# Patient Record
Sex: MALE | Race: WHITE | NOT HISPANIC OR LATINO | Employment: OTHER | URBAN - METROPOLITAN AREA
[De-identification: names, ages, dates, MRNs, and addresses within clinical notes are randomized per-mention and may not be internally consistent; named-entity substitution may affect disease eponyms.]

---

## 2017-01-30 RX ORDER — ZOLPIDEM TARTRATE 10 MG/1
TABLET ORAL
Qty: 30 TABLET | Refills: 0 | Status: SHIPPED | OUTPATIENT
Start: 2017-01-30 | End: 2017-03-15 | Stop reason: SDUPTHER

## 2017-03-15 RX ORDER — ZOLPIDEM TARTRATE 10 MG/1
TABLET ORAL
Qty: 30 TABLET | Refills: 0 | Status: SHIPPED | OUTPATIENT
Start: 2017-03-15 | End: 2017-05-16 | Stop reason: SDUPTHER

## 2017-05-17 RX ORDER — ZOLPIDEM TARTRATE 10 MG/1
TABLET ORAL
Qty: 30 TABLET | Refills: 0 | Status: SHIPPED | OUTPATIENT
Start: 2017-05-17 | End: 2017-06-14 | Stop reason: SDUPTHER

## 2017-06-10 ENCOUNTER — PATIENT MESSAGE (OUTPATIENT)
Dept: INTERNAL MEDICINE | Facility: CLINIC | Age: 62
End: 2017-06-10

## 2017-06-15 RX ORDER — ZOLPIDEM TARTRATE 10 MG/1
TABLET ORAL
Qty: 30 TABLET | Refills: 0 | Status: SHIPPED | OUTPATIENT
Start: 2017-06-15 | End: 2018-01-03 | Stop reason: SDUPTHER

## 2017-06-30 ENCOUNTER — LAB VISIT (OUTPATIENT)
Dept: LAB | Facility: HOSPITAL | Age: 62
End: 2017-06-30
Attending: FAMILY MEDICINE
Payer: COMMERCIAL

## 2017-06-30 ENCOUNTER — OFFICE VISIT (OUTPATIENT)
Dept: INTERNAL MEDICINE | Facility: CLINIC | Age: 62
End: 2017-06-30
Payer: COMMERCIAL

## 2017-06-30 VITALS
HEART RATE: 81 BPM | BODY MASS INDEX: 23.92 KG/M2 | SYSTOLIC BLOOD PRESSURE: 110 MMHG | OXYGEN SATURATION: 98 % | WEIGHT: 176.56 LBS | DIASTOLIC BLOOD PRESSURE: 60 MMHG | HEIGHT: 72 IN

## 2017-06-30 DIAGNOSIS — E78.5 HYPERLIPIDEMIA, UNSPECIFIED HYPERLIPIDEMIA TYPE: ICD-10-CM

## 2017-06-30 DIAGNOSIS — C02.9 TONGUE CANCER: ICD-10-CM

## 2017-06-30 DIAGNOSIS — Z00.00 ANNUAL PHYSICAL EXAM: Primary | ICD-10-CM

## 2017-06-30 DIAGNOSIS — Z00.00 ANNUAL PHYSICAL EXAM: ICD-10-CM

## 2017-06-30 DIAGNOSIS — Z12.5 PROSTATE CANCER SCREENING: ICD-10-CM

## 2017-06-30 DIAGNOSIS — Z12.11 COLON CANCER SCREENING: ICD-10-CM

## 2017-06-30 DIAGNOSIS — G47.00 INSOMNIA, UNSPECIFIED TYPE: ICD-10-CM

## 2017-06-30 LAB
ANION GAP SERPL CALC-SCNC: 13 MMOL/L
BUN SERPL-MCNC: 17 MG/DL
CALCIUM SERPL-MCNC: 10.1 MG/DL
CHLORIDE SERPL-SCNC: 108 MMOL/L
CHOLEST/HDLC SERPL: 3.8 {RATIO}
CO2 SERPL-SCNC: 23 MMOL/L
COMPLEXED PSA SERPL-MCNC: 0.86 NG/ML
CREAT SERPL-MCNC: 1.3 MG/DL
EST. GFR  (AFRICAN AMERICAN): >60 ML/MIN/1.73 M^2
EST. GFR  (NON AFRICAN AMERICAN): 58.5 ML/MIN/1.73 M^2
GLUCOSE SERPL-MCNC: 92 MG/DL
HDL/CHOLESTEROL RATIO: 26 %
HDLC SERPL-MCNC: 250 MG/DL
HDLC SERPL-MCNC: 65 MG/DL
LDLC SERPL CALC-MCNC: 164 MG/DL
NONHDLC SERPL-MCNC: 185 MG/DL
POTASSIUM SERPL-SCNC: 4.6 MMOL/L
SODIUM SERPL-SCNC: 144 MMOL/L
TRIGL SERPL-MCNC: 105 MG/DL

## 2017-06-30 PROCEDURE — 80048 BASIC METABOLIC PNL TOTAL CA: CPT

## 2017-06-30 PROCEDURE — 99396 PREV VISIT EST AGE 40-64: CPT | Mod: S$GLB,,, | Performed by: FAMILY MEDICINE

## 2017-06-30 PROCEDURE — 84153 ASSAY OF PSA TOTAL: CPT

## 2017-06-30 PROCEDURE — 36415 COLL VENOUS BLD VENIPUNCTURE: CPT

## 2017-06-30 PROCEDURE — 99999 PR PBB SHADOW E&M-EST. PATIENT-LVL IV: CPT | Mod: PBBFAC,,, | Performed by: FAMILY MEDICINE

## 2017-06-30 PROCEDURE — 80061 LIPID PANEL: CPT

## 2017-06-30 NOTE — PROGRESS NOTES
Subjective:       Patient ID: Timothy Galdamez is a 62 y.o. male.    Chief Complaint: Annual Exam    Established patient for an annual wellness check/physical exam. No specific complaints, please see ROS.        Review of Systems   Constitutional: Negative for chills, fatigue and fever.   HENT: Negative for congestion and trouble swallowing.    Eyes: Negative for redness.   Respiratory: Negative for cough, chest tightness and shortness of breath.    Cardiovascular: Negative for chest pain, palpitations and leg swelling.   Gastrointestinal: Negative for abdominal pain and blood in stool.   Genitourinary: Negative for hematuria.   Musculoskeletal: Negative for arthralgias, back pain, gait problem, joint swelling, myalgias and neck pain.   Skin: Negative for color change and rash.   Neurological: Negative for tremors, speech difficulty, weakness, numbness and headaches.   Hematological: Negative for adenopathy. Does not bruise/bleed easily.   Psychiatric/Behavioral: Positive for sleep disturbance. Negative for behavioral problems and confusion. The patient is not nervous/anxious.        Objective:      Physical Exam   Constitutional: He appears well-developed and well-nourished.   HENT:   Head: Normocephalic.   Right Ear: Tympanic membrane, external ear and ear canal normal.   Left Ear: Tympanic membrane, external ear and ear canal normal.   Mouth/Throat: Oropharynx is clear and moist.   Eyes: Pupils are equal, round, and reactive to light.   Neck: Normal range of motion. Neck supple. Carotid bruit is not present. No thyromegaly present.   Cardiovascular: Normal rate, regular rhythm, normal heart sounds and intact distal pulses.  Exam reveals no gallop and no friction rub.    No murmur heard.  Pulmonary/Chest: Effort normal and breath sounds normal.   Abdominal: Soft. Bowel sounds are normal. He exhibits no distension and no mass. There is no tenderness. There is no rebound and no guarding.   Musculoskeletal: Normal  range of motion. He exhibits no edema or tenderness.   Lymphadenopathy:     He has no cervical adenopathy.   Neurological: He is alert. He has normal strength. He displays normal reflexes. No cranial nerve deficit or sensory deficit. Coordination and gait normal.   Skin: Skin is warm and dry.   Psychiatric: He has a normal mood and affect. His behavior is normal. Judgment and thought content normal.   Nursing note and vitals reviewed.      Assessment:       1. Annual physical exam    2. Hyperlipidemia, unspecified hyperlipidemia type    3. Insomnia, unspecified type    4. Tongue cancer    5. Colon cancer screening    6. Prostate cancer screening        Plan:   Timothy was seen today for annual exam.    Diagnoses and all orders for this visit:    Annual physical exam  -     Basic metabolic panel; Future  -     Lipid panel; Future  -     PSA, Screening; Future    Hyperlipidemia, unspecified hyperlipidemia type  -     Lipid panel; Future    Insomnia, unspecified type    Tongue cancer    Colon cancer screening  -     Case request GI: COLONOSCOPY    Prostate cancer screening  -     PSA, Screening; Future      See meds, orders, follow up, routing and instructions sections of encounter.  Dictation #1  MRN:[id number]  [facility]:[id number]

## 2017-06-30 NOTE — PATIENT INSTRUCTIONS
[phone] - [facility].  Clinical  or Psychologist            Insomnia  Insomnia is repeated difficulty going to sleep or staying asleep, or both. Whether you have insomnia is not defined by a specific amount of sleep. Different people need different amounts of sleep, and you may need more or less sleep at different times of your life.  There are 3 major types of insomnia:  short-term, chronic, and other.  Short-term, or acute insomnia lasts less than 3 months.  The symptoms are temporary and can be linked directly to a stressor, such as the death of a loved one, financial problems, or a new physical problem.  Short-term insomnia stops when the stressor resolves or the person adapts to its presence.  Chronic insomnia occurs at least 3 times a week and lasts longer than 3 months.  Chronic insomnia can occur when either the cause of the sleeping problem is not clear, or the insomnia does not get better when the stressor is resolved. A number of other criteria are also used to make the diagnosis of chronic insomnia.   Other insomnia is the third type of insomnia-related sleep disorders.  This description applies to people who have problems getting to sleep or staying asleep, but do not meet all of the factors that describe either short-term or chronic insomnia.    Many things cause insomnia. Different people may have different causes. It can be from an underlying medical or psychological condition, or lifestyle. It can also be primary insomnia, which means no cause can be found.  Causes of insomnia include:  · Chronic medical problems- heart disease, gastrointestinal problems, hormonal changes, breathing problems  · Anxiety  · Stress  · Depression  · Pain  · Work schedule  · Sleep apnea  · Illegal drugs  · Certain medicines  Many different medidcines can affect your sleep, such as stimulants, caffeine, alcohol, some decongestants, and diet pills. Other medicines may include some types of  blood pressure pills, steroids, asthma medicines, antihistamines, antidepressants, seizure medicines and statins. Not all of these will affect your sleep, and they shouldnt be stopped without talking to your doctor.  Symptoms of insomnia can include:  · Lying awake for long periods at night before falling asleep  · Waking up several times during the night  · Waking up early in the morning and not being able to get back to sleep  · Feeling tired and not refreshed by sleep  · Not being able to function properly during the day and finding it hard to concentrate  · Irritability  · Tiredness and fatigue during the day  Home care  1. Review your medicines with your doctor or pharmacist to find out if they can cause insomnia. Not all medicines will affect your sleep, but they shouldn't be stopped without reviewing them with your doctor. There may be serious side effects and consequences from suddenly stopping your medicines. Not taking them may cause strokes, heart attacks, and many other problems.  2. Caffeine, smoking and alcohol also affect sleep. Limit your daily use and do not use these before bedtime. Alcohol may make you sleepy at first, but as its effects wear off, you may awaken a few hours later and have trouble returning to sleep.  3. Do not exercise, eat or drink large amounts of liquid within 2 hours of your bedtime.  4. Improve your sleep habits. Have a fixed bed and wake-up time. Try to keep noise, light and heat in your bedroom at a comfortable level. Try using earplugs or eyeshades if needed.   5. Avoid watching TV in bed.  6. If you do not fall asleep within 30 minutes, try to relax by reading or listening to soft music.  7. Limit daytime napping to one 30 minute period, early in the day.  8. Get regular exercise. Find other ways to lessen your stress level.  9. If a medicine was prescribed to help reset your sleep patterns, take it as directed. Sleeping pills are intended for short-term use, only. If  taken for too long, the effect wears off while the risk of physical addiction and psychological dependence increases.  Sleep diary  If the cause isnt obvious and it is not improving, try keeping a sleep diary for a couple of weeks. Include in it:  · The time you go to bed  · How long it takes to fall asleep  · How many times you wake up  · What time you wake up  · Your meal times and what you eat  · What time you drink alcohol  · Your exercise habits and times  Follow-up care  Follow up with your healthcare provider, or as advised. If X-rays or CT scans were done, you will be notified if there is a change in the reading, especially if it affects treatment.  Call 911  Call 911 if any of these occur:  · Trouble breathing  · Confusion or trouble waking  · Fainting or loss of consciousness  · Rapid heart rate  · New chest, arm, shoulder, neck or upper back pain  · Trouble with speech or vision, weakness of an arm or leg  · Trouble walking or talking, loss of balance, numbness or weakness in one side of your body, facial droop  When to seek medical advice  Call your healthcare provider right away if any of these occur:  · Extreme restlessness or irritability  · Confusion or hallucinations (seeing or hearing things that are not there)  · Anxiety, depression  · Several days without sleeping  Date Last Reviewed: 11/19/2015  © 5724-0285 [employer]. [address] [place] [address], [place] [address]. All rights reserved. This information is not intended as a substitute for professional medical care. Always follow your healthcare professional's instructions.

## 2017-07-05 ENCOUNTER — PATIENT MESSAGE (OUTPATIENT)
Dept: INTERNAL MEDICINE | Facility: CLINIC | Age: 62
End: 2017-07-05

## 2017-07-05 DIAGNOSIS — E78.5 HYPERLIPIDEMIA, UNSPECIFIED HYPERLIPIDEMIA TYPE: Primary | ICD-10-CM

## 2017-07-05 DIAGNOSIS — Z12.11 SPECIAL SCREENING FOR MALIGNANT NEOPLASMS, COLON: Primary | ICD-10-CM

## 2017-07-05 RX ORDER — PRAVASTATIN SODIUM 40 MG/1
40 TABLET ORAL DAILY
Qty: 30 TABLET | Refills: 11 | Status: SHIPPED | OUTPATIENT
Start: 2017-07-05 | End: 2018-08-02 | Stop reason: SDUPTHER

## 2017-07-05 RX ORDER — SODIUM, POTASSIUM,MAG SULFATES 17.5-3.13G
SOLUTION, RECONSTITUTED, ORAL ORAL
Qty: 1 BOTTLE | Refills: 0 | Status: SHIPPED | OUTPATIENT
Start: 2017-07-05 | End: 2018-06-04

## 2017-08-21 ENCOUNTER — SURGERY (OUTPATIENT)
Age: 62
End: 2017-08-21

## 2017-08-21 ENCOUNTER — ANESTHESIA (OUTPATIENT)
Dept: ENDOSCOPY | Facility: HOSPITAL | Age: 62
End: 2017-08-21
Payer: COMMERCIAL

## 2017-08-21 ENCOUNTER — ANESTHESIA EVENT (OUTPATIENT)
Dept: ENDOSCOPY | Facility: HOSPITAL | Age: 62
End: 2017-08-21
Payer: COMMERCIAL

## 2017-08-21 ENCOUNTER — HOSPITAL ENCOUNTER (OUTPATIENT)
Facility: HOSPITAL | Age: 62
Discharge: HOME | End: 2017-08-21
Attending: COLON & RECTAL SURGERY | Admitting: COLON & RECTAL SURGERY
Payer: COMMERCIAL

## 2017-08-21 VITALS
RESPIRATION RATE: 18 BRPM | HEART RATE: 56 BPM | OXYGEN SATURATION: 100 % | DIASTOLIC BLOOD PRESSURE: 81 MMHG | TEMPERATURE: 98 F | HEIGHT: 73 IN | WEIGHT: 175 LBS | SYSTOLIC BLOOD PRESSURE: 131 MMHG | BODY MASS INDEX: 23.19 KG/M2

## 2017-08-21 DIAGNOSIS — Z12.11 SPECIAL SCREENING FOR MALIGNANT NEOPLASMS, COLON: ICD-10-CM

## 2017-08-21 PROCEDURE — 63600175 PHARM REV CODE 636 W HCPCS: Performed by: NURSE ANESTHETIST, CERTIFIED REGISTERED

## 2017-08-21 PROCEDURE — D9220A PRA ANESTHESIA: Mod: 33,CRNA,, | Performed by: NURSE ANESTHETIST, CERTIFIED REGISTERED

## 2017-08-21 PROCEDURE — S5010 5% DEXTROSE AND 0.45% SALINE: HCPCS | Performed by: COLON & RECTAL SURGERY

## 2017-08-21 PROCEDURE — 25000003 PHARM REV CODE 250: Performed by: COLON & RECTAL SURGERY

## 2017-08-21 PROCEDURE — 37000009 HC ANESTHESIA EA ADD 15 MINS: Performed by: COLON & RECTAL SURGERY

## 2017-08-21 PROCEDURE — G0121 COLON CA SCRN NOT HI RSK IND: HCPCS | Performed by: COLON & RECTAL SURGERY

## 2017-08-21 PROCEDURE — 37000008 HC ANESTHESIA 1ST 15 MINUTES: Performed by: COLON & RECTAL SURGERY

## 2017-08-21 PROCEDURE — G0121 COLON CA SCRN NOT HI RSK IND: HCPCS | Mod: ,,, | Performed by: COLON & RECTAL SURGERY

## 2017-08-21 PROCEDURE — D9220A PRA ANESTHESIA: Mod: 33,ANES,, | Performed by: ANESTHESIOLOGY

## 2017-08-21 RX ORDER — LIDOCAINE HCL/PF 100 MG/5ML
SYRINGE (ML) INTRAVENOUS
Status: DISCONTINUED | OUTPATIENT
Start: 2017-08-21 | End: 2017-08-21

## 2017-08-21 RX ORDER — PROPOFOL 10 MG/ML
VIAL (ML) INTRAVENOUS CONTINUOUS PRN
Status: DISCONTINUED | OUTPATIENT
Start: 2017-08-21 | End: 2017-08-21

## 2017-08-21 RX ORDER — PROPOFOL 10 MG/ML
VIAL (ML) INTRAVENOUS
Status: DISCONTINUED | OUTPATIENT
Start: 2017-08-21 | End: 2017-08-21

## 2017-08-21 RX ORDER — DEXTROSE MONOHYDRATE AND SODIUM CHLORIDE 5; .45 G/100ML; G/100ML
INJECTION, SOLUTION INTRAVENOUS CONTINUOUS
Status: DISCONTINUED | OUTPATIENT
Start: 2017-08-21 | End: 2017-08-21 | Stop reason: HOSPADM

## 2017-08-21 RX ADMIN — DEXTROSE AND SODIUM CHLORIDE: 5; .45 INJECTION, SOLUTION INTRAVENOUS at 08:08

## 2017-08-21 RX ADMIN — PROPOFOL 150 MCG/KG/MIN: 10 INJECTION, EMULSION INTRAVENOUS at 09:08

## 2017-08-21 RX ADMIN — PROPOFOL 80 MG: 10 INJECTION, EMULSION INTRAVENOUS at 09:08

## 2017-08-21 RX ADMIN — LIDOCAINE HYDROCHLORIDE 100 MG: 20 INJECTION, SOLUTION INTRAVENOUS at 09:08

## 2017-08-21 NOTE — ANESTHESIA PREPROCEDURE EVALUATION
2017  Timothy Galdamez is a 62 y.o., male.  Pre-operative evaluation for Procedure(s) (LRB):  COLONOSCOPY (N/A)    Timothy Galdamez is a 62 y.o. male     Patient Active Problem List   Diagnosis    Insomnia    Hyperlipidemia    Tongue cancer       Review of patient's allergies indicates:  No Known Allergies    No current facility-administered medications on file prior to encounter.      Current Outpatient Prescriptions on File Prior to Encounter   Medication Sig Dispense Refill    meloxicam (MOBIC) 15 MG tablet       sildenafil (VIAGRA) 100 MG tablet Take 1 tablet (100 mg total) by mouth daily as needed for Erectile Dysfunction. 6 tablet 11    zolpidem (AMBIEN) 10 mg Tab TAKE ONE TABLET BY MOUTH EVERY DAY AT BEDTIME AS NEEDED 30 tablet 0       Past Surgical History:   Procedure Laterality Date    ANKLE SURGERY      L ankle    TONSILLECTOMY      VASECTOMY         Social History     Social History    Marital status: Single     Spouse name: N/A    Number of children: N/A    Years of education: N/A     Occupational History    Not on file.     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                     Vital Signs Range (Last 24H):         CBC: No results for input(s): WBC, RBC, HGB, HCT, PLT, MCV, MCH, MCHC in the last 72 hours.    CMP: No results for input(s): NA, K, CL, CO2, BUN, CREATININE, GLU, MG, PHOS, CALCIUM, ALBUMIN, PROT, ALKPHOS, ALT, AST, BILITOT in the last 72 hours.    INR  No results for input(s): INR, PROTIME, APTT in the last 72 hours.    Invalid input(s): PT        Diagnostic Studies:      EKD  Echo:        Pre-op Assessment    I have reviewed the Patient Summary Reports.     I have reviewed the Nursing Notes.   I have reviewed the Medications.     Review of Systems  Anesthesia Hx:  No problems with previous Anesthesia  History of prior surgery of interest to airway management or planning: Denies Family Hx of Anesthesia complications.    Social:  Non-Smoker, Alcohol Use Occasional alcohol use   Cardiovascular:   Exercise tolerance: good Denies Hypertension.      hyperlipidemia    Pulmonary:   Denies COPD.  Denies Recent URI.    Renal/:   Denies Chronic Renal Disease.     Hepatic/GI:   Denies GERD.  Denies Hepatitis.    Musculoskeletal:   Denies Arthritis.     Neurological:   Denies CVA. Denies Seizures.    Endocrine:   Denies Diabetes. Denies Hypothyroidism.        Physical Exam  General:  Well nourished    Airway/Jaw/Neck:  Airway Findings: Mouth Opening: Normal Tongue: Normal  General Airway Assessment: Adult  Mallampati: II  TM Distance: Normal, at least 6 cm  Jaw/Neck Findings:  Neck ROM: Normal ROM      Dental:  Dental Findings: In tact   Chest/Lungs:  Chest/Lungs Findings: Clear to auscultation, Normal Respiratory Rate     Heart/Vascular:  Heart Findings: Rate: Normal  Rhythm: Regular Rhythm        Mental Status:  Mental Status Findings:  Cooperative, Alert and Oriented         Anesthesia Plan  Type of Anesthesia, risks & benefits discussed:  Anesthesia Type:  general  Patient's Preference: general  Intra-op Monitoring Plan: standard ASA monitors  Intra-op Monitoring Plan Comments:   Post Op Pain Control Plan: per primary service following discharge from PACU  Post Op Pain Control Plan Comments:   Induction:   IV  Beta Blocker:  Patient is not currently on a Beta-Blocker (No further documentation required).       Informed Consent: Patient understands risks and agrees with Anesthesia plan.  Questions answered. Anesthesia consent signed with patient.  ASA Score: 1     Day of Surgery Review of History  & Physical: I have interviewed and examined the patient. I have reviewed the patient's H&P dated: 7/15/14.   H&P update referred to the provider.         Ready For Surgery From Anesthesia Perspective.

## 2017-08-21 NOTE — PATIENT INSTRUCTIONS
Discharge Summary/Instructions after an Endoscopic Procedure  Patient Name: Timothy Galdamez  Patient MRN: [id number]  Patient YOB: 1955  Monday, August 21, 2017  Danny Jane MD  RESTRICTIONS:  During your procedure today, you received medications for sedation.  These   medications may affect your judgment, balance and coordination.  Therefore,   for 24 hours, you have the following restrictions:   - DO NOT drive a car, operate machinery, make legal/financial decisions,   sign important papers or drink alcohol.    ACTIVITY:  The following day: return to full activity including work, except no heavy   lifting, straining or running for 3 days if polyps were removed.  DIET:  Eat and drink normally unless instructed otherwise.  TREATMENT FOR COMMON SIDE EFFECTS:  - Mild abdominal pain, belching, bloating or excessive gas: rest, eat   lightly and use a heating pad.  - Sore Throat: treat with throat lozenges and/or gargle with warm salt   water.  SYMPTOMS TO WATCH FOR AND REPORT TO YOUR PHYSICIAN:  1. Abdominal pain or bloating, other than gas cramps.  2. Chest pain.  3. Back pain.  4. Chills or fever occurring within 24 hours after the procedure.  5. Rectal bleeding, which would show as bright red, maroon, or black stools.   (A tablespoon of blood from the rectum is not serious, especially if   hemorrhoids are present.)  6. Vomiting.  7. Weakness or dizziness.  8. Because air was used during the procedure, expelling large amounts of air   from your rectum or belching is normal.  9. If a bowel prep was taken, you may not have a bowel movement for 1-3   days.  This is normal.  GO DIRECTLY TO THE EMERGENCY ROOM IF YOU HAVE ANY OF THE FOLLOWING:   Difficulty breathing  Chills and/or fever over 101 F   Persistent vomiting and/or vomiting blood   Severe abdominal pain   Severe chest pain   Black, tarry stools   Bleeding- more than one tablespoon  Your doctor recommends these additional instructions:  If any biopsies  were taken, your doctorâs clinic will call you in 1 to 2   weeks with any results.  You are being discharged to home.   Your physician has recommended a repeat colonoscopy in 10 years for   screening purposes.  For questions, problems or results please call your physician - Danny Jane MD at Work:  [phone].  [facility], EMERGENCY ROOM PHONE NUMBER: [phone]  IF A COMPLICATION OR EMERGENCY SITUATION ARISES AND YOU ARE UNABLE TO REACH   YOUR PHYSICIAN - GO DIRECTLY TO THE EMERGENCY ROOM.  Danny Jane MD  8/21/2017 9:20:42 AM  This report has been verified and signed electronically.

## 2017-08-21 NOTE — TRANSFER OF CARE
"Anesthesia Transfer of Care Note    Patient: Timothy GREENE Rudolph    Procedure(s) Performed: Procedure(s) (LRB):  COLONOSCOPY (N/A)    Patient location: GI    Anesthesia Type: general    Transport from OR: Transported from OR on room air with adequate spontaneous ventilation    Post pain: adequate analgesia    Post assessment: no apparent anesthetic complications    Post vital signs: stable    Level of consciousness: awake    Nausea/Vomiting: no nausea/vomiting    Transfer of care protocol was followed      Last vitals:   Visit Vitals  BP (!) 97/56 (BP Location: Left arm, Patient Position: Lying)   Pulse 61   Temp 36.7 °C (98.1 °F) (Oral)   Resp 18   Ht 6' 1" (1.854 m)   Wt 79.4 kg (175 lb)   SpO2 98%   BMI 23.09 kg/m²     "

## 2017-08-21 NOTE — ANESTHESIA POSTPROCEDURE EVALUATION
"Anesthesia Post Evaluation    Patient: Timothy Galdamez    Procedure(s) Performed: Procedure(s) (LRB):  COLONOSCOPY (N/A)    Final Anesthesia Type: general  Patient location during evaluation: GI PACU  Patient participation: Yes- Able to Participate  Level of consciousness: awake and alert  Post-procedure vital signs: reviewed and stable  Pain management: adequate  Airway patency: patent  PONV status at discharge: No PONV  Anesthetic complications: no      Cardiovascular status: blood pressure returned to baseline  Respiratory status: unassisted, spontaneous ventilation and room air  Hydration status: euvolemic  Follow-up not needed.        Visit Vitals  /81 (BP Location: Left arm, Patient Position: Lying)   Pulse (!) 56   Temp 36.7 °C (98 °F) (Oral)   Resp 18   Ht 6' 1" (1.854 m)   Wt 79.4 kg (175 lb)   SpO2 100%   BMI 23.09 kg/m²       Pain/Stephy Score: Pain Assessment Performed: Yes (8/21/2017  9:45 AM)  Presence of Pain: denies (8/21/2017  9:45 AM)  Stephy Score: 10 (8/21/2017  9:45 AM)      "

## 2017-08-21 NOTE — DISCHARGE INSTRUCTIONS
Colonoscopy     A camera attached to a flexible tube with a viewing lens is used to take video pictures.     Colonoscopy is a test to view the inside of your lower digestive tract (colon and rectum). Sometimes it can show the last part of the small intestine (ileum). During the test, small pieces of tissue may be removed for testing. This is called a biopsy. Small growths, such as polyps, may also be removed.   Why is colonoscopy done?  The test is done to help look for colon cancer. And it can help find the source of abdominal pain, bleeding, and changes in bowel habits. It may be needed once a year, depending on factors such as your:  · Age  · Health history  · Family health history  · Symptoms  · Results from any prior colonoscopy  Risks and possible complications  These include:  · Bleeding               · A puncture or tear in the colon   · Risks of anesthesia  · A cancer lesion not being seen  Getting ready   To prepare for the test:  · Talk with your healthcare provider about the risks of the test (see below). Also ask your healthcare provider about alternatives to the test.  · Tell your healthcare provider about any medicines you take. Also tell him or her about any health conditions you may have.  · Make sure your rectum and colon are empty for the test. Follow the diet and bowel prep instructions exactly. If you dont, the test may need to be rescheduled.  · Plan for a friend or family member to drive you home after the test.     Colonoscopy provides an inside view of the entire colon.     You may discuss the results with your doctor right away or at a future visit.  During the test   The test is usually done in the hospital on an outpatient basis. This means you go home the same day. The procedure takes about 30 minutes. During that time:  · You are given relaxing (sedating) medicine through an IV line. You may be drowsy, or fully asleep.  · The healthcare provider will first give you a physical exam to  check for anal and rectal problems.  · Then the anus is lubricated and the scope inserted.  · If you are awake, you may have a feeling similar to needing to have a bowel movement. You may also feel pressure as air is pumped into the colon. Its OK to pass gas during the procedure.  · Biopsy, polyp removal, or other treatments may be done during the test.  After the test   You may have gas right after the test. It can help to try to pass it to help prevent later bloating. Your healthcare provider may discuss the results with you right away. Or you may need to schedule a follow-up visit to talk about the results. After the test, you can go back to your normal eating and other activities. You may be tired from the sedation and need to rest for a few hours.  Date Last Reviewed: 11/1/2016  © 9493-5174 [employer]. [address] [place] [address], [place] [address]. All rights reserved. This information is not intended as a substitute for professional medical care. Always follow your healthcare professional's instructions.

## 2017-08-21 NOTE — PLAN OF CARE
Discharge instructions reviewed with pt and pts wife. Both verbalize understanding and have no questions at this time.

## 2017-08-21 NOTE — H&P
Endoscopy H&P    Procedure : Colonoscopy      asymptomatic screening exam      Past Medical History:   Diagnosis Date    Hyperlipidemia      Sedation Problems: NO  Family History   Problem Relation Age of Onset    Psoriasis Neg Hx     Eczema Neg Hx      Fam Hx of Sedation Problems: NO  Social History     Social History    Marital status: Single     Spouse name: N/A    Number of children: N/A    Years of education: N/A     Occupational History    Not on file.     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                   Review of Systems - Negative     Respiratory ROS: negative  Cardiovascular ROS: negative  Gastrointestinal ROS: negative  Musculoskeletal ROS: negative  Neurological ROS: negative        Physical Exam:  General: no distress  Head: normocephalic  Airway:  normal oropharynx, airway normal  Neck: supple, symmetrical, trachea midline  Lungs:  clear to auscultation bilaterally and normal respiratory effort  Heart: regular rate and rhythm, S1, S2 normal, no murmur, rub or gallop  Abdomen: soft, non-tender non-distented; bowel sounds normal; no masses,  no organomegaly  Extremities: no cyanosis or edema, or clubbing       Deep Sedation: Mallampati Score per anesthesia     SedationPlan :Gen     ASA : II  Have seen and examined the patient with the fellow and agree with their plan.  MALGORZATA DENNIS

## 2017-08-28 ENCOUNTER — TELEPHONE (OUTPATIENT)
Dept: ENDOSCOPY | Facility: HOSPITAL | Age: 62
End: 2017-08-28

## 2017-12-08 ENCOUNTER — PATIENT MESSAGE (OUTPATIENT)
Dept: INTERNAL MEDICINE | Facility: CLINIC | Age: 62
End: 2017-12-08

## 2018-01-03 RX ORDER — ZOLPIDEM TARTRATE 10 MG/1
10 TABLET ORAL NIGHTLY
Qty: 30 TABLET | Refills: 0 | Status: SHIPPED | OUTPATIENT
Start: 2018-01-03 | End: 2018-03-02 | Stop reason: SDUPTHER

## 2018-02-09 RX ORDER — SILDENAFIL CITRATE 100 MG/1
TABLET, FILM COATED ORAL
Qty: 6 TABLET | Refills: 11 | Status: SHIPPED | OUTPATIENT
Start: 2018-02-09 | End: 2019-05-22 | Stop reason: ALTCHOICE

## 2018-03-02 RX ORDER — ZOLPIDEM TARTRATE 10 MG/1
10 TABLET ORAL NIGHTLY
Qty: 30 TABLET | Refills: 0 | Status: SHIPPED | OUTPATIENT
Start: 2018-03-02 | End: 2018-04-19 | Stop reason: SDUPTHER

## 2018-04-19 RX ORDER — ZOLPIDEM TARTRATE 10 MG/1
10 TABLET ORAL NIGHTLY
Qty: 30 TABLET | Refills: 0 | Status: SHIPPED | OUTPATIENT
Start: 2018-04-19 | End: 2018-06-17 | Stop reason: SDUPTHER

## 2018-06-04 ENCOUNTER — OFFICE VISIT (OUTPATIENT)
Dept: INTERNAL MEDICINE | Facility: CLINIC | Age: 63
End: 2018-06-04
Attending: FAMILY MEDICINE
Payer: COMMERCIAL

## 2018-06-04 VITALS
HEART RATE: 54 BPM | WEIGHT: 180 LBS | SYSTOLIC BLOOD PRESSURE: 121 MMHG | DIASTOLIC BLOOD PRESSURE: 78 MMHG | BODY MASS INDEX: 23.1 KG/M2 | HEIGHT: 74 IN

## 2018-06-04 DIAGNOSIS — E78.5 HYPERLIPIDEMIA, UNSPECIFIED HYPERLIPIDEMIA TYPE: ICD-10-CM

## 2018-06-04 DIAGNOSIS — R41.3 MEMORY CHANGE: ICD-10-CM

## 2018-06-04 DIAGNOSIS — C02.9 TONGUE CANCER: ICD-10-CM

## 2018-06-04 DIAGNOSIS — Z00.00 ANNUAL PHYSICAL EXAM: Primary | ICD-10-CM

## 2018-06-04 DIAGNOSIS — Z12.5 PROSTATE CANCER SCREENING: ICD-10-CM

## 2018-06-04 DIAGNOSIS — M75.101 ROTATOR CUFF SYNDROME OF RIGHT SHOULDER: ICD-10-CM

## 2018-06-04 DIAGNOSIS — G25.81 RLS (RESTLESS LEGS SYNDROME): ICD-10-CM

## 2018-06-04 DIAGNOSIS — G47.00 INSOMNIA, UNSPECIFIED TYPE: ICD-10-CM

## 2018-06-04 PROCEDURE — 99396 PREV VISIT EST AGE 40-64: CPT | Mod: S$GLB,,, | Performed by: FAMILY MEDICINE

## 2018-06-04 PROCEDURE — 99999 PR PBB SHADOW E&M-EST. PATIENT-LVL IV: CPT | Mod: PBBFAC,,, | Performed by: FAMILY MEDICINE

## 2018-06-04 NOTE — PATIENT INSTRUCTIONS
[phone] - [facility]. Clinical  or Psychologist      Physical Therapy/Occupational Therapy number to schedule appointments - [phone]

## 2018-06-04 NOTE — PROGRESS NOTES
Subjective:       Patient ID: Timothy Galdamez is a 63 y.o. male.    Chief Complaint: Annual Exam    Established patient for an annual wellness check/physical exam and also chronic disease management. Specific complaints - see dictation and please see ROS.        Review of Systems   Constitutional: Negative for chills, fatigue and fever.   HENT: Negative for congestion and trouble swallowing.    Eyes: Negative for redness.   Respiratory: Negative for cough, chest tightness and shortness of breath.    Cardiovascular: Negative for chest pain, palpitations and leg swelling.   Gastrointestinal: Negative for abdominal pain and blood in stool.   Genitourinary: Negative for hematuria.   Musculoskeletal: Positive for arthralgias. Negative for back pain, gait problem, joint swelling, myalgias and neck pain.   Skin: Negative for color change and rash.   Neurological: Negative for tremors, speech difficulty, weakness, numbness and headaches.   Hematological: Negative for adenopathy. Does not bruise/bleed easily.   Psychiatric/Behavioral: Positive for decreased concentration. Negative for behavioral problems, confusion and sleep disturbance. The patient is not nervous/anxious.        Objective:      Physical Exam   Constitutional: He is oriented to person, place, and time. He appears well-developed and well-nourished. No distress.   HENT:   Head: Normocephalic.   Right Ear: Tympanic membrane, external ear and ear canal normal.   Left Ear: Tympanic membrane, external ear and ear canal normal.   Nose: Nose normal.   Mouth/Throat: Uvula is midline, oropharynx is clear and moist and mucous membranes are normal. No oropharyngeal exudate.   Eyes: Conjunctivae are normal. Right eye exhibits no discharge. Left eye exhibits no discharge. No scleral icterus.   Neck: Normal range of motion. Neck supple. No thyromegaly present.   Cardiovascular: Normal rate, regular rhythm, normal heart sounds and intact distal pulses.  Exam reveals no  gallop and no friction rub.    No murmur heard.  Pulmonary/Chest: Effort normal. No respiratory distress. He has no wheezes. He has no rales. He exhibits no tenderness.   Abdominal: Soft. There is no tenderness.   Musculoskeletal: He exhibits no edema.        Right shoulder: He exhibits decreased range of motion. He exhibits normal strength.   Lymphadenopathy:     He has no cervical adenopathy.   Neurological: He is alert and oriented to person, place, and time.   Skin: Skin is warm and dry. No rash noted. He is not diaphoretic.   Nursing note and vitals reviewed.      Assessment:       1. Annual physical exam    2. Hyperlipidemia, unspecified hyperlipidemia type    3. Prostate cancer screening    4. Rotator cuff syndrome of right shoulder    5. Memory change    6. Insomnia, unspecified type    7. Tongue cancer    8. RLS (restless legs syndrome)        Plan:   Timothy was seen today for annual exam.    Diagnoses and all orders for this visit:    Annual physical exam  -     Comprehensive metabolic panel; Future  -     Lipid panel; Future  -     PSA, Screening; Future  -     TSH; Future    Hyperlipidemia, unspecified hyperlipidemia type  -     Lipid panel; Future    Prostate cancer screening  -     PSA, Screening; Future    Rotator cuff syndrome of right shoulder  -     Ambulatory Referral to Physical/Occupational Therapy    Memory change  -     Ambulatory referral to Neurology    Insomnia, unspecified type    Tongue cancer  Comments:  treated at outside institution - states had scanning last year - no records available  Orders:  -     TSH; Future    RLS (restless legs syndrome)      See meds, orders, follow up, routing and instructions sections of encounter.  A 63-year-old established male patient, presenting for an annual physical   examination with multiple complaints including, but not limited to right   shoulder pain for months, worse with volleyball serves.  He also has insomnia,   which is chronic.  We discussed  medications in the past.  I let him know that as   he approaches the age of 65, we will have future difficulties with Ambien.  He   also describes daytime fatigue and I did discuss residual Ambien effects.  He   also states he is taking his Ambien in the middle of the night.  I advised him   strongly not to.  Advised he needs eight hours of time period to sleep.  He   states he is arising after two hours of sleep.  I suggested looking into some   psychological counseling perhaps for stress management, increasing his aerobic   activities and a trial of alternate medications.  He has other complaints of   daytime fatigue, but no loss of libido.  He was asking about a testosterone   supplementation.  I informed him that we do not give out those type supplements   without a comprehensive evaluation and a typical referral to either   Endocrinology or Urology.  At that point., he states he did not want to pursue.    Does not appear he has any global symptoms of hypogonadism or any previous   risk.  Suggest increasing his aerobic activity.  See additional orders.      ISI  dd: 06/04/2018 13:30:22 (CDT)  td: 06/05/2018 03:22:02 (CDT)  Doc ID   #[id number]  Job ID #[id number]    CC:

## 2018-06-18 RX ORDER — ZOLPIDEM TARTRATE 10 MG/1
TABLET ORAL
Qty: 30 TABLET | Refills: 2 | Status: SHIPPED | OUTPATIENT
Start: 2018-06-18 | End: 2018-12-11 | Stop reason: SDUPTHER

## 2018-08-02 RX ORDER — PRAVASTATIN SODIUM 40 MG/1
TABLET ORAL
Qty: 90 TABLET | Refills: 3 | Status: SHIPPED | OUTPATIENT
Start: 2018-08-02 | End: 2019-05-22 | Stop reason: SDUPTHER

## 2018-12-14 RX ORDER — ZOLPIDEM TARTRATE 10 MG/1
TABLET ORAL
Qty: 30 TABLET | Refills: 2 | Status: SHIPPED | OUTPATIENT
Start: 2018-12-14 | End: 2019-05-22 | Stop reason: ALTCHOICE

## 2018-12-17 RX ORDER — ZOLPIDEM TARTRATE 10 MG/1
10 TABLET ORAL NIGHTLY
Qty: 30 TABLET | Refills: 2 | OUTPATIENT
Start: 2018-12-17

## 2019-03-19 RX ORDER — ZOLPIDEM TARTRATE 10 MG/1
TABLET ORAL
Qty: 30 TABLET | Refills: 0 | Status: SHIPPED | OUTPATIENT
Start: 2019-03-19 | End: 2019-05-22 | Stop reason: ALTCHOICE

## 2019-05-22 ENCOUNTER — OFFICE VISIT (OUTPATIENT)
Dept: INTERNAL MEDICINE | Facility: CLINIC | Age: 64
End: 2019-05-22
Attending: FAMILY MEDICINE
Payer: COMMERCIAL

## 2019-05-22 VITALS
HEIGHT: 72 IN | BODY MASS INDEX: 24.44 KG/M2 | OXYGEN SATURATION: 95 % | SYSTOLIC BLOOD PRESSURE: 110 MMHG | WEIGHT: 180.44 LBS | TEMPERATURE: 98 F | HEART RATE: 68 BPM | DIASTOLIC BLOOD PRESSURE: 72 MMHG

## 2019-05-22 DIAGNOSIS — Z12.5 PROSTATE CANCER SCREENING: ICD-10-CM

## 2019-05-22 DIAGNOSIS — C02.9 TONGUE CANCER: ICD-10-CM

## 2019-05-22 DIAGNOSIS — Z00.00 ANNUAL PHYSICAL EXAM: Primary | ICD-10-CM

## 2019-05-22 DIAGNOSIS — E78.5 HYPERLIPIDEMIA, UNSPECIFIED HYPERLIPIDEMIA TYPE: ICD-10-CM

## 2019-05-22 PROBLEM — Z12.11 SPECIAL SCREENING FOR MALIGNANT NEOPLASMS, COLON: Status: RESOLVED | Noted: 2017-08-21 | Resolved: 2019-05-22

## 2019-05-22 PROCEDURE — 99999 PR PBB SHADOW E&M-EST. PATIENT-LVL III: ICD-10-PCS | Mod: PBBFAC,,, | Performed by: FAMILY MEDICINE

## 2019-05-22 PROCEDURE — 99396 PREV VISIT EST AGE 40-64: CPT | Mod: 25,S$GLB,, | Performed by: FAMILY MEDICINE

## 2019-05-22 PROCEDURE — 99999 PR PBB SHADOW E&M-EST. PATIENT-LVL III: CPT | Mod: PBBFAC,,, | Performed by: FAMILY MEDICINE

## 2019-05-22 PROCEDURE — 99396 PR PREVENTIVE VISIT,EST,40-64: ICD-10-PCS | Mod: 25,S$GLB,, | Performed by: FAMILY MEDICINE

## 2019-05-22 RX ORDER — PRAVASTATIN SODIUM 40 MG/1
40 TABLET ORAL DAILY
Qty: 90 TABLET | Refills: 3 | Status: SHIPPED | OUTPATIENT
Start: 2019-05-22 | End: 2020-03-13 | Stop reason: SDUPTHER

## 2019-05-22 NOTE — PROGRESS NOTES
Subjective:       Patient ID: Timothy Galdamez is a 64 y.o. male.    Chief Complaint: Annual Exam    Established patient for an annual wellness check/physical exam and also chronic disease management. Specific complaints - see dictation and please see ROS.  P, S, Fm, Soc Hx's; Meds, allergies reviewed and reconciled.  Health maintenance file reviewed and addressed items due.    Review of Systems   Constitutional: Negative for chills, fatigue, fever and unexpected weight change.   HENT: Negative for congestion and trouble swallowing.    Eyes: Negative for redness and visual disturbance.   Respiratory: Negative for cough, chest tightness and shortness of breath.    Cardiovascular: Negative for chest pain, palpitations and leg swelling.   Gastrointestinal: Negative for abdominal pain and blood in stool.   Genitourinary: Negative for difficulty urinating and hematuria.   Musculoskeletal: Negative for arthralgias, back pain, gait problem, joint swelling, myalgias and neck pain.   Skin: Negative for color change and rash.   Neurological: Negative for tremors, speech difficulty, weakness, numbness and headaches.   Hematological: Negative for adenopathy. Does not bruise/bleed easily.   Psychiatric/Behavioral: Negative for behavioral problems, confusion and sleep disturbance. The patient is not nervous/anxious.        Objective:      Physical Exam   Constitutional: He appears well-developed and well-nourished.   HENT:   Head: Normocephalic and atraumatic.   Right Ear: Tympanic membrane, external ear and ear canal normal.   Left Ear: Tympanic membrane, external ear and ear canal normal.   Mouth/Throat: Oropharynx is clear and moist.   Eyes: Pupils are equal, round, and reactive to light. No scleral icterus.   Neck: Normal range of motion. Neck supple. Carotid bruit is not present. No thyromegaly present.   Cardiovascular: Normal rate, regular rhythm, normal heart sounds and intact distal pulses. Exam reveals no gallop and no  friction rub.   No murmur heard.  Pulmonary/Chest: Effort normal and breath sounds normal.   Abdominal: Soft. He exhibits no distension and no mass. There is no tenderness. There is no rebound and no guarding. Hernia confirmed negative in the right inguinal area and confirmed negative in the left inguinal area.   Genitourinary: Rectum normal, prostate normal, testes normal and penis normal. Right testis shows no mass and no tenderness. Left testis shows no mass and no tenderness.   Musculoskeletal: Normal range of motion. He exhibits no edema or tenderness.   Lymphadenopathy:     He has no cervical adenopathy.        Right: No inguinal adenopathy present.        Left: No inguinal adenopathy present.   Neurological: He is alert. He has normal strength. He displays normal reflexes. No cranial nerve deficit or sensory deficit. Coordination and gait normal.   Skin: Skin is warm and dry.   Psychiatric: He has a normal mood and affect. His behavior is normal. Judgment and thought content normal.   Nursing note and vitals reviewed.      Assessment:       1. Annual physical exam    2. Hyperlipidemia, unspecified hyperlipidemia type    3. Prostate cancer screening    4. Tongue cancer        Plan:     Medication List with Changes/Refills   Changed and/or Refilled Medications    Modified Medication Previous Medication    PRAVASTATIN (PRAVACHOL) 40 MG TABLET pravastatin (PRAVACHOL) 40 MG tablet       Take 1 tablet (40 mg total) by mouth once daily.    TAKE ONE TABLET BY MOUTH EVERY DAY   Discontinued Medications    MELOXICAM (MOBIC) 15 MG TABLET        VIAGRA 100 MG TABLET    TAKE ONE TABLET BY MOUTH EVERY DAY AS NEEDED FOR ERECTILE DYSFUNCTION    ZOLPIDEM (AMBIEN) 10 MG TAB    TAKE ONE TABLET BY MOUTH AT BEDTIME    ZOLPIDEM (AMBIEN) 10 MG TAB    TAKE ONE TABLET BY MOUTH AT BEDTIME     Timothy was seen today for annual exam.    Diagnoses and all orders for this visit:    Annual physical exam  -     Comprehensive metabolic panel;  Standing  -     Lipid panel; Standing  -     PSA, Screening; Standing    Hyperlipidemia, unspecified hyperlipidemia type  -     Lipid panel; Standing    Prostate cancer screening  -     PSA, Screening; Standing    Tongue cancer  Comments:  followed by Dr. Sae Sandhu    Other orders  -     pravastatin (PRAVACHOL) 40 MG tablet; Take 1 tablet (40 mg total) by mouth once daily.      See meds, orders, follow up, routing and instructions sections of encounter.  A 64-year-old patient for annual physical examination.  He successfully   discontinued his sleeping medication.  He is doing pretty well at this time.  No   other complaints noted.    RECOMMENDATIONS:  Annual followup.  Continue pravastatin.      CRICKET/HN  dd: 05/22/2019 10:15:27 (CDT)  td: 05/23/2019 02:28:22 (CDT)  Doc ID   #[id number]  Job ID #[id number]    CC:

## 2019-11-17 ENCOUNTER — PATIENT MESSAGE (OUTPATIENT)
Dept: INTERNAL MEDICINE | Facility: CLINIC | Age: 64
End: 2019-11-17

## 2020-03-13 DIAGNOSIS — E78.5 HYPERLIPIDEMIA, UNSPECIFIED HYPERLIPIDEMIA TYPE: Primary | ICD-10-CM

## 2020-03-13 NOTE — TELEPHONE ENCOUNTER
----- Message from Samira Mobley sent at 3/13/2020  3:20 PM CDT -----  Contact: Michelle mirza/ [facility]  Type:  RX Refill Request    Who Called:  Michelle    Refill or New Rx: refill    RX Name and Strength:  pravastatin (PRAVACHOL) 40 MG tablet     Preferred Pharmacy with phone number: [facility]    Would the patient rather a call back or a response via [facility]? Call    Best Call Back Number: [phone]  FAX: [phone]

## 2020-03-18 RX ORDER — PRAVASTATIN SODIUM 40 MG/1
40 TABLET ORAL DAILY
Qty: 90 TABLET | Refills: 0 | Status: SHIPPED | OUTPATIENT
Start: 2020-03-18 | End: 2020-04-17 | Stop reason: SDUPTHER

## 2020-03-19 NOTE — PROGRESS NOTES
Refill Authorization Note     is requesting a refill authorization.    Brief assessment and rationale for refill: APPROVE: prr          Medication Therapy Plan: approve 3 more    Medication reconciliation completed: No                         Comments:   Refill Center Care Gap Closure protocols temporarily suspended.   Requested Prescriptions   Pending Prescriptions Disp Refills    pravastatin (PRAVACHOL) 40 MG tablet 90 tablet 0     Sig: Take 1 tablet (40 mg total) by mouth once daily.       Cardiovascular:  Antilipid - Statins Passed - 3/13/2020  4:55 PM        Passed - Patient is at least 18 years old        Passed - Office visit in past 12 months or future 90 days.     Recent Outpatient Visits            10 months ago Annual physical exam    [facility] Tee Thomas MD    1 year ago Annual physical exam    [facility] Tee Thomas MD    2 years ago Annual physical exam    [facility] Rosa Thomas MD    3 years ago Routine general medical examination at a health care facility    [facility] Rosa Thomas MD    4 years ago Routine general medical examination at a health care facility    [facility] Rosa Thomas MD          Future Appointments              In 1 month Tee Thomas MD [facility], [facility]                Passed - Lipid Panel completed in last 360 days     Lab Results   Component Value Date    CHOL 206 (H) 05/22/2019    HDL 72 05/22/2019    LDLCALC 120.8 05/22/2019    TRIG 66 05/22/2019             Passed - ALT is 94 or below and within 360 days     ALT   Date Value Ref Range Status   05/22/2019 15 10 - 44 U/L Final   06/04/2018 17 10 - 44 U/L Final   06/04/2018 17 10 - 44 U/L Final              Passed - AST is 54 or below and within 360 days     AST   Date Value Ref Range Status   05/22/2019 18 10 - 40 U/L Final   06/04/2018 22 10 - 40 U/L Final    06/04/2018 22 10 - 40 U/L Final               Appointments  past 12m or future 3m with PCP    Date Provider   Last Visit   5/22/2019 Tee Thomas MD   Next Visit   5/11/2020 Tee Thomas MD   .  ED visits in past 90 days: 0       Note composed:7:12 PM 03/18/2020

## 2020-04-17 ENCOUNTER — PATIENT MESSAGE (OUTPATIENT)
Dept: INTERNAL MEDICINE | Facility: CLINIC | Age: 65
End: 2020-04-17

## 2020-04-17 DIAGNOSIS — E78.5 HYPERLIPIDEMIA, UNSPECIFIED HYPERLIPIDEMIA TYPE: ICD-10-CM

## 2020-04-17 RX ORDER — PRAVASTATIN SODIUM 40 MG/1
40 TABLET ORAL DAILY
Qty: 90 TABLET | Refills: 0 | Status: SHIPPED | OUTPATIENT
Start: 2020-04-17 | End: 2020-04-19 | Stop reason: SDUPTHER

## 2020-04-17 NOTE — TELEPHONE ENCOUNTER
I am now on the [employer] Gold Plan and the prescription for Pravastatin is covered at no cost to me.  I will be ordering through them and not [facility] in the future.  The ask for a 90-day supply for the mail order to be free.  Hope that is not a problem.     Their fax number is [phone]. Their phone number is [phone]     My [employer] Number, if needed is [id number].     Thanks,     Timothy

## 2020-04-19 DIAGNOSIS — E78.5 HYPERLIPIDEMIA, UNSPECIFIED HYPERLIPIDEMIA TYPE: ICD-10-CM

## 2020-04-20 RX ORDER — PRAVASTATIN SODIUM 40 MG/1
40 TABLET ORAL DAILY
Qty: 90 TABLET | Refills: 0 | Status: SHIPPED | OUTPATIENT
Start: 2020-04-20 | End: 2020-06-30

## 2020-04-20 RX ORDER — PRAVASTATIN SODIUM 40 MG/1
40 TABLET ORAL DAILY
Qty: 90 TABLET | Refills: 0 | Status: SHIPPED | OUTPATIENT
Start: 2020-04-20 | End: 2020-04-20 | Stop reason: SDUPTHER

## 2020-06-30 ENCOUNTER — OFFICE VISIT (OUTPATIENT)
Dept: INTERNAL MEDICINE | Facility: CLINIC | Age: 65
End: 2020-06-30
Attending: FAMILY MEDICINE
Payer: MEDICARE

## 2020-06-30 ENCOUNTER — HOSPITAL ENCOUNTER (OUTPATIENT)
Dept: RADIOLOGY | Facility: HOSPITAL | Age: 65
Discharge: HOME | End: 2020-06-30
Attending: FAMILY MEDICINE
Payer: MEDICARE

## 2020-06-30 VITALS
DIASTOLIC BLOOD PRESSURE: 80 MMHG | HEIGHT: 72 IN | WEIGHT: 188.06 LBS | BODY MASS INDEX: 25.47 KG/M2 | HEART RATE: 58 BPM | OXYGEN SATURATION: 97 % | SYSTOLIC BLOOD PRESSURE: 122 MMHG

## 2020-06-30 DIAGNOSIS — G89.29 CHRONIC RIGHT-SIDED LOW BACK PAIN, UNSPECIFIED WHETHER SCIATICA PRESENT: ICD-10-CM

## 2020-06-30 DIAGNOSIS — Z13.6 SCREENING FOR AAA (AORTIC ABDOMINAL ANEURYSM): ICD-10-CM

## 2020-06-30 DIAGNOSIS — M54.50 CHRONIC RIGHT-SIDED LOW BACK PAIN, UNSPECIFIED WHETHER SCIATICA PRESENT: ICD-10-CM

## 2020-06-30 DIAGNOSIS — C02.9 TONGUE CANCER: ICD-10-CM

## 2020-06-30 DIAGNOSIS — E78.5 HYPERLIPIDEMIA, UNSPECIFIED HYPERLIPIDEMIA TYPE: ICD-10-CM

## 2020-06-30 DIAGNOSIS — R10.9 FLANK PAIN: ICD-10-CM

## 2020-06-30 DIAGNOSIS — Z00.00 ANNUAL PHYSICAL EXAM: Primary | ICD-10-CM

## 2020-06-30 DIAGNOSIS — Z12.5 PROSTATE CANCER SCREENING: ICD-10-CM

## 2020-06-30 DIAGNOSIS — Z12.89 ENCOUNTER FOR SCREENING FOR MALIGNANT NEOPLASM OF OTHER SITES: ICD-10-CM

## 2020-06-30 PROCEDURE — 99499 RISK ADDL DX/OHS AUDIT: ICD-10-PCS | Mod: S$GLB,,, | Performed by: FAMILY MEDICINE

## 2020-06-30 PROCEDURE — 99397 PR PREVENTIVE VISIT,EST,65 & OVER: ICD-10-PCS | Mod: HCNC,S$GLB,, | Performed by: FAMILY MEDICINE

## 2020-06-30 PROCEDURE — 72170 X-RAY EXAM OF PELVIS: CPT | Mod: TC,HCNC

## 2020-06-30 PROCEDURE — 76700 US EXAM ABDOM COMPLETE: CPT | Mod: TC,HCNC

## 2020-06-30 PROCEDURE — 72170 XR PELVIS ROUTINE AP: ICD-10-PCS | Mod: 26,HCNC,, | Performed by: RADIOLOGY

## 2020-06-30 PROCEDURE — 76700 US EXAM ABDOM COMPLETE: CPT | Mod: 26,HCNC,, | Performed by: RADIOLOGY

## 2020-06-30 PROCEDURE — 99397 PER PM REEVAL EST PAT 65+ YR: CPT | Mod: HCNC,S$GLB,, | Performed by: FAMILY MEDICINE

## 2020-06-30 PROCEDURE — 99999 PR PBB SHADOW E&M-EST. PATIENT-LVL V: CPT | Mod: PBBFAC,HCNC,, | Performed by: FAMILY MEDICINE

## 2020-06-30 PROCEDURE — 99499 UNLISTED E&M SERVICE: CPT | Mod: S$GLB,,, | Performed by: FAMILY MEDICINE

## 2020-06-30 PROCEDURE — 76700 US ABDOMEN COMPLETE: ICD-10-PCS | Mod: 26,HCNC,, | Performed by: RADIOLOGY

## 2020-06-30 PROCEDURE — 99999 PR PBB SHADOW E&M-EST. PATIENT-LVL V: ICD-10-PCS | Mod: PBBFAC,HCNC,, | Performed by: FAMILY MEDICINE

## 2020-06-30 PROCEDURE — 72170 X-RAY EXAM OF PELVIS: CPT | Mod: 26,HCNC,, | Performed by: RADIOLOGY

## 2020-06-30 RX ORDER — PRAVASTATIN SODIUM 40 MG/1
40 TABLET ORAL DAILY
Qty: 90 TABLET | Refills: 3 | Status: SHIPPED | OUTPATIENT
Start: 2020-06-30 | End: 2021-06-15 | Stop reason: SDUPTHER

## 2020-06-30 NOTE — PROGRESS NOTES
Subjective:       Patient ID: Timothy GREENE Rudolph is a 65 y.o. male.    Chief Complaint: Annual Exam, Back Pain, and Hip Pain    Established patient for an annual wellness check/physical exam and also chronic disease management. Specific complaints - see dictation and please see ROS.  P, S, Fm, Soc Hx's; Meds, allergies reviewed and reconciled.  Health maintenance file reviewed and addressed items due.    6 mo hx of right pelvic brim and R flank musclular area abd wall. Constant. Mild progression. No neuro sx.    Back pain as listed above.  Right flank area.  Muscular area.  No injury.  Prior history of head and neck cancer.    Review of Systems   Constitutional: Negative for chills, fatigue, fever and unexpected weight change.   HENT: Negative for congestion and trouble swallowing.    Eyes: Negative for redness and visual disturbance.   Respiratory: Negative for cough, chest tightness and shortness of breath.    Cardiovascular: Negative for chest pain, palpitations and leg swelling.   Gastrointestinal: Negative for abdominal pain and blood in stool.   Genitourinary: Negative for difficulty urinating and hematuria.   Musculoskeletal: Positive for arthralgias. Negative for back pain, gait problem, joint swelling, myalgias and neck pain.        Side pain   Skin: Negative for color change and rash.   Neurological: Negative for tremors, speech difficulty, weakness, numbness and headaches.   Hematological: Negative for adenopathy. Does not bruise/bleed easily.   Psychiatric/Behavioral: Negative for behavioral problems, confusion and sleep disturbance. The patient is not nervous/anxious.        Objective:      Physical Exam  Vitals signs and nursing note reviewed.   Constitutional:       Appearance: He is well-developed. He is not diaphoretic.   Eyes:      General: No scleral icterus.  Neck:      Musculoskeletal: Normal range of motion and neck supple.      Thyroid: No thyromegaly.      Vascular: No carotid bruit or JVD.       Trachea: No tracheal deviation.   Cardiovascular:      Rate and Rhythm: Normal rate and regular rhythm.      Heart sounds: Normal heart sounds. No murmur. No friction rub. No gallop.    Pulmonary:      Effort: Pulmonary effort is normal. No respiratory distress.      Breath sounds: Normal breath sounds. No wheezing or rales.   Abdominal:      General: There is no distension.      Palpations: Abdomen is soft. There is no mass.      Tenderness: There is no abdominal tenderness. There is no guarding or rebound.   Lymphadenopathy:      Cervical: No cervical adenopathy.   Skin:     General: Skin is warm and dry.      Findings: No erythema or rash.   Neurological:      Mental Status: He is alert and oriented to person, place, and time.      Cranial Nerves: No cranial nerve deficit.      Motor: No tremor.      Coordination: Coordination normal.      Gait: Gait normal.   Psychiatric:         Behavior: Behavior normal.         Thought Content: Thought content normal.         Judgment: Judgment normal.         Assessment:       1. Annual physical exam    2. Hyperlipidemia, unspecified hyperlipidemia type    3. Chronic right-sided low back pain, unspecified whether sciatica present    4. Tongue cancer    5. Prostate cancer screening    6. Screening for AAA (aortic abdominal aneurysm)    7. Encounter for screening for malignant neoplasm of other sites    8. Flank pain        Plan:     Medication List with Changes/Refills   Changed and/or Refilled Medications    Modified Medication Previous Medication    PRAVASTATIN (PRAVACHOL) 40 MG TABLET pravastatin (PRAVACHOL) 40 MG tablet       Take 1 tablet (40 mg total) by mouth once daily.    Take 1 tablet (40 mg total) by mouth once daily.     Timothy was seen today for annual exam, back pain and hip pain.    Diagnoses and all orders for this visit:    Annual physical exam  -     Comprehensive metabolic panel; Future  -     Lipid Panel; Future  -     PSA, Screening; Future  -     Cancel:  CV Abdominal Aorta; Future    Hyperlipidemia, unspecified hyperlipidemia type  -     Lipid Panel; Future  -     pravastatin (PRAVACHOL) 40 MG tablet; Take 1 tablet (40 mg total) by mouth once daily.    Chronic right-sided low back pain, unspecified whether sciatica present  -     Ambulatory referral/consult to [facility]; Future  -     X-Ray Pelvis Routine AP; Future  -     NM Bone Scan Whole Body; Future  -     Urinalysis; Future  -     Urinalysis Microscopic; Future  -     US Abdomen Complete; Future    Tongue cancer  Comments:  followed by Dr. Sae Sandhu  Orders:  -     X-Ray Pelvis Routine AP; Future  -     NM Bone Scan Whole Body; Future    Prostate cancer screening  -     PSA, Screening; Future    Screening for AAA (aortic abdominal aneurysm)  -     Cancel: CV Abdominal Aorta; Future  -     US Abdomen Complete; Future    Encounter for screening for malignant neoplasm of other sites  -     NM Bone Scan Whole Body; Future    Flank pain  -     US Abdomen Complete; Future      See meds, orders, follow up, routing and instructions sections of encounter and AVS. Discussed with patient and provided on AVS.      FU w/ ENT treating throat cancer and current sx.    Investigate with bone scan.  Referral to back and spine center.  Symptoms do not suggest diffuse metastasis however will obtain some additional information.

## 2020-07-03 ENCOUNTER — TELEPHONE (OUTPATIENT)
Dept: INTERNAL MEDICINE | Facility: CLINIC | Age: 65
End: 2020-07-03

## 2020-07-03 DIAGNOSIS — I77.819 ECTATIC AORTA: Primary | ICD-10-CM

## 2020-07-03 NOTE — TELEPHONE ENCOUNTER
Please contact patient and explain that the ultrasound was generally OK - did not show any problems with the organs (liver, kidney).     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    Treatment is to eat healthy and keep taking the cholesterol medication.     I recommend a follow up ultrasound in 1 year.    See Cardiology department test orders and please call patient to schedule for ordered test in 1 year. Thank you.

## 2020-07-06 ENCOUNTER — HOSPITAL ENCOUNTER (OUTPATIENT)
Dept: RADIOLOGY | Facility: HOSPITAL | Age: 65
Discharge: HOME | End: 2020-07-06
Attending: FAMILY MEDICINE
Payer: MEDICARE

## 2020-07-06 ENCOUNTER — TELEPHONE (OUTPATIENT)
Dept: INTERNAL MEDICINE | Facility: CLINIC | Age: 65
End: 2020-07-06

## 2020-07-06 DIAGNOSIS — Z12.89 ENCOUNTER FOR SCREENING FOR MALIGNANT NEOPLASM OF OTHER SITES: ICD-10-CM

## 2020-07-06 DIAGNOSIS — M54.50 CHRONIC RIGHT-SIDED LOW BACK PAIN, UNSPECIFIED WHETHER SCIATICA PRESENT: ICD-10-CM

## 2020-07-06 DIAGNOSIS — G89.29 CHRONIC RIGHT-SIDED LOW BACK PAIN, UNSPECIFIED WHETHER SCIATICA PRESENT: ICD-10-CM

## 2020-07-06 DIAGNOSIS — C02.9 TONGUE CANCER: ICD-10-CM

## 2020-07-06 PROCEDURE — 78306 NM BONE SCAN WHOLE BODY: ICD-10-PCS | Mod: 26,HCNC,, | Performed by: RADIOLOGY

## 2020-07-06 PROCEDURE — A9503 TC99M MEDRONATE: HCPCS | Mod: HCNC

## 2020-07-06 PROCEDURE — 78306 BONE IMAGING WHOLE BODY: CPT | Mod: 26,HCNC,, | Performed by: RADIOLOGY

## 2020-07-06 NOTE — TELEPHONE ENCOUNTER
Notified pt of message from PCP and he stated he understood. He had his Prevnar 13 today and also know she has to get the Pneumovax 23 next year. Pt was placed om Recall list for Sono next yr.

## 2020-07-08 ENCOUNTER — IMMUNIZATION (OUTPATIENT)
Dept: PHARMACY | Facility: CLINIC | Age: 65
End: 2020-07-08
Payer: MEDICARE

## 2020-09-02 ENCOUNTER — PATIENT MESSAGE (OUTPATIENT)
Dept: INTERNAL MEDICINE | Facility: CLINIC | Age: 65
End: 2020-09-02

## 2020-09-02 DIAGNOSIS — R41.3 MEMORY PROBLEM: Primary | ICD-10-CM

## 2020-09-02 NOTE — TELEPHONE ENCOUNTER
Dr. Thomas,     My mother and my older brother both were diagnosed with severe dementia at rather young ages.  I was told that there is a test for the genes that contain dementia.  And that if the genes are present, then early treatment can prevent of defer the onset of dementia.   Is this correct?  And if so, can you order the test for me?

## 2020-09-03 ENCOUNTER — TELEPHONE (OUTPATIENT)
Dept: NEUROLOGY | Facility: CLINIC | Age: 65
End: 2020-09-03

## 2020-09-03 NOTE — TELEPHONE ENCOUNTER
----- Message from Annetta Rasheed MA sent at 9/3/2020 11:38 AM CDT -----  Regarding: Patient requesting an appt  Good Morning,       Patient requesting an appt to see a provider to discuss dementia prevention options. I wasn't sure which provider would be appropriate to schedule this patient with, can you assist with scheduling this patient.    Referral was placed by Dr. Thomas.      Thank you  Annetta Rasheed MA  [facility]

## 2020-11-01 ENCOUNTER — PATIENT OUTREACH (OUTPATIENT)
Dept: ADMINISTRATIVE | Facility: OTHER | Age: 65
End: 2020-11-01

## 2020-11-03 ENCOUNTER — OFFICE VISIT (OUTPATIENT)
Dept: NEUROLOGY | Facility: CLINIC | Age: 65
End: 2020-11-03
Attending: FAMILY MEDICINE
Payer: MEDICARE

## 2020-11-03 VITALS
DIASTOLIC BLOOD PRESSURE: 86 MMHG | HEART RATE: 67 BPM | BODY MASS INDEX: 24.87 KG/M2 | SYSTOLIC BLOOD PRESSURE: 142 MMHG | HEIGHT: 73 IN | WEIGHT: 187.63 LBS

## 2020-11-03 DIAGNOSIS — E55.9 VITAMIN D DEFICIENCY: Primary | ICD-10-CM

## 2020-11-03 DIAGNOSIS — R41.3 MEMORY PROBLEM: ICD-10-CM

## 2020-11-03 PROCEDURE — 1101F PT FALLS ASSESS-DOCD LE1/YR: CPT | Mod: HCNC,CPTII,S$GLB, | Performed by: PSYCHIATRY & NEUROLOGY

## 2020-11-03 PROCEDURE — 99999 PR PBB SHADOW E&M-EST. PATIENT-LVL IV: CPT | Mod: PBBFAC,HCNC,, | Performed by: PSYCHIATRY & NEUROLOGY

## 2020-11-03 PROCEDURE — 99999 PR PBB SHADOW E&M-EST. PATIENT-LVL IV: ICD-10-PCS | Mod: PBBFAC,HCNC,, | Performed by: PSYCHIATRY & NEUROLOGY

## 2020-11-03 PROCEDURE — 3008F BODY MASS INDEX DOCD: CPT | Mod: HCNC,CPTII,S$GLB, | Performed by: PSYCHIATRY & NEUROLOGY

## 2020-11-03 PROCEDURE — 3008F PR BODY MASS INDEX (BMI) DOCUMENTED: ICD-10-PCS | Mod: HCNC,CPTII,S$GLB, | Performed by: PSYCHIATRY & NEUROLOGY

## 2020-11-03 PROCEDURE — 1101F PR PT FALLS ASSESS DOC 0-1 FALLS W/OUT INJ PAST YR: ICD-10-PCS | Mod: HCNC,CPTII,S$GLB, | Performed by: PSYCHIATRY & NEUROLOGY

## 2020-11-03 PROCEDURE — 99205 OFFICE O/P NEW HI 60 MIN: CPT | Mod: HCNC,S$GLB,, | Performed by: PSYCHIATRY & NEUROLOGY

## 2020-11-03 PROCEDURE — 99205 PR OFFICE/OUTPT VISIT, NEW, LEVL V, 60-74 MIN: ICD-10-PCS | Mod: HCNC,S$GLB,, | Performed by: PSYCHIATRY & NEUROLOGY

## 2020-11-03 NOTE — PATIENT INSTRUCTIONS
CALL [phone] TO SCHEDULE APPOINTMENT WITH NEUROPSYCHOLOGY FOR MEMORY TESTING    CHECK LABS TODAY

## 2020-11-03 NOTE — LETTER
November 3, 2020      Tee Thomas MD  [address]  [facility] [address]           [facility] - Neurology 7th Fl  [address]  [facility] [address]  Phone: [phone]  Fax: [phone]          Patient: Timothy Galdamez   MR Number: [id number]   YOB: 1955   Date of Visit: 11/3/2020       Dear Dr. Tee Thomas:    Thank you for referring Timothy Galdamez to me for evaluation. Attached you will find relevant portions of my assessment and plan of care.    If you have questions, please do not hesitate to call me. I look forward to following Timothy Galdamez along with you.    Sincerely,    Jay Wiseman MD    Enclosure  CC:  No Recipients    If you would like to receive this communication electronically, please contact [email or web address] or [phone] to request more information on [facility] access.    For providers and/or their staff who would like to refer a patient to [facility], please contact us through our one-stop-shop provider referral line, [facility], at [phone].    If you feel you have received this communication in error or would no longer like to receive these types of communications, please e-mail [email or web address]

## 2020-11-03 NOTE — PROGRESS NOTES
[facility] - NEUROLOGY 7TH [address] [facility], [place]    Date: November 3, 2020   Patient Name: Timothy Galdamez   MRN: [id number]   PCP: Tee Brown  Referring Provider: Tee Brown MD    Assessment:      This is Timothy Galdamez, 65 y.o. male with memory difficulty and MOCA below normal, advised minimizing EtOH     Plan:      -  Referral to neuropsychology  -  Labs       I discussed side effects of the medications. I asked the patient to stop the medication if he notices serious adverse effects as we discussed and to seek immediate medical attention at an ER.     Jay Wiseman MD  [facility]   Department of Neurology    Subjective:        HPI:   Mr. Timothy Galdamez is a 65 y.o. male who presents with a chief complaint of memory difficulty    Patient has noted memory difficulty describes as difficulty recalling the names of celebrities and having to concentrate more when putting sentences together in conversation.  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  He greatly enjoys his work and spends a significant amount of after hours time maintaining additional certifications.  He is happily  and enjoys spending free time traveling and caring for pets.  He notes that he will typically sleep 2-3 hours then awaken and only sleep an additional 1-2 hours but does not feel tired during the day, remote trial of melatonin unhelpful.  He remains physically active doing treadmill, rowing machine, or yoga several days per week.  He estimates 1-2 drinks EtOH every evening during the week and more on weekends.    He notes family history of dementia:  Brother became symptomatic in early 70's although with significant smoking history, Mother became symptomatic in her mid 70's with mild vascular risk factors, Father passed away early with substance abuse issues.    PAST MEDICAL HISTORY:  Past Medical History:   Diagnosis Date     "Hyperlipidemia        PAST SURGICAL HISTORY:  Past Surgical History:   Procedure Laterality Date    ANKLE SURGERY      L ankle    COLONOSCOPY N/A 8/21/2017    Procedure: COLONOSCOPY;  Surgeon: Danny Jane MD;  Location: [facility] ([address] [place] [address]);  Service: Endoscopy;  Laterality: N/A;  Do not cancel this order    TONSILLECTOMY      VASECTOMY         CURRENT MEDS:  Current Outpatient Medications   Medication Sig Dispense Refill    pneumoc 13-vj conj-dip cr,PF, (PREVNAR 13, PF,) 0.5 mL Syrg Inject into the muscle. 0.5 mL 0    pravastatin (PRAVACHOL) 40 MG tablet Take 1 tablet (40 mg total) by mouth once daily. 90 tablet 3     No current facility-administered medications for this visit.        ALLERGIES:  Review of patient's allergies indicates:  No Known Allergies    FAMILY HISTORY:  Family History   Problem Relation Age of Onset    Psoriasis Neg Hx     Eczema Neg Hx        SOCIAL HISTORY:  Social History     Tobacco Use    Smoking status: Former Smoker   Substance Use Topics    Alcohol use: Yes     Alcohol/week: 3.0 standard drinks     Types: 3 Cans of beer per week     Comment: 4-5 x week    Drug use: No       Review of Systems:  12 review of systems is negative except for the symptoms mentioned in HPI.        Objective:     Vitals:    11/03/20 0751   BP: (!) 142/86   Pulse: 67   Weight: 85.1 kg (187 lb 9.8 oz)   Height: 6' 1" (1.854 m)       General: NAD, well nourished   Eyes: no tearing, discharge, no erythema   ENT: moist mucous membranes of the oral cavity, nares patent    Neck: Supple, full range of motion  Cardiovascular: Warm and well perfused, pulses equal and symmetrical  Lungs: Normal work of breathing, normal chest wall excursions  Skin: No rash, lesions, or breakdown on exposed skin  Psychiatry: Mood and affect are appropriate   Abdomen: soft, non tender, non distended  Extremeties: No cyanosis, clubbing or edema.    Neurological   MENTAL STATUS: MOCA 25/30  CRANIAL NERVES: Visual fields " intact. PERRL. EOMI. Facial sensation intact. Face symmetrical. Hearing grossly intact. Full shoulder shrug bilaterally. Tongue protrudes midline   SENSORY: Sensation is intact to light touch throughout.  Negative Romberg.   MOTOR: Normal bulk and tone. No pronator drift.     REFLEXES: ankles mute, remainder symmetric and 2+ throughout.   CEREBELLAR/COORDINATION/GAIT: Gait steady with normal arm swing and stride length, mild difficulty on tandem.  Heel to shin intact. Finger to nose intact. Normal rapid alternating movements.

## 2020-11-23 ENCOUNTER — PATIENT MESSAGE (OUTPATIENT)
Dept: NEUROLOGY | Facility: CLINIC | Age: 65
End: 2020-11-23

## 2020-12-01 ENCOUNTER — OFFICE VISIT (OUTPATIENT)
Dept: NEUROLOGY | Facility: CLINIC | Age: 65
End: 2020-12-01
Payer: MEDICARE

## 2020-12-01 DIAGNOSIS — R41.3 MEMORY CHANGE: Primary | ICD-10-CM

## 2020-12-01 PROCEDURE — 96116 NUBHVL XM PHYS/QHP 1ST HR: CPT | Mod: HCNC,S$GLB,, | Performed by: CLINICAL NEUROPSYCHOLOGIST

## 2020-12-01 PROCEDURE — 96116 PR NEUROBEHAVIORAL STATUS EXAM BY PSYCH/PHYS: ICD-10-PCS | Mod: HCNC,S$GLB,, | Performed by: CLINICAL NEUROPSYCHOLOGIST

## 2020-12-01 PROCEDURE — 99499 NO LOS: ICD-10-PCS | Mod: HCNC,S$GLB,, | Performed by: CLINICAL NEUROPSYCHOLOGIST

## 2020-12-01 PROCEDURE — 99499 UNLISTED E&M SERVICE: CPT | Mod: HCNC,S$GLB,, | Performed by: CLINICAL NEUROPSYCHOLOGIST

## 2020-12-02 ENCOUNTER — TELEPHONE (OUTPATIENT)
Dept: NEUROLOGY | Facility: CLINIC | Age: 65
End: 2020-12-02

## 2020-12-07 ENCOUNTER — INITIAL CONSULT (OUTPATIENT)
Dept: NEUROLOGY | Facility: CLINIC | Age: 65
End: 2020-12-07
Payer: MEDICARE

## 2020-12-07 DIAGNOSIS — F51.01 PRIMARY INSOMNIA: ICD-10-CM

## 2020-12-07 DIAGNOSIS — R41.3 MEMORY CHANGE: Primary | ICD-10-CM

## 2020-12-07 DIAGNOSIS — R41.3 MEMORY PROBLEM: ICD-10-CM

## 2020-12-07 DIAGNOSIS — E55.9 VITAMIN D DEFICIENCY: ICD-10-CM

## 2020-12-07 PROCEDURE — 99999 PR PBB SHADOW E&M-EST. PATIENT-LVL I: ICD-10-PCS | Mod: PBBFAC,HCNC,, | Performed by: CLINICAL NEUROPSYCHOLOGIST

## 2020-12-07 PROCEDURE — 96132 NRPSYC TST EVAL PHYS/QHP 1ST: CPT | Mod: HCNC,S$GLB,, | Performed by: CLINICAL NEUROPSYCHOLOGIST

## 2020-12-07 PROCEDURE — 99499 UNLISTED E&M SERVICE: CPT | Mod: HCNC,S$GLB,, | Performed by: CLINICAL NEUROPSYCHOLOGIST

## 2020-12-07 PROCEDURE — 96138 PSYCL/NRPSYC TECH 1ST: CPT | Mod: HCNC,S$GLB,, | Performed by: CLINICAL NEUROPSYCHOLOGIST

## 2020-12-07 PROCEDURE — 99999 PR PBB SHADOW E&M-EST. PATIENT-LVL I: CPT | Mod: PBBFAC,HCNC,, | Performed by: CLINICAL NEUROPSYCHOLOGIST

## 2020-12-07 PROCEDURE — 96133 NRPSYC TST EVAL PHYS/QHP EA: CPT | Mod: HCNC,S$GLB,, | Performed by: CLINICAL NEUROPSYCHOLOGIST

## 2020-12-07 PROCEDURE — 96139 PR PSYCH/NEUROPSYCH TEST ADMIN/SCORING, BY TECH, 2+ TESTS, EA ADDTL 30 MIN: ICD-10-PCS | Mod: HCNC,S$GLB,, | Performed by: CLINICAL NEUROPSYCHOLOGIST

## 2020-12-07 PROCEDURE — 96132 PR NEUROPSYCHOLOGIC TEST EVAL SVCS, 1ST HR: ICD-10-PCS | Mod: HCNC,S$GLB,, | Performed by: CLINICAL NEUROPSYCHOLOGIST

## 2020-12-07 PROCEDURE — 99499 NO LOS: ICD-10-PCS | Mod: HCNC,S$GLB,, | Performed by: CLINICAL NEUROPSYCHOLOGIST

## 2020-12-07 PROCEDURE — 96139 PSYCL/NRPSYC TST TECH EA: CPT | Mod: HCNC,S$GLB,, | Performed by: CLINICAL NEUROPSYCHOLOGIST

## 2020-12-07 PROCEDURE — 96138 PR PSYCH/NEUROPSYCH TEST ADMIN/SCORING, BY TECH, 2+ TESTS, 1ST 30 MIN: ICD-10-PCS | Mod: HCNC,S$GLB,, | Performed by: CLINICAL NEUROPSYCHOLOGIST

## 2020-12-07 PROCEDURE — 96133 PR NEUROPSYCHOLOGIC TEST EVAL SVCS, EA ADDTL HR: ICD-10-PCS | Mod: HCNC,S$GLB,, | Performed by: CLINICAL NEUROPSYCHOLOGIST

## 2020-12-07 NOTE — PROGRESS NOTES
Outpatient Neuropsychological Consultation (Interview Only)    Referral Information  Name: Timothy Galdamez  MRN: [id number]  : 1955  Age: 65 y.o.  Handedness: Right  Race: White  Gender: male  Referring Provider: Jay Wiseman Md  [address] [place] [address]  Referral Reason/Medical Necessity: Cognitive difficulties with neuropsychological evaluation ordered by Neurology to characterize cognitive status, differential diagnosis, and updated treatment recommendations.   Billing: See at the end of the report as billing and coding has changed in 2019.  Consent: The patient expressed an understanding of the purpose of the evaluation and consented to all procedures.    DIAGNOSTIC IMPRESSIONS/TREATMENT PLAN:   Review below for onset/course of cognitive symptoms along with functional status and pertinent medical history. Results from interview indicate the following:    Problem List Items Addressed This Visit        Neuro    Memory change - Primary    Current Assessment & Plan     Cognitive testing with treatment plan scheduled for 20                CHERYL Stout II, Ph.D., ABPP-CN  Board Certified Clinical Neuropsychologist  Co-Director, Cognitive Disorders and Brain Health Program  Department of Neurology and Neurosciences  [facility]    HPI/CURRENT SYMPTOMS   Active problems noted below.  [Onset/Course of Cognitive Symptoms]:  Mr. Galdamez has a significant family history of dementia prompting some worry about cognition. Onset of cognitive changes has been noted by him in the past several years. Specifically, he noted more trouble with with short-term memory and attention/focus. Course was fairly stable until the past year when he's noticed more trouble with attention and short-term memory.   Patient has noted memory difficulty describes as difficulty recalling the names of celebrities and having to concentrate more when putting sentences together in conversation.  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  He greatly enjoys his work and spends a significant amount of after hours time maintaining additional certifications.  He is happily  and enjoys spending free time traveling and caring for pets.  He remains physically active doing treadmill, rowing machine, or yoga several days per week.  He estimates 1-2 drinks EtOH every evening during the week and more on weekends.    Gradual worsening for the past several years; thought it was normal but has become worse in the past year    Neuropsychiatric Sxs:  Mood:   Depression Anxiety Other   · None · None · NA     Neurovegetative:   Sleep Appetite Energy   Current: He notes that he will typically sleep 2-3 hours then awaken and only sleep an additional 1-2 hours but does not feel tired during the day, remote trial of melatonin unhelpful. Onset of sleep disruption was for >10 years.    -Does move around a lot at night but no dx. Brother had sleep study diagnosed with RLS.  Good  Good   Awakens at 4am, exercises at 5am and goes to work at 6:30 and goes home      Behavioral Concerns: NA  Delusions/Hallucinations: NA  SI/HI: None    Physical Sxs:  Motor Sensory Pain    None pertinent   None pertinent   NA       Current Functioning (I/ADLs):  ADLs  Self-Care Eating Safety Other   Independent Independent Independent NA     Instrumental IADLs:   Driving Medications/Health Household Finances   Independent Independent Independent Independent       PERTINENT BACKGROUND INFORMATION     Family History   Problem Relation Age of Onset    Psoriasis Neg Hx     Eczema Neg Hx      Family Neurologic History: He notes family history of dementia:  Brother became symptomatic in early 70's although with significant smoking history and  this year from COVID, Mother became symptomatic in her mid 70's with mild vascular risk factors,  Family Psychiatric History:  Father passed away early with substance abuse  "issues.    Developmental/Academic Hx:  Developmental: No gestational or later developmental concerns.  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 (Works  hours weekly, "I'm obsessed with it I guess")      MEDICAL HISTORY     Patient Active Problem List   Diagnosis    Insomnia    Hyperlipidemia    Tongue cancer    Rotator cuff syndrome of right shoulder    Memory change    RLS (restless legs syndrome)    Chronic right-sided low back pain    Ectatic aorta       Past Medical History:   Diagnosis Date    Hyperlipidemia        Past Surgical History:   Procedure Laterality Date    ANKLE SURGERY      L ankle    COLONOSCOPY N/A 8/21/2017    Procedure: COLONOSCOPY;  Surgeon: Danny Jane MD;  Location: [address];  Service: Endoscopy;  Laterality: N/A;  Do not cancel this order    TONSILLECTOMY      VASECTOMY         Pertinent Neurologic History  TBIs  None   Seizures  None   CVAs  ?TIA but unknown   Movement Dis.  None   Other  NA         Pertinent Labs Impacting Cognition  Lab Results   Component Value Date    [id number] 834 11/03/2020     No results found for: RPR  No results found for: FOLATE  Lab Results   Component Value Date    TSH 2.175 11/03/2020     No results found for: LABA1C, HGBA1C  No results found for: HIV1X2, LTH76PPQB    Neurodiagnostics  Year Diagnostic Results[Copied from Report]   None   None None         Current Outpatient Medications:     pneumoc 13-vj " "conj-dip cr,PF, (PREVNAR 13, PF,) 0.5 mL Syrg, Inject into the muscle., Disp: 0.5 mL, Rfl: 0    pravastatin (PRAVACHOL) 40 MG tablet, Take 1 tablet (40 mg total) by mouth once daily., Disp: 90 tablet, Rfl: 3    Relevant Psychiatric History:  · Childhood: No tx hx  · Adult: No tx hx  · Substance Use: 2 beers nightly      MENTAL STATUS AND OBSERVATIONS:     APPEARANCE: Casually dressed and adequate grooming/hygiene.   ALERTNESS/ORIENTATION: Attentive and alert. Fully oriented (x5) to time and place  GAIT: Unremarkable  MOTOR MOVEMENTS/MANNERISMS: Unremarkable  SPEECH/LANGUAGE: Normal in rate, rhythm, tone, and volume. No significant word finding difficulty noted. Expressive and receptive language was normal.  STATED MOOD/AFFECT: The patients stated mood was "good." Affect was congruent with stated mood.   INTERPERSONAL BEHAVIOR: Rapport was quickly and easily established   SUICIDALITY/HOMICIDALITY: Denied  HALLUCINATIONS/DELUSIONS: None evidenced or endorsed  THOUGHT PROCESSES: Thoughts seemed logical and goal-directed.   INSIGHT/AWARENESS: Good insight/awareness and concerned about cognitive symptoms.     BILLING     Service Description CPT Code Minutes Units   Psychiatric diagnostic evaluation by physician [id number]     Neurobehavioral status exam by physician [id number]   Each additional hour by physician [id number]  0           "

## 2020-12-11 NOTE — PROGRESS NOTES
Outpatient Neuropsychological Evaluation    Referral Information  Name: Timothy Galdamez  MRN: [id number]  : 1955  Age: 65 y.o.  Handedness: Right  Race: White  Gender: male  Referring Provider: Jay Wiseman Md  [id number] [place] [address]  Referral Reason/Medical Necessity: Cognitive difficulties with neuropsychological evaluation ordered by Neurology to characterize cognitive status, differential diagnosis, and updated treatment recommendations.   Billing: See at the end of the report as billing and coding has changed in 2019.  Consent: The patient expressed an understanding of the purpose of the evaluation and consented to all procedures.    DIAGNOSTIC IMPRESSIONS/TREATMENT PLAN:   Review below for onset/course of cognitive symptoms along with functional status and pertinent medical history. Results indicate the following diagnoses and treatment plan with feedback scheduled to discuss findings and plan.    Problem List Items Addressed This Visit        Neuro    Memory change - Primary    Current Assessment & Plan     Assessment:  -Cognitive Testing:  Results were largely normal with no evidence of any significant cognitive impairment.  As is the case for all individuals, he had a pattern of strengths and weaknesses but it is normal to have a few isolated atypical scores on any lengthy neuropsychological battery of tests.   -Etiology:    >>Fortunately, he does not have any cognitive disorder and NO dementia.  In fact, many of his scores were normal or better than normal for his age.  >>Certainly the combination of significant work related stress and insomnia may be resulting in the kinds of cognitive sxs that he is noticing is everyday life.  Plan:  Follow Up Recommendations:  Neuropsychology Follow-up · We will discuss results and plan with him during feedback session.  · Neuropsychology: No follow-up.  If Mr. Galdamez or others notice reductions in functioning, repeat evaluation is  recommended.   Neurology Follow-up  Continued Neurology follow-up    Mental Health Follow-up · Psychiatry/Medical Psychology:  Will discuss whether psychiatry consult for his sleep versus sleep study versus both during feedback.  · Psychology/Therapy: Consultation with a therapist or  may be helpful so he can reduce his job-related over-work/stress.  longstanding  hour work weeks  · Recommend [email or web address] for referrals/scheduling therapy.    Sleep Medicine Follow-up  Will discuss potential referral with him during feedback.        Recommendations for Mr. Galdamez and Caregivers/Family:   Brain Health: · Engage in regular exercise, which increases alertness and arousal and can improve attention and focus.    · Get a good nights sleep, as this can enhance alertness and cognition.  · Eat healthy foods and balanced meals. It is notable that research indicates certain nutrients may aid in brain function, such as B vitamins (especially B6, B12, and folic acid), antioxidants (such as vitamins C and E, and beta carotene), and Omega-3 fatty acids. Talk with your physician or nutritionist about whats right for you.   · Keep your brain active. Find activities to stay mentally active, such as reading, games (cards, checkers), puzzles (crosswords, Sudoku, jig saw), crafts (models, woodworking), gardening, or participating in activities in the community.  · Stay socially engaged. Continue staying active with your family and friends.   Sleep Tips · Poor sleep has a negative effect on cognition. Several strategies have been shown to improve sleep:   · Caffeine intake in the afternoon and evening, as well as stuffing oneself at supper, can decrease the quality of restful sleep throughout the night.   · Bedtime and wake-up times should be consistent every night and morning so the body becomes used to a single routine, even on the weekends.  · Engage in daily physical activity, but not 2-3 hours before  bedtime.   · No technology use (television, computer, iPad) 1-2 hours before bed.   · Have a wind down routine (e.g., soft lights in the house, bath before bed, reduced fluid intake, songs, reading, less noise) to promote sleep readiness.   · Visit the www.sleepfoundation.org for more strategies.   Attention Tips · Remember that inattention and lack of focus are major culprits to forgetting information so be sure and practice paying attention for adequate learning of information. If you rely on passive attention to remembering something (e.g., yeah, uh-huh approach), youll find you cannot recall it later. I recommend the following to improve attention, which may aid in later recall:   · Reduce distractions as much as possible.  · Look at the person as they are speaking to you.   · Paraphrase as they are speaking  · Write down important pieces of information   · Ask people to repeat if you zone out.    · Have visual cues  (posted to-do-list, daily schedule) to remind you if you need to do something later.   Processing Speed Tips · Using multiple modalities (e.g., listening, writing notes, asking questions, recording) to learn new information is likely to allow additional time for processing, thus improving memory for the material.   · Allowing sufficient time to complete tasks will reduce frustration and help to ensure completion.  · Spend a lot of up-front time planning in advance how long a task may take and then chunk steps in the task so you don't wear yourself out.   Executive Functioning Tips: · Dont attempt to multi-task.  Separate tasks so that each can be completed one at a time.  · Consider using a calendar/day planner, as that may be effective to help you plan and stay on track.  Color-coding specific tasks by importance may add additional benefit to your planner.  · Break down large projects into smaller tasks and write down the steps to completing the task.    Memory Tips- Learning something  (initially reading something, talking to someone) · Rehearse - Immediately after seeing/hearing something, try to recall it.  Wait a few minutes, then check again.  Gradually lengthen the intervals between rehearsals.  · Repetition of learned material is critical to ensure storage of information to be learned. Self-test at home to ensure learning.  · Write down important information to improve your attention and focus and to have something to look back on when you need to recall it.  · Make sure the person doesnt rattle off, but presents in a clear, logical, and unhurried manner.    Memory Tips - Remembering after a period of time · Jog your memory - Lose something?  Think back to when you last had it.  What did you do next?  And after that?  Mentally walk yourself through each activity that followed.  Prodding your memory this way may enable you to recall the location of the missing item.  · Use a cue - Symbolic reminders (the proverbial string around the finger) are helpful.  So too are memos, timers, calendar notes, etc.--keep them in visible, appropriate places.  · Get organized - Have fixed locations for all important papers, key phone numbers, medications, keys, wallet, glasses, tools, etc.  · Develop routines - Routines can anchor memories so they do not drift away.                      Other    Primary insomnia    Current Assessment & Plan     Assessment:  -longstanding trouble with middle insomnia and sleep efficiency often results in negative downstream effects for cognition  -he also reports family history of restless leg syndrome along with potential symptoms  Plan:  -will discuss sleep hygiene and potential referral for assessment of sleep disorder           Other Visit Diagnoses     Memory problem        Vitamin D deficiency               CHERYL Stout II, Ph.D., [facility]  Board Certified Clinical Neuropsychologist  Co-Director, Cognitive Disorders and Brain Health Program  Department of Neurology  and Neurosciences  [facility]    HPI/CURRENT SYMPTOMS   Active problems noted below.  [Onset/Course of Cognitive Symptoms]:  Mr. Galdamez has a significant family history of dementia prompting some worry about cognition. Onset of cognitive changes has been noted by him in the past several years. Specifically, he noted more trouble with with short-term memory and attention/focus. Course was fairly stable until the past year when he's noticed more trouble with attention and short-term memory prompting this evaluation.     Neuropsychiatric Sxs:  Mood:   Depression Anxiety Other   · None · None · NA     Neurovegetative:   Sleep Appetite Energy   Current: He notes that he will typically sleep 2-3 hours then awaken and only sleep an additional 1-2 hours but does not feel tired during the day, remote trial of melatonin unhelpful. Onset of sleep disruption was for >10 years.    -Does move around a lot at night but no dx. Brother had sleep study diagnosed with RLS.  Good  Good   Awakens at 4am, exercises at 5am and goes to work at 6:30 and goes home      Behavioral Concerns: NA  Delusions/Hallucinations: NA  SI/HI: None    Physical Sxs:  Motor Sensory Pain    None pertinent   None pertinent   NA       Current Functioning (I/ADLs):  ADLs  Self-Care Eating Safety Other   Independent Independent Independent NA     Instrumental IADLs:   Driving Medications/Health Household Finances   Independent Independent Independent Independent       PERTINENT BACKGROUND INFORMATION     Family History   Problem Relation Age of Onset    Psoriasis Neg Hx     Eczema Neg Hx      Family Neurologic History: He notes family history of dementia:  Brother became symptomatic in early 70's although with significant smoking history and  this year from COVID, Mother became symptomatic in her mid 70's with mild vascular risk factors,  Family Psychiatric History:  Father passed away early with substance abuse  "issues.    Developmental/Academic Hx:  Developmental: No gestational or later developmental concerns.  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 (Works  hours weekly, "I'm obsessed with it I guess")      MEDICAL HISTORY     Patient Active Problem List   Diagnosis    Primary insomnia    Hyperlipidemia    Tongue cancer    Rotator cuff syndrome of right shoulder    Memory change    RLS (restless legs syndrome)    Chronic right-sided low back pain    Ectatic aorta       Past Medical History:   Diagnosis Date    Hyperlipidemia        Past Surgical History:   Procedure Laterality Date    ANKLE SURGERY      L ankle    COLONOSCOPY N/A 8/21/2017    Procedure: COLONOSCOPY;  Surgeon: Danny Jane MD;  Location: [address];  Service: Endoscopy;  Laterality: N/A;  Do not cancel this order    TONSILLECTOMY      VASECTOMY         Pertinent Neurologic History  TBIs  None   Seizures  None   CVAs  ?TIA per patient but never any dx. He notes having a period where he seemed off and wonders if that was a TIA.   Movement Dis.  None   Other  NA         Pertinent Labs Impacting Cognition  Lab Results   Component Value Date    [id number] 834 11/03/2020     No results found for: RPR  No results found for: FOLATE  Lab Results   Component Value Date    TSH 2.175 11/03/2020     No results found for: LABA1C, HGBA1C  No results found for: HIV1X2, IHP22IVTF    Neurodiagnostics  Year Diagnostic " "Results[Copied from Report]   None   None None         Current Outpatient Medications:     pneumoc 13-vj conj-dip cr,PF, (PREVNAR 13, PF,) 0.5 mL Syrg, Inject into the muscle., Disp: 0.5 mL, Rfl: 0    pravastatin (PRAVACHOL) 40 MG tablet, Take 1 tablet (40 mg total) by mouth once daily., Disp: 90 tablet, Rfl: 3    Relevant Psychiatric History:  · Childhood: No tx hx  · Adult: No tx hx  · Substance Use: 2 beers nightly    MENTAL STATUS AND OBSERVATIONS:     APPEARANCE: Casually dressed and adequate grooming/hygiene.   ALERTNESS/ORIENTATION: Attentive and alert. Fully oriented (x5) to time and place  GAIT: Unremarkable  MOTOR MOVEMENTS/MANNERISMS: Unremarkable  SPEECH/LANGUAGE: Normal in rate, rhythm, tone, and volume. No significant word finding difficulty noted. Expressive and receptive language was normal.  STATED MOOD/AFFECT: The patients stated mood was "good." Affect was congruent with stated mood as testing progressed. Initially, when testing started he was quite anxious. With reassurance, mood/affect improved.   INTERPERSONAL BEHAVIOR: Rapport was quickly and easily established   SUICIDALITY/HOMICIDALITY: Denied  HALLUCINATIONS/DELUSIONS: None evidenced or endorsed  THOUGHT PROCESSES: Thoughts seemed logical and goal-directed.   TEST TAKING BEHAVIOR and VALIDITY: Freestanding and embedded performance validity measures and observation of effort were largely suggestive of adequate engagement. At the beginning of testing, he was quite anxious about his performance that likely impacted one validity measure. With reassurance, his anxiety improved. Additionally, scores below are largely WNL. Thus, The current results, therefore, are likely a valid reflection of the patient's current functioning.     PROCEDURES/TESTS ADMINISTERED   In addition to performing a review of pertinent medical records, reviewing limits to confidentiality, conducting a clinical interview, and explaining procedures, the following measures " were administered: [place] Cognitive Assessment (MoCA), [employer] (ACS) Test of Pre-Morbid Functioning (TOPF), Green's MSVT, Wechsler Adult Intelligence Scale-Fourth Edition (WAIS-IV Digit Span, Arithmetic, Symbol Search, Coding Matrix Reasoning and Similarities), Trail Making Test (TMT-A&B; Basilio et al., 2004), Verbal Fluency Test(FAS/Animals; Basilio et al., 2004), NAB Naming Test, Warren Complex Figure Copy Trial(Warren CFT), [place] Verbal Learning Test-Second Edition (CVLT-2), Wechsler Memory Scale-Fourth Edition (WMS-IV) Logical Memory and Visual Reproduction subtests, [place] Card Sorting Test (WCST-128), Grooved Pegboard (GPT; Basilio et al., 2004), JOSEFINA-7/PHQ-9,     Manual norms were used unless otherwise indicated.  Review data Appendix Below for scores and percentiles.     TEST RESULTS     Pre-Morbid Functioning  Average to above average based on educational and occupational history along with score on a word reading measure   Mental Status:  MoCA=26/30   Attention/Working Memory:  Within normal expectations   Processing or Mental Speed  Within normal expectations   Motor Functions  Very slightly slower than expected motor speed and dexterity bilaterally   Language    Basic expressive and receptive language was normal on observation   Naming was normal   Verbal reasoning was normal   Phonemic and semantic fluency were both below expectations   Visuospatial/  Construction:  Visual reasoning was excellent   Copy a simple figures was normal   Copy of a complex figure was below expectations mainly due to poor initial approach and planning.  No significant trouble with visual perceptual skills noted   Learning and Memory:  On a structured learning task, his learning and memory were slightly below expectations. Recognition cues improved recall.    Unstructured task, his learning and memory was well above expectations.  It seems that he benefited quite a bit from the  repetition.   Visual learning was just below expectations but he had no trouble with visual recall.   Executive or Frontal-lobe Functions  Set shifting was above expectations   Verbal and visual reasoning were normal   Conceptual reasoning was normal   Word generativity was below expectations   Normal copy of a clock   Psychiatric or Behavioral Symptoms  Mild anxiety       BILLING     Service Description CPT Code Minutes Units   Test Evaluation Services --  --   Neuropsychological testing evaluation services by physician [phone]   Each additional hour by physician [phone]   Test Administration and Scoring --  --   Psychological or neuropsychological test administration and scoring by technician [phone]   Each additional 30 minutes by technician [phone]         DATA APPENDIX:   Note: It is important to note that scores/percentiles should only be interpreted by a neuropsychologist. It is common for healthy individuals to have 1-3 isolated low/unusual scores that are not indicative of any significant cognitive dysfunction.      Raw Score Type of Standardized Score Standardized Score   MSVT IR 95 - -   MSVT DR 90 - -   MSVT Cons 85 - -   ACS LM II Rec 22 - -   ACS VR II Rec 6 - -   ACS RDS 9 - -   CVLT-II FC 16 - -   PREMORBID FUNCTIONING Raw Score Type of Standardized Score Standardized Score   TOPF simple dem. eFSIQ -    TOPF pred. eFSIQ -    TOPF simple + pred. eFSIQ -    INTELLECTUAL FUNCTIONING Raw Score Type of Standardized Score Standardized Score   WAIS-IV      WMI -    PSI -    Similarities 27 ss 11   Matrix Reasoning 23 ss 16   Digit Span 29 ss 12         DS Forward 10 ss 10         DS Backward 11 ss 14         DS Sequence 8 ss 10         Longest Digit Forward 7 - -         Longest Digit Backward 7 - -         Longest Digit Sequence 6 - -   Arithmetic 18 ss 14   Symbol Search 31 ss 12   Coding 59 ss 11   COGNITIVE SCREENING Raw Score Type of Standardized Score  Standardized Score   MoCA 26/30 - -   Orientation - Place 2/2 - -   Orientation - Date 4/4 - -   LANGUAGE FUNCTIONING Raw Score Type of Standardized Score Standardized Score   WAIS-IV Similarities 27 ss 11   TOPF Word Reading 49    NAB Naming 31 Tscore 55   FAS 25 Tscore 33   Animal Naming 13 Tscore 33   VISUOSPATIAL FUNCTIONING Raw Score Type of Standardized Score Standardized Score   WAIS-IV Matrix Reasoning 23 ss 16   RCFT Copy 28 - -   RCFT Time to Copy 233 - -   LEARNING & MEMORY Raw Score Type of Standardized Score Standardized Score   CVLT-II      Trials 1-5 (T-Score) 55 Tscore 67   List A Trial 1 8 zscore 1.5   List A Trial 5 14 zscore 1.5   List B 5 zscore 0   SDFR 11 zscore 1   SDCR 12 zscore 1   LDFR 12 zscore 1   LDCR 12 zscore 0.5   Semantic Clustering 2.6 zscore 2   Learning White 1.4 zscore 0.5   Repetitions 5 zscore 0.5   Intrusions 0 zscore -1   Recognition Hits 14 zscore 0   False Positives 2 zscore -0.5   Discriminability 2.7 zscore 0   WMS-IV      Auditory Immediate (additional score) -    Auditory Delayed (additional score) -    Auditory Memory -    WMS-IV Subtests      LM I 18 ss 7   LM II 15 ss 8   LM Recognition 22 - -   (CVLT-II Trials 1-5) 67  14   (CVLT-II Long Delay) 1  13   VR I 26 ss 7   VR II 19 ss 10   VR II Recognition 6 - -   VR Copy 42 - -   ATTENTION/WORKING MEMORY Raw Score Type of Standardized Score Standardized Score   WAIS-IV WMI -    WAIS-IV Digit Span 29 ss 12         DS Forward 10 ss 10         DS Backward 11 ss 14         DS Sequence 8 ss 10         Longest Digit Forward 7 - -         Longest Digit Backward 7 - -         Longest Digit Sequence 6 - -   WAIS-IV Arithmetic 18 ss 14   MENTAL PROCESSING SPEED Raw Score Type of Standardized Score Standardized Score   WAIS-IV PSI -    WAIS-IV Symbol Search 31 ss 12   WAIS-IV Coding 59 ss 11   TMT A  30 Tscore 50   TMT A errors 0 - -   EXECUTIVE FUNCTIONING Raw Score Type of Standardized Score  Standardized Score   TMT B 51 Tscore 62   TMT B errors 0 - -   WCST-128      Total Correct 68     Total Errors 17    Perseverative Resp. 7    Perseverative Err. 7    Nonperseverative Err. 10    Concept. Level Response 66    Categories Completed 6 - -   FMS 0 - -   Learning to Learn 3.90 - -   FRONTOMOTOR  Raw Score Type of Standardized Score Standardized Score   GPT DH 88 Tscore 43   GPD NDH 90 Tscore 42   MOOD & PERSONALITY Raw Score Type of Standardized Score Standardized Score   PHQ-9 3 - -   JOSEFINA-7 5 - -

## 2020-12-11 NOTE — ASSESSMENT & PLAN NOTE
Assessment:  -Cognitive Testing:  Results were largely normal with no evidence of any significant cognitive impairment.  As is the case for all individuals, he had a pattern of strengths and weaknesses but it is normal to have a few isolated atypical scores on any lengthy neuropsychological battery of tests.   -Etiology:    >>Fortunately, he does not have any cognitive disorder and NO dementia.  In fact, many of his scores were normal or better than normal for his age.  >>Certainly the combination of significant work related stress and insomnia may be resulting in the kinds of cognitive sxs that he is noticing is everyday life.  Plan:  Follow Up Recommendations:  Neuropsychology Follow-up · We will discuss results and plan with him during feedback session.  · Neuropsychology: No follow-up.  If Mr. Galdamez or others notice reductions in functioning, repeat evaluation is recommended.   Neurology Follow-up  Continued Neurology follow-up    Mental Health Follow-up · Psychiatry/Medical Psychology:  Will discuss whether psychiatry consult for his sleep versus sleep study versus both during feedback.  · Psychology/Therapy: Consultation with a therapist or  may be helpful so he can reduce his job-related over-work/stress.  longstanding  hour work weeks  · Recommend [email or web address] for referrals/scheduling therapy.    Sleep Medicine Follow-up  Will discuss potential referral with him during feedback.        Recommendations for Mr. Galdamez and Caregivers/Family:   Brain Health: · Engage in regular exercise, which increases alertness and arousal and can improve attention and focus.    · Get a good nights sleep, as this can enhance alertness and cognition.  · Eat healthy foods and balanced meals. It is notable that research indicates certain nutrients may aid in brain function, such as B vitamins (especially B6, B12, and folic acid), antioxidants (such as vitamins C and E, and beta carotene), and Omega-3  fatty acids. Talk with your physician or nutritionist about whats right for you.   · Keep your brain active. Find activities to stay mentally active, such as reading, games (cards, checkers), puzzles (crosswords, Sudoku, jig saw), crafts (models, woodworking), gardening, or participating in activities in the community.  · Stay socially engaged. Continue staying active with your family and friends.   Sleep Tips · Poor sleep has a negative effect on cognition. Several strategies have been shown to improve sleep:   · Caffeine intake in the afternoon and evening, as well as stuffing oneself at supper, can decrease the quality of restful sleep throughout the night.   · Bedtime and wake-up times should be consistent every night and morning so the body becomes used to a single routine, even on the weekends.  · Engage in daily physical activity, but not 2-3 hours before bedtime.   · No technology use (television, computer, iPad) 1-2 hours before bed.   · Have a wind down routine (e.g., soft lights in the house, bath before bed, reduced fluid intake, songs, reading, less noise) to promote sleep readiness.   · Visit the www.sleepfoundation.org for more strategies.   Attention Tips · Remember that inattention and lack of focus are major culprits to forgetting information so be sure and practice paying attention for adequate learning of information. If you rely on passive attention to remembering something (e.g., yestephy, uh-huh approach), youll find you cannot recall it later. I recommend the following to improve attention, which may aid in later recall:   · Reduce distractions as much as possible.  · Look at the person as they are speaking to you.   · Paraphrase as they are speaking  · Write down important pieces of information   · Ask people to repeat if you zone out.    · Have visual cues  (posted to-do-list, daily schedule) to remind you if you need to do something later.   Processing Speed Tips · Using multiple  modalities (e.g., listening, writing notes, asking questions, recording) to learn new information is likely to allow additional time for processing, thus improving memory for the material.   · Allowing sufficient time to complete tasks will reduce frustration and help to ensure completion.  · Spend a lot of up-front time planning in advance how long a task may take and then chunk steps in the task so you don't wear yourself out.   Executive Functioning Tips: · Dont attempt to multi-task.  Separate tasks so that each can be completed one at a time.  · Consider using a calendar/day planner, as that may be effective to help you plan and stay on track.  Color-coding specific tasks by importance may add additional benefit to your planner.  · Break down large projects into smaller tasks and write down the steps to completing the task.    Memory Tips- Learning something (initially reading something, talking to someone) · Rehearse - Immediately after seeing/hearing something, try to recall it.  Wait a few minutes, then check again.  Gradually lengthen the intervals between rehearsals.  · Repetition of learned material is critical to ensure storage of information to be learned. Self-test at home to ensure learning.  · Write down important information to improve your attention and focus and to have something to look back on when you need to recall it.  · Make sure the person doesnt rattle off, but presents in a clear, logical, and unhurried manner.    Memory Tips - Remembering after a period of time · Jog your memory - Lose something?  Think back to when you last had it.  What did you do next?  And after that?  Mentally walk yourself through each activity that followed.  Prodding your memory this way may enable you to recall the location of the missing item.  · Use a cue - Symbolic reminders (the proverbial string around the finger) are helpful.  So too are memos, timers, calendar notes, etc.--keep them in visible,  appropriate places.  · Get organized - Have fixed locations for all important papers, key phone numbers, medications, keys, wallet, glasses, tools, etc.  · Develop routines - Routines can anchor memories so they do not drift away.

## 2020-12-11 NOTE — ASSESSMENT & PLAN NOTE
Assessment:  -longstanding trouble with middle insomnia and sleep efficiency often results in negative downstream effects for cognition  -he also reports family history of restless leg syndrome along with potential symptoms  Plan:  -will discuss sleep hygiene and potential referral for assessment of sleep disorder

## 2020-12-28 ENCOUNTER — PATIENT MESSAGE (OUTPATIENT)
Dept: INTERNAL MEDICINE | Facility: CLINIC | Age: 65
End: 2020-12-28

## 2021-01-11 ENCOUNTER — OFFICE VISIT (OUTPATIENT)
Dept: NEUROLOGY | Facility: CLINIC | Age: 66
End: 2021-01-11
Payer: MEDICARE

## 2021-01-11 DIAGNOSIS — R41.3 MEMORY CHANGE: ICD-10-CM

## 2021-01-11 PROCEDURE — 99499 UNLISTED E&M SERVICE: CPT | Mod: GT,95,, | Performed by: CLINICAL NEUROPSYCHOLOGIST

## 2021-01-11 PROCEDURE — 99499 NO LOS: ICD-10-PCS | Mod: GT,95,, | Performed by: CLINICAL NEUROPSYCHOLOGIST

## 2021-02-01 ENCOUNTER — PATIENT MESSAGE (OUTPATIENT)
Dept: INTERNAL MEDICINE | Facility: CLINIC | Age: 66
End: 2021-02-01

## 2021-06-15 ENCOUNTER — HOSPITAL ENCOUNTER (OUTPATIENT)
Dept: RADIOLOGY | Facility: HOSPITAL | Age: 66
Discharge: HOME | End: 2021-06-15
Attending: FAMILY MEDICINE
Payer: MEDICARE

## 2021-06-15 ENCOUNTER — OFFICE VISIT (OUTPATIENT)
Dept: INTERNAL MEDICINE | Facility: CLINIC | Age: 66
End: 2021-06-15
Attending: FAMILY MEDICINE
Payer: MEDICARE

## 2021-06-15 VITALS
BODY MASS INDEX: 24.38 KG/M2 | HEIGHT: 72 IN | OXYGEN SATURATION: 99 % | SYSTOLIC BLOOD PRESSURE: 132 MMHG | WEIGHT: 180 LBS | HEART RATE: 66 BPM | DIASTOLIC BLOOD PRESSURE: 80 MMHG

## 2021-06-15 DIAGNOSIS — Z12.5 PROSTATE CANCER SCREENING: ICD-10-CM

## 2021-06-15 DIAGNOSIS — E78.5 HYPERLIPIDEMIA, UNSPECIFIED HYPERLIPIDEMIA TYPE: ICD-10-CM

## 2021-06-15 DIAGNOSIS — Z00.00 ANNUAL PHYSICAL EXAM: Primary | ICD-10-CM

## 2021-06-15 DIAGNOSIS — G89.29 CHRONIC RIGHT-SIDED LOW BACK PAIN, UNSPECIFIED WHETHER SCIATICA PRESENT: ICD-10-CM

## 2021-06-15 DIAGNOSIS — R10.9 FLANK PAIN: ICD-10-CM

## 2021-06-15 DIAGNOSIS — M54.50 CHRONIC RIGHT-SIDED LOW BACK PAIN, UNSPECIFIED WHETHER SCIATICA PRESENT: ICD-10-CM

## 2021-06-15 DIAGNOSIS — C02.9 TONGUE CANCER: ICD-10-CM

## 2021-06-15 DIAGNOSIS — R41.3 MEMORY CHANGE: ICD-10-CM

## 2021-06-15 PROCEDURE — 1101F PT FALLS ASSESS-DOCD LE1/YR: CPT | Mod: CPTII,S$GLB,, | Performed by: FAMILY MEDICINE

## 2021-06-15 PROCEDURE — 1101F PR PT FALLS ASSESS DOC 0-1 FALLS W/OUT INJ PAST YR: ICD-10-PCS | Mod: CPTII,S$GLB,, | Performed by: FAMILY MEDICINE

## 2021-06-15 PROCEDURE — 99999 PR PBB SHADOW E&M-EST. PATIENT-LVL IV: CPT | Mod: PBBFAC,,, | Performed by: FAMILY MEDICINE

## 2021-06-15 PROCEDURE — 72100 X-RAY EXAM L-S SPINE 2/3 VWS: CPT | Mod: 26,,, | Performed by: RADIOLOGY

## 2021-06-15 PROCEDURE — 99999 PR PBB SHADOW E&M-EST. PATIENT-LVL IV: ICD-10-PCS | Mod: PBBFAC,,, | Performed by: FAMILY MEDICINE

## 2021-06-15 PROCEDURE — 99397 PER PM REEVAL EST PAT 65+ YR: CPT | Mod: S$GLB,,, | Performed by: FAMILY MEDICINE

## 2021-06-15 PROCEDURE — 1125F AMNT PAIN NOTED PAIN PRSNT: CPT | Mod: S$GLB,,, | Performed by: FAMILY MEDICINE

## 2021-06-15 PROCEDURE — 3008F PR BODY MASS INDEX (BMI) DOCUMENTED: ICD-10-PCS | Mod: CPTII,S$GLB,, | Performed by: FAMILY MEDICINE

## 2021-06-15 PROCEDURE — 72100 XR LUMBAR SPINE AP AND LATERAL: ICD-10-PCS | Mod: 26,,, | Performed by: RADIOLOGY

## 2021-06-15 PROCEDURE — 99499 UNLISTED E&M SERVICE: CPT | Mod: S$GLB,,, | Performed by: FAMILY MEDICINE

## 2021-06-15 PROCEDURE — 3288F PR FALLS RISK ASSESSMENT DOCUMENTED: ICD-10-PCS | Mod: CPTII,S$GLB,, | Performed by: FAMILY MEDICINE

## 2021-06-15 PROCEDURE — 3288F FALL RISK ASSESSMENT DOCD: CPT | Mod: CPTII,S$GLB,, | Performed by: FAMILY MEDICINE

## 2021-06-15 PROCEDURE — 99397 PR PREVENTIVE VISIT,EST,65 & OVER: ICD-10-PCS | Mod: S$GLB,,, | Performed by: FAMILY MEDICINE

## 2021-06-15 PROCEDURE — 72100 X-RAY EXAM L-S SPINE 2/3 VWS: CPT | Mod: TC

## 2021-06-15 PROCEDURE — 1125F PR PAIN SEVERITY QUANTIFIED, PAIN PRESENT: ICD-10-PCS | Mod: S$GLB,,, | Performed by: FAMILY MEDICINE

## 2021-06-15 PROCEDURE — 99499 RISK ADDL DX/OHS AUDIT: ICD-10-PCS | Mod: S$GLB,,, | Performed by: FAMILY MEDICINE

## 2021-06-15 PROCEDURE — 3008F BODY MASS INDEX DOCD: CPT | Mod: CPTII,S$GLB,, | Performed by: FAMILY MEDICINE

## 2021-06-15 RX ORDER — PRAVASTATIN SODIUM 40 MG/1
40 TABLET ORAL DAILY
Qty: 90 TABLET | Refills: 3 | Status: SHIPPED | OUTPATIENT
Start: 2021-06-15 | End: 2021-10-22 | Stop reason: SDUPTHER

## 2021-06-16 ENCOUNTER — PATIENT MESSAGE (OUTPATIENT)
Dept: INTERNAL MEDICINE | Facility: CLINIC | Age: 66
End: 2021-06-16

## 2021-06-17 ENCOUNTER — TELEPHONE (OUTPATIENT)
Dept: ORTHOPEDICS | Facility: CLINIC | Age: 66
End: 2021-06-17

## 2021-06-17 ENCOUNTER — PATIENT MESSAGE (OUTPATIENT)
Dept: ORTHOPEDICS | Facility: CLINIC | Age: 66
End: 2021-06-17

## 2021-06-17 DIAGNOSIS — M51.36 DDD (DEGENERATIVE DISC DISEASE), LUMBAR: Primary | ICD-10-CM

## 2021-06-26 ENCOUNTER — PATIENT OUTREACH (OUTPATIENT)
Dept: ADMINISTRATIVE | Facility: OTHER | Age: 66
End: 2021-06-26

## 2021-06-28 ENCOUNTER — OFFICE VISIT (OUTPATIENT)
Dept: ORTHOPEDICS | Facility: CLINIC | Age: 66
End: 2021-06-28
Attending: FAMILY MEDICINE
Payer: MEDICARE

## 2021-06-28 ENCOUNTER — HOSPITAL ENCOUNTER (OUTPATIENT)
Dept: RADIOLOGY | Facility: HOSPITAL | Age: 66
Discharge: HOME | End: 2021-06-28
Attending: PHYSICIAN ASSISTANT
Payer: MEDICARE

## 2021-06-28 VITALS — WEIGHT: 184.31 LBS | BODY MASS INDEX: 24.96 KG/M2 | HEIGHT: 72 IN

## 2021-06-28 DIAGNOSIS — M51.36 DDD (DEGENERATIVE DISC DISEASE), LUMBAR: ICD-10-CM

## 2021-06-28 DIAGNOSIS — M54.50 CHRONIC RIGHT-SIDED LOW BACK PAIN, UNSPECIFIED WHETHER SCIATICA PRESENT: ICD-10-CM

## 2021-06-28 DIAGNOSIS — G89.29 CHRONIC RIGHT-SIDED LOW BACK PAIN, UNSPECIFIED WHETHER SCIATICA PRESENT: ICD-10-CM

## 2021-06-28 PROCEDURE — 3008F PR BODY MASS INDEX (BMI) DOCUMENTED: ICD-10-PCS | Mod: CPTII,S$GLB,, | Performed by: PHYSICIAN ASSISTANT

## 2021-06-28 PROCEDURE — 3288F FALL RISK ASSESSMENT DOCD: CPT | Mod: CPTII,S$GLB,, | Performed by: PHYSICIAN ASSISTANT

## 2021-06-28 PROCEDURE — 1101F PT FALLS ASSESS-DOCD LE1/YR: CPT | Mod: CPTII,S$GLB,, | Performed by: PHYSICIAN ASSISTANT

## 2021-06-28 PROCEDURE — 1125F PR PAIN SEVERITY QUANTIFIED, PAIN PRESENT: ICD-10-PCS | Mod: S$GLB,,, | Performed by: PHYSICIAN ASSISTANT

## 2021-06-28 PROCEDURE — 72120 XR LUMBAR SPINE FLEXION AND EXTENSION ONLY: ICD-10-PCS | Mod: 26,,, | Performed by: RADIOLOGY

## 2021-06-28 PROCEDURE — 3008F BODY MASS INDEX DOCD: CPT | Mod: CPTII,S$GLB,, | Performed by: PHYSICIAN ASSISTANT

## 2021-06-28 PROCEDURE — 99999 PR PBB SHADOW E&M-EST. PATIENT-LVL III: CPT | Mod: PBBFAC,,, | Performed by: PHYSICIAN ASSISTANT

## 2021-06-28 PROCEDURE — 1125F AMNT PAIN NOTED PAIN PRSNT: CPT | Mod: S$GLB,,, | Performed by: PHYSICIAN ASSISTANT

## 2021-06-28 PROCEDURE — 72120 X-RAY BEND ONLY L-S SPINE: CPT | Mod: TC

## 2021-06-28 PROCEDURE — 99204 OFFICE O/P NEW MOD 45 MIN: CPT | Mod: S$GLB,,, | Performed by: PHYSICIAN ASSISTANT

## 2021-06-28 PROCEDURE — 99999 PR PBB SHADOW E&M-EST. PATIENT-LVL III: ICD-10-PCS | Mod: PBBFAC,,, | Performed by: PHYSICIAN ASSISTANT

## 2021-06-28 PROCEDURE — 72120 X-RAY BEND ONLY L-S SPINE: CPT | Mod: 26,,, | Performed by: RADIOLOGY

## 2021-06-28 PROCEDURE — 99204 PR OFFICE/OUTPT VISIT, NEW, LEVL IV, 45-59 MIN: ICD-10-PCS | Mod: S$GLB,,, | Performed by: PHYSICIAN ASSISTANT

## 2021-06-28 PROCEDURE — 1159F PR MEDICATION LIST DOCUMENTED IN MEDICAL RECORD: ICD-10-PCS | Mod: S$GLB,,, | Performed by: PHYSICIAN ASSISTANT

## 2021-06-28 PROCEDURE — 1101F PR PT FALLS ASSESS DOC 0-1 FALLS W/OUT INJ PAST YR: ICD-10-PCS | Mod: CPTII,S$GLB,, | Performed by: PHYSICIAN ASSISTANT

## 2021-06-28 PROCEDURE — 3288F PR FALLS RISK ASSESSMENT DOCUMENTED: ICD-10-PCS | Mod: CPTII,S$GLB,, | Performed by: PHYSICIAN ASSISTANT

## 2021-06-28 PROCEDURE — 1159F MED LIST DOCD IN RCRD: CPT | Mod: S$GLB,,, | Performed by: PHYSICIAN ASSISTANT

## 2021-07-27 ENCOUNTER — OFFICE VISIT (OUTPATIENT)
Dept: INTERNAL MEDICINE | Facility: CLINIC | Age: 66
End: 2021-07-27
Attending: FAMILY MEDICINE
Payer: MEDICARE

## 2021-07-27 DIAGNOSIS — G89.29 CHRONIC RIGHT-SIDED LOW BACK PAIN WITHOUT SCIATICA: Primary | ICD-10-CM

## 2021-07-27 DIAGNOSIS — C02.9 TONGUE CANCER: ICD-10-CM

## 2021-07-27 DIAGNOSIS — M54.50 CHRONIC RIGHT-SIDED LOW BACK PAIN WITHOUT SCIATICA: Primary | ICD-10-CM

## 2021-07-27 PROCEDURE — 1160F PR REVIEW ALL MEDS BY PRESCRIBER/CLIN PHARMACIST DOCUMENTED: ICD-10-PCS | Mod: CPTII,95,, | Performed by: FAMILY MEDICINE

## 2021-07-27 PROCEDURE — 1159F MED LIST DOCD IN RCRD: CPT | Mod: CPTII,95,, | Performed by: FAMILY MEDICINE

## 2021-07-27 PROCEDURE — 1159F PR MEDICATION LIST DOCUMENTED IN MEDICAL RECORD: ICD-10-PCS | Mod: CPTII,95,, | Performed by: FAMILY MEDICINE

## 2021-07-27 PROCEDURE — 1160F RVW MEDS BY RX/DR IN RCRD: CPT | Mod: CPTII,95,, | Performed by: FAMILY MEDICINE

## 2021-07-27 PROCEDURE — 99213 PR OFFICE/OUTPT VISIT, EST, LEVL III, 20-29 MIN: ICD-10-PCS | Mod: 95,,, | Performed by: FAMILY MEDICINE

## 2021-07-27 PROCEDURE — 99213 OFFICE O/P EST LOW 20 MIN: CPT | Mod: 95,,, | Performed by: FAMILY MEDICINE

## 2021-10-18 DIAGNOSIS — E78.5 HYPERLIPIDEMIA, UNSPECIFIED HYPERLIPIDEMIA TYPE: ICD-10-CM

## 2021-10-19 RX ORDER — PRAVASTATIN SODIUM 40 MG/1
TABLET ORAL
Refills: 0 | OUTPATIENT
Start: 2021-10-19

## 2021-10-20 ENCOUNTER — PATIENT MESSAGE (OUTPATIENT)
Dept: ADMINISTRATIVE | Facility: OTHER | Age: 66
End: 2021-10-20
Payer: MEDICARE

## 2021-10-22 DIAGNOSIS — E78.5 HYPERLIPIDEMIA, UNSPECIFIED HYPERLIPIDEMIA TYPE: ICD-10-CM

## 2021-10-22 RX ORDER — PRAVASTATIN SODIUM 40 MG/1
40 TABLET ORAL DAILY
Qty: 90 TABLET | Refills: 0 | Status: SHIPPED | OUTPATIENT
Start: 2021-10-22 | End: 2021-11-15

## 2021-11-13 DIAGNOSIS — E78.5 HYPERLIPIDEMIA, UNSPECIFIED HYPERLIPIDEMIA TYPE: ICD-10-CM

## 2021-11-15 RX ORDER — PRAVASTATIN SODIUM 40 MG/1
TABLET ORAL
Qty: 90 TABLET | Refills: 2 | Status: SHIPPED | OUTPATIENT
Start: 2021-11-15 | End: 2022-09-28 | Stop reason: SDUPTHER

## 2021-12-13 ENCOUNTER — LAB VISIT (OUTPATIENT)
Dept: LAB | Facility: HOSPITAL | Age: 66
End: 2021-12-13
Attending: FAMILY MEDICINE
Payer: MEDICARE

## 2021-12-13 DIAGNOSIS — E78.5 HYPERLIPIDEMIA, UNSPECIFIED HYPERLIPIDEMIA TYPE: ICD-10-CM

## 2021-12-13 LAB
CHOLEST SERPL-MCNC: 203 MG/DL (ref 120–199)
CHOLEST/HDLC SERPL: 2.7 {RATIO} (ref 2–5)
HDLC SERPL-MCNC: 75 MG/DL (ref 40–75)
HDLC SERPL: 36.9 % (ref 20–50)
LDLC SERPL CALC-MCNC: 115 MG/DL (ref 63–159)
NONHDLC SERPL-MCNC: 128 MG/DL
TRIGL SERPL-MCNC: 65 MG/DL (ref 30–150)
TSH SERPL DL<=0.005 MIU/L-ACNC: 3.15 UIU/ML (ref 0.4–4)

## 2021-12-13 PROCEDURE — 36415 COLL VENOUS BLD VENIPUNCTURE: CPT | Mod: HCNC,PO | Performed by: FAMILY MEDICINE

## 2021-12-13 PROCEDURE — 80061 LIPID PANEL: CPT | Mod: HCNC | Performed by: FAMILY MEDICINE

## 2021-12-13 PROCEDURE — 84443 ASSAY THYROID STIM HORMONE: CPT | Mod: HCNC | Performed by: FAMILY MEDICINE

## 2022-01-11 ENCOUNTER — LAB VISIT (OUTPATIENT)
Dept: PRIMARY CARE CLINIC | Facility: CLINIC | Age: 67
End: 2022-01-11
Payer: MEDICARE

## 2022-01-11 DIAGNOSIS — Z20.822 CONTACT WITH AND (SUSPECTED) EXPOSURE TO COVID-19: ICD-10-CM

## 2022-01-11 LAB
CTP QC/QA: YES
SARS-COV-2 AG RESP QL IA.RAPID: POSITIVE

## 2022-01-11 PROCEDURE — 87811 SARS-COV-2 COVID19 W/OPTIC: CPT | Mod: HCNC

## 2022-01-15 DIAGNOSIS — U07.1 COVID-19 VIRUS DETECTED: ICD-10-CM

## 2022-01-16 ENCOUNTER — PATIENT MESSAGE (OUTPATIENT)
Dept: INTERNAL MEDICINE | Facility: CLINIC | Age: 67
End: 2022-01-16
Payer: MEDICARE

## 2022-01-16 ENCOUNTER — NURSE TRIAGE (OUTPATIENT)
Dept: ADMINISTRATIVE | Facility: CLINIC | Age: 67
End: 2022-01-16
Payer: MEDICARE

## 2022-01-16 NOTE — TELEPHONE ENCOUNTER
Pt states that he is not having new or worsening symptoms.  First tested + on 6th.  Pt retested on the 11th and 16th and all tests are still +.  Calling with questions regarding home isolation.  Denies any symptoms at this time.  Symptoms first started on 1/5.  Per protocol, care advised is home care.  Instructed pt that retesting is not generally recommended within 90 days unless exposure to someone that is Covid + and start having symptoms within 14 days of exposure.  Instructed to call for worsening/questions/concerns.  VU.    Reason for Disposition   [1] COVID-19 diagnosed by positive lab test AND [2] NO symptoms (e.g., cough, fever, others)    Additional Information   Negative: SEVERE difficulty breathing (e.g., struggling for each breath, speaks in single words)   Negative: Difficult to awaken or acting confused (e.g., disoriented, slurred speech)   Negative: Bluish (or gray) lips or face now   Negative: Shock suspected (e.g., cold/pale/clammy skin, too weak to stand, low BP, rapid pulse)   Negative: Sounds like a life-threatening emergency to the triager   Negative: SEVERE or constant chest pain or pressure (Exception: mild central chest pain, present only when coughing)   Negative: MODERATE difficulty breathing (e.g., speaks in phrases, SOB even at rest, pulse 100-120)   Negative: [1] Headache AND [2] stiff neck (can't touch chin to chest)   Negative: MILD difficulty breathing (e.g., minimal/no SOB at rest, SOB with walking, pulse <100)   Negative: Chest pain or pressure   Negative: Patient sounds very sick or weak to the triager   Negative: Fever > 103 F (39.4 C)   Negative: [1] Fever > 101 F (38.3 C) AND [2] age > 60 years   Negative: [1] Fever > 100.0 F (37.8 C) AND [2] bedridden (e.g., nursing home patient, CVA, chronic illness, recovering from surgery)   Negative: HIGH RISK for severe COVID complications (e.g., age > 64 years, obesity with BMI > 25, pregnant, chronic lung disease or  other chronic medical condition)  (Exception: Already seen by PCP and no new or worsening symptoms.)   Negative: [1] HIGH RISK patient AND [2] influenza is widespread in the community AND [3] ONE OR MORE respiratory symptoms: cough, sore throat, runny or stuffy nose   Negative: [1] HIGH RISK patient AND [2] influenza exposure within the last 7 days AND [3] ONE OR MORE respiratory symptoms: cough, sore throat, runny or stuffy nose   Negative: [1] COVID-19 infection suspected by caller or triager AND [2] mild symptoms (cough, fever, or others) AND [3] negative COVID-19 rapid test   Negative: Fever present > 3 days (72 hours)   Negative: [1] Fever returns after gone for over 24 hours AND [2] symptoms worse or not improved   Negative: [1] Continuous (nonstop) coughing interferes with work or school AND [2] no improvement using cough treatment per protocol   Negative: Cough present > 3 weeks    Protocols used: CORONAVIRUS (COVID-19) DIAGNOSED OR [id number]

## 2022-01-19 NOTE — TELEPHONE ENCOUNTER
Called pt to inform him no need to retest someone put me on hold for 4 minutes and never came back I never spoke to pt to infomed him of message called back and no answer

## 2022-05-31 ENCOUNTER — PATIENT MESSAGE (OUTPATIENT)
Dept: INTERNAL MEDICINE | Facility: CLINIC | Age: 67
End: 2022-05-31
Payer: MEDICARE

## 2022-05-31 NOTE — PROGRESS NOTES
Subjective:      Chief Complaint: get established     HPI   Mr. Timothy Galdamez is a 67-year-old man with restless leg syndrome, memory deficits, hyperlipidemia, ectatic aorta, head and neck cancer (Hx of; Dx with squamous cell Ca of neck mass/LN in 2015), chronic right back/shoulder pain, and insomnia presenting as a new patient to establish/transition primary care.    The 10-year ASCVD risk score (Cesar MCLAUGHLIN Jr., et al., 2013) is: 13%    Values used to calculate the score:      Age: 67 years      Sex: Male      Is Non- : No      Diabetic: No      Tobacco smoker: No      Systolic Blood Pressure: 134 mmHg      Is BP treated: No      HDL Cholesterol: 75 mg/dL      Total Cholesterol: 203 mg/dL   - maintained on pravastatin 40; previously intolerant of higher intensity statins    Family, social, surgical Hx reviewed     Health Maintenance:  Due for annual screening labs and vaccinations (Shingrix, completion of pneumococcal series, and COVID booster).    Past Medical History:   Diagnosis Date    Hyperlipidemia      Past Surgical History:   Procedure Laterality Date    ANKLE SURGERY      L ankle    COLONOSCOPY N/A 8/21/2017    Procedure: COLONOSCOPY;  Surgeon: Danny Jane MD;  Location: [address]);  Service: Endoscopy;  Laterality: N/A;  Do not cancel this order    TONSILLECTOMY      VASECTOMY       Family History   Problem Relation Age of Onset    Psoriasis Neg Hx     Eczema Neg Hx      Social History     Socioeconomic History    Marital status:    Tobacco Use    Smoking status: Former Smoker   Substance and Sexual Activity    Alcohol use: Yes     Alcohol/week: 3.0 standard drinks     Types: 3 Cans of beer per week     Comment: 4-5 x week    Drug use: No   Social History Narrative    Single, CPA, no tobacco, 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                 Social Determinants of Health     Financial Resource Strain: Low Risk     Difficulty of  Paying Living Expenses: Not hard at all   Food Insecurity: No Food Insecurity    Worried About Running Out of Food in the Last Year: Never true    Ran Out of Food in the Last Year: Never true   Transportation Needs: Unknown    Lack of Transportation (Non-Medical): No   Physical Activity: Insufficiently Active    Days of Exercise per Week: 4 days    Minutes of Exercise per Session: 30 min   Stress: Stress Concern Present    Feeling of Stress : To some extent   Social Connections: Unknown    Frequency of Social Gatherings with Friends and Family: Patient refused    Active Member of Clubs or Organizations: No    Attends Club or Organization Meetings: Patient refused    Marital Status:    Housing Stability: Low Risk     Unable to Pay for Housing in the Last Year: No    Number of Places Lived in the Last Year: 1    Unstable Housing in the Last Year: No     Review of patient's allergies indicates:  No Known Allergies  Timothy BAIRD Rudolph had no medications administered during this visit.    Review of Systems   Constitutional: Negative for appetite change, chills and fever.   HENT: Negative.    Respiratory: Negative for cough, chest tightness and shortness of breath.    Cardiovascular: Negative for chest pain, palpitations and leg swelling.   Gastrointestinal: Negative for abdominal distention, abdominal pain, blood in stool, constipation, diarrhea, nausea and vomiting.   Endocrine: Negative.    Genitourinary: Negative for difficulty urinating, dysuria, frequency and hematuria.   Musculoskeletal: Negative.    Integumentary:  Negative.   Neurological: Negative.    Psychiatric/Behavioral: Negative.          Objective:      Vitals:    06/01/22 0835   BP: 134/80   Pulse: 64   Resp: 16   Temp: 97.7 °F (36.5 °C)      Physical Exam  Vitals reviewed.   Constitutional:       General: He is not in acute distress.     Appearance: Normal appearance.   HENT:      Head: Normocephalic and atraumatic.      Comments: Facial  features are symmetric      Nose: Nose normal. No congestion or rhinorrhea.      Mouth/Throat:      Mouth: Mucous membranes are moist.      Pharynx: Oropharynx is clear. No oropharyngeal exudate or posterior oropharyngeal erythema.   Eyes:      General: No scleral icterus.     Extraocular Movements: Extraocular movements intact.      Conjunctiva/sclera: Conjunctivae normal.   Cardiovascular:      Rate and Rhythm: Normal rate and regular rhythm.      Pulses: Normal pulses.      Heart sounds: Normal heart sounds.   Pulmonary:      Effort: Pulmonary effort is normal. No respiratory distress.      Breath sounds: Normal breath sounds.   Musculoskeletal:         General: No deformity or signs of injury. Normal range of motion.      Cervical back: Normal range of motion.      Comments: Gait normal    Skin:     General: Skin is warm and dry.      Findings: No rash.   Neurological:      General: No focal deficit present.      Mental Status: He is alert and oriented to person, place, and time. Mental status is at baseline.   Psychiatric:         Mood and Affect: Mood normal.         Behavior: Behavior normal.         Thought Content: Thought content normal.       Current Outpatient Medications on File Prior to Visit   Medication Sig Dispense Refill    diphenhydrAMINE (SOMINEX) 25 mg tablet Take 25 mg by mouth nightly as needed for Insomnia.      pravastatin (PRAVACHOL) 40 MG tablet TAKE 1 TABLET EVERY DAY 90 tablet 2     No current facility-administered medications on file prior to visit.         Assessment:       1. Physical exam, annual    2. Prostate cancer screening    3. Fatigue, unspecified type    4. Vitamin D deficiency    5. Ectatic aorta    6. Abnormal PSA        Plan:       Physical exam, annual  -     CBC Auto Differential; Future; Expected date: 06/01/2022  -     Comprehensive Metabolic Panel; Future; Expected date: 06/01/2022  -     Lipid Panel; Future; Expected date: 06/01/2022  -     T4; Future; Expected  date: 06/01/2022  -     TSH; Future; Expected date: 06/01/2022  -     Vitamin D; Future; Expected date: 06/01/2022    Prostate cancer screening   - PSA pending    Fatigue, unspecified type  -     CBC Auto Differential; Future; Expected date: 06/01/2022  -     T4; Future; Expected date: 06/01/2022  -     TSH; Future; Expected date: 06/01/2022    Vitamin D deficiency  -     Vitamin D; Future; Expected date: 06/01/2022    Ectatic aorta   -  longstanding diagnosis;  Will continue to monitor for progression    Other orders  -     Pneumococcal Polysaccharide Vaccine (23 Valent) (SQ/IM)

## 2022-06-01 ENCOUNTER — PATIENT MESSAGE (OUTPATIENT)
Dept: INTERNAL MEDICINE | Facility: CLINIC | Age: 67
End: 2022-06-01

## 2022-06-01 ENCOUNTER — OFFICE VISIT (OUTPATIENT)
Dept: INTERNAL MEDICINE | Facility: CLINIC | Age: 67
End: 2022-06-01
Payer: MEDICARE

## 2022-06-01 VITALS
WEIGHT: 178 LBS | HEART RATE: 64 BPM | TEMPERATURE: 98 F | BODY MASS INDEX: 24.11 KG/M2 | DIASTOLIC BLOOD PRESSURE: 80 MMHG | HEIGHT: 72 IN | SYSTOLIC BLOOD PRESSURE: 134 MMHG | RESPIRATION RATE: 16 BRPM

## 2022-06-01 DIAGNOSIS — R13.10 DYSPHAGIA, UNSPECIFIED TYPE: Primary | ICD-10-CM

## 2022-06-01 DIAGNOSIS — R53.83 FATIGUE, UNSPECIFIED TYPE: ICD-10-CM

## 2022-06-01 DIAGNOSIS — I77.819 ECTATIC AORTA: ICD-10-CM

## 2022-06-01 DIAGNOSIS — Z00.00 PHYSICAL EXAM, ANNUAL: Primary | ICD-10-CM

## 2022-06-01 DIAGNOSIS — Z12.5 PROSTATE CANCER SCREENING: ICD-10-CM

## 2022-06-01 DIAGNOSIS — R97.20 ABNORMAL PSA: ICD-10-CM

## 2022-06-01 DIAGNOSIS — E55.9 VITAMIN D DEFICIENCY: ICD-10-CM

## 2022-06-01 PROCEDURE — 90732 PPSV23 VACC 2 YRS+ SUBQ/IM: CPT | Mod: S$GLB,,, | Performed by: STUDENT IN AN ORGANIZED HEALTH CARE EDUCATION/TRAINING PROGRAM

## 2022-06-01 PROCEDURE — 99999 PR PBB SHADOW E&M-EST. PATIENT-LVL III: ICD-10-PCS | Mod: PBBFAC,,, | Performed by: STUDENT IN AN ORGANIZED HEALTH CARE EDUCATION/TRAINING PROGRAM

## 2022-06-01 PROCEDURE — 1159F MED LIST DOCD IN RCRD: CPT | Mod: CPTII,S$GLB,, | Performed by: STUDENT IN AN ORGANIZED HEALTH CARE EDUCATION/TRAINING PROGRAM

## 2022-06-01 PROCEDURE — 90732 PNEUMOCOCCAL POLYSACCHARIDE VACCINE 23-VALENT =>2YO SQ IM: ICD-10-PCS | Mod: S$GLB,,, | Performed by: STUDENT IN AN ORGANIZED HEALTH CARE EDUCATION/TRAINING PROGRAM

## 2022-06-01 PROCEDURE — 3288F PR FALLS RISK ASSESSMENT DOCUMENTED: ICD-10-PCS | Mod: CPTII,S$GLB,, | Performed by: STUDENT IN AN ORGANIZED HEALTH CARE EDUCATION/TRAINING PROGRAM

## 2022-06-01 PROCEDURE — 99999 PR PBB SHADOW E&M-EST. PATIENT-LVL III: CPT | Mod: PBBFAC,,, | Performed by: STUDENT IN AN ORGANIZED HEALTH CARE EDUCATION/TRAINING PROGRAM

## 2022-06-01 PROCEDURE — 3075F PR MOST RECENT SYSTOLIC BLOOD PRESS GE 130-139MM HG: ICD-10-PCS | Mod: CPTII,S$GLB,, | Performed by: STUDENT IN AN ORGANIZED HEALTH CARE EDUCATION/TRAINING PROGRAM

## 2022-06-01 PROCEDURE — 1126F AMNT PAIN NOTED NONE PRSNT: CPT | Mod: CPTII,S$GLB,, | Performed by: STUDENT IN AN ORGANIZED HEALTH CARE EDUCATION/TRAINING PROGRAM

## 2022-06-01 PROCEDURE — 1159F PR MEDICATION LIST DOCUMENTED IN MEDICAL RECORD: ICD-10-PCS | Mod: CPTII,S$GLB,, | Performed by: STUDENT IN AN ORGANIZED HEALTH CARE EDUCATION/TRAINING PROGRAM

## 2022-06-01 PROCEDURE — 1101F PT FALLS ASSESS-DOCD LE1/YR: CPT | Mod: CPTII,S$GLB,, | Performed by: STUDENT IN AN ORGANIZED HEALTH CARE EDUCATION/TRAINING PROGRAM

## 2022-06-01 PROCEDURE — 1101F PR PT FALLS ASSESS DOC 0-1 FALLS W/OUT INJ PAST YR: ICD-10-PCS | Mod: CPTII,S$GLB,, | Performed by: STUDENT IN AN ORGANIZED HEALTH CARE EDUCATION/TRAINING PROGRAM

## 2022-06-01 PROCEDURE — 3079F DIAST BP 80-89 MM HG: CPT | Mod: CPTII,S$GLB,, | Performed by: STUDENT IN AN ORGANIZED HEALTH CARE EDUCATION/TRAINING PROGRAM

## 2022-06-01 PROCEDURE — 3288F FALL RISK ASSESSMENT DOCD: CPT | Mod: CPTII,S$GLB,, | Performed by: STUDENT IN AN ORGANIZED HEALTH CARE EDUCATION/TRAINING PROGRAM

## 2022-06-01 PROCEDURE — 99397 PER PM REEVAL EST PAT 65+ YR: CPT | Mod: 25,S$GLB,, | Performed by: STUDENT IN AN ORGANIZED HEALTH CARE EDUCATION/TRAINING PROGRAM

## 2022-06-01 PROCEDURE — G0009 ADMIN PNEUMOCOCCAL VACCINE: HCPCS | Mod: S$GLB,,, | Performed by: STUDENT IN AN ORGANIZED HEALTH CARE EDUCATION/TRAINING PROGRAM

## 2022-06-01 PROCEDURE — 3008F PR BODY MASS INDEX (BMI) DOCUMENTED: ICD-10-PCS | Mod: CPTII,S$GLB,, | Performed by: STUDENT IN AN ORGANIZED HEALTH CARE EDUCATION/TRAINING PROGRAM

## 2022-06-01 PROCEDURE — 99499 UNLISTED E&M SERVICE: CPT | Mod: S$GLB,,, | Performed by: STUDENT IN AN ORGANIZED HEALTH CARE EDUCATION/TRAINING PROGRAM

## 2022-06-01 PROCEDURE — 3079F PR MOST RECENT DIASTOLIC BLOOD PRESSURE 80-89 MM HG: ICD-10-PCS | Mod: CPTII,S$GLB,, | Performed by: STUDENT IN AN ORGANIZED HEALTH CARE EDUCATION/TRAINING PROGRAM

## 2022-06-01 PROCEDURE — 3075F SYST BP GE 130 - 139MM HG: CPT | Mod: CPTII,S$GLB,, | Performed by: STUDENT IN AN ORGANIZED HEALTH CARE EDUCATION/TRAINING PROGRAM

## 2022-06-01 PROCEDURE — 99397 PR PREVENTIVE VISIT,EST,65 & OVER: ICD-10-PCS | Mod: 25,S$GLB,, | Performed by: STUDENT IN AN ORGANIZED HEALTH CARE EDUCATION/TRAINING PROGRAM

## 2022-06-01 PROCEDURE — 99499 RISK ADDL DX/OHS AUDIT: ICD-10-PCS | Mod: S$GLB,,, | Performed by: STUDENT IN AN ORGANIZED HEALTH CARE EDUCATION/TRAINING PROGRAM

## 2022-06-01 PROCEDURE — 3008F BODY MASS INDEX DOCD: CPT | Mod: CPTII,S$GLB,, | Performed by: STUDENT IN AN ORGANIZED HEALTH CARE EDUCATION/TRAINING PROGRAM

## 2022-06-01 PROCEDURE — G0009 PNEUMOCOCCAL POLYSACCHARIDE VACCINE 23-VALENT =>2YO SQ IM: ICD-10-PCS | Mod: S$GLB,,, | Performed by: STUDENT IN AN ORGANIZED HEALTH CARE EDUCATION/TRAINING PROGRAM

## 2022-06-01 PROCEDURE — 1126F PR PAIN SEVERITY QUANTIFIED, NO PAIN PRESENT: ICD-10-PCS | Mod: CPTII,S$GLB,, | Performed by: STUDENT IN AN ORGANIZED HEALTH CARE EDUCATION/TRAINING PROGRAM

## 2022-06-01 RX ORDER — DIPHENHYDRAMINE HCL 25 MG
25 TABLET ORAL NIGHTLY PRN
COMMUNITY
End: 2023-09-12

## 2022-06-01 NOTE — TELEPHONE ENCOUNTER
Pt was seen today,    He is reporting some difficulty swallowing and want a referral to see a specialist to enlarge his throat.    Please advise, thanks

## 2022-06-02 ENCOUNTER — LAB VISIT (OUTPATIENT)
Dept: LAB | Facility: HOSPITAL | Age: 67
End: 2022-06-02
Attending: STUDENT IN AN ORGANIZED HEALTH CARE EDUCATION/TRAINING PROGRAM
Payer: MEDICARE

## 2022-06-02 ENCOUNTER — PATIENT MESSAGE (OUTPATIENT)
Dept: INTERNAL MEDICINE | Facility: CLINIC | Age: 67
End: 2022-06-02
Payer: MEDICARE

## 2022-06-02 DIAGNOSIS — E55.9 VITAMIN D DEFICIENCY: ICD-10-CM

## 2022-06-02 DIAGNOSIS — Z12.5 PROSTATE CANCER SCREENING: ICD-10-CM

## 2022-06-02 DIAGNOSIS — R53.83 FATIGUE, UNSPECIFIED TYPE: ICD-10-CM

## 2022-06-02 DIAGNOSIS — Z00.00 PHYSICAL EXAM, ANNUAL: ICD-10-CM

## 2022-06-02 DIAGNOSIS — R97.20 ABNORMAL PSA: ICD-10-CM

## 2022-06-02 LAB
25(OH)D3+25(OH)D2 SERPL-MCNC: 60 NG/ML (ref 30–96)
ALBUMIN SERPL BCP-MCNC: 3.9 G/DL (ref 3.5–5.2)
ALP SERPL-CCNC: 55 U/L (ref 55–135)
ALT SERPL W/O P-5'-P-CCNC: 20 U/L (ref 10–44)
ANION GAP SERPL CALC-SCNC: 7 MMOL/L (ref 8–16)
AST SERPL-CCNC: 17 U/L (ref 10–40)
BASOPHILS # BLD AUTO: 0.05 K/UL (ref 0–0.2)
BASOPHILS NFR BLD: 0.9 % (ref 0–1.9)
BILIRUB SERPL-MCNC: 0.9 MG/DL (ref 0.1–1)
BUN SERPL-MCNC: 12 MG/DL (ref 8–23)
CALCIUM SERPL-MCNC: 9.4 MG/DL (ref 8.7–10.5)
CHLORIDE SERPL-SCNC: 102 MMOL/L (ref 95–110)
CHOLEST SERPL-MCNC: 237 MG/DL (ref 120–199)
CHOLEST/HDLC SERPL: 3.2 {RATIO} (ref 2–5)
CO2 SERPL-SCNC: 28 MMOL/L (ref 23–29)
COMPLEXED PSA SERPL-MCNC: 0.73 NG/ML (ref 0–4)
CREAT SERPL-MCNC: 1 MG/DL (ref 0.5–1.4)
DIFFERENTIAL METHOD: ABNORMAL
EOSINOPHIL # BLD AUTO: 0.2 K/UL (ref 0–0.5)
EOSINOPHIL NFR BLD: 3.2 % (ref 0–8)
ERYTHROCYTE [DISTWIDTH] IN BLOOD BY AUTOMATED COUNT: 13.5 % (ref 11.5–14.5)
EST. GFR  (AFRICAN AMERICAN): >60 ML/MIN/1.73 M^2
EST. GFR  (NON AFRICAN AMERICAN): >60 ML/MIN/1.73 M^2
GLUCOSE SERPL-MCNC: 92 MG/DL (ref 70–110)
HCT VFR BLD AUTO: 47.6 % (ref 40–54)
HDLC SERPL-MCNC: 74 MG/DL (ref 40–75)
HDLC SERPL: 31.2 % (ref 20–50)
HGB BLD-MCNC: 15.8 G/DL (ref 14–18)
IMM GRANULOCYTES # BLD AUTO: 0.02 K/UL (ref 0–0.04)
IMM GRANULOCYTES NFR BLD AUTO: 0.4 % (ref 0–0.5)
LDLC SERPL CALC-MCNC: 152.6 MG/DL (ref 63–159)
LYMPHOCYTES # BLD AUTO: 0.9 K/UL (ref 1–4.8)
LYMPHOCYTES NFR BLD: 17.3 % (ref 18–48)
MCH RBC QN AUTO: 31.9 PG (ref 27–31)
MCHC RBC AUTO-ENTMCNC: 33.2 G/DL (ref 32–36)
MCV RBC AUTO: 96 FL (ref 82–98)
MONOCYTES # BLD AUTO: 0.6 K/UL (ref 0.3–1)
MONOCYTES NFR BLD: 11.1 % (ref 4–15)
NEUTROPHILS # BLD AUTO: 3.6 K/UL (ref 1.8–7.7)
NEUTROPHILS NFR BLD: 67.1 % (ref 38–73)
NONHDLC SERPL-MCNC: 163 MG/DL
NRBC BLD-RTO: 0 /100 WBC
PLATELET # BLD AUTO: 236 K/UL (ref 150–450)
PMV BLD AUTO: 12.1 FL (ref 9.2–12.9)
POTASSIUM SERPL-SCNC: 5.2 MMOL/L (ref 3.5–5.1)
PROT SERPL-MCNC: 6.4 G/DL (ref 6–8.4)
RBC # BLD AUTO: 4.95 M/UL (ref 4.6–6.2)
SODIUM SERPL-SCNC: 137 MMOL/L (ref 136–145)
T4 SERPL-MCNC: 6.8 UG/DL (ref 4.5–11.5)
TRIGL SERPL-MCNC: 52 MG/DL (ref 30–150)
TSH SERPL DL<=0.005 MIU/L-ACNC: 3.63 UIU/ML (ref 0.4–4)
WBC # BLD AUTO: 5.31 K/UL (ref 3.9–12.7)

## 2022-06-02 PROCEDURE — 82306 VITAMIN D 25 HYDROXY: CPT | Performed by: STUDENT IN AN ORGANIZED HEALTH CARE EDUCATION/TRAINING PROGRAM

## 2022-06-02 PROCEDURE — 36415 COLL VENOUS BLD VENIPUNCTURE: CPT | Mod: PO | Performed by: STUDENT IN AN ORGANIZED HEALTH CARE EDUCATION/TRAINING PROGRAM

## 2022-06-02 PROCEDURE — 84443 ASSAY THYROID STIM HORMONE: CPT | Performed by: STUDENT IN AN ORGANIZED HEALTH CARE EDUCATION/TRAINING PROGRAM

## 2022-06-02 PROCEDURE — 84436 ASSAY OF TOTAL THYROXINE: CPT | Performed by: STUDENT IN AN ORGANIZED HEALTH CARE EDUCATION/TRAINING PROGRAM

## 2022-06-02 PROCEDURE — 80061 LIPID PANEL: CPT | Performed by: STUDENT IN AN ORGANIZED HEALTH CARE EDUCATION/TRAINING PROGRAM

## 2022-06-02 PROCEDURE — 85025 COMPLETE CBC W/AUTO DIFF WBC: CPT | Performed by: STUDENT IN AN ORGANIZED HEALTH CARE EDUCATION/TRAINING PROGRAM

## 2022-06-02 PROCEDURE — 80053 COMPREHEN METABOLIC PANEL: CPT | Performed by: STUDENT IN AN ORGANIZED HEALTH CARE EDUCATION/TRAINING PROGRAM

## 2022-06-02 PROCEDURE — 84153 ASSAY OF PSA TOTAL: CPT | Performed by: STUDENT IN AN ORGANIZED HEALTH CARE EDUCATION/TRAINING PROGRAM

## 2022-06-02 NOTE — TELEPHONE ENCOUNTER
Last OV 6-1-22    Pt is asking if he should be on a higher dose of statin.  He is currently taking pravastatin  40 MG tablet.  Please advise, thanks

## 2022-09-28 DIAGNOSIS — E78.5 HYPERLIPIDEMIA, UNSPECIFIED HYPERLIPIDEMIA TYPE: ICD-10-CM

## 2022-09-28 RX ORDER — PRAVASTATIN SODIUM 40 MG/1
40 TABLET ORAL DAILY
Qty: 90 TABLET | Refills: 0 | Status: SHIPPED | OUTPATIENT
Start: 2022-09-28 | End: 2022-12-09

## 2022-09-28 NOTE — TELEPHONE ENCOUNTER
No new care gaps identified.  [facility] Embedded Care Gaps. Reference number: [id number]. 9/28/2022   3:46:50 PM CDT

## 2022-12-07 DIAGNOSIS — E78.5 HYPERLIPIDEMIA, UNSPECIFIED HYPERLIPIDEMIA TYPE: ICD-10-CM

## 2022-12-08 NOTE — TELEPHONE ENCOUNTER
No new care gaps identified.  [facility] Embedded Care Gaps. Reference number: [id number]. 12/07/2022   7:58:15 PM CST

## 2022-12-09 RX ORDER — PRAVASTATIN SODIUM 40 MG/1
TABLET ORAL
Qty: 90 TABLET | Refills: 0 | Status: SHIPPED | OUTPATIENT
Start: 2022-12-09 | End: 2023-09-22 | Stop reason: ALTCHOICE

## 2022-12-09 NOTE — TELEPHONE ENCOUNTER
Pravastatin refilled per patient request.  However, 10 year ASCVD risk is high enough to warrant more aggressive therapy.  Can we ask if he has ever tried/or been intolerant of either atorvastatin (Lipitor) at 40 mg or Crestor (rosuvastatin) at 20 mg or more please.    Thank you for your time.

## 2022-12-09 NOTE — TELEPHONE ENCOUNTER
I spoke to pt,, we discussed the following per   Dr. Lanier:Pravastatin refilled per patient request.  However, 10 year ASCVD risk is high enough to warrant more aggressive therapy.  Can we ask if he has ever tried/or been intolerant of either atorvastatin (Lipitor) at 40 mg or Crestor (rosuvastatin) at 20 mg or more please.    Pt don't remember taking either medication.  He is willing to change to medication you suggest.    Please send new Rx to [facility]: [facility]    Thanks

## 2022-12-09 NOTE — TELEPHONE ENCOUNTER
Refill Routing Note   Medication(s) are not appropriate for processing by [facility] for the following reason(s):      - Medication not previously prescribed by PCP    [facility] action(s):  Defer          Medication reconciliation completed: No     Appointments  past 12m or future 3m with PCP    Date Provider   Last Visit   Visit date not found Kiko Saleh DO   Next Visit   Visit date not found Kiko Saleh DO   ED visits in past 90 days: 0        Note composed:8:27 PM 12/08/2022

## 2022-12-12 ENCOUNTER — PATIENT MESSAGE (OUTPATIENT)
Dept: INTERNAL MEDICINE | Facility: CLINIC | Age: 67
End: 2022-12-12
Payer: MEDICARE

## 2022-12-12 RX ORDER — ROSUVASTATIN CALCIUM 20 MG/1
20 TABLET, COATED ORAL DAILY
Qty: 90 TABLET | Refills: 3 | Status: SHIPPED | OUTPATIENT
Start: 2022-12-12 | End: 2023-09-22

## 2023-01-19 ENCOUNTER — OFFICE VISIT (OUTPATIENT)
Dept: INTERNAL MEDICINE | Facility: CLINIC | Age: 68
End: 2023-01-19
Payer: MEDICARE

## 2023-01-19 VITALS
DIASTOLIC BLOOD PRESSURE: 80 MMHG | SYSTOLIC BLOOD PRESSURE: 126 MMHG | OXYGEN SATURATION: 97 % | HEIGHT: 72 IN | TEMPERATURE: 99 F | BODY MASS INDEX: 24.61 KG/M2 | WEIGHT: 181.69 LBS | HEART RATE: 55 BPM

## 2023-01-19 DIAGNOSIS — R03.0 ELEVATED BP WITHOUT DIAGNOSIS OF HYPERTENSION: Primary | ICD-10-CM

## 2023-01-19 PROCEDURE — 99213 PR OFFICE/OUTPT VISIT, EST, LEVL III, 20-29 MIN: ICD-10-PCS | Mod: HCNC,S$GLB,, | Performed by: INTERNAL MEDICINE

## 2023-01-19 PROCEDURE — 3008F BODY MASS INDEX DOCD: CPT | Mod: HCNC,CPTII,S$GLB, | Performed by: INTERNAL MEDICINE

## 2023-01-19 PROCEDURE — 99999 PR PBB SHADOW E&M-EST. PATIENT-LVL III: CPT | Mod: PBBFAC,HCNC,, | Performed by: INTERNAL MEDICINE

## 2023-01-19 PROCEDURE — 99213 OFFICE O/P EST LOW 20 MIN: CPT | Mod: HCNC,S$GLB,, | Performed by: INTERNAL MEDICINE

## 2023-01-19 PROCEDURE — 99999 PR PBB SHADOW E&M-EST. PATIENT-LVL III: ICD-10-PCS | Mod: PBBFAC,HCNC,, | Performed by: INTERNAL MEDICINE

## 2023-01-19 PROCEDURE — 1160F PR REVIEW ALL MEDS BY PRESCRIBER/CLIN PHARMACIST DOCUMENTED: ICD-10-PCS | Mod: HCNC,CPTII,S$GLB, | Performed by: INTERNAL MEDICINE

## 2023-01-19 PROCEDURE — 3008F PR BODY MASS INDEX (BMI) DOCUMENTED: ICD-10-PCS | Mod: HCNC,CPTII,S$GLB, | Performed by: INTERNAL MEDICINE

## 2023-01-19 PROCEDURE — 3074F PR MOST RECENT SYSTOLIC BLOOD PRESSURE < 130 MM HG: ICD-10-PCS | Mod: HCNC,CPTII,S$GLB, | Performed by: INTERNAL MEDICINE

## 2023-01-19 PROCEDURE — 3079F PR MOST RECENT DIASTOLIC BLOOD PRESSURE 80-89 MM HG: ICD-10-PCS | Mod: HCNC,CPTII,S$GLB, | Performed by: INTERNAL MEDICINE

## 2023-01-19 PROCEDURE — 3074F SYST BP LT 130 MM HG: CPT | Mod: HCNC,CPTII,S$GLB, | Performed by: INTERNAL MEDICINE

## 2023-01-19 PROCEDURE — 3079F DIAST BP 80-89 MM HG: CPT | Mod: HCNC,CPTII,S$GLB, | Performed by: INTERNAL MEDICINE

## 2023-01-19 PROCEDURE — 1159F PR MEDICATION LIST DOCUMENTED IN MEDICAL RECORD: ICD-10-PCS | Mod: HCNC,CPTII,S$GLB, | Performed by: INTERNAL MEDICINE

## 2023-01-19 PROCEDURE — 1159F MED LIST DOCD IN RCRD: CPT | Mod: HCNC,CPTII,S$GLB, | Performed by: INTERNAL MEDICINE

## 2023-01-19 PROCEDURE — 1160F RVW MEDS BY RX/DR IN RCRD: CPT | Mod: HCNC,CPTII,S$GLB, | Performed by: INTERNAL MEDICINE

## 2023-01-19 NOTE — PROGRESS NOTES
[facility] Note    Chief Complaint      Chief Complaint   Patient presents with    Hypertension     History of Present Illness      Timothy Galdamez is a 67 y.o. male who presents today for chief complaint elevated blood pressure .  Patient is new to me      PCP: Young Lanier MD  Patient comes to appointment alone.     HPI  Says his bp was 160s at home yesterdy and today, he is not on anti hypertensive   He is feeling fine   On my manual repeat his BP is normal   Active Problem List with Overview Notes    Diagnosis Date Noted    Ectatic aorta 07/03/2020    Chronic right-sided low back pain 06/30/2020    Rotator cuff syndrome of right shoulder 06/04/2018    Memory change 06/04/2018     nml NP testing [id number]      RLS (restless legs syndrome) 06/04/2018    Tongue cancer 06/29/2015     followed by Dr. Sae Sandhu      Primary insomnia 10/17/2012    Hyperlipidemia 10/17/2012     Health Maintenance   Topic Date Due    TETANUS VACCINE  06/29/2026    Lipid Panel  06/02/2027    Hepatitis C Screening  Completed    Abdominal Aortic Aneurysm Screening  Completed       Past Medical History:   Diagnosis Date    Hyperlipidemia        Past Surgical History:   Procedure Laterality Date    ANKLE SURGERY      L ankle    BIOPSY OF TISSUE OF NECK Left 2013    COLONOSCOPY N/A 08/21/2017    Procedure: COLONOSCOPY;  Surgeon: Danny Jane MD;  Location: [facility] ([address] [place] [address]);  Service: Endoscopy;  Laterality: N/A;  Do not cancel this order    TONSILLECTOMY      TUMOR REMOVAL Right 2015    at     VASECTOMY         family history includes Arthritis in his mother; COPD in his brother.     Social History     Tobacco Use    Smoking status: Former   Substance Use Topics    Alcohol use: Yes     Alcohol/week: 3.0 standard drinks     Types: 3 Cans of beer per week     Comment: 4-5 x week    Drug use: No       Review of Systems   Constitutional:  Negative for chills, fever, malaise/fatigue and weight loss.    Respiratory:  Negative for cough, sputum production, shortness of breath and wheezing.    Cardiovascular:  Negative for chest pain, palpitations, orthopnea and leg swelling.   Gastrointestinal:  Negative for constipation, diarrhea, nausea and vomiting.   Genitourinary:  Negative for dysuria, frequency, hematuria and urgency.      Outpatient Encounter Medications as of 1/19/2023   Medication Sig Note Dispense Refill    diphenhydrAMINE (SOMINEX) 25 mg tablet Take 25 mg by mouth nightly as needed for Insomnia. 6/1/2022: Benadryl      pravastatin (PRAVACHOL) 40 MG tablet TAKE 1 TABLET ONE TIME DAILY  90 tablet 0    rosuvastatin (CRESTOR) 20 MG tablet Take 1 tablet (20 mg total) by mouth once daily. (Patient not taking: Reported on 1/19/2023)  90 tablet 3     No facility-administered encounter medications on file as of 1/19/2023.       Review of patient's allergies indicates:  No Known Allergies      Physical Exam      Vital Signs  Temp: 98.5 °F (36.9 °C)  Pulse: (!) 55  SpO2: 97 %  BP: 126/80  BP Location: Right arm  Patient Position: Sitting  Height and Weight  Height: 6' (182.9 cm)  Weight: 82.4 kg (181 lb 10.5 oz)  BSA (Calculated - sq m): 2.05 sq meters  BMI (Calculated): 24.6  Weight in (lb) to have BMI = 25: 183.9]    Physical Exam  Vitals reviewed.   Constitutional:       Appearance: He is well-developed. He is not diaphoretic.   HENT:      Head: Normocephalic and atraumatic.      Right Ear: External ear normal.      Left Ear: External ear normal.   Eyes:      General: No scleral icterus.        Right eye: No discharge.         Left eye: No discharge.      Conjunctiva/sclera: Conjunctivae normal.   Pulmonary:      Effort: Pulmonary effort is normal. No respiratory distress.   Musculoskeletal:         General: Normal range of motion.      Cervical back: Normal range of motion.   Neurological:      Mental Status: He is alert and oriented to person, place, and time.   Psychiatric:         Behavior: Behavior  normal.         Thought Content: Thought content normal.         Judgment: Judgment normal.        Laboratory:  CBC:  No results for input(s): WBC, RBC, HGB, HCT, PLT, MCV, MCH, MCHC in the last 2160 hours.  CMP:  No results for input(s): GLU, CALCIUM, ALBUMIN, PROT, NA, K, CO2, CL, BUN, ALKPHOS, ALT, AST, BILITOT in the last 2160 hours.    Invalid input(s): CREATININ  URINALYSIS:  No results for input(s): COLORU, CLARITYU, SPECGRAV, PHUR, PROTEINUA, GLUCOSEU, BILIRUBINCON, BLOODU, WBCU, RBCU, BACTERIA, MUCUS, NITRITE, LEUKOCYTESUR, UROBILINOGEN, HYALINECASTS in the last 2160 hours.   LIPIDS:  No results for input(s): TSH, HDL, CHOL, TRIG, LDLCALC, CHOLHDL, NONHDLCHOL, TOTALCHOLEST in the last 2160 hours.  TSH:  No results for input(s): TSH in the last 2160 hours.  A1C:  No results for input(s): HGBA1C in the last 2160 hours.    Radiology:      Assessment/Plan     Timothy GREENE Rudolph is a 67 y.o.male with:    1. Elevated BP without diagnosis of hypertension    Discussed need for cuff calibration, can do nurse visit bp ck with his PCP     Use of the [facility] Patient Portal discussed and encouraged during today's visit  -Continue current medications and maintain follow up with specialists.  Return to clinic in .  No future appointments.    Selina Price MD  1/19/2023 1:25 PM    Primary Care Internal Medicine

## 2023-08-16 ENCOUNTER — PES CALL (OUTPATIENT)
Dept: ADMINISTRATIVE | Facility: CLINIC | Age: 68
End: 2023-08-16
Payer: MEDICARE

## 2023-08-28 ENCOUNTER — OFFICE VISIT (OUTPATIENT)
Dept: OTOLARYNGOLOGY | Facility: CLINIC | Age: 68
End: 2023-08-28
Payer: MEDICARE

## 2023-08-28 VITALS
DIASTOLIC BLOOD PRESSURE: 92 MMHG | SYSTOLIC BLOOD PRESSURE: 150 MMHG | BODY MASS INDEX: 24.7 KG/M2 | HEART RATE: 49 BPM | WEIGHT: 182.13 LBS

## 2023-08-28 DIAGNOSIS — R13.19 ESOPHAGEAL DYSPHAGIA: ICD-10-CM

## 2023-08-28 DIAGNOSIS — C01 SQUAMOUS CELL CARCINOMA OF BASE OF TONGUE: Primary | ICD-10-CM

## 2023-08-28 PROCEDURE — 99999 PR PBB SHADOW E&M-EST. PATIENT-LVL III: ICD-10-PCS | Mod: PBBFAC,HCNC,, | Performed by: OTOLARYNGOLOGY

## 2023-08-28 PROCEDURE — 31575 DIAGNOSTIC LARYNGOSCOPY: CPT | Mod: HCNC,S$GLB,, | Performed by: OTOLARYNGOLOGY

## 2023-08-28 PROCEDURE — 1159F MED LIST DOCD IN RCRD: CPT | Mod: HCNC,CPTII,S$GLB, | Performed by: OTOLARYNGOLOGY

## 2023-08-28 PROCEDURE — 99204 PR OFFICE/OUTPT VISIT, NEW, LEVL IV, 45-59 MIN: ICD-10-PCS | Mod: 25,HCNC,S$GLB, | Performed by: OTOLARYNGOLOGY

## 2023-08-28 PROCEDURE — 3080F PR MOST RECENT DIASTOLIC BLOOD PRESSURE >= 90 MM HG: ICD-10-PCS | Mod: HCNC,CPTII,S$GLB, | Performed by: OTOLARYNGOLOGY

## 2023-08-28 PROCEDURE — 3008F PR BODY MASS INDEX (BMI) DOCUMENTED: ICD-10-PCS | Mod: HCNC,CPTII,S$GLB, | Performed by: OTOLARYNGOLOGY

## 2023-08-28 PROCEDURE — 3077F SYST BP >= 140 MM HG: CPT | Mod: HCNC,CPTII,S$GLB, | Performed by: OTOLARYNGOLOGY

## 2023-08-28 PROCEDURE — 1126F AMNT PAIN NOTED NONE PRSNT: CPT | Mod: HCNC,CPTII,S$GLB, | Performed by: OTOLARYNGOLOGY

## 2023-08-28 PROCEDURE — 1126F PR PAIN SEVERITY QUANTIFIED, NO PAIN PRESENT: ICD-10-PCS | Mod: HCNC,CPTII,S$GLB, | Performed by: OTOLARYNGOLOGY

## 2023-08-28 PROCEDURE — 3080F DIAST BP >= 90 MM HG: CPT | Mod: HCNC,CPTII,S$GLB, | Performed by: OTOLARYNGOLOGY

## 2023-08-28 PROCEDURE — 1159F PR MEDICATION LIST DOCUMENTED IN MEDICAL RECORD: ICD-10-PCS | Mod: HCNC,CPTII,S$GLB, | Performed by: OTOLARYNGOLOGY

## 2023-08-28 PROCEDURE — 3077F PR MOST RECENT SYSTOLIC BLOOD PRESSURE >= 140 MM HG: ICD-10-PCS | Mod: HCNC,CPTII,S$GLB, | Performed by: OTOLARYNGOLOGY

## 2023-08-28 PROCEDURE — 3008F BODY MASS INDEX DOCD: CPT | Mod: HCNC,CPTII,S$GLB, | Performed by: OTOLARYNGOLOGY

## 2023-08-28 PROCEDURE — 99999 PR PBB SHADOW E&M-EST. PATIENT-LVL III: CPT | Mod: PBBFAC,HCNC,, | Performed by: OTOLARYNGOLOGY

## 2023-08-28 PROCEDURE — 99204 OFFICE O/P NEW MOD 45 MIN: CPT | Mod: 25,HCNC,S$GLB, | Performed by: OTOLARYNGOLOGY

## 2023-08-28 PROCEDURE — 31575 PR LARYNGOSCOPY, FLEXIBLE; DIAGNOSTIC: ICD-10-PCS | Mod: HCNC,S$GLB,, | Performed by: OTOLARYNGOLOGY

## 2023-08-28 NOTE — PROGRESS NOTES
Chief Complaint   Patient presents with    Base of Tounge and neck muscle spasms         68 y.o. male presents with history of right BOT squamous cell carcinoma. Treated at [facility] in 2015 with radiation. Did surveillance for 5 years without recurrence. Now here for recent ulcers under his tongue which are now resolved. Also with neck spasms and longstanding history of dysphagia with feeling of food getting stuck in his esophagus. He has not had a previous dilation. No current throat or ear pain.       Review of Systems     Constitutional: Negative for fatigue and unexpected weight change.   HENT: per HPI.  Eyes: Negative for visual disturbance.   Respiratory: Negative for shortness of breath, hemoptysis  Cardiovascular: Negative for chest pain and palpitations.   Genitourinary: Negative for dysuria and difficulty urinating.   Musculoskeletal: Negative for decreased ROM, back pain.   Skin: Negative for rash, sunburn, itching.   Neurological: Negative for dizziness and seizures.   Hematological: Negative for adenopathy. Does not bruise/bleed easily.   Psychiatric/Behavioral: Negative for agitation. The patient is not nervous/anxious.   Endocrine: Negative for rapid weight loss/weight gain, heat/cold intolerance.     Past Medical History:   Diagnosis Date    Hyperlipidemia        Past Surgical History:   Procedure Laterality Date    ANKLE SURGERY      L ankle    BIOPSY OF TISSUE OF NECK Left 2013    COLONOSCOPY N/A 08/21/2017    Procedure: COLONOSCOPY;  Surgeon: Danny Jane MD;  Location: [facility] ([address] [place] [address]);  Service: Endoscopy;  Laterality: N/A;  Do not cancel this order    TONSILLECTOMY      TUMOR REMOVAL Right 2015    at     VASECTOMY         family history includes Arthritis in his mother; COPD in his brother.    Pt  reports that he has quit smoking. He does not have any smokeless tobacco history on file. He reports current alcohol use of about 3.0 standard drinks of alcohol per week. He reports that he does  not use drugs.    Review of patient's allergies indicates:  No Known Allergies     Physical Exam    Vitals:    08/28/23 1122   BP: (!) 150/92   Pulse: (!) 49     Body mass index is 24.7 kg/m².    Physical Exam  Vitals and nursing note reviewed.   Constitutional:       General: He is not in acute distress.     Appearance: He is well-developed. He is not diaphoretic.   HENT:      Head: Normocephalic and atraumatic.      Right Ear: Hearing and external ear normal. No decreased hearing noted.      Left Ear: Hearing and external ear normal. No decreased hearing noted.      Nose: Nose normal.      Mouth/Throat:      Mouth: Mucous membranes are moist. No oral lesions.      Tongue: No lesions.      Pharynx: Oropharynx is clear. Uvula midline.   Eyes:      General: Lids are normal.         Right eye: No discharge.         Left eye: No discharge.      Conjunctiva/sclera: Conjunctivae normal.      Pupils: Pupils are equal, round, and reactive to light.   Neck:      Thyroid: No thyroid mass or thyromegaly.      Trachea: Trachea and phonation normal. No tracheal tenderness or tracheal deviation.   Cardiovascular:      Heart sounds: Normal heart sounds.   Pulmonary:      Breath sounds: Normal breath sounds. No stridor.   Abdominal:      Palpations: Abdomen is soft.   Musculoskeletal:      Cervical back: Normal range of motion and neck supple. No edema or erythema.   Lymphadenopathy:      Cervical: No cervical adenopathy.   Skin:     General: Skin is warm and dry.      Coloration: Skin is not pale.      Findings: No erythema or rash.   Neurological:      Mental Status: He is alert and oriented to person, place, and time.      Procedure: Flexible laryngoscopy  In order to fully examine the upper aerodigestive tract, including the larynx, in a patient with a hyperactive gag reflex, flexible endoscopy is required.  After explaining the procedure and obtaining verbal consent, a timeout was performed with the patient's participation  according to the universal protocol. Both nasal cavities were anesthetized with 4% Xylocaine spray mixed with John-Synephrine. The flexible laryngoscope was inserted into the nasal cavity and advanced to visualize the nasal cavity, nasopharynx, the posterior oropharynx, hypopharynx, and the endolarynx with the above findings noted. The scope was removed and the procedure terminated. The patient tolerated this procedure well without apparent complication.      Nasopharynx - the torus is clear. There are no lesions of the posterior wall.   Oropharynx - no lesions of the tongue base. There is no obvious fullness or asymmetry.  Hypopharynx - there are no lesions of the pyriform sinuses or postcricoid region   Larynx - there are no lesions of the supraglottic or glottic larynx. Vocal fold mobility is normal with complete closure.      Assessment     1. Squamous cell carcinoma of base of tongue    2. Esophageal dysphagia          Plan  In summary,  Rudolph is a 68 year old male with history of BOT squamous cell carcinoma p16+ s/p XRT. BRIGIDA. Having some esophageal dysphagia. GI referral placed. All questions were answered. Return to clinic in 1 year for surveillance or sooner as needed.

## 2023-08-29 ENCOUNTER — OFFICE VISIT (OUTPATIENT)
Dept: INTERNAL MEDICINE | Facility: CLINIC | Age: 68
End: 2023-08-29
Payer: MEDICARE

## 2023-08-29 ENCOUNTER — TELEPHONE (OUTPATIENT)
Dept: GASTROENTEROLOGY | Facility: CLINIC | Age: 68
End: 2023-08-29
Payer: MEDICARE

## 2023-08-29 VITALS
SYSTOLIC BLOOD PRESSURE: 138 MMHG | WEIGHT: 184.06 LBS | DIASTOLIC BLOOD PRESSURE: 86 MMHG | RESPIRATION RATE: 16 BRPM | OXYGEN SATURATION: 97 % | BODY MASS INDEX: 24.93 KG/M2 | TEMPERATURE: 98 F | HEIGHT: 72 IN | HEART RATE: 49 BPM

## 2023-08-29 DIAGNOSIS — I77.819 ECTATIC AORTA: ICD-10-CM

## 2023-08-29 DIAGNOSIS — Z00.00 PHYSICAL EXAM, ANNUAL: Primary | ICD-10-CM

## 2023-08-29 DIAGNOSIS — R73.01 ABNORMAL FASTING GLUCOSE: ICD-10-CM

## 2023-08-29 DIAGNOSIS — E78.5 HYPERLIPIDEMIA, UNSPECIFIED HYPERLIPIDEMIA TYPE: ICD-10-CM

## 2023-08-29 DIAGNOSIS — I10 HYPERTENSION, UNSPECIFIED TYPE: ICD-10-CM

## 2023-08-29 DIAGNOSIS — E55.9 VITAMIN D DEFICIENCY: ICD-10-CM

## 2023-08-29 DIAGNOSIS — Z12.5 ENCOUNTER FOR SCREENING FOR MALIGNANT NEOPLASM OF PROSTATE: ICD-10-CM

## 2023-08-29 PROCEDURE — 1126F AMNT PAIN NOTED NONE PRSNT: CPT | Mod: HCNC,CPTII,S$GLB, | Performed by: STUDENT IN AN ORGANIZED HEALTH CARE EDUCATION/TRAINING PROGRAM

## 2023-08-29 PROCEDURE — 1159F PR MEDICATION LIST DOCUMENTED IN MEDICAL RECORD: ICD-10-PCS | Mod: HCNC,CPTII,S$GLB, | Performed by: STUDENT IN AN ORGANIZED HEALTH CARE EDUCATION/TRAINING PROGRAM

## 2023-08-29 PROCEDURE — 1101F PR PT FALLS ASSESS DOC 0-1 FALLS W/OUT INJ PAST YR: ICD-10-PCS | Mod: HCNC,CPTII,S$GLB, | Performed by: STUDENT IN AN ORGANIZED HEALTH CARE EDUCATION/TRAINING PROGRAM

## 2023-08-29 PROCEDURE — 99999 PR PBB SHADOW E&M-EST. PATIENT-LVL III: CPT | Mod: PBBFAC,HCNC,, | Performed by: STUDENT IN AN ORGANIZED HEALTH CARE EDUCATION/TRAINING PROGRAM

## 2023-08-29 PROCEDURE — 3075F SYST BP GE 130 - 139MM HG: CPT | Mod: HCNC,CPTII,S$GLB, | Performed by: STUDENT IN AN ORGANIZED HEALTH CARE EDUCATION/TRAINING PROGRAM

## 2023-08-29 PROCEDURE — 3008F BODY MASS INDEX DOCD: CPT | Mod: HCNC,CPTII,S$GLB, | Performed by: STUDENT IN AN ORGANIZED HEALTH CARE EDUCATION/TRAINING PROGRAM

## 2023-08-29 PROCEDURE — 3079F PR MOST RECENT DIASTOLIC BLOOD PRESSURE 80-89 MM HG: ICD-10-PCS | Mod: HCNC,CPTII,S$GLB, | Performed by: STUDENT IN AN ORGANIZED HEALTH CARE EDUCATION/TRAINING PROGRAM

## 2023-08-29 PROCEDURE — 3079F DIAST BP 80-89 MM HG: CPT | Mod: HCNC,CPTII,S$GLB, | Performed by: STUDENT IN AN ORGANIZED HEALTH CARE EDUCATION/TRAINING PROGRAM

## 2023-08-29 PROCEDURE — 3008F PR BODY MASS INDEX (BMI) DOCUMENTED: ICD-10-PCS | Mod: HCNC,CPTII,S$GLB, | Performed by: STUDENT IN AN ORGANIZED HEALTH CARE EDUCATION/TRAINING PROGRAM

## 2023-08-29 PROCEDURE — 1126F PR PAIN SEVERITY QUANTIFIED, NO PAIN PRESENT: ICD-10-PCS | Mod: HCNC,CPTII,S$GLB, | Performed by: STUDENT IN AN ORGANIZED HEALTH CARE EDUCATION/TRAINING PROGRAM

## 2023-08-29 PROCEDURE — 99397 PER PM REEVAL EST PAT 65+ YR: CPT | Mod: HCNC,S$GLB,, | Performed by: STUDENT IN AN ORGANIZED HEALTH CARE EDUCATION/TRAINING PROGRAM

## 2023-08-29 PROCEDURE — 1101F PT FALLS ASSESS-DOCD LE1/YR: CPT | Mod: HCNC,CPTII,S$GLB, | Performed by: STUDENT IN AN ORGANIZED HEALTH CARE EDUCATION/TRAINING PROGRAM

## 2023-08-29 PROCEDURE — 99397 PR PREVENTIVE VISIT,EST,65 & OVER: ICD-10-PCS | Mod: HCNC,S$GLB,, | Performed by: STUDENT IN AN ORGANIZED HEALTH CARE EDUCATION/TRAINING PROGRAM

## 2023-08-29 PROCEDURE — 99999 PR PBB SHADOW E&M-EST. PATIENT-LVL III: ICD-10-PCS | Mod: PBBFAC,HCNC,, | Performed by: STUDENT IN AN ORGANIZED HEALTH CARE EDUCATION/TRAINING PROGRAM

## 2023-08-29 PROCEDURE — 3288F FALL RISK ASSESSMENT DOCD: CPT | Mod: HCNC,CPTII,S$GLB, | Performed by: STUDENT IN AN ORGANIZED HEALTH CARE EDUCATION/TRAINING PROGRAM

## 2023-08-29 PROCEDURE — 1159F MED LIST DOCD IN RCRD: CPT | Mod: HCNC,CPTII,S$GLB, | Performed by: STUDENT IN AN ORGANIZED HEALTH CARE EDUCATION/TRAINING PROGRAM

## 2023-08-29 PROCEDURE — 3075F PR MOST RECENT SYSTOLIC BLOOD PRESS GE 130-139MM HG: ICD-10-PCS | Mod: HCNC,CPTII,S$GLB, | Performed by: STUDENT IN AN ORGANIZED HEALTH CARE EDUCATION/TRAINING PROGRAM

## 2023-08-29 PROCEDURE — 3288F PR FALLS RISK ASSESSMENT DOCUMENTED: ICD-10-PCS | Mod: HCNC,CPTII,S$GLB, | Performed by: STUDENT IN AN ORGANIZED HEALTH CARE EDUCATION/TRAINING PROGRAM

## 2023-08-29 NOTE — TELEPHONE ENCOUNTER
----- Message from Patricia Valdes MA sent at 8/28/2023  5:03 PM CDT -----  Regarding: FW: Pt calling for endoscopy / dialation of throat for [address] location  Contact: @[phone]  He has a referral for GI clinic appointment. Can you please call?  ----- Message -----  From: Lizet Soliman  Sent: 8/28/2023   4:04 PM CDT  To: Patricia Valdes MA  Subject: Pt calling for endoscopy / dialation of thro#    Pt calling for endoscopy / dialation of throat for [address] location please call and advise @[phone]

## 2023-08-31 ENCOUNTER — LAB VISIT (OUTPATIENT)
Dept: LAB | Facility: HOSPITAL | Age: 68
End: 2023-08-31
Attending: STUDENT IN AN ORGANIZED HEALTH CARE EDUCATION/TRAINING PROGRAM
Payer: MEDICARE

## 2023-08-31 DIAGNOSIS — E55.9 VITAMIN D DEFICIENCY: ICD-10-CM

## 2023-08-31 DIAGNOSIS — R73.01 ABNORMAL FASTING GLUCOSE: ICD-10-CM

## 2023-08-31 DIAGNOSIS — E78.5 HYPERLIPIDEMIA, UNSPECIFIED HYPERLIPIDEMIA TYPE: ICD-10-CM

## 2023-08-31 DIAGNOSIS — Z12.5 ENCOUNTER FOR SCREENING FOR MALIGNANT NEOPLASM OF PROSTATE: ICD-10-CM

## 2023-08-31 DIAGNOSIS — Z00.00 PHYSICAL EXAM, ANNUAL: ICD-10-CM

## 2023-08-31 DIAGNOSIS — I10 HYPERTENSION, UNSPECIFIED TYPE: ICD-10-CM

## 2023-08-31 LAB
25(OH)D3+25(OH)D2 SERPL-MCNC: 51 NG/ML (ref 30–96)
ALBUMIN SERPL BCP-MCNC: 3.7 G/DL (ref 3.5–5.2)
ALP SERPL-CCNC: 45 U/L (ref 55–135)
ALT SERPL W/O P-5'-P-CCNC: 19 U/L (ref 10–44)
ANION GAP SERPL CALC-SCNC: 9 MMOL/L (ref 8–16)
AST SERPL-CCNC: 17 U/L (ref 10–40)
BASOPHILS # BLD AUTO: 0.04 K/UL (ref 0–0.2)
BASOPHILS NFR BLD: 1 % (ref 0–1.9)
BILIRUB SERPL-MCNC: 0.8 MG/DL (ref 0.1–1)
BUN SERPL-MCNC: 14 MG/DL (ref 8–23)
CALCIUM SERPL-MCNC: 9.3 MG/DL (ref 8.7–10.5)
CHLORIDE SERPL-SCNC: 109 MMOL/L (ref 95–110)
CHOLEST SERPL-MCNC: 150 MG/DL (ref 120–199)
CHOLEST/HDLC SERPL: 2.6 {RATIO} (ref 2–5)
CO2 SERPL-SCNC: 23 MMOL/L (ref 23–29)
COMPLEXED PSA SERPL-MCNC: 0.7 NG/ML (ref 0–4)
CREAT SERPL-MCNC: 0.9 MG/DL (ref 0.5–1.4)
DIFFERENTIAL METHOD: ABNORMAL
EOSINOPHIL # BLD AUTO: 0.2 K/UL (ref 0–0.5)
EOSINOPHIL NFR BLD: 3.8 % (ref 0–8)
ERYTHROCYTE [DISTWIDTH] IN BLOOD BY AUTOMATED COUNT: 13.5 % (ref 11.5–14.5)
EST. GFR  (NO RACE VARIABLE): >60 ML/MIN/1.73 M^2
ESTIMATED AVG GLUCOSE: 100 MG/DL (ref 68–131)
GLUCOSE SERPL-MCNC: 91 MG/DL (ref 70–110)
HBA1C MFR BLD: 5.1 % (ref 4–5.6)
HCT VFR BLD AUTO: 46.1 % (ref 40–54)
HDLC SERPL-MCNC: 57 MG/DL (ref 40–75)
HDLC SERPL: 38 % (ref 20–50)
HGB BLD-MCNC: 15.4 G/DL (ref 14–18)
IMM GRANULOCYTES # BLD AUTO: 0.01 K/UL (ref 0–0.04)
IMM GRANULOCYTES NFR BLD AUTO: 0.2 % (ref 0–0.5)
LDLC SERPL CALC-MCNC: 83.4 MG/DL (ref 63–159)
LYMPHOCYTES # BLD AUTO: 1 K/UL (ref 1–4.8)
LYMPHOCYTES NFR BLD: 22.7 % (ref 18–48)
MCH RBC QN AUTO: 32 PG (ref 27–31)
MCHC RBC AUTO-ENTMCNC: 33.4 G/DL (ref 32–36)
MCV RBC AUTO: 96 FL (ref 82–98)
MONOCYTES # BLD AUTO: 0.6 K/UL (ref 0.3–1)
MONOCYTES NFR BLD: 13.4 % (ref 4–15)
NEUTROPHILS # BLD AUTO: 2.5 K/UL (ref 1.8–7.7)
NEUTROPHILS NFR BLD: 58.9 % (ref 38–73)
NONHDLC SERPL-MCNC: 93 MG/DL
NRBC BLD-RTO: 0 /100 WBC
PLATELET # BLD AUTO: 193 K/UL (ref 150–450)
PMV BLD AUTO: 12.7 FL (ref 9.2–12.9)
POTASSIUM SERPL-SCNC: 5.3 MMOL/L (ref 3.5–5.1)
PROT SERPL-MCNC: 6.3 G/DL (ref 6–8.4)
RBC # BLD AUTO: 4.82 M/UL (ref 4.6–6.2)
SODIUM SERPL-SCNC: 141 MMOL/L (ref 136–145)
T4 SERPL-MCNC: 7.2 UG/DL (ref 4.5–11.5)
TRIGL SERPL-MCNC: 48 MG/DL (ref 30–150)
TSH SERPL DL<=0.005 MIU/L-ACNC: 2.52 UIU/ML (ref 0.4–4)
WBC # BLD AUTO: 4.18 K/UL (ref 3.9–12.7)

## 2023-08-31 PROCEDURE — 83036 HEMOGLOBIN GLYCOSYLATED A1C: CPT | Mod: HCNC | Performed by: STUDENT IN AN ORGANIZED HEALTH CARE EDUCATION/TRAINING PROGRAM

## 2023-08-31 PROCEDURE — 36415 COLL VENOUS BLD VENIPUNCTURE: CPT | Mod: HCNC,PO | Performed by: STUDENT IN AN ORGANIZED HEALTH CARE EDUCATION/TRAINING PROGRAM

## 2023-08-31 PROCEDURE — 84443 ASSAY THYROID STIM HORMONE: CPT | Mod: HCNC | Performed by: STUDENT IN AN ORGANIZED HEALTH CARE EDUCATION/TRAINING PROGRAM

## 2023-08-31 PROCEDURE — 85025 COMPLETE CBC W/AUTO DIFF WBC: CPT | Mod: HCNC | Performed by: STUDENT IN AN ORGANIZED HEALTH CARE EDUCATION/TRAINING PROGRAM

## 2023-08-31 PROCEDURE — 80053 COMPREHEN METABOLIC PANEL: CPT | Mod: HCNC | Performed by: STUDENT IN AN ORGANIZED HEALTH CARE EDUCATION/TRAINING PROGRAM

## 2023-08-31 PROCEDURE — 80061 LIPID PANEL: CPT | Mod: HCNC | Performed by: STUDENT IN AN ORGANIZED HEALTH CARE EDUCATION/TRAINING PROGRAM

## 2023-08-31 PROCEDURE — 82306 VITAMIN D 25 HYDROXY: CPT | Mod: HCNC | Performed by: STUDENT IN AN ORGANIZED HEALTH CARE EDUCATION/TRAINING PROGRAM

## 2023-08-31 PROCEDURE — 84153 ASSAY OF PSA TOTAL: CPT | Mod: HCNC | Performed by: STUDENT IN AN ORGANIZED HEALTH CARE EDUCATION/TRAINING PROGRAM

## 2023-08-31 PROCEDURE — 84436 ASSAY OF TOTAL THYROXINE: CPT | Mod: HCNC | Performed by: STUDENT IN AN ORGANIZED HEALTH CARE EDUCATION/TRAINING PROGRAM

## 2023-09-09 ENCOUNTER — PATIENT MESSAGE (OUTPATIENT)
Dept: INTERNAL MEDICINE | Facility: CLINIC | Age: 68
End: 2023-09-09
Payer: MEDICARE

## 2023-09-12 ENCOUNTER — PATIENT MESSAGE (OUTPATIENT)
Dept: NEUROLOGY | Facility: CLINIC | Age: 68
End: 2023-09-12
Payer: MEDICARE

## 2023-09-12 ENCOUNTER — OFFICE VISIT (OUTPATIENT)
Dept: GASTROENTEROLOGY | Facility: CLINIC | Age: 68
End: 2023-09-12
Payer: MEDICARE

## 2023-09-12 VITALS
DIASTOLIC BLOOD PRESSURE: 98 MMHG | HEART RATE: 56 BPM | HEIGHT: 72 IN | WEIGHT: 184.31 LBS | BODY MASS INDEX: 24.96 KG/M2 | SYSTOLIC BLOOD PRESSURE: 151 MMHG

## 2023-09-12 DIAGNOSIS — C01 SQUAMOUS CELL CARCINOMA OF BASE OF TONGUE: ICD-10-CM

## 2023-09-12 DIAGNOSIS — R13.19 ESOPHAGEAL DYSPHAGIA: ICD-10-CM

## 2023-09-12 PROCEDURE — 1101F PT FALLS ASSESS-DOCD LE1/YR: CPT | Mod: CPTII,S$GLB,, | Performed by: STUDENT IN AN ORGANIZED HEALTH CARE EDUCATION/TRAINING PROGRAM

## 2023-09-12 PROCEDURE — 3288F FALL RISK ASSESSMENT DOCD: CPT | Mod: CPTII,S$GLB,, | Performed by: STUDENT IN AN ORGANIZED HEALTH CARE EDUCATION/TRAINING PROGRAM

## 2023-09-12 PROCEDURE — 3044F PR MOST RECENT HEMOGLOBIN A1C LEVEL <7.0%: ICD-10-PCS | Mod: CPTII,S$GLB,, | Performed by: STUDENT IN AN ORGANIZED HEALTH CARE EDUCATION/TRAINING PROGRAM

## 2023-09-12 PROCEDURE — 3077F SYST BP >= 140 MM HG: CPT | Mod: CPTII,S$GLB,, | Performed by: STUDENT IN AN ORGANIZED HEALTH CARE EDUCATION/TRAINING PROGRAM

## 2023-09-12 PROCEDURE — 99204 PR OFFICE/OUTPT VISIT, NEW, LEVL IV, 45-59 MIN: ICD-10-PCS | Mod: S$GLB,,, | Performed by: STUDENT IN AN ORGANIZED HEALTH CARE EDUCATION/TRAINING PROGRAM

## 2023-09-12 PROCEDURE — 3008F PR BODY MASS INDEX (BMI) DOCUMENTED: ICD-10-PCS | Mod: CPTII,S$GLB,, | Performed by: STUDENT IN AN ORGANIZED HEALTH CARE EDUCATION/TRAINING PROGRAM

## 2023-09-12 PROCEDURE — 99999 PR PBB SHADOW E&M-EST. PATIENT-LVL III: CPT | Mod: PBBFAC,,, | Performed by: STUDENT IN AN ORGANIZED HEALTH CARE EDUCATION/TRAINING PROGRAM

## 2023-09-12 PROCEDURE — 1101F PR PT FALLS ASSESS DOC 0-1 FALLS W/OUT INJ PAST YR: ICD-10-PCS | Mod: CPTII,S$GLB,, | Performed by: STUDENT IN AN ORGANIZED HEALTH CARE EDUCATION/TRAINING PROGRAM

## 2023-09-12 PROCEDURE — 1159F MED LIST DOCD IN RCRD: CPT | Mod: CPTII,S$GLB,, | Performed by: STUDENT IN AN ORGANIZED HEALTH CARE EDUCATION/TRAINING PROGRAM

## 2023-09-12 PROCEDURE — 99999 PR PBB SHADOW E&M-EST. PATIENT-LVL III: ICD-10-PCS | Mod: PBBFAC,,, | Performed by: STUDENT IN AN ORGANIZED HEALTH CARE EDUCATION/TRAINING PROGRAM

## 2023-09-12 PROCEDURE — 3044F HG A1C LEVEL LT 7.0%: CPT | Mod: CPTII,S$GLB,, | Performed by: STUDENT IN AN ORGANIZED HEALTH CARE EDUCATION/TRAINING PROGRAM

## 2023-09-12 PROCEDURE — 3080F DIAST BP >= 90 MM HG: CPT | Mod: CPTII,S$GLB,, | Performed by: STUDENT IN AN ORGANIZED HEALTH CARE EDUCATION/TRAINING PROGRAM

## 2023-09-12 PROCEDURE — 3008F BODY MASS INDEX DOCD: CPT | Mod: CPTII,S$GLB,, | Performed by: STUDENT IN AN ORGANIZED HEALTH CARE EDUCATION/TRAINING PROGRAM

## 2023-09-12 PROCEDURE — 3077F PR MOST RECENT SYSTOLIC BLOOD PRESSURE >= 140 MM HG: ICD-10-PCS | Mod: CPTII,S$GLB,, | Performed by: STUDENT IN AN ORGANIZED HEALTH CARE EDUCATION/TRAINING PROGRAM

## 2023-09-12 PROCEDURE — 3080F PR MOST RECENT DIASTOLIC BLOOD PRESSURE >= 90 MM HG: ICD-10-PCS | Mod: CPTII,S$GLB,, | Performed by: STUDENT IN AN ORGANIZED HEALTH CARE EDUCATION/TRAINING PROGRAM

## 2023-09-12 PROCEDURE — 3288F PR FALLS RISK ASSESSMENT DOCUMENTED: ICD-10-PCS | Mod: CPTII,S$GLB,, | Performed by: STUDENT IN AN ORGANIZED HEALTH CARE EDUCATION/TRAINING PROGRAM

## 2023-09-12 PROCEDURE — 99204 OFFICE O/P NEW MOD 45 MIN: CPT | Mod: S$GLB,,, | Performed by: STUDENT IN AN ORGANIZED HEALTH CARE EDUCATION/TRAINING PROGRAM

## 2023-09-12 PROCEDURE — 1159F PR MEDICATION LIST DOCUMENTED IN MEDICAL RECORD: ICD-10-PCS | Mod: CPTII,S$GLB,, | Performed by: STUDENT IN AN ORGANIZED HEALTH CARE EDUCATION/TRAINING PROGRAM

## 2023-09-12 NOTE — PROGRESS NOTES
[facility] Consultation Note    Reason for Consult:  dysphagia     PCP:   Young Lanier   [address] [facility] [address]    Referring MD:  Micaela Poole Md  [address] [place] [address]    HPI:  This is a 68 y.o. male here for evaluation of dysphagia. PMHX of SCC of Tongue, HLD. For the past year, he has had increasing frequency of dysphagia to solids that occurs a few times a week now. Common with course or dry foods and certain vitamins. He feels they are hanging up in his throat. Has to drink liquids to get them to go down. If he has no liquids on hand, will vomit it back up. No choking episodes or times where he was not tolerating his secretions. No issues with liquids. Denied regular reflux symptoms. Sometimes after ETOH use will have dyspepsia and takes rolaids. He does have a history of SCC of the tongue which was removed in 2015 and followed with XRT. He also does not produce much saliva since XRT treatments. No prior EGD. Cscope in 2017 which was normal. Repeat in 10 years. Not anemic on recent blood work. Denied NSAID use. No Fh of CRC, esophageal or gastric cancer, celiac or IBD. No issues with BM, blood in stool, weight loss.        Objective Findings:    Vital Signs:  BP (!) 151/98   Pulse (!) 56   Ht 6' (1.829 m)   Wt 83.6 kg (184 lb 4.9 oz)   BMI 25.00 kg/m²   Body mass index is 25 kg/m².    Physical Exam:  General Appearance: Well appearing in no acute distress  Head:   Normocephalic, without obvious abnormality  Eyes:    No scleral icterus    Lungs: CTA bilaterally in anterior and posterior fields   Heart:  Regular rate and rhythm, no murmurs heard  Abdomen: Soft, non tender, non distended with positive bowel sounds in all four quadrants.      Imaging:    US 2020      Impression:     Ectatic abdominal aorta up to 2.3 cm with atherosclerotic plaque.           Endoscopy:      Cscope 2017   Findings:        The perianal and digital  rectal examinations were normal.        The entire examined colon appeared normal.     Assessment:    Dysphagia   SCC of tongue s/p resection and XRT     Patient with worsening solid food dysphagia over past year in setting of tongue SCC resection and XRT. May be esophageal stricture from prior radiation. Plan for EGD with possible dilation. No significant reflux symptoms.      Recommendations:    - EGD for further evaluation of dysphagia   - Cscope in 2027 for CRC screening     RTC after endoscopy     Order summary:         Thank you so much for allowing me to participate in the care of Timothy Villavicencio MD  Gastroenterology   [facility]

## 2023-09-13 ENCOUNTER — TELEPHONE (OUTPATIENT)
Dept: ENDOSCOPY | Facility: HOSPITAL | Age: 68
End: 2023-09-13
Payer: MEDICARE

## 2023-09-13 ENCOUNTER — PATIENT MESSAGE (OUTPATIENT)
Dept: ENDOSCOPY | Facility: HOSPITAL | Age: 68
End: 2023-09-13
Payer: MEDICARE

## 2023-09-13 DIAGNOSIS — R13.10 DYSPHAGIA, UNSPECIFIED TYPE: Primary | ICD-10-CM

## 2023-09-13 NOTE — TELEPHONE ENCOUNTER
"   Message  Received: Yesterday  Basilia Villavicencio MD Piglia, Ashley, RN  Caller: Unspecified (Yesterday,  2:34 PM)  Procedure: EGD     Diagnosis: Dysphagia     Procedure Timin-4 weeks     *If within 4 weeks selected, please nicolasa as high priority*     *If greater than 12 weeks, please select "5-12 weeks" and delay sending until 2 months prior to requested date*     Provider: Myself     Location: [facility]     Additional Scheduling Information: No scheduling concerns     Prep Specifications:N/A     Is the patient taking a GLP-1 Agonist:no     Have you attached a patient to this message: yes    "

## 2023-09-14 ENCOUNTER — ANESTHESIA EVENT (OUTPATIENT)
Dept: ENDOSCOPY | Facility: HOSPITAL | Age: 68
End: 2023-09-14
Payer: MEDICARE

## 2023-09-14 NOTE — ANESTHESIA PREPROCEDURE EVALUATION
09/14/2023  Timothy Galdamez is a 68 y.o., male.  [facility]  Anesthesia Pre-Operative Evaluation       Patient Name: Timothy Galdamez  YOB: 1955  MRN: [id number]  CSN: [id number]      Code Status: No Order   Date of Procedure: 9/18/2023  Anesthesia: Choice Procedure: Procedure(s) (LRB):  EGD (ESOPHAGOGASTRODUODENOSCOPY) (N/A)  Pre-Operative Diagnosis: Dysphagia, unspecified type [R13.10]  Proceduralist: Surgeon(s) and Role:     * Basilia Villavicencio MD - Primary        SUBJECTIVE:   Timothy Galdamez is a 68 y.o. male who  has a past medical history of Hyperlipidemia..     he has a current medication list which includes the following long-term medication(s): pravastatin and rosuvastatin.     ALLERGIES:   Review of patient's allergies indicates:  No Known Allergies  LDA:          Lines/Drains/Airways     Peripheral Intravenous Line  Duration                Peripheral IV - Single Lumen 07/16/14 1015 Left Forearm 3346 days               Anesthesia Evaluation      Airway   Dental      Pulmonary    Cardiovascular   Past MI: Ectatic Aortic.    Neuro/Psych    TIA: Memory Change.    GI/Hepatic/Renal      Endo/Other    Arthritis: Restless leg syndrome.  Abdominal                   MEDICATIONS:     Antibiotics (From admission, onward)    None        VTE Risk Mitigation (From admission, onward)    None            No current facility-administered medications for this encounter.     Current Outpatient Medications   Medication Sig Dispense Refill    pravastatin (PRAVACHOL) 40 MG tablet TAKE 1 TABLET ONE TIME DAILY 90 tablet 0    rosuvastatin (CRESTOR) 20 MG tablet Take 1 tablet (20 mg total) by mouth once daily. (Patient not taking: Reported on 9/12/2023) 90 tablet 3          History:   There are no hospital problems to display for this patient.    Surgical History:    has a past surgical history  "that includes Vasectomy; Tonsillectomy; Ankle surgery; Colonoscopy (N/A, 08/21/2017); Tumor removal (Right, 2015); and Biopsy of tissue of neck (Left, 2013).   Social History:    has no history on file for sexual activity.  reports that he has quit smoking. He does not have any smokeless tobacco history on file. He reports current alcohol use of about 3.0 standard drinks of alcohol per week. He reports that he does not use drugs.     OBJECTIVE:     Vital Signs (Most Recent):    Vital Signs Range (Last 24H):  BP: ()/()   Arterial Line BP: ()/()        There is no height or weight on file to calculate BMI.   Wt Readings from Last 4 Encounters:   09/12/23 83.6 kg (184 lb 4.9 oz)   08/29/23 83.5 kg (184 lb 1.4 oz)   08/28/23 82.6 kg (182 lb 1.6 oz)   01/19/23 82.4 kg (181 lb 10.5 oz)       Significant Labs:  Lab Results   Component Value Date    WBC 4.18 08/31/2023    HGB 15.4 08/31/2023    HCT 46.1 08/31/2023     08/31/2023     08/31/2023    K 5.3 (H) 08/31/2023     08/31/2023    CREATININE 0.9 08/31/2023    BUN 14 08/31/2023    CO2 23 08/31/2023    GLU 91 08/31/2023    CALCIUM 9.3 08/31/2023    ALKPHOS 45 (L) 08/31/2023    ALT 19 08/31/2023    AST 17 08/31/2023    ALBUMIN 3.7 08/31/2023    HGBA1C 5.1 08/31/2023     No LMP for male patient.  No results found for this or any previous visit (from the past 72 hour(s)).    EKG:       TTE:  No results found for this or any previous visit.  No results found for: "EF"   No results found for this or any previous visit.  LORETO:  No results found for this or any previous visit.  Stress Test:  No results found for this or any previous visit.     LHC:  No results found for this or any previous visit.     PFT:  No results found for: "FEV1", "FVC", "EXG4IYT", "TLC", "DLCO"     ASSESSMENT/PLAN:       Pre-op Assessment    I have reviewed the Patient Summary Reports.     I have reviewed the Nursing Notes. I have reviewed the NPO Status.   I have reviewed the " Medications.     Review of Systems  Anesthesia Hx:  No problems with previous Anesthesia  Denies Family Hx of Anesthesia complications.   Denies Personal Hx of Anesthesia complications.   Social:  Primary Insomnia   Hematology/Oncology:  Hematology Normal   Oncology Normal   Oncology Comments: Tongue Cancer     EENT/Dental:EENT/Dental Normal   Cardiovascular:   Past MI: Ectatic Aortic.  hyperlipidemia    Pulmonary:  Pulmonary Normal    Renal/:  Renal/ Normal     Hepatic/GI:  Hepatic/GI Normal    Musculoskeletal:  Musculoskeletal Normal Arthritis: Restless leg syndrome.   Rotator Cuff syndrome right syndrome  Chronic lower back pain   Neurological:  Neurology Normal TIA: Memory Change.    Endocrine:  Endocrine Normal    Dermatological:  Skin Normal    Psych:  Psychiatric Normal              Anesthesia Plan  Type of Anesthesia, risks & benefits discussed:    Anesthesia Type: Gen Natural Airway  Intra-op Monitoring Plan: Standard ASA Monitors  Post Op Pain Control Plan: multimodal analgesia  Induction:  IV  Informed Consent: Informed consent signed with the Patient and all parties understand the risks and agree with anesthesia plan.  All questions answered. Patient consented to blood products? No  ASA Score: 2  Day of Surgery Review of History & Physical: H&P Update referred to the surgeon/provider.    Ready For Surgery From Anesthesia Perspective.     .

## 2023-09-15 ENCOUNTER — OFFICE VISIT (OUTPATIENT)
Dept: INTERNAL MEDICINE | Facility: CLINIC | Age: 68
End: 2023-09-15
Payer: MEDICARE

## 2023-09-15 VITALS
SYSTOLIC BLOOD PRESSURE: 132 MMHG | BODY MASS INDEX: 25.14 KG/M2 | OXYGEN SATURATION: 99 % | HEART RATE: 56 BPM | DIASTOLIC BLOOD PRESSURE: 86 MMHG | TEMPERATURE: 96 F | HEIGHT: 72 IN | WEIGHT: 185.63 LBS | RESPIRATION RATE: 16 BRPM

## 2023-09-15 DIAGNOSIS — I10 HYPERTENSION, UNSPECIFIED TYPE: Primary | ICD-10-CM

## 2023-09-15 PROCEDURE — 3075F SYST BP GE 130 - 139MM HG: CPT | Mod: HCNC,CPTII,S$GLB, | Performed by: STUDENT IN AN ORGANIZED HEALTH CARE EDUCATION/TRAINING PROGRAM

## 2023-09-15 PROCEDURE — 3008F PR BODY MASS INDEX (BMI) DOCUMENTED: ICD-10-PCS | Mod: HCNC,CPTII,S$GLB, | Performed by: STUDENT IN AN ORGANIZED HEALTH CARE EDUCATION/TRAINING PROGRAM

## 2023-09-15 PROCEDURE — 3079F DIAST BP 80-89 MM HG: CPT | Mod: HCNC,CPTII,S$GLB, | Performed by: STUDENT IN AN ORGANIZED HEALTH CARE EDUCATION/TRAINING PROGRAM

## 2023-09-15 PROCEDURE — 1101F PR PT FALLS ASSESS DOC 0-1 FALLS W/OUT INJ PAST YR: ICD-10-PCS | Mod: HCNC,CPTII,S$GLB, | Performed by: STUDENT IN AN ORGANIZED HEALTH CARE EDUCATION/TRAINING PROGRAM

## 2023-09-15 PROCEDURE — 3044F PR MOST RECENT HEMOGLOBIN A1C LEVEL <7.0%: ICD-10-PCS | Mod: HCNC,CPTII,S$GLB, | Performed by: STUDENT IN AN ORGANIZED HEALTH CARE EDUCATION/TRAINING PROGRAM

## 2023-09-15 PROCEDURE — 1159F MED LIST DOCD IN RCRD: CPT | Mod: HCNC,CPTII,S$GLB, | Performed by: STUDENT IN AN ORGANIZED HEALTH CARE EDUCATION/TRAINING PROGRAM

## 2023-09-15 PROCEDURE — 3075F PR MOST RECENT SYSTOLIC BLOOD PRESS GE 130-139MM HG: ICD-10-PCS | Mod: HCNC,CPTII,S$GLB, | Performed by: STUDENT IN AN ORGANIZED HEALTH CARE EDUCATION/TRAINING PROGRAM

## 2023-09-15 PROCEDURE — 1159F PR MEDICATION LIST DOCUMENTED IN MEDICAL RECORD: ICD-10-PCS | Mod: HCNC,CPTII,S$GLB, | Performed by: STUDENT IN AN ORGANIZED HEALTH CARE EDUCATION/TRAINING PROGRAM

## 2023-09-15 PROCEDURE — 3044F HG A1C LEVEL LT 7.0%: CPT | Mod: HCNC,CPTII,S$GLB, | Performed by: STUDENT IN AN ORGANIZED HEALTH CARE EDUCATION/TRAINING PROGRAM

## 2023-09-15 PROCEDURE — 3079F PR MOST RECENT DIASTOLIC BLOOD PRESSURE 80-89 MM HG: ICD-10-PCS | Mod: HCNC,CPTII,S$GLB, | Performed by: STUDENT IN AN ORGANIZED HEALTH CARE EDUCATION/TRAINING PROGRAM

## 2023-09-15 PROCEDURE — 99214 PR OFFICE/OUTPT VISIT, EST, LEVL IV, 30-39 MIN: ICD-10-PCS | Mod: HCNC,S$GLB,, | Performed by: STUDENT IN AN ORGANIZED HEALTH CARE EDUCATION/TRAINING PROGRAM

## 2023-09-15 PROCEDURE — 99214 OFFICE O/P EST MOD 30 MIN: CPT | Mod: HCNC,S$GLB,, | Performed by: STUDENT IN AN ORGANIZED HEALTH CARE EDUCATION/TRAINING PROGRAM

## 2023-09-15 PROCEDURE — 99999 PR PBB SHADOW E&M-EST. PATIENT-LVL V: ICD-10-PCS | Mod: PBBFAC,HCNC,, | Performed by: STUDENT IN AN ORGANIZED HEALTH CARE EDUCATION/TRAINING PROGRAM

## 2023-09-15 PROCEDURE — 3288F FALL RISK ASSESSMENT DOCD: CPT | Mod: HCNC,CPTII,S$GLB, | Performed by: STUDENT IN AN ORGANIZED HEALTH CARE EDUCATION/TRAINING PROGRAM

## 2023-09-15 PROCEDURE — 1126F AMNT PAIN NOTED NONE PRSNT: CPT | Mod: HCNC,CPTII,S$GLB, | Performed by: STUDENT IN AN ORGANIZED HEALTH CARE EDUCATION/TRAINING PROGRAM

## 2023-09-15 PROCEDURE — 3008F BODY MASS INDEX DOCD: CPT | Mod: HCNC,CPTII,S$GLB, | Performed by: STUDENT IN AN ORGANIZED HEALTH CARE EDUCATION/TRAINING PROGRAM

## 2023-09-15 PROCEDURE — 1126F PR PAIN SEVERITY QUANTIFIED, NO PAIN PRESENT: ICD-10-PCS | Mod: HCNC,CPTII,S$GLB, | Performed by: STUDENT IN AN ORGANIZED HEALTH CARE EDUCATION/TRAINING PROGRAM

## 2023-09-15 PROCEDURE — 99999 PR PBB SHADOW E&M-EST. PATIENT-LVL V: CPT | Mod: PBBFAC,HCNC,, | Performed by: STUDENT IN AN ORGANIZED HEALTH CARE EDUCATION/TRAINING PROGRAM

## 2023-09-15 PROCEDURE — 1101F PT FALLS ASSESS-DOCD LE1/YR: CPT | Mod: HCNC,CPTII,S$GLB, | Performed by: STUDENT IN AN ORGANIZED HEALTH CARE EDUCATION/TRAINING PROGRAM

## 2023-09-15 PROCEDURE — 3288F PR FALLS RISK ASSESSMENT DOCUMENTED: ICD-10-PCS | Mod: HCNC,CPTII,S$GLB, | Performed by: STUDENT IN AN ORGANIZED HEALTH CARE EDUCATION/TRAINING PROGRAM

## 2023-09-15 RX ORDER — ASPIRIN 81 MG/1
81 TABLET ORAL DAILY
COMMUNITY

## 2023-09-15 RX ORDER — MULTIVITAMIN
1 TABLET ORAL DAILY
Status: ON HOLD | COMMUNITY
End: 2024-03-08 | Stop reason: HOSPADM

## 2023-09-15 RX ORDER — CALCIUM CARBONATE 600 MG
600 TABLET ORAL DAILY
COMMUNITY

## 2023-09-15 NOTE — H&P
Short Stay Endoscopy History and Physical    PCP - Young Lanier MD  Referring Physician - Saba Arnold RN  No address on file    Procedure - Colonoscopy  ASA - per anesthesia  Mallampati - per anesthesia  History of Anesthesia problems - no  Family history Anesthesia problems -  no   Plan of anesthesia - General    HPI  68 y.o. male  Reason for procedure:   Dysphagia, unspecified type [R13.10]        ROS:  Constitutional: No fevers, chills, No weight loss  CV: No chest pain  Pulm: No cough, No shortness of breath  GI: see HPI    Medical History:  has a past medical history of Hyperlipidemia.    Surgical History:  has a past surgical history that includes Vasectomy; Tonsillectomy; Ankle surgery; Colonoscopy (N/A, 08/21/2017); Tumor removal (Right, 2015); and Biopsy of tissue of neck (Left, 2013).    Family History: family history includes Arthritis in his mother; COPD in his brother..    Social History:  reports that he has quit smoking. He does not have any smokeless tobacco history on file. He reports current alcohol use of about 3.0 standard drinks of alcohol per week. He reports that he does not use drugs.    Review of patient's allergies indicates:  No Known Allergies    Medications:   Medications Prior to Admission   Medication Sig Dispense Refill Last Dose    ascorbic acid, vitamin C, (VITAMIN C) 100 MG tablet Take 100 mg by mouth once daily.       aspirin (ECOTRIN) 81 MG EC tablet Take 81 mg by mouth once daily.       calcium carbonate (OS-HERMES) 600 mg calcium (1,500 mg) Tab Take 600 mg by mouth once daily.       ergocalciferol, vitamin D2, (VITAMIN D ORAL) Take by mouth.       multivitamin (ONE DAILY MULTIVITAMIN) per tablet Take 1 tablet by mouth once daily.       NON FORMULARY MEDICATION Prevegan once daily       omega-3/dha/epa/dpa/fish oil (OMEGA-3 2100 ORAL) Take by mouth.       pravastatin (PRAVACHOL) 40 MG tablet TAKE 1 TABLET ONE TIME DAILY 90 tablet 0     rosuvastatin (CRESTOR) 20 MG  tablet Take 1 tablet (20 mg total) by mouth once daily. 90 tablet 3     vitamin E 100 UNIT capsule Take 100 Units by mouth once daily.          Physical Exam:    Vital Signs: There were no vitals filed for this visit.    General Appearance: Well appearing in no acute distress  Abdomen: Soft, non tender, non distended with normal bowel sounds, no masses    Labs:  Lab Results   Component Value Date    WBC 4.18 08/31/2023    HGB 15.4 08/31/2023    HCT 46.1 08/31/2023     08/31/2023    CHOL 150 08/31/2023    TRIG 48 08/31/2023    HDL 57 08/31/2023    ALT 19 08/31/2023    AST 17 08/31/2023     08/31/2023    K 5.3 (H) 08/31/2023     08/31/2023    CREATININE 0.9 08/31/2023    BUN 14 08/31/2023    CO2 23 08/31/2023    TSH 2.519 08/31/2023    PSA 0.70 08/31/2023    HGBA1C 5.1 08/31/2023       I have explained the risks and benefits of this endoscopic procedure to the patient including but not limited to bleeding, inflammation, infection, perforation, and death.    Assessment/Plan:     Dysphagia     - Proceed with EGD     Basilia Villavicencio MD  Gastroenterology   [facility]

## 2023-09-18 ENCOUNTER — ANESTHESIA (OUTPATIENT)
Dept: ENDOSCOPY | Facility: HOSPITAL | Age: 68
End: 2023-09-18
Payer: MEDICARE

## 2023-09-18 ENCOUNTER — HOSPITAL ENCOUNTER (OUTPATIENT)
Facility: HOSPITAL | Age: 68
Discharge: HOME | End: 2023-09-18
Attending: STUDENT IN AN ORGANIZED HEALTH CARE EDUCATION/TRAINING PROGRAM | Admitting: STUDENT IN AN ORGANIZED HEALTH CARE EDUCATION/TRAINING PROGRAM
Payer: MEDICARE

## 2023-09-18 VITALS
WEIGHT: 185 LBS | SYSTOLIC BLOOD PRESSURE: 111 MMHG | HEART RATE: 62 BPM | TEMPERATURE: 98 F | OXYGEN SATURATION: 96 % | BODY MASS INDEX: 25.06 KG/M2 | HEIGHT: 72 IN | RESPIRATION RATE: 14 BRPM | DIASTOLIC BLOOD PRESSURE: 67 MMHG

## 2023-09-18 DIAGNOSIS — R13.10 DYSPHAGIA: ICD-10-CM

## 2023-09-18 DIAGNOSIS — R13.10 DYSPHAGIA, UNSPECIFIED TYPE: Primary | ICD-10-CM

## 2023-09-18 PROCEDURE — 88305 TISSUE EXAM BY PATHOLOGIST: CPT | Mod: 26,HCNC,, | Performed by: PATHOLOGY

## 2023-09-18 PROCEDURE — 43239 EGD BIOPSY SINGLE/MULTIPLE: CPT | Performed by: STUDENT IN AN ORGANIZED HEALTH CARE EDUCATION/TRAINING PROGRAM

## 2023-09-18 PROCEDURE — 88342 IMHCHEM/IMCYTCHM 1ST ANTB: CPT | Performed by: PATHOLOGY

## 2023-09-18 PROCEDURE — D9220A PRA ANESTHESIA: Mod: ,,, | Performed by: NURSE ANESTHETIST, CERTIFIED REGISTERED

## 2023-09-18 PROCEDURE — 94761 N-INVAS EAR/PLS OXIMETRY MLT: CPT

## 2023-09-18 PROCEDURE — 88305 TISSUE EXAM BY PATHOLOGIST: CPT | Mod: 59 | Performed by: PATHOLOGY

## 2023-09-18 PROCEDURE — 43239 EGD BIOPSY SINGLE/MULTIPLE: CPT | Mod: HCNC,,, | Performed by: STUDENT IN AN ORGANIZED HEALTH CARE EDUCATION/TRAINING PROGRAM

## 2023-09-18 PROCEDURE — 99900035 HC TECH TIME PER 15 MIN (STAT)

## 2023-09-18 PROCEDURE — D9220A PRA ANESTHESIA: ICD-10-PCS | Mod: ,,, | Performed by: NURSE ANESTHETIST, CERTIFIED REGISTERED

## 2023-09-18 PROCEDURE — 63600175 PHARM REV CODE 636 W HCPCS: Performed by: NURSE ANESTHETIST, CERTIFIED REGISTERED

## 2023-09-18 PROCEDURE — 88342 CHG IMMUNOCYTOCHEMISTRY: ICD-10-PCS | Mod: 26,HCNC,, | Performed by: PATHOLOGY

## 2023-09-18 PROCEDURE — 27201012 HC FORCEPS, HOT/COLD, DISP: Performed by: STUDENT IN AN ORGANIZED HEALTH CARE EDUCATION/TRAINING PROGRAM

## 2023-09-18 PROCEDURE — 43239 PR EGD, FLEX, W/BIOPSY, SGL/MULTI: ICD-10-PCS | Mod: HCNC,,, | Performed by: STUDENT IN AN ORGANIZED HEALTH CARE EDUCATION/TRAINING PROGRAM

## 2023-09-18 PROCEDURE — 88305 TISSUE EXAM BY PATHOLOGIST: ICD-10-PCS | Mod: 26,HCNC,, | Performed by: PATHOLOGY

## 2023-09-18 PROCEDURE — 88342 IMHCHEM/IMCYTCHM 1ST ANTB: CPT | Mod: 26,HCNC,, | Performed by: PATHOLOGY

## 2023-09-18 PROCEDURE — 37000008 HC ANESTHESIA 1ST 15 MINUTES: Performed by: STUDENT IN AN ORGANIZED HEALTH CARE EDUCATION/TRAINING PROGRAM

## 2023-09-18 PROCEDURE — 25000003 PHARM REV CODE 250: Performed by: NURSE ANESTHETIST, CERTIFIED REGISTERED

## 2023-09-18 PROCEDURE — 25000003 PHARM REV CODE 250: Performed by: STUDENT IN AN ORGANIZED HEALTH CARE EDUCATION/TRAINING PROGRAM

## 2023-09-18 RX ORDER — PROPOFOL 10 MG/ML
VIAL (ML) INTRAVENOUS
Status: DISCONTINUED | OUTPATIENT
Start: 2023-09-18 | End: 2023-09-18

## 2023-09-18 RX ORDER — SODIUM CHLORIDE 9 MG/ML
INJECTION, SOLUTION INTRAVENOUS CONTINUOUS
Status: DISCONTINUED | OUTPATIENT
Start: 2023-09-18 | End: 2023-09-18 | Stop reason: HOSPADM

## 2023-09-18 RX ORDER — LIDOCAINE HYDROCHLORIDE 20 MG/ML
INJECTION, SOLUTION EPIDURAL; INFILTRATION; INTRACAUDAL; PERINEURAL
Status: DISCONTINUED | OUTPATIENT
Start: 2023-09-18 | End: 2023-09-18

## 2023-09-18 RX ORDER — OMEPRAZOLE 40 MG/1
40 CAPSULE, DELAYED RELEASE ORAL DAILY
Qty: 30 CAPSULE | Refills: 11 | Status: ON HOLD | OUTPATIENT
Start: 2023-09-18 | End: 2024-03-08 | Stop reason: HOSPADM

## 2023-09-18 RX ORDER — PROPOFOL 10 MG/ML
VIAL (ML) INTRAVENOUS CONTINUOUS PRN
Status: DISCONTINUED | OUTPATIENT
Start: 2023-09-18 | End: 2023-09-18

## 2023-09-18 RX ADMIN — SODIUM CHLORIDE: 0.9 INJECTION, SOLUTION INTRAVENOUS at 11:09

## 2023-09-18 RX ADMIN — PROPOFOL 50 MG: 10 INJECTION, EMULSION INTRAVENOUS at 11:09

## 2023-09-18 RX ADMIN — Medication 200 MCG/KG/MIN: at 11:09

## 2023-09-18 RX ADMIN — GLYCOPYRROLATE 0.2 MG: 0.2 INJECTION, SOLUTION INTRAMUSCULAR; INTRAVENOUS at 11:09

## 2023-09-18 RX ADMIN — LIDOCAINE HYDROCHLORIDE 80 MG: 20 INJECTION, SOLUTION EPIDURAL; INFILTRATION; INTRACAUDAL; PERINEURAL at 11:09

## 2023-09-18 RX ADMIN — PROPOFOL 130 MG: 10 INJECTION, EMULSION INTRAVENOUS at 11:09

## 2023-09-18 NOTE — TRANSFER OF CARE
Anesthesia Transfer of Care Note    Patient: Timothy GREENE Allday    Procedure(s) Performed: Procedure(s) (LRB):  EGD (ESOPHAGOGASTRODUODENOSCOPY) (N/A)    Patient location: PACU    Anesthesia Type: MAC    Transport from OR: Transported from OR on room air with adequate spontaneous ventilation    Post pain: adequate analgesia    Post assessment: no apparent anesthetic complications    Post vital signs: stable    Level of consciousness: responds to stimulation    Nausea/Vomiting: no nausea/vomiting    Complications: none    Transfer of care protocol was followed      Last vitals:   Visit Vitals  /77 (BP Location: Left arm, Patient Position: Lying)   Pulse 67   Temp 36.7 °C (98.1 °F) (Temporal)   Resp 14   Ht 6' (1.829 m)   Wt 83.9 kg (185 lb)   SpO2 95%   BMI 25.09 kg/m²

## 2023-09-18 NOTE — [employer]ATION PATIENT INSTRUCTIONS
Discharge Summary/Instructions after an Endoscopic Procedure  Patient Name: Timothy Galdamez  Patient MRN: [id number]  Patient YOB: 1955  Monday, September 18, 2023  Basilia Villavicencio MD  Dear patient,  As a result of recent federal legislation (The Federal Cures Act), you may   receive lab or pathology results from your procedure in your [facility]   account before your physician is able to contact you. Your physician or   their representative will relay the results to you with their   recommendations at their soonest availability.  Thank you,  RESTRICTIONS:  During your procedure today, you received medications for sedation.  These   medications may affect your judgment, balance and coordination.  Therefore,   for 24 hours, you have the following restrictions:   - DO NOT drive a car, operate machinery, make legal/financial decisions,   sign important papers or drink alcohol.    ACTIVITY:  Today: no heavy lifting, straining or running due to procedural   sedation/anesthesia.  The following day: return to full activity including work.  DIET:  Eat and drink normally unless instructed otherwise.     TREATMENT FOR COMMON SIDE EFFECTS:  - Mild abdominal pain, nausea, belching, bloating or excessive gas:  rest,   eat lightly and use a heating pad.  - Sore Throat: treat with throat lozenges and/or gargle with warm salt   water.  - Because air was used during the procedure, expelling large amounts of air   from your rectum or belching is normal.  - If a bowel prep was taken, you may not have a bowel movement for 1-3 days.    This is normal.  SYMPTOMS TO WATCH FOR AND REPORT TO YOUR PHYSICIAN:  1. Abdominal pain or bloating, other than gas cramps.  2. Chest pain.  3. Back pain.  4. Signs of infection such as: chills or fever occurring within 24 hours   after the procedure.  5. Rectal bleeding, which would show as bright red, maroon, or black stools.   (A tablespoon of blood from the rectum is not serious, especially if    hemorrhoids are present.)  6. Vomiting.  7. Weakness or dizziness.  GO DIRECTLY TO THE NEAREST EMERGENCY ROOM IF YOU HAVE ANY OF THE FOLLOWING:      Difficulty breathing              Chills and/or fever over 101 F   Persistent vomiting and/or vomiting blood   Severe abdominal pain   Severe chest pain   Black, tarry stools   Bleeding- more than one tablespoon   Any other symptom or condition that you feel may need urgent attention  Your doctor recommends these additional instructions:  If any biopsies were taken, your doctors clinic will contact you in 1 to 2   weeks with any results.  - Discharge patient to home (ambulatory).   - Resume regular diet.   - Continue present medications.   - Await pathology results.   - Use Prilosec (omeprazole) 40 mg PO daily.  For questions, problems or results please call your physician - Basilia Villavicencio MD at Work:  [phone].  [facility] PHONE NUMBER: [phone]  IF A COMPLICATION OR EMERGENCY SITUATION ARISES AND YOU ARE UNABLE TO REACH   YOUR PHYSICIAN - GO DIRECTLY TO THE EMERGENCY ROOM.  Basilia Villavicencio MD  9/18/2023 11:48:39 AM  This report has been verified and signed electronically.  Dear patient,  As a result of recent federal legislation (The Federal Cures Act), you may   receive lab or pathology results from your procedure in your [facility]   account before your physician is able to contact you. Your physician or   their representative will relay the results to you with their   recommendations at their soonest availability.  Thank you,  [employer]ATION

## 2023-09-18 NOTE — ANESTHESIA POSTPROCEDURE EVALUATION
Anesthesia Post Evaluation    Patient: Timothy Galdamez    Procedure(s) Performed: Procedure(s) (LRB):  EGD (ESOPHAGOGASTRODUODENOSCOPY) (N/A)    Final Anesthesia Type: general      Patient location during evaluation: GI PACU  Patient participation: Yes- Able to Participate  Level of consciousness: awake and alert  Post-procedure vital signs: reviewed and stable  Pain management: adequate  Airway patency: patent    PONV status at discharge: No PONV  Anesthetic complications: no      Cardiovascular status: blood pressure returned to baseline  Respiratory status: unassisted, spontaneous ventilation and room air  Hydration status: euvolemic  Follow-up not needed.          Vitals Value Taken Time   /64 09/18/23 1202   Temp 36.7 °C (98 °F) 09/18/23 1145   Pulse 68 09/18/23 1204   Resp 14 09/18/23 1200   SpO2 96 % 09/18/23 1204   Vitals shown include unvalidated device data.      No case tracking events are documented in the log.      Pain/Stephy Score: Stephy Score: 9 (9/18/2023 11:55 AM)

## 2023-09-20 ENCOUNTER — HOSPITAL ENCOUNTER (OUTPATIENT)
Dept: CARDIOLOGY | Facility: HOSPITAL | Age: 68
Discharge: HOME | End: 2023-09-20
Attending: STUDENT IN AN ORGANIZED HEALTH CARE EDUCATION/TRAINING PROGRAM
Payer: MEDICARE

## 2023-09-20 DIAGNOSIS — I10 HYPERTENSION, UNSPECIFIED TYPE: ICD-10-CM

## 2023-09-20 LAB
IMMEDIATE ARM BP: 157 MMHG
IMMEDIATE LEFT ABI: 1.27
IMMEDIATE LEFT TIBIAL: 199 MMHG
IMMEDIATE RIGHT ABI: 1.14
IMMEDIATE RIGHT TIBIAL: 179 MMHG
LEFT ABI: 1.17
LEFT ARM BP: 140 MMHG
LEFT DORSALIS PEDIS: 164 MMHG
LEFT POSTERIOR TIBIAL: 168 MMHG
LEFT TBI: 0.83
LEFT TOE PRESSURE: 119 MMHG
RIGHT ABI: 1.19
RIGHT ARM BP: 144 MMHG
RIGHT DORSALIS PEDIS: 172 MMHG
RIGHT POSTERIOR TIBIAL: 170 MMHG
RIGHT TBI: 1.2
RIGHT TOE PRESSURE: 173 MMHG
TREADMILL GRADE: 12 %
TREADMILL SPEED: 2 MPH

## 2023-09-20 PROCEDURE — 93924 LWR XTR VASC STDY BILAT: CPT | Mod: HCNC

## 2023-09-20 PROCEDURE — 93924 LWR XTR VASC STDY BILAT: CPT | Mod: 26,HCNC,, | Performed by: INTERNAL MEDICINE

## 2023-09-20 PROCEDURE — 93924 ANKLE BRACHIAL INDICES (ABI): ICD-10-PCS | Mod: 26,HCNC,, | Performed by: INTERNAL MEDICINE

## 2023-09-22 ENCOUNTER — OFFICE VISIT (OUTPATIENT)
Dept: CARDIOLOGY | Facility: CLINIC | Age: 68
End: 2023-09-22
Payer: MEDICARE

## 2023-09-22 VITALS — HEIGHT: 72 IN | BODY MASS INDEX: 24.87 KG/M2 | WEIGHT: 183.63 LBS

## 2023-09-22 DIAGNOSIS — E78.00 PURE HYPERCHOLESTEROLEMIA: ICD-10-CM

## 2023-09-22 DIAGNOSIS — Z13.6 ENCOUNTER FOR SCREENING FOR CARDIOVASCULAR DISORDERS: Primary | ICD-10-CM

## 2023-09-22 DIAGNOSIS — I77.89 OTHER SPECIFIED DISORDERS OF ARTERIES AND ARTERIOLES: ICD-10-CM

## 2023-09-22 DIAGNOSIS — I44.7 LBBB (LEFT BUNDLE BRANCH BLOCK): ICD-10-CM

## 2023-09-22 DIAGNOSIS — I10 PRIMARY HYPERTENSION: ICD-10-CM

## 2023-09-22 DIAGNOSIS — I77.819 ECTATIC AORTA: ICD-10-CM

## 2023-09-22 PROCEDURE — 99204 OFFICE O/P NEW MOD 45 MIN: CPT | Mod: S$GLB,,, | Performed by: INTERNAL MEDICINE

## 2023-09-22 PROCEDURE — 1101F PR PT FALLS ASSESS DOC 0-1 FALLS W/OUT INJ PAST YR: ICD-10-PCS | Mod: CPTII,S$GLB,, | Performed by: INTERNAL MEDICINE

## 2023-09-22 PROCEDURE — 1101F PT FALLS ASSESS-DOCD LE1/YR: CPT | Mod: CPTII,S$GLB,, | Performed by: INTERNAL MEDICINE

## 2023-09-22 PROCEDURE — 99204 PR OFFICE/OUTPT VISIT, NEW, LEVL IV, 45-59 MIN: ICD-10-PCS | Mod: S$GLB,,, | Performed by: INTERNAL MEDICINE

## 2023-09-22 PROCEDURE — 3008F PR BODY MASS INDEX (BMI) DOCUMENTED: ICD-10-PCS | Mod: CPTII,S$GLB,, | Performed by: INTERNAL MEDICINE

## 2023-09-22 PROCEDURE — 93010 EKG 12-LEAD: ICD-10-PCS | Mod: S$GLB,,, | Performed by: INTERNAL MEDICINE

## 2023-09-22 PROCEDURE — 1126F AMNT PAIN NOTED NONE PRSNT: CPT | Mod: CPTII,S$GLB,, | Performed by: INTERNAL MEDICINE

## 2023-09-22 PROCEDURE — 93005 EKG 12-LEAD: ICD-10-PCS | Mod: S$GLB,,, | Performed by: INTERNAL MEDICINE

## 2023-09-22 PROCEDURE — 1159F PR MEDICATION LIST DOCUMENTED IN MEDICAL RECORD: ICD-10-PCS | Mod: CPTII,S$GLB,, | Performed by: INTERNAL MEDICINE

## 2023-09-22 PROCEDURE — 3008F BODY MASS INDEX DOCD: CPT | Mod: CPTII,S$GLB,, | Performed by: INTERNAL MEDICINE

## 2023-09-22 PROCEDURE — 3288F FALL RISK ASSESSMENT DOCD: CPT | Mod: CPTII,S$GLB,, | Performed by: INTERNAL MEDICINE

## 2023-09-22 PROCEDURE — 3044F HG A1C LEVEL LT 7.0%: CPT | Mod: CPTII,S$GLB,, | Performed by: INTERNAL MEDICINE

## 2023-09-22 PROCEDURE — 93005 ELECTROCARDIOGRAM TRACING: CPT | Mod: S$GLB,,, | Performed by: INTERNAL MEDICINE

## 2023-09-22 PROCEDURE — 1126F PR PAIN SEVERITY QUANTIFIED, NO PAIN PRESENT: ICD-10-PCS | Mod: CPTII,S$GLB,, | Performed by: INTERNAL MEDICINE

## 2023-09-22 PROCEDURE — 3288F PR FALLS RISK ASSESSMENT DOCUMENTED: ICD-10-PCS | Mod: CPTII,S$GLB,, | Performed by: INTERNAL MEDICINE

## 2023-09-22 PROCEDURE — 93010 ELECTROCARDIOGRAM REPORT: CPT | Mod: S$GLB,,, | Performed by: INTERNAL MEDICINE

## 2023-09-22 PROCEDURE — 1160F RVW MEDS BY RX/DR IN RCRD: CPT | Mod: CPTII,S$GLB,, | Performed by: INTERNAL MEDICINE

## 2023-09-22 PROCEDURE — 1159F MED LIST DOCD IN RCRD: CPT | Mod: CPTII,S$GLB,, | Performed by: INTERNAL MEDICINE

## 2023-09-22 PROCEDURE — 3044F PR MOST RECENT HEMOGLOBIN A1C LEVEL <7.0%: ICD-10-PCS | Mod: CPTII,S$GLB,, | Performed by: INTERNAL MEDICINE

## 2023-09-22 PROCEDURE — 99999 PR PBB SHADOW E&M-EST. PATIENT-LVL III: ICD-10-PCS | Mod: PBBFAC,HCNC,, | Performed by: INTERNAL MEDICINE

## 2023-09-22 PROCEDURE — 1160F PR REVIEW ALL MEDS BY PRESCRIBER/CLIN PHARMACIST DOCUMENTED: ICD-10-PCS | Mod: CPTII,S$GLB,, | Performed by: INTERNAL MEDICINE

## 2023-09-22 PROCEDURE — 99999 PR PBB SHADOW E&M-EST. PATIENT-LVL III: CPT | Mod: PBBFAC,HCNC,, | Performed by: INTERNAL MEDICINE

## 2023-09-22 RX ORDER — ROSUVASTATIN CALCIUM 20 MG/1
20 TABLET, COATED ORAL
Qty: 90 TABLET | Refills: 3 | Status: ON HOLD | OUTPATIENT
Start: 2023-09-22 | End: 2024-03-08 | Stop reason: HOSPADM

## 2023-09-22 NOTE — TELEPHONE ENCOUNTER
No care due was identified.  [facility] Embedded Care Due Messages. Reference number: [id number].   9/21/2023 8:37:55 PM CDT

## 2023-09-22 NOTE — PROGRESS NOTES
Subjective:     Chief Complaint:  Timothy Galdamez is a 68 y.o. male referred by Young Lanier MD for evaluation of Hypertension.    Problem List and HPI:   Hypertension  Squamous cell carcinoma - XRT 2015  LBBB    Noted difference in BP between his two arms. Reviewed the BP log that he provided to Dr. Lanier. BP fluctuates with as much as a 30 mm Hg difference between the 2 arms - higher on the right side.  Previously on pravastatin recently switched to rosuvastatin 20 mg but is still finishing off pravastatin.  Untreated LDL was ~ 160 with a total cholesterol ~ 250 mg/dL.        Review of patient's allergies indicates:  No Known Allergies     Current Outpatient Medications   Medication Sig    ascorbic acid, vitamin C, (VITAMIN C) 100 MG tablet Take 100 mg by mouth once daily.    aspirin (ECOTRIN) 81 MG EC tablet Take 81 mg by mouth once daily.    calcium carbonate (OS-HERMES) 600 mg calcium (1,500 mg) Tab Take 600 mg by mouth once daily.    ergocalciferol, vitamin D2, (VITAMIN D ORAL) Take 1 tablet by mouth once daily.    multivitamin (ONE DAILY MULTIVITAMIN) per tablet Take 1 tablet by mouth once daily.    NON FORMULARY MEDICATION Prevegan once daily    omega-3/dha/epa/dpa/fish oil (OMEGA-3 2100 ORAL) Take 1 capsule by mouth once daily.    rosuvastatin (CRESTOR) 20 MG tablet TAKE 1 TABLET ONE TIME DAILY    vitamin E 100 UNIT capsule Take 100 Units by mouth once daily.    omeprazole (PRILOSEC) 40 MG capsule Take 1 capsule (40 mg total) by mouth once daily. (Patient not taking: Reported on 9/22/2023)     No current facility-administered medications for this visit.         Past Medical and Surgical history:  Timothy Galdamez  has a past medical history of Hyperlipidemia.   Past Surgical History:   Procedure Laterality Date    ANKLE SURGERY      L ankle    BIOPSY OF TISSUE OF NECK Left 2013    COLONOSCOPY N/A 08/21/2017    Procedure: COLONOSCOPY;  Surgeon: Danny Jane MD;  [facility] ([address] [place] [address]);   Service: Endoscopy;  Laterality: N/A;  Do not cancel this order    ESOPHAGOGASTRODUODENOSCOPY N/A 9/18/2023    Procedure: EGD (ESOPHAGOGASTRODUODENOSCOPY);  Surgeon: Basilia Villavicencio MD;  Location: [facility] ENDOSCOPY;  Service: Gastroenterology;  Laterality: N/A;  9.13 instr sent via portal [facility]  pre call complete, stated he would have a ride -HH    TONSILLECTOMY      TUMOR REMOVAL Right 2015    at [facility]    VASECTOMY         Social history:  Timothy D Rudolph  reports that he has quit smoking. He does not have any smokeless tobacco history on file. He reports current alcohol use of about 3.0 standard drinks of alcohol per week. He reports that he does not use drugs.    Family history:  Timothy's family history includes Arthritis in his mother; COPD in his brother.      Objective:   Ht 6' (1.829 m)   Wt 83.3 kg (183 lb 10.3 oz)   BMI 24.91 kg/m²    Physical Exam  Constitutional:       Appearance: He is well-developed.      Comments:      HENT:      Head: Normocephalic and atraumatic.   Neck:      Vascular: No carotid bruit or JVD.   Cardiovascular:      Rate and Rhythm: Normal rate and regular rhythm.      Pulses:           Radial pulses are 2+ on the right side and 2+ on the left side.        Posterior tibial pulses are 2+ on the right side and 2+ on the left side.      Heart sounds: S1 normal and S2 normal. No murmur heard.     No gallop.   Pulmonary:      Effort: Pulmonary effort is normal.      Breath sounds: No wheezing or rales.   Chest:      Chest wall: There is no dullness to percussion.   Abdominal:      Palpations: Abdomen is soft. There is no hepatomegaly or splenomegaly.      Tenderness: There is no abdominal tenderness.   Musculoskeletal:      Right lower leg: No edema.      Left lower leg: No edema.   Skin:     General: Skin is warm and dry.      Findings: No bruising.      Nails: There is no clubbing.   Neurological:      Mental Status: He is alert and oriented to person, place, and time.      Gait: Gait normal.    Psychiatric:         Speech: Speech normal.         Behavior: Behavior normal.         Thought Content: Thought content normal.         Judgment: Judgment normal.           Labs  Lipids:  Recent Labs   Lab 08/31/23  0700   LDL Cholesterol 83.4   HDL 57   Cholesterol 150      Renal:  Recent Labs   Lab 08/31/23  0700   Creatinine 0.9   Potassium 5.3 H   CO2 23   BUN 14     Liver:  Recent Labs   Lab 08/31/23  0700   AST 17   ALT 19     Cbc:    Lab Results   Component Value Date    WBC 4.18 08/31/2023    HGB 15.4 08/31/2023    HCT 46.1 08/31/2023    MCV 96 08/31/2023     08/31/2023     The 10-year ASCVD risk score (Zari MILLAN, et al., 2019) is: 9.9%    Values used to calculate the score:      Age: 68 years      Sex: Male      Is Non- : No      Diabetic: No      Tobacco smoker: No      Systolic Blood Pressure: 111 mmHg      Is BP treated: No      HDL Cholesterol: 57 mg/dL      Total Cholesterol: 150 mg/dL       ECG today sinus rhythm with left bundle branch block noted previously.      Assessment and Plan:       ICD-10-CM ICD-9-CM   1. Encounter for screening for cardiovascular disorders  Z13.6 V81.2   2. Primary hypertension  I10 401.9   3. Ectatic aorta  I77.819 447.70   4. Other specified disorders of arteries and arterioles  I77.89 447.8   5. Pure hypercholesterolemia  E78.00 272.0   6. LBBB (left bundle branch block)  I44.7 426.3        He had radiation therapy to the right side of his neck but his blood pressure is lower on the contralateral side.  Through reasonable to perform an ultrasound to rule out subclavian artery stenosis.  He does not have any symptoms suggestive of subclavian steal.  Carotid ultrasound in view of radiation to the right neck.  Coronary artery calcium determination by CT scan to refine 10 year and lifetime risk assessment of myocardial infarction beyond models using traditional risk factors.   Left bundle branch block is longstanding.  It is reasonable to  obtain an echocardiogram    Orders entered during this encounter:    Encounter for screening for cardiovascular disorders  -     IN OFFICE EKG 12-LEAD (to [place])  -     CV Ultrasound doppler arterial arms bilat; Future; Expected date: 09/22/2023  -     CV Ultrasound Bilateral Doppler Carotid; Future  -     CT Calcium Scoring Cardiac; Future; Expected date: 09/22/2023    Primary hypertension  -     Ambulatory referral/consult to Cardiology  -     IN OFFICE EKG 12-LEAD (to [place])  -     CV Ultrasound doppler arterial arms bilat; Future; Expected date: 09/22/2023  -     CV Ultrasound Bilateral Doppler Carotid; Future  -     CT Calcium Scoring Cardiac; Future; Expected date: 09/22/2023    Ectatic aorta  -     IN OFFICE EKG 12-LEAD (to [place])  -     CV Ultrasound doppler arterial arms bilat; Future; Expected date: 09/22/2023  -     CV Ultrasound Bilateral Doppler Carotid; Future  -     CT Calcium Scoring Cardiac; Future; Expected date: 09/22/2023    Other specified disorders of arteries and arterioles  -     CV Ultrasound Bilateral Doppler Carotid; Future    Pure hypercholesterolemia    LBBB (left bundle branch block)         Follow up if symptoms worsen or fail to improve.

## 2023-09-22 NOTE — TELEPHONE ENCOUNTER
Refill Routing Note   Medication(s) are not appropriate for processing by [facility] for the following reason(s):      Clarification of medication (Rx) details  Drug-drug interaction    [facility] action(s):  Defer Care Due:  None identified     Medication Therapy Plan: Both Crestor and Pravachol active on med list. Pravastatin has not been filled since Sept 2022, patient is currently filling Crestor. Which is patient to be taking?        Appointments  past 12m or future 3m with PCP    Date Provider   Last Visit   9/15/2023 Young Lanier MD   Next Visit   Visit date not found Young Lanier MD   ED visits in past 90 days: 0        Note composed:8:56 PM 09/21/2023

## 2023-09-23 ENCOUNTER — PATIENT MESSAGE (OUTPATIENT)
Dept: GASTROENTEROLOGY | Facility: CLINIC | Age: 68
End: 2023-09-23
Payer: MEDICARE

## 2023-09-25 LAB
FINAL PATHOLOGIC DIAGNOSIS: NORMAL
Lab: NORMAL

## 2023-10-18 ENCOUNTER — HOSPITAL ENCOUNTER (OUTPATIENT)
Dept: RADIOLOGY | Facility: HOSPITAL | Age: 68
Discharge: HOME | End: 2023-10-18
Attending: INTERNAL MEDICINE
Payer: MEDICARE

## 2023-10-18 DIAGNOSIS — I10 PRIMARY HYPERTENSION: ICD-10-CM

## 2023-10-18 DIAGNOSIS — I77.819 ECTATIC AORTA: ICD-10-CM

## 2023-10-18 DIAGNOSIS — Z13.6 ENCOUNTER FOR SCREENING FOR CARDIOVASCULAR DISORDERS: ICD-10-CM

## 2023-10-18 PROCEDURE — 75571 CT HRT W/O DYE W/CA TEST: CPT | Mod: 26,HCNC,, | Performed by: STUDENT IN AN ORGANIZED HEALTH CARE EDUCATION/TRAINING PROGRAM

## 2023-10-18 PROCEDURE — 75571 CT HRT W/O DYE W/CA TEST: CPT | Mod: TC,HCNC

## 2023-10-18 PROCEDURE — 75571 CT CALCIUM SCORING CARDIAC: ICD-10-PCS | Mod: 26,HCNC,, | Performed by: STUDENT IN AN ORGANIZED HEALTH CARE EDUCATION/TRAINING PROGRAM

## 2023-10-19 ENCOUNTER — HOSPITAL ENCOUNTER (OUTPATIENT)
Dept: CARDIOLOGY | Facility: HOSPITAL | Age: 68
Discharge: HOME | End: 2023-10-19
Attending: INTERNAL MEDICINE
Payer: MEDICARE

## 2023-10-19 DIAGNOSIS — I10 PRIMARY HYPERTENSION: ICD-10-CM

## 2023-10-19 DIAGNOSIS — Z13.6 ENCOUNTER FOR SCREENING FOR CARDIOVASCULAR DISORDERS: ICD-10-CM

## 2023-10-19 DIAGNOSIS — I77.819 ECTATIC AORTA: ICD-10-CM

## 2023-10-19 DIAGNOSIS — I77.89 OTHER SPECIFIED DISORDERS OF ARTERIES AND ARTERIOLES: ICD-10-CM

## 2023-10-19 LAB
LEFT ARM DIASTOLIC BLOOD PRESSURE: 80 MMHG
LEFT ARM SYSTOLIC BLOOD PRESSURE: 120 MMHG
LEFT CBA DIAS: 21 CM/S
LEFT CBA SYS: 130 CM/S
LEFT CCA DIST DIAS: 31 CM/S
LEFT CCA DIST SYS: 138 CM/S
LEFT CCA MID DIAS: 38 CM/S
LEFT CCA MID SYS: 156 CM/S
LEFT CCA PROX DIAS: 117 CM/S
LEFT CCA PROX SYS: 116 CM/S
LEFT ECA DIAS: 16 CM/S
LEFT ECA SYS: 123 CM/S
LEFT ICA DIST DIAS: 28 CM/S
LEFT ICA DIST SYS: 106 CM/S
LEFT ICA MID DIAS: 44 CM/S
LEFT ICA MID SYS: 130 CM/S
LEFT ICA PROX DIAS: 36 CM/S
LEFT ICA PROX SYS: 139 CM/S
LEFT VERTEBRAL DIAS: 11 CM/S
LEFT VERTEBRAL SYS: 59 CM/S
LEFT WRIST BRACHIAL INDEX: 1.16 WBI
OHS CV CAROTID RIGHT ICA EDV HIGHEST: 40
OHS CV CAROTID ULTRASOUND LEFT ICA/CCA RATIO: 1.01
OHS CV CAROTID ULTRASOUND RIGHT ICA/CCA RATIO: 2.09
OHS CV PV CAROTID LEFT HIGHEST CCA: 156
OHS CV PV CAROTID LEFT HIGHEST ICA: 139
OHS CV PV CAROTID RIGHT HIGHEST CCA: 83
OHS CV PV CAROTID RIGHT HIGHEST ICA: 171
OHS CV US CAROTID LEFT HIGHEST EDV: 44
RIGHT ARM DIASTOLIC BLOOD PRESSURE: 80 MMHG
RIGHT ARM SYSTOLIC BLOOD PRESSURE: 140 MMHG
RIGHT CBA DIAS: 21 CM/S
RIGHT CBA SYS: 112 CM/S
RIGHT CCA DIST DIAS: 21 CM/S
RIGHT CCA DIST SYS: 82 CM/S
RIGHT CCA MID DIAS: 11 CM/S
RIGHT CCA MID SYS: 83 CM/S
RIGHT CCA PROX DIAS: 11 CM/S
RIGHT CCA PROX SYS: 76 CM/S
RIGHT ECA DIAS: 15 CM/S
RIGHT ECA SYS: 120 CM/S
RIGHT ICA DIST DIAS: 30 CM/S
RIGHT ICA DIST SYS: 95 CM/S
RIGHT ICA MID DIAS: 29 CM/S
RIGHT ICA MID SYS: 97 CM/S
RIGHT ICA PROX DIAS: 40 CM/S
RIGHT ICA PROX SYS: 171 CM/S
RIGHT VERTEBRAL DIAS: 15 CM/S
RIGHT VERTEBRAL SYS: 85 CM/S
RIGHT WRIST BRACHIAL INDEX: 1 WBI
UPPER ARTERIAL LEFT ARM AXILLARY RATIO: 7
UPPER ARTERIAL LEFT ARM AXILLARY SYS MAX: 117 CM/S
UPPER ARTERIAL LEFT ARM BRACHIAL RATIO: 9
UPPER ARTERIAL LEFT ARM BRACHIAL SYS MAX: 96 CM/S
UPPER ARTERIAL LEFT ARM RADIAL RATIO: 15
UPPER ARTERIAL LEFT ARM RADIAL SYS MAX: 115 CM/S
UPPER ARTERIAL LEFT ARM SUBCLAVIAN RATIO: 10
UPPER ARTERIAL LEFT ARM SUBCLAVIAN SYS MAX: 233 CM/S
UPPER ARTERIAL LEFT ARM ULNAR RATIO: 12
UPPER ARTERIAL LEFT ARM ULNAR SYS MAX: 75 CM/S
UPPER ARTERIAL RIGHT ARM AXILLARY RATIO: 24
UPPER ARTERIAL RIGHT ARM AXILLARY SYS MAX: 111 CM/S
UPPER ARTERIAL RIGHT ARM BRACHIAL RATIO: 15
UPPER ARTERIAL RIGHT ARM BRACHIAL SYS MAX: 96 CM/S
UPPER ARTERIAL RIGHT ARM RADIAL RATIO: 13
UPPER ARTERIAL RIGHT ARM RADIAL SYS MAX: 135 CM/S
UPPER ARTERIAL RIGHT ARM SUBCLAVIAN SYS MAX: 337 CM/S
UPPER ARTERIAL RIGHT ARM ULNAR RATIO: 18
UPPER ARTERIAL RIGHT ARM ULNAR SYS MAX: 85 CM/S

## 2023-10-19 PROCEDURE — 93930 UPPER EXTREMITY STUDY: CPT | Mod: 26,HCNC,, | Performed by: INTERNAL MEDICINE

## 2023-10-19 PROCEDURE — 93880 CV US DOPPLER CAROTID (CUPID ONLY): ICD-10-PCS | Mod: 26,HCNC,, | Performed by: INTERNAL MEDICINE

## 2023-10-19 PROCEDURE — 93930 UPPER EXTREMITY STUDY: CPT | Mod: 50,HCNC

## 2023-10-19 PROCEDURE — 93880 EXTRACRANIAL BILAT STUDY: CPT | Mod: HCNC

## 2023-10-19 PROCEDURE — 93930 CV US DOPPLER ARTERIAL ARMS BILATERAL (CUPID ONLY): ICD-10-PCS | Mod: 26,HCNC,, | Performed by: INTERNAL MEDICINE

## 2023-10-19 PROCEDURE — 93880 EXTRACRANIAL BILAT STUDY: CPT | Mod: 26,HCNC,, | Performed by: INTERNAL MEDICINE

## 2023-10-20 ENCOUNTER — TELEPHONE (OUTPATIENT)
Dept: CARDIOLOGY | Facility: CLINIC | Age: 68
End: 2023-10-20
Payer: MEDICARE

## 2023-10-20 ENCOUNTER — PATIENT MESSAGE (OUTPATIENT)
Dept: CARDIOLOGY | Facility: CLINIC | Age: 68
End: 2023-10-20
Payer: MEDICARE

## 2023-10-20 DIAGNOSIS — I77.1 SUBCLAVIAN ARTERY STENOSIS, LEFT: Primary | ICD-10-CM

## 2023-10-20 DIAGNOSIS — I65.29 OCCLUSION AND STENOSIS OF UNSPECIFIED CAROTID ARTERY: Primary | ICD-10-CM

## 2023-10-20 RX ORDER — EZETIMIBE 10 MG/1
10 TABLET ORAL DAILY
COMMUNITY
End: 2023-10-20 | Stop reason: SDUPTHER

## 2023-10-20 RX ORDER — EZETIMIBE 10 MG/1
10 TABLET ORAL DAILY
Qty: 90 TABLET | Refills: 3 | Status: ON HOLD | OUTPATIENT
Start: 2023-10-20 | End: 2024-03-08 | Stop reason: HOSPADM

## 2023-10-20 NOTE — TELEPHONE ENCOUNTER
----- Message from Irasema Graf MD sent at 10/20/2023  9:51 AM CDT -----  Please tell patient that the CT scan showed a small amount of coronary calcification.  His score was 42 which is not unexpected.  He should continue taking aspirin and Crestor. I would like to add Zetia - pl send me a prescription.  He should see Dr. Guzmán for the blockage in the subclavian artery.  Should check blood pressure only in the right arm because it will be underestimated in the left arm

## 2023-10-24 ENCOUNTER — PATIENT MESSAGE (OUTPATIENT)
Dept: INTERNAL MEDICINE | Facility: CLINIC | Age: 68
End: 2023-10-24
Payer: MEDICARE

## 2023-10-24 NOTE — TELEPHONE ENCOUNTER
Last OV 9-15-23  FYI    Pt sent in home BP reading as follows:  Month Year  Sep-23 137 85 146 88 9 4  Oct-23 135 80 137 81 2 2    Virtual appt with Dr. Graf 11-9-23   OV with Dr. Guzmán 12-4-23    Thanks     Complex Repair And M Plasty Text: The defect edges were debeveled with a #15 scalpel blade.  The primary defect was closed partially with a complex linear closure.  Given the location of the remaining defect, shape of the defect and the proximity to free margins an M plasty was deemed most appropriate for complete closure of the defect.  Using a sterile surgical marker, an appropriate advancement flap was drawn incorporating the defect and placing the expected incisions within the relaxed skin tension lines where possible.    The area thus outlined was incised deep to adipose tissue with a #15 scalpel blade.  The skin margins were undermined to an appropriate distance in all directions utilizing iris scissors.

## 2023-10-26 ENCOUNTER — PATIENT MESSAGE (OUTPATIENT)
Dept: CARDIOLOGY | Facility: CLINIC | Age: 68
End: 2023-10-26
Payer: MEDICARE

## 2023-10-28 ENCOUNTER — PATIENT MESSAGE (OUTPATIENT)
Dept: INTERNAL MEDICINE | Facility: CLINIC | Age: 68
End: 2023-10-28
Payer: MEDICARE

## 2023-10-28 DIAGNOSIS — L98.9 SKIN LESION: Primary | ICD-10-CM

## 2023-10-30 ENCOUNTER — OFFICE VISIT (OUTPATIENT)
Dept: CARDIOLOGY | Facility: CLINIC | Age: 68
End: 2023-10-30
Payer: MEDICARE

## 2023-10-30 VITALS
BODY MASS INDEX: 25.29 KG/M2 | HEIGHT: 72 IN | WEIGHT: 186.75 LBS | HEART RATE: 56 BPM | SYSTOLIC BLOOD PRESSURE: 156 MMHG | DIASTOLIC BLOOD PRESSURE: 95 MMHG

## 2023-10-30 DIAGNOSIS — I70.0 AORTIC ATHEROSCLEROSIS: ICD-10-CM

## 2023-10-30 DIAGNOSIS — I25.10 CORONARY ARTERY CALCIFICATION: ICD-10-CM

## 2023-10-30 DIAGNOSIS — I65.29 OCCLUSION AND STENOSIS OF UNSPECIFIED CAROTID ARTERY: ICD-10-CM

## 2023-10-30 DIAGNOSIS — I25.84 CORONARY ARTERY CALCIFICATION: ICD-10-CM

## 2023-10-30 DIAGNOSIS — I77.819 ECTATIC AORTA: ICD-10-CM

## 2023-10-30 DIAGNOSIS — R79.9 ABNORMAL FINDING OF BLOOD CHEMISTRY, UNSPECIFIED: ICD-10-CM

## 2023-10-30 DIAGNOSIS — I77.1 SUBCLAVIAN ARTERY STENOSIS, LEFT: Primary | ICD-10-CM

## 2023-10-30 PROCEDURE — 99205 OFFICE O/P NEW HI 60 MIN: CPT | Mod: HCNC,S$GLB,, | Performed by: INTERNAL MEDICINE

## 2023-10-30 PROCEDURE — 1159F PR MEDICATION LIST DOCUMENTED IN MEDICAL RECORD: ICD-10-PCS | Mod: HCNC,CPTII,S$GLB, | Performed by: INTERNAL MEDICINE

## 2023-10-30 PROCEDURE — 1126F AMNT PAIN NOTED NONE PRSNT: CPT | Mod: HCNC,CPTII,S$GLB, | Performed by: INTERNAL MEDICINE

## 2023-10-30 PROCEDURE — 1101F PR PT FALLS ASSESS DOC 0-1 FALLS W/OUT INJ PAST YR: ICD-10-PCS | Mod: HCNC,CPTII,S$GLB, | Performed by: INTERNAL MEDICINE

## 2023-10-30 PROCEDURE — 3080F PR MOST RECENT DIASTOLIC BLOOD PRESSURE >= 90 MM HG: ICD-10-PCS | Mod: HCNC,CPTII,S$GLB, | Performed by: INTERNAL MEDICINE

## 2023-10-30 PROCEDURE — 99205 PR OFFICE/OUTPT VISIT, NEW, LEVL V, 60-74 MIN: ICD-10-PCS | Mod: HCNC,S$GLB,, | Performed by: INTERNAL MEDICINE

## 2023-10-30 PROCEDURE — 3080F DIAST BP >= 90 MM HG: CPT | Mod: HCNC,CPTII,S$GLB, | Performed by: INTERNAL MEDICINE

## 2023-10-30 PROCEDURE — 3077F SYST BP >= 140 MM HG: CPT | Mod: HCNC,CPTII,S$GLB, | Performed by: INTERNAL MEDICINE

## 2023-10-30 PROCEDURE — 3077F PR MOST RECENT SYSTOLIC BLOOD PRESSURE >= 140 MM HG: ICD-10-PCS | Mod: HCNC,CPTII,S$GLB, | Performed by: INTERNAL MEDICINE

## 2023-10-30 PROCEDURE — 3288F FALL RISK ASSESSMENT DOCD: CPT | Mod: HCNC,CPTII,S$GLB, | Performed by: INTERNAL MEDICINE

## 2023-10-30 PROCEDURE — 1101F PT FALLS ASSESS-DOCD LE1/YR: CPT | Mod: HCNC,CPTII,S$GLB, | Performed by: INTERNAL MEDICINE

## 2023-10-30 PROCEDURE — 3288F PR FALLS RISK ASSESSMENT DOCUMENTED: ICD-10-PCS | Mod: HCNC,CPTII,S$GLB, | Performed by: INTERNAL MEDICINE

## 2023-10-30 PROCEDURE — 3044F PR MOST RECENT HEMOGLOBIN A1C LEVEL <7.0%: ICD-10-PCS | Mod: HCNC,CPTII,S$GLB, | Performed by: INTERNAL MEDICINE

## 2023-10-30 PROCEDURE — 1159F MED LIST DOCD IN RCRD: CPT | Mod: HCNC,CPTII,S$GLB, | Performed by: INTERNAL MEDICINE

## 2023-10-30 PROCEDURE — 99999 PR PBB SHADOW E&M-EST. PATIENT-LVL IV: ICD-10-PCS | Mod: PBBFAC,HCNC,, | Performed by: INTERNAL MEDICINE

## 2023-10-30 PROCEDURE — 1126F PR PAIN SEVERITY QUANTIFIED, NO PAIN PRESENT: ICD-10-PCS | Mod: HCNC,CPTII,S$GLB, | Performed by: INTERNAL MEDICINE

## 2023-10-30 PROCEDURE — 99999 PR PBB SHADOW E&M-EST. PATIENT-LVL IV: CPT | Mod: PBBFAC,HCNC,, | Performed by: INTERNAL MEDICINE

## 2023-10-30 PROCEDURE — 3008F PR BODY MASS INDEX (BMI) DOCUMENTED: ICD-10-PCS | Mod: HCNC,CPTII,S$GLB, | Performed by: INTERNAL MEDICINE

## 2023-10-30 PROCEDURE — 3008F BODY MASS INDEX DOCD: CPT | Mod: HCNC,CPTII,S$GLB, | Performed by: INTERNAL MEDICINE

## 2023-10-30 PROCEDURE — 3044F HG A1C LEVEL LT 7.0%: CPT | Mod: HCNC,CPTII,S$GLB, | Performed by: INTERNAL MEDICINE

## 2023-10-30 NOTE — PROGRESS NOTES
"[facility]      Chief Complaint   Patient presents with    Subclavian artery stenosis, left       Patient ID: Timothy Galdamez is a 68 y.o. male with HTN, history of squamous cell CA s/p XRT (2015), overweight, former smoker, who presents for an initial appointment.  Pertinent history/events are as follows:     -Pt kindly referred by Dr. Wilian Dudley for evaluation of subclavian artery stenosis (per his note on 9/22/2023):  "Noted difference in BP between his two arms. Reviewed the BP log that he provided to Dr. Lanier. BP fluctuates with as much as a 30 mm Hg difference between the 2 arms - higher on the right side.  He had radiation therapy to the right side of his neck but his blood pressure is lower on the contralateral side.  Through reasonable to perform an ultrasound to rule out subclavian artery stenosis.  He does not have any symptoms suggestive of subclavian steal."    HPI:  Mr. Galdamez reports no upper extremity weakness, no dizziness, or other symptoms.  E Arterial Ultrasound on 10/19/2023 revealed right subclavian artery with elevated velocity suggestive of greater than 50% stenosis.  Normal wrist brachial index bilaterally.     Past Medical History:   Diagnosis Date    Hyperlipidemia      Past Surgical History:   Procedure Laterality Date    ANKLE SURGERY      L ankle    BIOPSY OF TISSUE OF NECK Left 2013    COLONOSCOPY N/A 08/21/2017    Procedure: COLONOSCOPY;  Surgeon: Danny Jane MD;  Location: [address]);  Service: Endoscopy;  Laterality: N/A;  Do not cancel this order    ESOPHAGOGASTRODUODENOSCOPY N/A 9/18/2023    Procedure: EGD (ESOPHAGOGASTRODUODENOSCOPY);  Surgeon: Basilia Villavicencio MD;  Location: [facility] ENDOSCOPY;  Service: Gastroenterology;  Laterality: N/A;  9.13 instr sent via portal [facility]  pre call complete, stated he would have a ride -HH    TONSILLECTOMY      TUMOR REMOVAL Right 2015    at     VASECTOMY       Social History     Socioeconomic History    Marital " status:    Tobacco Use    Smoking status: Former   Substance and Sexual Activity    Alcohol use: Yes     Alcohol/week: 3.0 standard drinks of alcohol     Types: 3 Cans of beer per week     Comment: 4-5 x week    Drug use: No   Social History Narrative    Single, CPA, no tobacco, 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                 Social Determinants of Health     Financial Resource Strain: Low Risk  (10/26/2023)    Overall Financial Resource Strain (CARDIA)     Difficulty of Paying Living Expenses: Not hard at all   Food Insecurity: No Food Insecurity (10/26/2023)    Hunger Vital Sign     Worried About Running Out of Food in the Last Year: Never true     Ran Out of Food in the Last Year: Never true   Transportation Needs: No Transportation Needs (10/26/2023)    PRAPARE - Transportation     Lack of Transportation (Medical): No     Lack of Transportation (Non-Medical): No   Physical Activity: Insufficiently Active (10/26/2023)    Exercise Vital Sign     Days of Exercise per Week: 3 days     Minutes of Exercise per Session: 40 min   Stress: Stress Concern Present (10/26/2023)    [employer] - Occupational Stress Questionnaire     Feeling of Stress : Rather much   Social Connections: Unknown (10/26/2023)    Social Connection and Isolation Panel [NHANES]     Frequency of Communication with Friends and Family: Never     Frequency of Social Gatherings with Friends and Family: Patient refused     Active Member of Clubs or Organizations: Yes     Attends Club or Organization Meetings: More than 4 times per year     Marital Status:    Housing Stability: Low Risk  (10/26/2023)    Housing Stability Vital Sign     Unable to Pay for Housing in the Last Year: No     Number of Places Lived in the Last Year: 1     Unstable Housing in the Last Year: No     Family History   Problem Relation Age of Onset    Arthritis Mother     COPD Brother     Psoriasis Neg Hx     Eczema Neg  Hx        Review of patient's allergies indicates:  No Known Allergies    Medication List with Changes/Refills   Current Medications    ASCORBIC ACID, VITAMIN C, (VITAMIN C) 100 MG TABLET    Take 100 mg by mouth once daily.    ASPIRIN (ECOTRIN) 81 MG EC TABLET    Take 81 mg by mouth once daily.    CALCIUM CARBONATE (OS-HERMES) 600 MG CALCIUM (1,500 MG) TAB    Take 600 mg by mouth once daily.    ERGOCALCIFEROL, VITAMIN D2, (VITAMIN D ORAL)    Take 1 tablet by mouth once daily.    EZETIMIBE (ZETIA) 10 MG TABLET    Take 1 tablet (10 mg total) by mouth once daily.    MULTIVITAMIN (ONE DAILY MULTIVITAMIN) PER TABLET    Take 1 tablet by mouth once daily.    NON FORMULARY MEDICATION    Prevegan once daily    OMEGA-3/DHA/EPA/DPA/FISH OIL (OMEGA-3 2100 ORAL)    Take 1 capsule by mouth once daily.    OMEPRAZOLE (PRILOSEC) 40 MG CAPSULE    Take 1 capsule (40 mg total) by mouth once daily.    ROSUVASTATIN (CRESTOR) 20 MG TABLET    TAKE 1 TABLET ONE TIME DAILY    VITAMIN E 100 UNIT CAPSULE    Take 100 Units by mouth once daily.       Review of Systems  Constitution: Denies chills, fever, and sweats.  HENT: Denies headaches or blurry vision.  Cardiovascular: Denies chest pain or irregular heart beat.  Respiratory: Denies cough or shortness of breath.  Gastrointestinal: Denies abdominal pain, nausea, or vomiting.  Musculoskeletal: Denies muscle cramps.  Neurological: Denies dizziness or focal weakness.  Psychiatric/Behavioral: Normal mental status.  Hematologic/Lymphatic: Denies bleeding problem or easy bruising/bleeding.  Skin: Denies rash or suspicious lesions    Physical Examination  BP (!) 156/95   Pulse (!) 56   Ht 6' (1.829 m)   Wt 84.7 kg (186 lb 11.7 oz)   BMI 25.33 kg/m²     Constitutional: No acute distress, conversant  HEENT: Sclera anicteric, Pupils equal, round and reactive to light, extraocular motions intact, Oropharynx clear  Neck: No JVD, no carotid bruits  Cardiovascular: regular rate and rhythm, no murmur, rubs  "or gallops, normal S1/S2  Pulmonary: Clear to auscultation bilaterally  Abdominal: Abdomen soft, nontender, nondistended, positive bowel sounds  Extremities: No lower extremity edema,   Pulses:  Carotid pulses are 2+ on the right side, and 2+ on the left side.  Radial pulses are 2+ on the right side, and 2+ on the left side.   Femoral pulses are 2+ on the right side, and 2+ on the left side.  Popliteal pulses are 2+ on the right side, and 2+ on the left side.   Dorsalis pedis pulses are 2+ on the right side, and 2+ on the left side.   Posterior tibial pulses are 2+ on the right side, and 2+ on the left side.    Skin: No ecchymosis, erythema, or ulcers  Psych: Alert and oriented x 3, appropriate affect  Neuro: CNII-XII intact, no focal deficits    Labs:  Most Recent Data  CBC:   Lab Results   Component Value Date    WBC 4.18 08/31/2023    HGB 15.4 08/31/2023    HCT 46.1 08/31/2023     08/31/2023    MCV 96 08/31/2023    RDW 13.5 08/31/2023     BMP:   Lab Results   Component Value Date     08/31/2023    K 5.3 (H) 08/31/2023     08/31/2023    CO2 23 08/31/2023    BUN 14 08/31/2023    CREATININE 0.9 08/31/2023    GLU 91 08/31/2023    CALCIUM 9.3 08/31/2023     LFTS;   Lab Results   Component Value Date    PROT 6.3 08/31/2023    ALBUMIN 3.7 08/31/2023    BILITOT 0.8 08/31/2023    AST 17 08/31/2023    ALKPHOS 45 (L) 08/31/2023    ALT 19 08/31/2023     COAGS: No results found for: "INR", "PROTIME", "PTT"  FLP:   Lab Results   Component Value Date    CHOL 150 08/31/2023    HDL 57 08/31/2023    LDLCALC 83.4 08/31/2023    TRIG 48 08/31/2023    CHOLHDL 38.0 08/31/2023     CARDIAC: No results found for: "TROPONINI", "CKTOTAL", "CKMB", "BNP"    Imaging:    BUE Arterial Ultrasound 10/19/2023:    Right subclavian artery with elevated velocity suggestive of greater than 50% stenosis.  Recommend further evaluation with CTA or MRA if clinically indicated.    Right wrist brachial index 1.0, is normal.    Left wrist " brachial index 1.16, is normal.    Assessment/Plan:   Timothy Galdamez is a 68 y.o. male with HTN, history of squamous cell CA s/p XRT (2015), overweight, former smoker, who presents for an initial appointment.     Left Subclavian artery stenosis- Pt is asymptomatic with no LUE weakness, dizziness, or other symptoms related to subclavian steal.  Etiologies include atherosclerosis and prior radiation exposure.  Check CTA neck to evaluate further. Continue rosuvastatin 20 mg daily, zetia 10 mg daily, and ASA 81 mg daily.      2. HTN- Continue current medications.     3. Overweight- Encourage diet, exercise, and weight loss.    Follow up in 5 months with lipids prior    Total duration of face to face visit time 30 minutes.  Total time spent counseling greater than fifty percent of total visit time.  Counseling included discussion regarding imaging findings, diagnosis, possibilities, treatment options, risks and benefits.  The patient had many questions regarding the options and long-term effects.    Carlos Guzmán MD, PhD  Interventional Cardiology

## 2023-10-30 NOTE — PATIENT INSTRUCTIONS
Assessment/Plan:   Timothy Galdamez is a 68 y.o. male with HTN, history of squamous cell CA s/p XRT (2015), overweight, former smoker, who presents for an initial appointment.     Left Subclavian artery stenosis- Pt is asymptomatic with no LUE weakness, dizziness, or other symptoms related to subclavian steal.  Etiologies include atherosclerosis and prior radiation exposure.  Check CTA neck to evaluate further. Continue rosuvastatin 20 mg daily, zetia 10 mg daily, and ASA 81 mg daily.      2. HTN- Continue current medications.     3. Overweight- Encourage diet, exercise, and weight loss.    Follow up in 5 months with lipids prior

## 2023-11-01 ENCOUNTER — PATIENT MESSAGE (OUTPATIENT)
Dept: ADMINISTRATIVE | Facility: HOSPITAL | Age: 68
End: 2023-11-01
Payer: MEDICARE

## 2023-11-01 ENCOUNTER — PATIENT OUTREACH (OUTPATIENT)
Dept: ADMINISTRATIVE | Facility: HOSPITAL | Age: 68
End: 2023-11-01
Payer: MEDICARE

## 2023-11-02 ENCOUNTER — PATIENT MESSAGE (OUTPATIENT)
Dept: CARDIOLOGY | Facility: CLINIC | Age: 68
End: 2023-11-02
Payer: MEDICARE

## 2023-11-05 ENCOUNTER — PATIENT MESSAGE (OUTPATIENT)
Dept: CARDIOLOGY | Facility: CLINIC | Age: 68
End: 2023-11-05
Payer: MEDICARE

## 2023-11-06 ENCOUNTER — HOSPITAL ENCOUNTER (OUTPATIENT)
Dept: CARDIOLOGY | Facility: HOSPITAL | Age: 68
Discharge: HOME | End: 2023-11-06
Attending: INTERNAL MEDICINE
Payer: MEDICARE

## 2023-11-06 VITALS
BODY MASS INDEX: 24.79 KG/M2 | HEART RATE: 53 BPM | WEIGHT: 183 LBS | HEIGHT: 72 IN | DIASTOLIC BLOOD PRESSURE: 80 MMHG | SYSTOLIC BLOOD PRESSURE: 110 MMHG

## 2023-11-06 DIAGNOSIS — I10 PRIMARY HYPERTENSION: ICD-10-CM

## 2023-11-06 LAB
ASCENDING AORTA: 3.71 CM
AV INDEX (PROSTH): 1.19
AV MEAN GRADIENT: 4 MMHG
AV PEAK GRADIENT: 7 MMHG
AV VALVE AREA BY VELOCITY RATIO: 3.21 CM²
AV VALVE AREA: 3.73 CM²
AV VELOCITY RATIO: 1.02
BSA FOR ECHO PROCEDURE: 2.05 M2
CV ECHO LV RWT: 0.33 CM
DOP CALC AO PEAK VEL: 1.28 M/S
DOP CALC AO VTI: 33.69 CM
DOP CALC LVOT AREA: 3.1 CM2
DOP CALC LVOT DIAMETER: 2 CM
DOP CALC LVOT PEAK VEL: 1.31 M/S
DOP CALC LVOT STROKE VOLUME: 125.63 CM3
DOP CALC RVOT PEAK VEL: 0.65 M/S
DOP CALC RVOT VTI: 16.14 CM
DOP CALCLVOT PEAK VEL VTI: 40.01 CM
E WAVE DECELERATION TIME: 233.8 MSEC
E/A RATIO: 1
E/E' RATIO: 15.4 M/S
ECHO LV POSTERIOR WALL: 0.78 CM (ref 0.6–1.1)
FRACTIONAL SHORTENING: 24 % (ref 28–44)
INTERVENTRICULAR SEPTUM: 0.72 CM (ref 0.6–1.1)
IVRT: 98.95 MSEC
LA MAJOR: 5.17 CM
LA MINOR: 5.32 CM
LA WIDTH: 3.46 CM
LEFT ATRIUM SIZE: 2.94 CM
LEFT ATRIUM VOLUME INDEX MOD: 23.4 ML/M2
LEFT ATRIUM VOLUME INDEX: 22.1 ML/M2
LEFT ATRIUM VOLUME MOD: 47.91 CM3
LEFT ATRIUM VOLUME: 45.34 CM3
LEFT INTERNAL DIMENSION IN SYSTOLE: 3.63 CM (ref 2.1–4)
LEFT VENTRICLE DIASTOLIC VOLUME INDEX: 52.39 ML/M2
LEFT VENTRICLE DIASTOLIC VOLUME: 107.4 ML
LEFT VENTRICLE MASS INDEX: 57 G/M2
LEFT VENTRICLE SYSTOLIC VOLUME INDEX: 27.2 ML/M2
LEFT VENTRICLE SYSTOLIC VOLUME: 55.69 ML
LEFT VENTRICULAR INTERNAL DIMENSION IN DIASTOLE: 4.8 CM (ref 3.5–6)
LEFT VENTRICULAR MASS: 116.63 G
LV LATERAL E/E' RATIO: 12.83 M/S
LV SEPTAL E/E' RATIO: 19.25 M/S
MV A" WAVE DURATION": 12.94 MSEC
MV PEAK A VEL: 0.77 M/S
MV PEAK E VEL: 0.77 M/S
MV STENOSIS PRESSURE HALF TIME: 67.8 MS
MV VALVE AREA P 1/2 METHOD: 3.24 CM2
PISA TR MAX VEL: 2.29 M/S
PULM VEIN S/D RATIO: 1.15
PV MEAN GRADIENT: 1 MMHG
PV PEAK D VEL: 0.33 M/S
PV PEAK GRADIENT: 4 MMHG
PV PEAK S VEL: 0.38 M/S
PV PEAK VELOCITY: 1.01 M/S
RA MAJOR: 4.71 CM
RA PRESSURE ESTIMATED: 3 MMHG
RA WIDTH: 3.34 CM
RIGHT VENTRICULAR END-DIASTOLIC DIMENSION: 2.78 CM
RV TB RVSP: 5 MMHG
SINUS: 3.71 CM
STJ: 3.28 CM
TDI LATERAL: 0.06 M/S
TDI SEPTAL: 0.04 M/S
TDI: 0.05 M/S
TR MAX PG: 21 MMHG
TRICUSPID ANNULAR PLANE SYSTOLIC EXCURSION: 2.72 CM
TV REST PULMONARY ARTERY PRESSURE: 24 MMHG
Z-SCORE OF LEFT VENTRICULAR DIMENSION IN END DIASTOLE: -2.48
Z-SCORE OF LEFT VENTRICULAR DIMENSION IN END SYSTOLE: -0.29

## 2023-11-06 PROCEDURE — 93306 TTE W/DOPPLER COMPLETE: CPT | Mod: HCNC,PO

## 2023-11-06 PROCEDURE — 93306 TTE W/DOPPLER COMPLETE: CPT | Mod: 26,HCNC,, | Performed by: INTERNAL MEDICINE

## 2023-11-06 PROCEDURE — 93306 ECHO (CUPID ONLY): ICD-10-PCS | Mod: 26,HCNC,, | Performed by: INTERNAL MEDICINE

## 2023-11-09 ENCOUNTER — PATIENT MESSAGE (OUTPATIENT)
Dept: CARDIOLOGY | Facility: CLINIC | Age: 68
End: 2023-11-09
Payer: MEDICARE

## 2023-11-09 ENCOUNTER — OFFICE VISIT (OUTPATIENT)
Dept: CARDIOLOGY | Facility: CLINIC | Age: 68
End: 2023-11-09
Payer: MEDICARE

## 2023-11-09 VITALS
DIASTOLIC BLOOD PRESSURE: 87 MMHG | HEIGHT: 72 IN | SYSTOLIC BLOOD PRESSURE: 148 MMHG | WEIGHT: 175 LBS | BODY MASS INDEX: 23.7 KG/M2 | HEART RATE: 60 BPM

## 2023-11-09 DIAGNOSIS — I77.1 SUBCLAVIAN ARTERY STENOSIS, LEFT: ICD-10-CM

## 2023-11-09 DIAGNOSIS — E87.5 HYPERKALEMIA: ICD-10-CM

## 2023-11-09 DIAGNOSIS — E78.00 PURE HYPERCHOLESTEROLEMIA: Primary | ICD-10-CM

## 2023-11-09 DIAGNOSIS — I25.84 CORONARY ARTERY CALCIFICATION: ICD-10-CM

## 2023-11-09 DIAGNOSIS — I25.10 CORONARY ARTERY CALCIFICATION: ICD-10-CM

## 2023-11-09 PROCEDURE — 99213 OFFICE O/P EST LOW 20 MIN: CPT | Mod: 95,,, | Performed by: INTERNAL MEDICINE

## 2023-11-09 PROCEDURE — 3008F BODY MASS INDEX DOCD: CPT | Mod: CPTII,95,, | Performed by: INTERNAL MEDICINE

## 2023-11-09 PROCEDURE — 3077F SYST BP >= 140 MM HG: CPT | Mod: CPTII,95,, | Performed by: INTERNAL MEDICINE

## 2023-11-09 PROCEDURE — 1101F PT FALLS ASSESS-DOCD LE1/YR: CPT | Mod: CPTII,95,, | Performed by: INTERNAL MEDICINE

## 2023-11-09 PROCEDURE — 3079F DIAST BP 80-89 MM HG: CPT | Mod: CPTII,95,, | Performed by: INTERNAL MEDICINE

## 2023-11-09 PROCEDURE — 3288F FALL RISK ASSESSMENT DOCD: CPT | Mod: CPTII,95,, | Performed by: INTERNAL MEDICINE

## 2023-11-09 PROCEDURE — 1126F AMNT PAIN NOTED NONE PRSNT: CPT | Mod: CPTII,95,, | Performed by: INTERNAL MEDICINE

## 2023-11-09 RX ORDER — AMLODIPINE BESYLATE 2.5 MG/1
2.5 TABLET ORAL DAILY
Qty: 30 TABLET | Refills: 11 | Status: SHIPPED | OUTPATIENT
Start: 2023-11-09 | End: 2023-12-14 | Stop reason: SDUPTHER

## 2023-11-09 NOTE — PROGRESS NOTES
The patient location is: Home, in [place].  The chief complaint leading to consultation is: Hypertension and Hyperlipidemia    Visit type:TELE AUDIOVISUAL  Total time spent with patient: 21m 10s minutes.  Each patient to whom he or she provides medical services by telemedicine is:  (1) informed of the relationship between the physician and patient and the respective role of any other health care provider with respect to management of the patient; and (2) notified that he or she may decline to receive medical services by telemedicine and may withdraw from such care at any time.   Subjective:   Chief Complaint:  Timothy Galdamez is a 68 y.o. male who presents for follow-up of Hypertension and Hyperlipidemia      Problem List and HPI:   Hypertension  Squamous cell carcinoma - XRT 2015  LBBB  Left subclavian artery stenosis  CACS 42    Started ezetimibe recently.   Evaluated by Dr. Guzmán for a left subclavian artery stenosis.  In view of asymptomatic nature he did not recommend intervention. CTA pending.    Home BPs - SBPs as high as 150s in am and lower in the evening. Wakes up v early. Difference in BP b/w the 2 arms is now less noticeable.            Review of patient's allergies indicates:  No Known Allergies     Current Outpatient Medications   Medication Sig    ascorbic acid, vitamin C, (VITAMIN C) 100 MG tablet Take 100 mg by mouth once daily.    aspirin (ECOTRIN) 81 MG EC tablet Take 81 mg by mouth once daily.    calcium carbonate (OS-HERMES) 600 mg calcium (1,500 mg) Tab Take 600 mg by mouth once daily.    ergocalciferol, vitamin D2, (VITAMIN D ORAL) Take 1 tablet by mouth once daily.    ezetimibe (ZETIA) 10 mg tablet Take 1 tablet (10 mg total) by mouth once daily.    multivitamin (ONE DAILY MULTIVITAMIN) per tablet Take 1 tablet by mouth once daily.    NON FORMULARY MEDICATION Prevegan once daily    omega-3/dha/epa/dpa/fish oil (OMEGA-3 2100 ORAL) Take 1 capsule by mouth once daily.    omeprazole (PRILOSEC) 40  MG capsule Take 1 capsule (40 mg total) by mouth once daily.    rosuvastatin (CRESTOR) 20 MG tablet TAKE 1 TABLET ONE TIME DAILY    vitamin E 100 UNIT capsule Take 100 Units by mouth once daily.     No current facility-administered medications for this visit.       Social history:  Timothy GREENE Rudolph  reports that he has quit smoking. He does not have any smokeless tobacco history on file. He reports current alcohol use of about 3.0 standard drinks of alcohol per week. He reports that he does not use drugs.      Objective:   BP (!) 148/87   Pulse 60   Ht 6' (1.829 m)   Wt 79.4 kg (175 lb)   BMI 23.73 kg/m²    Physical Exam    Lipids:  Recent Labs   Lab 08/31/23  0700   LDL Cholesterol 83.4   HDL 57   Cholesterol 150      Renal:  Recent Labs   Lab 08/31/23  0700 11/06/23  1643   Creatinine 0.9 1.0   Potassium 5.3 H  --    CO2 23  --    BUN 14  --      Liver:  Recent Labs   Lab 08/31/23  0700   AST 17   ALT 19     CBC:  Lab Results   Component Value Date    WBC 4.18 08/31/2023    HGB 15.4 08/31/2023    HCT 46.1 08/31/2023    MCV 96 08/31/2023     08/31/2023         Results for orders placed during the hospital encounter of 11/06/23    Echo    Interpretation Summary    Left Ventricle: The left ventricle is normal in size. Ventricular mass is normal. Normal wall thickness. Normal wall motion. There is normal systolic function with a visually estimated ejection fraction of 55 - 60%. There is normal diastolic function.    Right Ventricle: Normal right ventricular cavity size. Wall thickness is normal. Right ventricle wall motion  is normal. Systolic function is normal.    Pulmonary Artery: The estimated pulmonary artery systolic pressure is 24 mmHg.    IVC/SVC: Normal venous pressure at 3 mmHg.       No results found for this or any previous visit.      Results for orders placed or performed during the hospital encounter of 11/13/23 (from the past 2160 hour(s))   CTA Neck    Narrative    EXAMINATION:  CTA  NECK    CLINICAL HISTORY:  Carotid artery stenosis;left subclavian artery stenosis;Occlusion and stenosis of unspecified carotid artery    TECHNIQUE:  CT angiogram was performed from the level of the rosie to the EAC following the IV administration of 75mL of Omnipaque 350.   Sagittal and coronal reconstructions and maximum intensity projection reconstructions were performed. Arterial stenosis percentages are based on NASCET measurement criteria.    COMPARISON:  10/20/2014    FINDINGS:  The aortic arch is not completely included on the exam.  The major aortic branch vessels are patent.  There is no evidence of significant subclavian stenosis on either side.  The bilateral common carotid arteries are patent.  There is mixed calcified noncalcified plaque at the carotid bifurcations right greater than left.  In the left common carotid artery, there is noncalcified plaque measuring up to 4 mm.  No significant stenosis of the common carotid artery or left carotid artery bifurcation.  On the right, there is heterogeneous mixed plaque resulting in less than 50% stenosis by NASCET criteria.  The cervical, petrous, cavernous and supraclinoid segments are patent.  The right A1 segment is hypoplastic.  Left A1 segment is dominant.  The major anterior and middle cerebral artery branches patent.    The vertebral arteries are patent and codominant.  The basilar and major posterior cerebral artery branches patent.    Limited intracranial evaluation shows no acute abnormalities.  Hypodensity at the junction of the anterior thalamus and caudate either represents a cyst or remote infarct.  The visualized paranasal sinuses and mastoid air cells are essentially clear.  There is multilevel cervical spondylosis.    Postoperative changes right neck.  The cystic/solid lesion right neck prior study of 2014 is no longer visualized.  Mild asymmetry with mild medial deviation of the right vocal cord.  There is an area of focal nodular  enhancement at the base of the tongue on the right measuring approximately 1.0 x 1.1 x 0.9 cm (axial image 232 series 2 and sagittal 104 series 601).    Visualized portions of the lung apices show no acute abnormalities.      Impression    CTA neck shows no evidence of significant subclavian stenosis as clinically questioned.    Atherosclerotic disease with mixed calcified noncalcified plaque at the carotid bifurcations right greater than left and left common carotid artery.  No high-grade stenosis by NASCET criteria.  The estimated stenosis is less than 50%.    Nodular enhancing lesion at the base of the tongue right.  Recommend follow-up with ENT further evaluation/characterization.    This report was flagged in Epic as abnormal.      Electronically signed by: Parish Blancas MD  Date:    11/14/2023  Time:    09:14           Assessment and Plan:       ICD-10-CM ICD-9-CM   1. Pure hypercholesterolemia  E78.00 272.0   2. Hyperkalemia  E87.5 276.7   3. Coronary artery calcification  I25.10 414.00    I25.84 414.4   4. Subclavian artery stenosis, left  I77.1 447.1        CACS <100 but in view of subclavian artery stenosis recommend a LDL of <70. Continue rosuvastatin 20 mg daily. Continue ezetimibe 10 mg added recently.  Lipids in 2/2024.   Reviewed BP log. BPs in am are higher. K has been elevated .Begin amlodipine 2.5 mg qhs.    Orders placed during this encounter:     Pure hypercholesterolemia  -     Lipid Panel; Future; Expected date: 02/01/2024  -     Comprehensive Metabolic Panel; Future; Expected date: 02/01/2024    Hyperkalemia    Coronary artery calcification    Subclavian artery stenosis, left    Other orders  -     Discontinue: amLODIPine (NORVASC) 2.5 MG tablet; Take 1 tablet (2.5 mg total) by mouth once daily.  Dispense: 30 tablet; Refill: 11         Follow up in about 8 months (around 7/9/2024).

## 2023-11-10 ENCOUNTER — PATIENT OUTREACH (OUTPATIENT)
Dept: ADMINISTRATIVE | Facility: HOSPITAL | Age: 68
End: 2023-11-10
Payer: MEDICARE

## 2023-11-10 ENCOUNTER — TELEPHONE (OUTPATIENT)
Dept: INTERNAL MEDICINE | Facility: CLINIC | Age: 68
End: 2023-11-10
Payer: MEDICARE

## 2023-11-10 VITALS — SYSTOLIC BLOOD PRESSURE: 133 MMHG | DIASTOLIC BLOOD PRESSURE: 79 MMHG

## 2023-11-10 NOTE — PROGRESS NOTES

## 2023-11-13 ENCOUNTER — HOSPITAL ENCOUNTER (OUTPATIENT)
Dept: RADIOLOGY | Facility: HOSPITAL | Age: 68
Discharge: HOME | End: 2023-11-13
Attending: INTERNAL MEDICINE
Payer: MEDICARE

## 2023-11-13 DIAGNOSIS — I65.29 OCCLUSION AND STENOSIS OF UNSPECIFIED CAROTID ARTERY: ICD-10-CM

## 2023-11-13 PROCEDURE — 25500020 PHARM REV CODE 255: Mod: HCNC | Performed by: INTERNAL MEDICINE

## 2023-11-13 PROCEDURE — 70498 CTA NECK: ICD-10-PCS | Mod: 26,HCNC,, | Performed by: RADIOLOGY

## 2023-11-13 PROCEDURE — 70498 CT ANGIOGRAPHY NECK: CPT | Mod: 26,HCNC,, | Performed by: RADIOLOGY

## 2023-11-13 PROCEDURE — 70498 CT ANGIOGRAPHY NECK: CPT | Mod: TC,HCNC

## 2023-11-13 RX ADMIN — IOHEXOL 75 ML: 350 INJECTION, SOLUTION INTRAVENOUS at 05:11

## 2023-11-15 ENCOUNTER — PATIENT MESSAGE (OUTPATIENT)
Dept: OTOLARYNGOLOGY | Facility: CLINIC | Age: 68
End: 2023-11-15
Payer: MEDICARE

## 2023-11-21 ENCOUNTER — PATIENT MESSAGE (OUTPATIENT)
Dept: INTERNAL MEDICINE | Facility: CLINIC | Age: 68
End: 2023-11-21
Payer: MEDICARE

## 2023-11-22 ENCOUNTER — PATIENT MESSAGE (OUTPATIENT)
Dept: CARDIOLOGY | Facility: CLINIC | Age: 68
End: 2023-11-22
Payer: MEDICARE

## 2023-11-22 NOTE — TELEPHONE ENCOUNTER
"I spoke to pt and notified him of the following per Dr. Lanier:.CTA-neck gathered to screen for subclavian stenosis incidentally identified "Nodular enhancing lesion at the base of the tongue right" in this Patient  with History of right BOT squamous cell carcinoma.   Pt is scheduled for Otolaryngology appt on 11-29-23.  Pt verbalized understanding     "

## 2023-11-29 ENCOUNTER — OFFICE VISIT (OUTPATIENT)
Dept: OTOLARYNGOLOGY | Facility: CLINIC | Age: 68
End: 2023-11-29
Payer: MEDICARE

## 2023-11-29 VITALS
WEIGHT: 179.69 LBS | BODY MASS INDEX: 24.34 KG/M2 | HEIGHT: 72 IN | DIASTOLIC BLOOD PRESSURE: 87 MMHG | SYSTOLIC BLOOD PRESSURE: 132 MMHG | HEART RATE: 59 BPM

## 2023-11-29 DIAGNOSIS — C01 SQUAMOUS CELL CARCINOMA OF BASE OF TONGUE: Primary | ICD-10-CM

## 2023-11-29 PROCEDURE — 1159F MED LIST DOCD IN RCRD: CPT | Mod: HCNC,CPTII,S$GLB, | Performed by: OTOLARYNGOLOGY

## 2023-11-29 PROCEDURE — 3075F PR MOST RECENT SYSTOLIC BLOOD PRESS GE 130-139MM HG: ICD-10-PCS | Mod: HCNC,CPTII,S$GLB, | Performed by: OTOLARYNGOLOGY

## 2023-11-29 PROCEDURE — 99999 PR PBB SHADOW E&M-EST. PATIENT-LVL IV: ICD-10-PCS | Mod: PBBFAC,HCNC,, | Performed by: OTOLARYNGOLOGY

## 2023-11-29 PROCEDURE — 99215 PR OFFICE/OUTPT VISIT, EST, LEVL V, 40-54 MIN: ICD-10-PCS | Mod: 25,HCNC,S$GLB, | Performed by: OTOLARYNGOLOGY

## 2023-11-29 PROCEDURE — 99215 OFFICE O/P EST HI 40 MIN: CPT | Mod: 25,HCNC,S$GLB, | Performed by: OTOLARYNGOLOGY

## 2023-11-29 PROCEDURE — 3044F HG A1C LEVEL LT 7.0%: CPT | Mod: HCNC,CPTII,S$GLB, | Performed by: OTOLARYNGOLOGY

## 2023-11-29 PROCEDURE — 3079F DIAST BP 80-89 MM HG: CPT | Mod: HCNC,CPTII,S$GLB, | Performed by: OTOLARYNGOLOGY

## 2023-11-29 PROCEDURE — 31575 DIAGNOSTIC LARYNGOSCOPY: CPT | Mod: HCNC,S$GLB,, | Performed by: OTOLARYNGOLOGY

## 2023-11-29 PROCEDURE — 3008F PR BODY MASS INDEX (BMI) DOCUMENTED: ICD-10-PCS | Mod: HCNC,CPTII,S$GLB, | Performed by: OTOLARYNGOLOGY

## 2023-11-29 PROCEDURE — 1126F PR PAIN SEVERITY QUANTIFIED, NO PAIN PRESENT: ICD-10-PCS | Mod: HCNC,CPTII,S$GLB, | Performed by: OTOLARYNGOLOGY

## 2023-11-29 PROCEDURE — 3044F PR MOST RECENT HEMOGLOBIN A1C LEVEL <7.0%: ICD-10-PCS | Mod: HCNC,CPTII,S$GLB, | Performed by: OTOLARYNGOLOGY

## 2023-11-29 PROCEDURE — 31575 PR LARYNGOSCOPY, FLEXIBLE; DIAGNOSTIC: ICD-10-PCS | Mod: HCNC,S$GLB,, | Performed by: OTOLARYNGOLOGY

## 2023-11-29 PROCEDURE — 1126F AMNT PAIN NOTED NONE PRSNT: CPT | Mod: HCNC,CPTII,S$GLB, | Performed by: OTOLARYNGOLOGY

## 2023-11-29 PROCEDURE — 3008F BODY MASS INDEX DOCD: CPT | Mod: HCNC,CPTII,S$GLB, | Performed by: OTOLARYNGOLOGY

## 2023-11-29 PROCEDURE — 3075F SYST BP GE 130 - 139MM HG: CPT | Mod: HCNC,CPTII,S$GLB, | Performed by: OTOLARYNGOLOGY

## 2023-11-29 PROCEDURE — 3079F PR MOST RECENT DIASTOLIC BLOOD PRESSURE 80-89 MM HG: ICD-10-PCS | Mod: HCNC,CPTII,S$GLB, | Performed by: OTOLARYNGOLOGY

## 2023-11-29 PROCEDURE — 1159F PR MEDICATION LIST DOCUMENTED IN MEDICAL RECORD: ICD-10-PCS | Mod: HCNC,CPTII,S$GLB, | Performed by: OTOLARYNGOLOGY

## 2023-11-29 PROCEDURE — 99999 PR PBB SHADOW E&M-EST. PATIENT-LVL IV: CPT | Mod: PBBFAC,HCNC,, | Performed by: OTOLARYNGOLOGY

## 2023-11-29 NOTE — PROGRESS NOTES
No chief complaint on file.        68 y.o. male presents with history of right BOT squamous cell carcinoma. Treated at [facility] in 2015 with radiation. Did surveillance for 5 years without recurrence. Now here for recent ulcers under his tongue which are now resolved. Also with neck spasms and longstanding history of dysphagia with feeling of food getting stuck in his esophagus. He has not had a previous dilation. No current throat or ear pain.     Here for evaluation after incidental finding of right BOT mass on CTA neck for subclavian artery stenosis. No new throat pain, ear pain, hemoptysis or voice changes.      Review of Systems     Constitutional: Negative for fatigue and unexpected weight change.   HENT: per HPI.  Eyes: Negative for visual disturbance.   Respiratory: Negative for shortness of breath, hemoptysis  Cardiovascular: Negative for chest pain and palpitations.   Genitourinary: Negative for dysuria and difficulty urinating.   Musculoskeletal: Negative for decreased ROM, back pain.   Skin: Negative for rash, sunburn, itching.   Neurological: Negative for dizziness and seizures.   Hematological: Negative for adenopathy. Does not bruise/bleed easily.   Psychiatric/Behavioral: Negative for agitation. The patient is not nervous/anxious.   Endocrine: Negative for rapid weight loss/weight gain, heat/cold intolerance.     Past Medical History:   Diagnosis Date    Hyperlipidemia        Past Surgical History:   Procedure Laterality Date    ANKLE SURGERY      L ankle    BIOPSY OF TISSUE OF NECK Left 2013    COLONOSCOPY N/A 08/21/2017    Procedure: COLONOSCOPY;  Surgeon: Danny Jane MD;  Location: [address]);  Service: Endoscopy;  Laterality: N/A;  Do not cancel this order    ESOPHAGOGASTRODUODENOSCOPY N/A 9/18/2023    Procedure: EGD (ESOPHAGOGASTRODUODENOSCOPY);  Surgeon: Basilia Villavicencio MD;  Location: [facility] ENDOSCOPY;  Service: Gastroenterology;  Laterality: N/A;  9.13 instr sent via portal [facility]  pre  call complete, stated he would have a ride -    TONSILLECTOMY      TUMOR REMOVAL Right 2015    at [facility]    VASECTOMY         family history includes Arthritis in his mother; COPD in his brother.    Pt  reports that he has quit smoking. He does not have any smokeless tobacco history on file. He reports current alcohol use of about 3.0 standard drinks of alcohol per week. He reports that he does not use drugs.    Review of patient's allergies indicates:  No Known Allergies     Physical Exam    Vitals:    11/29/23 0959   BP: 132/87   Pulse: (!) 59     Body mass index is 24.37 kg/m².    Physical Exam  Vitals and nursing note reviewed.   Constitutional:       General: He is not in acute distress.     Appearance: He is well-developed. He is not diaphoretic.   HENT:      Head: Normocephalic and atraumatic.      Right Ear: Hearing and external ear normal. No decreased hearing noted.      Left Ear: Hearing and external ear normal. No decreased hearing noted.      Nose: Nose normal.      Mouth/Throat:      Mouth: Mucous membranes are moist. No oral lesions.      Tongue: No lesions.      Pharynx: Oropharynx is clear. Uvula midline.   Eyes:      General: Lids are normal.         Right eye: No discharge.         Left eye: No discharge.      Conjunctiva/sclera: Conjunctivae normal.      Pupils: Pupils are equal, round, and reactive to light.   Neck:      Thyroid: No thyroid mass or thyromegaly.      Trachea: Trachea and phonation normal. No tracheal tenderness or tracheal deviation.   Cardiovascular:      Heart sounds: Normal heart sounds.   Pulmonary:      Breath sounds: Normal breath sounds. No stridor.   Abdominal:      Palpations: Abdomen is soft.   Musculoskeletal:      Cervical back: Normal range of motion and neck supple. No edema or erythema.   Lymphadenopathy:      Cervical: No cervical adenopathy.   Skin:     General: Skin is warm and dry.      Coloration: Skin is not pale.      Findings: No erythema or rash.    Neurological:      Mental Status: He is alert and oriented to person, place, and time.        Procedure: Flexible laryngoscopy  In order to fully examine the upper aerodigestive tract, including the larynx, in a patient with a hyperactive gag reflex, flexible endoscopy is required.  After explaining the procedure and obtaining verbal consent, a timeout was performed with the patient's participation according to the universal protocol. Both nasal cavities were anesthetized with 4% Xylocaine spray mixed with John-Synephrine. The flexible laryngoscope was inserted into the nasal cavity and advanced to visualize the nasal cavity, nasopharynx, the posterior oropharynx, hypopharynx, and the endolarynx with the above findings noted. The scope was removed and the procedure terminated. The patient tolerated this procedure well without apparent complication.      Nasopharynx - the torus is clear. There are no lesions of the posterior wall.   Oropharynx - Right BOT with exophytic erythema with overlying ulcerative change.  Hypopharynx - there are no lesions of the pyriform sinuses or postcricoid region   Larynx - there are no lesions of the supraglottic or glottic larynx. Vocal fold mobility is normal with complete closure.      CTA 11/13/23:  Postoperative changes right neck.  The cystic/solid lesion right neck prior study of 2014 is no longer visualized.  Mild asymmetry with mild medial deviation of the right vocal cord.  There is an area of focal nodular enhancement at the base of the tongue on the right measuring approximately 1.0 x 1.1 x 0.9 cm (axial image 232 series 2 and sagittal 104 series 601).     Assessment     1. Squamous cell carcinoma of base of tongue          Plan  In summary,  Rudolph is a 68 year old male with history of BOT squamous cell carcinoma p16+ s/p XRT in 2015. Now with concerning R BOT lesion. No noted keya disease. PET/CT ordered. Plan for DL/Biopsy in OR after PET complete. Risks, benefits,  and alternatives discussed with the patient and he is in agreement with the plan. Plan for 12/11/23.

## 2023-12-06 NOTE — PRE-PROCEDURE INSTRUCTIONS
PreOp Instructions given:   - Verbal medication information (what to hold and what to take)   - NPO guidelines 2400  - Arrival place directions given; time to be given the day before procedure by the   Surgeon's Office [facility]  - Bathing with antibacterial soap   - Don't wear any jewelry or bring any valuables AM of surgery   - No makeup or moisturizer to face   - No perfume/cologne, powder, lotions or aftershave   Pt. verbalized understanding.   Pt denies any h/o Anesthesia/Sedation complications or side effects.  HR-50-54 bpm  Patient does not know arrival time.  Explained that this information comes from the surgeon's office and if they haven't heard from them by 2 or 3 pm to call the office.  Patient stated an understanding.

## 2023-12-07 ENCOUNTER — HOSPITAL ENCOUNTER (OUTPATIENT)
Dept: RADIOLOGY | Facility: HOSPITAL | Age: 68
Discharge: HOME | End: 2023-12-07
Attending: OTOLARYNGOLOGY
Payer: MEDICARE

## 2023-12-07 DIAGNOSIS — C01 SQUAMOUS CELL CARCINOMA OF BASE OF TONGUE: ICD-10-CM

## 2023-12-07 PROCEDURE — 78815 PET IMAGE W/CT SKULL-THIGH: CPT | Mod: 26,PI,HCNC, | Performed by: STUDENT IN AN ORGANIZED HEALTH CARE EDUCATION/TRAINING PROGRAM

## 2023-12-07 PROCEDURE — A9552 F18 FDG: HCPCS | Mod: HCNC

## 2023-12-07 PROCEDURE — 78815 NM PET CT FDG SKULL BASE TO MID THIGH: ICD-10-PCS | Mod: 26,PI,HCNC, | Performed by: STUDENT IN AN ORGANIZED HEALTH CARE EDUCATION/TRAINING PROGRAM

## 2023-12-08 ENCOUNTER — TELEPHONE (OUTPATIENT)
Dept: OTOLARYNGOLOGY | Facility: CLINIC | Age: 68
End: 2023-12-08
Payer: MEDICARE

## 2023-12-10 ENCOUNTER — ANESTHESIA EVENT (OUTPATIENT)
Dept: SURGERY | Facility: HOSPITAL | Age: 68
End: 2023-12-10
Payer: MEDICARE

## 2023-12-10 NOTE — ANESTHESIA PREPROCEDURE EVALUATION
[facility]  Anesthesia Pre-Operative Evaluation         Patient Name: Timothy Galdamez  YOB: 1955  MRN: [id number]    SUBJECTIVE:     Pre-operative evaluation for Procedure(s) (LRB):  LARYNGOSCOPY, DIRECT, WITH BRONCHOSCOPY (N/A)     12/10/2023    Timothy Galdamez is a 68 y.o. 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 Patient was treated at [facility] with radiation and surveillance for 5 years without recurrence. Now with concerning R BOT lesion associated with ulcers which have resolved. Patient also has neck spasms and longstanding history of dysphagia with feeling of food getting suck in esophagus. No history of esophageal dilation. No keya disease. Plan for DL/Biopsy in OR after PET/CT imaging complete.     Patient now presents for the above procedure(s).    FDG Skull Base to Mid Thigh:  Impression:   1.2 cm hypermetabolic lesion at the right base of tongue, concerning for malignancy.  No PET evidence for metastases.    CTA Neck   Impression:  CTA neck shows no evidence of significant subclavian stenosis as clinically questioned.  Atherosclerotic disease with mixed calcified noncalcified plaque at the carotid bifurcations right greater than left and left common carotid artery.  No high-grade stenosis by NASCET criteria.  The estimated stenosis is less than 50%.  Nodular enhancing lesion at the base of the tongue right.  Recommend follow-up with ENT further evaluation/characterization.      LDA: None documented.     Prev airway: None documented.    Drips: None documented.      Patient Active Problem List   Diagnosis    Primary insomnia    Hyperlipidemia    Tongue cancer    Rotator cuff syndrome of right shoulder    Memory change    RLS (restless legs syndrome)    Chronic right-sided low back pain    Ectatic aorta     Subclavian artery stenosis, left    Aortic atherosclerosis    Coronary artery calcification       Review of patient's allergies indicates:  No Known Allergies    Current Inpatient Medications:      No current facility-administered medications on file prior to encounter.     Current Outpatient Medications on File Prior to Encounter   Medication Sig Dispense Refill    aspirin (ECOTRIN) 81 MG EC tablet Take 81 mg by mouth once daily.      amLODIPine (NORVASC) 2.5 MG tablet Take 1 tablet (2.5 mg total) by mouth once daily. 30 tablet 11    ascorbic acid, vitamin C, (VITAMIN C) 100 MG tablet Take 100 mg by mouth once daily.      calcium carbonate (OS-HERMES) 600 mg calcium (1,500 mg) Tab Take 600 mg by mouth once daily.      ergocalciferol, vitamin D2, (VITAMIN D ORAL) Take 1 tablet by mouth once daily.      ezetimibe (ZETIA) 10 mg tablet Take 1 tablet (10 mg total) by mouth once daily. 90 tablet 3    multivitamin (ONE DAILY MULTIVITAMIN) per tablet Take 1 tablet by mouth once daily.      NON FORMULARY MEDICATION Prevegan once daily      omega-3/dha/epa/dpa/fish oil (OMEGA-3 2100 ORAL) Take 1 capsule by mouth once daily.      omeprazole (PRILOSEC) 40 MG capsule Take 1 capsule (40 mg total) by mouth once daily. 30 capsule 11    rosuvastatin (CRESTOR) 20 MG tablet TAKE 1 TABLET ONE TIME DAILY 90 tablet 3    vitamin E 100 UNIT capsule Take 100 Units by mouth once daily.         Past Surgical History:   Procedure Laterality Date    ANKLE SURGERY      L ankle    BIOPSY OF TISSUE OF NECK Left 2013    COLONOSCOPY N/A 08/21/2017    Procedure: COLONOSCOPY;  Surgeon: Danny Jane MD;  Location: [address]);  Service: Endoscopy;  Laterality: N/A;  Do not cancel this order    ESOPHAGOGASTRODUODENOSCOPY N/A 9/18/2023    Procedure: EGD (ESOPHAGOGASTRODUODENOSCOPY);  Surgeon: Basilia Villavicencio MD;  Location: [facility] ENDOSCOPY;  Service: Gastroenterology;  Laterality: N/A;  9.13 instr sent via portal [facility]  pre call complete,  stated he would have a ride -    TONSILLECTOMY      TUMOR REMOVAL Right 2015    at [facility]    VASECTOMY         Social History:  Tobacco Use: Medium Risk (11/9/2023)    Patient History     Smoking Tobacco Use: Former     Smokeless Tobacco Use: Unknown     Passive Exposure: Not on file      Alcohol Use: Heavy Drinker (11/7/2023)    AUDIT-C     Frequency of Alcohol Consumption: 4 or more times a week     Average Number of Drinks: 1 or 2     Frequency of Binge Drinking: Monthly        OBJECTIVE:     Vital Signs Range (Last 24H):         Significant Labs:  Lab Results   Component Value Date    WBC 4.18 08/31/2023    HGB 15.4 08/31/2023    HCT 46.1 08/31/2023     08/31/2023    CHOL 150 08/31/2023    TRIG 48 08/31/2023    HDL 57 08/31/2023    ALT 19 08/31/2023    AST 17 08/31/2023     08/31/2023    K 5.3 (H) 08/31/2023     08/31/2023    CREATININE 1.0 11/06/2023    BUN 14 08/31/2023    CO2 23 08/31/2023    TSH 2.519 08/31/2023    PSA 0.70 08/31/2023    HGBA1C 5.1 08/31/2023       Diagnostic Studies: No relevant studies.    EKG:   Results for orders placed or performed in visit on 09/22/23   IN OFFICE EKG 12-LEAD (to [place])    Collection Time: 09/22/23  9:58 AM    Narrative    Test Reason : I10,Z13.6,I77.819,    Vent. Rate : 054 BPM     Atrial Rate : 054 BPM     P-R Int : 184 ms          QRS Dur : 130 ms      QT Int : 486 ms       P-R-T Axes : 052 058 055 degrees     QTc Int : 460 ms    Sinus bradycardia  Left bundle branch block  Abnormal ECG  When compared with ECG of 16-JUL-2014 15:00,  Vent. rate has decreased BY  26 BPM  Nonspecific T wave abnormality now evident in Inferior leads  QT has shortened  Confirmed by Dickson RITCHIE MD ([id number]) on 9/22/2023 5:18:47 PM    Referred By: SANDRA HDZ           Confirmed By:Dickson RITCHIE MD       2D ECHO:  TTE:  Results for orders placed or performed during the hospital encounter of 11/06/23   Echo   Result Value Ref Range    BSA 2.05 m2    LVOT stroke volume 125.63 cm3  "   LVIDd 4.80 3.5 - 6.0 cm    LV Systolic Volume 55.69 mL    LV Systolic Volume Index 27.2 mL/m2    LVIDs 3.63 2.1 - 4.0 cm    LV Diastolic Volume 107.40 mL    LV Diastolic Volume Index 52.39 mL/m2    IVS 0.72 0.6 - 1.1 cm    LVOT diameter 2.00 cm    LVOT area 3.1 cm2    FS 24 (A) 28 - 44 %    Left Ventricle Relative Wall Thickness 0.33 cm    Posterior Wall 0.78 0.6 - 1.1 cm    LV mass 116.63 g    LV Mass Index 57 g/m2    MV Peak E Palmer 0.77 m/s    TDI LATERAL 0.06 m/s    TDI SEPTAL 0.04 m/s    E/E' ratio 15.40 m/s    MV Peak A Palmer 0.77 m/s    TR Max Palmer 2.29 m/s    E/A ratio 1.00     IVRT 98.95 msec    E wave deceleration time 233.80 msec    MV "A" wave duration 12.94 msec    LV SEPTAL E/E' RATIO 19.25 m/s    LA Volume Index 22.1 mL/m2    LV LATERAL E/E' RATIO 12.83 m/s    LA volume 45.34 cm3    PV Peak S Palmer 0.38 m/s    PV Peak D Palmer 0.33 m/s    Pulm vein S/D ratio 1.15     LVOT peak palmer 1.31 m/s    RVDD 2.78 cm    RVOT peak VTI 16.14 cm    TAPSE 2.72 cm    LA size 2.94 cm    Left Atrium Minor Axis 5.32 cm    Left Atrium Major Axis 5.17 cm    LA volume (mod) 47.91 cm3    LA WIDTH 3.46 cm    LA Volume Index (Mod) 23.4 mL/m2    RA Major Axis 4.71 cm    RA Width 3.34 cm    AV mean gradient 4 mmHg    AV peak gradient 7 mmHg    Ao peak palmer 1.28 m/s    Ao VTI 33.69 cm    LVOT peak VTI 40.01 cm    AV valve area 3.73 cm²    AV Velocity Ratio 1.02     AV index (prosthetic) 1.19     MELA by Velocity Ratio 3.21 cm²    MV stenosis pressure 1/2 time 67.80 ms    MV valve area p 1/2 method 3.24 cm2    Triscuspid Valve Regurgitation Peak Gradient 21 mmHg    PV mean gradient 1 mmHg    PV PEAK VELOCITY 1.01 m/s    PV peak gradient 4 mmHg    RVOT peak palmer 0.65 m/s    Sinus 3.71 cm    STJ 3.28 cm    Ascending aorta 3.71 cm    Mean e' 0.05 m/s    ZLVIDS -0.29     ZLVIDD -2.48     TV resting pulmonary artery pressure 24 mmHg    RV TB RVSP 5 mmHg    Est. RA pres 3 mmHg    Narrative      Left Ventricle: The left ventricle is normal in " size. Ventricular mass   is normal. Normal wall thickness. Normal wall motion. There is normal   systolic function with a visually estimated ejection fraction of 55 - 60%.   There is normal diastolic function.    Right Ventricle: Normal right ventricular cavity size. Wall thickness   is normal. Right ventricle wall motion  is normal. Systolic function is   normal.    Pulmonary Artery: The estimated pulmonary artery systolic pressure is   24 mmHg.    IVC/SVC: Normal venous pressure at 3 mmHg.         LORETO:  No results found for this or any previous visit.    ASSESSMENT/PLAN:        Pre-op Assessment    I have reviewed the Patient Summary Reports.     I have reviewed the Nursing Notes. I have reviewed the NPO Status.   I have reviewed the Medications.     Review of Systems  Anesthesia Hx:  No problems with previous Anesthesia               Denies Personal Hx of Anesthesia complications.                    Social:  Former Smoker       Cardiovascular:     Hypertension   CAD          PVD                              Pulmonary:  Pulmonary Normal                       Renal/:  Renal/ Normal                 Hepatic/GI:  Hepatic/GI Normal                 Neurological:  Neurology Normal                                          Physical Exam  General: Well nourished, Cooperative and Alert    Airway:  Mallampati: III / II  Mouth Opening: Normal  Tongue: Normal  Neck ROM: Normal ROM    Dental:  Intact        Anesthesia Plan  Type of Anesthesia, risks & benefits discussed:    Anesthesia Type: Gen ETT  Intra-op Monitoring Plan: Standard ASA Monitors  Post Op Pain Control Plan: multimodal analgesia and IV/PO Opioids PRN  Induction:  IV  Airway Plan: Direct, Post-Induction  Informed Consent: Informed consent signed with the Patient and all parties understand the risks and agree with anesthesia plan.  All questions answered.   ASA Score: 3  Day of Surgery Review of History & Physical: H&P Update referred to the  surgeon/provider.    Ready For Surgery From Anesthesia Perspective.     .

## 2023-12-11 ENCOUNTER — HOSPITAL ENCOUNTER (OUTPATIENT)
Facility: HOSPITAL | Age: 68
Discharge: HOME | End: 2023-12-11
Attending: OTOLARYNGOLOGY | Admitting: OTOLARYNGOLOGY
Payer: MEDICARE

## 2023-12-11 ENCOUNTER — ANESTHESIA (OUTPATIENT)
Dept: SURGERY | Facility: HOSPITAL | Age: 68
End: 2023-12-11
Payer: MEDICARE

## 2023-12-11 VITALS
RESPIRATION RATE: 18 BRPM | HEIGHT: 72 IN | TEMPERATURE: 97 F | HEART RATE: 64 BPM | WEIGHT: 179 LBS | DIASTOLIC BLOOD PRESSURE: 81 MMHG | SYSTOLIC BLOOD PRESSURE: 164 MMHG | OXYGEN SATURATION: 98 % | BODY MASS INDEX: 24.24 KG/M2

## 2023-12-11 DIAGNOSIS — J39.2 OROPHARYNGEAL MASS: Primary | ICD-10-CM

## 2023-12-11 PROCEDURE — 36000709 HC OR TIME LEV III EA ADD 15 MIN: Mod: HCNC | Performed by: OTOLARYNGOLOGY

## 2023-12-11 PROCEDURE — 25000003 PHARM REV CODE 250: Mod: HCNC

## 2023-12-11 PROCEDURE — D9220A PRA ANESTHESIA: ICD-10-PCS | Mod: HCNC,,, | Performed by: ANESTHESIOLOGY

## 2023-12-11 PROCEDURE — D9220A PRA ANESTHESIA: Mod: HCNC,,, | Performed by: ANESTHESIOLOGY

## 2023-12-11 PROCEDURE — 31536 PR LARYNGOSCOPY,DIRCT,OP SCOPE,BIOPSY: ICD-10-PCS | Mod: HCNC,,, | Performed by: OTOLARYNGOLOGY

## 2023-12-11 PROCEDURE — 36000708 HC OR TIME LEV III 1ST 15 MIN: Mod: HCNC | Performed by: OTOLARYNGOLOGY

## 2023-12-11 PROCEDURE — 63600175 PHARM REV CODE 636 W HCPCS: Mod: HCNC

## 2023-12-11 PROCEDURE — 25000003 PHARM REV CODE 250: Mod: HCNC | Performed by: OTOLARYNGOLOGY

## 2023-12-11 PROCEDURE — 71000044 HC DOSC ROUTINE RECOVERY FIRST HOUR: Mod: HCNC | Performed by: OTOLARYNGOLOGY

## 2023-12-11 PROCEDURE — 31536 LARYNGOSCOPY W/BX & OP SCOPE: CPT | Mod: HCNC,,, | Performed by: OTOLARYNGOLOGY

## 2023-12-11 PROCEDURE — 37000009 HC ANESTHESIA EA ADD 15 MINS: Mod: HCNC | Performed by: OTOLARYNGOLOGY

## 2023-12-11 PROCEDURE — 71000015 HC POSTOP RECOV 1ST HR: Mod: HCNC | Performed by: OTOLARYNGOLOGY

## 2023-12-11 PROCEDURE — 37000008 HC ANESTHESIA 1ST 15 MINUTES: Mod: HCNC | Performed by: OTOLARYNGOLOGY

## 2023-12-11 RX ORDER — OXYMETAZOLINE HCL 0.05 %
SPRAY, NON-AEROSOL (ML) NASAL
Status: DISCONTINUED | OUTPATIENT
Start: 2023-12-11 | End: 2023-12-11 | Stop reason: HOSPADM

## 2023-12-11 RX ORDER — SODIUM CHLORIDE 0.9 % (FLUSH) 0.9 %
10 SYRINGE (ML) INJECTION
Status: DISCONTINUED | OUTPATIENT
Start: 2023-12-11 | End: 2023-12-11 | Stop reason: HOSPADM

## 2023-12-11 RX ORDER — PROPOFOL 10 MG/ML
VIAL (ML) INTRAVENOUS
Status: DISCONTINUED | OUTPATIENT
Start: 2023-12-11 | End: 2023-12-11

## 2023-12-11 RX ORDER — ROCURONIUM BROMIDE 10 MG/ML
INJECTION, SOLUTION INTRAVENOUS
Status: DISCONTINUED | OUTPATIENT
Start: 2023-12-11 | End: 2023-12-11

## 2023-12-11 RX ORDER — ONDANSETRON 4 MG/1
4 TABLET, ORALLY DISINTEGRATING ORAL EVERY 8 HOURS PRN
Qty: 10 TABLET | Refills: 0 | Status: ON HOLD | OUTPATIENT
Start: 2023-12-11 | End: 2024-03-08 | Stop reason: HOSPADM

## 2023-12-11 RX ORDER — FENTANYL CITRATE 50 UG/ML
INJECTION, SOLUTION INTRAMUSCULAR; INTRAVENOUS
Status: DISCONTINUED | OUTPATIENT
Start: 2023-12-11 | End: 2023-12-11

## 2023-12-11 RX ORDER — HALOPERIDOL 5 MG/ML
0.5 INJECTION INTRAMUSCULAR EVERY 10 MIN PRN
Status: DISCONTINUED | OUTPATIENT
Start: 2023-12-11 | End: 2023-12-11 | Stop reason: HOSPADM

## 2023-12-11 RX ORDER — LIDOCAINE HYDROCHLORIDE 10 MG/ML
1 INJECTION, SOLUTION EPIDURAL; INFILTRATION; INTRACAUDAL; PERINEURAL ONCE AS NEEDED
Status: DISCONTINUED | OUTPATIENT
Start: 2023-12-11 | End: 2023-12-11 | Stop reason: HOSPADM

## 2023-12-11 RX ORDER — LIDOCAINE HYDROCHLORIDE 20 MG/ML
INJECTION, SOLUTION EPIDURAL; INFILTRATION; INTRACAUDAL; PERINEURAL
Status: DISCONTINUED | OUTPATIENT
Start: 2023-12-11 | End: 2023-12-11

## 2023-12-11 RX ORDER — DEXAMETHASONE SODIUM PHOSPHATE 4 MG/ML
INJECTION, SOLUTION INTRA-ARTICULAR; INTRALESIONAL; INTRAMUSCULAR; INTRAVENOUS; SOFT TISSUE
Status: DISCONTINUED | OUTPATIENT
Start: 2023-12-11 | End: 2023-12-11

## 2023-12-11 RX ORDER — HYDROCODONE BITARTRATE AND ACETAMINOPHEN 5; 325 MG/1; MG/1
1 TABLET ORAL EVERY 6 HOURS PRN
Qty: 5 TABLET | Refills: 0 | Status: ON HOLD | OUTPATIENT
Start: 2023-12-11 | End: 2024-03-08 | Stop reason: HOSPADM

## 2023-12-11 RX ORDER — DEXMEDETOMIDINE HYDROCHLORIDE 100 UG/ML
INJECTION, SOLUTION INTRAVENOUS
Status: DISCONTINUED | OUTPATIENT
Start: 2023-12-11 | End: 2023-12-11

## 2023-12-11 RX ORDER — MIDAZOLAM HYDROCHLORIDE 5 MG/ML
INJECTION INTRAMUSCULAR; INTRAVENOUS
Status: DISCONTINUED | OUTPATIENT
Start: 2023-12-11 | End: 2023-12-11

## 2023-12-11 RX ORDER — SODIUM CHLORIDE 9 MG/ML
INJECTION, SOLUTION INTRAVENOUS CONTINUOUS
Status: DISCONTINUED | OUTPATIENT
Start: 2023-12-11 | End: 2023-12-11 | Stop reason: HOSPADM

## 2023-12-11 RX ORDER — FENTANYL CITRATE 50 UG/ML
25 INJECTION, SOLUTION INTRAMUSCULAR; INTRAVENOUS EVERY 5 MIN PRN
Status: DISCONTINUED | OUTPATIENT
Start: 2023-12-11 | End: 2023-12-11 | Stop reason: HOSPADM

## 2023-12-11 RX ADMIN — FENTANYL CITRATE 100 MCG: 50 INJECTION, SOLUTION INTRAMUSCULAR; INTRAVENOUS at 10:12

## 2023-12-11 RX ADMIN — LIDOCAINE HYDROCHLORIDE 75 MG: 20 INJECTION, SOLUTION EPIDURAL; INFILTRATION; INTRACAUDAL; PERINEURAL at 10:12

## 2023-12-11 RX ADMIN — PROPOFOL 30 MG: 10 INJECTION, EMULSION INTRAVENOUS at 11:12

## 2023-12-11 RX ADMIN — DEXMEDETOMIDINE 8 MCG: 100 INJECTION, SOLUTION, CONCENTRATE INTRAVENOUS at 11:12

## 2023-12-11 RX ADMIN — DEXAMETHASONE SODIUM PHOSPHATE 4 MG: 4 INJECTION, SOLUTION INTRAMUSCULAR; INTRAVENOUS at 11:12

## 2023-12-11 RX ADMIN — ROCURONIUM BROMIDE 40 MG: 10 INJECTION INTRAVENOUS at 10:12

## 2023-12-11 RX ADMIN — PROPOFOL 120 MG: 10 INJECTION, EMULSION INTRAVENOUS at 10:12

## 2023-12-11 RX ADMIN — MIDAZOLAM 2 MG: 5 INJECTION INTRAMUSCULAR; INTRAVENOUS at 10:12

## 2023-12-11 RX ADMIN — SODIUM CHLORIDE: 9 INJECTION, SOLUTION INTRAVENOUS at 10:12

## 2023-12-11 RX ADMIN — PROPOFOL 50 MG: 10 INJECTION, EMULSION INTRAVENOUS at 11:12

## 2023-12-11 NOTE — OP NOTE
Surgery Date: 12/11/2023      Surgeon(s) and Role:     * Micaela Poole MD - Primary     * Andrew Houston MD - Resident - Assisting           Pre-op Diagnosis:  Squamous cell carcinoma of base of tongue [C01]     Post-op Diagnosis:  Post-Op Diagnosis Codes:     * Squamous cell carcinoma of base of tongue [C01]     Procedure(s) (LRB):  LARYNGOSCOPY, DIRECT, WITH BRONCHOSCOPY (N/A)     Anesthesia: General     Operative Findings: direct laryngoscopy and biopsy of right tongue base    Indication for Surgery: Mr. 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 Now with concerning R BOT lesion. No noted keya disease. PET/CT with increased uptake in right BOT. Plan for DL/Biopsy in OR. Risks, benefits, and alternatives discussed with the patient and he is in agreement with the plan.     Description of Surgery: Patient taken to the operating room and placed under general anesthesia. The patient was rotated 90 degrees to face the operating surgeon. The mouthguard was inserted and the Dedo laryngoscope was used to perform the laryngoscopy, together with the 0-degree telescope. The BOT, vallecula, posterior pharynx, lateral pharyngeal walls, epiglottis, FVC, TVC, and subglottis were examined. An exophytic mass of the right tongue base with central ulceration was seen and biopsied multiple times and sent for permanent pathology. Hemostasis is achieved with afrin-soaked pledgets. The scope, pledgets and mouthguard were removed. He was then extubated and taken to the recovery room.    Complications: None     Estimated Blood Loss: 5 ml         Specimens: Right base of tongue

## 2023-12-11 NOTE — TRANSFER OF CARE
Anesthesia Transfer of Care Note    Patient: Timothy GREENE Rudolph    Procedure(s) Performed: Procedure(s) (LRB):  LARYNGOSCOPY, DIRECT, WITH BRONCHOSCOPY (N/A)    Patient location: PACU    Anesthesia Type: epidural    Transport from OR: Transported from OR on 6-10 L/min O2 by face mask with adequate spontaneous ventilation    Post pain: adequate analgesia    Post assessment: no apparent anesthetic complications    Post vital signs: stable    Level of consciousness: awake    Nausea/Vomiting: no nausea/vomiting    Complications: none    Transfer of care protocol was followed      Last vitals: Visit Vitals  BP (!) 156/79 (BP Location: Right arm, Patient Position: Lying)   Pulse 70   Temp 36.3 °C (97.3 °F) (Temporal)   Resp 18   Ht 6' (1.829 m)   Wt 81.2 kg (179 lb)   SpO2 100%   BMI 24.28 kg/m²

## 2023-12-11 NOTE — PLAN OF CARE
Patient discharged to home via wheelchair, escorted by [facility] transport. Pt alert and talkative, vitals stable on room air, tolerating PO intake. Discharge instructions (written and verbal) and follow-up information given to patient who verbalized understanding, as well as a readiness for discharge. C contact info provided for additional questions following discharge. MARINE

## 2023-12-11 NOTE — DISCHARGE INSTRUCTIONS
DO NOT CALL [facility] ON CALL FOR POSTOPERATIVE PROBLEMS. CALL THE  [phone] AND ASK FOR ENT RESIDENT ON CALL.    Full liquid and soft diet and advance as tolerated  Call for pain or discomfort not relieved by pain medication  Call for redness, swelling, drainage, pus like drainage or greenish drainage, signs of infection, fever, increase in bleeding, or pain

## 2023-12-11 NOTE — ANESTHESIA PROCEDURE NOTES
Intubation    Date/Time: 12/11/2023 10:58 AM    Performed by: Ariella Martinez MD  Authorized by: Ben Gillis MD    Intubation:     Induction:  Intravenous    Intubated:  Postinduction    Mask Ventilation:  Easy mask    Attempts:  1    Attempted By:  Student    Method of Intubation:  Video laryngoscopy    Blade:  Angel 3    Laryngeal View Grade: Grade I - full view of cords      Difficult Airway Encountered?: No      Complications:  None    Airway Device:  Oral endotracheal tube    Airway Device Size:  7.0    Style/Cuff Inflation:  Cuffed (inflated to minimal occlusive pressure)    Secured at:  The lips    Placement Verified By:  Capnometry and Fiber optic visualization    Complicating Factors:  None    Findings Post-Intubation:  BS equal bilateral and atraumatic/condition of teeth unchanged

## 2023-12-11 NOTE — ANESTHESIA POSTPROCEDURE EVALUATION
Anesthesia Post Evaluation    Patient: Timothy Galdamez    Procedure(s) Performed: Procedure(s) (LRB):  LARYNGOSCOPY, DIRECT, WITH BRONCHOSCOPY (N/A)    Final Anesthesia Type: general      Patient location during evaluation: PACU  Patient participation: Yes- Able to Participate  Level of consciousness: awake and alert and oriented  Post-procedure vital signs: reviewed and stable  Pain management: adequate  Airway patency: patent    PONV status at discharge: No PONV  Anesthetic complications: no      Cardiovascular status: blood pressure returned to baseline  Respiratory status: unassisted, spontaneous ventilation and face mask  Hydration status: euvolemic  Follow-up not needed.              Vitals Value Taken Time   /76 12/11/23 1152   Temp 37 12/11/23 1156   Pulse 68 12/11/23 1156   Resp 12 12/11/23 1156   SpO2 99 % 12/11/23 1156   Vitals shown include unvalidated device data.      No case tracking events are documented in the log.      Pain/Tsephy Score: Stephy Score: 9 (12/11/2023 11:47 AM)

## 2023-12-11 NOTE — BRIEF OP NOTE
[facility] - Surgery ([facility])  Brief Operative Note    Surgery Date: 12/11/2023     Surgeon(s) and Role:     * Yunior Poole MD - Primary     * Andrew Houston MD - Resident - Assisting        Pre-op Diagnosis:  Squamous cell carcinoma of base of tongue [C01]    Post-op Diagnosis:  Post-Op Diagnosis Codes:     * Squamous cell carcinoma of base of tongue [C01]    Procedure(s) (LRB):  LARYNGOSCOPY, DIRECT, WITH BRONCHOSCOPY (N/A)    Anesthesia: General    Operative Findings: direct laryngoscopy and biopsy of right tongue base    Estimated Blood Loss: 5 ml         Specimens:   Specimen (24h ago, onward)       Start     Ordered    12/11/23 1117  Specimen to Pathology, Surgery ENT  Once        Comments: Pre-op Diagnosis: Squamous cell carcinoma of base of tongue [C01]Procedure(s):LARYNGOSCOPY, DIRECT, WITH BRONCHOSCOPY Number of specimens: 1Name of specimens: 1. Right base of tongue -Perm     References:    Click here for ordering Quick Tip   Question Answer Comment   Procedure Type: ENT    Which provider would you like to cc? YUNIOR POOLE    Release to patient Immediate        12/11/23 1119                      Discharge Note    OUTCOME: Patient tolerated treatment/procedure well without complication and is now ready for discharge.    DISPOSITION: Home or Self Care    FINAL DIAGNOSIS:  Oropharyngeal mass    FOLLOWUP: In clinic    DISCHARGE INSTRUCTIONS:    Discharge Procedure Orders   Diet Adult Regular     No dressing needed     Activity as tolerated

## 2023-12-11 NOTE — H&P
To OR for direct laryngoscopy and biopsy. Same day surgery.    68 y.o. male presents with history of right BOT squamous cell carcinoma. Treated at [facility] in 2015 with radiation. Did surveillance for 5 years without recurrence. Now here for recent ulcers under his tongue which are now resolved. Also with neck spasms and longstanding history of dysphagia with feeling of food getting stuck in his esophagus. He has not had a previous dilation. No current throat or ear pain.     Here for evaluation after incidental finding of right BOT mass on CTA neck for subclavian artery stenosis. No new throat pain, ear pain, hemoptysis or voice changes.      Review of Systems     Constitutional: Negative for fatigue and unexpected weight change.   HENT: per HPI.  Eyes: Negative for visual disturbance.   Respiratory: Negative for shortness of breath, hemoptysis  Cardiovascular: Negative for chest pain and palpitations.   Genitourinary: Negative for dysuria and difficulty urinating.   Musculoskeletal: Negative for decreased ROM, back pain.   Skin: Negative for rash, sunburn, itching.   Neurological: Negative for dizziness and seizures.   Hematological: Negative for adenopathy. Does not bruise/bleed easily.   Psychiatric/Behavioral: Negative for agitation. The patient is not nervous/anxious.   Endocrine: Negative for rapid weight loss/weight gain, heat/cold intolerance.     Past Medical History:   Diagnosis Date    Hyperlipidemia        Past Surgical History:   Procedure Laterality Date    ANKLE SURGERY      L ankle    BIOPSY OF TISSUE OF NECK Left 2013    COLONOSCOPY N/A 08/21/2017    Procedure: COLONOSCOPY;  Surgeon: Danny Jane MD;  Location: [address]);  Service: Endoscopy;  Laterality: N/A;  Do not cancel this order    ESOPHAGOGASTRODUODENOSCOPY N/A 9/18/2023    Procedure: EGD (ESOPHAGOGASTRODUODENOSCOPY);  Surgeon: Basilia Villavicencio MD;  Location: [facility] ENDOSCOPY;  Service: Gastroenterology;  Laterality: N/A;  9.13  instr sent via portal [facility]  pre call complete, stated he would have a ride -    TONSILLECTOMY      TUMOR REMOVAL Right 2015    at [facility]    VASECTOMY         family history includes Arthritis in his mother; COPD in his brother.    Pt  reports that he has quit smoking. He does not have any smokeless tobacco history on file. He reports current alcohol use of about 3.0 standard drinks of alcohol per week. He reports that he does not use drugs.    Review of patient's allergies indicates:  No Known Allergies     Physical Exam    Vitals:    11/29/23 0959   BP: 132/87   Pulse: (!) 59     Body mass index is 24.37 kg/m².    Physical Exam  Vitals and nursing note reviewed.   Constitutional:       General: He is not in acute distress.     Appearance: He is well-developed. He is not diaphoretic.   HENT:      Head: Normocephalic and atraumatic.      Right Ear: Hearing and external ear normal. No decreased hearing noted.      Left Ear: Hearing and external ear normal. No decreased hearing noted.      Nose: Nose normal.      Mouth/Throat:      Mouth: Mucous membranes are moist. No oral lesions.      Tongue: No lesions.      Pharynx: Oropharynx is clear. Uvula midline.   Eyes:      General: Lids are normal.         Right eye: No discharge.         Left eye: No discharge.      Conjunctiva/sclera: Conjunctivae normal.      Pupils: Pupils are equal, round, and reactive to light.   Neck:      Thyroid: No thyroid mass or thyromegaly.      Trachea: Trachea and phonation normal. No tracheal tenderness or tracheal deviation.   Cardiovascular:      Heart sounds: Normal heart sounds.   Pulmonary:      Breath sounds: Normal breath sounds. No stridor.   Abdominal:      Palpations: Abdomen is soft.   Musculoskeletal:      Cervical back: Normal range of motion and neck supple. No edema or erythema.   Lymphadenopathy:      Cervical: No cervical adenopathy.   Skin:     General: Skin is warm and dry.      Coloration: Skin is not pale.      Findings:  No erythema or rash.   Neurological:      Mental Status: He is alert and oriented to person, place, and time.        Procedure: Flexible laryngoscopy  In order to fully examine the upper aerodigestive tract, including the larynx, in a patient with a hyperactive gag reflex, flexible endoscopy is required.  After explaining the procedure and obtaining verbal consent, a timeout was performed with the patient's participation according to the universal protocol. Both nasal cavities were anesthetized with 4% Xylocaine spray mixed with John-Synephrine. The flexible laryngoscope was inserted into the nasal cavity and advanced to visualize the nasal cavity, nasopharynx, the posterior oropharynx, hypopharynx, and the endolarynx with the above findings noted. The scope was removed and the procedure terminated. The patient tolerated this procedure well without apparent complication.      Nasopharynx - the torus is clear. There are no lesions of the posterior wall.   Oropharynx - Right BOT with exophytic erythema with overlying ulcerative change.  Hypopharynx - there are no lesions of the pyriform sinuses or postcricoid region   Larynx - there are no lesions of the supraglottic or glottic larynx. Vocal fold mobility is normal with complete closure.      CTA 11/13/23:  Postoperative changes right neck.  The cystic/solid lesion right neck prior study of 2014 is no longer visualized.  Mild asymmetry with mild medial deviation of the right vocal cord.  There is an area of focal nodular enhancement at the base of the tongue on the right measuring approximately 1.0 x 1.1 x 0.9 cm (axial image 232 series 2 and sagittal 104 series 601).     Assessment     1. Squamous cell carcinoma of base of tongue      Plan  In summary, Mr. 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 Now with concerning R BOT lesion. No noted keya disease. PET/CT ordered. Plan for DL/Biopsy in OR after PET complete.  Risks, benefits, and alternatives discussed with the patient and he is in agreement with the plan. Plan for 12/11/23.

## 2023-12-18 LAB
COMMENT: NORMAL
FINAL PATHOLOGIC DIAGNOSIS: NORMAL
GROSS: NORMAL
Lab: NORMAL
MICROSCOPIC EXAM: NORMAL

## 2023-12-18 RX ORDER — AMLODIPINE BESYLATE 2.5 MG/1
2.5 TABLET ORAL DAILY
Qty: 90 TABLET | Refills: 3 | Status: ON HOLD | OUTPATIENT
Start: 2023-12-18 | End: 2024-03-08 | Stop reason: HOSPADM

## 2023-12-19 DIAGNOSIS — C02.9 SQUAMOUS CELL CARCINOMA OF TONGUE: Primary | ICD-10-CM

## 2023-12-20 ENCOUNTER — ON-DEMAND VIRTUAL (OUTPATIENT)
Dept: URGENT CARE | Facility: CLINIC | Age: 68
End: 2023-12-20
Payer: MEDICARE

## 2023-12-20 DIAGNOSIS — M54.50 ACUTE MIDLINE LOW BACK PAIN WITHOUT SCIATICA: Primary | ICD-10-CM

## 2023-12-20 PROCEDURE — 99212 OFFICE O/P EST SF 10 MIN: CPT | Mod: 95,,, | Performed by: FAMILY MEDICINE

## 2023-12-20 PROCEDURE — 99212 PR OFFICE/OUTPT VISIT, EST, LEVL II, 10-19 MIN: ICD-10-PCS | Mod: 95,,, | Performed by: FAMILY MEDICINE

## 2023-12-20 RX ORDER — CYCLOBENZAPRINE HCL 5 MG
5 TABLET ORAL 3 TIMES DAILY PRN
Qty: 6 TABLET | Refills: 0 | Status: SHIPPED | OUTPATIENT
Start: 2023-12-20 | End: 2023-12-30

## 2023-12-20 NOTE — PROGRESS NOTES
Subjective:      Patient ID: Timothy Galdamez is a 68 y.o. male.    Vitals:  vitals were not taken for this visit.     Chief Complaint: Back Pain (Back pain)      Visit Type: TELE AUDIOVISUAL    Present with the patient at the time of consultation: TELEMED PRESENT WITH PATIENT: None    Past Medical History:   Diagnosis Date    Hyperlipidemia      Past Surgical History:   Procedure Laterality Date    ANKLE SURGERY      L ankle    BIOPSY OF TISSUE OF NECK Left 2013    COLONOSCOPY N/A 08/21/2017    Procedure: COLONOSCOPY;  Surgeon: Danny Jane MD;  Location: [facility] (4TH FLR);  Service: Endoscopy;  Laterality: N/A;  Do not cancel this order    DIRECT LARYNGOBRONCHOSCOPY N/A 12/11/2023    Procedure: LARYNGOSCOPY, DIRECT, WITH BRONCHOSCOPY;  Surgeon: Micaela Poole MD;  Location: [facility] OR 2ND FLR;  Service: ENT;  Laterality: N/A;    ESOPHAGOGASTRODUODENOSCOPY N/A 9/18/2023    Procedure: EGD (ESOPHAGOGASTRODUODENOSCOPY);  Surgeon: Basilia Villavicencio MD;  Location: [facility] ENDOSCOPY;  Service: Gastroenterology;  Laterality: N/A;  9.13 instr sent via portal [facility]  pre call complete, stated he would have a ride -HH    TONSILLECTOMY      TUMOR REMOVAL Right 2015    at     VASECTOMY       Review of patient's allergies indicates:  No Known Allergies  Current Outpatient Medications on File Prior to Visit   Medication Sig Dispense Refill    amLODIPine (NORVASC) 2.5 MG tablet Take 1 tablet (2.5 mg total) by mouth once daily. 90 tablet 3    ascorbic acid, vitamin C, (VITAMIN C) 100 MG tablet Take 100 mg by mouth once daily.      aspirin (ECOTRIN) 81 MG EC tablet Take 81 mg by mouth once daily.      calcium carbonate (OS-HERMES) 600 mg calcium (1,500 mg) Tab Take 600 mg by mouth once daily.      ergocalciferol, vitamin D2, (VITAMIN D ORAL) Take 1 tablet by mouth once daily.      ezetimibe (ZETIA) 10 mg tablet Take 1 tablet (10 mg total) by mouth once daily. 90 tablet 3    HYDROcodone-acetaminophen (NORCO) 5-325 mg per tablet  Take 1 tablet by mouth every 6 (six) hours as needed for Pain. 5 tablet 0    multivitamin (ONE DAILY MULTIVITAMIN) per tablet Take 1 tablet by mouth once daily.      NON FORMULARY MEDICATION Prevegan once daily      omega-3/dha/epa/dpa/fish oil (OMEGA-3 2100 ORAL) Take 1 capsule by mouth once daily.      omeprazole (PRILOSEC) 40 MG capsule Take 1 capsule (40 mg total) by mouth once daily. 30 capsule 11    ondansetron (ZOFRAN-ODT) 4 MG TbDL Dissolve 1 tablet (4 mg total) by mouth every 8 (eight) hours as needed (nausea). 10 tablet 0    rosuvastatin (CRESTOR) 20 MG tablet TAKE 1 TABLET ONE TIME DAILY 90 tablet 3    vitamin E 100 UNIT capsule Take 100 Units by mouth once daily.       No current facility-administered medications on file prior to visit.     Family History   Problem Relation Age of Onset    Arthritis Mother     COPD Brother     Psoriasis Neg Hx     Eczema Neg Hx        Medications Ordered                [facility] - [place], [place] - [address]   [address] [place] [address]    Telephone: [phone]   Fax: [phone]   Hours: Not open 24 hours                         E-Prescribed (1 of 1)              cyclobenzaprine (FLEXERIL) 5 MG tablet    Sig: Take 1 tablet (5 mg total) by mouth 3 (three) times daily as needed for Muscle spasms.       Start: 12/20/23     Quantity: 6 tablet Refills: 0                           Ohs Peq Odvv Intake    12/20/2023  6:25 AM CST - Filed by Patient   Describe your reason for todays visit Back pain   What is your current physical address in the event of a medical emergency? [address] [place] [address]   Are you able to take your vital signs? Yes   Systolic Blood Pressure: 129   Diastolic Blood Pressure: 85   Weight: 175   Height: 72   Pulse: 60   Temperature: 98.7   Respiration rate:    Pulse Oxygen:    Please attach any relevant images or files          Onset of symptoms was a few days ago.    Back Pain  Pertinent  negatives include no bladder incontinence.       Genitourinary:  Negative for bladder incontinence and bed wetting.   Musculoskeletal:  Positive for back pain.        Objective:   The physical exam was conducted virtually.  Physical Exam   Constitutional: He is oriented to person, place, and time. He does not appear ill. No distress.   HENT:   Head: Normocephalic and atraumatic.   Pulmonary/Chest: Effort normal. No respiratory distress.   Neurological: He is alert and oriented to person, place, and time.   Psychiatric: Mood normal.       Assessment:     1. Acute midline low back pain without sciatica        Plan:       Acute midline low back pain without sciatica  -     cyclobenzaprine (FLEXERIL) 5 MG tablet; Take 1 tablet (5 mg total) by mouth 3 (three) times daily as needed for Muscle spasms.  Dispense: 6 tablet; Refill: 0

## 2023-12-27 LAB
DNA RANGE(S) EXAMINED NAR: NORMAL
GENE DIS ANL INTERP-IMP: POSITIVE
GENE DIS ASSESSED: NORMAL
GENE MUT TESTED BLD/T: 2.6 M/MB
MSI CA SPEC-IMP: NORMAL
PD-L1 BY 22C3 TISS IMSTN DOC: POSITIVE
REASON FOR STUDY: NORMAL
TEMPUS FUSIONADDENDUM: NORMAL
TEMPUS LCA: NORMAL
TEMPUS PD-L1 (22C3) COMBINED POSITIVE SCORE: 15
TEMPUS PD-L1 (22C3) TUMOR PROPORTION SCORE: 10 %
TEMPUS PORTAL: NORMAL
TEMPUS TRIAL1: NORMAL
TEMPUS TRIAL2: NORMAL
TEMPUS TRIALCOUNT: 2

## 2023-12-29 ENCOUNTER — PATIENT MESSAGE (OUTPATIENT)
Dept: OTOLARYNGOLOGY | Facility: CLINIC | Age: 68
End: 2023-12-29
Payer: MEDICARE

## 2024-01-03 ENCOUNTER — OFFICE VISIT (OUTPATIENT)
Dept: OTOLARYNGOLOGY | Facility: CLINIC | Age: 69
End: 2024-01-03
Payer: MEDICARE

## 2024-01-03 DIAGNOSIS — C02.9 TONGUE CANCER: Primary | ICD-10-CM

## 2024-01-03 PROCEDURE — 99499 UNLISTED E&M SERVICE: CPT | Mod: 95,,, | Performed by: OTOLARYNGOLOGY

## 2024-01-04 ENCOUNTER — OFFICE VISIT (OUTPATIENT)
Dept: OTOLARYNGOLOGY | Facility: CLINIC | Age: 69
End: 2024-01-04
Payer: MEDICARE

## 2024-01-04 VITALS
DIASTOLIC BLOOD PRESSURE: 85 MMHG | SYSTOLIC BLOOD PRESSURE: 146 MMHG | HEART RATE: 60 BPM | BODY MASS INDEX: 24.01 KG/M2 | WEIGHT: 177 LBS

## 2024-01-04 DIAGNOSIS — C02.9 TONGUE CANCER: Primary | ICD-10-CM

## 2024-01-04 PROCEDURE — 31575 DIAGNOSTIC LARYNGOSCOPY: CPT | Mod: S$GLB,,, | Performed by: OTOLARYNGOLOGY

## 2024-01-04 PROCEDURE — 3288F FALL RISK ASSESSMENT DOCD: CPT | Mod: CPTII,S$GLB,, | Performed by: OTOLARYNGOLOGY

## 2024-01-04 PROCEDURE — 3079F DIAST BP 80-89 MM HG: CPT | Mod: CPTII,S$GLB,, | Performed by: OTOLARYNGOLOGY

## 2024-01-04 PROCEDURE — 99214 OFFICE O/P EST MOD 30 MIN: CPT | Mod: 25,S$GLB,, | Performed by: OTOLARYNGOLOGY

## 2024-01-04 PROCEDURE — 3077F SYST BP >= 140 MM HG: CPT | Mod: CPTII,S$GLB,, | Performed by: OTOLARYNGOLOGY

## 2024-01-04 PROCEDURE — 3008F BODY MASS INDEX DOCD: CPT | Mod: CPTII,S$GLB,, | Performed by: OTOLARYNGOLOGY

## 2024-01-04 PROCEDURE — 99999 PR PBB SHADOW E&M-EST. PATIENT-LVL III: CPT | Mod: PBBFAC,,, | Performed by: OTOLARYNGOLOGY

## 2024-01-04 PROCEDURE — 1126F AMNT PAIN NOTED NONE PRSNT: CPT | Mod: CPTII,S$GLB,, | Performed by: OTOLARYNGOLOGY

## 2024-01-04 PROCEDURE — 1101F PT FALLS ASSESS-DOCD LE1/YR: CPT | Mod: CPTII,S$GLB,, | Performed by: OTOLARYNGOLOGY

## 2024-01-05 ENCOUNTER — TELEPHONE (OUTPATIENT)
Dept: SPEECH THERAPY | Facility: HOSPITAL | Age: 69
End: 2024-01-05
Payer: MEDICARE

## 2024-01-05 RX ORDER — LIDOCAINE HYDROCHLORIDE 10 MG/ML
1 INJECTION, SOLUTION EPIDURAL; INFILTRATION; INTRACAUDAL; PERINEURAL ONCE
Status: CANCELLED | OUTPATIENT
Start: 2024-01-05 | End: 2024-01-05

## 2024-01-05 RX ORDER — SODIUM CHLORIDE 0.9 % (FLUSH) 0.9 %
10 SYRINGE (ML) INJECTION
Status: CANCELLED | OUTPATIENT
Start: 2024-01-05

## 2024-01-05 NOTE — ASSESSMENT & PLAN NOTE
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  This most recent tumor appears to be P 16 negative and is presumably a 2nd primary.  After reviewing his scans and examining him, I do not feel that transoral robotic surgery is feasible given the medial extent of the tumor and also the need for reconstruction.  I recommended that we proceed to the operating room for right versus bilateral neck dissection, partial glossectomy, tracheostomy and reconstruction with an anterolateral thigh free flap.  I will also arrange for general surgery to place a gastrostomy tube at the time of surgery since I will not allow him deep by mouth for least 1 month after surgery.  He is amenable to this plan.  I will arrange for him to be seen by speech language pathology prior to surgery.  Surgery is scheduled on January 30, 2024 in conjunction with Dr. Poole.

## 2024-01-05 NOTE — TELEPHONE ENCOUNTER
Edwina pt to schedule pre-op appt 1/24@8am----- Message from Jeferson Ventura MD sent at 1/5/2024  2:10 PM CST -----  Please have him see Mary Ellen for pre-surgery evaluation. Thanks.

## 2024-01-05 NOTE — PROGRESS NOTES
Chief Complaint   Patient presents with    Other     Discuss surgery       HPI   68 y.o. male presents for evaluation of a newly diagnosed squamous cell carcinoma of the right base of tongue.  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  He underwent right sided cervical lymph node biopsy which revealed metastatic disease in the right neck and subsequent laryngoscopy with biopsy of the right base of tongue.  He was treated with radiation at [facility] in 2015.  Interestingly, his most recent biopsy revealed P 16 negative squamous cell carcinoma.    He was recently seen by Dr. Poole.  He underwent PET-CT scan which revealed no evidence of metastatic disease.  He presents today to discuss his surgical options particularly as they pertain to reconstruction.    Review of Systems   Constitutional: Negative for fatigue and unexpected weight change.   HENT: Per HPI.  Eyes: Negative for visual disturbance.   Respiratory: Negative for shortness of breath, hemoptysis   Cardiovascular: Negative for chest pain and palpitations.   Musculoskeletal: Negative for decreased ROM, back pain.   Skin: Negative for rash, sunburn, itching.   Neurological: Negative for dizziness and seizures.   Hematological: Negative for adenopathy. Does not bruise/bleed easily.   Endocrine: Negative for rapid weight loss/weight gain, heat/cold intolerance.     Past Medical History   Patient Active Problem List   Diagnosis    Primary insomnia    Hyperlipidemia    Tongue cancer    Rotator cuff syndrome of right shoulder    Memory change    RLS (restless legs syndrome)    Chronic right-sided low back pain    Ectatic aorta    Subclavian artery stenosis, left    Aortic atherosclerosis    Coronary artery calcification    Oropharyngeal mass           Past Surgical History   Past Surgical History:   Procedure Laterality Date    ANKLE SURGERY       L ankle    BIOPSY OF TISSUE OF NECK Left 2013    COLONOSCOPY N/A 08/21/2017    Procedure: COLONOSCOPY;  Surgeon: Danny Jane MD;  Location: [facility] ENDO (4TH FLR);  Service: Endoscopy;  Laterality: N/A;  Do not cancel this order    DIRECT LARYNGOBRONCHOSCOPY N/A 12/11/2023    Procedure: LARYNGOSCOPY, DIRECT, WITH BRONCHOSCOPY;  Surgeon: Micaela Poole MD;  Location: [facility] OR 2ND FLR;  Service: ENT;  Laterality: N/A;    ESOPHAGOGASTRODUODENOSCOPY N/A 9/18/2023    Procedure: EGD (ESOPHAGOGASTRODUODENOSCOPY);  Surgeon: Basilia Villavicencio MD;  Location: [facility] ENDOSCOPY;  Service: Gastroenterology;  Laterality: N/A;  9.13 instr sent via portal [facility]  pre call complete, stated he would have a ride -    TONSILLECTOMY      TUMOR REMOVAL Right 2015    at     VASECTOMY           Family History   Family History   Problem Relation Age of Onset    Arthritis Mother     COPD Brother     Psoriasis Neg Hx     Eczema Neg Hx            Social History   .  Social History     Socioeconomic History    Marital status:    Tobacco Use    Smoking status: Former   Substance and Sexual Activity    Alcohol use: Yes     Alcohol/week: 3.0 standard drinks of alcohol     Types: 3 Cans of beer per week     Comment: 4-5 x week    Drug use: No   Social History Narrative    Single, CPA, no tobacco, 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                 Social Determinants of Health     Financial Resource Strain: Low Risk  (11/7/2023)    Overall Financial Resource Strain (CARDIA)     Difficulty of Paying Living Expenses: Not hard at all   Food Insecurity: No Food Insecurity (11/7/2023)    Hunger Vital Sign     Worried About Running Out of Food in the Last Year: Never true     Ran Out of Food in the Last Year: Never true   Transportation Needs: No Transportation Needs (11/7/2023)    PRAPARE - Transportation     Lack of Transportation (Medical): No     Lack of Transportation (Non-Medical): No   Physical Activity:  Insufficiently Active (11/7/2023)    Exercise Vital Sign     Days of Exercise per Week: 4 days     Minutes of Exercise per Session: 30 min   Stress: Stress Concern Present (11/7/2023)    [employer] [employer] - Occupational Stress Questionnaire     Feeling of Stress : Rather much   Social Connections: Unknown (11/7/2023)    Social Connection and Isolation Panel [NHANES]     Frequency of Communication with Friends and Family: Once a week     Frequency of Social Gatherings with Friends and Family: Patient declined     Active Member of Clubs or Organizations: Yes     Attends Club or Organization Meetings: More than 4 times per year     Marital Status:    Housing Stability: Low Risk  (11/7/2023)    Housing Stability Vital Sign     Unable to Pay for Housing in the Last Year: No     Number of Places Lived in the Last Year: 1     Unstable Housing in the Last Year: No         Allergies   Review of patient's allergies indicates:  No Known Allergies        Physical Exam     Vitals:    01/04/24 0835   BP: (!) 146/85   Pulse: 60         Body mass index is 24.01 kg/m².      General: AOx3, NAD   Respiratory:  Symmetric chest rise, normal effort  Nose: No gross nasal septal deviation. Inferior Turbinates WNL bilaterally. No septal perforation. No masses/lesions.   Oral Cavity:  Oral Tongue mobile, no lesions noted. Hard Palate WNL. No buccal or FOM lesions.  Oropharynx:  No masses/lesions of the posterior pharyngeal wall. Tonsillar fossa without lesions. Soft palate without masses. Midline uvula.  Palpable mass right base of tongue extending to the midline.  Neck:  Well-healed right neck scar.  No cervical lymphadenopathy, thyromegaly or thyroid nodules.  Normal range of motion.    Face: House Brackmann I bilaterally.     Flex Naso Emilee Hypo Procedures #2    Procedure:  Diagnostic flexible nasopharyngoscopy, laryngoscopy and hypopharyngoscopy:    Routine preparation with local atomizer with 1%  neosynephrine/pontocaine with customary flexible endoscope.    Nasopharynx:  No lesions.   Mucosa:  No lesions.   Adenoids:  Present.  Posterior Choanae:  Patent.  Eustachian Tubes:  Patent.  Posterior pharynx:  No lesions.  Larynx/hypopharynx:   Epiglottis:  No lesions, without edema.   AE Folds:  No lesions.   Vocal cords:  No polyps, nodules, ulcers or lesions.   Mobility:  Equal and normal bilateral.   Hypopharynx:  No lesions.   Piriform sinus:  No pooling, no lesions.   Post Cricoid:  No erythema, no edema.    See photo.      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  This most recent tumor appears to be P 16 negative and is presumably a 2nd primary.  After reviewing his scans and examining him, I do not feel that transoral robotic surgery is feasible given the medial extent of the tumor and also the need for reconstruction.  I recommended that we proceed to the operating room for right versus bilateral neck dissection, partial glossectomy, tracheostomy and reconstruction with an anterolateral thigh free flap.  I will also arrange for general surgery to place a gastrostomy tube at the time of surgery since I will not allow him deep by mouth for least 1 month after surgery.  He is amenable to this plan.  I will arrange for him to be seen by speech language pathology prior to surgery.  Surgery is scheduled on January 30, 2024 in conjunction with Dr. Poole.         Relevant Orders    Case Request Operating Room: GLOSSECTOMY, PARTIAL, DISSECTION, NECK, TRACHEOTOMY, CREATION, FREE FLAP (Completed)    Ambulatory referral/consult to Speech Therapy

## 2024-01-08 ENCOUNTER — TELEPHONE (OUTPATIENT)
Dept: CARDIOLOGY | Facility: CLINIC | Age: 69
End: 2024-01-08
Payer: MEDICARE

## 2024-01-08 DIAGNOSIS — Z01.818 PRE-OP TESTING: Primary | ICD-10-CM

## 2024-01-08 NOTE — ANESTHESIA PAT ROS NOTE
01/08/2024  Timothy Galdamez is a 68 y.o., male.      Pre-op Assessment          Review of Systems           Anesthesia Assessment: Preoperative EQUATION    Planned Procedure: Procedure(s) (LRB):  GLOSSECTOMY, PARTIAL (Right)  DISSECTION, NECK (Right)  TRACHEOTOMY (N/A)  CREATION, FREE FLAP (N/A)  Requested Anesthesia Type:General  Surgeon: Micaela Poole MD  Service: ENT  Known or anticipated Date of Surgery:1/30/2024    Surgeon notes: reviewed    Electronic QUestionnaire Assessment completed via nurse interview with patient.        Triage considerations:     The patient has no apparent active cardiac condition (No unstable coronary Syndrome such as severe unstable angina or recent [<1 month] myocardial infarction, decompensated CHF, severe valvular   disease or significant arrhythmia)    Previous anesthesia records:GETA and No problems    Airway:  Mallampati: III / II  Mouth Opening: Normal  Tongue: Normal  Neck ROM: Normal ROM      Intubation     Date/Time: 12/11/2023 10:58 AM     Performed by: Ariella Martinez MD  Authorized by: Ben Gillis MD    Intubation:     Induction:  Intravenous    Intubated:  Postinduction    Mask Ventilation:  Easy mask    Attempts:  1    Attempted By:  Student    Method of Intubation:  Video laryngoscopy    Blade:  Angel 3    Laryngeal View Grade: Grade I - full view of cords      Difficult Airway Encountered?: No      Complications:  None    Airway Device:  Oral endotracheal tube    Airway Device Size:  7.0    Style/Cuff Inflation:  Cuffed (inflated to minimal occlusive pressure)    Secured at:  The lips    Placement Verified By:  Capnometry and Fiber optic visualization    Complicating Factors:  None    Findings Post-Intubation:  BS equal bilateral and atraumatic/condition of teeth unchanged          Last PCP note: within [facility]   Subspecialty notes:  Cardiology: General, ENT, Gastroenterology    Other important co-morbidities: GERD, HLD, HTN, and TONGUE CANCER, HX OF NECK RADIATION       Tests already available:  Available tests,  within 3 months , within [facility] .     12/7/23  PET CT    11/13/23  CTA NECK    11/6/23  CREATININE, ECHO-EF 55-60%  Summary         Left Ventricle: The left ventricle is normal in size. Ventricular mass is normal. Normal wall thickness. Normal wall motion. There is normal systolic function with a visually estimated ejection fraction of 55 - 60%. There is normal diastolic function.    Right Ventricle: Normal right ventricular cavity size. Wall thickness is normal. Right ventricle wall motion  is normal. Systolic function is normal.    Pulmonary Artery: The estimated pulmonary artery systolic pressure is 24 mmHg.    IVC/SVC: Normal venous pressure at 3 mmHg.       10/19/23  CV ULTRASOUND ARTERIAL ARMS BILATERAL, CV ULTRASOUND BILATERAL DOPPLER CAROTID    Conclusion    Right subclavian artery with elevated velocity suggestive of greater than 50% stenosis.  Recommend further evaluation with CTA or MRA if clinically indicated.    Right wrist brachial index 1.0, is normal.    Left wrist brachial index 1.16, is normal.    Conclusion     There is less than 50% right Internal Carotid Stenosis.    There is less than 50% left Internal Carotid Stenosis.    There is antegrade flow in the vertebral arteries bilaterally..      10/18/23  CT CALCIUM SCORING    9/22/23  EKG    9/20/23  TRENTON  Conclusion    Resting TRENTON's and waveforms are normal.    Left exercise TRENTON is mildly reduced consistent with mild PAD.    Right exercise TRENTON is normal.               Instructions given. (See in Nurse's note)    Optimization:  Anesthesia Preop Clinic Assessment  Indicated    Medical Opinion Indicated [facility]       Sub-specialist consult indicated:   [facility]      Plan:    Testing:  BMP, EKG, Hematocrit, Hemoglobin, and T&S   Pre-anesthesia  visit        Visit focus: concerns in complex and/or prolonged anesthesia, airway concerns     Consultation:[facility] Perioperative Hospitalist     Patient  has previously scheduled Medical Appointment:1/24    Navigation: Tests Scheduled.              Consults scheduled.             Results will be tracked by Preop Clinic.    Irasema Graf MD Buckner, Megan, DAMON; Michelle Landers LPN  Caller: Unspecified (2 days ago, 11:48 AM)  Patient is cleared for surgery and anesthesia. He has a right subclavian artery stenosis therefore BPs on that side will be lower. He can hold aspirin for 5 days prior to surgery.  Irasema Graf  [facility] [place]    Medical optimization 1/24/24:  Patient is optimized for surgery.  Optimized per cardiology (Lesia) 1/18/24; holding ASA 81 mg .  Thania Monroy NP  Perioperative Medicine  [facility]

## 2024-01-08 NOTE — TELEPHONE ENCOUNTER
----- Message from Georgie Falk RN sent at 1/8/2024  1:23 PM CST -----  Dr. Graf,         This patient is scheduled for surgery (partial glossectomy, neck dissection, tracheotomy, free flap creation) on 1/30/2024 with Dr. Poole. This will last approximately 510 minutes under general anesthesia. Requesting cardiac optimization prior to this procedure along with medication (aspirin) instructions. Please advise. Will await your response.                                        Thank you,                                                   DAMON Jacques BSN                                       [facility]

## 2024-01-09 ENCOUNTER — PATIENT MESSAGE (OUTPATIENT)
Dept: OTOLARYNGOLOGY | Facility: CLINIC | Age: 69
End: 2024-01-09
Payer: MEDICARE

## 2024-01-09 ENCOUNTER — TELEPHONE (OUTPATIENT)
Dept: PREADMISSION TESTING | Facility: HOSPITAL | Age: 69
End: 2024-01-09
Payer: MEDICARE

## 2024-01-09 NOTE — TELEPHONE ENCOUNTER
----- Message from Georgie Falk RN sent at 1/8/2024  1:22 PM CST -----  1/30 Corinne/POC/LAB/EKG

## 2024-01-18 ENCOUNTER — DOCUMENTATION ONLY (OUTPATIENT)
Dept: CARDIOLOGY | Facility: CLINIC | Age: 69
End: 2024-01-18

## 2024-01-18 ENCOUNTER — TELEPHONE (OUTPATIENT)
Dept: CARDIOLOGY | Facility: CLINIC | Age: 69
End: 2024-01-18
Payer: MEDICARE

## 2024-01-18 NOTE — TELEPHONE ENCOUNTER
----- Message from Irasema Graf MD sent at 1/18/2024  3:24 PM CST -----  Patient is cleared for surgery and anesthesia. He has a right subclavian artery stenosis therefore BPs on that side will be lower. He can hold aspirin for 5 days prior to surgery.  Irasema Graf  [facility]: [phone]     ----- Message -----  From: Georgie Falk RN  Sent: 1/16/2024  11:50 AM CST  To: Irasema Graf MD; #    Dr. Graf,     Is this patient optimized from a cardiac standpoint to proceed with 1/30/24 partial glossectomy, neck dissection, tracheotomy, and free flap creation under 510 minutes of general anesthesia? Also requesting aspirin 81mg instructions. Please advise.     Thanks,     DAMON Jacques  [facility] - Anesthesia

## 2024-01-22 NOTE — DISCHARGE INSTRUCTIONS
Your surgery has been scheduled for:_____1/30/24_____________________________________    You should report to:  ____[facility], located on the [place] side of the first floor of the           [facility] ([phone])  __X__[facility], located on the [facility] side of the            Second floor of the [facility] ([phone])  ____3rd Floor [facility] located on the [facility] side of the [facility] [phone]  ____[facility]: located at [address] [place] [address]. [address]     Please Note   Tell your doctor if you take Aspirin, products containing Aspirin, herbal medications  or blood thinners, such as Coumadin, Ticlid, or Plavix.  (Consult your provider regarding holding or stopping before surgery).  Arrange for someone to drive you home following surgery.  You will not be allowed to leave the surgical facility alone or drive yourself home following sedation and anesthesia.    Before Surgery  Stop taking all herbal medications, vitamins, and supplements 7 days prior to surgery  No Motrin/Advil (Ibuprofen) 7 days before surgery  No Aleve (Naproxen) 7 days before surgery  Stop Taking Asprin, products containing Aspirin __7___days before surgery   No Goody's/BC Powder 7 days before surgery  Refrain from drinking alcoholic beverages for 24 hours before and after surgery  Stop or limit smoking at least 24 hours prior to surgery  You may take Tylenol for pain    Night before Surgery  Do not eat or drink after midnight  Take a shower or bath (shower is recommended).  Bathe with Hibiclens soap or an antibacterial soap from the neck down.  If not supplied by your surgeon, hibiclens soap will need to be purchased over the counter in pharmacy.  Rinse soap off thoroughly.  Shampoo your hair with your regular shampoo    The Day of Surgery  Take another bath or shower with hibiclens  or any antibacterial soap, to reduce the chance of infection.  Take heart and blood pressure medications with a small sip of water, as advised by the perioperative team.  Do not take fluid pills  You may brush your teeth and rinse your mouth, but do not swallow any additional water.   Do not apply perfumes, powder, body lotions or deodorant on the day of surgery.  Nail polish should be removed.  Do not wear makeup or moisturizer  Wear comfortable clothes, such as a button front shirt and loose fitting pants.  Leave all jewelry, including body piercings, and valuables at home.    Bring any devices you will neeed after surgery such as crutches or canes.  If you have sleep apnea, please bring your CPAP machine  In the event that your physical condition changes including the onset of a cold or respiratory illness, or if you have to delay or cancel your surgery, please notify your surgeon.

## 2024-01-23 ENCOUNTER — PATIENT MESSAGE (OUTPATIENT)
Dept: PREADMISSION TESTING | Facility: HOSPITAL | Age: 69
End: 2024-01-23
Payer: MEDICARE

## 2024-01-23 ENCOUNTER — TELEPHONE (OUTPATIENT)
Dept: SPEECH THERAPY | Facility: HOSPITAL | Age: 69
End: 2024-01-23
Payer: MEDICARE

## 2024-01-23 ENCOUNTER — TELEPHONE (OUTPATIENT)
Dept: INTERNAL MEDICINE | Facility: CLINIC | Age: 69
End: 2024-01-23
Payer: MEDICARE

## 2024-01-24 ENCOUNTER — OFFICE VISIT (OUTPATIENT)
Dept: INTERNAL MEDICINE | Facility: CLINIC | Age: 69
End: 2024-01-24
Payer: MEDICARE

## 2024-01-24 ENCOUNTER — CLINICAL SUPPORT (OUTPATIENT)
Dept: SPEECH THERAPY | Facility: HOSPITAL | Age: 69
End: 2024-01-24
Attending: OTOLARYNGOLOGY
Payer: MEDICARE

## 2024-01-24 VITALS
DIASTOLIC BLOOD PRESSURE: 79 MMHG | OXYGEN SATURATION: 98 % | HEART RATE: 53 BPM | TEMPERATURE: 98 F | BODY MASS INDEX: 23.59 KG/M2 | HEIGHT: 72 IN | SYSTOLIC BLOOD PRESSURE: 135 MMHG | WEIGHT: 174.19 LBS

## 2024-01-24 DIAGNOSIS — I70.0 AORTIC ATHEROSCLEROSIS: ICD-10-CM

## 2024-01-24 DIAGNOSIS — G25.81 RLS (RESTLESS LEGS SYNDROME): ICD-10-CM

## 2024-01-24 DIAGNOSIS — Z92.3 HISTORY OF HEAD AND NECK RADIATION: ICD-10-CM

## 2024-01-24 DIAGNOSIS — C02.9 TONGUE CANCER: ICD-10-CM

## 2024-01-24 DIAGNOSIS — R74.01 ELEVATED ALANINE AMINOTRANSFERASE (ALT) LEVEL: ICD-10-CM

## 2024-01-24 DIAGNOSIS — R13.12 DYSPHAGIA, OROPHARYNGEAL: Primary | ICD-10-CM

## 2024-01-24 DIAGNOSIS — G89.29 CHRONIC RIGHT-SIDED LOW BACK PAIN, UNSPECIFIED WHETHER SCIATICA PRESENT: ICD-10-CM

## 2024-01-24 DIAGNOSIS — I10 PRIMARY HYPERTENSION: ICD-10-CM

## 2024-01-24 DIAGNOSIS — E78.00 PURE HYPERCHOLESTEROLEMIA: ICD-10-CM

## 2024-01-24 DIAGNOSIS — I25.10 CORONARY ARTERY CALCIFICATION: ICD-10-CM

## 2024-01-24 DIAGNOSIS — M54.50 CHRONIC RIGHT-SIDED LOW BACK PAIN, UNSPECIFIED WHETHER SCIATICA PRESENT: ICD-10-CM

## 2024-01-24 DIAGNOSIS — I77.1 SUBCLAVIAN ARTERY STENOSIS, LEFT: ICD-10-CM

## 2024-01-24 DIAGNOSIS — I25.84 CORONARY ARTERY CALCIFICATION: ICD-10-CM

## 2024-01-24 DIAGNOSIS — R41.3 MEMORY CHANGE: ICD-10-CM

## 2024-01-24 DIAGNOSIS — Z01.818 PREOPERATIVE EXAMINATION: Primary | ICD-10-CM

## 2024-01-24 PROCEDURE — 3075F SYST BP GE 130 - 139MM HG: CPT | Mod: CPTII,S$GLB,, | Performed by: NURSE PRACTITIONER

## 2024-01-24 PROCEDURE — 3078F DIAST BP <80 MM HG: CPT | Mod: CPTII,S$GLB,, | Performed by: NURSE PRACTITIONER

## 2024-01-24 PROCEDURE — 1126F AMNT PAIN NOTED NONE PRSNT: CPT | Mod: CPTII,S$GLB,, | Performed by: NURSE PRACTITIONER

## 2024-01-24 PROCEDURE — 3008F BODY MASS INDEX DOCD: CPT | Mod: CPTII,S$GLB,, | Performed by: NURSE PRACTITIONER

## 2024-01-24 PROCEDURE — 99999 PR PBB SHADOW E&M-EST. PATIENT-LVL V: CPT | Mod: PBBFAC,,, | Performed by: NURSE PRACTITIONER

## 2024-01-24 PROCEDURE — 92610 EVALUATE SWALLOWING FUNCTION: CPT | Mod: GN | Performed by: SPEECH-LANGUAGE PATHOLOGIST

## 2024-01-24 PROCEDURE — 99215 OFFICE O/P EST HI 40 MIN: CPT | Mod: S$GLB,,, | Performed by: NURSE PRACTITIONER

## 2024-01-24 PROCEDURE — 1159F MED LIST DOCD IN RCRD: CPT | Mod: CPTII,S$GLB,, | Performed by: NURSE PRACTITIONER

## 2024-01-24 PROCEDURE — 1160F RVW MEDS BY RX/DR IN RCRD: CPT | Mod: CPTII,S$GLB,, | Performed by: NURSE PRACTITIONER

## 2024-01-24 NOTE — ASSESSMENT & PLAN NOTE
Recommend  healthy diet (DASH/Mediterranean) and exercise.   Patient should exercise 30 minutes at least five times weekly once recovered from surgery. Limit alcohol.  Treated with: Zetia/Crestor    Component      Latest Ref Rng 8/31/2023   Cholesterol Total      120 - 199 mg/dL 150    Triglycerides      30 - 150 mg/dL 48    HDL      40 - 75 mg/dL 57    LDL Cholesterol      63.0 - 159.0 mg/dL 83.4    HDL/Cholesterol Ratio      20.0 - 50.0 % 38.0    Total Cholesterol/HDL Ratio      2.0 - 5.0  2.6    Non-HDL Cholesterol      mg/dL 93

## 2024-01-24 NOTE — ASSESSMENT & PLAN NOTE
Current BP  controlled today.    Taking: amlodipine    Lifestyle changes to reduce systolic BP:   exercise 30 minutes per day,  5 days per week or 150 minutes weekly; sodium reduction and avoidance of high salt foods such as processed meats, frozen meals and  fast foods.   Keeping a healthy weight/BMI can help with better BP control    BP acceptable for surgery. I recommend monitoring BP during perioperative period as uncontrolled pain can elevate blood pressure.

## 2024-01-24 NOTE — OUTPATIENT SUBJECTIVE & OBJECTIVE
"Outpatient Subjective & Objective      Chief Complaint: Preoperative evaulation, perioperative medical management, and complication reduction plan.     Functional Capacity: works out 2-3x weekly: treadmill, rowing, and stretching exercises- can do all without CP/SOB.       Anesthesia issues: None    Difficulty mouth opening: No    Steroid use in the last 12 months:  No    Dental Issues: None    Family anesthesia difficulty: None       Family Hx of Thrombosis: None    Past Medical History:   Diagnosis Date    Hyperlipidemia          No past medical history pertinent negatives.      Past Surgical History:   Procedure Laterality Date    ANKLE SURGERY      L ankle    BIOPSY OF TISSUE OF NECK Left 2013    COLONOSCOPY N/A 08/21/2017    Procedure: COLONOSCOPY;  Surgeon: Danny Jane MD;  Location: [facility] (4TH FLR);  Service: Endoscopy;  Laterality: N/A;  Do not cancel this order    DIRECT LARYNGOBRONCHOSCOPY N/A 12/11/2023    Procedure: LARYNGOSCOPY, DIRECT, WITH BRONCHOSCOPY;  Surgeon: Micaela Poole MD;  [facility];  Service: ENT;  Laterality: N/A;    ESOPHAGOGASTRODUODENOSCOPY N/A 9/18/2023    Procedure: EGD (ESOPHAGOGASTRODUODENOSCOPY);  Surgeon: Basilia Villavicencio MD;  Location: [facility] ENDOSCOPY;  Service: Gastroenterology;  Laterality: N/A;  9.13 instr sent via portal [facility]  pre call complete, stated he would have a ride -HH    TONSILLECTOMY      TUMOR REMOVAL Right 2015    at     VASECTOMY         Review of Systems   Constitutional:  Negative for chills, fatigue, fever and unexpected weight change.   HENT:  Positive for tinnitus and trouble swallowing (attributes to tongue mass). Negative for congestion, hearing loss, rhinorrhea and sore throat.    Eyes:  Positive for visual disturbance (right eye- occasional "bloodshot").   Respiratory:  Negative for cough, chest tightness, shortness of breath and wheezing.    Cardiovascular:  Negative for chest pain, palpitations and leg swelling. "   Gastrointestinal:  Negative for constipation and diarrhea.        Denies Fatty liver, Hepatitis   Genitourinary:  Negative for decreased urine volume, difficulty urinating, dysuria, frequency, hematuria and urgency.   Musculoskeletal:  Positive for back pain (occasional). Negative for arthralgias, neck pain and neck stiffness.   Skin:  Negative for rash and wound.   Neurological:  Negative for dizziness, syncope, weakness, numbness and headaches.   Hematological:  Does not bruise/bleed easily.   Psychiatric/Behavioral:  Negative for sleep disturbance and suicidal ideas.               VITALS  Visit Vitals  /79 (BP Location: Right arm, Patient Position: Sitting)   Pulse (!) 53   Temp 98.1 °F (36.7 °C) (Oral)   Ht 6' (1.829 m)   Wt 79 kg (174 lb 2.6 oz)   SpO2 98%   BMI 23.62 kg/m²          Physical Exam  Vitals reviewed.   Constitutional:       General: He is not in acute distress.     Appearance: He is well-developed.   HENT:      Head: Normocephalic.      Nose: Nose normal.      Mouth/Throat:      Pharynx: No oropharyngeal exudate.   Eyes:      General:         Right eye: No discharge.         Left eye: No discharge.      Conjunctiva/sclera: Conjunctivae normal.      Pupils: Pupils are equal, round, and reactive to light.   Neck:      Thyroid: No thyromegaly.      Vascular: No carotid bruit or JVD.      Trachea: No tracheal deviation.   Cardiovascular:      Rate and Rhythm: Regular rhythm. Bradycardia present.      Pulses:           Carotid pulses are 2+ on the right side and 2+ on the left side.       Dorsalis pedis pulses are 2+ on the right side and 2+ on the left side.        Posterior tibial pulses are 2+ on the right side and 2+ on the left side.      Heart sounds: Normal heart sounds. No murmur heard.  Pulmonary:      Effort: Pulmonary effort is normal. No respiratory distress.      Breath sounds: Normal breath sounds. No stridor. No wheezing, rhonchi or rales.   Abdominal:      General: Bowel sounds  are normal. There is no distension.      Palpations: Abdomen is soft.      Tenderness: There is no abdominal tenderness. There is no guarding.   Musculoskeletal:      Cervical back: Normal range of motion.      Right lower leg: No edema.      Left lower leg: No edema.   Lymphadenopathy:      Cervical: No cervical adenopathy.   Skin:     General: Skin is warm and dry.      Capillary Refill: Capillary refill takes less than 2 seconds.      Findings: No erythema or rash.   Neurological:      Mental Status: He is alert and oriented to person, place, and time.   Psychiatric:         Behavior: Behavior normal.          Significant Labs:  Lab Results   Component Value Date    WBC 6.05 01/24/2024    HGB 15.8 01/24/2024    HGB 15.8 01/24/2024    HCT 46.4 01/24/2024    HCT 46.4 01/24/2024     01/24/2024    CHOL 150 08/31/2023    TRIG 48 08/31/2023    HDL 57 08/31/2023    ALT 53 (H) 01/24/2024    AST 34 01/24/2024     01/24/2024     01/24/2024    K 4.7 01/24/2024    K 4.7 01/24/2024     01/24/2024     01/24/2024    CREATININE 0.9 01/24/2024    CREATININE 0.9 01/24/2024    BUN 10 01/24/2024    BUN 10 01/24/2024    CO2 27 01/24/2024    CO2 27 01/24/2024    TSH 2.519 08/31/2023    PSA 0.70 08/31/2023    HGBA1C 5.1 08/31/2023       Diagnostic Studies: No relevant studies.    EKG:   Results for orders placed or performed in visit on 09/22/23   IN OFFICE EKG 12-LEAD (to [place])    Collection Time: 09/22/23  9:58 AM    Narrative    Test Reason : I10,Z13.6,I77.819,    Vent. Rate : 054 BPM     Atrial Rate : 054 BPM     P-R Int : 184 ms          QRS Dur : 130 ms      QT Int : 486 ms       P-R-T Axes : 052 058 055 degrees     QTc Int : 460 ms    Sinus bradycardia  Left bundle branch block  Abnormal ECG  When compared with ECG of 16-JUL-2014 15:00,  Vent. rate has decreased BY  26 BPM  Nonspecific T wave abnormality now evident in Inferior leads  QT has shortened  Confirmed by Dickson RITCHIE MD ([id number]) on 9/22/2023  "5:18:47 PM    Referred By: SANDRA HDZ           Confirmed By:Dickson RITCHIE MD       2D ECHO:  TTE:  Results for orders placed or performed during the hospital encounter of 11/06/23   Echo   Result Value Ref Range    BSA 2.05 m2    LVOT stroke volume 125.63 cm3    LVIDd 4.80 3.5 - 6.0 cm    LV Systolic Volume 55.69 mL    LV Systolic Volume Index 27.2 mL/m2    LVIDs 3.63 2.1 - 4.0 cm    LV Diastolic Volume 107.40 mL    LV Diastolic Volume Index 52.39 mL/m2    IVS 0.72 0.6 - 1.1 cm    LVOT diameter 2.00 cm    LVOT area 3.1 cm2    FS 24 (A) 28 - 44 %    Left Ventricle Relative Wall Thickness 0.33 cm    Posterior Wall 0.78 0.6 - 1.1 cm    LV mass 116.63 g    LV Mass Index 57 g/m2    MV Peak E Palmer 0.77 m/s    TDI LATERAL 0.06 m/s    TDI SEPTAL 0.04 m/s    E/E' ratio 15.40 m/s    MV Peak A Palmer 0.77 m/s    TR Max Palmer 2.29 m/s    E/A ratio 1.00     IVRT 98.95 msec    E wave deceleration time 233.80 msec    MV "A" wave duration 12.94 msec    LV SEPTAL E/E' RATIO 19.25 m/s    LA Volume Index 22.1 mL/m2    LV LATERAL E/E' RATIO 12.83 m/s    LA volume 45.34 cm3    PV Peak S Palmer 0.38 m/s    PV Peak D Palmer 0.33 m/s    Pulm vein S/D ratio 1.15     LVOT peak palmer 1.31 m/s    RVDD 2.78 cm    RVOT peak VTI 16.14 cm    TAPSE 2.72 cm    LA size 2.94 cm    Left Atrium Minor Axis 5.32 cm    Left Atrium Major Axis 5.17 cm    LA volume (mod) 47.91 cm3    LA WIDTH 3.46 cm    LA Volume Index (Mod) 23.4 mL/m2    RA Major Axis 4.71 cm    RA Width 3.34 cm    AV mean gradient 4 mmHg    AV peak gradient 7 mmHg    Ao peak palmer 1.28 m/s    Ao VTI 33.69 cm    LVOT peak VTI 40.01 cm    AV valve area 3.73 cm²    AV Velocity Ratio 1.02     AV index (prosthetic) 1.19     MELA by Velocity Ratio 3.21 cm²    MV stenosis pressure 1/2 time 67.80 ms    MV valve area p 1/2 method 3.24 cm2    Triscuspid Valve Regurgitation Peak Gradient 21 mmHg    PV mean gradient 1 mmHg    PV PEAK VELOCITY 1.01 m/s    PV peak gradient 4 mmHg    RVOT peak palmer 0.65 m/s    Sinus 3.71 " cm    STJ 3.28 cm    Ascending aorta 3.71 cm    Mean e' 0.05 m/s    ZLVIDS -0.29     ZLVIDD -2.48     TV resting pulmonary artery pressure 24 mmHg    RV TB RVSP 5 mmHg    Est. RA pres 3 mmHg    Narrative      Left Ventricle: The left ventricle is normal in size. Ventricular mass   is normal. Normal wall thickness. Normal wall motion. There is normal   systolic function with a visually estimated ejection fraction of 55 - 60%.   There is normal diastolic function.    Right Ventricle: Normal right ventricular cavity size. Wall thickness   is normal. Right ventricle wall motion  is normal. Systolic function is   normal.    Pulmonary Artery: The estimated pulmonary artery systolic pressure is   24 mmHg.    IVC/SVC: Normal venous pressure at 3 mmHg.           Imaging   Carotid US 10/19/23    There is less than 50% right Internal Carotid Stenosis.    There is less than 50% left Internal Carotid Stenosis.    There is antegrade flow in the vertebral arteries bilaterally..    US Bilateral arm 10/19/23   Right subclavian artery with elevated velocity suggestive of greater than 50% stenosis.  Recommend further evaluation with CTA or MRA if clinically indicated.    Right wrist brachial index 1.0, is normal.    Left wrist brachial index 1.16, is normal.      Active Cardiac Conditions: None      Revised Cardiac Risk Index   High -Risk Surgery  Intraperitoneal; Intrathoracic; suprainguinal vascular Yes- + 1 No- 0   History of Ischemic Heart Disease   (Hx of MI/positive exercise test/current chest pain due to ischemia/use of nitrate therapy/EKG with pathological Q waves) Yes- + 1 No- 0   History of CHF  (Pulmonary edema/bilateral rales or S3 gallop/PND/CXR showing pulmonary vascular redistribution) Yes- + 1 No- 0   History of CVA   (Prior stroke or TIA) Yes- + 1 No- 0   Pre-operative treatment with insulin Yes- + 1 No- 0   Pre-operative creatinine > 2mg/dl Yes- + 1 No- 0   Total: 0      Risk Status:  Estimated risk of cardiac  complications after non-cardiac surgery using the Revised Cardiac Risk Index for Preoperative risk is 3.9 %      ARISCAT (Canet) risk index: Intermediate: 13.3% risk of post-op pulmonary complications.    American Society of Anesthesiologists Physical Status classification (ASA): 3    Shayy respiratory failure index: 1.8 %    MITRA postop respiratory failure risk (For General Anesthesia):       No further cardiac workup needed prior to surgery.    Outpatient Subjective & Objective

## 2024-01-24 NOTE — ASSESSMENT & PLAN NOTE
Followed per Cardiology; last seen 11/9/23.  Taking ASA/Crestor/Zetia.    CT Calcium Score 10/18/23  Impression:     Your total calcium score is 42.  The total calcium score of 42 is between the 25th and 50th percentile for males between the ages of 65 and 69.

## 2024-01-24 NOTE — PROGRESS NOTES
REFERRING PHYSICIAN:  Jeferson Ventura M.D., Head and Neck Surgical Oncologist  LENGTH OF VISIT:  45 minutes    REASON FOR REFERRAL:  Timothy Galdamez, age 68, was referred by Dr. 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  He was unaccompanied.    Mr. Galdamez has a history of XRT at [facility] in 2015 to treat p16+ SCC of BOT.  He was able to recall that he had twice daily treatment for 4-6 weeks.      MEDICAL HISTORY:  Past Medical History:   Diagnosis Date    Hyperlipidemia        SURGICAL HISTORY:  Past Surgical History:   Procedure Laterality Date    ANKLE SURGERY      L ankle    BIOPSY OF TISSUE OF NECK Left 2013    COLONOSCOPY N/A 08/21/2017    Procedure: COLONOSCOPY;  Surgeon: Danny Jane MD;  Location: [facility] (4TH FLR);  Service: Endoscopy;  Laterality: N/A;  Do not cancel this order    DIRECT LARYNGOBRONCHOSCOPY N/A 12/11/2023    Procedure: LARYNGOSCOPY, DIRECT, WITH BRONCHOSCOPY;  Surgeon: Micaela Poole MD;  [facility];  Service: ENT;  Laterality: N/A;    ESOPHAGOGASTRODUODENOSCOPY N/A 9/18/2023    Procedure: EGD (ESOPHAGOGASTRODUODENOSCOPY);  Surgeon: Basilia Villavicencio MD;  Location: [facility] ENDOSCOPY;  Service: Gastroenterology;  Laterality: N/A;  9.13 instr sent via portal [facility]  pre call complete, stated he would have a ride -    TONSILLECTOMY      [facility]         ONCOLOGIC and TREATMENT HISTORY of problem for which patient is referred:  2015:  XRT at [facility] to treat p16+ BOT SCC  [id number] SCC BOT SCC; thought to be second primary      Possible side effects related to treatment:  Xerostomia:   Positive; manages with drinking during day.  Using Biotene spray at night  "  Irritation:  Negative  Changes in taste: Present briefly with XRT; resolved.  Radiation fibrosis: Possible; R neck tighter than L.   Lymphedema:  Negative    SWALLOWING HISTORY:  In the past 12 months or so, began having sensation of food sticking (indicated base of neck) and needs liquids to clear.  Has difficulty with this occurring with vitamins (whole or halved), bread, nuts, peanut butter, and food that may not have been chewed sufficiently (fast eater, reduced saliva).  Benefits from liquid washes to clear and/or preparing foods to tender, moist state.    FAMILY HISTORY:  Family History   Problem Relation Age of Onset    Arthritis Mother     COPD Brother     Psoriasis Neg Hx     Eczema Neg Hx        SOCIAL HISTORY:  Mr. Galdamez is  and lives in [place].  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    Tobacco use:  Former smoker as a teen ([id number]).  ETOH use:  Yes.  Mr. Galdamez stated that he thinks he drinks more than he should and noted it was a family pattern.  He verbalized his suspicion that alcohol consumption may be related to his cancer.    BEHAVIOR:  Mr. Galdamez was a very pleasant man whose affect and social interaction were appropriate for situation and setting.  He  was fully cooperative for the assessment. Results are considered indicative of the current levels of speech and swallowing functioning.    HEARING:  Subjectively, within normal limits.  Mr. Galdamez reported it was fine.  He also reported tinnitus that predated his 2015 treatment.  He has had that assessed by an ENT and Audiologist.  He manages it with use of white noise when going to sleep.    ASSESSMENT:  Oral Peripheral:     Mandible: 53 mm via TheraBite measuring tool.  Avg = 40 m.   Lips:  within normal limits  for rounding, " spreading, and alternating as well as impounding air, smacking, and repetition of /p/.   Tongue:  within normal limits  for protrusion, lateralization, elevation, retroflexion. AMRs for /t/, /k/, and diadochokinetics were within normal limits.   Velum and Hard Palate:  Hard palate within normal limits.  Velum dynamic, symmetric.   Reflexes:  Gag: + Swallow: +   Dentition:  All native.  Bite within normal limits for speech and swallowing purposes.  Used a fluoride tray for a time after XRT, but has stopped.  Denied any dental issues related to XRT.   Oropharynx: within normal limits     Speech:  100% intelligible with no distortions.       Swallowing:  Within normal limits for timing of initiation and laryngeal elevation on palpation with single and consecutive swallows of thin liquids.  No s/s observed or reported concerning for airway threat.    Voice/Resonance:  Within normal limits.   He noted he may get some slight voice change related to apparent compensation for discomfort in lesion area at the ends of long conversations.    COUNSELING:  Timothy Rudolph engaged in discussion of normal swallowing function, possible effects of surgery, and therapeutic intervention.    Reviewed the basics of the normal pharyngeal swallow using Follow the Swallow.    Per his notes, Dr. Ventura plans not to allow PO intake for about 1 month after surgery. Will be swallowing saliva until cleared.  Reviewed general protocol for having PEG removed and our goal of facilitating that as rapidly as possible once cleared for PO intake.  Reviewed possible use of MBSS to compliment office assessment as needed.  Will plan to see him at his initial post-op visit time with the surgeons unless he is in home health.    Should he experience difficulty with speech post-op, provided strategy of slowing rate of speech as initial one to use.      IMPRESSIONS:   This 69 yo man appears to present with   Mild dysphagia per patient report characterized by  inefficiency in clearing material through pharynx.  XRT-associated toxicities, including irritation and possible radiation fibrosis  Apparent second primary SCC of BOT, [id number] per Dr. Ventura   History SCC BOT, p16+, s/p XRT, 2015 ([facility]).      RECOMMENDATIONS/PLAN OF CARE:  It is felt that  Rudolph would benefit from  1.  Continuation of his current regular consistency diet with thin liquids through the time of surgery using common aspiration precautions and strategies to facilitate clearance of solids, including    A.  Preparing foods to tender, moist state.   B.  Adding sauces, gravies, rouxs, condiments.   C.  Adding a tiny sip of liquid to oral cavity during mastication.  2.  Proceed with surgery as directed by Laura Poole and Lorenzo.  3.  Return to clinic for re-assessment at time of post-op appt if not in home health services; otherwise, would like to see him as soon as he is out of all home health services.  4.  Contact with questions via portal or phone at any time.

## 2024-01-24 NOTE — PROGRESS NOTES
[facility]  Progress Note    Patient Name: Timothy Galdamez  MRN: [id number]  Date of Evaluation- 01/24/2024  PCP- Young Lanier MD    Future cases for Timothy Galdamez [[id number]]       Case ID Status Date Time Alejandro Procedure Provider Location    [id number] [facility] 1/30/2024  8:30  GLOSSECTOMY, PARTIAL Micaela Poole MD [[id number]] [facility]            HPI:  This is a 68 y.o. male  who presents today for a preoperative evaluation in preparation for partial glossectomy, neck dissection, tracheotomy; and creation of free flap.  Surgery is indicated for tongue cancer.   Patient is new to me.  The history has been obtained by speaking with the patient and reviewing the electronic medical record and/or outside health information. Significant health conditions for the perioperative period are discussed below in assessment and plan.   Patient reports current health status to be Good.  Denies any new symptoms before surgery.       Subjective/ Objective:     Chief Complaint: Preoperative evaulation, perioperative medical management, and complication reduction plan.     Functional Capacity: works out 2-3x weekly: treadmill, rowing, and stretching exercises- can do all without CP/SOB.       Anesthesia issues: None    Difficulty mouth opening: No    Steroid use in the last 12 months:  No    Dental Issues: None    Family anesthesia difficulty: None       Family Hx of Thrombosis: None    Past Medical History:   Diagnosis Date    Hyperlipidemia          No past medical history pertinent negatives.      Past Surgical History:   Procedure Laterality Date    ANKLE SURGERY      L ankle    BIOPSY OF TISSUE OF NECK Left 2013    COLONOSCOPY N/A 08/21/2017    Procedure: COLONOSCOPY;  Surgeon: Danny Jane MD;  Location: [facility] (4TH FLR);  Service: Endoscopy;  Laterality: N/A;  Do not cancel this order    DIRECT LARYNGOBRONCHOSCOPY N/A 12/11/2023    Procedure: LARYNGOSCOPY, DIRECT, WITH BRONCHOSCOPY;   "Surgeon: Micaela Poole MD;  Location: [facility] OR [address] [place] [address];  Service: ENT;  Laterality: N/A;    ESOPHAGOGASTRODUODENOSCOPY N/A 9/18/2023    Procedure: EGD (ESOPHAGOGASTRODUODENOSCOPY);  Surgeon: Basilia Villavicencio MD;  Location: [facility] ENDOSCOPY;  Service: Gastroenterology;  Laterality: N/A;  9.13 instr sent via portal HS  pre call complete, stated he would have a ride -HH    TONSILLECTOMY      TUMOR REMOVAL Right 2015    at     VASECTOMY         Review of Systems   Constitutional:  Negative for chills, fatigue, fever and unexpected weight change.   HENT:  Positive for tinnitus and trouble swallowing (attributes to tongue mass). Negative for congestion, hearing loss, rhinorrhea and sore throat.    Eyes:  Positive for visual disturbance (right eye- occasional "bloodshot").   Respiratory:  Negative for cough, chest tightness, shortness of breath and wheezing.    Cardiovascular:  Negative for chest pain, palpitations and leg swelling.   Gastrointestinal:  Negative for constipation and diarrhea.        Denies Fatty liver, Hepatitis   Genitourinary:  Negative for decreased urine volume, difficulty urinating, dysuria, frequency, hematuria and urgency.   Musculoskeletal:  Positive for back pain (occasional). Negative for arthralgias, neck pain and neck stiffness.   Skin:  Negative for rash and wound.   Neurological:  Negative for dizziness, syncope, weakness, numbness and headaches.   Hematological:  Does not bruise/bleed easily.   Psychiatric/Behavioral:  Negative for sleep disturbance and suicidal ideas.               VITALS  Visit Vitals  /79 (BP Location: Right arm, Patient Position: Sitting)   Pulse (!) 53   Temp 98.1 °F (36.7 °C) (Oral)   Ht 6' (1.829 m)   Wt 79 kg (174 lb 2.6 oz)   SpO2 98%   BMI 23.62 kg/m²          Physical Exam  Vitals reviewed.   Constitutional:       General: He is not in acute distress.     Appearance: He is well-developed.   HENT:      Head: Normocephalic.      Nose: Nose normal.     "  Mouth/Throat:      Pharynx: No oropharyngeal exudate.   Eyes:      General:         Right eye: No discharge.         Left eye: No discharge.      Conjunctiva/sclera: Conjunctivae normal.      Pupils: Pupils are equal, round, and reactive to light.   Neck:      Thyroid: No thyromegaly.      Vascular: No carotid bruit or JVD.      Trachea: No tracheal deviation.   Cardiovascular:      Rate and Rhythm: Regular rhythm. Bradycardia present.      Pulses:           Carotid pulses are 2+ on the right side and 2+ on the left side.       Dorsalis pedis pulses are 2+ on the right side and 2+ on the left side.        Posterior tibial pulses are 2+ on the right side and 2+ on the left side.      Heart sounds: Normal heart sounds. No murmur heard.  Pulmonary:      Effort: Pulmonary effort is normal. No respiratory distress.      Breath sounds: Normal breath sounds. No stridor. No wheezing, rhonchi or rales.   Abdominal:      General: Bowel sounds are normal. There is no distension.      Palpations: Abdomen is soft.      Tenderness: There is no abdominal tenderness. There is no guarding.   Musculoskeletal:      Cervical back: Normal range of motion.      Right lower leg: No edema.      Left lower leg: No edema.   Lymphadenopathy:      Cervical: No cervical adenopathy.   Skin:     General: Skin is warm and dry.      Capillary Refill: Capillary refill takes less than 2 seconds.      Findings: No erythema or rash.   Neurological:      Mental Status: He is alert and oriented to person, place, and time.   Psychiatric:         Behavior: Behavior normal.          Significant Labs:  Lab Results   Component Value Date    WBC 6.05 01/24/2024    HGB 15.8 01/24/2024    HGB 15.8 01/24/2024    HCT 46.4 01/24/2024    HCT 46.4 01/24/2024     01/24/2024    CHOL 150 08/31/2023    TRIG 48 08/31/2023    HDL 57 08/31/2023    ALT 53 (H) 01/24/2024    AST 34 01/24/2024     01/24/2024     01/24/2024    K 4.7 01/24/2024    K 4.7  "01/24/2024     01/24/2024     01/24/2024    CREATININE 0.9 01/24/2024    CREATININE 0.9 01/24/2024    BUN 10 01/24/2024    BUN 10 01/24/2024    CO2 27 01/24/2024    CO2 27 01/24/2024    TSH 2.519 08/31/2023    PSA 0.70 08/31/2023    HGBA1C 5.1 08/31/2023       Diagnostic Studies: No relevant studies.    EKG:   Results for orders placed or performed in visit on 09/22/23   IN OFFICE EKG 12-LEAD (to [place])    Collection Time: 09/22/23  9:58 AM    Narrative    Test Reason : I10,Z13.6,I77.819,    Vent. Rate : 054 BPM     Atrial Rate : 054 BPM     P-R Int : 184 ms          QRS Dur : 130 ms      QT Int : 486 ms       P-R-T Axes : 052 058 055 degrees     QTc Int : 460 ms    Sinus bradycardia  Left bundle branch block  Abnormal ECG  When compared with ECG of 16-JUL-2014 15:00,  Vent. rate has decreased BY  26 BPM  Nonspecific T wave abnormality now evident in Inferior leads  QT has shortened  Confirmed by Dickson RITCHIE MD ([id number]) on 9/22/2023 5:18:47 PM    Referred By: SANDRA HDZ           Confirmed By:Dickson RITCHIE MD       2D ECHO:  TTE:  Results for orders placed or performed during the hospital encounter of 11/06/23   Echo   Result Value Ref Range    BSA 2.05 m2    LVOT stroke volume 125.63 cm3    LVIDd 4.80 3.5 - 6.0 cm    LV Systolic Volume 55.69 mL    LV Systolic Volume Index 27.2 mL/m2    LVIDs 3.63 2.1 - 4.0 cm    LV Diastolic Volume 107.40 mL    LV Diastolic Volume Index 52.39 mL/m2    IVS 0.72 0.6 - 1.1 cm    LVOT diameter 2.00 cm    LVOT area 3.1 cm2    FS 24 (A) 28 - 44 %    Left Ventricle Relative Wall Thickness 0.33 cm    Posterior Wall 0.78 0.6 - 1.1 cm    LV mass 116.63 g    LV Mass Index 57 g/m2    MV Peak E Palmer 0.77 m/s    TDI LATERAL 0.06 m/s    TDI SEPTAL 0.04 m/s    E/E' ratio 15.40 m/s    MV Peak A Palmer 0.77 m/s    TR Max Palmer 2.29 m/s    E/A ratio 1.00     IVRT 98.95 msec    E wave deceleration time 233.80 msec    MV "A" wave duration 12.94 msec    LV SEPTAL E/E' RATIO 19.25 m/s    LA Volume " Index 22.1 mL/m2    LV LATERAL E/E' RATIO 12.83 m/s    LA volume 45.34 cm3    PV Peak S Palmer 0.38 m/s    PV Peak D Palmer 0.33 m/s    Pulm vein S/D ratio 1.15     LVOT peak palmer 1.31 m/s    RVDD 2.78 cm    RVOT peak VTI 16.14 cm    TAPSE 2.72 cm    LA size 2.94 cm    Left Atrium Minor Axis 5.32 cm    Left Atrium Major Axis 5.17 cm    LA volume (mod) 47.91 cm3    LA WIDTH 3.46 cm    LA Volume Index (Mod) 23.4 mL/m2    RA Major Axis 4.71 cm    RA Width 3.34 cm    AV mean gradient 4 mmHg    AV peak gradient 7 mmHg    Ao peak palmer 1.28 m/s    Ao VTI 33.69 cm    LVOT peak VTI 40.01 cm    AV valve area 3.73 cm²    AV Velocity Ratio 1.02     AV index (prosthetic) 1.19     MELA by Velocity Ratio 3.21 cm²    MV stenosis pressure 1/2 time 67.80 ms    MV valve area p 1/2 method 3.24 cm2    Triscuspid Valve Regurgitation Peak Gradient 21 mmHg    PV mean gradient 1 mmHg    PV PEAK VELOCITY 1.01 m/s    PV peak gradient 4 mmHg    RVOT peak palmer 0.65 m/s    Sinus 3.71 cm    STJ 3.28 cm    Ascending aorta 3.71 cm    Mean e' 0.05 m/s    ZLVIDS -0.29     ZLVIDD -2.48     TV resting pulmonary artery pressure 24 mmHg    RV TB RVSP 5 mmHg    Est. RA pres 3 mmHg    Narrative      Left Ventricle: The left ventricle is normal in size. Ventricular mass   is normal. Normal wall thickness. Normal wall motion. There is normal   systolic function with a visually estimated ejection fraction of 55 - 60%.   There is normal diastolic function.    Right Ventricle: Normal right ventricular cavity size. Wall thickness   is normal. Right ventricle wall motion  is normal. Systolic function is   normal.    Pulmonary Artery: The estimated pulmonary artery systolic pressure is   24 mmHg.    IVC/SVC: Normal venous pressure at 3 mmHg.           Imaging   Carotid US 10/19/23    There is less than 50% right Internal Carotid Stenosis.    There is less than 50% left Internal Carotid Stenosis.    There is antegrade flow in the vertebral arteries bilaterally..    US  Bilateral arm 10/19/23   Right subclavian artery with elevated velocity suggestive of greater than 50% stenosis.  Recommend further evaluation with CTA or MRA if clinically indicated.    Right wrist brachial index 1.0, is normal.    Left wrist brachial index 1.16, is normal.      Active Cardiac Conditions: None      Revised Cardiac Risk Index   High -Risk Surgery  Intraperitoneal; Intrathoracic; suprainguinal vascular Yes- + 1 No- 0   History of Ischemic Heart Disease   (Hx of MI/positive exercise test/current chest pain due to ischemia/use of nitrate therapy/EKG with pathological Q waves) Yes- + 1 No- 0   History of CHF  (Pulmonary edema/bilateral rales or S3 gallop/PND/CXR showing pulmonary vascular redistribution) Yes- + 1 No- 0   History of CVA   (Prior stroke or TIA) Yes- + 1 No- 0   Pre-operative treatment with insulin Yes- + 1 No- 0   Pre-operative creatinine > 2mg/dl Yes- + 1 No- 0   Total: 0      Risk Status:  Estimated risk of cardiac complications after non-cardiac surgery using the Revised Cardiac Risk Index for Preoperative risk is 3.9 %      ARISCAT (Canet) risk index: Intermediate: 13.3% risk of post-op pulmonary complications.    American Society of Anesthesiologists Physical Status classification (ASA): 3    [facility] failure index: 1.8 %    MANRIQUEZ postop respiratory failure risk (For General Anesthesia):       No further cardiac workup needed prior to surgery.          Orders Placed This Encounter    CBC Auto Differential    Comprehensive Metabolic Panel           Assessment/Plan:     Tongue cancer  Scheduled with Dr. Poole on 1/30/24 for partial glossectomy/neck dissection/tracheotomy.   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       Memory change  Doing better- not currently having symptoms.   Treated with Prevagen  Last seen by neurology 11/2020    RLS (restless legs syndrome)  Not treated; tolerating symptoms.   Melatonin helps a little.   Will  "discuss with PCP at next visit.     Hyperlipidemia  Recommend  healthy diet (DASH/Mediterranean) and exercise.   Patient should exercise 30 minutes at least five times weekly once recovered from surgery. Limit alcohol.  Treated with: Zetia/Crestor    Component      Latest Ref Rng 8/31/2023   Cholesterol Total      120 - 199 mg/dL 150    Triglycerides      30 - 150 mg/dL 48    HDL      40 - 75 mg/dL 57    LDL Cholesterol      63.0 - 159.0 mg/dL 83.4    HDL/Cholesterol Ratio      20.0 - 50.0 % 38.0    Total Cholesterol/HDL Ratio      2.0 - 5.0  2.6    Non-HDL Cholesterol      mg/dL 93          Subclavian artery stenosis, left  Followed per Cardiology (Ramirez) - no intervention needed since patient is asymptomatic.  BP readings may be lower on left side      CTA neck 11/13/23  Impression:     CTA neck shows no evidence of significant subclavian stenosis as clinically questioned.     Atherosclerotic disease with mixed calcified noncalcified plaque at the carotid bifurcations right greater than left and left common carotid artery.  No high-grade stenosis by NASCET criteria.  The estimated stenosis is less than 50%.     Nodular enhancing lesion at the base of the tongue right.  Recommend follow-up with ENT further evaluation/characterization.     This report was flagged in Epic as abnormal.            Aortic atherosclerosis  Taking ASA/statin; stable.     Coronary artery calcification  Followed per Cardiology; last seen 11/9/23.  Taking ASA/Crestor/Zetia.    CT Calcium Score 10/18/23  Impression:     Your total calcium score is 42.  The total calcium score of 42 is between the 25th and 50th percentile for males between the ages of 65 and 69.            Chronic right-sided low back pain  Intermittent pain- "muscle spasms"  No loss of bladder or bowel control    Denies N/T to buttocks, perineum and inner surfaces of the thighs (saddle anesthesia)      Symptoms: pain radiating down leg:    Any N/T or  weakness:   Followed " per Pain Management ?    Recent imaging: Xray L-Spine 6/2021  FINDINGS:  Lumbar vertebral body heights and alignment are preserved.  There is severe disc space narrowing, spondylitic spurring and degenerative endplate sclerosis at T12-L1, L1-2, and T11-12 with mild disc space narrowing of the lower lumbar spine and exuberant facet arthrosis at the lower lumbar levels.  Flexion and extension reveals no instability.     Impression:     Advanced degenerative change without instability          Primary hypertension  Current BP  controlled today.    Taking: amlodipine    Lifestyle changes to reduce systolic BP:   exercise 30 minutes per day,  5 days per week or 150 minutes weekly; sodium reduction and avoidance of high salt foods such as processed meats, frozen meals and  fast foods.   Keeping a healthy weight/BMI can help with better BP control    BP acceptable for surgery. I recommend monitoring BP during perioperative period as uncontrolled pain can elevate blood pressure.         Elevated alanine aminotransferase (ALT) level  ALT is 53 U/L  AST is normal  Denies liver disease  Alcohol consumption: 3 Glasses of wine weekly or  4-5 Standard drinks or equivalent per week   I recommend care with the usage of medications that are hepatotoxic or metabolized in the liver.        Discussion/Management of Perioperative Care    Thromboembolic prophylaxis (VTE) Care: Risk factors for thrombosis include: age and surgical procedure.  I recommend prophylaxis of thromboembolism with the use of compression stockings/pneumatic devices, and/or pharmacologic agents. The benefits should outweigh the risks for pharmacologic prophylaxis in the perioperative period. I also encourage early ambulation if not contraindicated during the post-operative period.    Risk factors for post-operative pulmonary complications include:age > 65 years, HTN, and surgery > 3 hours. To reduce the risk of pulmonary complications, prophylactic recommendations  include: end tidal carbon dioxide monitoring and pain control.    Risk factors for renal complications include: age and HTN. To reduce the risk of postoperative renal complications, I recommend the patient maintain adequate fluid volume status by drinking 2 liters of water daily.  Avoid/reduce NSAIDS and GARCIA-2 inhibitors use as well as IV contrast for renal protection.    I recommend the use of appropriate prophylactic antibiotics to reduce the risk of surgical site infections.    Delirium risk factors include advanced age. I recommend to avoid/reduce use of benzodiazepine use (not for patients who take on a regular basis), anticholinergics, Benadryl,  and agents that may cause postoperative serotonin syndrome.  Controlled pain can decrease the risk for postop delirium and since opioids are used for postoperative pain control, I suggest using the lowest dose for the shortest amount of time necessary for pain management.     The patient is at an increased risk for urinary retention due to : advanced age. I recommend to avoid/decrease the use of benzodiazepines, anticholinergics, and Benadryl in the perioperative period. I also recommend using opioids for the shortest period of time if possible.          This visit was focused on Preoperative evaluation, Perioperative Medical management, complication reduction plans. I suggest that the patient follows up with primary care or relevant sub specialists for ongoing health care.    I appreciate the opportunity to be involved in this patients care. Please feel free to contact me if there were any questions about this consultation.        I spent a total of 63 minutes on the day of the visit.This includes face to face time and non-face to face time preparing to see the patient (e.g., review of tests), obtaining and/or reviewing separately obtained history, documenting clinical information in the electronic or other health record, independently interpreting results and  communicating results to the patient/family/caregiver, or care coordinator.       Patient is optimized for surgery.    Optimized per cardiology (Dinshaw) 1/18/24; holding ASA 81 mg .      Thania Monroy NP  Perioperative Medicine  [facility]

## 2024-01-24 NOTE — ASSESSMENT & PLAN NOTE
Followed per Cardiology (Ramirez) - no intervention needed since patient is asymptomatic.  BP readings may be lower on left side      CTA neck 11/13/23  Impression:     CTA neck shows no evidence of significant subclavian stenosis as clinically questioned.     Atherosclerotic disease with mixed calcified noncalcified plaque at the carotid bifurcations right greater than left and left common carotid artery.  No high-grade stenosis by NASCET criteria.  The estimated stenosis is less than 50%.     Nodular enhancing lesion at the base of the tongue right.  Recommend follow-up with ENT further evaluation/characterization.     This report was flagged in Epic as abnormal.

## 2024-01-24 NOTE — HPI
This is a 68 y.o. male  who presents today for a preoperative evaluation in preparation for partial glossectomy, neck dissection, tracheotomy; and creation of free flap.  Surgery is indicated for tongue cancer.   Patient is new to me.  The history has been obtained by speaking with the patient and reviewing the electronic medical record and/or outside health information. Significant health conditions for the perioperative period are discussed below in assessment and plan.   Patient reports current health status to be Good.  Denies any new symptoms before surgery.

## 2024-01-24 NOTE — ASSESSMENT & PLAN NOTE
Not treated; tolerating symptoms.   Melatonin helps a little.   Will discuss with PCP at next visit.

## 2024-01-24 NOTE — ASSESSMENT & PLAN NOTE
Scheduled with Dr. Poole on 1/30/24 for partial glossectomy/neck dissection/tracheotomy.   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

## 2024-01-24 NOTE — ASSESSMENT & PLAN NOTE
"Intermittent pain- "muscle spasms"  No loss of bladder or bowel control    Denies N/T to buttocks, perineum and inner surfaces of the thighs (saddle anesthesia)      Symptoms: pain radiating down leg:    Any N/T or  weakness:   Followed per Pain Management ?    Recent imaging: Xray L-Spine 6/2021  FINDINGS:  Lumbar vertebral body heights and alignment are preserved.  There is severe disc space narrowing, spondylitic spurring and degenerative endplate sclerosis at T12-L1, L1-2, and T11-12 with mild disc space narrowing of the lower lumbar spine and exuberant facet arthrosis at the lower lumbar levels.  Flexion and extension reveals no instability.     Impression:     Advanced degenerative change without instability        "

## 2024-01-24 NOTE — ASSESSMENT & PLAN NOTE
ALT is 53 U/L  AST is normal  Denies liver disease  Alcohol consumption: 3 Glasses of wine weekly or  4-5 Standard drinks or equivalent per week   I recommend care with the usage of medications that are hepatotoxic or metabolized in the liver.

## 2024-01-24 NOTE — ASSESSMENT & PLAN NOTE
Doing better- not currently having symptoms.   Treated with Prevagen  Last seen by neurology 11/2020

## 2024-01-24 NOTE — PLAN OF CARE
IMPRESSIONS:   This 67 yo man appears to present with   Mild dysphagia per patient report characterized by inefficiency in clearing material through pharynx.  XRT-associated toxicities, including irritation and possible radiation fibrosis  Apparent second primary SCC of BOT, [id number] per Dr. Ventura   History SCC BOT, p16+, s/p XRT, 2015 ([facility]).      RECOMMENDATIONS/PLAN OF CARE:  It is felt that  Rudolph would benefit from  1.  Continuation of his current regular consistency diet with thin liquids through the time of surgery using common aspiration precautions and strategies to facilitate clearance of solids, including    A.  Preparing foods to tender, moist state.   B.  Adding sauces, gravies, rouxs, condiments.   C.  Adding a tiny sip of liquid to oral cavity during mastication.  2.  Proceed with surgery as directed by Laura Poole and Lorenzo.  3.  Return to clinic for re-assessment at time of post-op appt if not in home health services; otherwise, would like to see him as soon as he is out of all home health services.  4.  Contact with questions via portal or phone at any time.

## 2024-01-29 ENCOUNTER — PATIENT MESSAGE (OUTPATIENT)
Dept: OTOLARYNGOLOGY | Facility: CLINIC | Age: 69
End: 2024-01-29
Payer: MEDICARE

## 2024-01-29 RX ORDER — HYDROMORPHONE HYDROCHLORIDE 1 MG/ML
0.2 INJECTION, SOLUTION INTRAMUSCULAR; INTRAVENOUS; SUBCUTANEOUS EVERY 5 MIN PRN
Status: CANCELLED | OUTPATIENT
Start: 2024-01-29

## 2024-01-29 RX ORDER — SODIUM CHLORIDE 0.9 % (FLUSH) 0.9 %
10 SYRINGE (ML) INJECTION
Status: CANCELLED | OUTPATIENT
Start: 2024-01-29

## 2024-01-29 RX ORDER — HALOPERIDOL 5 MG/ML
0.5 INJECTION INTRAMUSCULAR EVERY 10 MIN PRN
Status: CANCELLED | OUTPATIENT
Start: 2024-01-29

## 2024-01-30 ENCOUNTER — HOSPITAL ENCOUNTER (INPATIENT)
Facility: HOSPITAL | Age: 69
LOS: 1 days | Discharge: HOME | DRG: 146 | End: 2024-01-30
Attending: OTOLARYNGOLOGY | Admitting: OTOLARYNGOLOGY
Payer: MEDICARE

## 2024-01-30 ENCOUNTER — PATIENT MESSAGE (OUTPATIENT)
Dept: INTERNAL MEDICINE | Facility: CLINIC | Age: 69
End: 2024-01-30
Payer: MEDICARE

## 2024-01-30 VITALS
WEIGHT: 170 LBS | RESPIRATION RATE: 16 BRPM | OXYGEN SATURATION: 98 % | DIASTOLIC BLOOD PRESSURE: 82 MMHG | HEART RATE: 90 BPM | SYSTOLIC BLOOD PRESSURE: 154 MMHG | TEMPERATURE: 100 F | BODY MASS INDEX: 23.03 KG/M2 | HEIGHT: 72 IN

## 2024-01-30 DIAGNOSIS — C02.9 TONGUE CANCER: ICD-10-CM

## 2024-01-30 DIAGNOSIS — U07.1 COVID-19 VIRUS DETECTED: ICD-10-CM

## 2024-01-30 LAB — SARS-COV-2 RDRP RESP QL NAA+PROBE: POSITIVE

## 2024-01-30 PROCEDURE — 12000002 HC ACUTE/MED SURGE SEMI-PRIVATE ROOM

## 2024-01-30 PROCEDURE — 36415 COLL VENOUS BLD VENIPUNCTURE: CPT | Performed by: OTOLARYNGOLOGY

## 2024-01-30 PROCEDURE — 99499 UNLISTED E&M SERVICE: CPT | Mod: ,,, | Performed by: OTOLARYNGOLOGY

## 2024-01-30 PROCEDURE — 99222 1ST HOSP IP/OBS MODERATE 55: CPT | Mod: AI,GC,, | Performed by: OTOLARYNGOLOGY

## 2024-01-30 PROCEDURE — U0002 COVID-19 LAB TEST NON-CDC: HCPCS | Performed by: STUDENT IN AN ORGANIZED HEALTH CARE EDUCATION/TRAINING PROGRAM

## 2024-01-30 RX ORDER — LIDOCAINE HYDROCHLORIDE 10 MG/ML
1 INJECTION, SOLUTION EPIDURAL; INFILTRATION; INTRACAUDAL; PERINEURAL ONCE
Status: DISCONTINUED | OUTPATIENT
Start: 2024-01-30 | End: 2024-01-30 | Stop reason: HOSPADM

## 2024-01-30 RX ORDER — SODIUM CHLORIDE 0.9 % (FLUSH) 0.9 %
10 SYRINGE (ML) INJECTION
Status: DISCONTINUED | OUTPATIENT
Start: 2024-01-30 | End: 2024-01-30 | Stop reason: HOSPADM

## 2024-01-30 RX ORDER — NIRMATRELVIR AND RITONAVIR 300-100 MG
KIT ORAL
Qty: 10 TABLET | Refills: 0 | Status: SHIPPED | OUTPATIENT
Start: 2024-01-30 | End: 2024-02-04

## 2024-01-30 RX ORDER — NIRMATRELVIR AND RITONAVIR 300-100 MG
KIT ORAL
Qty: 10 TABLET | Refills: 0 | Status: SHIPPED | OUTPATIENT
Start: 2024-01-30 | End: 2024-01-30

## 2024-01-30 NOTE — PROGRESS NOTES
Pt informed me that he felt like he had a fever and chills last night and woke up w/ a little cough, Dr. Perry informed, he will come and see pt. Pt is currently asymptomatic

## 2024-01-30 NOTE — H&P
Otolaryngology - Head and Neck Surgery  History & Physical    68 y.o. male presents with history of right BOT squamous cell carcinoma. Treated at [facility] in 2015 with radiation. Did surveillance for 5 years without recurrence. Now here for recent ulcers under his tongue which are now resolved. Also with neck spasms and longstanding history of dysphagia with feeling of food getting stuck in his esophagus. He has not had a previous dilation. No current throat or ear pain.     Here for evaluation after incidental finding of right BOT mass on CTA neck for subclavian artery stenosis. No new throat pain, ear pain, hemoptysis or voice changes. Biopsy showing squamous cell carcinoma.      Review of Systems     Constitutional: Negative for fatigue and unexpected weight change.   HENT: per HPI.  Eyes: Negative for visual disturbance.   Respiratory: Negative for shortness of breath, hemoptysis  Cardiovascular: Negative for chest pain and palpitations.   Genitourinary: Negative for dysuria and difficulty urinating.   Musculoskeletal: Negative for decreased ROM, back pain.   Skin: Negative for rash, sunburn, itching.   Neurological: Negative for dizziness and seizures.   Hematological: Negative for adenopathy. Does not bruise/bleed easily.   Psychiatric/Behavioral: Negative for agitation. The patient is not nervous/anxious.   Endocrine: Negative for rapid weight loss/weight gain, heat/cold intolerance.     Past Medical History:   Diagnosis Date    Cancer     Hyperlipidemia     Hypertension        Past Surgical History:   Procedure Laterality Date    ANKLE SURGERY      L ankle    BIOPSY OF TISSUE OF NECK Left 2013    COLONOSCOPY N/A 08/21/2017    Procedure: COLONOSCOPY;  Surgeon: Danny Jane MD;  Location: [address]);  Service: Endoscopy;  Laterality: N/A;  Do not cancel this order    DIRECT LARYNGOBRONCHOSCOPY N/A 12/11/2023    Procedure: LARYNGOSCOPY, DIRECT, WITH BRONCHOSCOPY;  Surgeon: Micaela Poole MD;   Location: [facility] OR [address] [place] [address];  Service: ENT;  Laterality: N/A;    ESOPHAGOGASTRODUODENOSCOPY N/A 09/18/2023    Procedure: EGD (ESOPHAGOGASTRODUODENOSCOPY);  Surgeon: Basilia Villavicencio MD;  Location: [facility] ENDOSCOPY;  Service: Gastroenterology;  Laterality: N/A;  9.13 instr sent via portal [facility]  pre call complete, stated he would have a ride -HH    TONSILLECTOMY      TUMOR REMOVAL Right 2015    at     VASECTOMY      WRIST FRACTURE SURGERY Right        family history includes Arthritis in his mother; COPD in his brother.    Pt  reports that he has quit smoking. His smoking use included cigarettes. He has never used smokeless tobacco. He reports current alcohol use of about 7.0 - 8.0 standard drinks of alcohol per week. He reports that he does not use drugs.    Review of patient's allergies indicates:  No Known Allergies     Physical Exam    Vitals:    01/30/24 0921   BP: (!) 154/82   Pulse: 90   Resp: 16   Temp: 99.8 °F (37.7 °C)     Body mass index is 23.06 kg/m².    Physical Exam  Vitals and nursing note reviewed.   Constitutional:       General: He is not in acute distress.     Appearance: He is well-developed. He is not diaphoretic.   HENT:      Head: Normocephalic and atraumatic.      Right Ear: Hearing and external ear normal. No decreased hearing noted.      Left Ear: Hearing and external ear normal. No decreased hearing noted.      Nose: Nose normal.      Mouth/Throat:      Mouth: Mucous membranes are moist. No oral lesions.      Tongue: No lesions.      Pharynx: Oropharynx is clear. Uvula midline.   Eyes:      General: Lids are normal.         Right eye: No discharge.         Left eye: No discharge.      Conjunctiva/sclera: Conjunctivae normal.      Pupils: Pupils are equal, round, and reactive to light.   Neck:      Thyroid: No thyroid mass or thyromegaly.      Trachea: Trachea and phonation normal. No tracheal tenderness or tracheal deviation.   Cardiovascular:      Heart sounds: Normal heart sounds.    Pulmonary:      Breath sounds: Normal breath sounds. No stridor.   Abdominal:      Palpations: Abdomen is soft.   Musculoskeletal:      Cervical back: Normal range of motion and neck supple. No edema or erythema.   Lymphadenopathy:      Cervical: No cervical adenopathy.   Skin:     General: Skin is warm and dry.      Coloration: Skin is not pale.      Findings: No erythema or rash.   Neurological:      Mental Status: He is alert and oriented to person, place, and time.       CTA 11/13/23:  Postoperative changes right neck.  The cystic/solid lesion right neck prior study of 2014 is no longer visualized.  Mild asymmetry with mild medial deviation of the right vocal cord.  There is an area of focal nodular enhancement at the base of the tongue on the right measuring approximately 1.0 x 1.1 x 0.9 cm (axial image 232 series 2 and sagittal 104 series 601).     Assessment     1. Tongue cancer          Plan  In summary,  Rudolph is a 68 year old male with history of BOT squamous cell carcinoma p16+ s/p XRT in 2015. Now with concerning R BOT lesion with biopsy positive for SCC.    Plan for OR today for partial glossectomy, neck dissection, tracheostomy, and reconstruction with likely ALT free flap.      Patient reports feeling subjective fevers and chills at home, no known sick contacts. Rapid COVID test ordered and returned positive. Will send Paxlovid to pharmacy and reschedule surgery at a later date.

## 2024-01-31 VITALS — SYSTOLIC BLOOD PRESSURE: 130 MMHG | DIASTOLIC BLOOD PRESSURE: 80 MMHG

## 2024-02-06 ENCOUNTER — PATIENT MESSAGE (OUTPATIENT)
Dept: RESEARCH | Facility: HOSPITAL | Age: 69
End: 2024-02-06
Payer: MEDICARE

## 2024-02-07 ENCOUNTER — PATIENT MESSAGE (OUTPATIENT)
Dept: OTOLARYNGOLOGY | Facility: CLINIC | Age: 69
End: 2024-02-07
Payer: MEDICARE

## 2024-02-12 ENCOUNTER — PATIENT MESSAGE (OUTPATIENT)
Dept: OTOLARYNGOLOGY | Facility: CLINIC | Age: 69
End: 2024-02-12
Payer: MEDICARE

## 2024-02-15 ENCOUNTER — TELEPHONE (OUTPATIENT)
Dept: HEMATOLOGY/ONCOLOGY | Facility: CLINIC | Age: 69
End: 2024-02-15
Payer: MEDICARE

## 2024-02-15 ENCOUNTER — OFFICE VISIT (OUTPATIENT)
Dept: OTOLARYNGOLOGY | Facility: CLINIC | Age: 69
End: 2024-02-15
Payer: MEDICARE

## 2024-02-15 ENCOUNTER — HOSPITAL ENCOUNTER (OUTPATIENT)
Dept: RADIOLOGY | Facility: HOSPITAL | Age: 69
Discharge: HOME | End: 2024-02-15
Attending: OTOLARYNGOLOGY
Payer: MEDICARE

## 2024-02-15 VITALS
SYSTOLIC BLOOD PRESSURE: 105 MMHG | WEIGHT: 173.06 LBS | BODY MASS INDEX: 23.44 KG/M2 | HEART RATE: 71 BPM | DIASTOLIC BLOOD PRESSURE: 72 MMHG | HEIGHT: 72 IN

## 2024-02-15 DIAGNOSIS — C02.9 TONGUE CANCER: ICD-10-CM

## 2024-02-15 DIAGNOSIS — C02.9 TONGUE CANCER: Primary | ICD-10-CM

## 2024-02-15 PROCEDURE — 70491 CT SOFT TISSUE NECK W/DYE: CPT | Mod: TC

## 2024-02-15 PROCEDURE — 31575 DIAGNOSTIC LARYNGOSCOPY: CPT | Mod: S$GLB,,, | Performed by: OTOLARYNGOLOGY

## 2024-02-15 PROCEDURE — 3078F DIAST BP <80 MM HG: CPT | Mod: CPTII,S$GLB,, | Performed by: OTOLARYNGOLOGY

## 2024-02-15 PROCEDURE — 1159F MED LIST DOCD IN RCRD: CPT | Mod: CPTII,S$GLB,, | Performed by: OTOLARYNGOLOGY

## 2024-02-15 PROCEDURE — 99214 OFFICE O/P EST MOD 30 MIN: CPT | Mod: 25,S$GLB,, | Performed by: OTOLARYNGOLOGY

## 2024-02-15 PROCEDURE — 25500020 PHARM REV CODE 255: Performed by: OTOLARYNGOLOGY

## 2024-02-15 PROCEDURE — 1160F RVW MEDS BY RX/DR IN RCRD: CPT | Mod: CPTII,S$GLB,, | Performed by: OTOLARYNGOLOGY

## 2024-02-15 PROCEDURE — 3074F SYST BP LT 130 MM HG: CPT | Mod: CPTII,S$GLB,, | Performed by: OTOLARYNGOLOGY

## 2024-02-15 PROCEDURE — 1111F DSCHRG MED/CURRENT MED MERGE: CPT | Mod: CPTII,S$GLB,, | Performed by: OTOLARYNGOLOGY

## 2024-02-15 PROCEDURE — 1125F AMNT PAIN NOTED PAIN PRSNT: CPT | Mod: CPTII,S$GLB,, | Performed by: OTOLARYNGOLOGY

## 2024-02-15 PROCEDURE — 70491 CT SOFT TISSUE NECK W/DYE: CPT | Mod: 26,,, | Performed by: RADIOLOGY

## 2024-02-15 PROCEDURE — 3008F BODY MASS INDEX DOCD: CPT | Mod: CPTII,S$GLB,, | Performed by: OTOLARYNGOLOGY

## 2024-02-15 PROCEDURE — 99999 PR PBB SHADOW E&M-EST. PATIENT-LVL IV: CPT | Mod: PBBFAC,,, | Performed by: OTOLARYNGOLOGY

## 2024-02-15 RX ORDER — SODIUM CHLORIDE 0.9 % (FLUSH) 0.9 %
10 SYRINGE (ML) INJECTION
Status: CANCELLED | OUTPATIENT
Start: 2024-02-15

## 2024-02-15 RX ORDER — LIDOCAINE HYDROCHLORIDE 10 MG/ML
1 INJECTION, SOLUTION EPIDURAL; INFILTRATION; INTRACAUDAL; PERINEURAL ONCE
Status: CANCELLED | OUTPATIENT
Start: 2024-02-15 | End: 2024-02-15

## 2024-02-15 RX ADMIN — IOHEXOL 75 ML: 350 INJECTION, SOLUTION INTRAVENOUS at 11:02

## 2024-02-15 NOTE — ASSESSMENT & PLAN NOTE
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  This new tumor is P 16 negative.  It appears that this lesion has progressed since his last evaluation.  He has not had imaging since December of this year so I ordered a new CT scan.  I explained to him that the only curative option for this problem the surgery but that surgery may entail resection of the tongue base which may devascularize the remainder of the oral tongue and results in a functional total glossectomy.  He states that he has not willing to consent such an operation given his occupational demands particularly as pertains to speech.  This is a reasonable choice.  I will discuss his case at our tumor board today and will arrange evaluations with Radiation and Medical Oncology.  I will contact him with tumor board recommendations.

## 2024-02-15 NOTE — PROGRESS NOTES
CC: Base of tongue cancer      HPI   68 y.o. male presents for evaluation of a newly diagnosed squamous cell carcinoma of the right base of tongue.  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  He underwent right sided cervical lymph node biopsy which revealed metastatic disease in the right neck and subsequent laryngoscopy with biopsy of the right base of tongue.  He was treated with radiation at [facility] in 2015.  Interestingly, his most recent biopsy revealed P 16 negative squamous cell carcinoma.    He presented for surgery roughly 2 weeks ago and was found to be COVID positive.  Surgery was delayed for several weeks in order to give him time to recover from this illness.  Reports that he has noted increasing difficulty swallowing and is concerned that his tumor is growing.    Review of Systems   Constitutional: Negative for fatigue and unexpected weight change.   HENT: Per HPI.  Eyes: Negative for visual disturbance.   Respiratory: Negative for shortness of breath, hemoptysis   Cardiovascular: Negative for chest pain and palpitations.   Musculoskeletal: Negative for decreased ROM, back pain.   Skin: Negative for rash, sunburn, itching.   Neurological: Negative for dizziness and seizures.   Hematological: Negative for adenopathy. Does not bruise/bleed easily.   Endocrine: Negative for rapid weight loss/weight gain, heat/cold intolerance.     Past Medical History   Patient Active Problem List   Diagnosis    Primary insomnia    Hyperlipidemia    Tongue cancer    Rotator cuff syndrome of right shoulder    Memory change    RLS (restless legs syndrome)    Chronic right-sided low back pain    Ectatic aorta    Subclavian artery stenosis, left    Aortic atherosclerosis    Coronary artery calcification    Oropharyngeal mass    Primary hypertension    Elevated alanine aminotransferase (ALT) level            Past Surgical History   Past Surgical History:   Procedure Laterality Date    ANKLE SURGERY      L ankle    BIOPSY OF TISSUE OF NECK Left 2013    COLONOSCOPY N/A 08/21/2017    Procedure: COLONOSCOPY;  Surgeon: Danny Jane MD;  Location: [facility] (4TH FLR);  Service: Endoscopy;  Laterality: N/A;  Do not cancel this order    DIRECT LARYNGOBRONCHOSCOPY N/A 12/11/2023    Procedure: LARYNGOSCOPY, DIRECT, WITH BRONCHOSCOPY;  Surgeon: Micaela Poole MD;  [facility];  Service: ENT;  Laterality: N/A;    ESOPHAGOGASTRODUODENOSCOPY N/A 09/18/2023    Procedure: EGD (ESOPHAGOGASTRODUODENOSCOPY);  Surgeon: Basilia Villavicencio MD;  Location: [facility] ENDOSCOPY;  Service: Gastroenterology;  Laterality: N/A;  9.13 instr sent via portal [facility]  pre call complete, stated he would have a ride -    TONSILLECTOMY      TUMOR REMOVAL Right 2015    at     VASECTOMY      WRIST FRACTURE SURGERY Right          Family History   Family History   Problem Relation Age of Onset    Arthritis Mother     COPD Brother     Psoriasis Neg Hx     Eczema Neg Hx            Social History   .  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                 Social Determinants of Health     Financial Resource Strain: Low Risk  (1/22/2024)    Overall Financial Resource Strain (CARDIA)     Difficulty of Paying Living Expenses: Not hard at all   Food Insecurity: No Food Insecurity (1/22/2024)    Hunger Vital Sign     Worried About Running Out of Food in the Last Year: Never true      Ran Out of Food in the Last Year: Never true   Transportation Needs: No Transportation Needs (1/22/2024)    PRAPARE - Transportation     Lack of Transportation (Medical): No     Lack of Transportation (Non-Medical): No   Physical Activity: Insufficiently Active (1/22/2024)    Exercise Vital Sign     Days of Exercise per Week: 3 days     Minutes of Exercise per Session: 30 min   Stress: Stress Concern Present (1/22/2024)    [employer] [place] [employer] - Occupational Stress Questionnaire     Feeling of Stress : Rather much   Social Connections: Unknown (1/22/2024)    Social Connection and Isolation Panel [NHANES]     Frequency of Communication with Friends and Family: Never     Frequency of Social Gatherings with Friends and Family: Patient declined     Active Member of Clubs or Organizations: No     Attends Club or Organization Meetings: Never     Marital Status:    Housing Stability: Low Risk  (1/22/2024)    Housing Stability Vital Sign     Unable to Pay for Housing in the Last Year: No     Number of Places Lived in the Last Year: 1     Unstable Housing in the Last Year: No         Allergies   Review of patient's allergies indicates:  No Known Allergies        Physical Exam     Vitals:    02/15/24 1012   BP: 105/72   Pulse: 71         Body mass index is 23.47 kg/m².      General: AOx3, NAD   Respiratory:  Symmetric chest rise, normal effort  Nose: No gross nasal septal deviation. Inferior Turbinates WNL bilaterally. No septal perforation. No masses/lesions.   Oral Cavity:  Oral Tongue mobile, no lesions noted. Hard Palate WNL. No buccal or FOM lesions.  Oropharynx:  No masses/lesions of the posterior pharyngeal wall. Tonsillar fossa without lesions. Soft palate without masses. Midline uvula.  Palpable mass right base of tongue extending across the midline with roughly 1 cm of apparently normal tongue base to the left of the lesion.  Neck:  Well-healed right neck scar.  No cervical  lymphadenopathy, thyromegaly or thyroid nodules.  Normal range of motion.    Face: House Brackmann I bilaterally.     Flex Naso Emilee Hypo Procedures #2    Procedure:  Diagnostic flexible nasopharyngoscopy, laryngoscopy and hypopharyngoscopy:    Routine preparation with local atomizer with 1% neosynephrine/pontocaine with customary flexible endoscope.    Nasopharynx:  No lesions.   Mucosa:  No lesions.   Adenoids:  Present.  Posterior Choanae:  Patent.  Eustachian Tubes:  Patent.  Posterior pharynx:  Ulcerated lesion of right base of tongue extending across the midline to within roughly 1 cm of the left most aspect of the base of tongue.  Larynx/hypopharynx:   Epiglottis:  No lesions, without edema.   AE Folds:  No lesions.   Vocal cords:  No polyps, nodules, ulcers or lesions.   Mobility:  Equal and normal bilateral.   Hypopharynx:  No lesions.   Piriform sinus:  No pooling, no lesions.   Post Cricoid:  No erythema, no edema.        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  This new tumor is P 16 negative.  It appears that this lesion has progressed since his last evaluation.  He has not had imaging since December of this year so I ordered a new CT scan.  I explained to him that the only curative option for this problem the surgery but that surgery may entail resection of the tongue base which may devascularize the remainder of the oral tongue and results in a functional total glossectomy.  He states that he has not willing to consent such an operation given his occupational demands particularly as pertains to speech.  This is a reasonable choice.  I will discuss his case at our tumor board today and will arrange evaluations with Radiation and Medical Oncology.  I will contact him  with tumor board recommendations.         Relevant Orders    CT Soft Tissue Neck With Contrast (Completed)    Ambulatory referral/consult to Radiation Oncology    Ambulatory referral/consult to Oncology

## 2024-02-15 NOTE — TELEPHONE ENCOUNTER
Called & spoke with pt, scheduled with both medical & radiation oncology on Monday 2/19/24. Reviewed details & confirmed.

## 2024-02-15 NOTE — H&P (VIEW-ONLY)
CC: Base of tongue cancer      HPI   68 y.o. male presents for evaluation of a newly diagnosed squamous cell carcinoma of the right base of tongue.  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  He underwent right sided cervical lymph node biopsy which revealed metastatic disease in the right neck and subsequent laryngoscopy with biopsy of the right base of tongue.  He was treated with radiation at [facility] in 2015.  Interestingly, his most recent biopsy revealed P 16 negative squamous cell carcinoma.    He presented for surgery roughly 2 weeks ago and was found to be COVID positive.  Surgery was delayed for several weeks in order to give him time to recover from this illness.  Reports that he has noted increasing difficulty swallowing and is concerned that his tumor is growing.    Review of Systems   Constitutional: Negative for fatigue and unexpected weight change.   HENT: Per HPI.  Eyes: Negative for visual disturbance.   Respiratory: Negative for shortness of breath, hemoptysis   Cardiovascular: Negative for chest pain and palpitations.   Musculoskeletal: Negative for decreased ROM, back pain.   Skin: Negative for rash, sunburn, itching.   Neurological: Negative for dizziness and seizures.   Hematological: Negative for adenopathy. Does not bruise/bleed easily.   Endocrine: Negative for rapid weight loss/weight gain, heat/cold intolerance.     Past Medical History   Patient Active Problem List   Diagnosis    Primary insomnia    Hyperlipidemia    Tongue cancer    Rotator cuff syndrome of right shoulder    Memory change    RLS (restless legs syndrome)    Chronic right-sided low back pain    Ectatic aorta    Subclavian artery stenosis, left    Aortic atherosclerosis    Coronary artery calcification    Oropharyngeal mass    Primary hypertension    Elevated alanine aminotransferase (ALT) level            Past Surgical History   Past Surgical History:   Procedure Laterality Date    ANKLE SURGERY      L ankle    BIOPSY OF TISSUE OF NECK Left 2013    COLONOSCOPY N/A 08/21/2017    Procedure: COLONOSCOPY;  Surgeon: Danny Jane MD;  Location: [facility] (4TH FLR);  Service: Endoscopy;  Laterality: N/A;  Do not cancel this order    DIRECT LARYNGOBRONCHOSCOPY N/A 12/11/2023    Procedure: LARYNGOSCOPY, DIRECT, WITH BRONCHOSCOPY;  Surgeon: Micaela Poole MD;  [facility];  Service: ENT;  Laterality: N/A;    ESOPHAGOGASTRODUODENOSCOPY N/A 09/18/2023    Procedure: EGD (ESOPHAGOGASTRODUODENOSCOPY);  Surgeon: Basilia Villavicencio MD;  Location: [facility] ENDOSCOPY;  Service: Gastroenterology;  Laterality: N/A;  9.13 instr sent via portal [facility]  pre call complete, stated he would have a ride -    TONSILLECTOMY      TUMOR REMOVAL Right 2015    at     VASECTOMY      WRIST FRACTURE SURGERY Right          Family History   Family History   Problem Relation Age of Onset    Arthritis Mother     COPD Brother     Psoriasis Neg Hx     Eczema Neg Hx            Social History   .  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                 Social Determinants of Health     Financial Resource Strain: Low Risk  (1/22/2024)    Overall Financial Resource Strain (CARDIA)     Difficulty of Paying Living Expenses: Not hard at all   Food Insecurity: No Food Insecurity (1/22/2024)    Hunger Vital Sign     Worried About Running Out of Food in the Last Year: Never true      Ran Out of Food in the Last Year: Never true   Transportation Needs: No Transportation Needs (1/22/2024)    PRAPARE - Transportation     Lack of Transportation (Medical): No     Lack of Transportation (Non-Medical): No   Physical Activity: Insufficiently Active (1/22/2024)    Exercise Vital Sign     Days of Exercise per Week: 3 days     Minutes of Exercise per Session: 30 min   Stress: Stress Concern Present (1/22/2024)    [employer] [employer] - Occupational Stress Questionnaire     Feeling of Stress : Rather much   Social Connections: Unknown (1/22/2024)    Social Connection and Isolation Panel [NHANES]     Frequency of Communication with Friends and Family: Never     Frequency of Social Gatherings with Friends and Family: Patient declined     Active Member of Clubs or Organizations: No     Attends Club or Organization Meetings: Never     Marital Status:    Housing Stability: Low Risk  (1/22/2024)    Housing Stability Vital Sign     Unable to Pay for Housing in the Last Year: No     Number of Places Lived in the Last Year: 1     Unstable Housing in the Last Year: No         Allergies   Review of patient's allergies indicates:  No Known Allergies        Physical Exam     Vitals:    02/15/24 1012   BP: 105/72   Pulse: 71         Body mass index is 23.47 kg/m².      General: AOx3, NAD   Respiratory:  Symmetric chest rise, normal effort  Nose: No gross nasal septal deviation. Inferior Turbinates WNL bilaterally. No septal perforation. No masses/lesions.   Oral Cavity:  Oral Tongue mobile, no lesions noted. Hard Palate WNL. No buccal or FOM lesions.  Oropharynx:  No masses/lesions of the posterior pharyngeal wall. Tonsillar fossa without lesions. Soft palate without masses. Midline uvula.  Palpable mass right base of tongue extending across the midline with roughly 1 cm of apparently normal tongue base to the left of the lesion.  Neck:  Well-healed right neck scar.  No cervical  lymphadenopathy, thyromegaly or thyroid nodules.  Normal range of motion.    Face: House Brackmann I bilaterally.     Flex Naso Emilee Hypo Procedures #2    Procedure:  Diagnostic flexible nasopharyngoscopy, laryngoscopy and hypopharyngoscopy:    Routine preparation with local atomizer with 1% neosynephrine/pontocaine with customary flexible endoscope.    Nasopharynx:  No lesions.   Mucosa:  No lesions.   Adenoids:  Present.  Posterior Choanae:  Patent.  Eustachian Tubes:  Patent.  Posterior pharynx:  Ulcerated lesion of right base of tongue extending across the midline to within roughly 1 cm of the left most aspect of the base of tongue.  Larynx/hypopharynx:   Epiglottis:  No lesions, without edema.   AE Folds:  No lesions.   Vocal cords:  No polyps, nodules, ulcers or lesions.   Mobility:  Equal and normal bilateral.   Hypopharynx:  No lesions.   Piriform sinus:  No pooling, no lesions.   Post Cricoid:  No erythema, no edema.        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  This new tumor is P 16 negative.  It appears that this lesion has progressed since his last evaluation.  He has not had imaging since December of this year so I ordered a new CT scan.  I explained to him that the only curative option for this problem the surgery but that surgery may entail resection of the tongue base which may devascularize the remainder of the oral tongue and results in a functional total glossectomy.  He states that he has not willing to consent such an operation given his occupational demands particularly as pertains to speech.  This is a reasonable choice.  I will discuss his case at our tumor board today and will arrange evaluations with Radiation and Medical Oncology.  I will contact him  with tumor board recommendations.         Relevant Orders    CT Soft Tissue Neck With Contrast (Completed)    Ambulatory referral/consult to Radiation Oncology    Ambulatory referral/consult to Oncology

## 2024-02-15 NOTE — TELEPHONE ENCOUNTER
----- Message from Samira Mobley sent at 2/15/2024  2:14 PM CST -----  Regarding: head/neck  New Cancer Patient Intake Documentation     Diagnosis: Tongue cancer [C02.9]     Date patient referral received: 2/15/24     Records collected: records from 2015 diagnosis are scanned in ; records from recent diagnosis are in house     Questions asked:  What doctor have you seen for this diagnosis?  Dr. Ventura     What imaging have you had?   11/13 CTA neck  12/7/23 PET scan  2/15/24 CT neck     Have you been diagnosed with cancer by a biopsy and/or surgery?   pathology 12/11/23  pathology 4/16/15

## 2024-02-15 NOTE — NURSING
Oncology Navigation   Intake  Date of Diagnosis: 12/11/23  Cancer Type: Head and Neck  Type of Referral: Internal  Date of Referral: 02/15/24  Initial Nurse Navigator Contact: 02/15/24  Referral to Initial Contact Timeline (days): 0  Date Worked: 02/15/24  First Appointment Available: 02/19/24  Appointment Date: 02/19/24  First Available Date vs. Scheduled Date (days): 0  Multiple appointments: Yes     Treatment  Current Status: Staging work-up    Surgical Oncologist: Dr. Jeferson Ventura    Medical Oncologist: Dr. Vincent Reid  Consult Date: 02/19/24    Radiation Oncologist: Dr. Elvin Zuniga    Procedures: Biopsy; CT; PET scan  Biopsy Schedule Date: 12/11/23  CT Schedule Date: 02/15/24  PET Scan Schedule Date: 12/07/23       HPV: Negative    Radiation Oncologist: Dr. Elvin Zuniga        Acuity      Follow Up  No follow-ups on file.

## 2024-02-19 ENCOUNTER — OFFICE VISIT (OUTPATIENT)
Dept: HEMATOLOGY/ONCOLOGY | Facility: CLINIC | Age: 69
End: 2024-02-19
Payer: MEDICARE

## 2024-02-19 ENCOUNTER — PATIENT MESSAGE (OUTPATIENT)
Dept: OTOLARYNGOLOGY | Facility: CLINIC | Age: 69
End: 2024-02-19
Payer: MEDICARE

## 2024-02-19 VITALS
RESPIRATION RATE: 16 BRPM | OXYGEN SATURATION: 99 % | WEIGHT: 172.81 LBS | SYSTOLIC BLOOD PRESSURE: 136 MMHG | DIASTOLIC BLOOD PRESSURE: 79 MMHG | HEART RATE: 64 BPM | BODY MASS INDEX: 23.44 KG/M2

## 2024-02-19 DIAGNOSIS — C02.9 SQUAMOUS CELL CARCINOMA OF TONGUE: Primary | ICD-10-CM

## 2024-02-19 DIAGNOSIS — C02.9 TONGUE CANCER: ICD-10-CM

## 2024-02-19 DIAGNOSIS — Z79.899 DRUG THERAPY: ICD-10-CM

## 2024-02-19 PROCEDURE — 99205 OFFICE O/P NEW HI 60 MIN: CPT | Mod: S$GLB,,, | Performed by: INTERNAL MEDICINE

## 2024-02-19 PROCEDURE — 3288F FALL RISK ASSESSMENT DOCD: CPT | Mod: CPTII,S$GLB,, | Performed by: INTERNAL MEDICINE

## 2024-02-19 PROCEDURE — 3008F BODY MASS INDEX DOCD: CPT | Mod: CPTII,S$GLB,, | Performed by: INTERNAL MEDICINE

## 2024-02-19 PROCEDURE — 1126F AMNT PAIN NOTED NONE PRSNT: CPT | Mod: CPTII,S$GLB,, | Performed by: INTERNAL MEDICINE

## 2024-02-19 PROCEDURE — 1111F DSCHRG MED/CURRENT MED MERGE: CPT | Mod: CPTII,S$GLB,, | Performed by: INTERNAL MEDICINE

## 2024-02-19 PROCEDURE — 99999 PR PBB SHADOW E&M-EST. PATIENT-LVL IV: CPT | Mod: PBBFAC,,, | Performed by: INTERNAL MEDICINE

## 2024-02-19 PROCEDURE — 1101F PT FALLS ASSESS-DOCD LE1/YR: CPT | Mod: CPTII,S$GLB,, | Performed by: INTERNAL MEDICINE

## 2024-02-19 PROCEDURE — 3078F DIAST BP <80 MM HG: CPT | Mod: CPTII,S$GLB,, | Performed by: INTERNAL MEDICINE

## 2024-02-19 PROCEDURE — 3075F SYST BP GE 130 - 139MM HG: CPT | Mod: CPTII,S$GLB,, | Performed by: INTERNAL MEDICINE

## 2024-02-19 PROCEDURE — 1159F MED LIST DOCD IN RCRD: CPT | Mod: CPTII,S$GLB,, | Performed by: INTERNAL MEDICINE

## 2024-02-19 PROCEDURE — 1160F RVW MEDS BY RX/DR IN RCRD: CPT | Mod: CPTII,S$GLB,, | Performed by: INTERNAL MEDICINE

## 2024-02-20 ENCOUNTER — OFFICE VISIT (OUTPATIENT)
Dept: RADIATION ONCOLOGY | Facility: CLINIC | Age: 69
End: 2024-02-20
Payer: MEDICARE

## 2024-02-20 VITALS
WEIGHT: 175.69 LBS | DIASTOLIC BLOOD PRESSURE: 72 MMHG | HEIGHT: 72 IN | SYSTOLIC BLOOD PRESSURE: 130 MMHG | HEART RATE: 61 BPM | BODY MASS INDEX: 23.8 KG/M2 | OXYGEN SATURATION: 98 % | TEMPERATURE: 98 F

## 2024-02-20 DIAGNOSIS — C02.9 TONGUE CANCER: ICD-10-CM

## 2024-02-20 PROCEDURE — 99999 PR PBB SHADOW E&M-EST. PATIENT-LVL IV: CPT | Mod: PBBFAC,,, | Performed by: STUDENT IN AN ORGANIZED HEALTH CARE EDUCATION/TRAINING PROGRAM

## 2024-02-20 PROCEDURE — 3288F FALL RISK ASSESSMENT DOCD: CPT | Mod: CPTII,S$GLB,, | Performed by: STUDENT IN AN ORGANIZED HEALTH CARE EDUCATION/TRAINING PROGRAM

## 2024-02-20 PROCEDURE — 3008F BODY MASS INDEX DOCD: CPT | Mod: CPTII,S$GLB,, | Performed by: STUDENT IN AN ORGANIZED HEALTH CARE EDUCATION/TRAINING PROGRAM

## 2024-02-20 PROCEDURE — 3075F SYST BP GE 130 - 139MM HG: CPT | Mod: CPTII,S$GLB,, | Performed by: STUDENT IN AN ORGANIZED HEALTH CARE EDUCATION/TRAINING PROGRAM

## 2024-02-20 PROCEDURE — 1126F AMNT PAIN NOTED NONE PRSNT: CPT | Mod: CPTII,S$GLB,, | Performed by: STUDENT IN AN ORGANIZED HEALTH CARE EDUCATION/TRAINING PROGRAM

## 2024-02-20 PROCEDURE — 1159F MED LIST DOCD IN RCRD: CPT | Mod: CPTII,S$GLB,, | Performed by: STUDENT IN AN ORGANIZED HEALTH CARE EDUCATION/TRAINING PROGRAM

## 2024-02-20 PROCEDURE — 31575 DIAGNOSTIC LARYNGOSCOPY: CPT | Mod: S$GLB,,, | Performed by: STUDENT IN AN ORGANIZED HEALTH CARE EDUCATION/TRAINING PROGRAM

## 2024-02-20 PROCEDURE — 99205 OFFICE O/P NEW HI 60 MIN: CPT | Mod: 25,S$GLB,, | Performed by: STUDENT IN AN ORGANIZED HEALTH CARE EDUCATION/TRAINING PROGRAM

## 2024-02-20 PROCEDURE — 3078F DIAST BP <80 MM HG: CPT | Mod: CPTII,S$GLB,, | Performed by: STUDENT IN AN ORGANIZED HEALTH CARE EDUCATION/TRAINING PROGRAM

## 2024-02-20 PROCEDURE — 1101F PT FALLS ASSESS-DOCD LE1/YR: CPT | Mod: CPTII,S$GLB,, | Performed by: STUDENT IN AN ORGANIZED HEALTH CARE EDUCATION/TRAINING PROGRAM

## 2024-02-20 PROCEDURE — 1111F DSCHRG MED/CURRENT MED MERGE: CPT | Mod: CPTII,S$GLB,, | Performed by: STUDENT IN AN ORGANIZED HEALTH CARE EDUCATION/TRAINING PROGRAM

## 2024-02-20 NOTE — PROGRESS NOTES
[facility] Consult Note    Referring provider: Jeferson Ventura MD    Assessment:  Timothy Galdamez is a 68 y.o. 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  Never smoker (intermittent use for a few yrs, never a pack a day)  ECOG: (0) Fully active, able to carry on all predisease performance without restriction        Plan:  Treatment options were discussed with the patient including surgery, radiotherapy, systemic therapy and some combination thereof.   We discussed [place] IJROBP 2018 with RPA.   We discussed the goals of treatment to be curative.  The risks, benefits, scheduling, alternatives to and rationale of radiation therapy were explained in detail.    At this time, he is set up for induction IO. I have spoken with Dr. Moore and will obtain his OSH prior radiation records. My preference is resection if feasible. Understanding this may require a morbid procedure, if risks of surgery > benefits, re-irradiation with concurrent chemo could be considered. If induction therapy could potentially make surgery feasible/ less risk of total glossectomy, then I think that would be best plan. If induction does not change local therapy, it may be best to proceed with definitive intent treatment.   Risks of re-irradiation were discussed, notably tissue necrosis, damage to adjacent blood vessel, notably lingual artery, laryngeal edema and necrosis.         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   11/13/23 CTA for subclavian artery stenosis, demonstrating nodular enhancing lesion at the right tongue base.  12/7/23: PET/CT demonstrated a focus of avidity in the right BOT (1.2cm, with SUV of 7.6). no avid or enlarged cervical  lymph nodes. No distant mets.   12/11/23: DL and biopsy  Op note: exophytic mass of the right tongue base with central ulceration was seen and biopsied   Path: p16 negative squamous cell carcinoma. No LVSI, no PNI. PDL1 10%  CPS 15  2/15/24: CT neck with right BOT mass, mildly increased since prior imaging, on personal review, extending down into the right vallecula. No adenopathy.   Scheduled for surgery 1/30/24 but delayed due to pt having covid. There was clinical progression in interim. Surgery may entail devascularization of the tongue and he did not consent to this.        History of Present Illness:  Timothy GREENE Rudolph presents today to discuss the role of re-irradiation.    He is asymptomatic from this tumor, other than odynophagia with acidic/spicy foods. No throat pain otherwise, otalgia, neck masses, hemoptysis. Voice will fatigue at the end of the day, no other voice changes. Tolerating a full diet. No weight loss. Smoked casually (< 1ppd) for a few years. Social alcohol use that picks up before tax season and then he does not drink during tax season, as he is a busy . No illicit drug use. Hx of tonsillectomy as a kid. No RT other than H&N RT with Dr. Moore.     Review of Systems:  ROS as above    Social History:  Social History     Tobacco Use    Smoking status: Former     Types: Cigarettes    Smokeless tobacco: Never    Tobacco comments:     Quit in 1981   Substance Use Topics    Alcohol use: Yes     Alcohol/week: 7.0 - 8.0 standard drinks of alcohol     Types: 3 Glasses of wine, 4 - 5 Standard drinks or equivalent per week    Drug use: No       Past Medical History:  Past Medical History:   Diagnosis Date    Cancer     Hyperlipidemia     Hypertension        Past Surgical History:   Procedure Laterality Date    ANKLE SURGERY      L ankle    BIOPSY OF TISSUE OF NECK Left 2013    COLONOSCOPY N/A 08/21/2017    Procedure: COLONOSCOPY;  Surgeon: Danny Jane MD;  Location: [address]  FLR);  Service: Endoscopy;  Laterality: N/A;  Do not cancel this order    DIRECT LARYNGOBRONCHOSCOPY N/A 12/11/2023    Procedure: LARYNGOSCOPY, DIRECT, WITH BRONCHOSCOPY;  Surgeon: Micaela Poole MD;  [facility];  Service: ENT;  Laterality: N/A;    ESOPHAGOGASTRODUODENOSCOPY N/A 09/18/2023    Procedure: EGD (ESOPHAGOGASTRODUODENOSCOPY);  Surgeon: Basilia Villavicencio MD;  Location: [facility] ENDOSCOPY;  Service: Gastroenterology;  Laterality: N/A;  9.13 instr sent via portal [facility]  pre call complete, stated he would have a ride -HH    TONSILLECTOMY      TUMOR REMOVAL Right 2015    at     VASECTOMY      WRIST FRACTURE SURGERY Right          Medications:  Current Outpatient Medications on File Prior to Visit   Medication Sig Dispense Refill    amLODIPine (NORVASC) 2.5 MG tablet Take 1 tablet (2.5 mg total) by mouth once daily. 90 tablet 3    ascorbic acid, vitamin C, (VITAMIN C) 100 MG tablet Take 100 mg by mouth once daily.      aspirin (ECOTRIN) 81 MG EC tablet Take 81 mg by mouth once daily.      calcium carbonate (OS-HERMES) 600 mg calcium (1,500 mg) Tab Take 600 mg by mouth once daily.      ergocalciferol, vitamin D2, (VITAMIN D ORAL) Take 1 tablet by mouth once daily.      ezetimibe (ZETIA) 10 mg tablet Take 1 tablet (10 mg total) by mouth once daily. 90 tablet 3    HYDROcodone-acetaminophen (NORCO) 5-325 mg per tablet Take 1 tablet by mouth every 6 (six) hours as needed for Pain. 5 tablet 0    multivitamin (ONE DAILY MULTIVITAMIN) per tablet Take 1 tablet by mouth once daily.      NON FORMULARY MEDICATION Prevegan once daily      omega-3/dha/epa/dpa/fish oil (OMEGA-3 2100 ORAL) Take 1 capsule by mouth once daily.      omeprazole (PRILOSEC) 40 MG capsule Take 1 capsule (40 mg total) by mouth once daily. 30 capsule 11    ondansetron (ZOFRAN-ODT) 4 MG TbDL Dissolve 1 tablet (4 mg total) by mouth every 8 (eight) hours as needed (nausea). 10 tablet 0    rosuvastatin (CRESTOR) 20 MG tablet TAKE 1 TABLET  ONE TIME DAILY 90 tablet 3    vitamin E 100 UNIT capsule Take 100 Units by mouth once daily.       No current facility-administered medications on file prior to visit.       Allergies:  Review of patient's allergies indicates:  No Known Allergies    Exam:  Vitals:    02/20/24 1427   BP: 130/72   BP Location: Right arm   Patient Position: Sitting   BP Method: Large (Automatic)   Pulse: 61   Temp: 98.1 °F (36.7 °C)   SpO2: 98%   Weight: 79.7 kg (175 lb 11.3 oz)   Height: 6' (1.829 m)     Constitutional: Pleasant 68 y.o. male in no acute distress.  Well nourished. Well groomed.   HEENT: no gingival lesions other than a ~2mm shallow ulcer in the right medial mandibular gingiva. Likely trauma. No FOM, oral tongue, buccal, soft palate, tonsillar fossa lesions. He has telangiectasias on the posterior pharyngeal wall and post RT scar tissue in the R>L opx. No tonsillar fossa masses. On palpation there is ~3cm right BOT lesion that does not involve the right tonsil or pharyngeal wall. I palpate 1.5cm of normal tongue on the left but the lesion crosses midline.   Lymph: no appreciated cervical adenopathy.  Cardiovascular: Upper extremities warm to touch  Lungs: No audible wheezing.  Normal effort. **  Musculoskeletal: No gross MSK deformities. Ambulates well  Skin: No rashes appreciated.  Psych: Alert and oriented with appropriate mood and affect.  Neuro:  Grossly normal.    PROCEDURE:  Flexible nasolaryngoscopy  INDICATION:  BOT cancer, gag reflex  PROCEDURE DETAILS:  After verbal consent was obtained and a time-out was performed, the nasal cavity was topically anesthetized.  A flexible nasolaryngoscope was introduced through the right nasal cavity.  There were no complications.  Findings:  no nasopharyngeal masses. No posterior pharyngeal wall masses. In the right BOT there is a raised mass with central ulceration that crosses midline. This does not involve the lingual surface of the epiglottis. No involvement of the  lateral pharynx or PE folds. No appreciated laryngeal or hypopharyngeal lesions.  No significant supraglottic edema.  Vocal cords were visualized bilaterally with normal function.       Data Review:    Independent Interpretation of Test(s): CT neck from 2/15/24 was personally reviewed and shows R bot mass        Elvin Zuniga MD  Radiation Oncology

## 2024-02-21 ENCOUNTER — PATIENT MESSAGE (OUTPATIENT)
Dept: OTOLARYNGOLOGY | Facility: CLINIC | Age: 69
End: 2024-02-21
Payer: MEDICARE

## 2024-02-21 NOTE — PROGRESS NOTES
PATIENT: Timothy Galdamez  MRN: [id number]  DATE: 2/21/2024    Diagnosis:   1. Squamous cell carcinoma of tongue    2. Tongue cancer    3. Drug therapy        Chief Complaint: Cancer and Establish Care      Oncologic History:   Very pleasant 67yo man with prior history of p16+ tongue cancer s/p radiation with Dr. Moore at  in 2015; he was recently noted to have new BOT mass on imaging and upon biopsy this was found to be p16- squamous cell carcinoma. He is referred by Dr. Ventura for medical oncology evaluation.             Subjective:      History of Present Illness: Mr. Galdamez is a 68 y.o. male who presents for evaluation and management of SqCCa BOT, p16-.  He feels well. He is scheduled for partial glossectomy later this week with Dr. Ventura however considering that there is a high morbidity to that surgery he is referred to discuss other possible options. We discussed the options of chemotherapy vs chemo/IO vs single-agent keytruda (his CPS is 15%/10% TPS). He describes an interest to minimize toxicity and as such we did discuss the consideration for up-front immunotherapy. He is scheduled to see Dr. Zuniga tomorrow. Also discussed availability of a clinical trial--will refer to clinical trial TB.       Past medical, surgical, family, and social histories have been reviewed and updated below.    Past Medical History:   Past Medical History:   Diagnosis Date    Cancer     Hyperlipidemia     Hypertension        Past Surgical History:   Past Surgical History:   Procedure Laterality Date    ANKLE SURGERY      L ankle    BIOPSY OF TISSUE OF NECK Left 2013    COLONOSCOPY N/A 08/21/2017    Procedure: COLONOSCOPY;  Surgeon: Danny Jane MD;  Location: [facility] (4TH FLR);  Service: Endoscopy;  Laterality: N/A;  Do not cancel this order    DIRECT LARYNGOBRONCHOSCOPY N/A 12/11/2023    Procedure: LARYNGOSCOPY, DIRECT, WITH BRONCHOSCOPY;  Surgeon: Micaela Poole MD;  Location: [facility] OR 2ND FLR;  Service: ENT;   Laterality: N/A;    ESOPHAGOGASTRODUODENOSCOPY N/A 09/18/2023    Procedure: EGD (ESOPHAGOGASTRODUODENOSCOPY);  Surgeon: Basilia Villavicencio MD;  Location: [facility] ENDOSCOPY;  Service: Gastroenterology;  Laterality: N/A;  9.13 instr sent via portal [facility]  pre call complete, stated he would have a ride -HH    TONSILLECTOMY      TUMOR REMOVAL Right 2015    at     VASECTOMY      WRIST FRACTURE SURGERY Right        Family History:   Family History   Problem Relation Age of Onset    Arthritis Mother     COPD Brother     Psoriasis Neg Hx     Eczema Neg Hx        Social History:  reports that he has quit smoking. His smoking use included cigarettes. He has never used smokeless tobacco. He reports current alcohol use of about 7.0 - 8.0 standard drinks of alcohol per week. He reports that he does not use drugs.    Allergies:  Review of patient's allergies indicates:  No Known Allergies    Medications:  Current Outpatient Medications   Medication Sig Dispense Refill    amLODIPine (NORVASC) 2.5 MG tablet Take 1 tablet (2.5 mg total) by mouth once daily. 90 tablet 3    ascorbic acid, vitamin C, (VITAMIN C) 100 MG tablet Take 100 mg by mouth once daily.      aspirin (ECOTRIN) 81 MG EC tablet Take 81 mg by mouth once daily.      calcium carbonate (OS-HERMES) 600 mg calcium (1,500 mg) Tab Take 600 mg by mouth once daily.      ergocalciferol, vitamin D2, (VITAMIN D ORAL) Take 1 tablet by mouth once daily.      ezetimibe (ZETIA) 10 mg tablet Take 1 tablet (10 mg total) by mouth once daily. 90 tablet 3    HYDROcodone-acetaminophen (NORCO) 5-325 mg per tablet Take 1 tablet by mouth every 6 (six) hours as needed for Pain. 5 tablet 0    multivitamin (ONE DAILY MULTIVITAMIN) per tablet Take 1 tablet by mouth once daily.      NON FORMULARY MEDICATION Prevegan once daily      omega-3/dha/epa/dpa/fish oil (OMEGA-3 2100 ORAL) Take 1 capsule by mouth once daily.      omeprazole (PRILOSEC) 40 MG capsule Take 1 capsule (40 mg total) by mouth once daily.  30 capsule 11    ondansetron (ZOFRAN-ODT) 4 MG TbDL Dissolve 1 tablet (4 mg total) by mouth every 8 (eight) hours as needed (nausea). 10 tablet 0    rosuvastatin (CRESTOR) 20 MG tablet TAKE 1 TABLET ONE TIME DAILY 90 tablet 3    vitamin E 100 UNIT capsule Take 100 Units by mouth once daily.       No current facility-administered medications for this visit.       Review of Systems  A comprehensive review of systems was performed; pertinent positives and negatives are noted in the HPI.      ECOG Performance Status:   ECOG SCORE    0 - Fully active-able to carry on all pre-disease performance without restriction         Objective:      Vitals:   Vitals:    02/19/24 1102   BP: 136/79   BP Location: Right arm   Patient Position: Sitting   BP Method: Large (Automatic)   Pulse: 64   Resp: 16   SpO2: 99%   Weight: 78.4 kg (172 lb 13.5 oz)     BMI: Body mass index is 23.44 kg/m².    Physical Exam  Vitals and nursing note reviewed.   Constitutional:       General: He is not in acute distress.     Appearance: Normal appearance. He is normal weight. He is not toxic-appearing.   HENT:      Head: Normocephalic and atraumatic.   Eyes:      General: No scleral icterus.     Conjunctiva/sclera: Conjunctivae normal.   Cardiovascular:      Rate and Rhythm: Normal rate and regular rhythm.      Heart sounds: Normal heart sounds. No murmur heard.  Pulmonary:      Effort: Pulmonary effort is normal.      Breath sounds: Normal breath sounds. No wheezing, rhonchi or rales.   Abdominal:      General: Abdomen is flat. Bowel sounds are normal.      Palpations: Abdomen is soft.      Tenderness: There is no abdominal tenderness.   Musculoskeletal:         General: No tenderness or deformity.      Cervical back: Neck supple.   Lymphadenopathy:      Cervical: No cervical adenopathy.   Skin:     General: Skin is warm and dry.      Coloration: Skin is not jaundiced.      Findings: No bruising or erythema.   Neurological:      General: No focal  deficit present.      Mental Status: He is alert and oriented to person, place, and time. Mental status is at baseline.   Psychiatric:         Mood and Affect: Mood normal.         Behavior: Behavior normal.         Thought Content: Thought content normal.         Laboratory Data:  Labs have been reviewed.        Specimen to Pathology, Surgery ENT  Order: [id number]  Status: Final result       Visible to patient: Yes (seen)       Next appt: 02/29/2024 at 10:20 AM in Lab (LAB, [facility])    0 Result Notes      Component 2 mo ago   Final Pathologic Diagnosis Base of tongue, right, biopsy:  - Squamous cell carcinoma, invasive, moderately differentiated, non-keratinizing, recurrent    - p16 status (IHC): Negative    - Depth of Invasion (DOI): at least 0.3 cm  - No lymphovascular invasion identified  - No perineural invasion identified  - PD-L1 testing and [employer] NGS have been ordered and results will be      issued in a separate report in the EPIC-MEDIA section  - SEE COMMENT   Comment: Interp By Dave Dominguez MD, PhD, Signed on 12/18/2023 at 12:00   Comment This patient's history of prior history of metastatic squamous cell carcinoma to the right neck (EPIC-MEDIA, [id number], 4/30/2015) with subsequent radiation therapy was reviewed.      Designate Block for Future Studies: [id number] (only block)    This case was reviewed by Dr. MAGALY Amezquita, [facility], who agreed with the above diagnosis.   Microscopic Exam Microscopic examination performed.    Immunostain for p16 was performed and did not demonstrate block-immunopositive staining (interpreted as negative) in the malignant neoplastic cells.  Positive control and internal negative control for p16 immunostain were examined and were appropriate.   [facility]/clinic label MRN:  [id number]  Pathology label MRN:  [id number]    The specimen is received in formalin labeled &quot;right base tongue-perm&quot;. The specimen consists of multiple tan-yellow,  semi-firm fragments of soft tissue, and red-brown hemorrhagic material measuring 1.8 x 0.7 x 0.6 cm in aggregate. The specimen  is submitted entirely in cassette [id number] Valencia,  Gross Technologist   Disclaimer Unless the case is a 'gross only' or additional testing only, the final diagnosis for each specimen is based on a microscopic examination of appropriate tissue sections.   Resulting Agency [facility]              Narrative  Performed by: [employer]  Pre-op Diagnosis: Squamous cell carcinoma of base of tongue  [C01]  Procedure(s):  LARYNGOSCOPY, DIRECT, WITH BRONCHOSCOPY  Number of specimens: 1  Name of specimens: 1. Right base of tongue -Perm  Which provider would you like to cc?->YUNIOR CALHOUN  Release to patient->Immediate  Specimen total (fresh, frozen, permanent):->1      Specimen Collected: 12/11/23 11:20 CST Last Resulted: 12/18/23 12:22 CST           Specimen to Pathology, Surgery ENT  Order: [id number]  Status: Final result       Visible to patient: Yes (seen)       Next appt: 02/29/2024 at 10:20 AM in Lab (LAB, [facility])    0 Result Notes      Component 2 mo ago   Final Pathologic Diagnosis Base of tongue, right, biopsy:  - Squamous cell carcinoma, invasive, moderately differentiated, non-keratinizing, recurrent    - p16 status (IHC): Negative    - Depth of Invasion (DOI): at least 0.3 cm  - No lymphovascular invasion identified  - No perineural invasion identified  - PD-L1 testing and [employer] NGS have been ordered and results will be      issued in a separate report in the EPIC-MEDIA section  - SEE COMMENT   Comment: Interp By Dave Dominguez MD, PhD, Signed on 12/18/2023 at 12:00   Comment This patient's history of prior history of metastatic squamous cell carcinoma to the right neck (EPIC-MEDIA, [id number], 4/30/2015) with subsequent radiation therapy was reviewed.      Designate Block for Future Studies: [id number] (only block)    This case was reviewed by Dr. MCKENZIE  Erasmo Amezquita, [facility], who agreed with the above diagnosis.   Microscopic Exam Microscopic examination performed.    Immunostain for p16 was performed and did not demonstrate block-immunopositive staining (interpreted as negative) in the malignant neoplastic cells.  Positive control and internal negative control for p16 immunostain were examined and were appropriate.   [facility]/clinic label MRN:  [id number]  Pathology label MRN:  [id number]    The specimen is received in formalin labeled &quot;right base tongue-perm&quot;. The specimen consists of multiple tan-yellow, semi-firm fragments of soft tissue, and red-brown hemorrhagic material measuring 1.8 x 0.7 x 0.6 cm in aggregate. The specimen  is submitted entirely in cassette [id number].    John Valencia,  Gross Technologist   Disclaimer Unless the case is a 'gross only' or additional testing only, the final diagnosis for each specimen is based on a microscopic examination of appropriate tissue sections.   Resulting Agency [facility]              Narrative  Performed by: [employer]  Pre-op Diagnosis: Squamous cell carcinoma of base of tongue  [C01]  Procedure(s):  LARYNGOSCOPY, DIRECT, WITH BRONCHOSCOPY  Number of specimens: 1  Name of specimens: 1. Right base of tongue -Perm  Which provider would you like to cc?->YUNIOR CALHOUN  Release to patient->Immediate  Specimen total (fresh, frozen, permanent):->1      Specimen Collected: 12/11/23 11:20 CST Last Resulted: 12/18/23 12:22 CST             Assessment:       1. Squamous cell carcinoma of tongue    2. Tongue cancer    3. Drug therapy      History of local p16+ tongue cancer s/p radiation in 2015 with recent recurrence however now p16-  Currently disease appears confined to tongue. He is scheduled to undergo resection later this week however he presents today to discuss other options. The tumor is PL-L1+ (15% CPS/10% TPS). There is a high risk of morbidity with the planned surgery and if he were to  prioritize minimizing morbidity we could start with immunotherapy and watch closely. Discussed this option. Also there is a clinical trial for first-line in unresectable H&N--a clinical trial TB referral is being placed.     Plan:     Discussed potential to start immunotherapy. Patient consults with rad onc tomorrow. Decision for treatment plan TBD. Consented today for keytruda in the event that is the direction we do end up going.     Orders Placed This Encounter    TSH    Comprehensive Metabolic Panel    CBC Auto Differential          Vincent Reid MD, FACP  Hematology/Oncology  [facility] - [address], [address]  [place] [address]  Phone: [phone]  Fax: [phone]        Total time of this visit, including time spent face to face with patient and/or via video/audio, and also in preparing for today's visit for MDM and documentation. (Medical Decision Making, including consideration of possible diagnoses, management options, complex medical record review, review of diagnostic tests and information, consideration and discussion of significant complications based on comorbidities, and discussion with providers involved with the care of the patient) 71 minutes. Greater than 50% was spent face to face with the patient counseling and coordinating care.

## 2024-02-22 ENCOUNTER — ANESTHESIA EVENT (OUTPATIENT)
Dept: SURGERY | Facility: HOSPITAL | Age: 69
DRG: 012 | End: 2024-02-22
Payer: MEDICARE

## 2024-02-22 NOTE — ANESTHESIA PREPROCEDURE EVALUATION
[facility]  Anesthesia Pre-Operative Evaluation         Patient Name: Timothy Galdamez  YOB: 1955  MRN: [id number]    SUBJECTIVE:     Pre-operative evaluation for Procedure(s) (LRB):  GLOSSECTOMY (Right)  DISSECTION, NECK (Bilateral)  TRACHEOTOMY (N/A)  CREATION, FREE FLAP (N/A)     02/22/2024    Timothy Galdamez is a 68 y.o. 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 Patient was treated at [facility] with radiation and surveillance for 5 years without recurrence. Now with concerning R BOT lesion associated with ulcers which have resolved. Patient also has neck spasms and longstanding history of dysphagia with feeling of food getting suck in esophagus. No history of esophageal dilation. No keya disease.      Patient now presents for the above procedure(s).    TTE 2023    Left Ventricle: The left ventricle is normal in size. Ventricular mass is normal. Normal wall thickness. Normal wall motion. There is normal systolic function with a visually estimated ejection fraction of 55 - 60%. There is normal diastolic function.    Right Ventricle: Normal right ventricular cavity size. Wall thickness is normal. Right ventricle wall motion  is normal. Systolic function is normal.    Pulmonary Artery: The estimated pulmonary artery systolic pressure is 24 mmHg.    IVC/SVC: Normal venous pressure at 3 mmHg.    LDA: None documented.       Prev airway: Intubation:     Induction:  Intravenous    Intubated:  Postinduction    Mask Ventilation:  Easy mask    Attempts:  1    Attempted By:  Student    Method of Intubation:  Video laryngoscopy    Blade:  Angel 3    Laryngeal View Grade: Grade I - full view of cords      Difficult Airway Encountered?: No      Complications:  None    Airway Device:  Oral endotracheal tube    Airway Device Size:   7.0    Style/Cuff Inflation:  Cuffed (inflated to minimal occlusive pressure)    Secured at:  The lips    Placement Verified By:  Capnometry and Fiber optic visualization    Complicating Factors:  None    Findings Post-Intubation:  BS equal bilateral and atraumatic/condition of teeth unchanged    Drips: None documented.      Patient Active Problem List   Diagnosis    Primary insomnia    Hyperlipidemia    Tongue cancer    Rotator cuff syndrome of right shoulder    Memory change    RLS (restless legs syndrome)    Chronic right-sided low back pain    Ectatic aorta    Subclavian artery stenosis, left    Aortic atherosclerosis    Coronary artery calcification    Oropharyngeal mass    Primary hypertension    Elevated alanine aminotransferase (ALT) level       Review of patient's allergies indicates:  No Known Allergies    Current Inpatient Medications:      No current facility-administered medications on file prior to encounter.     Current Outpatient Medications on File Prior to Encounter   Medication Sig Dispense Refill    amLODIPine (NORVASC) 2.5 MG tablet Take 1 tablet (2.5 mg total) by mouth once daily. 90 tablet 3    ascorbic acid, vitamin C, (VITAMIN C) 100 MG tablet Take 100 mg by mouth once daily.      aspirin (ECOTRIN) 81 MG EC tablet Take 81 mg by mouth once daily.      calcium carbonate (OS-HERMES) 600 mg calcium (1,500 mg) Tab Take 600 mg by mouth once daily.      ergocalciferol, vitamin D2, (VITAMIN D ORAL) Take 1 tablet by mouth once daily.      ezetimibe (ZETIA) 10 mg tablet Take 1 tablet (10 mg total) by mouth once daily. 90 tablet 3    HYDROcodone-acetaminophen (NORCO) 5-325 mg per tablet Take 1 tablet by mouth every 6 (six) hours as needed for Pain. 5 tablet 0    multivitamin (ONE DAILY MULTIVITAMIN) per tablet Take 1 tablet by mouth once daily.      NON FORMULARY MEDICATION Prevegan once daily      omega-3/dha/epa/dpa/fish oil (OMEGA-3 2100 ORAL) Take 1 capsule by mouth once daily.      omeprazole  (PRILOSEC) 40 MG capsule Take 1 capsule (40 mg total) by mouth once daily. 30 capsule 11    ondansetron (ZOFRAN-ODT) 4 MG TbDL Dissolve 1 tablet (4 mg total) by mouth every 8 (eight) hours as needed (nausea). 10 tablet 0    rosuvastatin (CRESTOR) 20 MG tablet TAKE 1 TABLET ONE TIME DAILY 90 tablet 3    vitamin E 100 UNIT capsule Take 100 Units by mouth once daily.         Past Surgical History:   Procedure Laterality Date    ANKLE SURGERY      L ankle    BIOPSY OF TISSUE OF NECK Left 2013    COLONOSCOPY N/A 08/21/2017    Procedure: COLONOSCOPY;  Surgeon: Danny Jane MD;  Location: [facility] ENDO (4TH FLR);  Service: Endoscopy;  Laterality: N/A;  Do not cancel this order    DIRECT LARYNGOBRONCHOSCOPY N/A 12/11/2023    Procedure: LARYNGOSCOPY, DIRECT, WITH BRONCHOSCOPY;  Surgeon: Micaela Poole MD;  [facility];  Service: ENT;  Laterality: N/A;    ESOPHAGOGASTRODUODENOSCOPY N/A 09/18/2023    Procedure: EGD (ESOPHAGOGASTRODUODENOSCOPY);  Surgeon: Basilia Villavicencio MD;  Location: [facility] ENDOSCOPY;  Service: Gastroenterology;  Laterality: N/A;  9.13 instr sent via portal [facility]  pre call complete, stated he would have a ride -    TONSILLECTOMY      TUMOR REMOVAL Right 2015    at     VASECTOMY      WRIST FRACTURE SURGERY Right        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                 Social Determinants of Health     Financial Resource Strain: Low Risk  (1/22/2024)    Overall Financial Resource Strain (CARDIA)     Difficulty of Paying  Living Expenses: Not hard at all   Food Insecurity: No Food Insecurity (1/22/2024)    Hunger Vital Sign     Worried About Running Out of Food in the Last Year: Never true     Ran Out of Food in the Last Year: Never true   Transportation Needs: No Transportation Needs (1/22/2024)    PRAPARE - Transportation     Lack of Transportation (Medical): No     Lack of Transportation (Non-Medical): No   Physical Activity: Insufficiently Active (1/22/2024)    Exercise Vital Sign     Days of Exercise per Week: 3 days     Minutes of Exercise per Session: 30 min   Stress: Stress Concern Present (1/22/2024)    [place] [employer] - Occupational Stress Questionnaire     Feeling of Stress : Rather much   Social Connections: Unknown (1/22/2024)    Social Connection and Isolation Panel [NHANES]     Frequency of Communication with Friends and Family: Never     Frequency of Social Gatherings with Friends and Family: Patient declined     Active Member of Clubs or Organizations: No     Attends Club or Organization Meetings: Never     Marital Status:    Housing Stability: Low Risk  (1/22/2024)    Housing Stability Vital Sign     Unable to Pay for Housing in the Last Year: No     Number of Places Lived in the Last Year: 1     Unstable Housing in the Last Year: No       OBJECTIVE:     Vital Signs Range (Last 24H):         Significant Labs:  Lab Results   Component Value Date    WBC 6.05 01/24/2024    HGB 15.8 01/24/2024    HGB 15.8 01/24/2024    HCT 46.4 01/24/2024    HCT 46.4 01/24/2024     01/24/2024    CHOL 150 08/31/2023    TRIG 48 08/31/2023    HDL 57 08/31/2023    ALT 53 (H) 01/24/2024    AST 34 01/24/2024     01/24/2024     01/24/2024    K 4.7 01/24/2024    K 4.7 01/24/2024     01/24/2024     01/24/2024    CREATININE 0.9 01/24/2024    CREATININE 0.9 01/24/2024    BUN 10 01/24/2024    BUN 10 01/24/2024    CO2 27 01/24/2024    CO2 27 01/24/2024    TSH 2.519 08/31/2023    PSA 0.70  "08/31/2023    HGBA1C 5.1 08/31/2023       Diagnostic Studies: No relevant studies.    EKG:   Results for orders placed or performed in visit on 09/22/23   IN OFFICE EKG 12-LEAD (to [place])    Collection Time: 09/22/23  9:58 AM    Narrative    Test Reason : I10,Z13.6,I77.819,    Vent. Rate : 054 BPM     Atrial Rate : 054 BPM     P-R Int : 184 ms          QRS Dur : 130 ms      QT Int : 486 ms       P-R-T Axes : 052 058 055 degrees     QTc Int : 460 ms    Sinus bradycardia  Left bundle branch block  Abnormal ECG  When compared with ECG of 16-JUL-2014 15:00,  Vent. rate has decreased BY  26 BPM  Nonspecific T wave abnormality now evident in Inferior leads  QT has shortened  Confirmed by Dickson RITCHIE MD ([id number]) on 9/22/2023 5:18:47 PM    Referred By: SANDRA HDZ           Confirmed By:Dickson RITCHIE MD       2D ECHO:  TTE:  Results for orders placed or performed during the hospital encounter of 11/06/23   Echo   Result Value Ref Range    BSA 2.05 m2    LVOT stroke volume 125.63 cm3    LVIDd 4.80 3.5 - 6.0 cm    LV Systolic Volume 55.69 mL    LV Systolic Volume Index 27.2 mL/m2    LVIDs 3.63 2.1 - 4.0 cm    LV Diastolic Volume 107.40 mL    LV Diastolic Volume Index 52.39 mL/m2    IVS 0.72 0.6 - 1.1 cm    LVOT diameter 2.00 cm    LVOT area 3.1 cm2    FS 24 (A) 28 - 44 %    Left Ventricle Relative Wall Thickness 0.33 cm    Posterior Wall 0.78 0.6 - 1.1 cm    LV mass 116.63 g    LV Mass Index 57 g/m2    MV Peak E Palmer 0.77 m/s    TDI LATERAL 0.06 m/s    TDI SEPTAL 0.04 m/s    E/E' ratio 15.40 m/s    MV Peak A Palmer 0.77 m/s    TR Max Palmer 2.29 m/s    E/A ratio 1.00     IVRT 98.95 msec    E wave deceleration time 233.80 msec    MV "A" wave duration 12.94 msec    LV SEPTAL E/E' RATIO 19.25 m/s    LA Volume Index 22.1 mL/m2    LV LATERAL E/E' RATIO 12.83 m/s    LA volume 45.34 cm3    PV Peak S Palmer 0.38 m/s    PV Peak D Palmer 0.33 m/s    Pulm vein S/D ratio 1.15     LVOT peak palmer 1.31 m/s    RVDD 2.78 cm    RVOT peak VTI 16.14 cm    TAPSE " 2.72 cm    LA size 2.94 cm    Left Atrium Minor Axis 5.32 cm    Left Atrium Major Axis 5.17 cm    LA volume (mod) 47.91 cm3    LA WIDTH 3.46 cm    LA Volume Index (Mod) 23.4 mL/m2    RA Major Axis 4.71 cm    RA Width 3.34 cm    AV mean gradient 4 mmHg    AV peak gradient 7 mmHg    Ao peak shahzad 1.28 m/s    Ao VTI 33.69 cm    LVOT peak VTI 40.01 cm    AV valve area 3.73 cm²    AV Velocity Ratio 1.02     AV index (prosthetic) 1.19     MELA by Velocity Ratio 3.21 cm²    MV stenosis pressure 1/2 time 67.80 ms    MV valve area p 1/2 method 3.24 cm2    Triscuspid Valve Regurgitation Peak Gradient 21 mmHg    PV mean gradient 1 mmHg    PV PEAK VELOCITY 1.01 m/s    PV peak gradient 4 mmHg    RVOT peak shahzad 0.65 m/s    Sinus 3.71 cm    STJ 3.28 cm    Ascending aorta 3.71 cm    Mean e' 0.05 m/s    ZLVIDS -0.29     ZLVIDD -2.48     TV resting pulmonary artery pressure 24 mmHg    RV TB RVSP 5 mmHg    Est. RA pres 3 mmHg    Narrative      Left Ventricle: The left ventricle is normal in size. Ventricular mass   is normal. Normal wall thickness. Normal wall motion. There is normal   systolic function with a visually estimated ejection fraction of 55 - 60%.   There is normal diastolic function.    Right Ventricle: Normal right ventricular cavity size. Wall thickness   is normal. Right ventricle wall motion  is normal. Systolic function is   normal.    Pulmonary Artery: The estimated pulmonary artery systolic pressure is   24 mmHg.    IVC/SVC: Normal venous pressure at 3 mmHg.         LORETO:  No results found for this or any previous visit.    ASSESSMENT/PLAN:         Pre-op Assessment    I have reviewed the Patient Summary Reports.     I have reviewed the Nursing Notes. I have reviewed the NPO Status.   I have reviewed the Medications.     Review of Systems  Anesthesia Hx:  No problems with previous Anesthesia   History of prior surgery of interest to airway management or planning:          Denies Family Hx of Anesthesia complications.     Denies Personal Hx of Anesthesia complications.                    Social:  Former Smoker, Social Alcohol Use       Hematology/Oncology:  Hematology Normal                     Current/Recent Cancer.         radiation       EENT/Dental:  EENT/Dental Normal           Cardiovascular:     Hypertension   CAD          PVD        Coronary Artery Disease:                            Hypertension         Pulmonary:  Pulmonary Normal                       Hepatic/GI:  Hepatic/GI Normal                 Musculoskeletal:  Musculoskeletal Normal                Neurological:  Neurology Normal                                      Endocrine:  Endocrine Normal            Psych:  Psychiatric Normal                    Physical Exam  General: Well nourished, Cooperative, Alert and Oriented    Airway:  Mallampati: II / I  Mouth Opening: Normal  TM Distance: Normal  Tongue: Normal  Neck ROM: Normal ROM    Dental:  Intact        Anesthesia Plan  Type of Anesthesia, risks & benefits discussed:    Anesthesia Type: Gen ETT  Intra-op Monitoring Plan: Standard ASA Monitors and Art Line  Post Op Pain Control Plan: multimodal analgesia and IV/PO Opioids PRN  Induction:  IV  Airway Plan: Video, Post-Induction  Informed Consent: Informed consent signed with the Patient and all parties understand the risks and agree with anesthesia plan.  All questions answered.   ASA Score: 3  Day of Surgery Review of History & Physical: H&P Update referred to the surgeon/provider.    Ready For Surgery From Anesthesia Perspective.     .

## 2024-02-23 ENCOUNTER — ANESTHESIA (OUTPATIENT)
Dept: SURGERY | Facility: HOSPITAL | Age: 69
DRG: 012 | End: 2024-02-23
Payer: MEDICARE

## 2024-02-23 ENCOUNTER — HOSPITAL ENCOUNTER (INPATIENT)
Facility: HOSPITAL | Age: 69
LOS: 14 days | Discharge: HOME HEALTH | DRG: 012 | End: 2024-03-08
Attending: OTOLARYNGOLOGY | Admitting: OTOLARYNGOLOGY
Payer: MEDICARE

## 2024-02-23 DIAGNOSIS — Z99.11 ENCOUNTER FOR WEANING FROM VENTILATOR: ICD-10-CM

## 2024-02-23 DIAGNOSIS — Z98.890 POSTOPERATIVE STATE: ICD-10-CM

## 2024-02-23 DIAGNOSIS — C02.9 TONGUE CANCER: ICD-10-CM

## 2024-02-23 DIAGNOSIS — Z93.0 TRACHEOSTOMY DEPENDENCE: Primary | ICD-10-CM

## 2024-02-23 LAB
ALBUMIN SERPL BCP-MCNC: 3 G/DL (ref 3.5–5.2)
ALP SERPL-CCNC: 42 U/L (ref 55–135)
ALT SERPL W/O P-5'-P-CCNC: 26 U/L (ref 10–44)
ANION GAP SERPL CALC-SCNC: 9 MMOL/L (ref 8–16)
ANION GAP SERPL CALC-SCNC: 9 MMOL/L (ref 8–16)
AST SERPL-CCNC: 25 U/L (ref 10–40)
BASOPHILS # BLD AUTO: 0.02 K/UL (ref 0–0.2)
BASOPHILS NFR BLD: 0.1 % (ref 0–1.9)
BILIRUB SERPL-MCNC: 0.7 MG/DL (ref 0.1–1)
BUN SERPL-MCNC: 16 MG/DL (ref 8–23)
BUN SERPL-MCNC: 16 MG/DL (ref 8–23)
CALCIUM SERPL-MCNC: 8.4 MG/DL (ref 8.7–10.5)
CALCIUM SERPL-MCNC: 8.4 MG/DL (ref 8.7–10.5)
CHLORIDE SERPL-SCNC: 107 MMOL/L (ref 95–110)
CHLORIDE SERPL-SCNC: 107 MMOL/L (ref 95–110)
CO2 SERPL-SCNC: 22 MMOL/L (ref 23–29)
CO2 SERPL-SCNC: 22 MMOL/L (ref 23–29)
CREAT SERPL-MCNC: 1 MG/DL (ref 0.5–1.4)
CREAT SERPL-MCNC: 1 MG/DL (ref 0.5–1.4)
DIFFERENTIAL METHOD BLD: ABNORMAL
EOSINOPHIL # BLD AUTO: 0 K/UL (ref 0–0.5)
EOSINOPHIL NFR BLD: 0 % (ref 0–8)
ERYTHROCYTE [DISTWIDTH] IN BLOOD BY AUTOMATED COUNT: 12.7 % (ref 11.5–14.5)
ERYTHROCYTE [DISTWIDTH] IN BLOOD BY AUTOMATED COUNT: 12.8 % (ref 11.5–14.5)
EST. GFR  (NO RACE VARIABLE): >60 ML/MIN/1.73 M^2
EST. GFR  (NO RACE VARIABLE): >60 ML/MIN/1.73 M^2
GLUCOSE SERPL-MCNC: 140 MG/DL (ref 70–110)
GLUCOSE SERPL-MCNC: 140 MG/DL (ref 70–110)
HCT VFR BLD AUTO: 33.3 % (ref 40–54)
HCT VFR BLD AUTO: 39 % (ref 40–54)
HGB BLD-MCNC: 11.9 G/DL (ref 14–18)
HGB BLD-MCNC: 13.4 G/DL (ref 14–18)
IMM GRANULOCYTES # BLD AUTO: 0.06 K/UL (ref 0–0.04)
IMM GRANULOCYTES NFR BLD AUTO: 0.4 % (ref 0–0.5)
LACTATE SERPL-SCNC: 2.4 MMOL/L (ref 0.5–2.2)
LYMPHOCYTES # BLD AUTO: 0.9 K/UL (ref 1–4.8)
LYMPHOCYTES NFR BLD: 5.5 % (ref 18–48)
MAGNESIUM SERPL-MCNC: 1.8 MG/DL (ref 1.6–2.6)
MCH RBC QN AUTO: 32.2 PG (ref 27–31)
MCH RBC QN AUTO: 32.7 PG (ref 27–31)
MCHC RBC AUTO-ENTMCNC: 34.4 G/DL (ref 32–36)
MCHC RBC AUTO-ENTMCNC: 35.7 G/DL (ref 32–36)
MCV RBC AUTO: 92 FL (ref 82–98)
MCV RBC AUTO: 94 FL (ref 82–98)
MONOCYTES # BLD AUTO: 1.7 K/UL (ref 0.3–1)
MONOCYTES NFR BLD: 10.3 % (ref 4–15)
NEUTROPHILS # BLD AUTO: 13.3 K/UL (ref 1.8–7.7)
NEUTROPHILS NFR BLD: 83.7 % (ref 38–73)
NRBC BLD-RTO: 0 /100 WBC
PHOSPHATE SERPL-MCNC: 3.8 MG/DL (ref 2.7–4.5)
PLATELET # BLD AUTO: 246 K/UL (ref 150–450)
PLATELET # BLD AUTO: 284 K/UL (ref 150–450)
PMV BLD AUTO: 11.2 FL (ref 9.2–12.9)
PMV BLD AUTO: 11.4 FL (ref 9.2–12.9)
POTASSIUM SERPL-SCNC: 3.9 MMOL/L (ref 3.5–5.1)
POTASSIUM SERPL-SCNC: 3.9 MMOL/L (ref 3.5–5.1)
PROT SERPL-MCNC: 5.3 G/DL (ref 6–8.4)
RBC # BLD AUTO: 3.64 M/UL (ref 4.6–6.2)
RBC # BLD AUTO: 4.16 M/UL (ref 4.6–6.2)
SODIUM SERPL-SCNC: 138 MMOL/L (ref 136–145)
SODIUM SERPL-SCNC: 138 MMOL/L (ref 136–145)
WBC # BLD AUTO: 12.95 K/UL (ref 3.9–12.7)
WBC # BLD AUTO: 15.95 K/UL (ref 3.9–12.7)

## 2024-02-23 PROCEDURE — 88331 PATH CONSLTJ SURG 1 BLK 1SPC: CPT | Mod: 26,,, | Performed by: STUDENT IN AN ORGANIZED HEALTH CARE EDUCATION/TRAINING PROGRAM

## 2024-02-23 PROCEDURE — 37000009 HC ANESTHESIA EA ADD 15 MINS: Performed by: OTOLARYNGOLOGY

## 2024-02-23 PROCEDURE — 83605 ASSAY OF LACTIC ACID: CPT

## 2024-02-23 PROCEDURE — 5A1945Z RESPIRATORY VENTILATION, 24-96 CONSECUTIVE HOURS: ICD-10-PCS | Performed by: OTOLARYNGOLOGY

## 2024-02-23 PROCEDURE — 0CB70ZZ EXCISION OF TONGUE, OPEN APPROACH: ICD-10-PCS | Performed by: OTOLARYNGOLOGY

## 2024-02-23 PROCEDURE — 94003 VENT MGMT INPAT SUBQ DAY: CPT

## 2024-02-23 PROCEDURE — 42890 LIMITED PHARYNGECTOMY: CPT | Mod: 51,,, | Performed by: OTOLARYNGOLOGY

## 2024-02-23 PROCEDURE — 94761 N-INVAS EAR/PLS OXIMETRY MLT: CPT

## 2024-02-23 PROCEDURE — 80053 COMPREHEN METABOLIC PANEL: CPT

## 2024-02-23 PROCEDURE — 84100 ASSAY OF PHOSPHORUS: CPT

## 2024-02-23 PROCEDURE — 88331 PATH CONSLTJ SURG 1 BLK 1SPC: CPT | Performed by: STUDENT IN AN ORGANIZED HEALTH CARE EDUCATION/TRAINING PROGRAM

## 2024-02-23 PROCEDURE — 85027 COMPLETE CBC AUTOMATED: CPT | Performed by: STUDENT IN AN ORGANIZED HEALTH CARE EDUCATION/TRAINING PROGRAM

## 2024-02-23 PROCEDURE — 27201423 OPTIME MED/SURG SUP & DEVICES STERILE SUPPLY: Performed by: OTOLARYNGOLOGY

## 2024-02-23 PROCEDURE — 25000003 PHARM REV CODE 250: Performed by: STUDENT IN AN ORGANIZED HEALTH CARE EDUCATION/TRAINING PROGRAM

## 2024-02-23 PROCEDURE — 25000003 PHARM REV CODE 250

## 2024-02-23 PROCEDURE — D9220A PRA ANESTHESIA: Mod: ,,, | Performed by: ANESTHESIOLOGY

## 2024-02-23 PROCEDURE — 27100171 HC OXYGEN HIGH FLOW UP TO 24 HOURS

## 2024-02-23 PROCEDURE — 99900035 HC TECH TIME PER 15 MIN (STAT)

## 2024-02-23 PROCEDURE — 63600175 PHARM REV CODE 636 W HCPCS

## 2024-02-23 PROCEDURE — 36000707: Performed by: OTOLARYNGOLOGY

## 2024-02-23 PROCEDURE — 99900026 HC AIRWAY MAINTENANCE (STAT)

## 2024-02-23 PROCEDURE — 0CB30ZZ EXCISION OF SOFT PALATE, OPEN APPROACH: ICD-10-PCS | Performed by: OTOLARYNGOLOGY

## 2024-02-23 PROCEDURE — 85025 COMPLETE CBC W/AUTO DIFF WBC: CPT

## 2024-02-23 PROCEDURE — 94002 VENT MGMT INPAT INIT DAY: CPT

## 2024-02-23 PROCEDURE — 0B110F4 BYPASS TRACHEA TO CUTANEOUS WITH TRACHEOSTOMY DEVICE, OPEN APPROACH: ICD-10-PCS | Performed by: OTOLARYNGOLOGY

## 2024-02-23 PROCEDURE — 63600175 PHARM REV CODE 636 W HCPCS: Performed by: STUDENT IN AN ORGANIZED HEALTH CARE EDUCATION/TRAINING PROGRAM

## 2024-02-23 PROCEDURE — 88309 TISSUE EXAM BY PATHOLOGIST: CPT | Performed by: STUDENT IN AN ORGANIZED HEALTH CARE EDUCATION/TRAINING PROGRAM

## 2024-02-23 PROCEDURE — 88309 TISSUE EXAM BY PATHOLOGIST: CPT | Mod: 26,,, | Performed by: STUDENT IN AN ORGANIZED HEALTH CARE EDUCATION/TRAINING PROGRAM

## 2024-02-23 PROCEDURE — 83735 ASSAY OF MAGNESIUM: CPT

## 2024-02-23 PROCEDURE — 15734 MUSCLE-SKIN GRAFT TRUNK: CPT | Mod: 51,,, | Performed by: OTOLARYNGOLOGY

## 2024-02-23 PROCEDURE — 71000033 HC RECOVERY, INTIAL HOUR: Performed by: OTOLARYNGOLOGY

## 2024-02-23 PROCEDURE — 37000008 HC ANESTHESIA 1ST 15 MINUTES: Performed by: OTOLARYNGOLOGY

## 2024-02-23 PROCEDURE — 0DH63UZ INSERTION OF FEEDING DEVICE INTO STOMACH, PERCUTANEOUS APPROACH: ICD-10-PCS | Performed by: STUDENT IN AN ORGANIZED HEALTH CARE EDUCATION/TRAINING PROGRAM

## 2024-02-23 PROCEDURE — 31600 PLANNED TRACHEOSTOMY: CPT | Mod: 51,,, | Performed by: OTOLARYNGOLOGY

## 2024-02-23 PROCEDURE — 03BM0ZZ EXCISION OF RIGHT EXTERNAL CAROTID ARTERY, OPEN APPROACH: ICD-10-PCS | Performed by: OTOLARYNGOLOGY

## 2024-02-23 PROCEDURE — 0CBG0ZZ EXCISION OF RIGHT SUBMAXILLARY GLAND, OPEN APPROACH: ICD-10-PCS | Performed by: OTOLARYNGOLOGY

## 2024-02-23 PROCEDURE — 00BS0ZZ EXCISION OF HYPOGLOSSAL NERVE, OPEN APPROACH: ICD-10-PCS | Performed by: OTOLARYNGOLOGY

## 2024-02-23 PROCEDURE — 63600175 PHARM REV CODE 636 W HCPCS: Performed by: OTOLARYNGOLOGY

## 2024-02-23 PROCEDURE — 36000706: Performed by: OTOLARYNGOLOGY

## 2024-02-23 PROCEDURE — 07T10ZZ RESECTION OF RIGHT NECK LYMPHATIC, OPEN APPROACH: ICD-10-PCS | Performed by: OTOLARYNGOLOGY

## 2024-02-23 PROCEDURE — 63600175 PHARM REV CODE 636 W HCPCS: Mod: JG

## 2024-02-23 PROCEDURE — 88305 TISSUE EXAM BY PATHOLOGIST: CPT | Performed by: STUDENT IN AN ORGANIZED HEALTH CARE EDUCATION/TRAINING PROGRAM

## 2024-02-23 PROCEDURE — 36620 INSERTION CATHETER ARTERY: CPT | Mod: 59,,, | Performed by: ANESTHESIOLOGY

## 2024-02-23 PROCEDURE — 07T20ZZ RESECTION OF LEFT NECK LYMPHATIC, OPEN APPROACH: ICD-10-PCS | Performed by: OTOLARYNGOLOGY

## 2024-02-23 PROCEDURE — 0CJS8ZZ INSPECTION OF LARYNX, VIA NATURAL OR ARTIFICIAL OPENING ENDOSCOPIC: ICD-10-PCS | Performed by: OTOLARYNGOLOGY

## 2024-02-23 PROCEDURE — 99223 1ST HOSP IP/OBS HIGH 75: CPT | Mod: 24,GC,, | Performed by: SURGERY

## 2024-02-23 PROCEDURE — 0DJ08ZZ INSPECTION OF UPPER INTESTINAL TRACT, VIA NATURAL OR ARTIFICIAL OPENING ENDOSCOPIC: ICD-10-PCS | Performed by: STUDENT IN AN ORGANIZED HEALTH CARE EDUCATION/TRAINING PROGRAM

## 2024-02-23 PROCEDURE — 71000039 HC RECOVERY, EACH ADD'L HOUR: Performed by: OTOLARYNGOLOGY

## 2024-02-23 PROCEDURE — 0CBM0ZZ EXCISION OF PHARYNX, OPEN APPROACH: ICD-10-PCS | Performed by: OTOLARYNGOLOGY

## 2024-02-23 PROCEDURE — 49440 PLACE GASTROSTOMY TUBE PERC: CPT | Mod: ,,, | Performed by: STUDENT IN AN ORGANIZED HEALTH CARE EDUCATION/TRAINING PROGRAM

## 2024-02-23 PROCEDURE — 20000000 HC ICU ROOM

## 2024-02-23 PROCEDURE — 41120 PARTIAL REMOVAL OF TONGUE: CPT | Mod: 51,,, | Performed by: OTOLARYNGOLOGY

## 2024-02-23 PROCEDURE — 25000003 PHARM REV CODE 250: Performed by: OTOLARYNGOLOGY

## 2024-02-23 PROCEDURE — 0KXH0Z1 TRANSFER RIGHT THORAX MUSCLE WITH SUBCUTANEOUS TISSUE, OPEN APPROACH: ICD-10-PCS | Performed by: OTOLARYNGOLOGY

## 2024-02-23 PROCEDURE — 38724 REMOVAL OF LYMPH NODES NECK: CPT | Mod: 50,,, | Performed by: OTOLARYNGOLOGY

## 2024-02-23 PROCEDURE — 88305 TISSUE EXAM BY PATHOLOGIST: CPT | Mod: 26,,, | Performed by: STUDENT IN AN ORGANIZED HEALTH CARE EDUCATION/TRAINING PROGRAM

## 2024-02-23 RX ORDER — LIDOCAINE HYDROCHLORIDE 10 MG/ML
1 INJECTION, SOLUTION EPIDURAL; INFILTRATION; INTRACAUDAL; PERINEURAL ONCE
Status: DISCONTINUED | OUTPATIENT
Start: 2024-02-23 | End: 2024-02-23

## 2024-02-23 RX ORDER — MIDAZOLAM HYDROCHLORIDE 1 MG/ML
INJECTION INTRAMUSCULAR; INTRAVENOUS
Status: DISCONTINUED | OUTPATIENT
Start: 2024-02-23 | End: 2024-02-23

## 2024-02-23 RX ORDER — ROCURONIUM BROMIDE 10 MG/ML
INJECTION, SOLUTION INTRAVENOUS
Status: DISCONTINUED | OUTPATIENT
Start: 2024-02-23 | End: 2024-02-23

## 2024-02-23 RX ORDER — ACETAMINOPHEN 650 MG/20.3ML
650 LIQUID ORAL EVERY 6 HOURS
Status: DISCONTINUED | OUTPATIENT
Start: 2024-02-23 | End: 2024-02-28

## 2024-02-23 RX ORDER — OXYCODONE HCL 5 MG/5 ML
5 SOLUTION, ORAL ORAL EVERY 4 HOURS PRN
Status: DISCONTINUED | OUTPATIENT
Start: 2024-02-23 | End: 2024-02-23

## 2024-02-23 RX ORDER — VASOPRESSIN 20 [USP'U]/ML
INJECTION, SOLUTION INTRAMUSCULAR; SUBCUTANEOUS
Status: DISCONTINUED | OUTPATIENT
Start: 2024-02-23 | End: 2024-02-23

## 2024-02-23 RX ORDER — BACITRACIN ZINC 500 UNIT/G
OINTMENT (GRAM) TOPICAL
Status: DISCONTINUED | OUTPATIENT
Start: 2024-02-23 | End: 2024-02-23 | Stop reason: HOSPADM

## 2024-02-23 RX ORDER — ACETAMINOPHEN 500 MG
1000 TABLET ORAL
Status: COMPLETED | OUTPATIENT
Start: 2024-02-23 | End: 2024-02-23

## 2024-02-23 RX ORDER — SODIUM CHLORIDE 9 MG/ML
INJECTION, SOLUTION INTRAVENOUS CONTINUOUS
Status: DISCONTINUED | OUTPATIENT
Start: 2024-02-23 | End: 2024-02-23

## 2024-02-23 RX ORDER — LIDOCAINE HYDROCHLORIDE AND EPINEPHRINE 10; 10 MG/ML; UG/ML
INJECTION, SOLUTION INFILTRATION; PERINEURAL
Status: DISCONTINUED | OUTPATIENT
Start: 2024-02-23 | End: 2024-02-23 | Stop reason: HOSPADM

## 2024-02-23 RX ORDER — FENTANYL CITRATE 50 UG/ML
INJECTION, SOLUTION INTRAMUSCULAR; INTRAVENOUS
Status: DISCONTINUED | OUTPATIENT
Start: 2024-02-23 | End: 2024-02-23

## 2024-02-23 RX ORDER — FENTANYL CITRATE 50 UG/ML
50 INJECTION, SOLUTION INTRAMUSCULAR; INTRAVENOUS ONCE
Status: COMPLETED | OUTPATIENT
Start: 2024-02-23 | End: 2024-02-23

## 2024-02-23 RX ORDER — MORPHINE SULFATE 2 MG/ML
2 INJECTION, SOLUTION INTRAMUSCULAR; INTRAVENOUS EVERY 4 HOURS PRN
Status: DISCONTINUED | OUTPATIENT
Start: 2024-02-23 | End: 2024-02-26

## 2024-02-23 RX ORDER — EZETIMIBE 10 MG/1
10 TABLET ORAL DAILY
Status: DISCONTINUED | OUTPATIENT
Start: 2024-02-24 | End: 2024-03-08 | Stop reason: HOSPADM

## 2024-02-23 RX ORDER — TALC
6 POWDER (GRAM) TOPICAL NIGHTLY PRN
Status: DISCONTINUED | OUTPATIENT
Start: 2024-02-23 | End: 2024-03-08 | Stop reason: HOSPADM

## 2024-02-23 RX ORDER — PHENYLEPHRINE HCL IN 0.9% NACL 20MG/250ML
0-5 PLASTIC BAG, INJECTION (ML) INTRAVENOUS CONTINUOUS
Status: DISCONTINUED | OUTPATIENT
Start: 2024-02-23 | End: 2024-02-23

## 2024-02-23 RX ORDER — ACETAMINOPHEN 325 MG/1
650 TABLET ORAL EVERY 6 HOURS PRN
Status: DISCONTINUED | OUTPATIENT
Start: 2024-02-23 | End: 2024-02-24

## 2024-02-23 RX ORDER — PHENYLEPHRINE HYDROCHLORIDE 10 MG/ML
INJECTION INTRAVENOUS
Status: DISCONTINUED | OUTPATIENT
Start: 2024-02-23 | End: 2024-02-23

## 2024-02-23 RX ORDER — PROPOFOL 10 MG/ML
0-50 INJECTION, EMULSION INTRAVENOUS CONTINUOUS
Status: DISCONTINUED | OUTPATIENT
Start: 2024-02-23 | End: 2024-02-25

## 2024-02-23 RX ORDER — HYDROCODONE BITARTRATE AND ACETAMINOPHEN 5; 325 MG/1; MG/1
1 TABLET ORAL EVERY 6 HOURS PRN
Status: DISCONTINUED | OUTPATIENT
Start: 2024-02-23 | End: 2024-02-24

## 2024-02-23 RX ORDER — HYDROMORPHONE HYDROCHLORIDE 1 MG/ML
0.2 INJECTION, SOLUTION INTRAMUSCULAR; INTRAVENOUS; SUBCUTANEOUS EVERY 5 MIN PRN
Status: DISCONTINUED | OUTPATIENT
Start: 2024-02-23 | End: 2024-02-23

## 2024-02-23 RX ORDER — HYDROCODONE BITARTRATE AND ACETAMINOPHEN 10; 325 MG/1; MG/1
1 TABLET ORAL EVERY 6 HOURS PRN
Status: DISCONTINUED | OUTPATIENT
Start: 2024-02-23 | End: 2024-02-24

## 2024-02-23 RX ORDER — PROPOFOL 10 MG/ML
INJECTION, EMULSION INTRAVENOUS CONTINUOUS PRN
Status: DISCONTINUED | OUTPATIENT
Start: 2024-02-23 | End: 2024-02-23

## 2024-02-23 RX ORDER — FENTANYL CITRATE-0.9 % NACL/PF 10 MCG/ML
0-200 PLASTIC BAG, INJECTION (ML) INTRAVENOUS CONTINUOUS
Status: DISCONTINUED | OUTPATIENT
Start: 2024-02-23 | End: 2024-02-24

## 2024-02-23 RX ORDER — SODIUM CHLORIDE, SODIUM LACTATE, POTASSIUM CHLORIDE, CALCIUM CHLORIDE 600; 310; 30; 20 MG/100ML; MG/100ML; MG/100ML; MG/100ML
INJECTION, SOLUTION INTRAVENOUS CONTINUOUS
Status: ACTIVE | OUTPATIENT
Start: 2024-02-23 | End: 2024-02-26

## 2024-02-23 RX ORDER — SODIUM CHLORIDE 0.9 % (FLUSH) 0.9 %
10 SYRINGE (ML) INJECTION
Status: DISCONTINUED | OUTPATIENT
Start: 2024-02-23 | End: 2024-03-08 | Stop reason: HOSPADM

## 2024-02-23 RX ORDER — ONDANSETRON HYDROCHLORIDE 2 MG/ML
4 INJECTION, SOLUTION INTRAVENOUS EVERY 6 HOURS PRN
Status: DISCONTINUED | OUTPATIENT
Start: 2024-02-23 | End: 2024-03-08 | Stop reason: HOSPADM

## 2024-02-23 RX ORDER — KETAMINE HCL IN 0.9 % NACL 50 MG/5 ML
SYRINGE (ML) INTRAVENOUS
Status: DISCONTINUED | OUTPATIENT
Start: 2024-02-23 | End: 2024-02-23

## 2024-02-23 RX ORDER — SODIUM CHLORIDE 0.9 % (FLUSH) 0.9 %
10 SYRINGE (ML) INJECTION
Status: DISCONTINUED | OUTPATIENT
Start: 2024-02-23 | End: 2024-02-23

## 2024-02-23 RX ORDER — NAPROXEN SODIUM 220 MG/1
81 TABLET, FILM COATED ORAL DAILY
Status: DISCONTINUED | OUTPATIENT
Start: 2024-02-24 | End: 2024-03-08 | Stop reason: HOSPADM

## 2024-02-23 RX ORDER — ATORVASTATIN CALCIUM 40 MG/1
80 TABLET, FILM COATED ORAL DAILY
Status: DISCONTINUED | OUTPATIENT
Start: 2024-02-24 | End: 2024-03-08 | Stop reason: HOSPADM

## 2024-02-23 RX ORDER — FENTANYL CITRATE 50 UG/ML
INJECTION, SOLUTION INTRAMUSCULAR; INTRAVENOUS
Status: COMPLETED
Start: 2024-02-23 | End: 2024-02-23

## 2024-02-23 RX ORDER — DEXAMETHASONE SODIUM PHOSPHATE 4 MG/ML
INJECTION, SOLUTION INTRA-ARTICULAR; INTRALESIONAL; INTRAMUSCULAR; INTRAVENOUS; SOFT TISSUE
Status: DISCONTINUED | OUTPATIENT
Start: 2024-02-23 | End: 2024-02-23

## 2024-02-23 RX ORDER — HALOPERIDOL 5 MG/ML
0.5 INJECTION INTRAMUSCULAR EVERY 10 MIN PRN
Status: DISCONTINUED | OUTPATIENT
Start: 2024-02-23 | End: 2024-02-23

## 2024-02-23 RX ORDER — DEXMEDETOMIDINE HYDROCHLORIDE 100 UG/ML
INJECTION, SOLUTION INTRAVENOUS
Status: DISCONTINUED | OUTPATIENT
Start: 2024-02-23 | End: 2024-02-23

## 2024-02-23 RX ORDER — PANTOPRAZOLE SODIUM 40 MG/10ML
40 INJECTION, POWDER, LYOPHILIZED, FOR SOLUTION INTRAVENOUS DAILY
Status: DISCONTINUED | OUTPATIENT
Start: 2024-02-24 | End: 2024-03-08 | Stop reason: HOSPADM

## 2024-02-23 RX ORDER — ONDANSETRON HYDROCHLORIDE 2 MG/ML
INJECTION, SOLUTION INTRAVENOUS
Status: DISCONTINUED | OUTPATIENT
Start: 2024-02-23 | End: 2024-02-23

## 2024-02-23 RX ORDER — PROPOFOL 10 MG/ML
VIAL (ML) INTRAVENOUS
Status: DISCONTINUED | OUTPATIENT
Start: 2024-02-23 | End: 2024-02-23

## 2024-02-23 RX ORDER — ENOXAPARIN SODIUM 100 MG/ML
40 INJECTION SUBCUTANEOUS EVERY 24 HOURS
Status: DISCONTINUED | OUTPATIENT
Start: 2024-02-24 | End: 2024-03-08 | Stop reason: HOSPADM

## 2024-02-23 RX ORDER — PROPOFOL 10 MG/ML
0-50 INJECTION, EMULSION INTRAVENOUS CONTINUOUS
Status: DISCONTINUED | OUTPATIENT
Start: 2024-02-23 | End: 2024-02-23

## 2024-02-23 RX ORDER — AMLODIPINE BESYLATE 2.5 MG/1
2.5 TABLET ORAL DAILY
Status: DISCONTINUED | OUTPATIENT
Start: 2024-02-24 | End: 2024-03-08 | Stop reason: HOSPADM

## 2024-02-23 RX ORDER — LIDOCAINE HYDROCHLORIDE 20 MG/ML
INJECTION, SOLUTION EPIDURAL; INFILTRATION; INTRACAUDAL; PERINEURAL
Status: DISCONTINUED | OUTPATIENT
Start: 2024-02-23 | End: 2024-02-23

## 2024-02-23 RX ORDER — OXYCODONE HCL 5 MG/5 ML
10 SOLUTION, ORAL ORAL EVERY 4 HOURS PRN
Status: DISCONTINUED | OUTPATIENT
Start: 2024-02-23 | End: 2024-02-23

## 2024-02-23 RX ADMIN — ROCURONIUM BROMIDE 20 MG: 10 INJECTION, SOLUTION INTRAVENOUS at 10:02

## 2024-02-23 RX ADMIN — DEXMEDETOMIDINE 8 MCG: 100 INJECTION, SOLUTION, CONCENTRATE INTRAVENOUS at 12:02

## 2024-02-23 RX ADMIN — ROCURONIUM BROMIDE 20 MG: 10 INJECTION, SOLUTION INTRAVENOUS at 01:02

## 2024-02-23 RX ADMIN — ROCURONIUM BROMIDE 50 MG: 10 INJECTION, SOLUTION INTRAVENOUS at 07:02

## 2024-02-23 RX ADMIN — ROCURONIUM BROMIDE 20 MG: 10 INJECTION, SOLUTION INTRAVENOUS at 02:02

## 2024-02-23 RX ADMIN — PROPOFOL 50 MCG/KG/MIN: 10 INJECTION, EMULSION INTRAVENOUS at 02:02

## 2024-02-23 RX ADMIN — ROCURONIUM BROMIDE 20 MG: 10 INJECTION, SOLUTION INTRAVENOUS at 11:02

## 2024-02-23 RX ADMIN — Medication 25 MG: at 07:02

## 2024-02-23 RX ADMIN — Medication 10 MG: at 08:02

## 2024-02-23 RX ADMIN — VASOPRESSIN 1 UNITS: 20 INJECTION INTRAVENOUS at 11:02

## 2024-02-23 RX ADMIN — PROPOFOL 50 MCG/KG/MIN: 10 INJECTION, EMULSION INTRAVENOUS at 09:02

## 2024-02-23 RX ADMIN — FENTANYL CITRATE 50 MCG: 50 INJECTION INTRAMUSCULAR; INTRAVENOUS at 05:02

## 2024-02-23 RX ADMIN — DEXMEDETOMIDINE 8 MCG: 100 INJECTION, SOLUTION, CONCENTRATE INTRAVENOUS at 09:02

## 2024-02-23 RX ADMIN — Medication 15 MG: at 10:02

## 2024-02-23 RX ADMIN — AMPICILLIN SODIUM AND SULBACTAM SODIUM 3 G: 2; 1 INJECTION, POWDER, FOR SOLUTION INTRAMUSCULAR; INTRAVENOUS at 09:02

## 2024-02-23 RX ADMIN — SODIUM CHLORIDE, POTASSIUM CHLORIDE, SODIUM LACTATE AND CALCIUM CHLORIDE: 600; 310; 30; 20 INJECTION, SOLUTION INTRAVENOUS at 10:02

## 2024-02-23 RX ADMIN — VASOPRESSIN 1 UNITS: 20 INJECTION INTRAVENOUS at 01:02

## 2024-02-23 RX ADMIN — PHENYLEPHRINE HYDROCHLORIDE 100 MCG: 10 INJECTION INTRAVENOUS at 08:02

## 2024-02-23 RX ADMIN — ROCURONIUM BROMIDE 10 MG: 10 INJECTION, SOLUTION INTRAVENOUS at 11:02

## 2024-02-23 RX ADMIN — VASOPRESSIN 2 UNITS: 20 INJECTION INTRAVENOUS at 01:02

## 2024-02-23 RX ADMIN — ACETAMINOPHEN 650 MG: 650 SOLUTION ORAL at 05:02

## 2024-02-23 RX ADMIN — LIDOCAINE HYDROCHLORIDE 100 MG: 20 INJECTION, SOLUTION EPIDURAL; INFILTRATION; INTRACAUDAL; PERINEURAL at 07:02

## 2024-02-23 RX ADMIN — ACETAMINOPHEN 650 MG: 650 SOLUTION ORAL at 11:02

## 2024-02-23 RX ADMIN — ROCURONIUM BROMIDE 20 MG: 10 INJECTION, SOLUTION INTRAVENOUS at 09:02

## 2024-02-23 RX ADMIN — SODIUM CHLORIDE: 0.9 INJECTION, SOLUTION INTRAVENOUS at 07:02

## 2024-02-23 RX ADMIN — VASOPRESSIN 1 UNITS: 20 INJECTION INTRAVENOUS at 12:02

## 2024-02-23 RX ADMIN — FENTANYL CITRATE 50 MCG: 50 INJECTION, SOLUTION INTRAMUSCULAR; INTRAVENOUS at 03:02

## 2024-02-23 RX ADMIN — DEXMEDETOMIDINE 8 MCG: 100 INJECTION, SOLUTION, CONCENTRATE INTRAVENOUS at 08:02

## 2024-02-23 RX ADMIN — FENTANYL CITRATE 50 MCG: 50 INJECTION, SOLUTION INTRAMUSCULAR; INTRAVENOUS at 08:02

## 2024-02-23 RX ADMIN — ONDANSETRON 4 MG: 2 INJECTION INTRAMUSCULAR; INTRAVENOUS at 03:02

## 2024-02-23 RX ADMIN — VASOPRESSIN 2 UNITS: 20 INJECTION INTRAVENOUS at 10:02

## 2024-02-23 RX ADMIN — ROCURONIUM BROMIDE 20 MG: 10 INJECTION, SOLUTION INTRAVENOUS at 12:02

## 2024-02-23 RX ADMIN — PROPOFOL 170 MG: 10 INJECTION, EMULSION INTRAVENOUS at 07:02

## 2024-02-23 RX ADMIN — SODIUM CHLORIDE 0.15 MCG/KG/MIN: 9 INJECTION, SOLUTION INTRAVENOUS at 09:02

## 2024-02-23 RX ADMIN — VASOPRESSIN 1 UNITS: 20 INJECTION INTRAVENOUS at 09:02

## 2024-02-23 RX ADMIN — AMPICILLIN SODIUM AND SULBACTAM SODIUM 3 G: 2; 1 INJECTION, POWDER, FOR SOLUTION INTRAMUSCULAR; INTRAVENOUS at 07:02

## 2024-02-23 RX ADMIN — ACETAMINOPHEN 1000 MG: 500 TABLET ORAL at 06:02

## 2024-02-23 RX ADMIN — DEXAMETHASONE SODIUM PHOSPHATE 8 MG: 4 INJECTION, SOLUTION INTRAMUSCULAR; INTRAVENOUS at 07:02

## 2024-02-23 RX ADMIN — PROPOFOL 50 MCG/KG/MIN: 10 INJECTION, EMULSION INTRAVENOUS at 05:02

## 2024-02-23 RX ADMIN — FENTANYL CITRATE 50 MCG: 50 INJECTION, SOLUTION INTRAMUSCULAR; INTRAVENOUS at 02:02

## 2024-02-23 RX ADMIN — AMPICILLIN SODIUM AND SULBACTAM SODIUM 3 G: 2; 1 INJECTION, POWDER, FOR SOLUTION INTRAMUSCULAR; INTRAVENOUS at 01:02

## 2024-02-23 RX ADMIN — PROPOFOL 20 MG: 10 INJECTION, EMULSION INTRAVENOUS at 09:02

## 2024-02-23 RX ADMIN — SODIUM CHLORIDE, SODIUM GLUCONATE, SODIUM ACETATE, POTASSIUM CHLORIDE, MAGNESIUM CHLORIDE, SODIUM PHOSPHATE, DIBASIC, AND POTASSIUM PHOSPHATE: .53; .5; .37; .037; .03; .012; .00082 INJECTION, SOLUTION INTRAVENOUS at 07:02

## 2024-02-23 RX ADMIN — Medication 25 MCG/HR: at 06:02

## 2024-02-23 RX ADMIN — ROCURONIUM BROMIDE 20 MG: 10 INJECTION, SOLUTION INTRAVENOUS at 08:02

## 2024-02-23 RX ADMIN — DEXMEDETOMIDINE 8 MCG: 100 INJECTION, SOLUTION, CONCENTRATE INTRAVENOUS at 01:02

## 2024-02-23 RX ADMIN — SODIUM CHLORIDE 0.25 MCG/KG/MIN: 9 INJECTION, SOLUTION INTRAVENOUS at 01:02

## 2024-02-23 RX ADMIN — MIDAZOLAM HYDROCHLORIDE 1 MG: 1 INJECTION, SOLUTION INTRAMUSCULAR; INTRAVENOUS at 07:02

## 2024-02-23 RX ADMIN — FENTANYL CITRATE 100 MCG: 50 INJECTION, SOLUTION INTRAMUSCULAR; INTRAVENOUS at 07:02

## 2024-02-23 RX ADMIN — AMPICILLIN SODIUM AND SULBACTAM SODIUM 3 G: 2; 1 INJECTION, POWDER, FOR SOLUTION INTRAMUSCULAR; INTRAVENOUS at 11:02

## 2024-02-23 RX ADMIN — SODIUM CHLORIDE, POTASSIUM CHLORIDE, SODIUM LACTATE AND CALCIUM CHLORIDE: 600; 310; 30; 20 INJECTION, SOLUTION INTRAVENOUS at 05:02

## 2024-02-23 NOTE — SUBJECTIVE & OBJECTIVE
Follow-up For: Procedure(s) (LRB):  GLOSSECTOMY (Right)  DISSECTION, NECK (Bilateral)  TRACHEOTOMY (N/A)  CREATION, FLAP, MUSCLE ROTATION (N/A)  INSERTION, PEG TUBE (Right)    Post-Operative Day: Day of Surgery     Past Medical History:   Diagnosis Date    Cancer     Hyperlipidemia     Hypertension        Past Surgical History:   Procedure Laterality Date    ANKLE SURGERY      L ankle    BIOPSY OF TISSUE OF NECK Left 2013    COLONOSCOPY N/A 08/21/2017    Procedure: COLONOSCOPY;  Surgeon: Danny Jane MD;  Location: [facility] (4TH FLR);  Service: Endoscopy;  Laterality: N/A;  Do not cancel this order    DIRECT LARYNGOBRONCHOSCOPY N/A 12/11/2023    Procedure: LARYNGOSCOPY, DIRECT, WITH BRONCHOSCOPY;  Surgeon: Micaela Poole MD;  [facility];  Service: ENT;  Laterality: N/A;    ESOPHAGOGASTRODUODENOSCOPY N/A 09/18/2023    Procedure: EGD (ESOPHAGOGASTRODUODENOSCOPY);  Surgeon: Basilia Villavicencio MD;  Location: [facility] ENDOSCOPY;  Service: Gastroenterology;  Laterality: N/A;  9.13 instr sent via portal [facility]  pre call complete, stated he would have a ride -    TONSILLECTOMY      TUMOR REMOVAL Right 2015    at     VASECTOMY      WRIST FRACTURE SURGERY Right        Review of patient's allergies indicates:  No Known Allergies    Family History       Problem Relation (Age of Onset)    Arthritis Mother    COPD Brother          Tobacco Use    Smoking status: Former     Types: Cigarettes    Smokeless tobacco: Never    Tobacco comments:     Quit in 1981   Substance and Sexual Activity    Alcohol use: Yes     Alcohol/week: 7.0 - 8.0 standard drinks of alcohol     Types: 3 Glasses of wine, 4 - 5 Standard drinks or equivalent per week    Drug use: No    Sexual activity: Not on file      Review of Systems  See HPI  Objective:     Vital Signs (Most Recent):  Temp: 98.6 °F (37 °C) (02/23/24 1545)  Pulse: 95 (02/23/24 1606)  Resp: 12 (02/23/24 1606)  BP: (!) 154/90 (02/23/24 1606)  SpO2: 100 % (02/23/24 1606)  Vital Signs (24h Range):  Temp:  [98 °F (36.7 °C)-98.6 °F (37 °C)] 98.6 °F (37 °C)  Pulse:  [74-95] 95  Resp:  [12-18] 12  SpO2:  [100 %] 100 %  BP: (142-154)/(77-90) 154/90     Weight: 79.4 kg (175 lb)  Body mass index is 23.73 kg/m².      Intake/Output Summary (Last 24 hours) at 2/23/2024 1624  Last data filed at 2/23/2024 1511  Gross per 24 hour   Intake 2811.05 ml   Output 615 ml   Net 2196.05 ml          Physical Exam  Constitutional:       Appearance: He is ill-appearing.   Neck:      Comments: Incision clean and intact.  Appropriate post-op redness around the neck  2 MARU drains, one on each side of the neck.  Tracheostomy tube in place  Cardiovascular:      Rate and Rhythm: Normal rate.      Comments: -- 2 MARU drains to the chest  -- incision clean and intact  Pulmonary:      Comments: Tracheostomy  Abdominal:      Comments: PEG tube in place   Genitourinary:     Comments: Olson in place  Skin:     General: Skin is warm and dry.   Neurological:      Comments: sedated            Vents:  Vent Mode: A/C (02/23/24 1606)  Set Rate: 12 BPM (02/23/24 1606)  Vt Set: 500 mL (02/23/24 1606)  PEEP/CPAP: 5 cmH20 (02/23/24 1606)  Oxygen Concentration (%): 50 (02/23/24 1606)  Peak Airway Pressure: 17 cmH20 (02/23/24 1606)  Plateau Pressure: 0 cmH20 (02/23/24 1606)  Total Ve: 6.46 L/m (02/23/24 1606)  F/VT Ratio<105 (RSBI): (!) 22.3 (02/23/24 1606)    Lines/Drains/Airways       Drain  Duration                  Closed/Suction Drain 02/23/24 1333 Tube - 1 Right;Lateral Chest Bulb 15 Fr. <1 day         Closed/Suction Drain 02/23/24 1338 Tube - 2 Right;Medial Chest Bulb 15 Fr. <1 day         Closed/Suction Drain 02/23/24 1414 Tube - 3 Left;Anterior Neck Bulb 15 Fr. <1 day         Closed/Suction Drain 02/23/24 1445 Tube - 4 Right;Anterior Neck Bulb 15 Fr. <1 day         Gastrostomy/Enterostomy 02/23/24 0750 Percutaneous endoscopic gastrostomy (PEG) LUQ feeding <1 day         Urethral Catheter 02/23/24 0735 Silicone;Temperature  probe 16 Fr. <1 day              Airway  Duration                  Airway - Non-Surgical 02/23/24 0734 Coil Wire Tube <1 day    Adult Surgical Airway 02/23/24 1504 Shiley Cuffed 6.0/ 75mm <1 day              Peripheral Intravenous Line  Duration                  Peripheral IV - Single Lumen 02/23/24 0555 18 G Left;Posterior Forearm <1 day         Peripheral IV - Single Lumen 02/23/24 0739 18 G Left;Posterior Forearm <1 day                    Significant Labs:    Significant Imaging: I have reviewed all pertinent imaging results/findings within the past 24 hours.

## 2024-02-23 NOTE — OP NOTE
[facility]  General Surgery  Operative Note    DATE: 2/23/2024    PREOPERATIVE DIAGNOSIS: Tongue cancer [C02.9]   Dysphagia  Need for prolonged enteral access    POSTOPERATIVE DIAGNOSIS: Tongue cancer [C02.9]   Dysphagia  Need for prolonged enteral access    Procedure(s):  INSERTION, PEG TUBE     Surgeon(s) and Role:     * Alpesh Wu MD - Primary     * Kiko Galvan MD - Resident - Assisting    ANESTHESIA: General endotracheal anesthesia    FINDINGS: upper endoscopy with routine percutaneous gastrostomy placement.    INDICATION: Mr. Galdamez is a 68 y.o. years old male requiring prolonged enteral nutrition due to ENT procedure, glossectomy, with anticipated dysphagia requiring enteral access. Percutaneous endoscopic gastrostomy was chosen as the route of nutritional support. After discussing the alternatives, benefits and risks including but not limited to bleeding, infection, need for additional procedures, and rarely heart attack, stroke and even death, the decision to go forward was made. All questions were answered to apparent satisfaction and informed consent was obtained.      PROCEDURE IN DETAIL:  was identified in the preoperative area. He had been NPO since midnight. Consent was on the chart. He was marked and ready to proceed. He was taken to the operating room where he underwent general anesthesia and endotracheal intubation. The skin of the abdomen was sterilely prepped and draped over the affected area in the usual fashion.  A timeout was called and documented.     The endoscope was passed down through the esophagus, into the stomach, and down into the first part of the duodenum. No abnormalities were noted during the EGD. The stomach was then fully insufflated with air and the endoscope was positioned in the midportion and directed toward the anterior abdominal wall. With the room darkened and the intensity of the endoscope increase, good transillumination was noted on  the abdominal wall in the upper left quadrant. Finger pressure was applied at the point of transillumination, and adequate indention of the stomach wall was noted by endoscopy. A snare was then passed through the endoscope into the stomach and opened fully at the site of finger indention. A small incision was made at this location using a #15 blade scalpel. The introducer needle with overlying catheter was passed through this incision and into the stomach under direct visualization. The needle was removed from the catheter and the snare was placed surrounding the catheter. The guide wire was passed through the catheter and snared. The endoscope, snare, and guide wire were then withdrawn and pulled out of the patient's mouth. The gastrostomy tube was attached to the loop of the guide wire and the whole thing was pulled back into the stomach until the 3-cm nicolasa of the gastrostomy tube.Adequate placement of the gastrostomy tube was identified and could be rotated freely. The gastrostomy tube end was cut to length and the bumper, clamping appliance, and tube tip were placed.   This completed our procedure. He was awakened from anesthesia, extubated and transferred to the recovery room in stable condition.     EBL: <5mL.    COMPLICATIONS: none apparent.    SPECIMEN: none    DRAINS: gastrostomy tube    DISPOSITION: PACU.    Kiko Galvan MD   General Surgery, PGY-3

## 2024-02-23 NOTE — CARE UPDATE
Care update: Post-Gastrostomy Tube Placement Instructions    Leave g-tube to gravity via [place] bag until 2pm 2/23/2024.   Can keep abdominal binder in place around the clock to help prevent accidental dislodgement if needed.   Ok to use g-tube immediately for medications; prefer elixirs and liquids however well-crushed medications are acceptable. Clamp tube for 30 mins after medication administration.  Tube feeds are ok to start at 2pm 2/23/2024 if no apparent issues.  Please call for any bleeding or malfunction.    Kiko Galvan MD   General Surgery, PGY-3

## 2024-02-23 NOTE — ANESTHESIA PROCEDURE NOTES
Arterial    Diagnosis: squamous cell carcinoma of tongue    Patient location during procedure: done in OR  Timeout: 2/23/2024 7:30 AM  Procedure end time: 2/23/2024 7:40 AM    Staffing  Authorizing Provider: Noemy Nicole MD  Performing Provider: Davida Platt MD    Staffing  Performed by: Davida Platt MD  Authorized by: Noemy Nicole MD    Anesthesiologist was present at the time of the procedure.    Preanesthetic Checklist  Completed: patient identified, IV checked, site marked, risks and benefits discussed, surgical consent, monitors and equipment checked, pre-op evaluation, timeout performed and anesthesia consent givenArterial  Skin Prep: chlorhexidine gluconate  Local Infiltration: none  Anesthesia nerve block laterality: bilateral.  Location: radial    Catheter Size: 20 G Insertion Attempts: unsuccessful  Assessment  Dressing: secured with tape and tegaderm  Patient: Tolerated well  Additional Notes  Attempted 2x on R radial artery, able to see blood return and direct visualization of needle on US however unable to thread due to significant calcifications. Tried 2x on L radial artery, similar issue encountered.

## 2024-02-23 NOTE — BRIEF OP NOTE
[facility] ([facility])  Brief Operative Note    SUMMARY     Surgery Date: 2/23/2024     Surgeon(s) and Role:  Panel 1:     * Jeferson Ventura MD - Primary     * Buck Nelson MD - Resident - Assisting  Panel 2:     * Alpesh Wu MD - Primary     * Kiko Galvan MD - Resident - Assisting        Pre-op Diagnosis:  Tongue cancer [C02.9]    Post-op Diagnosis:  Post-Op Diagnosis Codes:     * Tongue cancer [C02.9]    Procedure(s) (LRB):  GLOSSECTOMY (Right)  DISSECTION, NECK (Bilateral)  TRACHEOTOMY (N/A)  CREATION, FLAP, MUSCLE ROTATION (N/A)  INSERTION, PEG TUBE (Right)    Anesthesia: General    Implants:  * No implants in log *    Operative Findings: right base of tongue mass now s/p trach, PEG, right partial glossectomy, bilateral neck dissections, right pectoralis flap reconstruction    Estimated Blood Loss: * No values recorded between 2/23/2024  7:48 AM and 2/23/2024  3:42 PM *    Estimated Blood Loss has been documented.         Specimens:   Specimen (24h ago, onward)       Start     Ordered    02/23/24 1429  Specimen to Pathology, Surgery ENT  Once        Comments: Pre-op Diagnosis: Tongue cancer [C02.9]Procedure(s):GLOSSECTOMYDISSECTION, NECKTRACHEOTOMYCREATION, FLAP, MUSCLE ROTATIONINSERTION, PEG TUBE Number of specimens: 9Name of specimens: 1. Right level 1B neck dissection (perm)2. Level 1A neck dissection (perm)3. Left level 1B neck dissection (perm)4. Right partial glossectomy single stitch anterior double stitch medial (perm)5. Soft palate margin (frozen, sent to path)6. Posterior pharyngeal margin (frozen, sent to path)7. Floor of mouth margin (frozen, sent to path)8. Left neck dissection level 2-4 (perm)9. Right neck dissection level 2, 3, 4 (perm)     References:    Click here for ordering Quick Tip   Question Answer Comment   Procedure Type: ENT    Specimen Class: Routine/Screening    Which provider would you like to cc? JEFERSON VENTURA    Release to patient Immediate         02/23/24 1432                    [id number]

## 2024-02-23 NOTE — HPI
68 y.o. 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 He is now s/p glossectomy, neck dissection and Pec-neck rotation flap with Dr. Ventura and PEG tube with Dr. Wu on 02/23/24.    On admission, they have a trach, sedated with propofol, and in stable condition. Vasopressor requirements upon admission 0.3 of phenylephrine to maintain blood pressure at a MAP >65 and SBP < 180. They have 2 MARU drains to the neck and 2 MARU drains to the chest. Unable to obtain an A-line in the OR. Pt's left sided blood pressure is about 30 points lower than his right side.BP cuff is on the right side.     Immediate post-operative plans include hemodynamic stabilization. Plan to wean vasopressors  as tolerated, and closely monitor fluid status with strict Is/Os and continued fluid resuscitation as needed.

## 2024-02-23 NOTE — H&P
[facility] - [facility] ([facility])  Critical Care - Surgery  History & Physical    Patient Name: Timothy Galdamez  MRN: [id number]  Admission Date: 2/23/2024  Code Status: Full Code  Attending Physician: Jeferson Ventura MD   Primary Care Provider: Young Lanier MD   Principal Problem: Tongue cancer    Subjective:     HPI:  68 y.o. 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 He is now s/p glossectomy, neck dissection and Pec-neck rotation flap with Dr. Ventura and PEG tube with Dr. Wu on 02/23/24.    On admission, they have a trach, sedated with propofol, and in stable condition. Vasopressor requirements upon admission 0.3 of phenylephrine to maintain blood pressure at a MAP >65 and SBP < 180. They have 2 MARU drains to the neck and 2 MARU drains to the chest. Unable to obtain an A-line in the OR. Pt's left sided blood pressure is about 30 points lower than his right side.BP cuff is on the right side.     Immediate post-operative plans include hemodynamic stabilization. Plan to wean vasopressors  as tolerated, and closely monitor fluid status with strict Is/Os and continued fluid resuscitation as needed.      Hospital/ICU Course:  No notes on file    Follow-up For: Procedure(s) (LRB):  GLOSSECTOMY (Right)  DISSECTION, NECK (Bilateral)  TRACHEOTOMY (N/A)  CREATION, FLAP, MUSCLE ROTATION (N/A)  INSERTION, PEG TUBE (Right)    Post-Operative Day: Day of Surgery     Past Medical History:   Diagnosis Date    Cancer     Hyperlipidemia     Hypertension        Past Surgical History:   Procedure Laterality Date    ANKLE SURGERY      L ankle    BIOPSY OF TISSUE OF NECK Left 2013    COLONOSCOPY N/A 08/21/2017    Procedure: COLONOSCOPY;  Surgeon: Danny Jane MD;  Location: [facility] ([address] [place] [address]);  Service: Endoscopy;  Laterality: N/A;  Do not cancel this order    DIRECT LARYNGOBRONCHOSCOPY N/A 12/11/2023    Procedure: LARYNGOSCOPY,  DIRECT, WITH BRONCHOSCOPY;  Surgeon: Micaela Poole MD;  Location: [facility] OR [address] [place] [address];  Service: ENT;  Laterality: N/A;    ESOPHAGOGASTRODUODENOSCOPY N/A 09/18/2023    Procedure: EGD (ESOPHAGOGASTRODUODENOSCOPY);  Surgeon: Basilia Villavicencio MD;  Location: [facility] ENDOSCOPY;  Service: Gastroenterology;  Laterality: N/A;  9.13 instr sent via portal [facility]  pre call complete, stated he would have a ride -    TONSILLECTOMY      TUMOR REMOVAL Right 2015    at     VASECTOMY      WRIST FRACTURE SURGERY Right        Review of patient's allergies indicates:  No Known Allergies    Family History       Problem Relation (Age of Onset)    Arthritis Mother    COPD Brother          Tobacco Use    Smoking status: Former     Types: Cigarettes    Smokeless tobacco: Never    Tobacco comments:     Quit in 1981   Substance and Sexual Activity    Alcohol use: Yes     Alcohol/week: 7.0 - 8.0 standard drinks of alcohol     Types: 3 Glasses of wine, 4 - 5 Standard drinks or equivalent per week    Drug use: No    Sexual activity: Not on file      Review of Systems  See HPI  Objective:     Vital Signs (Most Recent):  Temp: 98.6 °F (37 °C) (02/23/24 1545)  Pulse: 95 (02/23/24 1606)  Resp: 12 (02/23/24 1606)  BP: (!) 154/90 (02/23/24 1606)  SpO2: 100 % (02/23/24 1606) Vital Signs (24h Range):  Temp:  [98 °F (36.7 °C)-98.6 °F (37 °C)] 98.6 °F (37 °C)  Pulse:  [74-95] 95  Resp:  [12-18] 12  SpO2:  [100 %] 100 %  BP: (142-154)/(77-90) 154/90     Weight: 79.4 kg (175 lb)  Body mass index is 23.73 kg/m².      Intake/Output Summary (Last 24 hours) at 2/23/2024 1624  Last data filed at 2/23/2024 1511  Gross per 24 hour   Intake 2811.05 ml   Output 615 ml   Net 2196.05 ml          Physical Exam  Constitutional:       Appearance: He is ill-appearing.   Neck:      Comments: Incision clean and intact.  Appropriate post-op redness around the neck  2 MARU drains, one on each side of the neck.  Tracheostomy tube in place  Cardiovascular:      Rate and  Rhythm: Normal rate.      Comments: -- 2 MARU drains to the chest  -- incision clean and intact  Pulmonary:      Comments: Tracheostomy  Abdominal:      Comments: PEG tube in place   Genitourinary:     Comments: Olson in place  Skin:     General: Skin is warm and dry.   Neurological:      Comments: sedated            Vents:  Vent Mode: A/C (02/23/24 1606)  Set Rate: 12 BPM (02/23/24 1606)  Vt Set: 500 mL (02/23/24 1606)  PEEP/CPAP: 5 cmH20 (02/23/24 1606)  Oxygen Concentration (%): 50 (02/23/24 1606)  Peak Airway Pressure: 17 cmH20 (02/23/24 1606)  Plateau Pressure: 0 cmH20 (02/23/24 1606)  Total Ve: 6.46 L/m (02/23/24 1606)  F/VT Ratio<105 (RSBI): (!) 22.3 (02/23/24 1606)    Lines/Drains/Airways       Drain  Duration                  Closed/Suction Drain 02/23/24 1333 Tube - 1 Right;Lateral Chest Bulb 15 Fr. <1 day         Closed/Suction Drain 02/23/24 1338 Tube - 2 Right;Medial Chest Bulb 15 Fr. <1 day         Closed/Suction Drain 02/23/24 1414 Tube - 3 Left;Anterior Neck Bulb 15 Fr. <1 day         Closed/Suction Drain 02/23/24 1445 Tube - 4 Right;Anterior Neck Bulb 15 Fr. <1 day         Gastrostomy/Enterostomy 02/23/24 0750 Percutaneous endoscopic gastrostomy (PEG) LUQ feeding <1 day         Urethral Catheter 02/23/24 0735 Silicone;Temperature probe 16 Fr. <1 day              Airway  Duration                  Airway - Non-Surgical 02/23/24 0734 Coil Wire Tube <1 day    Adult Surgical Airway 02/23/24 1504 Shiley Cuffed 6.0/ 75mm <1 day              Peripheral Intravenous Line  Duration                  Peripheral IV - Single Lumen 02/23/24 0555 18 G Left;Posterior Forearm <1 day         Peripheral IV - Single Lumen 02/23/24 0739 18 G Left;Posterior Forearm <1 day                    Significant Labs:    Significant Imaging: I have reviewed all pertinent imaging results/findings within the past 24 hours.  Assessment/Plan:     Oncology  * Tongue cancer  Timothy Galdamez is a 68 y.o. yo male who is s/p glossectomy,neck  dissection, rotational neck flap and G-tube placement by Dr. Ventura and Dr. Wu on 02/23/24. He was a difficult intubation in the OR and is currently sedated with a tracheostomy tube. Plan to keep him sedated overnight.       Neuro/Psych:   -- Sedation: propofol. Keep sedated.  -- Pain: MMD pain regimen  -- On CIWA protocol             Cards:   -- on phenylephrine gtt  -- goal SBP < 180  --High blood pressure is on the right arm      Pulm:   -- Difficult intubation in the  OR.   -- Tracheostomy   -- O2 sat > 90%      Renal:  -- Keep birmingham for strict I/O  -- Replace lytes as needed      FEN / GI:   -- Replace lytes as needed  -- Nutrition: NPO      ID:   -- Tm: afebrile      Heme/Onc:  -- H/H:  -- Daily CBC      Endo:   -- Gluc goal 140-180  -- Blood glucose at goal.         PPx:   Feeding: NPO  Analgesia/Sedation: propofol  Thromboembolic prevention: holding until 02/24/24  HOB >30: Y  Stress Ulcer ppx: none  Glucose control: Critical care goal 140-180 g/dl, SSI**  Bowel reg: **  Invasive Lines/Drains/Airway: [place], Trach,  Deescalation: dc'ed **        Dispo/Code Status/Palliative:   -- SICU / Full Code           Gelacio Norman MD  Critical Care - Surgery  [facility] - Surgery (2nd Fl)

## 2024-02-23 NOTE — ANESTHESIA PROCEDURE NOTES
Intubation    Date/Time: 2/23/2024 7:34 AM    Performed by: Davida Platt MD  Authorized by: Noemy Nicole MD    Intubation:     Induction:  Intravenous    Intubated:  Postinduction    Mask Ventilation:  Easy mask    Attempts:  1    Attempted By:  Resident anesthesiologist    Method of Intubation:  Video laryngoscopy    Blade:  Angel 3    Laryngeal View Grade: Grade I - full view of cords      Difficult Airway Encountered?: No      Complications:  None    Airway Device:  Coil wire tube    Airway Device Size:  7.0    Style/Cuff Inflation:  Cuffed (inflated to minimal occlusive pressure)    Tube secured:  22    Secured at:  The teeth    Placement Verified By:  Capnometry and Revisualization with laryngoscopy    Complicating Factors:  None    Findings Post-Intubation:  BS equal bilateral and atraumatic/condition of teeth unchanged

## 2024-02-23 NOTE — TRANSFER OF CARE
Anesthesia Transfer of Care Note    Patient: Timothy GREENE Rudolph    Procedure(s) Performed: Procedure(s) (LRB):  GLOSSECTOMY (Right)  DISSECTION, NECK (Bilateral)  TRACHEOTOMY (N/A)  CREATION, FLAP, MUSCLE ROTATION (N/A)  INSERTION, PEG TUBE (Right)    Patient location: PACU    Anesthesia Type: general    Transport from OR: Transported from OR intubated on 100% O2 by AMBU with adequate controlled ventilation. Upon arrival to PACU/ICU, patient attached to ventilator and auscultated to confirm bilateral breath sounds and adequate TV. Continuous ECG monitoring in transport. Continuous SpO2 monitoring in transport    Post pain: adequate analgesia    Post assessment: no apparent anesthetic complications and tolerated procedure well    Post vital signs: stable    Level of consciousness: sedated    Nausea/Vomiting: no nausea/vomiting    Complications: none    Transfer of care protocol was followed      Last vitals: Visit Vitals  BP (!) 164/96   Pulse 95   Temp 37 °C (98.6 °F) (Temporal)   Resp 14   Ht 6' (1.829 m)   Wt 79.4 kg (175 lb)   SpO2 100%   BMI 23.73 kg/m²

## 2024-02-23 NOTE — ASSESSMENT & PLAN NOTE
Timothy Galdamez is a 68 y.o. yo male who is s/p glossectomy,neck dissection, rotational neck flap and G-tube placement by Dr. Ventura and Dr. Wu on 02/23/24. He was a difficult intubation in the OR and is currently sedated with a tracheostomy tube. Plan to keep him sedated overnight.       Neuro/Psych:   -- Sedation: propofol. Keep sedated.  -- Pain: MMD pain regimen  -- On CIWA protocol             Cards:   -- on phenylephrine gtt  -- goal SBP < 180  --High blood pressure is on the right arm      Pulm:   -- Difficult intubation in the  OR.   -- Tracheostomy   -- O2 sat > 90%      Renal:  -- Keep birmingham for strict I/O  -- Replace lytes as needed      FEN / GI:   -- Replace lytes as needed  -- Nutrition: NPO      ID:   -- Tm: afebrile      Heme/Onc:  -- H/H:  -- Daily CBC      Endo:   -- Gluc goal 140-180  -- Blood glucose at goal.         PPx:   Feeding: NPO  Analgesia/Sedation: propofol  Thromboembolic prevention: holding until 02/24/24  HOB >30: Y  Stress Ulcer ppx: none  Glucose control: Critical care goal 140-180 g/dl, SSI**  Bowel reg: **  Invasive Lines/Drains/Airway: Irlanda Birmingham,  Kurtcalation: dc'ed **        Dispo/Code Status/Palliative:   -- SICU / Full Code

## 2024-02-24 PROBLEM — Z93.1 S/P PERCUTANEOUS ENDOSCOPIC GASTROSTOMY (PEG) TUBE PLACEMENT: Status: ACTIVE | Noted: 2024-02-24

## 2024-02-24 LAB
BASOPHILS # BLD AUTO: 0.01 K/UL (ref 0–0.2)
BASOPHILS NFR BLD: 0.1 % (ref 0–1.9)
DIFFERENTIAL METHOD BLD: ABNORMAL
EOSINOPHIL # BLD AUTO: 0 K/UL (ref 0–0.5)
EOSINOPHIL NFR BLD: 0 % (ref 0–8)
ERYTHROCYTE [DISTWIDTH] IN BLOOD BY AUTOMATED COUNT: 12.8 % (ref 11.5–14.5)
ERYTHROCYTE [DISTWIDTH] IN BLOOD BY AUTOMATED COUNT: 12.8 % (ref 11.5–14.5)
ERYTHROCYTE [DISTWIDTH] IN BLOOD BY AUTOMATED COUNT: 13 % (ref 11.5–14.5)
ERYTHROCYTE [DISTWIDTH] IN BLOOD BY AUTOMATED COUNT: 13 % (ref 11.5–14.5)
HCT VFR BLD AUTO: 30.6 % (ref 40–54)
HCT VFR BLD AUTO: 32.7 % (ref 40–54)
HCT VFR BLD AUTO: 33.3 % (ref 40–54)
HCT VFR BLD AUTO: 33.3 % (ref 40–54)
HGB BLD-MCNC: 10.6 G/DL (ref 14–18)
HGB BLD-MCNC: 11.4 G/DL (ref 14–18)
HGB BLD-MCNC: 11.8 G/DL (ref 14–18)
HGB BLD-MCNC: 11.8 G/DL (ref 14–18)
IMM GRANULOCYTES # BLD AUTO: 0.03 K/UL (ref 0–0.04)
IMM GRANULOCYTES NFR BLD AUTO: 0.2 % (ref 0–0.5)
LYMPHOCYTES # BLD AUTO: 0.8 K/UL (ref 1–4.8)
LYMPHOCYTES NFR BLD: 6.3 % (ref 18–48)
MAGNESIUM SERPL-MCNC: 1.9 MG/DL (ref 1.6–2.6)
MCH RBC QN AUTO: 32.5 PG (ref 27–31)
MCH RBC QN AUTO: 32.5 PG (ref 27–31)
MCH RBC QN AUTO: 32.8 PG (ref 27–31)
MCH RBC QN AUTO: 33 PG (ref 27–31)
MCHC RBC AUTO-ENTMCNC: 34.6 G/DL (ref 32–36)
MCHC RBC AUTO-ENTMCNC: 34.9 G/DL (ref 32–36)
MCHC RBC AUTO-ENTMCNC: 35.4 G/DL (ref 32–36)
MCHC RBC AUTO-ENTMCNC: 35.4 G/DL (ref 32–36)
MCV RBC AUTO: 92 FL (ref 82–98)
MCV RBC AUTO: 92 FL (ref 82–98)
MCV RBC AUTO: 95 FL (ref 82–98)
MCV RBC AUTO: 95 FL (ref 82–98)
MONOCYTES # BLD AUTO: 1.3 K/UL (ref 0.3–1)
MONOCYTES NFR BLD: 10.7 % (ref 4–15)
NEUTROPHILS # BLD AUTO: 10.1 K/UL (ref 1.8–7.7)
NEUTROPHILS NFR BLD: 82.7 % (ref 38–73)
NRBC BLD-RTO: 0 /100 WBC
PHOSPHATE SERPL-MCNC: 4.4 MG/DL (ref 2.7–4.5)
PLATELET # BLD AUTO: 191 K/UL (ref 150–450)
PLATELET # BLD AUTO: 210 K/UL (ref 150–450)
PLATELET # BLD AUTO: 231 K/UL (ref 150–450)
PLATELET # BLD AUTO: 231 K/UL (ref 150–450)
PMV BLD AUTO: 11.7 FL (ref 9.2–12.9)
PMV BLD AUTO: 11.7 FL (ref 9.2–12.9)
PMV BLD AUTO: 11.9 FL (ref 9.2–12.9)
PMV BLD AUTO: 11.9 FL (ref 9.2–12.9)
POCT GLUCOSE: 137 MG/DL (ref 70–110)
RBC # BLD AUTO: 3.23 M/UL (ref 4.6–6.2)
RBC # BLD AUTO: 3.45 M/UL (ref 4.6–6.2)
RBC # BLD AUTO: 3.63 M/UL (ref 4.6–6.2)
RBC # BLD AUTO: 3.63 M/UL (ref 4.6–6.2)
WBC # BLD AUTO: 11.45 K/UL (ref 3.9–12.7)
WBC # BLD AUTO: 12.18 K/UL (ref 3.9–12.7)
WBC # BLD AUTO: 12.18 K/UL (ref 3.9–12.7)
WBC # BLD AUTO: 12.48 K/UL (ref 3.9–12.7)

## 2024-02-24 PROCEDURE — 63600175 PHARM REV CODE 636 W HCPCS

## 2024-02-24 PROCEDURE — 25000003 PHARM REV CODE 250

## 2024-02-24 PROCEDURE — 99900026 HC AIRWAY MAINTENANCE (STAT)

## 2024-02-24 PROCEDURE — 85027 COMPLETE CBC AUTOMATED: CPT | Mod: 91 | Performed by: STUDENT IN AN ORGANIZED HEALTH CARE EDUCATION/TRAINING PROGRAM

## 2024-02-24 PROCEDURE — 84100 ASSAY OF PHOSPHORUS: CPT

## 2024-02-24 PROCEDURE — 85025 COMPLETE CBC W/AUTO DIFF WBC: CPT

## 2024-02-24 PROCEDURE — 20000000 HC ICU ROOM

## 2024-02-24 PROCEDURE — 94003 VENT MGMT INPAT SUBQ DAY: CPT

## 2024-02-24 PROCEDURE — 27000221 HC OXYGEN, UP TO 24 HOURS

## 2024-02-24 PROCEDURE — 63600175 PHARM REV CODE 636 W HCPCS: Performed by: STUDENT IN AN ORGANIZED HEALTH CARE EDUCATION/TRAINING PROGRAM

## 2024-02-24 PROCEDURE — 25000003 PHARM REV CODE 250: Performed by: STUDENT IN AN ORGANIZED HEALTH CARE EDUCATION/TRAINING PROGRAM

## 2024-02-24 PROCEDURE — 94761 N-INVAS EAR/PLS OXIMETRY MLT: CPT

## 2024-02-24 PROCEDURE — 83735 ASSAY OF MAGNESIUM: CPT

## 2024-02-24 PROCEDURE — C9113 INJ PANTOPRAZOLE SODIUM, VIA: HCPCS | Performed by: STUDENT IN AN ORGANIZED HEALTH CARE EDUCATION/TRAINING PROGRAM

## 2024-02-24 PROCEDURE — 27100171 HC OXYGEN HIGH FLOW UP TO 24 HOURS

## 2024-02-24 PROCEDURE — 99291 CRITICAL CARE FIRST HOUR: CPT | Mod: 24,GC,, | Performed by: SURGERY

## 2024-02-24 PROCEDURE — 99900035 HC TECH TIME PER 15 MIN (STAT)

## 2024-02-24 RX ORDER — LORAZEPAM 0.5 MG/1
2 TABLET ORAL EVERY 4 HOURS PRN
Status: DISCONTINUED | OUTPATIENT
Start: 2024-02-24 | End: 2024-02-28

## 2024-02-24 RX ORDER — OXYCODONE HYDROCHLORIDE 10 MG/1
10 TABLET ORAL EVERY 6 HOURS PRN
Status: DISCONTINUED | OUTPATIENT
Start: 2024-02-24 | End: 2024-02-27

## 2024-02-24 RX ORDER — POLYETHYLENE GLYCOL 3350 17 G/17G
17 POWDER, FOR SOLUTION ORAL DAILY
Status: DISCONTINUED | OUTPATIENT
Start: 2024-02-24 | End: 2024-03-08 | Stop reason: HOSPADM

## 2024-02-24 RX ORDER — THIAMINE HCL 100 MG
100 TABLET ORAL DAILY
Status: DISCONTINUED | OUTPATIENT
Start: 2024-02-24 | End: 2024-03-08 | Stop reason: HOSPADM

## 2024-02-24 RX ORDER — OXYCODONE HYDROCHLORIDE 5 MG/1
5 TABLET ORAL EVERY 6 HOURS PRN
Status: DISCONTINUED | OUTPATIENT
Start: 2024-02-24 | End: 2024-02-27

## 2024-02-24 RX ORDER — MUPIROCIN 20 MG/G
OINTMENT TOPICAL 2 TIMES DAILY
Status: DISPENSED | OUTPATIENT
Start: 2024-02-24 | End: 2024-02-29

## 2024-02-24 RX ADMIN — ACETAMINOPHEN 650 MG: 650 SOLUTION ORAL at 05:02

## 2024-02-24 RX ADMIN — HYDROCODONE BITARTRATE AND ACETAMINOPHEN 1 TABLET: 10; 325 TABLET ORAL at 09:02

## 2024-02-24 RX ADMIN — AMPICILLIN SODIUM AND SULBACTAM SODIUM 3 G: 2; 1 INJECTION, POWDER, FOR SOLUTION INTRAMUSCULAR; INTRAVENOUS at 01:02

## 2024-02-24 RX ADMIN — AMPICILLIN SODIUM AND SULBACTAM SODIUM 3 G: 2; 1 INJECTION, POWDER, FOR SOLUTION INTRAMUSCULAR; INTRAVENOUS at 06:02

## 2024-02-24 RX ADMIN — Medication 100 MG: at 11:02

## 2024-02-24 RX ADMIN — THERA TABS 1 TABLET: TAB at 11:02

## 2024-02-24 RX ADMIN — MORPHINE SULFATE 2 MG: 2 INJECTION, SOLUTION INTRAMUSCULAR; INTRAVENOUS at 05:02

## 2024-02-24 RX ADMIN — MORPHINE SULFATE 2 MG: 2 INJECTION, SOLUTION INTRAMUSCULAR; INTRAVENOUS at 12:02

## 2024-02-24 RX ADMIN — OXYCODONE HYDROCHLORIDE 10 MG: 10 TABLET ORAL at 12:02

## 2024-02-24 RX ADMIN — PANTOPRAZOLE SODIUM 40 MG: 40 INJECTION, POWDER, FOR SOLUTION INTRAVENOUS at 08:02

## 2024-02-24 RX ADMIN — ASPIRIN 81 MG CHEWABLE TABLET 81 MG: 81 TABLET CHEWABLE at 08:02

## 2024-02-24 RX ADMIN — EZETIMIBE 10 MG: 10 TABLET ORAL at 08:02

## 2024-02-24 RX ADMIN — AMPICILLIN SODIUM AND SULBACTAM SODIUM 3 G: 2; 1 INJECTION, POWDER, FOR SOLUTION INTRAMUSCULAR; INTRAVENOUS at 05:02

## 2024-02-24 RX ADMIN — PROPOFOL 50 MCG/KG/MIN: 10 INJECTION, EMULSION INTRAVENOUS at 07:02

## 2024-02-24 RX ADMIN — SODIUM CHLORIDE, POTASSIUM CHLORIDE, SODIUM LACTATE AND CALCIUM CHLORIDE: 600; 310; 30; 20 INJECTION, SOLUTION INTRAVENOUS at 11:02

## 2024-02-24 RX ADMIN — OXYCODONE HYDROCHLORIDE 10 MG: 10 TABLET ORAL at 06:02

## 2024-02-24 RX ADMIN — ACETAMINOPHEN 650 MG: 650 SOLUTION ORAL at 11:02

## 2024-02-24 RX ADMIN — MORPHINE SULFATE 2 MG: 2 INJECTION, SOLUTION INTRAMUSCULAR; INTRAVENOUS at 09:02

## 2024-02-24 RX ADMIN — AMPICILLIN SODIUM AND SULBACTAM SODIUM 3 G: 2; 1 INJECTION, POWDER, FOR SOLUTION INTRAMUSCULAR; INTRAVENOUS at 11:02

## 2024-02-24 RX ADMIN — ENOXAPARIN SODIUM 40 MG: 40 INJECTION SUBCUTANEOUS at 05:02

## 2024-02-24 RX ADMIN — AMLODIPINE BESYLATE 2.5 MG: 10 TABLET ORAL at 10:02

## 2024-02-24 RX ADMIN — ATORVASTATIN CALCIUM 80 MG: 40 TABLET, FILM COATED ORAL at 08:02

## 2024-02-24 RX ADMIN — POLYETHYLENE GLYCOL 3350 17 G: 17 POWDER, FOR SOLUTION ORAL at 09:02

## 2024-02-24 RX ADMIN — PROPOFOL 50 MCG/KG/MIN: 10 INJECTION, EMULSION INTRAVENOUS at 11:02

## 2024-02-24 NOTE — SUBJECTIVE & OBJECTIVE
Interval History: HDS, afebrile. PEG tube with minimal output, functioning appropriately.    Medications:  Continuous Infusions:   lactated ringers 125 mL/hr at 02/24/24 1000    propofoL 50 mcg/kg/min (02/24/24 0928)     Scheduled Meds:   acetaminophen  650 mg Per G Tube Q6H    amLODIPine  2.5 mg Per G Tube Daily    ampicillin-sulbactam  3 g Intravenous Q6H    aspirin  81 mg Per G Tube Daily    atorvastatin  80 mg Per G Tube Daily    enoxparin  40 mg Subcutaneous Daily    ezetimibe  10 mg Per G Tube Daily    multivitamin  1 tablet Per G Tube Daily    pantoprazole  40 mg Intravenous Daily    polyethylene glycol  17 g Per G Tube Daily    thiamine  100 mg Per G Tube Daily     PRN Meds:acetaminophen, HYDROcodone-acetaminophen, HYDROcodone-acetaminophen, LORazepam, melatonin, morphine, ondansetron, sodium chloride 0.9%     Review of patient's allergies indicates:  No Known Allergies  Objective:     Vital Signs (Most Recent):  Temp: 98.4 °F (36.9 °C) (02/24/24 0730)  Pulse: 75 (02/24/24 1030)  Resp: 16 (02/24/24 1030)  BP: 136/79 (02/24/24 1030)  SpO2: 100 % (02/24/24 1030) Vital Signs (24h Range):  Temp:  [98.4 °F (36.9 °C)-100.1 °F (37.8 °C)] 98.4 °F (36.9 °C)  Pulse:  [] 75  Resp:  [12-23] 16  SpO2:  [100 %] 100 %  BP: ()/() 136/79     Weight: 79.4 kg (175 lb)  Body mass index is 23.73 kg/m².    Intake/Output - Last 3 Shifts         02/22 0700 02/23 0659 02/23 0700 02/24 0659 02/24 0700 02/25 0659    I.V. (mL/kg)  3460.2 (43.6) 506.3 (6.4)    IV Piggyback  1002.9 90.1    Total Intake(mL/kg)  4463.1 (56.2) 596.4 (7.5)    Urine (mL/kg/hr)  1160 (0.6) 170 (0.6)    Drains  523 70    Total Output  1683 240    Net  +2780.1 +356.4                    Physical Exam  Constitutional:       Comments: sedated   Neck:      Comments: Neck incision c/d/I  Swelling at inferior portion of incision  2x neck MARU drains w/ SS output    Tracheostomy tube in place w/ pooled clotted blood    Cardiovascular:      Rate and  Rhythm: Normal rate and regular rhythm.      Comments: 2x MARU drains to flap site w/ SS output  Incision c/d/i  Pulmonary:      Comments: Tracheostomy  Abdominal:      General: There is no distension.      Palpations: Abdomen is soft.      Tenderness: There is no abdominal tenderness.      Comments: PEG tube in place   Genitourinary:     Comments: Olson in place  Musculoskeletal:      Right lower leg: No edema.      Left lower leg: No edema.   Skin:     General: Skin is warm and dry.          Significant Labs:  I have reviewed all pertinent lab results within the past 24 hours.    Significant Diagnostics:  I have reviewed all pertinent imaging results/findings within the past 24 hours.

## 2024-02-24 NOTE — PLAN OF CARE
Recommendations     1.) TF recs: If pt is to remain on Propofol sedation, recommend Peptamen VHP at trickle rate and slowly advancing to goal rate of 50ml/hr to provide 1200 kcal, 110g PRO, and 1008ml FF + Kcal from Propofol- FWF per MD.      2.) TF recs: Once pt is off Propofol sedation, recommend TF of Impact Peptide 1.5 at trickle rate and slowly advancing to goal rate of 50ml/hr to provide 1800 kcal, 113g PRO, and 924ml FF- FWF per MD.      3.) Monitor for s/s of intolerance such as residuals >500ml, n/v/d, or abdominal distension.       4.) Re-consult nutrition when bolus feeds are medically appropriate.     5.) RD to monitor wt, tolerance, skin, labs, vent settings.         Goals: to meet % of EEN/EPN by next RD f/u  Nutrition Goal Status: new  Communication of RD Recs:  (POC)

## 2024-02-24 NOTE — NURSING
Pt received from PACU via stretcher with cardiac monitoring in place. Upon arrival, Phenylephrine infusing with no order in place. ENT notified and verbal order given to titrate gtt off if possible. Order to be carried out as given.

## 2024-02-24 NOTE — ASSESSMENT & PLAN NOTE
Timothy Galdamez is a 68 y.o. yo male who is s/p glossectomy,neck dissection, rotational neck flap and G-tube placement by Dr. Ventura and Dr. Wu on 02/23/24. He was a difficult intubation in the OR and is currently sedated with a tracheostomy tube. Plan to keep him sedated overnight.       Neuro/Psych:   -- Sedation: Wean for SAT  -- Pain: MMD pain regimen  -- On CIWA protocol for hx EtOH             Cards:   -- HDS off pressors.   -- goal SBP < 180  --High blood pressure is on the right arm      Pulm:   -- Difficult intubation in the OR.   -- S/p Tracheostomy creation  -- Wean for SBT  -- O2 sat > 90%  Vent Mode: A/C  Oxygen Concentration (%):  [40-50] 50  Resp Rate Total:  [12 br/min-23 br/min] 14 br/min  Vt Set:  [500 mL] 500 mL  PEEP/CPAP:  [5 cmH20] 5 cmH20  Mean Airway Pressure:  [6.5 cmH20-7.6 cmH20] 6.7 cmH20        Renal:  -- Continue birmingham for strict I/O  -- Replace lytes as needed      FEN / GI:   -- Replace lytes as needed  -- Nutrition: trickle TF      ID:   -- Tm: afebrile  -- WBC stable      Heme/Onc:  -- H/H:  -- Daily CBC      Endo:   -- Gluc goal 140-180  -- Blood glucose at goal.         PPx:   Feeding: advance to trickle  Analgesia/Sedation: propofol, wean to SAT  Thromboembolic prevention: holding until 02/25/24  HOB >30: Y  Stress Ulcer ppx: none  Glucose control: Critical care goal 140-180 g/dl, SSI  Bowel reg: miralax   Invasive Lines/Drains/Airway: [place], Trach, 2x neck MARU drains 2x flap incision MARU drains  Deescalation:        Dispo/Code Status/Palliative:   -- SICU / Full Code

## 2024-02-24 NOTE — PT/OT/SLP PROGRESS
Physical Therapy  Consult    Patient Name:  Timothy Galdamez   MRN:  [id number]  Admitting Diagnosis:  Tongue cancer   Recent Surgery: Procedure(s) (LRB):  GLOSSECTOMY (Right)  DISSECTION, NECK (Bilateral)  TRACHEOTOMY (N/A)  CREATION, FLAP, MUSCLE ROTATION (N/A)  INSERTION, PEG TUBE (Right) 1 Day Post-Op  Admit Date: 2/23/2024  Length of Stay: 1 days    Physical Therapy orders received and acknowledged. Patient not seen today due to Nurse/ NAKITA hold as pt remains sedated on vent  via trach.  PT to attempt when Timothy Galdamez is medically appropriate for progressive mobility.    2/24/2024

## 2024-02-24 NOTE — ASSESSMENT & PLAN NOTE
Timothy Galdamez is a 68 y.o. yo male who is s/p glossectomy,neck dissection, rotational neck flap and G-tube placement by Dr. Ventura and Dr. Wu on 02/23/24. PEG is functional without issue    - continue PEG care  - cont to use for meds, can use for feeds  - rest of care per primary    General surgery will sign off, please reach out for any concerns.

## 2024-02-24 NOTE — ASSESSMENT & PLAN NOTE
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 He had bleeding from the oral cavity post-op and we recommend heavy sedation to avoid exacerbating any further bleeding.    ENT   - Keep MARU in place to neck and chest    Neuro  - Please keep patient heavily sedated until bleeding improves and patient is cleared by ENT    CV  - Currently HDS    Resp  - On ventilator given sedation    FEN/GI  - NPO  - Hold tube feeds today, anticipate starting tomorrow    Renal  - Continue birmingham while sedated    Heme/ID   - Unasyn for post-op prophylaxis  - CBC q6h to monitor for significant drop in hemoglobin    MSK   - Hold on PT/OT since sedated    Ppx: L40    Dispo: Continue deep sedation and SICU care

## 2024-02-24 NOTE — NURSING
Wife at bedside, reported patient does not drink during the week, he has 1-2 glasses of wine on the weekend.

## 2024-02-24 NOTE — PROGRESS NOTES
[facility]  Critical Care - Surgery  Progress Note    Patient Name: Timothy GREENE Allday  MRN: [id number]  Admission Date: 2/23/2024  Hospital Length of Stay: 1 days  Code Status: Full Code  Attending Provider: Jeferson Ventura MD  Primary Care Provider: Young Lanier MD   Principal Problem: Tongue cancer    Subjective:     Hospital/ICU Course:  No notes on file    Interval History/Significant Events: NAEON. Patient sedated on examination. Oozy bleeding from nostrils and trach site. Expected postop swelling about the flap site to neck.    Follow-up For: Procedure(s) (LRB):  GLOSSECTOMY (Right)  DISSECTION, NECK (Bilateral)  TRACHEOTOMY (N/A)  CREATION, FLAP, MUSCLE ROTATION (N/A)  INSERTION, PEG TUBE (Right)    Post-Operative Day: 1 Day Post-Op    Objective:     Vital Signs (Most Recent):  Temp: 98.6 °F (37 °C) (02/24/24 0531)  Pulse: 86 (02/24/24 0745)  Resp: 14 (02/24/24 0745)  BP: 103/69 (02/24/24 0745)  SpO2: 100 % (02/24/24 0745) Vital Signs (24h Range):  Temp:  [98.6 °F (37 °C)-100.1 °F (37.8 °C)] 98.6 °F (37 °C)  Pulse:  [] 86  Resp:  [12-23] 14  SpO2:  [100 %] 100 %  BP: ()/() 103/69     Weight: 79.4 kg (175 lb)  Body mass index is 23.73 kg/m².      Intake/Output Summary (Last 24 hours) at 2/24/2024 0757  Last data filed at 2/24/2024 0700  Gross per 24 hour   Intake 4493.28 ml   Output 1683 ml   Net 2810.28 ml          Physical Exam  Constitutional:       Comments: sedated   Neck:      Comments: Neck incision c/d/I  Swelling at inferior portion of incision  2x neck MARU drains w/ SS output    Tracheostomy tube in place w/ pooled clotted blood    Cardiovascular:      Rate and Rhythm: Normal rate and regular rhythm.      Comments: 2x MARU drains to flap site w/ SS output  Incision c/d/i  Pulmonary:      Comments: Tracheostomy  Abdominal:      General: There is no distension.      Palpations: Abdomen is soft.      Tenderness: There is no abdominal tenderness.       Comments: PEG tube in place   Genitourinary:     Comments: Olson in place  Musculoskeletal:      Right lower leg: No edema.      Left lower leg: No edema.   Skin:     General: Skin is warm and dry.            Vents:  Vent Mode: A/C (02/24/24 0715)  Set Rate: 12 BPM (02/24/24 0715)  Vt Set: 500 mL (02/24/24 0715)  PEEP/CPAP: 5 cmH20 (02/24/24 0715)  Oxygen Concentration (%): 50 (02/24/24 0745)  Peak Airway Pressure: 12 cmH20 (02/24/24 0715)  Plateau Pressure: 0 cmH20 (02/24/24 0715)  Total Ve: 8.02 L/m (02/24/24 0715)  Negative Inspiratory Force (cm H2O): 0 (02/24/24 0715)  F/VT Ratio<105 (RSBI): (!) 31.58 (02/24/24 0715)    Lines/Drains/Airways       Drain  Duration                  Gastrostomy/Enterostomy 02/23/24 0750 Percutaneous endoscopic gastrostomy (PEG) LUQ feeding 1 day         Urethral Catheter 02/23/24 0735 Silicone;Temperature probe 16 Fr. 1 day         Closed/Suction Drain 02/23/24 1333 Tube - 1 Right;Lateral Chest Bulb 15 Fr. <1 day         Closed/Suction Drain 02/23/24 1338 Tube - 2 Right;Medial Chest Bulb 15 Fr. <1 day         Closed/Suction Drain 02/23/24 1414 Tube - 3 Left;Anterior Neck Bulb 15 Fr. <1 day         Closed/Suction Drain 02/23/24 1445 Tube - 4 Right;Anterior Neck Bulb 15 Fr. <1 day              Airway  Duration                  Airway - Non-Surgical 02/23/24 0734 Coil Wire Tube 1 day    Adult Surgical Airway 02/23/24 1504 Shiley Cuffed 6.0/ 75mm <1 day              Peripheral Intravenous Line  Duration                  Peripheral IV - Single Lumen 02/23/24 0555 18 G Left;Posterior Forearm 1 day         Peripheral IV - Single Lumen 02/23/24 0739 18 G Left;Posterior Forearm 1 day         Peripheral IV - Single Lumen 02/23/24 1800 20 G Anterior;Proximal;Right Forearm <1 day                    Significant Labs:    CBC/Anemia Profile:  Recent Labs   Lab 02/23/24  1748 02/23/24  2312 02/24/24  0330   WBC 15.95* 12.95* 12.18  12.18   HGB 13.4* 11.9* 11.8*  11.8*   HCT 39.0* 33.3* 33.3*   33.3*    246 231  231   MCV 94 92 92  92   RDW 12.7 12.8 12.8  12.8        Chemistries:  Recent Labs   Lab 02/23/24  1748 02/24/24  0330     138  --    K 3.9  3.9  --      107  --    CO2 22*  22*  --    BUN 16  16  --    CREATININE 1.0  1.0  --    CALCIUM 8.4*  8.4*  --    ALBUMIN 3.0*  --    PROT 5.3*  --    BILITOT 0.7  --    ALKPHOS 42*  --    ALT 26  --    AST 25  --    MG 1.8 1.9   PHOS 3.8 4.4       All pertinent labs within the past 24 hours have been reviewed.    Significant Imaging:  I have reviewed all pertinent imaging results/findings within the past 24 hours.  Assessment/Plan:     Oncology  * Tongue cancer  Timothy GREENE Rudolph is a 68 y.o. yo male who is s/p glossectomy,neck dissection, rotational neck flap and G-tube placement by Dr. Ventura and Dr. Wu on 02/23/24. He was a difficult intubation in the OR and is currently sedated with a tracheostomy tube. Plan to keep him sedated overnight.       Neuro/Psych:   -- Sedation: Wean for SAT  -- Pain: MMD pain regimen  -- On CIWA protocol for hx EtOH             Cards:   -- HDS off pressors.   -- goal SBP < 180  --High blood pressure is on the right arm      Pulm:   -- Difficult intubation in the OR.   -- S/p Tracheostomy creation  -- Wean for SBT  -- O2 sat > 90%  Vent Mode: A/C  Oxygen Concentration (%):  [40-50] 50  Resp Rate Total:  [12 br/min-23 br/min] 14 br/min  Vt Set:  [500 mL] 500 mL  PEEP/CPAP:  [5 cmH20] 5 cmH20  Mean Airway Pressure:  [6.5 cmH20-7.6 cmH20] 6.7 cmH20        Renal:  -- Continue birmingham for strict I/O  -- Replace lytes as needed      FEN / GI:   -- Replace lytes as needed  -- Nutrition: trickle TF      ID:   -- Tm: afebrile  -- WBC stable      Heme/Onc:  -- H/H:  -- Daily CBC      Endo:   -- Gluc goal 140-180  -- Blood glucose at goal.         PPx:   Feeding: advance to trickle  Analgesia/Sedation: propofol, wean to SAT  Thromboembolic prevention: holding until 02/25/24  HOB >30: Y  Stress Ulcer ppx:  none  Glucose control: Critical care goal 140-180 g/dl, SSI  Bowel reg: miralax   Invasive Lines/Drains/Airway: Olson, Trach, 2x neck MARU drains 2x flap incision MARU drains  Deescalation:        Dispo/Code Status/Palliative:   -- SICU / Full Code          Gina Mendoza MD  Critical Care - Surgery  [facility]

## 2024-02-24 NOTE — PT/OT/SLP PROGRESS
Occupational Therapy      Patient Name:  Timothy Galdamez   MRN:  [id number]    Patient not seen today secondary to on hold per RN. Pt remains sedated on vent via trach  . Will follow-up 2/25/24.    2/24/2024

## 2024-02-24 NOTE — SUBJECTIVE & OBJECTIVE
Interval History/Significant Events: NAEON. Patient sedated on examination. Oozy bleeding from nostrils and trach site. Expected postop swelling about the flap site to neck.    Follow-up For: Procedure(s) (LRB):  GLOSSECTOMY (Right)  DISSECTION, NECK (Bilateral)  TRACHEOTOMY (N/A)  CREATION, FLAP, MUSCLE ROTATION (N/A)  INSERTION, PEG TUBE (Right)    Post-Operative Day: 1 Day Post-Op    Objective:     Vital Signs (Most Recent):  Temp: 98.6 °F (37 °C) (02/24/24 0531)  Pulse: 86 (02/24/24 0745)  Resp: 14 (02/24/24 0745)  BP: 103/69 (02/24/24 0745)  SpO2: 100 % (02/24/24 0745) Vital Signs (24h Range):  Temp:  [98.6 °F (37 °C)-100.1 °F (37.8 °C)] 98.6 °F (37 °C)  Pulse:  [] 86  Resp:  [12-23] 14  SpO2:  [100 %] 100 %  BP: ()/() 103/69     Weight: 79.4 kg (175 lb)  Body mass index is 23.73 kg/m².      Intake/Output Summary (Last 24 hours) at 2/24/2024 0757  Last data filed at 2/24/2024 0700  Gross per 24 hour   Intake 4493.28 ml   Output 1683 ml   Net 2810.28 ml          Physical Exam  Constitutional:       Comments: sedated   Neck:      Comments: Neck incision c/d/I  Swelling at inferior portion of incision  2x neck MARU drains w/ SS output    Tracheostomy tube in place w/ pooled clotted blood    Cardiovascular:      Rate and Rhythm: Normal rate and regular rhythm.      Comments: 2x MARU drains to flap site w/ SS output  Incision c/d/i  Pulmonary:      Comments: Tracheostomy  Abdominal:      General: There is no distension.      Palpations: Abdomen is soft.      Tenderness: There is no abdominal tenderness.      Comments: PEG tube in place   Genitourinary:     Comments: Olson in place  Musculoskeletal:      Right lower leg: No edema.      Left lower leg: No edema.   Skin:     General: Skin is warm and dry.            Vents:  Vent Mode: A/C (02/24/24 0715)  Set Rate: 12 BPM (02/24/24 0715)  Vt Set: 500 mL (02/24/24 0715)  PEEP/CPAP: 5 cmH20 (02/24/24 0715)  Oxygen Concentration (%): 50 (02/24/24 0745)  Peak  Airway Pressure: 12 cmH20 (02/24/24 0715)  Plateau Pressure: 0 cmH20 (02/24/24 0715)  Total Ve: 8.02 L/m (02/24/24 0715)  Negative Inspiratory Force (cm H2O): 0 (02/24/24 0715)  F/VT Ratio<105 (RSBI): (!) 31.58 (02/24/24 0715)    Lines/Drains/Airways       Drain  Duration                  Gastrostomy/Enterostomy 02/23/24 0750 Percutaneous endoscopic gastrostomy (PEG) LUQ feeding 1 day         Urethral Catheter 02/23/24 0735 Silicone;Temperature probe 16 Fr. 1 day         Closed/Suction Drain 02/23/24 1333 Tube - 1 Right;Lateral Chest Bulb 15 Fr. <1 day         Closed/Suction Drain 02/23/24 1338 Tube - 2 Right;Medial Chest Bulb 15 Fr. <1 day         Closed/Suction Drain 02/23/24 1414 Tube - 3 Left;Anterior Neck Bulb 15 Fr. <1 day         Closed/Suction Drain 02/23/24 1445 Tube - 4 Right;Anterior Neck Bulb 15 Fr. <1 day              Airway  Duration                  Airway - Non-Surgical 02/23/24 0734 Coil Wire Tube 1 day    Adult Surgical Airway 02/23/24 1504 Shiley Cuffed 6.0/ 75mm <1 day              Peripheral Intravenous Line  Duration                  Peripheral IV - Single Lumen 02/23/24 0555 18 G Left;Posterior Forearm 1 day         Peripheral IV - Single Lumen 02/23/24 0739 18 G Left;Posterior Forearm 1 day         Peripheral IV - Single Lumen 02/23/24 1800 20 G Anterior;Proximal;Right Forearm <1 day                    Significant Labs:    CBC/Anemia Profile:  Recent Labs   Lab 02/23/24 1748 02/23/24  2312 02/24/24  0330   WBC 15.95* 12.95* 12.18  12.18   HGB 13.4* 11.9* 11.8*  11.8*   HCT 39.0* 33.3* 33.3*  33.3*    246 231  231   MCV 94 92 92  92   RDW 12.7 12.8 12.8  12.8        Chemistries:  Recent Labs   Lab 02/23/24 1748 02/24/24  0330     138  --    K 3.9  3.9  --      107  --    CO2 22*  22*  --    BUN 16  16  --    CREATININE 1.0  1.0  --    CALCIUM 8.4*  8.4*  --    ALBUMIN 3.0*  --    PROT 5.3*  --    BILITOT 0.7  --    ALKPHOS 42*  --    ALT 26  --    AST 25  --     MG 1.8 1.9   PHOS 3.8 4.4       All pertinent labs within the past 24 hours have been reviewed.    Significant Imaging:  I have reviewed all pertinent imaging results/findings within the past 24 hours.

## 2024-02-24 NOTE — PROGRESS NOTES
[facility]  Otorhinolaryngology-Head & Neck Surgery  Progress Note    Subjective:     Post-Op Info:  Procedure(s) (LRB):  GLOSSECTOMY (Right)  DISSECTION, NECK (Bilateral)  TRACHEOTOMY (N/A)  CREATION, FLAP, MUSCLE ROTATION (N/A)  INSERTION, PEG TUBE (Right)   1 Day Post-Op  Hospital Day: 2     Interval History: Patient with bleeding from the nose and mouth post-op, appears to be venous congestion of the tongue from the surgery. Patient remains sedated and on the vent.     Medications:  Continuous Infusions:   lactated ringers 125 mL/hr at 02/24/24 1108    propofoL 50 mcg/kg/min (02/24/24 1100)     Scheduled Meds:   acetaminophen  650 mg Per G Tube Q6H    amLODIPine  2.5 mg Per G Tube Daily    ampicillin-sulbactam  3 g Intravenous Q6H    aspirin  81 mg Per G Tube Daily    atorvastatin  80 mg Per G Tube Daily    enoxparin  40 mg Subcutaneous Daily    ezetimibe  10 mg Per G Tube Daily    multivitamin  1 tablet Per G Tube Daily    pantoprazole  40 mg Intravenous Daily    polyethylene glycol  17 g Per G Tube Daily    thiamine  100 mg Per G Tube Daily     PRN Meds:acetaminophen, HYDROcodone-acetaminophen, HYDROcodone-acetaminophen, LORazepam, melatonin, morphine, ondansetron, sodium chloride 0.9%     Review of patient's allergies indicates:  No Known Allergies  Objective:     Vital Signs (24h Range):  Temp:  [98.4 °F (36.9 °C)-100.1 °F (37.8 °C)] 98.5 °F (36.9 °C)  Pulse:  [] 97  Resp:  [12-23] 19  SpO2:  [99 %-100 %] 99 %  BP: ()/() 120/78     Date 02/24/24 0700 - 02/25/24 0659   Shift 3344-8063 7871-5685 8825-3523 24 Hour Total   INTAKE   I.V.(mL/kg) 638.1(8)   638.1(8)   IV Piggyback 90.1   90.1   Shift Total(mL/kg) 728.2(9.2)   728.2(9.2)   OUTPUT   Urine(mL/kg/hr) 255   255   Drains 70   70   Shift Total(mL/kg) 325(4.1)   325(4.1)   Weight (kg) 79.4 79.4 79.4 79.4     Lines/Drains/Airways       Drain  Duration                  Gastrostomy/Enterostomy 02/23/24 7368  Percutaneous endoscopic gastrostomy (PEG) LUQ feeding 1 day         Urethral Catheter 02/23/24 0735 Silicone;Temperature probe 16 Fr. 1 day         Closed/Suction Drain 02/23/24 1333 Tube - 1 Right;Lateral Chest Bulb 15 Fr. <1 day         Closed/Suction Drain 02/23/24 1338 Tube - 2 Right;Medial Chest Bulb 15 Fr. <1 day         Closed/Suction Drain 02/23/24 1414 Tube - 3 Left;Anterior Neck Bulb 15 Fr. <1 day         Closed/Suction Drain 02/23/24 1445 Tube - 4 Right;Anterior Neck Bulb 15 Fr. <1 day              Airway  Duration                  Airway - Non-Surgical 02/23/24 0734 Coil Wire Tube 1 day    Adult Surgical Airway 02/23/24 1504 Shiley Cuffed 6.0/ 75mm <1 day              Peripheral Intravenous Line  Duration                  Peripheral IV - Single Lumen 02/23/24 0555 18 G Left;Posterior Forearm 1 day         Peripheral IV - Single Lumen 02/23/24 0739 18 G Left;Posterior Forearm 1 day         Peripheral IV - Single Lumen 02/23/24 1800 20 G Anterior;Proximal;Right Forearm <1 day                     Physical Exam  Sedated, arouses to stimulation  Native tongue edematous and completely occluding oral cavity, cannot visualize oropharynx  Some blood suctioned from oral cavity but overall improved since yesterday evening  No blood from nose at this time  6-0 cuffed tracheostomy in place, cuff inflated  2 neck MARU in place with serosanguinous drainage  Neck is soft  Neck incision intact with staples  Chest incision intact with staples  Chest is soft  2 MARU in chest with serosanguinous drainage     Significant Labs:  CBC:   Recent Labs   Lab 02/24/24  0330   WBC 12.18  12.18   RBC 3.63*  3.63*   HGB 11.8*  11.8*   HCT 33.3*  33.3*     231   MCV 92  92   MCH 32.5*  32.5*   MCHC 35.4  35.4     CMP:   Recent Labs   Lab 02/23/24  1748   *  140*   CALCIUM 8.4*  8.4*   ALBUMIN 3.0*   PROT 5.3*     138   K 3.9  3.9   CO2 22*  22*     107   BUN 16  16   CREATININE 1.0  1.0   ALKPHOS  42*   ALT 26   AST 25   BILITOT 0.7       Significant Diagnostics:  I have reviewed and interpreted all pertinent imaging results/findings within the past 24 hours.  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 He had bleeding from the oral cavity post-op and we recommend heavy sedation to avoid exacerbating any further bleeding.    ENT   - Keep MARU in place to neck and chest    Neuro  - Please keep patient heavily sedated until bleeding improves and patient is cleared by ENT    CV  - Currently HDS    Resp  - On ventilator given sedation    FEN/GI  - NPO  - Hold tube feeds today, anticipate starting tomorrow    Renal  - Continue birmingham while sedated    Heme/ID   - Unasyn for post-op prophylaxis  - CBC q6h to monitor for significant drop in hemoglobin    MSK   - Hold on PT/OT since sedated    Ppx: L40    Dispo: Continue deep sedation and SICU care          Trixie Sandy MD  Otorhinolaryngology-Head & Neck Surgery  [facility]

## 2024-02-24 NOTE — PLAN OF CARE
"[facility]  Initial Discharge Assessment       Primary Care Provider: Young Lanier MD    Admission Diagnosis: Tongue cancer [C02.9]    Admission Date: 2/23/2024  Expected Discharge Date:     Transition of Care Barriers: None    Payor: [employer] MANAGED MEDICARE / Plan: [employer] MEDICARE HMO / Product Type: Capitation /     Extended Emergency Contact Information  Primary Emergency Contact: Della Rahseed  Address: [address]           [address]           [place] [address] [facility]  Home Phone: [phone]  Mobile Phone: [phone]  Relation: Friend    Discharge Plan A: Home Health  Discharge Plan B: Long-term acute care facility (LTAC)      [facility] - [place], [place] - [address]  [address] [address]  Phone: [phone] Fax: [phone]    [facility] - [place], [place] - [address]  [address]  [place] [address]  Phone: [phone] Fax: [phone]      Initial Assessment (most recent)       Adult Discharge Assessment - 02/24/24 1108          Discharge Assessment    Assessment Type Discharge Planning Assessment     Confirmed/corrected address, phone number and insurance Yes     Confirmed Demographics Correct on Facesheet     Source of Information health record     People in Home friend(s)     Do you expect to return to your current living situation? Yes     Do you have help at home or someone to help you manage your care at home? Yes     Who are your caregiver(s) and their phone number(s)? "friends"     Prior to hospitilization cognitive status: Unable to Assess     Current cognitive status: Unable to Assess     Walking or Climbing Stairs Difficulty no     Dressing/Bathing Difficulty no     Home Accessibility wheelchair accessible     Home Layout Able to live on 1st floor     Equipment Currently Used at Home none     Readmission within 30 days? No     Patient " currently being followed by outpatient case management? No     Do you currently have service(s) that help you manage your care at home? No     Do you take prescription medications? Yes     Do you have prescription coverage? Yes     Coverage [employer]     Do you have any problems affording any of your prescribed medications? No     Is the patient taking medications as prescribed? yes     How do you get to doctors appointments? car, drives self     Are you on dialysis? No     Do you take coumadin? No     Discharge Plan A Home Health     Discharge Plan B Long-term acute care facility (LTAC)     DME Needed Upon Discharge  respiratory supplies     Discharge Plan discussed with: Patient     Transition of Care Barriers None                        Info gathered from chart. Pt lives at home with self. Post hospital  stay friends will be pt support person and pt. has transportation at d/c with friends. There have been no hospitalizations within the last 30 days per pt chart. Verified pt PCP and preferred pharmacy. Pt chart stated not on Coumadin and is not receiving dialysis. A Discharge booklet with SW contact information given to pt.     Discharge Plan A and Plan B have been determined by review of patient's clinical status, future medical and therapeutic needs, and coverage/benefits for post-acute care in coordination with multidisciplinary team members.       Kerri Cuba LCSW  Case Management/[facility]  [phone]

## 2024-02-24 NOTE — SUBJECTIVE & OBJECTIVE
Interval History: Patient with bleeding from the nose and mouth post-op, appears to be venous congestion of the tongue from the surgery. Patient remains sedated and on the vent.     Medications:  Continuous Infusions:   lactated ringers 125 mL/hr at 02/24/24 1108    propofoL 50 mcg/kg/min (02/24/24 1100)     Scheduled Meds:   acetaminophen  650 mg Per G Tube Q6H    amLODIPine  2.5 mg Per G Tube Daily    ampicillin-sulbactam  3 g Intravenous Q6H    aspirin  81 mg Per G Tube Daily    atorvastatin  80 mg Per G Tube Daily    enoxparin  40 mg Subcutaneous Daily    ezetimibe  10 mg Per G Tube Daily    multivitamin  1 tablet Per G Tube Daily    pantoprazole  40 mg Intravenous Daily    polyethylene glycol  17 g Per G Tube Daily    thiamine  100 mg Per G Tube Daily     PRN Meds:acetaminophen, HYDROcodone-acetaminophen, HYDROcodone-acetaminophen, LORazepam, melatonin, morphine, ondansetron, sodium chloride 0.9%     Review of patient's allergies indicates:  No Known Allergies  Objective:     Vital Signs (24h Range):  Temp:  [98.4 °F (36.9 °C)-100.1 °F (37.8 °C)] 98.5 °F (36.9 °C)  Pulse:  [] 97  Resp:  [12-23] 19  SpO2:  [99 %-100 %] 99 %  BP: ()/() 120/78     Date 02/24/24 0700 - 02/25/24 0659   Shift 1607-5764 3042-9033 3081-1302 24 Hour Total   INTAKE   I.V.(mL/kg) 638.1(8)   638.1(8)   IV Piggyback 90.1   90.1   Shift Total(mL/kg) 728.2(9.2)   728.2(9.2)   OUTPUT   Urine(mL/kg/hr) 255   255   Drains 70   70   Shift Total(mL/kg) 325(4.1)   325(4.1)   Weight (kg) 79.4 79.4 79.4 79.4     Lines/Drains/Airways       Drain  Duration                  Gastrostomy/Enterostomy 02/23/24 0750 Percutaneous endoscopic gastrostomy (PEG) LUQ feeding 1 day         Urethral Catheter 02/23/24 0735 Silicone;Temperature probe 16 Fr. 1 day         Closed/Suction Drain 02/23/24 1333 Tube - 1 Right;Lateral Chest Bulb 15 Fr. <1 day         Closed/Suction Drain 02/23/24 1338 Tube - 2 Right;Medial Chest Bulb 15 Fr. <1 day          Closed/Suction Drain 02/23/24 1414 Tube - 3 Left;Anterior Neck Bulb 15 Fr. <1 day         Closed/Suction Drain 02/23/24 1445 Tube - 4 Right;Anterior Neck Bulb 15 Fr. <1 day              Airway  Duration                  Airway - Non-Surgical 02/23/24 0734 Coil Wire Tube 1 day    Adult Surgical Airway 02/23/24 1504 Shiley Cuffed 6.0/ 75mm <1 day              Peripheral Intravenous Line  Duration                  Peripheral IV - Single Lumen 02/23/24 0555 18 G Left;Posterior Forearm 1 day         Peripheral IV - Single Lumen 02/23/24 0739 18 G Left;Posterior Forearm 1 day         Peripheral IV - Single Lumen 02/23/24 1800 20 G Anterior;Proximal;Right Forearm <1 day                     Physical Exam  Sedated, arouses to stimulation  Native tongue edematous and completely occluding oral cavity, cannot visualize oropharynx  Some blood suctioned from oral cavity but overall improved since yesterday evening  No blood from nose at this time  6-0 cuffed tracheostomy in place, cuff inflated  2 neck MARU in place with serosanguinous drainage  Neck is soft  Neck incision intact with staples  Chest incision intact with staples  Chest is soft  2 MARU in chest with serosanguinous drainage     Significant Labs:  CBC:   Recent Labs   Lab 02/24/24  0330   WBC 12.18  12.18   RBC 3.63*  3.63*   HGB 11.8*  11.8*   HCT 33.3*  33.3*     231   MCV 92  92   MCH 32.5*  32.5*   MCHC 35.4  35.4     CMP:   Recent Labs   Lab 02/23/24  1748   *  140*   CALCIUM 8.4*  8.4*   ALBUMIN 3.0*   PROT 5.3*     138   K 3.9  3.9   CO2 22*  22*     107   BUN 16  16   CREATININE 1.0  1.0   ALKPHOS 42*   ALT 26   AST 25   BILITOT 0.7       Significant Diagnostics:  I have reviewed and interpreted all pertinent imaging results/findings within the past 24 hours.

## 2024-02-24 NOTE — PROGRESS NOTES
[facility]  General Surgery  Progress Note    Subjective:     History of Present Illness:  No notes on file    Post-Op Info:  Procedure(s) (LRB):  GLOSSECTOMY (Right)  DISSECTION, NECK (Bilateral)  TRACHEOTOMY (N/A)  CREATION, FLAP, MUSCLE ROTATION (N/A)  INSERTION, PEG TUBE (Right)   1 Day Post-Op     Interval History: HDS, afebrile. PEG tube with minimal output, functioning appropriately.    Medications:  Continuous Infusions:   lactated ringers 125 mL/hr at 02/24/24 1000    propofoL 50 mcg/kg/min (02/24/24 0928)     Scheduled Meds:   acetaminophen  650 mg Per G Tube Q6H    amLODIPine  2.5 mg Per G Tube Daily    ampicillin-sulbactam  3 g Intravenous Q6H    aspirin  81 mg Per G Tube Daily    atorvastatin  80 mg Per G Tube Daily    enoxparin  40 mg Subcutaneous Daily    ezetimibe  10 mg Per G Tube Daily    multivitamin  1 tablet Per G Tube Daily    pantoprazole  40 mg Intravenous Daily    polyethylene glycol  17 g Per G Tube Daily    thiamine  100 mg Per G Tube Daily     PRN Meds:acetaminophen, HYDROcodone-acetaminophen, HYDROcodone-acetaminophen, LORazepam, melatonin, morphine, ondansetron, sodium chloride 0.9%     Review of patient's allergies indicates:  No Known Allergies  Objective:     Vital Signs (Most Recent):  Temp: 98.4 °F (36.9 °C) (02/24/24 0730)  Pulse: 75 (02/24/24 1030)  Resp: 16 (02/24/24 1030)  BP: 136/79 (02/24/24 1030)  SpO2: 100 % (02/24/24 1030) Vital Signs (24h Range):  Temp:  [98.4 °F (36.9 °C)-100.1 °F (37.8 °C)] 98.4 °F (36.9 °C)  Pulse:  [] 75  Resp:  [12-23] 16  SpO2:  [100 %] 100 %  BP: ()/() 136/79     Weight: 79.4 kg (175 lb)  Body mass index is 23.73 kg/m².    Intake/Output - Last 3 Shifts         02/22 0700 02/23 0659 02/23 0700 02/24 0659 02/24 0700 02/25 0659    I.V. (mL/kg)  3460.2 (43.6) 506.3 (6.4)    IV Piggyback  1002.9 90.1    Total Intake(mL/kg)  4463.1 (56.2) 596.4 (7.5)    Urine (mL/kg/hr)  1160 (0.6) 170 (0.6)    Drains  523 70     Total Output  1683 240    Net  +2780.1 +356.4                    Physical Exam  Constitutional:       Comments: sedated   Neck:      Comments: Neck incision c/d/I  Swelling at inferior portion of incision  2x neck MARU drains w/ SS output    Tracheostomy tube in place w/ pooled clotted blood    Cardiovascular:      Rate and Rhythm: Normal rate and regular rhythm.      Comments: 2x MARU drains to flap site w/ SS output  Incision c/d/i  Pulmonary:      Comments: Tracheostomy  Abdominal:      General: There is no distension.      Palpations: Abdomen is soft.      Tenderness: There is no abdominal tenderness.      Comments: PEG tube in place   Genitourinary:     Comments: Olson in place  Musculoskeletal:      Right lower leg: No edema.      Left lower leg: No edema.   Skin:     General: Skin is warm and dry.          Significant Labs:  I have reviewed all pertinent lab results within the past 24 hours.    Significant Diagnostics:  I have reviewed all pertinent imaging results/findings within the past 24 hours.  Assessment/Plan:     S/P percutaneous endoscopic gastrostomy (PEG) tube placement  Timothy Galdamez is a 68 y.o. yo male who is s/p glossectomy,neck dissection, rotational neck flap and G-tube placement by Dr. Ventura and Dr. Wu on 02/23/24. PEG is functional without issue    - continue PEG care  - cont to use for meds, can use for feeds  - rest of care per primary    General surgery will sign off, please reach out for any concerns.        Ken Baker MD  General Surgery  [facility]

## 2024-02-24 NOTE — CONSULTS
[facility]  Adult Nutrition  Consult Note    SUMMARY     Recommendations    1.) TF recs: If pt is to remain on Propofol sedation, recommend Peptamen VHP at trickle rate and slowly advancing to goal rate of 50ml/hr to provide 1200 kcal, 110g PRO, and 1008ml FF + Kcal from Propofol- FWF per MD.     2.) TF recs: Once pt is off Propofol sedation, recommend TF of Impact Peptide 1.5 at trickle rate and slowly advancing to goal rate of 50ml/hr to provide 1800 kcal, 113g PRO, and 924ml FF- FWF per MD.     3.) Monitor for s/s of intolerance such as residuals >500ml, n/v/d, or abdominal distension.      4.) Re-consult nutrition when bolus feeds are medically appropriate.    5.) RD to monitor wt, tolerance, skin, labs, vent settings.       Goals: to meet % of EEN/EPN by next RD f/u  Nutrition Goal Status: new  Communication of RD Recs:  (POC)    Assessment and Plan    Nutrition Problem  Inadequate oral intake    Related to (etiology):   Inability to consume adequate nutrition    Signs and Symptoms (as evidenced by):   NPO, need for EN     Interventions/Recommendations (treatment strategy):  Collaboration of nutritional care with other providers.  EN    Nutrition Diagnosis Status:   New     Reason for Assessment    Reason For Assessment: consult  Diagnosis: cancer diagnosis/related complications (Tongue cancer; S/p glossectomy, tracheotomy, PEG- placed)  Relevant Medical History: HTN, HLD  Interdisciplinary Rounds: did not attend    General Information Comments: RD consulted for TF recs. Pt remains sedated, s/p tracheostomy, on vent. Propofol running at current rate of 23.8ml/hr. NFPE not appropriate at this time. Nutritional hx could not be obtained. RD team to continue to monitor.    Nutrition Discharge Planning: adequate nutritional intake via EN    Nutrition Risk Screen    Nutrition Risk Screen: tube feeding or parenteral nutrition    Nutrition/Diet History    Spiritual, Cultural Beliefs,  Jewish Practices, Values that Affect Care: no    Anthropometrics    Temp: 98.4 °F (36.9 °C)  Height: 6' (182.9 cm)  Height (inches): 72 in  Weight: 79.4 kg (175 lb)  Weight (lb): 175 lb  Ideal Body Weight (IBW), Male: 178 lb  % Ideal Body Weight, Male (lb): 98.31 %  BMI (Calculated): 23.7    Lab/Procedures/Meds    Pertinent Labs Reviewed: reviewed  Pertinent Labs Comments: 2/23: Gluc: 140, Ca: 8.4, ALP: 42, PRO: 5.3, alb: 3.0    Pertinent Medications Reviewed: reviewed  Pertinent Medications Comments: Abx, statin, enoxaparin, ezetimibe, pantoprazole, polyethylene glycol, fentanyl, lactated ringers, propofol    Estimated/Assessed Needs    Weight Used For Calorie Calculations: 79.4 kg (175 lb 0.7 oz)  Energy Calorie Requirements (kcal): 1893 kcal  Energy Need Method: [facility]    Protein Requirements: 95- 119g  Weight Used For Protein Calculations: 79.4 kg (175 lb 0.7 oz)    Fluid Requirements (mL): 1ml/1kcal or per MD  Estimated Fluid Requirement Method: RDA Method  RDA Method (mL): 1893    Nutrition Prescription Ordered    Current Diet Order: NPO    Evaluation of Received Nutrient/Fluid Intake    Other Calories (kcal): 628 (from 571ml of Propofol; Propofol running at 23.8ml/hr)  I/O: +2.0L since admit  Energy Calories Required: not meeting needs  Protein Required: not meeting needs  Fluid Required:  (as per MD)  Comments: LBM PTA  % Intake of Estimated Energy Needs: 0 - 25 %  % Meal Intake: NPO    Nutrition Risk    Level of Risk/Frequency of Follow-up:  (RD to f/u x 1-2/week)     Monitor and Evaluation    Food and Nutrient Intake: energy intake, enteral nutrition intake  Food and Nutrient Adminstration: enteral and parenteral nutrition administration  Physical Activity and Function: nutrition-related ADLs and IADLs  Anthropometric Measurements: weight, weight change, body mass index  Biochemical Data, Medical Tests and Procedures: electrolyte and renal panel, gastrointestinal profile, glucose/endocrine profile,  inflammatory profile, lipid profile  Nutrition-Focused Physical Findings: overall appearance, skin     Nutrition Follow-Up    RD Follow-up?: Yes

## 2024-02-25 LAB
ALBUMIN SERPL BCP-MCNC: 2 G/DL (ref 3.5–5.2)
ALP SERPL-CCNC: 34 U/L (ref 55–135)
ALT SERPL W/O P-5'-P-CCNC: 17 U/L (ref 10–44)
ANION GAP SERPL CALC-SCNC: 8 MMOL/L (ref 8–16)
AST SERPL-CCNC: 23 U/L (ref 10–40)
BILIRUB SERPL-MCNC: 0.4 MG/DL (ref 0.1–1)
BUN SERPL-MCNC: 24 MG/DL (ref 8–23)
CALCIUM SERPL-MCNC: 7.7 MG/DL (ref 8.7–10.5)
CHLORIDE SERPL-SCNC: 107 MMOL/L (ref 95–110)
CO2 SERPL-SCNC: 25 MMOL/L (ref 23–29)
CREAT SERPL-MCNC: 0.8 MG/DL (ref 0.5–1.4)
ERYTHROCYTE [DISTWIDTH] IN BLOOD BY AUTOMATED COUNT: 12.8 % (ref 11.5–14.5)
ERYTHROCYTE [DISTWIDTH] IN BLOOD BY AUTOMATED COUNT: 12.9 % (ref 11.5–14.5)
ERYTHROCYTE [DISTWIDTH] IN BLOOD BY AUTOMATED COUNT: 13 % (ref 11.5–14.5)
ERYTHROCYTE [DISTWIDTH] IN BLOOD BY AUTOMATED COUNT: 13.1 % (ref 11.5–14.5)
EST. GFR  (NO RACE VARIABLE): >60 ML/MIN/1.73 M^2
GLUCOSE SERPL-MCNC: 128 MG/DL (ref 70–110)
HCT VFR BLD AUTO: 22.6 % (ref 40–54)
HCT VFR BLD AUTO: 23.3 % (ref 40–54)
HCT VFR BLD AUTO: 23.7 % (ref 40–54)
HCT VFR BLD AUTO: 27 % (ref 40–54)
HGB BLD-MCNC: 7.8 G/DL (ref 14–18)
HGB BLD-MCNC: 8 G/DL (ref 14–18)
HGB BLD-MCNC: 8.2 G/DL (ref 14–18)
HGB BLD-MCNC: 9.4 G/DL (ref 14–18)
MAGNESIUM SERPL-MCNC: 1.9 MG/DL (ref 1.6–2.6)
MCH RBC QN AUTO: 31.7 PG (ref 27–31)
MCH RBC QN AUTO: 32.2 PG (ref 27–31)
MCH RBC QN AUTO: 32.2 PG (ref 27–31)
MCH RBC QN AUTO: 32.6 PG (ref 27–31)
MCHC RBC AUTO-ENTMCNC: 34.3 G/DL (ref 32–36)
MCHC RBC AUTO-ENTMCNC: 34.5 G/DL (ref 32–36)
MCHC RBC AUTO-ENTMCNC: 34.6 G/DL (ref 32–36)
MCHC RBC AUTO-ENTMCNC: 34.8 G/DL (ref 32–36)
MCV RBC AUTO: 93 FL (ref 82–98)
MCV RBC AUTO: 94 FL (ref 82–98)
PHOSPHATE SERPL-MCNC: 2.4 MG/DL (ref 2.7–4.5)
PLATELET # BLD AUTO: 150 K/UL (ref 150–450)
PLATELET # BLD AUTO: 155 K/UL (ref 150–450)
PLATELET # BLD AUTO: 168 K/UL (ref 150–450)
PLATELET # BLD AUTO: 169 K/UL (ref 150–450)
PMV BLD AUTO: 11.3 FL (ref 9.2–12.9)
PMV BLD AUTO: 11.5 FL (ref 9.2–12.9)
PMV BLD AUTO: 11.7 FL (ref 9.2–12.9)
PMV BLD AUTO: 12 FL (ref 9.2–12.9)
POTASSIUM SERPL-SCNC: 4 MMOL/L (ref 3.5–5.1)
PROT SERPL-MCNC: 4.2 G/DL (ref 6–8.4)
RBC # BLD AUTO: 2.42 M/UL (ref 4.6–6.2)
RBC # BLD AUTO: 2.52 M/UL (ref 4.6–6.2)
RBC # BLD AUTO: 2.55 M/UL (ref 4.6–6.2)
RBC # BLD AUTO: 2.88 M/UL (ref 4.6–6.2)
SODIUM SERPL-SCNC: 140 MMOL/L (ref 136–145)
WBC # BLD AUTO: 10.21 K/UL (ref 3.9–12.7)
WBC # BLD AUTO: 11.67 K/UL (ref 3.9–12.7)
WBC # BLD AUTO: 9.77 K/UL (ref 3.9–12.7)
WBC # BLD AUTO: 9.98 K/UL (ref 3.9–12.7)

## 2024-02-25 PROCEDURE — 80053 COMPREHEN METABOLIC PANEL: CPT | Performed by: OTOLARYNGOLOGY

## 2024-02-25 PROCEDURE — 97165 OT EVAL LOW COMPLEX 30 MIN: CPT

## 2024-02-25 PROCEDURE — 25000003 PHARM REV CODE 250: Performed by: OTOLARYNGOLOGY

## 2024-02-25 PROCEDURE — 63600175 PHARM REV CODE 636 W HCPCS: Performed by: STUDENT IN AN ORGANIZED HEALTH CARE EDUCATION/TRAINING PROGRAM

## 2024-02-25 PROCEDURE — 27000221 HC OXYGEN, UP TO 24 HOURS

## 2024-02-25 PROCEDURE — 63600175 PHARM REV CODE 636 W HCPCS

## 2024-02-25 PROCEDURE — 25000003 PHARM REV CODE 250

## 2024-02-25 PROCEDURE — 99900026 HC AIRWAY MAINTENANCE (STAT)

## 2024-02-25 PROCEDURE — 25000003 PHARM REV CODE 250: Performed by: STUDENT IN AN ORGANIZED HEALTH CARE EDUCATION/TRAINING PROGRAM

## 2024-02-25 PROCEDURE — 97116 GAIT TRAINING THERAPY: CPT

## 2024-02-25 PROCEDURE — 85027 COMPLETE CBC AUTOMATED: CPT | Mod: 91 | Performed by: STUDENT IN AN ORGANIZED HEALTH CARE EDUCATION/TRAINING PROGRAM

## 2024-02-25 PROCEDURE — 85027 COMPLETE CBC AUTOMATED: CPT | Performed by: STUDENT IN AN ORGANIZED HEALTH CARE EDUCATION/TRAINING PROGRAM

## 2024-02-25 PROCEDURE — 84100 ASSAY OF PHOSPHORUS: CPT | Performed by: OTOLARYNGOLOGY

## 2024-02-25 PROCEDURE — C9113 INJ PANTOPRAZOLE SODIUM, VIA: HCPCS | Performed by: STUDENT IN AN ORGANIZED HEALTH CARE EDUCATION/TRAINING PROGRAM

## 2024-02-25 PROCEDURE — 20000000 HC ICU ROOM

## 2024-02-25 PROCEDURE — 97530 THERAPEUTIC ACTIVITIES: CPT

## 2024-02-25 PROCEDURE — 83735 ASSAY OF MAGNESIUM: CPT | Performed by: OTOLARYNGOLOGY

## 2024-02-25 PROCEDURE — 99900035 HC TECH TIME PER 15 MIN (STAT)

## 2024-02-25 PROCEDURE — 99233 SBSQ HOSP IP/OBS HIGH 50: CPT | Mod: 24,GC,, | Performed by: SURGERY

## 2024-02-25 PROCEDURE — 51798 US URINE CAPACITY MEASURE: CPT

## 2024-02-25 PROCEDURE — 94761 N-INVAS EAR/PLS OXIMETRY MLT: CPT

## 2024-02-25 PROCEDURE — 97161 PT EVAL LOW COMPLEX 20 MIN: CPT

## 2024-02-25 RX ORDER — TAMSULOSIN HYDROCHLORIDE 0.4 MG/1
0.4 CAPSULE ORAL DAILY
Status: DISCONTINUED | OUTPATIENT
Start: 2024-02-25 | End: 2024-02-26

## 2024-02-25 RX ADMIN — AMPICILLIN SODIUM AND SULBACTAM SODIUM 3 G: 2; 1 INJECTION, POWDER, FOR SOLUTION INTRAMUSCULAR; INTRAVENOUS at 06:02

## 2024-02-25 RX ADMIN — ASPIRIN 81 MG CHEWABLE TABLET 81 MG: 81 TABLET CHEWABLE at 08:02

## 2024-02-25 RX ADMIN — EZETIMIBE 10 MG: 10 TABLET ORAL at 08:02

## 2024-02-25 RX ADMIN — POLYETHYLENE GLYCOL 3350 17 G: 17 POWDER, FOR SOLUTION ORAL at 08:02

## 2024-02-25 RX ADMIN — PANTOPRAZOLE SODIUM 40 MG: 40 INJECTION, POWDER, FOR SOLUTION INTRAVENOUS at 08:02

## 2024-02-25 RX ADMIN — AMLODIPINE BESYLATE 2.5 MG: 10 TABLET ORAL at 08:02

## 2024-02-25 RX ADMIN — AMPICILLIN SODIUM AND SULBACTAM SODIUM 3 G: 2; 1 INJECTION, POWDER, FOR SOLUTION INTRAMUSCULAR; INTRAVENOUS at 12:02

## 2024-02-25 RX ADMIN — TAMSULOSIN HYDROCHLORIDE 0.4 MG: 0.4 CAPSULE ORAL at 10:02

## 2024-02-25 RX ADMIN — AMPICILLIN SODIUM AND SULBACTAM SODIUM 3 G: 2; 1 INJECTION, POWDER, FOR SOLUTION INTRAMUSCULAR; INTRAVENOUS at 05:02

## 2024-02-25 RX ADMIN — ATORVASTATIN CALCIUM 80 MG: 40 TABLET, FILM COATED ORAL at 08:02

## 2024-02-25 RX ADMIN — MORPHINE SULFATE 2 MG: 2 INJECTION, SOLUTION INTRAMUSCULAR; INTRAVENOUS at 11:02

## 2024-02-25 RX ADMIN — ACETAMINOPHEN 650 MG: 650 SOLUTION ORAL at 05:02

## 2024-02-25 RX ADMIN — ACETAMINOPHEN 650 MG: 650 SOLUTION ORAL at 11:02

## 2024-02-25 RX ADMIN — SODIUM CHLORIDE, POTASSIUM CHLORIDE, SODIUM LACTATE AND CALCIUM CHLORIDE: 600; 310; 30; 20 INJECTION, SOLUTION INTRAVENOUS at 08:02

## 2024-02-25 RX ADMIN — OXYCODONE HYDROCHLORIDE 10 MG: 10 TABLET ORAL at 07:02

## 2024-02-25 RX ADMIN — ENOXAPARIN SODIUM 40 MG: 40 INJECTION SUBCUTANEOUS at 04:02

## 2024-02-25 RX ADMIN — ACETAMINOPHEN 650 MG: 650 SOLUTION ORAL at 12:02

## 2024-02-25 RX ADMIN — THERA TABS 1 TABLET: TAB at 08:02

## 2024-02-25 RX ADMIN — OXYCODONE HYDROCHLORIDE 10 MG: 10 TABLET ORAL at 02:02

## 2024-02-25 RX ADMIN — MUPIROCIN: 20 OINTMENT TOPICAL at 08:02

## 2024-02-25 RX ADMIN — Medication 100 MG: at 08:02

## 2024-02-25 RX ADMIN — MORPHINE SULFATE 2 MG: 2 INJECTION, SOLUTION INTRAMUSCULAR; INTRAVENOUS at 06:02

## 2024-02-25 RX ADMIN — LORAZEPAM 2 MG: 0.5 TABLET ORAL at 07:02

## 2024-02-25 NOTE — PT/OT/SLP EVAL
Physical Therapy Co-Evaluation with OT and Treatment     Patient Name:  Timothy Galdamez  MRN: [id number]    Admit Date: 2/23/2024  Admitting Diagnosis:  Tongue cancer  Length of Stay: 2 days  Recent Surgery: Procedure(s) (LRB):  GLOSSECTOMY (Right)  DISSECTION, NECK (Bilateral)  TRACHEOTOMY (N/A)  CREATION, FLAP, MUSCLE ROTATION (N/A)  INSERTION, PEG TUBE (Right) 2 Days Post-Op    Recommendations:     Discharge Recommendations: low intensity therapy  Equipment recommendations: none  Barriers to discharge: None Identified     Assessment:     Timothy Galdamez is a 68 y.o. male admitted to [facility] on 2/23/2024 with medical diagnosis of Tongue cancer. Pt presents with impaired endurance, weakness, impaired self care skills, impaired functional mobility, gait instability, impaired balance, decreased coordination, pain, impaired skin, impaired cardiopulmonary response to activity. These deficits effect their roles and responsibilities in which they were able to complete prior to admit.     Pt is agreeable to therapy evaluation and session. Pt able to nod yes/no to questions during session; no family at bedside. PTA, pt reports independence with all mobility. Per nsg, pt has not experienced bleeding from nose or mouth this AM; large clot in R nostril. Pt requires assist to sit eob. Endorses feeling dizzy but BP WNL throughout session (likely d/t being flat in bed d/t bleeding). Pt able to stand and ambulate 6 ft to recliner with assist. Left up in chair, reclined, as L nostril began trickling blood at end of session. Nsg in room and aware. Will continue to progress as tolerated.     Timothy Galdamez would benefit from acute PT intervention to improve quality of life, focus on recovery of impairments, provide patient/caregiver education, reduce fall risk, and maximize (I) and safety with functional mobility. Once medically stable, recommending pt discharge to low intensity therapy. Patient currently demonstrates a need for low intensity  therapy on a scheduled basis secondary to a decline in functional status due to surgical procedure .      Rehab Prognosis: Good    Plan:     During this hospitalization, patient to be seen 4 x/week to address the identified rehab impairments via gait training, therapeutic activities, therapeutic exercises, neuromuscular re-education and progress towards stated goals.     Plan of Care Expires:  03/26/24  Plan of Care reviewed with: patient    This plan of care has been discussed with the patient/caregiver, who was included in its development and is in agreement with the identified goals and treatment plan.     Subjective     Communicated with RN prior to session.  Patient found supine upon PT entry to room, agreeable to evaluation. Pt alone during session.    Chief Complaint: neck pain    Patient/Family Comments/goals: none stated    Pain/Comfort:  Pain Rating 1: 5/10  Location 1: neck  Pain Addressed 1: Reposition, Cessation of Activity, Distraction    Patients cultural, spiritual, Nondenominational conflicts given the current situation: None identified     Patient History: information obtained from wife    Living Environment: 2SH with threshold to enter with wife; bedroom is on 1st floor  Prior Level of Function: independent with mobility and ADLs  DME owned: none  Works: yes - CPA  Drives: yes  Support Available/Caregiver Assistance:  wife    Objective:   OT present for coeval due to pt's multiple medical comorbidities and functional/cognition deficits requiring two skilled therapists to appropriately progress pt's musculoskeletal strength, neuromuscular control, and endurance while taking into consideration medical acuity and pt safety.    Patient found with: Tracheostomy, oxygen, MARU drain, PEG Tube, birmingham catheter, pulse ox (continuous), telemetry, blood pressure cuff    Recent Surgery: Procedure(s) (LRB):  GLOSSECTOMY (Right)  DISSECTION, NECK (Bilateral)  TRACHEOTOMY (N/A)  CREATION, FLAP, MUSCLE ROTATION  "(N/A)  INSERTION, PEG TUBE (Right) 2 Days Post-Op  General Precautions: Standard, fall   Orthopedic Precautions:    Braces:     Oxygen Device: trach collar 5L/28%      Exams:    Cognition:  Alert  Command following: Follows multistep verbal commands  Communication: non-vocal d/t trach    Sensation:   Light touch sensation: Intact BLEs    Gross Motor Coordination: No deficits noted during functional mobility tasks     Edema/Skin Integrity: None noted;  bleeding minimally from L nostril    Postural examination/scapula alignment: no deficits noted    Lower Extremity Range of Motion:  Right Lower Extremity: WFL  Left Lower Extremity: WFL    Lower Extremity Strength:  Right Lower Extremity: WFL  Left Lower Extremity: WFL    Functional Mobility:    Bed Mobility:  Supine > Sit with minimum assistance    Transfers:   Sit <> Stand Transfer: Contact Guard Assistance x 1 trials from eob with no AD              Gait:  Distance: 6 ft  Assistance level: Minimal Assistance  Assistive Device:  HHA  Gait Assessment: wide base of support; "waddling" gait pattern with decreased richy foot clearance    Balance:  Dynamic Sitting: GOOD: Maintains balance through MODERATE excursions of active trunk movement  Standing:  Static: FAIR+: Takes MINIMAL challenges from all directions   Dynamic: POOR+: Needs MIN (minimal ) assist during gait    Outcome Measure: AM-PAC 6 CLICK MOBILITY  Total Score:18     Patient/Caregiver Education:     Therapist educated pt/caregiver regarding:   PT POC and goals for therapy   Safety with mobility and fall risk   Instruction on use of call button and importance of calling nursing staff for assistance with mobility   Time provided for therapeutic counseling and discussion of current health disposition. All questions answered to satisfaction, within scope of PT practice     Patient/caregiver able to verbalize understanding and expressed no further questions this visit; will follow-up with pt/caregiver during " current admit for additional questions/concerns within scope of practice.     White board updated.     Patient left up in chair with all lines intact, call button in reach, and nsg present.    GOALS:   Multidisciplinary Problems       Physical Therapy Goals          Problem: Physical Therapy    Goal Priority Disciplines Outcome Goal Variances Interventions   Physical Therapy Goal     PT, PT/OT Ongoing, Progressing     Description: Goals to be met by: 3/26/24     Patient will increase functional independence with mobility by performin. Supine to sit with [place]  2. Sit to supine with [place]  3. Sit to stand transfer with [place]  4. Bed to chair transfer with [place] using No Assistive Device  5. Gait  x 250 feet with [place] using No Assistive Device.                            History:     Past Medical History:   Diagnosis Date    Cancer     Hyperlipidemia     Hypertension        Past Surgical History:   Procedure Laterality Date    ANKLE SURGERY      L ankle    BIOPSY OF TISSUE OF NECK Left     COLONOSCOPY N/A 2017    Procedure: COLONOSCOPY;  Surgeon: Danny Jane MD;  Location: [facility] ([address] [place] [address]);  Service: Endoscopy;  Laterality: N/A;  Do not cancel this order    DIRECT LARYNGOBRONCHOSCOPY N/A 2023    Procedure: LARYNGOSCOPY, DIRECT, WITH BRONCHOSCOPY;  Surgeon: Micaela Poole MD;  Location: [facility] OR [address] [place] [address];  Service: ENT;  Laterality: N/A;    ESOPHAGOGASTRODUODENOSCOPY N/A 2023    Procedure: EGD (ESOPHAGOGASTRODUODENOSCOPY);  Surgeon: Basilia Villavicencio MD;  Location: [facility] ENDOSCOPY;  Service: Gastroenterology;  Laterality: N/A;  9.13 instr sent via portal [facility]  pre call complete, stated he would have a ride -    TONSILLECTOMY      TUMOR REMOVAL Right     at     VASECTOMY      WRIST FRACTURE SURGERY Right        Time Tracking:     PT Received On: 24  PT Start Time: 826     PT Stop Time: 842  PT Total Time (min): 16 min      Billable Minutes: Evaluation 8 and Gait Training 8    02/25/2024

## 2024-02-25 NOTE — SUBJECTIVE & OBJECTIVE
Interval History/Significant Events: Tolerated trach collar overnight. Very calm, very pleasant. Cooperative      Objective:     Vital Signs (Most Recent):  Temp: 98.6 °F (37 °C) (02/24/24 1900)  Pulse: 67 (02/25/24 0440)  Resp: (!) 9 (02/25/24 0440)  BP: 134/64 (02/25/24 0130)  SpO2: 100 % (02/25/24 0440) Vital Signs (24h Range):  Temp:  [97.8 °F (36.6 °C)-98.6 °F (37 °C)] 98.6 °F (37 °C)  Pulse:  [] 67  Resp:  [7-21] 9  SpO2:  [97 %-100 %] 100 %  BP: ()/(58-81) 134/64     Weight: 79.4 kg (175 lb)  Body mass index is 23.73 kg/m².      Intake/Output Summary (Last 24 hours) at 2/25/2024 0508  Last data filed at 2/25/2024 0100  Gross per 24 hour   Intake 2581.51 ml   Output 1060 ml   Net 1521.51 ml            Physical Exam  Constitutional:       General: He is not in acute distress.  Neck:      Comments: Neck incision c/d/I  Swelling at inferior portion of incision  2x neck MARU drains w/ SS output    Tracheostomy tube in place w/ pooled clotted blood    Cardiovascular:      Rate and Rhythm: Normal rate and regular rhythm.      Comments: 2x MARU drains to flap site w/ SS output  Incision c/d/i  Pulmonary:      Comments: Tracheostomy  Abdominal:      General: There is no distension.      Palpations: Abdomen is soft.      Tenderness: There is no abdominal tenderness.      Comments: PEG tube in place   Genitourinary:     Comments: Olson in place  Musculoskeletal:      Right lower leg: No edema.      Left lower leg: No edema.   Skin:     General: Skin is warm and dry.   Neurological:      Mental Status: He is alert.              Lines/Drains/Airways       Drain  Duration                  Closed/Suction Drain 02/23/24 1333 Tube - 1 Right;Lateral Chest Bulb 15 Fr. 1 day         Closed/Suction Drain 02/23/24 1338 Tube - 2 Right;Medial Chest Bulb 15 Fr. 1 day         Closed/Suction Drain 02/23/24 1414 Tube - 3 Left;Anterior Neck Bulb 15 Fr. 1 day         Closed/Suction Drain 02/23/24 1443 Tube - 4 Right;Anterior Neck  Bulb 15 Fr. 1 day         Gastrostomy/Enterostomy 02/23/24 0750 Percutaneous endoscopic gastrostomy (PEG) LUQ feeding 1 day         Urethral Catheter 02/23/24 0735 Silicone;Temperature probe 16 Fr. 1 day              Airway  Duration                  Airway - Non-Surgical 02/23/24 0734 Coil Wire Tube 1 day    Adult Surgical Airway 02/23/24 1504 Shiley Cuffed 6.0/ 75mm 1 day              Peripheral Intravenous Line  Duration                  Peripheral IV - Single Lumen 02/23/24 0555 18 G Left;Posterior Forearm 1 day         Peripheral IV - Single Lumen 02/23/24 0739 18 G Left;Posterior Forearm 1 day         Peripheral IV - Single Lumen 02/23/24 1800 20 G Anterior;Proximal;Right Forearm 1 day                    Significant Labs:    CBC/Anemia Profile:  Recent Labs   Lab 02/24/24  1700 02/25/24  0015 02/25/24  0329   WBC 12.48 11.67 10.21   HGB 10.6* 9.4* 8.2*   HCT 30.6* 27.0* 23.7*    169 150   MCV 95 94 93   RDW 13.0 12.9 13.1          Chemistries:  Recent Labs   Lab 02/23/24  1748 02/24/24  0330 02/25/24  0329     138  --  140   K 3.9  3.9  --  4.0     107  --  107   CO2 22*  22*  --  25   BUN 16  16  --  24*   CREATININE 1.0  1.0  --  0.8   CALCIUM 8.4*  8.4*  --  7.7*   ALBUMIN 3.0*  --  2.0*   PROT 5.3*  --  4.2*   BILITOT 0.7  --  0.4   ALKPHOS 42*  --  34*   ALT 26  --  17   AST 25  --  23   MG 1.8 1.9 1.9   PHOS 3.8 4.4 2.4*         All pertinent labs within the past 24 hours have been reviewed.    Significant Imaging:  I have reviewed all pertinent imaging results/findings within the past 24 hours.

## 2024-02-25 NOTE — PROGRESS NOTES
[facility]  Otorhinolaryngology-Head & Neck Surgery  Progress Note    Subjective:     Post-Op Info:  Procedure(s) (LRB):  GLOSSECTOMY (Right)  DISSECTION, NECK (Bilateral)  TRACHEOTOMY (N/A)  CREATION, FLAP, MUSCLE ROTATION (N/A)  INSERTION, PEG TUBE (Right)   2 Days Post-Op  Hospital Day: 3     Interval History: Patient awake and sitting in chair this morning. Patient with less bleeding this morning. Overall, doing okay.    Medications:  Continuous Infusions:   lactated ringers 125 mL/hr at 02/25/24 0900     Scheduled Meds:   acetaminophen  650 mg Per G Tube Q6H    amLODIPine  2.5 mg Per G Tube Daily    ampicillin-sulbactam  3 g Intravenous Q6H    aspirin  81 mg Per G Tube Daily    atorvastatin  80 mg Per G Tube Daily    enoxparin  40 mg Subcutaneous Daily    ezetimibe  10 mg Per G Tube Daily    multivitamin  1 tablet Per G Tube Daily    mupirocin   Nasal BID    pantoprazole  40 mg Intravenous Daily    polyethylene glycol  17 g Per G Tube Daily    thiamine  100 mg Per G Tube Daily     PRN Meds:LORazepam, melatonin, morphine, ondansetron, oxyCODONE, oxyCODONE, sodium chloride 0.9%     Review of patient's allergies indicates:  No Known Allergies  Objective:     Vital Signs (24h Range):  Temp:  [97.8 °F (36.6 °C)-98.6 °F (37 °C)] 97.9 °F (36.6 °C)  Pulse:  [64-97] 74  Resp:  [7-25] 13  SpO2:  [97 %-100 %] 100 %  BP: ()/(58-81) 135/65     Date 02/25/24 0700 - 02/26/24 0659   Shift 7976-4563 7051-7269 7702-7225 24 Hour Total   INTAKE   I.V.(mL/kg) 255.7(3.2)   255.7(3.2)   NG/   200   IV Piggyback 95.5   95.5   Shift Total(mL/kg) 551.1(6.9)   551.1(6.9)   OUTPUT   Urine(mL/kg/hr) 390   390   Shift Total(mL/kg) 390(4.9)   390(4.9)   Weight (kg) 79.4 79.4 79.4 79.4     Lines/Drains/Airways       Drain  Duration                  Gastrostomy/Enterostomy 02/23/24 0750 Percutaneous endoscopic gastrostomy (PEG) LUQ feeding 2 days         Urethral Catheter 02/23/24 0735 Silicone;Temperature  probe 16 Fr. 2 days         Closed/Suction Drain 02/23/24 1333 Tube - 1 Right;Lateral Chest Bulb 15 Fr. 1 day         Closed/Suction Drain 02/23/24 1338 Tube - 2 Right;Medial Chest Bulb 15 Fr. 1 day         Closed/Suction Drain 02/23/24 1414 Tube - 3 Left;Anterior Neck Bulb 15 Fr. 1 day         Closed/Suction Drain 02/23/24 1445 Tube - 4 Right;Anterior Neck Bulb 15 Fr. 1 day              Airway  Duration                  Airway - Non-Surgical 02/23/24 0734 Coil Wire Tube 2 days    Adult Surgical Airway 02/23/24 1504 Shiley Cuffed 6.0/ 75mm 1 day              Peripheral Intravenous Line  Duration                  Peripheral IV - Single Lumen 02/23/24 0555 18 G Left;Posterior Forearm 2 days         Peripheral IV - Single Lumen 02/23/24 0739 18 G Left;Posterior Forearm 2 days         Peripheral IV - Single Lumen 02/23/24 1800 20 G Anterior;Proximal;Right Forearm 1 day                     Physical Exam  Awake, nods head to answer questions  Native tongue edematous and completely occluding oral cavity, cannot visualize oropharynx; although improving slightly from yesterday  Drainage from mouth is more serosanguinous than sandy blood  Dried blood on nares, no active bleeding  6-0 cuffed tracheostomy in place, cuff inflated  2 neck MARU in place with serosanguinous drainage  Neck is soft  Neck incision intact with staples  Chest incision intact with staples  Chest is soft  2 MARU in chest with serosanguinous drainage     Significant Labs:  CBC:   Recent Labs   Lab 02/25/24  0329   WBC 10.21   RBC 2.55*   HGB 8.2*   HCT 23.7*      MCV 93   MCH 32.2*   MCHC 34.6     CMP:   Recent Labs   Lab 02/25/24  0329   *   CALCIUM 7.7*   ALBUMIN 2.0*   PROT 4.2*      K 4.0   CO2 25      BUN 24*   CREATININE 0.8   ALKPHOS 34*   ALT 17   AST 23   BILITOT 0.4       Significant Diagnostics:  None  Assessment/Plan:     * Tongue cancer  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 He had bleeding from the oral cavity post-op that has overall improved, but his hemoglobin has continued to drop. Will continue to monitor closely for now and transfuse as needed.    ENT   - Keep MARU in place to neck and chest    Neuro  - Patient now completley off sedation per SICU  - Recommend multimodal pain control    CV  - Currently HDS    Resp  - On trach collar  - Routine trach care    FEN/GI  - NPO  - Start trickle tube feeds today but hold for ANY nausea/vomiting/fullness    Renal  - Remove birmingham    Heme/ID   - Unasyn for post-op prophylaxis  - CBC q6h to monitor for significant drop in hemoglobin    MSK   - PT/OT/OOB    Ppx: L40    Dispo: Continue SICU care          Trixie Sandy MD  Otorhinolaryngology-Head & Neck Surgery  [facility]

## 2024-02-25 NOTE — SUBJECTIVE & OBJECTIVE
Interval History: Patient awake and sitting in chair this morning. Patient with less bleeding this morning. Overall, doing okay.    Medications:  Continuous Infusions:   lactated ringers 125 mL/hr at 02/25/24 0900     Scheduled Meds:   acetaminophen  650 mg Per G Tube Q6H    amLODIPine  2.5 mg Per G Tube Daily    ampicillin-sulbactam  3 g Intravenous Q6H    aspirin  81 mg Per G Tube Daily    atorvastatin  80 mg Per G Tube Daily    enoxparin  40 mg Subcutaneous Daily    ezetimibe  10 mg Per G Tube Daily    multivitamin  1 tablet Per G Tube Daily    mupirocin   Nasal BID    pantoprazole  40 mg Intravenous Daily    polyethylene glycol  17 g Per G Tube Daily    thiamine  100 mg Per G Tube Daily     PRN Meds:LORazepam, melatonin, morphine, ondansetron, oxyCODONE, oxyCODONE, sodium chloride 0.9%     Review of patient's allergies indicates:  No Known Allergies  Objective:     Vital Signs (24h Range):  Temp:  [97.8 °F (36.6 °C)-98.6 °F (37 °C)] 97.9 °F (36.6 °C)  Pulse:  [64-97] 74  Resp:  [7-25] 13  SpO2:  [97 %-100 %] 100 %  BP: ()/(58-81) 135/65     Date 02/25/24 0700 - 02/26/24 0659   Shift 4367-4902 6262-1487 1357-8031 24 Hour Total   INTAKE   I.V.(mL/kg) 255.7(3.2)   255.7(3.2)   NG/   200   IV Piggyback 95.5   95.5   Shift Total(mL/kg) 551.1(6.9)   551.1(6.9)   OUTPUT   Urine(mL/kg/hr) 390   390   Shift Total(mL/kg) 390(4.9)   390(4.9)   Weight (kg) 79.4 79.4 79.4 79.4     Lines/Drains/Airways       Drain  Duration                  Gastrostomy/Enterostomy 02/23/24 0750 Percutaneous endoscopic gastrostomy (PEG) LUQ feeding 2 days         Urethral Catheter 02/23/24 0735 Silicone;Temperature probe 16 Fr. 2 days         Closed/Suction Drain 02/23/24 1333 Tube - 1 Right;Lateral Chest Bulb 15 Fr. 1 day         Closed/Suction Drain 02/23/24 1338 Tube - 2 Right;Medial Chest Bulb 15 Fr. 1 day         Closed/Suction Drain 02/23/24 1414 Tube - 3 Left;Anterior Neck Bulb 15 Fr. 1 day         Closed/Suction Drain  02/23/24 1445 Tube - 4 Right;Anterior Neck Bulb 15 Fr. 1 day              Airway  Duration                  Airway - Non-Surgical 02/23/24 0734 Coil Wire Tube 2 days    Adult Surgical Airway 02/23/24 1504 Shiley Cuffed 6.0/ 75mm 1 day              Peripheral Intravenous Line  Duration                  Peripheral IV - Single Lumen 02/23/24 0555 18 G Left;Posterior Forearm 2 days         Peripheral IV - Single Lumen 02/23/24 0739 18 G Left;Posterior Forearm 2 days         Peripheral IV - Single Lumen 02/23/24 1800 20 G Anterior;Proximal;Right Forearm 1 day                     Physical Exam  Awake, nods head to answer questions  Native tongue edematous and completely occluding oral cavity, cannot visualize oropharynx; although improving slightly from yesterday  Drainage from mouth is more serosanguinous than sandy blood  Dried blood on nares, no active bleeding  6-0 cuffed tracheostomy in place, cuff inflated  2 neck MARU in place with serosanguinous drainage  Neck is soft  Neck incision intact with staples  Chest incision intact with staples  Chest is soft  2 MARU in chest with serosanguinous drainage     Significant Labs:  CBC:   Recent Labs   Lab 02/25/24  0329   WBC 10.21   RBC 2.55*   HGB 8.2*   HCT 23.7*      MCV 93   MCH 32.2*   MCHC 34.6     CMP:   Recent Labs   Lab 02/25/24  0329   *   CALCIUM 7.7*   ALBUMIN 2.0*   PROT 4.2*      K 4.0   CO2 25      BUN 24*   CREATININE 0.8   ALKPHOS 34*   ALT 17   AST 23   BILITOT 0.4       Significant Diagnostics:  None

## 2024-02-25 NOTE — PLAN OF CARE
Problem: Occupational Therapy  Goal: Occupational Therapy Goal  Description: Goals to be met by: 3/10/24     Patient will increase functional independence with ADLs by performing:    Feeding with [place].  UE Dressing with Set-up Assistance.  LE Dressing with Set-up Assistance.  Grooming while standing at sink with Modified [place].  Toileting from toilet with Modified [place] for hygiene and clothing management.   Toilet transfer to toilet with Modified [place].    Outcome: Ongoing, Progressing

## 2024-02-25 NOTE — PT/OT/SLP EVAL
Occupational Therapy   Co-Evaluation    Name: Timothy Galdamez  MRN: [id number]  Admitting Diagnosis: Tongue cancer  Recent Surgery: Procedure(s) (LRB):  GLOSSECTOMY (Right)  DISSECTION, NECK (Bilateral)  TRACHEOTOMY (N/A)  CREATION, FLAP, MUSCLE ROTATION (N/A)  INSERTION, PEG TUBE (Right) 2 Days Post-Op    Recommendations:     Discharge Recommendations: Low Intensity Therapy  Discharge Equipment Recommendations:  none  Barriers to discharge:  None    Assessment:     Timothy Galdamez is a 68 y.o. male with a medical diagnosis of Tongue cancer.  He presents agreeable and eager for OOB. Performance deficits affecting function: impaired endurance, gait instability, impaired functional mobility, impaired self care skills, weakness, impaired balance, pain, impaired cardiopulmonary response to activity, decreased upper extremity function.  Pt benefits from co-treatment with PT to accommodate pt's activity tolerance and progression with therapy.      Rehab Prognosis: Good; patient would benefit from acute skilled OT services to address these deficits and reach maximum level of function.       Plan:     Patient to be seen 4 x/week to address the above listed problems via self-care/home management, therapeutic activities, therapeutic exercises  Plan of Care Expires: 03/10/24  Plan of Care Reviewed with: patient    Subjective     Chief Complaint: bleeding from nostril  Patient/Family Comments/goals: to get better and go home    Occupational Profile:  Patient History: information obtained from pt      Living Environment: unable to obtain from pt  Prior Level of Function: independent with mobility and ADLs  DME owned: none  Support Available/Caregiver Assistance:  wife    Pain/Comfort:  Pain Rating 1: 5/10  Location - Side 1: Bilateral  Location - Orientation 1: generalized  Location 1: neck  Pain Addressed 1: Reposition, Distraction  Pain Rating Post-Intervention 1: 5/10    Patients cultural, spiritual, Cheondoism conflicts given the  current situation: no    Objective:     Communicated with: RN prior to session.  Patient found supine with blood pressure cuff, pulse ox (continuous), telemetry, MARU drain, oxygen, Tracheostomy, [place] catheter, PEG Tube upon OT entry to room.    General Precautions: Standard, fall  Orthopedic Precautions:    Braces:    Respiratory Status:  trach collar 5L 28%    Occupational Performance:    Bed Mobility:    Patient completed Scooting/Bridging with contact guard assistance  Patient completed Supine to Sit with minimum assistance    Functional Mobility/Transfers:  Patient completed Sit <> Stand Transfer with contact guard assistance  with  hand-held assist   Patient completed Bed <> Chair Transfer using Step Transfer technique with minimum assistance with hand-held assist  Functional Mobility: Min A with HHA to chair x ~6 feet with short steps; distance limited 2* bleeding from nostril    Activities of Daily Living:  Grooming: contact guard assistance for safety to avoid wounds  Upper Body Dressing: moderate assistance    Lower Body Dressing: moderate assistance      Cognitive/Visual Perceptual:  Pt is alert, oriented and following commands    Physical Exam:  No postural abnormalities noted  B UE AROM WFL elbow and below; proximally limited by pain  B UE MMT WFL; NT proximally    AMPAC 6 Click ADL:  AMPAC Total Score: 14    Treatment & Education:  Pt ed on OT POC  Pt sat EOB with SBA while engaged in occupations of choice  Pt ed on gentle ROM ex's     Patient left up in chair with all lines intact, call button in reach, and RN notified    GOALS:   Multidisciplinary Problems       Occupational Therapy Goals          Problem: Occupational Therapy    Goal Priority Disciplines Outcome Interventions   Occupational Therapy Goal     OT, PT/OT Ongoing, Progressing    Description: Goals to be met by: 3/10/24     Patient will increase functional independence with ADLs by performing:    Feeding with [place].  UE Dressing  with Set-up Assistance.  LE Dressing with Set-up Assistance.  Grooming while standing at sink with Modified [place].  Toileting from toilet with Modified [place] for hygiene and clothing management.   Toilet transfer to toilet with Modified [place].                         History:     Past Medical History:   Diagnosis Date    Cancer     Hyperlipidemia     Hypertension          Past Surgical History:   Procedure Laterality Date    ANKLE SURGERY      L ankle    BIOPSY OF TISSUE OF NECK Left 2013    COLONOSCOPY N/A 08/21/2017    Procedure: COLONOSCOPY;  Surgeon: Danny Jane MD;  Location: [facility] (4TH FLR);  Service: Endoscopy;  Laterality: N/A;  Do not cancel this order    DIRECT LARYNGOBRONCHOSCOPY N/A 12/11/2023    Procedure: LARYNGOSCOPY, DIRECT, WITH BRONCHOSCOPY;  Surgeon: Micaela Poole MD;  [facility];  Service: ENT;  Laterality: N/A;    ESOPHAGOGASTRODUODENOSCOPY N/A 09/18/2023    Procedure: EGD (ESOPHAGOGASTRODUODENOSCOPY);  Surgeon: Basilia Villavicencio MD;  Location: [facility] ENDOSCOPY;  Service: Gastroenterology;  Laterality: N/A;  9.13 instr sent via portal [facility]  pre call complete, stated he would have a ride -    TONSILLECTOMY      TUMOR REMOVAL Right 2015    at     VASECTOMY      WRIST FRACTURE SURGERY Right        Time Tracking:     OT Date of Treatment: 02/25/24  OT Start Time: 0826  OT Stop Time: 0842  OT Total Time (min): 16 min    Billable Minutes:Evaluation 8  Therapeutic Activity 8    2/25/2024

## 2024-02-25 NOTE — OP NOTE
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 Diagnostic direct laryngoscopy  2. Tracheostomy   3. Bilateral modified neck dissection of level 1A through 4  4. Resection of squamous cell carcinoma of the right base of tongue with partial glossectomy involving entirety of tongue base and adjacent pharynx and bilateral posterior oral tongue  5. Pectoralis major myocutaneous flap reconstruction of partial pharyngectomy/partial glossectomy defect    6. Tracheostomy tube exchange    ANESTHESIA: General      INDICATIONS FOR PROCEDURE:   Timothy Galdamez is a 68 y.o. man who recently evaluated for a squamous cell carcinoma of the right base of tongue.  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  This new tumor was found to be P 16 negative and was considered a 2nd primary, possibly a radiation induced malignancy.  Staging studies were negative for evidence of metastatic disease.  He was seen and examined, I recommended that we proceed to the operating room for resection of the tumor along with the above procedures.  He was seen in multidisciplinary evaluation by Radiation and Medical Oncology and discussed his nonsurgical options with them.  Ultimately, he elected to proceed with surgery.    He was apprised of the risks, benefits and alternatives to surgery.  In spite of the risk inherent to surgery,he provided informed consent for the aforementioned procedures.     PROCEDURE IN DETAIL:  The patient was taken to the operating room and placed on the  operating table in the supine position.  General endotracheal anesthesia was induced by the anesthesia team.     The operating table was rotated  180°.  The general surgery service placed a PEG tube.  Please see their note for details.    To begin, a laryngoscopy was carried out to assess the tumor.  The upper teeth were protected with a mouth guard.  The Dedo laryngoscope was inserted through the mouth and into the oropharynx.  The tumor was noted to be limited to the right base of tongue on direct visualization.  Upon palpation, however, it was apparent that the tumor extended anteriorly into the posterior oral tongue and laterally across the midline to the left base of tongue.  On imaging studies, there was concern for involvement of the vallecula as well as the suprahyoid muscles.    Next, an apron incision was marked in the neck to allow for access to levels 1A through 4.  A separate tracheostomy incision was marked and injected with several cc of 1% lidocaine with epinephrine.      Next, the face, neck and chest were prepped and draped in standard sterile fashion as was the left lower extremity in case and anterolateral thigh free flap would be necessary.      To begin, the tracheostomy was carried out.  The skin was incised with the Bovie on cutting current.  Sharp dissection proceeded through the underlying subcutaneous tissue platysma.  The midline was identified and divided.  The thyroid isthmus was bluntly dissected away from the underlying trachea and divided with the Bovie.  The strap musculature was retracted laterally on each side as was the thyroid.  Trachea was visualized.  The tracheostomy was carried out between the 1st and 2nd tracheal rings utilizing a 10 blade.  An inferiorly based Dave flap was fashioned by making downward cuts on either side of the tracheostomy with curved Al scissors.  The flap was secured to the skin of the neck with 3-0 Vicryl.  The endotracheal tube was slowly  withdrawn by anesthesia and the trachea was intubated with a size 7.5 reinforced endotracheal tube.  It was connected to the anesthesia circuit.  Placement in the airway was confirmed with return of end-tidal CO2 via the anesthesia monitor.      Next, our attention was turned to the neck dissections.  The neck incision was carried out with the Bovie on cutting current.  Sharp dissection proceeded through the underlying subcutaneous tissue and platysma.  Superiorly and inferiorly-based flaps were elevated the level of the mandible and mastoid superiorly to the level of the clavicle inferiorly.  Care was taken not to violate the trach site.  Dissection began in level 1B on the right side.  The marginal mandibular nerve was identified.  It was freed from the surrounding soft tissue attachments elevated superiorly.  The fibrofatty tissue of level 1A was then transected down to the underlying mandibular periosteum.  Dissection proceeded along the mandible up to the level of the anterior digastric.  The anterior belly of the digastric, digastric tendon and posterior belly were skeletonized.  Care was taken not to injure the facial vein were facial artery in case it would be necessary for microvascular anastomosis.  Several branches to the gland were isolated, clipped and divided.  The gland was then freed from surrounding soft tissue attachments.  The mylohyoid neurovascular bundle was identified.  It was clipped and divided.  The mylohyoid was then retracted anteriorly.  The lingual nerve and hypoglossal nerve were identified and preserved.  The submandibular ganglion and [place]'s duct were clipped and divided.  Ultimately, the submandibular gland was amputated and sent to pathology along with the adjacent level 1B lymph nodes.    Next, an identical procedure was performed in left level 1B.  Again, all relevant neurovascular structures were identified and preserved.  The specimen was sent to pathology for permanent  analysis.  Level 1A was then addressed.  The fibrofatty tissue overlying the anterior bellies of the digastric and mylohyoid was divided free of the mandible superiorly down to the level of the hyoid.  Ultimately, the specimen was amputated and sent to pathology for permanent analysis.      Dissection then began in the left neck.  The anterior border of the sternocleidomastoid muscle was skeletonized along its full length down to the level of the clavicle and sternal notch.  The omohyoid muscle was identified.  Dissection proceeded superiorly to identify the spinal accessory nerve which was traced cephalad the skull base to junction with the posterior belly of the digastric and the internal jugular vein.  The lateral border of the internal jugular vein was then skeletonized superiorly.  The specimen was freed and level 2B.  It was transected down to the underlying deep cervical fascia and swept deep to the spinal accessory nerve.  Next, the fibrofatty tissue of levels 2A, 3 and 4 were transected down to the underlying deep cervical fascia, cervical rootlets transverse cervical vessels.  The omohyoid was circumferentially dissected and divided.  The specimen was elevated off the floor of the neck with electrocautery up to the level of the carotid sheath.  The phrenic nerve was identified and preserved.  Ultimately, the specimen was freed from the carotid sheath structures with a 15 blade.  The specimen was amputated and sent to pathology for permanent analysis.      An identical procedure was then carried out on the right side.  It is notable, though, that there had been prior surgery performed in the right neck and there was little fibrofatty tissue remaining in levels 2 and 3.  The right neck dissection was also amputated and sent to pathology for permanent analysis.      Next, our attention was turned to the primary tumor.  The tongue was delivered into the neck.  The suprahyoid muscles including the digastric,  mylohyoid genial hyoid were freed from the mandible.  Attention was turned intraorally.  The floor of mouth was incised laterally on each side and anteriorly.  The genial hyoid muscle was divided the tongue was delivered from the oral cavity into the neck.  The lateral pharynx was addressed taking care not to injure the lingual artery or hypoglossal nerve on the right side.  These maneuvers allowed us to visualize the primary tumor.  It was as described above.  There was obvious invasion of the suprahyoid muscles which were freed from the hyoid kept continuity with the specimen.  The mucosa was incised across the vallecula up to the level of the tip of the epiglottis.  Dissection then proceeded along the left base of tongue freeing the specimen from the left lateral pharyngeal wall.  Ultimately, a margin 2 cm were taken circumferentially including the deep musculature of the tongue.  The right hypoglossal nerve and right lingual artery were isolated, clipped and divided and sacrificed with the specimen.  The left lingual artery and hypoglossal nerve were identified and preserved.  Ultimately, the specimen was amputated.  A close margin was noted to be along the left mucosal margin.  Margins were sent as below and found to be negative for carcinoma.      Hemostasis was then achieved in the resection bed with electrocautery.  The tongue was tacked back to the left lateral pharynx with  3-0 Vicryl suture.      Next, our attention was turned to the reconstructive portion of procedure.  The right chest was addressed.  An approximately 10 x 6 cm skin paddle was marked overlying the pectoralis major muscle just medial to the nipple.  The skin paddle was incised circumferentially with the Bovie.  Sharp dissection proceeded down to the underlying pectoralis major muscle.  The skin and subcutaneous tissue the chest wall was then elevated off of the muscle identifying its lateral border.  The inferior border was then freed  from the ribs the medial aspect of the muscle was divided along its full length.  The muscle was then elevated off the underlying pectoralis minor.  The thoracoacromial pedicle was identified and preserved.  The lateral pectoral nerves were isolated, clipped and divided in order to denervate the flap.  Next, a subcutaneous tunnel wide enough to accommodate for my fingers was elevated from the chest into the neck.  The flap was then further mobilized I divided the humeral attachments of the pectoralis.  The flap was then passed from the chest in the neck.  It was sewn circumferentially to the mucosal defect.  The tongue was then delivered back into the oral cavity and sewn back to the floor mouth on the left and anteriorly.  The right floor of mouth and reconstructed with the flap.  The flap was inset with 3-0 Vicryl suture.  The tongue was also reapproximated to the general or mucosa with 3-0 Vicryl suture.    Once the inset was complete, the neck was closed with absorbable suture and skin staples over 2 separate 15 [place] drains which were secured with silk suture.  Hemostasis was achieved in the neck with electrocautery.      Similarly, the right chest donor site was closed with 3-0 Vicryl and skin staples over 2 separate 15 [place] drains.  Hemostasis was achieved in the chest with electrocautery prior to closure.    Once the neck and chest have been closed, the endotracheal tube was removed from the tracheostomy stoma and replaced with a size 6 cuffed Shiley tracheostomy tube.  The tube was secured the 4 corners with silk suture and circumferentially around the neck with Velcro tie.  It was attached to the anesthesia circuit.  Placement in the airway was confirmed with return of end-tidal CO2 via the anesthesia circuit.    He was then handed back to anesthesia.  Was transferred to ICU sedated and satisfactory condition.    There were no intraoperative complications.  I was present for and participated  in the entire procedure as dictated above.       ESTIMATED BLOOD LOSS: 250 mL    SPECIMENS:   Specimen (24h ago, onward)      1. Right level 1B neck dissection (perm) 2. Level 1A neck dissection (perm) 3. Left level 1B neck dissection (perm) 4. Right partial glossectomy single stitch anterior double stitch medial (perm) 5. Soft palate margin (frozen, sent to path) 6. Posterior pharyngeal margin (frozen, sent to path) 7. Floor of mouth margin (frozen, sent to path) 8. Left neck dissection level 2-4 (perm) 9. Right neck dissection level 2, 3, 4 (perm)

## 2024-02-25 NOTE — ASSESSMENT & PLAN NOTE
Timothy Galdamez is a 68 y.o. yo male who is s/p glossectomy,neck dissection, rotational neck flap and G-tube placement by Dr. Ventura and Dr. Wu on 02/23/24. He was a difficult intubation in the OR and is currently sedated with a tracheostomy tube. Now tolerating trach collar       Neuro/Psych:   -- Sedation: none  -- Pain: MMD pain regimen  -- On CIWA protocol for hx EtOH, CIWA scores 0   -- thiamine daily, multivitamin daily, ativan 2 prn q4             Cards:   -- HDS off pressors.   -- goal SBP < 180  -- home amlodipine 2.5      Pulm:   -- S/p Tracheostomy creation, tolerating trach collar   -- O2 sat > 90%      Renal:  -- Continue [place]irmingham for strict I/O  -- Replace lytes as needed      FEN / GI:   -- mIVF at 50  -- Replace lytes as needed  -- pantoprazole (on home omeprazole)  -- Nutrition: trickle TF   --TF of Impact Peptide 1.5 at trickle rate; ok to slowly advance to goal rate of 50ml/hr q2 to provide 1800 kcal, 113g PRO, and 924ml FF- FWF per MD.       ID:   -- Tm: afebrile  -- WBC 10.2 from 12  -- unasyn per primary      Heme/Onc:  -- H/H: 8.2 from 10.6  -- CBC q6 to trend      Endo:   -- Gluc goal 140-180  -- Blood glucose at goal     PPx:   Feeding: TF  Analgesia/Sedation: mmd, no sedation  Thromboembolic prevention: holding until 02/26/24  HOB >30: Y  Stress Ulcer ppx: none  Glucose control: Critical care goal 140-180 g/dl, SSI  Bowel reg: miralax   Invasive Lines/Drains/Airway: [place], Trach, 2x neck MARU drains 2x flap incision MAUR drains  Deescalation: off vent, advancing tube feeds        Dispo/Code Status/Palliative:   -- SICU / Full Code

## 2024-02-25 NOTE — PROGRESS NOTES
[facility]  Critical Care - Surgery  Progress Note    Patient Name: Timothy GREENE Allday  MRN: [id number]  Admission Date: 2/23/2024  Hospital Length of Stay: 2 days  Code Status: Full Code  Attending Provider: Jeferson Ventura MD  Primary Care Provider: Young Lanier MD   Principal Problem: Tongue cancer    Subjective:     Interval History/Significant Events: Tolerated trach collar overnight. Very calm, very pleasant. Cooperative. Hgb 8.2 this am      Objective:     Vital Signs (Most Recent):  Temp: 98.6 °F (37 °C) (02/24/24 1900)  Pulse: 67 (02/25/24 0440)  Resp: (!) 9 (02/25/24 0440)  BP: 134/64 (02/25/24 0130)  SpO2: 100 % (02/25/24 0440) Vital Signs (24h Range):  Temp:  [97.8 °F (36.6 °C)-98.6 °F (37 °C)] 98.6 °F (37 °C)  Pulse:  [] 67  Resp:  [7-21] 9  SpO2:  [97 %-100 %] 100 %  BP: ()/(58-81) 134/64     Weight: 79.4 kg (175 lb)  Body mass index is 23.73 kg/m².      Intake/Output Summary (Last 24 hours) at 2/25/2024 0508  Last data filed at 2/25/2024 0100  Gross per 24 hour   Intake 2581.51 ml   Output 1060 ml   Net 1521.51 ml            Physical Exam  Constitutional:       General: He is not in acute distress.  Neck:      Comments: Neck incision c/d/I  Swelling at inferior portion of incision  2x neck MARU drains w/ SS output    Tracheostomy tube in place w/ pooled clotted blood    Cardiovascular:      Rate and Rhythm: Normal rate and regular rhythm.      Comments: 2x MARU drains to flap site w/ SS output  Incision c/d/i  Pulmonary:      Comments: Tracheostomy  Abdominal:      General: There is no distension.      Palpations: Abdomen is soft.      Tenderness: There is no abdominal tenderness.      Comments: PEG tube in place   Genitourinary:     Comments: Olson in place  Musculoskeletal:      Right lower leg: No edema.      Left lower leg: No edema.   Skin:     General: Skin is warm and dry.   Neurological:      Mental Status: He is alert.              Lines/Drains/Airways        Drain  Duration                  Closed/Suction Drain 02/23/24 1333 Tube - 1 Right;Lateral Chest Bulb 15 Fr. 1 day         Closed/Suction Drain 02/23/24 1338 Tube - 2 Right;Medial Chest Bulb 15 Fr. 1 day         Closed/Suction Drain 02/23/24 1414 Tube - 3 Left;Anterior Neck Bulb 15 Fr. 1 day         Closed/Suction Drain 02/23/24 1445 Tube - 4 Right;Anterior Neck Bulb 15 Fr. 1 day         Gastrostomy/Enterostomy 02/23/24 0750 Percutaneous endoscopic gastrostomy (PEG) LUQ feeding 1 day         Urethral Catheter 02/23/24 0735 Silicone;Temperature probe 16 Fr. 1 day              Airway  Duration                  Airway - Non-Surgical 02/23/24 0734 Coil Wire Tube 1 day    Adult Surgical Airway 02/23/24 1504 Shiley Cuffed 6.0/ 75mm 1 day              Peripheral Intravenous Line  Duration                  Peripheral IV - Single Lumen 02/23/24 0555 18 G Left;Posterior Forearm 1 day         Peripheral IV - Single Lumen 02/23/24 0739 18 G Left;Posterior Forearm 1 day         Peripheral IV - Single Lumen 02/23/24 1800 20 G Anterior;Proximal;Right Forearm 1 day                    Significant Labs:    CBC/Anemia Profile:  Recent Labs   Lab 02/24/24  1700 02/25/24  0015 02/25/24  0329   WBC 12.48 11.67 10.21   HGB 10.6* 9.4* 8.2*   HCT 30.6* 27.0* 23.7*    169 150   MCV 95 94 93   RDW 13.0 12.9 13.1          Chemistries:  Recent Labs   Lab 02/23/24  1748 02/24/24  0330 02/25/24  0329     138  --  140   K 3.9  3.9  --  4.0     107  --  107   CO2 22*  22*  --  25   BUN 16  16  --  24*   CREATININE 1.0  1.0  --  0.8   CALCIUM 8.4*  8.4*  --  7.7*   ALBUMIN 3.0*  --  2.0*   PROT 5.3*  --  4.2*   BILITOT 0.7  --  0.4   ALKPHOS 42*  --  34*   ALT 26  --  17   AST 25  --  23   MG 1.8 1.9 1.9   PHOS 3.8 4.4 2.4*         All pertinent labs within the past 24 hours have been reviewed.    Significant Imaging:  I have reviewed all pertinent imaging results/findings within the past 24 hours.  Assessment/Plan:      Oncology  * Tongue cancer  Timothy Galdamez is a 68 y.o. yo male who is s/p glossectomy,neck dissection, rotational neck flap and G-tube placement by Dr. Ventura and Dr. Wu on 02/23/24. He was a difficult intubation in the OR and is currently sedated with a tracheostomy tube. Now tolerating trach collar       Neuro/Psych:   -- Sedation: none  -- Pain: MMD pain regimen  -- On CIWA protocol for hx EtOH, CIWA scores 0   -- thiamine daily, multivitamin daily, ativan 2 prn q4             Cards:   -- HDS off pressors.   -- goal SBP < 180  -- home amlodipine 2.5      Pulm:   -- S/p Tracheostomy creation, tolerating trach collar   -- O2 sat > 90%      Renal:  -- Continue birmingham for strict I/O  -- Replace lytes as needed      FEN / GI:   -- mIVF at 50  -- Replace lytes as needed  -- pantoprazole (on home omeprazole)  -- Nutrition: trickle TF   --TF of Impact Peptide 1.5 at trickle rate; ok to slowly advance to goal rate of 50ml/hr q2 to provide 1800 kcal, 113g PRO, and 924ml FF- FWF per MD.       ID:   -- Tm: afebrile  -- WBC 10.2 from 12  -- unasyn per primary      Heme/Onc:  -- H/H: 8.2 from 10.6  -- CBC q6 to trend      Endo:   -- Gluc goal 140-180  -- Blood glucose at goal     PPx:   Feeding: TF  Analgesia/Sedation: mmd, no sedation  Thromboembolic prevention: holding until 02/26/24  HOB >30: Y  Stress Ulcer ppx: none  Glucose control: Critical care goal 140-180 g/dl, SSI  Bowel reg: miralax   Invasive Lines/Drains/Airway: [place], Trach, 2x neck MARU drains 2x flap incision MARU drains  Deescalation: off vent, advancing tube feeds        Dispo/Code Status/Palliative:   -- SICU / Full Code           Ursula Melendez MD  Critical Care - Surgery  [facility]

## 2024-02-25 NOTE — PLAN OF CARE
PT evaluation complete - see note for details. POC and goals established.    Problem: Physical Therapy  Goal: Physical Therapy Goal  Description: Goals to be met by: 3/26/24     Patient will increase functional independence with mobility by performin. Supine to sit with Bee  2. Sit to supine with Bee  3. Sit to stand transfer with Bee  4. Bed to chair transfer with Bee using No Assistive Device  5. Gait  x 250 feet with Bee using No Assistive Device.     Outcome: Ongoing, Progressing     2024

## 2024-02-25 NOTE — ASSESSMENT & PLAN NOTE
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 He had bleeding from the oral cavity post-op that has overall improved, but his hemoglobin has continued to drop. Will continue to monitor closely for now and transfuse as needed.    ENT   - Keep MARU in place to neck and chest    Neuro  - Patient now completley off sedation per SICU  - Recommend multimodal pain control    CV  - Currently HDS    Resp  - On trach collar  - Routine trach care    FEN/GI  - NPO  - Start trickle tube feeds today but hold for ANY nausea/vomiting/fullness    Renal  - Remove birmingham    Heme/ID   - Unasyn for post-op prophylaxis  - CBC q6h to monitor for significant drop in hemoglobin    MSK   - PT/OT/OOB    Ppx: L40    Dispo: Continue SICU care

## 2024-02-26 PROBLEM — E88.09 HYPOALBUMINEMIA: Status: ACTIVE | Noted: 2024-02-26

## 2024-02-26 PROBLEM — E83.39 HYPOPHOSPHATEMIA: Status: ACTIVE | Noted: 2024-02-26

## 2024-02-26 PROBLEM — E87.6 HYPOKALEMIA: Status: ACTIVE | Noted: 2024-02-26

## 2024-02-26 PROBLEM — Z99.81 ON SUPPLEMENTAL OXYGEN THERAPY: Status: ACTIVE | Noted: 2024-02-23

## 2024-02-26 PROBLEM — D62 ACUTE BLOOD LOSS ANEMIA: Status: ACTIVE | Noted: 2024-02-26

## 2024-02-26 PROBLEM — Z93.0 STATUS POST TRACHEOSTOMY: Status: ACTIVE | Noted: 2024-02-26

## 2024-02-26 PROBLEM — Z93.0 STATUS POST TRACHEOSTOMY: Status: ACTIVE | Noted: 2024-02-23

## 2024-02-26 LAB
ALBUMIN SERPL BCP-MCNC: 2 G/DL (ref 3.5–5.2)
ALP SERPL-CCNC: 40 U/L (ref 55–135)
ALT SERPL W/O P-5'-P-CCNC: 19 U/L (ref 10–44)
ANION GAP SERPL CALC-SCNC: 5 MMOL/L (ref 8–16)
AST SERPL-CCNC: 25 U/L (ref 10–40)
BASOPHILS # BLD AUTO: 0.01 K/UL (ref 0–0.2)
BASOPHILS NFR BLD: 0.2 % (ref 0–1.9)
BILIRUB SERPL-MCNC: 0.4 MG/DL (ref 0.1–1)
BUN SERPL-MCNC: 15 MG/DL (ref 8–23)
CALCIUM SERPL-MCNC: 7.6 MG/DL (ref 8.7–10.5)
CHLORIDE SERPL-SCNC: 106 MMOL/L (ref 95–110)
CO2 SERPL-SCNC: 27 MMOL/L (ref 23–29)
CREAT SERPL-MCNC: 0.7 MG/DL (ref 0.5–1.4)
DIFFERENTIAL METHOD BLD: ABNORMAL
EOSINOPHIL # BLD AUTO: 0 K/UL (ref 0–0.5)
EOSINOPHIL NFR BLD: 0.5 % (ref 0–8)
ERYTHROCYTE [DISTWIDTH] IN BLOOD BY AUTOMATED COUNT: 12.9 % (ref 11.5–14.5)
ERYTHROCYTE [DISTWIDTH] IN BLOOD BY AUTOMATED COUNT: 12.9 % (ref 11.5–14.5)
ERYTHROCYTE [DISTWIDTH] IN BLOOD BY AUTOMATED COUNT: 13 % (ref 11.5–14.5)
EST. GFR  (NO RACE VARIABLE): >60 ML/MIN/1.73 M^2
GLUCOSE SERPL-MCNC: 97 MG/DL (ref 70–110)
HCT VFR BLD AUTO: 20.6 % (ref 40–54)
HCT VFR BLD AUTO: 21.4 % (ref 40–54)
HCT VFR BLD AUTO: 21.7 % (ref 40–54)
HGB BLD-MCNC: 7.2 G/DL (ref 14–18)
HGB BLD-MCNC: 7.2 G/DL (ref 14–18)
HGB BLD-MCNC: 7.5 G/DL (ref 14–18)
IMM GRANULOCYTES # BLD AUTO: 0.04 K/UL (ref 0–0.04)
IMM GRANULOCYTES NFR BLD AUTO: 0.6 % (ref 0–0.5)
LYMPHOCYTES # BLD AUTO: 0.6 K/UL (ref 1–4.8)
LYMPHOCYTES NFR BLD: 9.8 % (ref 18–48)
MAGNESIUM SERPL-MCNC: 1.8 MG/DL (ref 1.6–2.6)
MCH RBC QN AUTO: 32 PG (ref 27–31)
MCH RBC QN AUTO: 32.1 PG (ref 27–31)
MCH RBC QN AUTO: 32.5 PG (ref 27–31)
MCHC RBC AUTO-ENTMCNC: 33.6 G/DL (ref 32–36)
MCHC RBC AUTO-ENTMCNC: 34.6 G/DL (ref 32–36)
MCHC RBC AUTO-ENTMCNC: 35 G/DL (ref 32–36)
MCV RBC AUTO: 92 FL (ref 82–98)
MCV RBC AUTO: 94 FL (ref 82–98)
MCV RBC AUTO: 95 FL (ref 82–98)
MONOCYTES # BLD AUTO: 0.7 K/UL (ref 0.3–1)
MONOCYTES NFR BLD: 10.6 % (ref 4–15)
NEUTROPHILS # BLD AUTO: 5.1 K/UL (ref 1.8–7.7)
NEUTROPHILS NFR BLD: 78.3 % (ref 38–73)
NRBC BLD-RTO: 0 /100 WBC
PHOSPHATE SERPL-MCNC: 1.7 MG/DL (ref 2.7–4.5)
PLATELET # BLD AUTO: 140 K/UL (ref 150–450)
PLATELET # BLD AUTO: 143 K/UL (ref 150–450)
PLATELET # BLD AUTO: 148 K/UL (ref 150–450)
PMV BLD AUTO: 11.6 FL (ref 9.2–12.9)
PMV BLD AUTO: 11.8 FL (ref 9.2–12.9)
PMV BLD AUTO: 11.8 FL (ref 9.2–12.9)
POTASSIUM SERPL-SCNC: 3.4 MMOL/L (ref 3.5–5.1)
PROT SERPL-MCNC: 4.3 G/DL (ref 6–8.4)
RBC # BLD AUTO: 2.24 M/UL (ref 4.6–6.2)
RBC # BLD AUTO: 2.25 M/UL (ref 4.6–6.2)
RBC # BLD AUTO: 2.31 M/UL (ref 4.6–6.2)
SODIUM SERPL-SCNC: 138 MMOL/L (ref 136–145)
WBC # BLD AUTO: 6.52 K/UL (ref 3.9–12.7)
WBC # BLD AUTO: 7.3 K/UL (ref 3.9–12.7)
WBC # BLD AUTO: 7.73 K/UL (ref 3.9–12.7)

## 2024-02-26 PROCEDURE — 63600175 PHARM REV CODE 636 W HCPCS

## 2024-02-26 PROCEDURE — 25000003 PHARM REV CODE 250

## 2024-02-26 PROCEDURE — 97116 GAIT TRAINING THERAPY: CPT

## 2024-02-26 PROCEDURE — 27000221 HC OXYGEN, UP TO 24 HOURS

## 2024-02-26 PROCEDURE — 85027 COMPLETE CBC AUTOMATED: CPT | Performed by: STUDENT IN AN ORGANIZED HEALTH CARE EDUCATION/TRAINING PROGRAM

## 2024-02-26 PROCEDURE — 25000003 PHARM REV CODE 250: Performed by: OTOLARYNGOLOGY

## 2024-02-26 PROCEDURE — 85025 COMPLETE CBC W/AUTO DIFF WBC: CPT | Performed by: OTOLARYNGOLOGY

## 2024-02-26 PROCEDURE — C9113 INJ PANTOPRAZOLE SODIUM, VIA: HCPCS | Performed by: STUDENT IN AN ORGANIZED HEALTH CARE EDUCATION/TRAINING PROGRAM

## 2024-02-26 PROCEDURE — 25000003 PHARM REV CODE 250: Performed by: STUDENT IN AN ORGANIZED HEALTH CARE EDUCATION/TRAINING PROGRAM

## 2024-02-26 PROCEDURE — 11000001 HC ACUTE MED/SURG PRIVATE ROOM

## 2024-02-26 PROCEDURE — 85027 COMPLETE CBC AUTOMATED: CPT | Mod: 91 | Performed by: STUDENT IN AN ORGANIZED HEALTH CARE EDUCATION/TRAINING PROGRAM

## 2024-02-26 PROCEDURE — 99900026 HC AIRWAY MAINTENANCE (STAT)

## 2024-02-26 PROCEDURE — 97535 SELF CARE MNGMENT TRAINING: CPT

## 2024-02-26 PROCEDURE — 99900022

## 2024-02-26 PROCEDURE — 99900035 HC TECH TIME PER 15 MIN (STAT)

## 2024-02-26 PROCEDURE — 94761 N-INVAS EAR/PLS OXIMETRY MLT: CPT

## 2024-02-26 PROCEDURE — 83735 ASSAY OF MAGNESIUM: CPT | Performed by: OTOLARYNGOLOGY

## 2024-02-26 PROCEDURE — 99233 SBSQ HOSP IP/OBS HIGH 50: CPT | Mod: 24,,, | Performed by: STUDENT IN AN ORGANIZED HEALTH CARE EDUCATION/TRAINING PROGRAM

## 2024-02-26 PROCEDURE — 63600175 PHARM REV CODE 636 W HCPCS: Performed by: STUDENT IN AN ORGANIZED HEALTH CARE EDUCATION/TRAINING PROGRAM

## 2024-02-26 PROCEDURE — 84100 ASSAY OF PHOSPHORUS: CPT | Performed by: OTOLARYNGOLOGY

## 2024-02-26 PROCEDURE — 80053 COMPREHEN METABOLIC PANEL: CPT | Performed by: OTOLARYNGOLOGY

## 2024-02-26 RX ORDER — CALCIUM GLUCONATE 20 MG/ML
2 INJECTION, SOLUTION INTRAVENOUS
Status: DISCONTINUED | OUTPATIENT
Start: 2024-02-26 | End: 2024-02-28

## 2024-02-26 RX ORDER — CALCIUM GLUCONATE 20 MG/ML
1 INJECTION, SOLUTION INTRAVENOUS
Status: DISCONTINUED | OUTPATIENT
Start: 2024-02-26 | End: 2024-02-28

## 2024-02-26 RX ORDER — IBUPROFEN 600 MG/1
600 TABLET ORAL EVERY 6 HOURS
Status: DISCONTINUED | OUTPATIENT
Start: 2024-02-26 | End: 2024-02-26

## 2024-02-26 RX ORDER — MAGNESIUM SULFATE HEPTAHYDRATE 40 MG/ML
4 INJECTION, SOLUTION INTRAVENOUS
Status: DISCONTINUED | OUTPATIENT
Start: 2024-02-26 | End: 2024-02-28

## 2024-02-26 RX ORDER — TRIPROLIDINE/PSEUDOEPHEDRINE 2.5MG-60MG
200 TABLET ORAL EVERY 6 HOURS
Status: DISCONTINUED | OUTPATIENT
Start: 2024-02-26 | End: 2024-02-26

## 2024-02-26 RX ORDER — SILODOSIN 4 MG/1
4 CAPSULE ORAL DAILY
Status: DISCONTINUED | OUTPATIENT
Start: 2024-02-27 | End: 2024-03-08 | Stop reason: HOSPADM

## 2024-02-26 RX ORDER — MAGNESIUM SULFATE HEPTAHYDRATE 40 MG/ML
2 INJECTION, SOLUTION INTRAVENOUS
Status: DISCONTINUED | OUTPATIENT
Start: 2024-02-26 | End: 2024-02-28

## 2024-02-26 RX ORDER — POTASSIUM CHLORIDE 7.45 MG/ML
60 INJECTION INTRAVENOUS
Status: DISCONTINUED | OUTPATIENT
Start: 2024-02-26 | End: 2024-02-28

## 2024-02-26 RX ORDER — POTASSIUM CHLORIDE 7.45 MG/ML
40 INJECTION INTRAVENOUS
Status: DISCONTINUED | OUTPATIENT
Start: 2024-02-26 | End: 2024-02-28

## 2024-02-26 RX ORDER — SODIUM,POTASSIUM PHOSPHATES 280-250MG
2 POWDER IN PACKET (EA) ORAL 4 TIMES DAILY
Status: COMPLETED | OUTPATIENT
Start: 2024-02-26 | End: 2024-02-26

## 2024-02-26 RX ORDER — TRIPROLIDINE/PSEUDOEPHEDRINE 2.5MG-60MG
600 TABLET ORAL EVERY 6 HOURS
Status: DISCONTINUED | OUTPATIENT
Start: 2024-02-26 | End: 2024-02-26

## 2024-02-26 RX ORDER — CALCIUM GLUCONATE 20 MG/ML
3 INJECTION, SOLUTION INTRAVENOUS
Status: DISCONTINUED | OUTPATIENT
Start: 2024-02-26 | End: 2024-02-28

## 2024-02-26 RX ORDER — POTASSIUM CHLORIDE 7.45 MG/ML
80 INJECTION INTRAVENOUS
Status: DISCONTINUED | OUTPATIENT
Start: 2024-02-26 | End: 2024-02-28

## 2024-02-26 RX ADMIN — IBUPROFEN 600 MG: 600 TABLET, FILM COATED ORAL at 09:02

## 2024-02-26 RX ADMIN — Medication 100 MG: at 08:02

## 2024-02-26 RX ADMIN — MUPIROCIN: 20 OINTMENT TOPICAL at 08:02

## 2024-02-26 RX ADMIN — AMPICILLIN SODIUM AND SULBACTAM SODIUM 3 G: 2; 1 INJECTION, POWDER, FOR SOLUTION INTRAMUSCULAR; INTRAVENOUS at 12:02

## 2024-02-26 RX ADMIN — ENOXAPARIN SODIUM 40 MG: 40 INJECTION SUBCUTANEOUS at 05:02

## 2024-02-26 RX ADMIN — THERA TABS 1 TABLET: TAB at 09:02

## 2024-02-26 RX ADMIN — ACETAMINOPHEN 650 MG: 650 SOLUTION ORAL at 06:02

## 2024-02-26 RX ADMIN — POTASSIUM CHLORIDE 60 MEQ: 7.46 INJECTION, SOLUTION INTRAVENOUS at 09:02

## 2024-02-26 RX ADMIN — OXYCODONE HYDROCHLORIDE 10 MG: 10 TABLET ORAL at 08:02

## 2024-02-26 RX ADMIN — ACETAMINOPHEN 650 MG: 650 SOLUTION ORAL at 11:02

## 2024-02-26 RX ADMIN — ASPIRIN 81 MG CHEWABLE TABLET 81 MG: 81 TABLET CHEWABLE at 08:02

## 2024-02-26 RX ADMIN — LORAZEPAM 2 MG: 0.5 TABLET ORAL at 09:02

## 2024-02-26 RX ADMIN — TAMSULOSIN HYDROCHLORIDE 0.4 MG: 0.4 CAPSULE ORAL at 08:02

## 2024-02-26 RX ADMIN — ACETAMINOPHEN 650 MG: 650 SOLUTION ORAL at 05:02

## 2024-02-26 RX ADMIN — ACETAMINOPHEN 650 MG: 650 SOLUTION ORAL at 12:02

## 2024-02-26 RX ADMIN — AMLODIPINE BESYLATE 2.5 MG: 10 TABLET ORAL at 08:02

## 2024-02-26 RX ADMIN — MAGNESIUM SULFATE HEPTAHYDRATE 2 G: 40 INJECTION, SOLUTION INTRAVENOUS at 09:02

## 2024-02-26 RX ADMIN — LORAZEPAM 2 MG: 0.5 TABLET ORAL at 03:02

## 2024-02-26 RX ADMIN — PANTOPRAZOLE SODIUM 40 MG: 40 INJECTION, POWDER, FOR SOLUTION INTRAVENOUS at 08:02

## 2024-02-26 RX ADMIN — POTASSIUM & SODIUM PHOSPHATES POWDER PACK 280-160-250 MG 2 PACKET: 280-160-250 PACK at 01:02

## 2024-02-26 RX ADMIN — MUPIROCIN: 20 OINTMENT TOPICAL at 09:02

## 2024-02-26 RX ADMIN — ATORVASTATIN CALCIUM 80 MG: 40 TABLET, FILM COATED ORAL at 08:02

## 2024-02-26 RX ADMIN — POLYETHYLENE GLYCOL 3350 17 G: 17 POWDER, FOR SOLUTION ORAL at 08:02

## 2024-02-26 RX ADMIN — POTASSIUM & SODIUM PHOSPHATES POWDER PACK 280-160-250 MG 2 PACKET: 280-160-250 PACK at 05:02

## 2024-02-26 RX ADMIN — EZETIMIBE 10 MG: 10 TABLET ORAL at 08:02

## 2024-02-26 RX ADMIN — AMPICILLIN SODIUM AND SULBACTAM SODIUM 3 G: 2; 1 INJECTION, POWDER, FOR SOLUTION INTRAMUSCULAR; INTRAVENOUS at 06:02

## 2024-02-26 RX ADMIN — POTASSIUM & SODIUM PHOSPHATES POWDER PACK 280-160-250 MG 2 PACKET: 280-160-250 PACK at 09:02

## 2024-02-26 NOTE — ASSESSMENT & PLAN NOTE
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 He had bleeding from the oral cavity post-op that has overall improved, but his hemoglobin has continued to drop. Will continue to monitor closely for now and transfuse as needed.    ENT   - Keep JPs in place to neck and chest    Neuro  - Patient now completley off sedation per SICU  - Recommend multimodal pain control    CV  - Currently HDS    Resp  - On trach collar  - Routine trach care    FEN/GI  - NPO  - Ok to slowly advance tube feeds to goal by 10 cc q6h but hold for ANY nausea/vomiting/fullness    Renal  - Cr stable    Heme/ID   - Unasyn for post-op prophylaxis  - CBC q6h to monitor for significant drop in hemoglobin    MSK   - PT/OT/OOB    Ppx: L40    Dispo: Continue SICU care

## 2024-02-26 NOTE — SUBJECTIVE & OBJECTIVE
Interval History/Significant Events: Pt seen and examined at bedside. VSS.  He had an in & out cath after a scan revealed 400c of urine overnight. He had reduced urine output afterwards.Scan this morning revealed 600cc in the bladder. He c/o pain in the groin due to being unable to pee.      Follow-up For: Procedure(s) (LRB):  GLOSSECTOMY (Right)  DISSECTION, NECK (Bilateral)  TRACHEOTOMY (N/A)  CREATION, FLAP, MUSCLE ROTATION (N/A)  INSERTION, PEG TUBE (Right)    Post-Operative Day: 3 Days Post-Op    Objective:     Vital Signs (Most Recent):  Temp: 98 °F (36.7 °C) (02/25/24 1500)  Pulse: 85 (02/26/24 0700)  Resp: 15 (02/26/24 0700)  BP: 134/60 (02/26/24 0700)  SpO2: 100 % (02/26/24 0700) Vital Signs (24h Range):  Temp:  [97.9 °F (36.6 °C)-98.1 °F (36.7 °C)] 98 °F (36.7 °C)  Pulse:  [] 85  Resp:  [5-28] 15  SpO2:  [96 %-100 %] 100 %  BP: ()/(55-85) 134/60     Weight: 79.4 kg (175 lb)  Body mass index is 23.73 kg/m².      Intake/Output Summary (Last 24 hours) at 2/26/2024 0706  Last data filed at 2/26/2024 0644  Gross per 24 hour   Intake 2016.6 ml   Output 1915 ml   Net 101.6 ml          Physical Exam  HENT:      Nose:      Comments: Dark clotted blood over the nose.       Constitutional:       General: He is not in acute distress.  Neck:      Comments: Neck incision c/d/I  Swelling at inferior portion of incision  2x neck MARU drains w/ SS output     Tracheostomy tube in place w/ pooled clotted blood     Cardiovascular:      Rate and Rhythm: Normal rate and regular rhythm.      Comments: 2x MARU drains to flap site w/ SS output  Incision c/d/i  Pulmonary:      Comments: Tracheostomy  Abdominal:      General: There is no distension.      Palpations: Abdomen is soft.      Tenderness: There is no abdominal tenderness.      Comments: PEG tube in place   Genitourinary:     Comments: condom cath in place  Musculoskeletal:      Right lower leg: No edema.      Left lower leg: No edema.   Skin:     General: Skin is  warm and dry.   Neurological:      Mental Status: He is alert.      Lines/Drains/Airways       Drain  Duration                  Closed/Suction Drain 02/23/24 1333 Tube - 1 Right;Lateral Chest Bulb 15 Fr. 2 days         Closed/Suction Drain 02/23/24 1338 Tube - 2 Right;Medial Chest Bulb 15 Fr. 2 days         Closed/Suction Drain 02/23/24 1414 Tube - 3 Left;Anterior Neck Bulb 15 Fr. 2 days         Closed/Suction Drain 02/23/24 1445 Tube - 4 Right;Anterior Neck Bulb 15 Fr. 2 days         Gastrostomy/Enterostomy 02/23/24 0750 Percutaneous endoscopic gastrostomy (PEG) LUQ feeding 2 days         Urethral Catheter 02/26/24 0644 Non-latex 16 Fr. <1 day              Airway  Duration                  Airway - Non-Surgical 02/23/24 0734 Coil Wire Tube 2 days    Adult Surgical Airway 02/23/24 1504 Shiley Cuffed 6.0/ 75mm 2 days              Peripheral Intravenous Line  Duration                  Peripheral IV - Single Lumen 02/23/24 0555 18 G Left;Posterior Forearm 3 days         Peripheral IV - Single Lumen 02/23/24 0739 18 G Left;Posterior Forearm 2 days         Peripheral IV - Single Lumen 02/23/24 1800 20 G Anterior;Proximal;Right Forearm 2 days                    Significant Labs:    CBC/Anemia Profile:  Recent Labs   Lab 02/25/24  1719 02/26/24  0031 02/26/24  0350   WBC 9.77 7.73 7.30   HGB 7.8* 7.2* 7.2*   HCT 22.6* 21.4* 20.6*    143* 140*   MCV 93 95 92   RDW 12.8 12.9 13.0        Chemistries:  Recent Labs   Lab 02/25/24  0329 02/26/24  0350    138   K 4.0 3.4*    106   CO2 25 27   BUN 24* 15   CREATININE 0.8 0.7   CALCIUM 7.7* 7.6*   ALBUMIN 2.0* 2.0*   PROT 4.2* 4.3*   BILITOT 0.4 0.4   ALKPHOS 34* 40*   ALT 17 19   AST 23 25   MG 1.9 1.8   PHOS 2.4* 1.7*         Significant Imaging:  I have reviewed all pertinent imaging results/findings within the past 24 hours.

## 2024-02-26 NOTE — ASSESSMENT & PLAN NOTE
Timothy Galdamez is a 68 y.o. yo male who is s/p glossectomy,neck dissection, rotational neck flap and G-tube placement by Dr. Ventura and Dr. Wu on 02/23/24. He was a difficult intubation in the OR and is currently sedated with a tracheostomy tube. Now tolerating trach collar       Neuro/Psych:   -- Sedation: none  -- Pain: MMD pain regimen  -- On CIWA protocol for hx EtOH, CIWA scores 0   -- thiamine daily, multivitamin daily, ativan 2 prn q4             Cards:   -- HDS off pressors.   -- goal SBP < 180  -- home amlodipine 2.5, statin, ezetimibe      Pulm:   -- S/p Tracheostomy creation, tolerating trach collar   -- O2 sat > 90%      Renal:  -- Olson. Maintain strict I/O  -- Replace lytes as needed  UOP: 1.1cc/kg/hr      FEN / GI:   -- mIVF at 50  -- Replace lytes as needed  -- pantoprazole (on home omeprazole)  -- Nutrition: trickle TF   --TF of Impact Peptide 1.5 at trickle rate; ok to slowly advance to goal rate of 50ml/hr   -- Miralax for bowel regimen      ID:   -- Tm: afebrile  -- unasyn, last dose 02/28      Heme/Onc:  -- H/H: 7.2 (8.2)  - Daily CBC      Endo:   -- Gluc goal 140-180  -- Blood glucose at goal     PPx:   Feeding: TF  Analgesia/Sedation: mmd, no sedation  Thromboembolic prevention: lovenox  HOB >30: Y  Stress Ulcer ppx: none  Glucose control: Critical care goal 140-180 g/dl, SSI  Bowel reg: miralax   Invasive Lines/Drains/Airway: Olson, Trach, 2x neck MARU drains 2x flap incision MARU drains  Deescalation: off vent, advancing tube feeds        Dispo/Code Status/Palliative:   -- SICU / Full Code  -- stable for SD today

## 2024-02-26 NOTE — PLAN OF CARE
[facility]  Discharge Reassessment    Primary Care Provider: Young Lanier MD    Expected Discharge Date:     Reassessment (most recent)       Discharge Reassessment - 02/26/24 0957          Discharge Reassessment    Assessment Type Discharge Planning Reassessment     Communicated AMRIK with patient/caregiver Date not available/Unable to determine     Discharge Plan A Home;Home with family;Home Health     Discharge Plan B Long-term acute care facility (LTAC)     DME Needed Upon Discharge  other (see comments)   TBD    Transition of Care Barriers None     Why the patient remains in the hospital Requires continued medical care                     Per MD's Note,  Interval History: NAEON. No further bloody drainage from nose, clotted off. Tolerating trickle TFs.       Discharge Plan A and Plan B have been determined by review of patient's clinical status, future medical and therapeutic needs, and coverage/benefits for post-acute care in coordination with multidisciplinary team members.     Pablo Shen LMSW  Case Management [facility]

## 2024-02-26 NOTE — NURSING
Birmingham catheter reinserted due to urinary retention. Pt unable to void following removal of previous birmingham at 1200 on 2/25/24. MD aware.

## 2024-02-26 NOTE — PT/OT/SLP PROGRESS
"Occupational Therapy   Co-Treatment    Name: Timothy Galdamez  MRN: [id number]  Admitting Diagnosis:  Tongue cancer  3 Days Post-Op    Recommendations:     Discharge Recommendations: Low Intensity Therapy  Discharge Equipment Recommendations:   (TBD closer to d/c)  Barriers to discharge:  None    Assessment:     Timothy Galdamez is a 68 y.o. male with a medical diagnosis of Tongue cancer.  He presents motivated, but limited by lightheadedness despite BP WDL (MAP 70 at rest and up to 81 with standing/mobility). Per RN, pt losing blood and H/H 7/20. Pt required cueing for safety as pt very off balance during mobility and trying to "push through" symptoms of dizziness/lightheadedness. Performance deficits affecting function are weakness, impaired endurance, impaired self care skills, impaired functional mobility, gait instability, decreased safety awareness, pain, impaired cardiopulmonary response to activity, impaired balance, decreased coordination. Pt benefits from co-treatment with PT to accommodate pt's activity tolerance and progression with therapy.       Rehab Prognosis:  Good; patient would benefit from acute skilled OT services to address these deficits and reach maximum level of function.       Plan:     Patient to be seen 4 x/week to address the above listed problems via self-care/home management, therapeutic activities, therapeutic exercises  Plan of Care Expires: 03/10/24  Plan of Care Reviewed with: patient, spouse    Subjective     Chief Complaint: lightheadedness  Patient/Family Comments/goals: Pt stating that he wants to go to sink with mirror instead of front sink  Pain/Comfort:  Pain Rating 1: 3/10  Location - Side 1: Bilateral  Location - Orientation 1: generalized  Location 1: neck  Pain Addressed 1: Reposition, Distraction  Pain Rating Post-Intervention 1: 3/10    Objective:     Communicated with: RN prior to session.  Patient found up in chair with blood pressure cuff, pulse ox (continuous), telemetry, " Tracheostomy, oxygen, MARU drain, PEG Tube, birmingham catheter upon OT entry to room.    General Precautions: Standard, fall    Orthopedic Precautions:   Braces:    Respiratory Status:  trach collar 5L 28%     Occupational Performance:     Bed Mobility:    NT     Functional Mobility/Transfers:  Patient completed Sit <> Stand Transfer with minimum assistance  with  hand-held assist   Functional Mobility: Min A x 2 with B HHA; pt impulsive and beginning to mobilize without prompting and while c/o dizziness    Activities of Daily Living:  Grooming: minimum assistance for tasks standing at sink with cueing needed to return to sitting for safety      [facility] 6 Click ADL: 12    Treatment & Education:  Pt ed on OT POC  Pt ed on safety and need for slowing down tasks and allowing body to acclimate to positional changes  Pt/spouse re-ed on no neck ties    Patient left  reclined in chair  with all lines intact, call button in reach, RN notified, and RN and spouse present    GOALS:   Multidisciplinary Problems       Occupational Therapy Goals          Problem: Occupational Therapy    Goal Priority Disciplines Outcome Interventions   Occupational Therapy Goal     OT, PT/OT Ongoing, Progressing    Description: Goals to be met by: 3/10/24     Patient will increase functional independence with ADLs by performing:    Feeding with [place].  UE Dressing with Set-up Assistance.  LE Dressing with Set-up Assistance.  Grooming while standing at sink with Modified [place].  Toileting from toilet with Modified [place] for hygiene and clothing management.   Toilet transfer to toilet with Modified [place].                         Time Tracking:     OT Date of Treatment: 02/26/24  OT Start Time: 1313  OT Stop Time: 1331  OT Total Time (min): 18 min    Billable Minutes:Self Care/Home Management 18               2/26/2024

## 2024-02-26 NOTE — PROGRESS NOTES
[facility]  Otorhinolaryngology-Head & Neck Surgery  Progress Note    Subjective:     Post-Op Info:  Procedure(s) (LRB):  GLOSSECTOMY (Right)  DISSECTION, NECK (Bilateral)  TRACHEOTOMY (N/A)  CREATION, FLAP, MUSCLE ROTATION (N/A)  INSERTION, PEG TUBE (Right)   3 Days Post-Op  Hospital Day: 4     Interval History: NAEON. No further bloody drainage from nose, clotted off. Tolerating trickle TFs.    Medications:  Continuous Infusions:   lactated ringers 50 mL/hr at 02/26/24 0600     Scheduled Meds:   acetaminophen  650 mg Per G Tube Q6H    amLODIPine  2.5 mg Per G Tube Daily    ampicillin-sulbactam  3 g Intravenous Q6H    aspirin  81 mg Per G Tube Daily    atorvastatin  80 mg Per G Tube Daily    enoxparin  40 mg Subcutaneous Daily    ezetimibe  10 mg Per G Tube Daily    multivitamin  1 tablet Per G Tube Daily    mupirocin   Nasal BID    pantoprazole  40 mg Intravenous Daily    polyethylene glycol  17 g Per G Tube Daily    tamsulosin  0.4 mg Oral Daily    thiamine  100 mg Per G Tube Daily     PRN Meds:LORazepam, melatonin, morphine, ondansetron, oxyCODONE, oxyCODONE, sodium chloride 0.9%     Review of patient's allergies indicates:  No Known Allergies  Objective:     Vital Signs (24h Range):  Temp:  [97.9 °F (36.6 °C)-98.1 °F (36.7 °C)] 98 °F (36.7 °C)  Pulse:  [] 92  Resp:  [5-28] 14  SpO2:  [96 %-100 %] 100 %  BP: ()/(55-85) 129/62         Lines/Drains/Airways       Drain  Duration                  Closed/Suction Drain 02/23/24 1333 Tube - 1 Right;Lateral Chest Bulb 15 Fr. 2 days         Closed/Suction Drain 02/23/24 1338 Tube - 2 Right;Medial Chest Bulb 15 Fr. 2 days         Closed/Suction Drain 02/23/24 1414 Tube - 3 Left;Anterior Neck Bulb 15 Fr. 2 days         Closed/Suction Drain 02/23/24 1445 Tube - 4 Right;Anterior Neck Bulb 15 Fr. 2 days         Gastrostomy/Enterostomy 02/23/24 0750 Percutaneous endoscopic gastrostomy (PEG) LUQ feeding 2 days              Airway  Duration                   Airway - Non-Surgical 02/23/24 0734 Coil Wire Tube 2 days    Adult Surgical Airway 02/23/24 1504 Shiley Cuffed 6.0/ 75mm 2 days              Peripheral Intravenous Line  Duration                  Peripheral IV - Single Lumen 02/23/24 0555 18 G Left;Posterior Forearm 3 days         Peripheral IV - Single Lumen 02/23/24 0739 18 G Left;Posterior Forearm 2 days         Peripheral IV - Single Lumen 02/23/24 1800 20 G Anterior;Proximal;Right Forearm 2 days                    Physical Exam  Awake, nods head to answer questions  Native tongue edematous/congested and completely occluding oral cavity, cannot visualize oropharynx; improving slightly   Drainage in mouth serosanguinous, pooling  Dried blood around bilateral nares, no active bleeding  6-0 cuffed tracheostomy in place  2 neck MARU in place with serosanguinous drainage  Neck is soft  Neck incision intact with staples  Chest incision intact with staples  Chest is soft, right bulk tunneling to neck 2/2 pec flap  2 MARU in chest with serosanguinous drainage     Significant Labs:  CBC:   Recent Labs   Lab 02/26/24  0350   WBC 7.30   RBC 2.24*   HGB 7.2*   HCT 20.6*   *   MCV 92   MCH 32.1*   MCHC 35.0       CMP:   Recent Labs   Lab 02/26/24  0350   GLU 97   CALCIUM 7.6*   ALBUMIN 2.0*   PROT 4.3*      K 3.4*   CO2 27      BUN 15   CREATININE 0.7   ALKPHOS 40*   ALT 19   AST 25   BILITOT 0.4         Significant Diagnostics:  None  Assessment/Plan:     * Tongue cancer  The patient is a 68 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 He had bleeding from the oral cavity post-op that has overall improved, but his hemoglobin has continued to drop. Will continue to monitor closely for now and transfuse as needed.    ENT   - Keep JPs in place to neck and chest    Neuro  - [facility]  -  Recommend multimodal pain control    CV  - Currently HDS    Resp  - On trach collar  - Routine trach care    FEN/GI  - NPO  - Ok to slowly advance tube feeds to goal by 10 cc q6h but hold for ANY nausea/vomiting/fullness    Renal  - Cr stable    Heme/ID   - Unasyn for post-op prophylaxis  - CBC q6h to monitor for significant drop in hemoglobin    MSK   - PT/OT/OOB    Ppx: L40    Dispo: Continue SICU care          Buck Nelson MD  Otorhinolaryngology-Head & Neck Surgery  [facility] - Surgical Intensive Care

## 2024-02-26 NOTE — ANESTHESIA POSTPROCEDURE EVALUATION
Anesthesia Post Evaluation    Patient: Timothy Galdamez    Procedure(s) Performed: Procedure(s) (LRB):  GLOSSECTOMY (Right)  DISSECTION, NECK (Bilateral)  TRACHEOTOMY (N/A)  CREATION, FLAP, MUSCLE ROTATION (N/A)  INSERTION, PEG TUBE (Right)    Final Anesthesia Type: general      Patient location during evaluation: PACU  Patient participation: No - Unable to Participate, Sedation  Level of consciousness: sedated  Post-procedure vital signs: reviewed and stable  Pain management: adequate  Airway patency: patent    PONV status at discharge: No PONV  Anesthetic complications: no      Cardiovascular status: blood pressure returned to baseline and hemodynamically stable  Respiratory status: ventilator and Tracheostomy  Hydration status: euvolemic  Follow-up not needed.              Vitals Value Taken Time   /59 02/26/24 1502   Temp 36.5 °C (97.7 °F) 02/26/24 1530   Pulse 75 02/26/24 1542   Resp 14 02/26/24 1542   SpO2 100 % 02/26/24 1542   Vitals shown include unvalidated device data.      Event Time   Out of Recovery 20:22:00         Pain/Stephy Score: Pain Rating Prior to Med Admin: 2 (2/26/2024 11:00 AM)  Pain Rating Post Med Admin: 2 (2/26/2024 12:00 PM)

## 2024-02-26 NOTE — PROGRESS NOTES
[facility] - [facility]  Critical Care - Surgery  Progress Note    Patient Name: Timothy GREENE Allday  MRN: [id number]  Admission Date: 2/23/2024  Hospital Length of Stay: 3 days  Code Status: Full Code  Attending Provider: Jeferson Ventura MD  Primary Care Provider: Young Lanier MD   Principal Problem: Tongue cancer    Subjective:     Hospital/ICU Course:  No notes on file    Interval History/Significant Events: Pt seen and examined at bedside. VSS.  He had an in & out cath after a scan revealed 400c of urine overnight. He had reduced urine output afterwards.Scan this morning revealed 600cc in the bladder. He c/o pain in the groin due to being unable to pee.      Follow-up For: Procedure(s) (LRB):  GLOSSECTOMY (Right)  DISSECTION, NECK (Bilateral)  TRACHEOTOMY (N/A)  CREATION, FLAP, MUSCLE ROTATION (N/A)  INSERTION, PEG TUBE (Right)    Post-Operative Day: 3 Days Post-Op    Objective:     Vital Signs (Most Recent):  Temp: 98 °F (36.7 °C) (02/25/24 1500)  Pulse: 85 (02/26/24 0700)  Resp: 15 (02/26/24 0700)  BP: 134/60 (02/26/24 0700)  SpO2: 100 % (02/26/24 0700) Vital Signs (24h Range):  Temp:  [97.9 °F (36.6 °C)-98.1 °F (36.7 °C)] 98 °F (36.7 °C)  Pulse:  [] 85  Resp:  [5-28] 15  SpO2:  [96 %-100 %] 100 %  BP: ()/(55-85) 134/60     Weight: 79.4 kg (175 lb)  Body mass index is 23.73 kg/m².      Intake/Output Summary (Last 24 hours) at 2/26/2024 0706  Last data filed at 2/26/2024 0644  Gross per 24 hour   Intake 2016.6 ml   Output 1915 ml   Net 101.6 ml          Physical Exam  HENT:      Nose:      Comments: Dark clotted blood over the nose.       Constitutional:       General: He is not in acute distress.  Neck:      Comments: Neck incision c/d/I  Swelling at inferior portion of incision  2x neck MARU drains w/ SS output     Tracheostomy tube in place w/ pooled clotted blood     Cardiovascular:      Rate and Rhythm: Normal rate and regular rhythm.      Comments: 2x MARU drains to flap site w/ SS  output  Incision c/d/i  Pulmonary:      Comments: Tracheostomy  Abdominal:      General: There is no distension.      Palpations: Abdomen is soft.      Tenderness: There is no abdominal tenderness.      Comments: PEG tube in place   Genitourinary:     Comments: condom cath in place  Musculoskeletal:      Right lower leg: No edema.      Left lower leg: No edema.   Skin:     General: Skin is warm and dry.   Neurological:      Mental Status: He is alert.      Lines/Drains/Airways       Drain  Duration                  Closed/Suction Drain 02/23/24 1333 Tube - 1 Right;Lateral Chest Bulb 15 Fr. 2 days         Closed/Suction Drain 02/23/24 1338 Tube - 2 Right;Medial Chest Bulb 15 Fr. 2 days         Closed/Suction Drain 02/23/24 1414 Tube - 3 Left;Anterior Neck Bulb 15 Fr. 2 days         Closed/Suction Drain 02/23/24 1445 Tube - 4 Right;Anterior Neck Bulb 15 Fr. 2 days         Gastrostomy/Enterostomy 02/23/24 0750 Percutaneous endoscopic gastrostomy (PEG) LUQ feeding 2 days         Urethral Catheter 02/26/24 0644 Non-latex 16 Fr. <1 day              Airway  Duration                  Airway - Non-Surgical 02/23/24 0734 Coil Wire Tube 2 days    Adult Surgical Airway 02/23/24 1504 Shiley Cuffed 6.0/ 75mm 2 days              Peripheral Intravenous Line  Duration                  Peripheral IV - Single Lumen 02/23/24 0555 18 G Left;Posterior Forearm 3 days         Peripheral IV - Single Lumen 02/23/24 0739 18 G Left;Posterior Forearm 2 days         Peripheral IV - Single Lumen 02/23/24 1800 20 G Anterior;Proximal;Right Forearm 2 days                    Significant Labs:    CBC/Anemia Profile:  Recent Labs   Lab 02/25/24  1719 02/26/24  0031 02/26/24  0350   WBC 9.77 7.73 7.30   HGB 7.8* 7.2* 7.2*   HCT 22.6* 21.4* 20.6*    143* 140*   MCV 93 95 92   RDW 12.8 12.9 13.0        Chemistries:  Recent Labs   Lab 02/25/24  0329 02/26/24  0350    138   K 4.0 3.4*    106   CO2 25 27   BUN 24* 15   CREATININE 0.8 0.7    CALCIUM 7.7* 7.6*   ALBUMIN 2.0* 2.0*   PROT 4.2* 4.3*   BILITOT 0.4 0.4   ALKPHOS 34* 40*   ALT 17 19   AST 23 25   MG 1.9 1.8   PHOS 2.4* 1.7*         Significant Imaging:  I have reviewed all pertinent imaging results/findings within the past 24 hours.  Assessment/Plan:     Oncology  * Tongue cancer  Timothy GREENE Rudolph is a 68 y.o. yo male who is s/p glossectomy,neck dissection, rotational neck flap and G-tube placement by Dr. Ventura and Dr. Wu on 02/23/24. He was a difficult intubation in the OR and is currently sedated with a tracheostomy tube. Now tolerating trach collar       Neuro/Psych:   -- Sedation: none  -- Pain: MMD pain regimen  -- On CIWA protocol for hx EtOH, CIWA scores 0   -- thiamine daily, multivitamin daily, ativan 2 prn q4             Cards:   -- HDS off pressors.   -- goal SBP < 180  -- home amlodipine 2.5      Pulm:   -- S/p Tracheostomy creation, tolerating trach collar   -- O2 sat > 90%      Renal:  -- Olson. Maintain strict I/O  -- Replace lytes as needed      FEN / GI:   -- mIVF at 50  -- Replace lytes as needed  -- pantoprazole (on home omeprazole)  -- Nutrition: trickle TF   --TF of Impact Peptide 1.5 at trickle rate; ok to slowly advance to goal rate of 50ml/hr q2 to provide 1800 kcal, 113g PRO, and 924ml FF- FWF per MD.       ID:   -- Tm: afebrile    -- unasyn per primary      Heme/Onc:  -- H/H: 7.2 (8.2)      Endo:   -- Gluc goal 140-180  -- Blood glucose at goal     PPx:   Feeding: TF  Analgesia/Sedation: mmd, no sedation  Thromboembolic prevention: lovenox  HOB >30: Y  Stress Ulcer ppx: none  Glucose control: Critical care goal 140-180 g/dl, SSI  Bowel reg: miralax   Invasive Lines/Drains/Airway: Olson, Trach, 2x neck MARU drains 2x flap incision MARU drains  Deescalation: off vent, advancing tube feeds        Dispo/Code Status/Palliative:   -- SICU / Full Code        Gelacio Norman MD  Critical Care - Surgery  [facility] - [facility]

## 2024-02-26 NOTE — ASSESSMENT & PLAN NOTE
Timothy Galdamez is a 68 y.o. yo male who is s/p glossectomy,neck dissection, rotational neck flap and G-tube placement by Dr. Ventura and Dr. Wu on 02/23/24. He was a difficult intubation in the OR and is currently sedated with a tracheostomy tube. Now tolerating trach collar       Neuro/Psych:   -- Sedation: none  -- Pain: MMD pain regimen  -- On CIWA protocol for hx EtOH, CIWA scores 0   -- thiamine daily, multivitamin daily, ativan 2 prn q4             Cards:   -- HDS off pressors.   -- goal SBP < 180  -- home amlodipine 2.5      Pulm:   -- S/p Tracheostomy creation, tolerating trach collar   -- O2 sat > 90%      Renal:  -- Olson. Maintain strict I/O  -- Replace lytes as needed      FEN / GI:   -- mIVF at 50  -- Replace lytes as needed  -- pantoprazole (on home omeprazole)  -- Nutrition: trickle TF   --TF of Impact Peptide 1.5 at trickle rate; ok to slowly advance to goal rate of 50ml/hr q2 to provide 1800 kcal, 113g PRO, and 924ml FF- FWF per MD.       ID:   -- Tm: afebrile    -- unasyn per primary      Heme/Onc:  -- H/H: 7.2 (8.2)      Endo:   -- Gluc goal 140-180  -- Blood glucose at goal     PPx:   Feeding: TF  Analgesia/Sedation: mmd, no sedation  Thromboembolic prevention: lovenox  HOB >30: Y  Stress Ulcer ppx: none  Glucose control: Critical care goal 140-180 g/dl, SSI  Bowel reg: miralax   Invasive Lines/Drains/Airway: Olson, Trach, 2x neck MARU drains 2x flap incision MARU drains  Deescalation: off vent, advancing tube feeds        Dispo/Code Status/Palliative:   -- SICU / Full Code

## 2024-02-26 NOTE — SUBJECTIVE & OBJECTIVE
Interval History: NAEON. No further bloody drainage from nose, clotted off. Tolerating trickle TFs.    Medications:  Continuous Infusions:   lactated ringers 50 mL/hr at 02/26/24 0600     Scheduled Meds:   acetaminophen  650 mg Per G Tube Q6H    amLODIPine  2.5 mg Per G Tube Daily    ampicillin-sulbactam  3 g Intravenous Q6H    aspirin  81 mg Per G Tube Daily    atorvastatin  80 mg Per G Tube Daily    enoxparin  40 mg Subcutaneous Daily    ezetimibe  10 mg Per G Tube Daily    multivitamin  1 tablet Per G Tube Daily    mupirocin   Nasal BID    pantoprazole  40 mg Intravenous Daily    polyethylene glycol  17 g Per G Tube Daily    tamsulosin  0.4 mg Oral Daily    thiamine  100 mg Per G Tube Daily     PRN Meds:LORazepam, melatonin, morphine, ondansetron, oxyCODONE, oxyCODONE, sodium chloride 0.9%     Review of patient's allergies indicates:  No Known Allergies  Objective:     Vital Signs (24h Range):  Temp:  [97.9 °F (36.6 °C)-98.1 °F (36.7 °C)] 98 °F (36.7 °C)  Pulse:  [] 92  Resp:  [5-28] 14  SpO2:  [96 %-100 %] 100 %  BP: ()/(55-85) 129/62         Lines/Drains/Airways       Drain  Duration                  Closed/Suction Drain 02/23/24 1333 Tube - 1 Right;Lateral Chest Bulb 15 Fr. 2 days         Closed/Suction Drain 02/23/24 1338 Tube - 2 Right;Medial Chest Bulb 15 Fr. 2 days         Closed/Suction Drain 02/23/24 1414 Tube - 3 Left;Anterior Neck Bulb 15 Fr. 2 days         Closed/Suction Drain 02/23/24 1445 Tube - 4 Right;Anterior Neck Bulb 15 Fr. 2 days         Gastrostomy/Enterostomy 02/23/24 0750 Percutaneous endoscopic gastrostomy (PEG) LUQ feeding 2 days              Airway  Duration                  Airway - Non-Surgical 02/23/24 0734 Coil Wire Tube 2 days    Adult Surgical Airway 02/23/24 1504 Shiley Cuffed 6.0/ 75mm 2 days              Peripheral Intravenous Line  Duration                  Peripheral IV - Single Lumen 02/23/24 0555 18 G Left;Posterior Forearm 3 days         Peripheral IV - Single  Lumen 02/23/24 0739 18 G Left;Posterior Forearm 2 days         Peripheral IV - Single Lumen 02/23/24 1800 20 G Anterior;Proximal;Right Forearm 2 days                     Physical Exam  Awake, nods head to answer questions  Native tongue edematous/congested and completely occluding oral cavity, cannot visualize oropharynx; improving slightly   Drainage in mouth serosanguinous, pooling  Dried blood around bilateral nares, no active bleeding  6-0 cuffed tracheostomy in place  2 neck MARU in place with serosanguinous drainage  Neck is soft  Neck incision intact with staples  Chest incision intact with staples  Chest is soft, right bulk tunneling to neck 2/2 pec flap  2 MARU in chest with serosanguinous drainage     Significant Labs:  CBC:   Recent Labs   Lab 02/26/24  0350   WBC 7.30   RBC 2.24*   HGB 7.2*   HCT 20.6*   *   MCV 92   MCH 32.1*   MCHC 35.0       CMP:   Recent Labs   Lab 02/26/24  0350   GLU 97   CALCIUM 7.6*   ALBUMIN 2.0*   PROT 4.3*      K 3.4*   CO2 27      BUN 15   CREATININE 0.7   ALKPHOS 40*   ALT 19   AST 25   BILITOT 0.4         Significant Diagnostics:  None

## 2024-02-26 NOTE — PT/OT/SLP PROGRESS
Physical Therapy   Progress Note    Patient Name:  Timothy Galdamez  MRN: [id number]    Admit Date: 2/23/2024  Admitting Diagnosis:  Tongue cancer  Length of Stay: 3 days  Recent Surgery: Procedure(s) (LRB):  GLOSSECTOMY (Right)  DISSECTION, NECK (Bilateral)  TRACHEOTOMY (N/A)  CREATION, FLAP, MUSCLE ROTATION (N/A)  INSERTION, PEG TUBE (Right) 3 Days Post-Op    Recommendations:     Discharge Recommendations: low intensity therapy  Equipment recommendations: none  Barriers to discharge: None Identified     Assessment:     Timothy Galdamez is a 68 y.o. male admitted to [facility] on 2/23/2024 with medical diagnosis of Tongue cancer. Pt presents with impaired endurance, impaired functional mobility, gait instability, impaired balance, decreased coordination, pain, decreased safety awareness, impaired coordination, impaired skin, impaired cardiopulmonary response to activity. Pt is progressing towards goals, but has not yet reached prior level of function.     Pt up in chair with wife at bedside; agreeable to therapy session. Nsg present during session. Prior to mobility, MAP 70. After assisting pt into standing, MAP increased to 81. Pt ambulated to restroom, demonstrating unsteady gait requiring HHAx2 and performed standing ADLs. Pt endorsed feeling lightheaded. Assisted pt into recliner and reclined patient with instructions to perform ankle pumps. Pt left sitting up with MAP WFL. Will continue to progress as tolerated.    Timothy Galdamez would benefit from continued acute PT intervention to improve quality of life, focus on recovery of impairments, provide patient/caregiver education, reduce fall risk, and maximize (I) and safety with functional mobility. Once medically stable, recommending pt discharge to low intensity therapy.  Patient continues to demonstrate the need for low intensity therapy on a scheduled basis exhibited by decreased independence with self-care and functional mobility .      Rehab Prognosis: Good    Plan:      During this hospitalization, patient to be seen 4 x/week to address the identified rehab impairments via gait training, therapeutic exercises, therapeutic activities, neuromuscular re-education and progress towards stated goals.     Plan of Care Expires:  03/26/24  Plan of Care reviewed with: patient and spouse    This plan of care has been discussed with the patient/caregiver, who was included in its development and is in agreement with the identified goals and treatment plan.     Subjective     Communicated with RN prior to session.  Patient found up in chair upon PT entry to room, agreeable to therapy session. Pt's wife present during session.    Patient/Family Comments/goals: none verbalized    Pain/Comfort:  Pain Rating 1: 5/10  Location - Side 1: Bilateral  Location - Orientation 1: generalized  Location 1: neck  Pain Addressed 1: Reposition, Distraction, Cessation of Activity    Patients cultural, spiritual, Adventist conflicts given the current situation: None identified     Objective:   OT present for cotreat due to pt's multiple medical comorbidities and functional/cognition deficits requiring two skilled therapists to appropriately progress pt's musculoskeletal strength, neuromuscular control, and endurance while taking into consideration medical acuity and pt safety.    Patient found with: blood pressure cuff, MARU drain, telemetry, pulse ox (continuous), PEG Tube, oxygen, Tracheostomy, [place] catheter    General Precautions: Standard, fall   Orthopedic Precautions:    Braces:     Oxygen Device: trach collar 5L/28%    Cognition:  Pt is Alert during session.    Therapist provided skilled verbal and tactile cueing to facilitate the following functional mobility tasks. Listed tasks are focused on recovery of impairments and improving pt's independence and efficiency with bed mobility, transfers and ambulation as able.     Bed Mobility:  Not assessed d/t up in recliner    Transfers:   Sit <> Stand  Transfer: Minimal Assistance from recliner with no AD                  Gait:  Distance: 10 ft  Assistance level: Minimal Assistancex2  Assistive Device:  HHAx2  Gait Assessment: decreased step length , decreased benjy, decreased gait speed, narrow base of support, and unsteady gait     Balance:  Dynamic Sitting: GOOD: Maintains balance through MODERATE excursions of active trunk movement  Standing:  Static: POOR+: Needs MINIMAL assist to maintain   Dynamic: POOR+: Needs MIN (minimal ) assist during gait    Outcome Measure: AM-PAC 6 CLICK MOBILITY  Total Score:18     Patient/Caregiver Education and Additional Therapeutic Activities/Exercises       Provided pt/caregiver education regarding:   PT POC and goals for therapy   Safety with mobility and fall risk   Safe management of AD as needed   Energy conservation techniques   Instruction on use of call button and importance of calling nursing staff for assistance with mobility     Patient/caregiver able to verbalize understanding; will follow-up with pt/caregiver during current admit for additional questions/concerns within scope of practice.     White board updated.     Patient left up in chair with all lines intact, call button in reach, and nsg present.    Goals:     Multidisciplinary Problems       Physical Therapy Goals          Problem: Physical Therapy    Goal Priority Disciplines Outcome Goal Variances Interventions   Physical Therapy Goal     PT, PT/OT Ongoing, Progressing     Description: Goals to be met by: 3/26/24     Patient will increase functional independence with mobility by performin. Supine to sit with McGrath  2. Sit to supine with McGrath  3. Sit to stand transfer with McGrath  4. Bed to chair transfer with McGrath using No Assistive Device  5. Gait  x 250 feet with McGrath using No Assistive Device.                          Time Tracking:       PT Received On: 24  PT Start Time: 1313     PT Stop Time:  1331  PT Total Time (min): 18 min     Billable Minutes: Gait Training 18    02/26/2024

## 2024-02-26 NOTE — CARE UPDATE
I have reviewed the chart of Timothy Galdamez who is hospitalized for the following:    Active Hospital Problems    Diagnosis    *Tongue cancer    Acute blood loss anemia    Hypokalemia    Hypoalbuminemia    Hypophosphatemia    S/P percutaneous endoscopic gastrostomy (PEG) tube placement    Status post tracheostomy    On supplemental oxygen therapy    Primary hypertension    Hyperlipidemia      Ingrid Pike, JORGE LUIS  Unit Based [facility]

## 2024-02-27 LAB
ANION GAP SERPL CALC-SCNC: 4 MMOL/L (ref 8–16)
BUN SERPL-MCNC: 16 MG/DL (ref 8–23)
CALCIUM SERPL-MCNC: 8.2 MG/DL (ref 8.7–10.5)
CHLORIDE SERPL-SCNC: 108 MMOL/L (ref 95–110)
CO2 SERPL-SCNC: 27 MMOL/L (ref 23–29)
CREAT SERPL-MCNC: 0.6 MG/DL (ref 0.5–1.4)
ERYTHROCYTE [DISTWIDTH] IN BLOOD BY AUTOMATED COUNT: 13 % (ref 11.5–14.5)
EST. GFR  (NO RACE VARIABLE): >60 ML/MIN/1.73 M^2
GLUCOSE SERPL-MCNC: 114 MG/DL (ref 70–110)
HCT VFR BLD AUTO: 22.1 % (ref 40–54)
HGB BLD-MCNC: 7.5 G/DL (ref 14–18)
MAGNESIUM SERPL-MCNC: 2.1 MG/DL (ref 1.6–2.6)
MCH RBC QN AUTO: 31.6 PG (ref 27–31)
MCHC RBC AUTO-ENTMCNC: 33.9 G/DL (ref 32–36)
MCV RBC AUTO: 93 FL (ref 82–98)
PHOSPHATE SERPL-MCNC: 2.7 MG/DL (ref 2.7–4.5)
PLATELET # BLD AUTO: 174 K/UL (ref 150–450)
PMV BLD AUTO: 11 FL (ref 9.2–12.9)
POTASSIUM SERPL-SCNC: 4.1 MMOL/L (ref 3.5–5.1)
RBC # BLD AUTO: 2.37 M/UL (ref 4.6–6.2)
SODIUM SERPL-SCNC: 139 MMOL/L (ref 136–145)
WBC # BLD AUTO: 6.58 K/UL (ref 3.9–12.7)

## 2024-02-27 PROCEDURE — 99024 POSTOP FOLLOW-UP VISIT: CPT | Mod: ,,, | Performed by: STUDENT IN AN ORGANIZED HEALTH CARE EDUCATION/TRAINING PROGRAM

## 2024-02-27 PROCEDURE — 99900035 HC TECH TIME PER 15 MIN (STAT)

## 2024-02-27 PROCEDURE — C9113 INJ PANTOPRAZOLE SODIUM, VIA: HCPCS | Performed by: STUDENT IN AN ORGANIZED HEALTH CARE EDUCATION/TRAINING PROGRAM

## 2024-02-27 PROCEDURE — 25000003 PHARM REV CODE 250: Performed by: STUDENT IN AN ORGANIZED HEALTH CARE EDUCATION/TRAINING PROGRAM

## 2024-02-27 PROCEDURE — 20600001 HC STEP DOWN PRIVATE ROOM

## 2024-02-27 PROCEDURE — 84100 ASSAY OF PHOSPHORUS: CPT | Performed by: STUDENT IN AN ORGANIZED HEALTH CARE EDUCATION/TRAINING PROGRAM

## 2024-02-27 PROCEDURE — 97535 SELF CARE MNGMENT TRAINING: CPT

## 2024-02-27 PROCEDURE — 85027 COMPLETE CBC AUTOMATED: CPT | Performed by: STUDENT IN AN ORGANIZED HEALTH CARE EDUCATION/TRAINING PROGRAM

## 2024-02-27 PROCEDURE — 25000003 PHARM REV CODE 250

## 2024-02-27 PROCEDURE — 27100171 HC OXYGEN HIGH FLOW UP TO 24 HOURS

## 2024-02-27 PROCEDURE — 99900026 HC AIRWAY MAINTENANCE (STAT)

## 2024-02-27 PROCEDURE — 99900031 HC PATIENT EDUCATION (STAT)

## 2024-02-27 PROCEDURE — 99900022

## 2024-02-27 PROCEDURE — 27200966 HC CLOSED SUCTION SYSTEM

## 2024-02-27 PROCEDURE — 83735 ASSAY OF MAGNESIUM: CPT | Performed by: STUDENT IN AN ORGANIZED HEALTH CARE EDUCATION/TRAINING PROGRAM

## 2024-02-27 PROCEDURE — 94761 N-INVAS EAR/PLS OXIMETRY MLT: CPT

## 2024-02-27 PROCEDURE — 97116 GAIT TRAINING THERAPY: CPT

## 2024-02-27 PROCEDURE — 80048 BASIC METABOLIC PNL TOTAL CA: CPT | Performed by: STUDENT IN AN ORGANIZED HEALTH CARE EDUCATION/TRAINING PROGRAM

## 2024-02-27 PROCEDURE — 63600175 PHARM REV CODE 636 W HCPCS: Performed by: STUDENT IN AN ORGANIZED HEALTH CARE EDUCATION/TRAINING PROGRAM

## 2024-02-27 RX ORDER — OXYCODONE HYDROCHLORIDE 5 MG/1
5 TABLET ORAL EVERY 6 HOURS PRN
Status: DISCONTINUED | OUTPATIENT
Start: 2024-02-27 | End: 2024-02-28

## 2024-02-27 RX ORDER — IBUPROFEN 400 MG/1
400 TABLET ORAL EVERY 6 HOURS
Status: DISCONTINUED | OUTPATIENT
Start: 2024-02-27 | End: 2024-02-28

## 2024-02-27 RX ADMIN — SILODOSIN 4 MG: 4 CAPSULE ORAL at 08:02

## 2024-02-27 RX ADMIN — MUPIROCIN: 20 OINTMENT TOPICAL at 09:02

## 2024-02-27 RX ADMIN — POLYETHYLENE GLYCOL 3350 17 G: 17 POWDER, FOR SOLUTION ORAL at 08:02

## 2024-02-27 RX ADMIN — AMLODIPINE BESYLATE 2.5 MG: 10 TABLET ORAL at 08:02

## 2024-02-27 RX ADMIN — AMPICILLIN SODIUM AND SULBACTAM SODIUM 3 G: 2; 1 INJECTION, POWDER, FOR SOLUTION INTRAMUSCULAR; INTRAVENOUS at 12:02

## 2024-02-27 RX ADMIN — ENOXAPARIN SODIUM 40 MG: 40 INJECTION SUBCUTANEOUS at 04:02

## 2024-02-27 RX ADMIN — MUPIROCIN: 20 OINTMENT TOPICAL at 08:02

## 2024-02-27 RX ADMIN — IBUPROFEN 400 MG: 400 TABLET ORAL at 05:02

## 2024-02-27 RX ADMIN — ASPIRIN 81 MG CHEWABLE TABLET 81 MG: 81 TABLET CHEWABLE at 08:02

## 2024-02-27 RX ADMIN — EZETIMIBE 10 MG: 10 TABLET ORAL at 08:02

## 2024-02-27 RX ADMIN — AMPICILLIN SODIUM AND SULBACTAM SODIUM 3 G: 2; 1 INJECTION, POWDER, FOR SOLUTION INTRAMUSCULAR; INTRAVENOUS at 06:02

## 2024-02-27 RX ADMIN — ACETAMINOPHEN 650 MG: 650 SOLUTION ORAL at 11:02

## 2024-02-27 RX ADMIN — AMPICILLIN SODIUM AND SULBACTAM SODIUM 3 G: 2; 1 INJECTION, POWDER, FOR SOLUTION INTRAMUSCULAR; INTRAVENOUS at 05:02

## 2024-02-27 RX ADMIN — ACETAMINOPHEN 650 MG: 650 SOLUTION ORAL at 05:02

## 2024-02-27 RX ADMIN — IBUPROFEN 400 MG: 400 TABLET ORAL at 11:02

## 2024-02-27 RX ADMIN — OXYCODONE HYDROCHLORIDE 10 MG: 10 TABLET ORAL at 12:02

## 2024-02-27 RX ADMIN — ATORVASTATIN CALCIUM 80 MG: 40 TABLET, FILM COATED ORAL at 08:02

## 2024-02-27 RX ADMIN — THERA TABS 1 TABLET: TAB at 08:02

## 2024-02-27 RX ADMIN — ACETAMINOPHEN 650 MG: 650 SOLUTION ORAL at 12:02

## 2024-02-27 RX ADMIN — PANTOPRAZOLE SODIUM 40 MG: 40 INJECTION, POWDER, FOR SOLUTION INTRAVENOUS at 08:02

## 2024-02-27 RX ADMIN — SODIUM PHOSPHATE, MONOBASIC, MONOHYDRATE AND SODIUM PHOSPHATE, DIBASIC, ANHYDROUS 15 MMOL: 142; 276 INJECTION, SOLUTION INTRAVENOUS at 06:02

## 2024-02-27 RX ADMIN — Medication 100 MG: at 08:02

## 2024-02-27 RX ADMIN — LORAZEPAM 2 MG: 0.5 TABLET ORAL at 06:02

## 2024-02-27 NOTE — PROGRESS NOTES
[facility] - [facility] Intensive [facility]  Otorhinolaryngology-Head & Neck Surgery  Progress Note    Subjective:     Post-Op Info:  Procedure(s) (LRB):  GLOSSECTOMY (Right)  DISSECTION, NECK (Bilateral)  TRACHEOTOMY (N/A)  CREATION, FLAP, MUSCLE ROTATION (N/A)  INSERTION, PEG TUBE (Right)   4 Days Post-Op  Hospital Day: 5     Interval History: NAEON. Sitting up in chair this morning. Tolerating slow advancement of TFs.    Medications:  Continuous Infusions:      Scheduled Meds:   acetaminophen  650 mg Per G Tube Q6H    amLODIPine  2.5 mg Per G Tube Daily    ampicillin-sulbactam  3 g Intravenous Q6H    aspirin  81 mg Per G Tube Daily    atorvastatin  80 mg Per G Tube Daily    enoxparin  40 mg Subcutaneous Daily    ezetimibe  10 mg Per G Tube Daily    multivitamin  1 tablet Per G Tube Daily    mupirocin   Nasal BID    pantoprazole  40 mg Intravenous Daily    polyethylene glycol  17 g Per G Tube Daily    silodosin  4 mg Per G Tube Daily    thiamine  100 mg Per G Tube Daily     PRN Meds:calcium gluconate IVPB, calcium gluconate IVPB, calcium gluconate IVPB, LORazepam, magnesium sulfate IVPB, magnesium sulfate IVPB, melatonin, ondansetron, oxyCODONE, oxyCODONE, potassium chloride **AND** potassium chloride **AND** potassium chloride, sodium chloride 0.9%, sodium phosphate 15 mmol in dextrose 5 % (D5W) 250 mL IVPB, sodium phosphate 20.01 mmol in dextrose 5 % (D5W) 250 mL IVPB, sodium phosphate 30 mmol in dextrose 5 % (D5W) 250 mL IVPB     Review of patient's allergies indicates:  No Known Allergies  Objective:     Vital Signs (24h Range):  Temp:  [97.7 °F (36.5 °C)-99.1 °F (37.3 °C)] 98.3 °F (36.8 °C)  Pulse:  [] 81  Resp:  [5-40] 15  SpO2:  [96 %-100 %] 100 %  BP: ()/(51-89) 127/68         Lines/Drains/Airways       Drain  Duration                  Closed/Suction Drain 02/23/24 1333 Tube - 1 Right;Lateral Chest Bulb 15 Fr. 3 days         Closed/Suction Drain 02/23/24 1338 Tube - 2 Right;Medial Chest Bulb 15 Fr. 3  days         Closed/Suction Drain 02/23/24 1414 Tube - 3 Left;Anterior Neck Bulb 15 Fr. 3 days         Closed/Suction Drain 02/23/24 1445 Tube - 4 Right;Anterior Neck Bulb 15 Fr. 3 days         Gastrostomy/Enterostomy 02/23/24 0750 Percutaneous endoscopic gastrostomy (PEG) LUQ feeding 3 days         Urethral Catheter 02/26/24 0644 Non-latex 16 Fr. 1 day              Airway  Duration             Adult Surgical Airway 02/23/24 1504 Shiley Cuffed 6.0/ 75mm 3 days              Peripheral Intravenous Line  Duration                  Peripheral IV - Single Lumen 02/23/24 0555 18 G Left;Posterior Forearm 4 days         Peripheral IV - Single Lumen 02/23/24 0739 18 G Left;Posterior Forearm 3 days         Peripheral IV - Single Lumen 02/23/24 1800 20 G Anterior;Proximal;Right Forearm 3 days                    Physical Exam  Awake, sitting up in chair, nods head to answer questions  Native tongue edematous/congested and completely occluding oral cavity, cannot visualize oropharynx; improving slightly   No bleeding from nose  6-0 cuffed tracheostomy in place, cuff deflated  2 neck MARU in place with serosanguinous drainage  Neck is soft  Neck incision intact with staples  Chest incision intact with staples  Chest is soft, right bulk tunneling to neck 2/2 pec flap  2 MARU in chest with serosanguinous drainage     Significant Labs:  CBC:   Recent Labs   Lab 02/27/24  0510   WBC 6.58   RBC 2.37*   HGB 7.5*   HCT 22.1*      MCV 93   MCH 31.6*   MCHC 33.9       CMP:   Recent Labs   Lab 02/26/24  0350 02/27/24  0510   GLU 97 114*   CALCIUM 7.6* 8.2*   ALBUMIN 2.0*  --    PROT 4.3*  --     139   K 3.4* 4.1   CO2 27 27    108   BUN 15 16   CREATININE 0.7 0.6   ALKPHOS 40*  --    ALT 19  --    AST 25  --    BILITOT 0.4  --          Significant Diagnostics:  None  Assessment/Plan:     * Tongue cancer  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 He had bleeding from the oral cavity post-op that has overall improved. Hgb stabilized. Will continue to monitor closely for now and transfuse as needed.    ENT   - Keep JPs in place to neck and chest  - Of note, patient cannot be intubated by mouth 2/2 tongue edema from surgery, must maintain tracheostomy    Neuro  - Multimodal pain control    CV  - Currently HDS    Resp  - On trach collar  - Routine trach care    FEN/GI  - NPO  - Ok to slowly advance tube feeds to goal by 10 cc q6h but hold for ANY nausea/vomiting/fullness    Renal  - Cr stable    Heme/ID   - Unasyn for post-op prophylaxis  - Monitor H/H    MSK   - PT/OT/OOB    Ppx: L40    Dispo: ok for step down to [facility] only          Buck Nelson MD  Otorhinolaryngology-Head & Neck Surgery  [facility] - Surgical Intensive Care

## 2024-02-27 NOTE — PROGRESS NOTES
[facility]  Adult Nutrition  Progress Note    SUMMARY       Recommendations    1.) TF recs: Once pt is off Propofol sedation, recommend TF of Impact Peptide 1.5 at trickle rate and slowly advancing to goal rate of 50ml/hr to provide 1800 kcal, 113g PRO, and 924ml FF- FWF per MD.      2.) Monitor for s/s of intolerance such as residuals >500ml, n/v/d, or abdominal distension.       3.) Re-consult nutrition when bolus feeds are medically appropriate.     4.) RD to monitor wt, tolerance, skin, labs, vent settings.      Goals: to meet % of EEN/EPN by next RD f/u  Nutrition Goal Status: progressing towards goal  Communication of RD Recs:  (POC)    Assessment and Plan    Nutrition Problem  Inadequate oral intake     Related to (etiology):   Inability to consume adequate nutrition     Signs and Symptoms (as evidenced by):   NPO, need for EN      Interventions/Recommendations (treatment strategy):  Collaboration of nutritional care with other providers.  EN     Nutrition Diagnosis Status:   Continues    Reason for Assessment    Reason For Assessment: RD follow-up  Diagnosis: cancer diagnosis/related complications (Tongue cancer; S/p glossectomy, tracheotomy, PEG- placed)  Relevant Medical History: HTN, HLD  Interdisciplinary Rounds: did not attend    General Information Comments: RD f/u: pt is trached, on vent with TF running at goal. Pt denies n/v/d/c. Pt denies abd fullness or discomfort. Pt appears nourished at this time, with no visible s/s of malnutrition. NFPE not warranted at this time. RD team to continue to monitor.    Nutrition Discharge Planning: adequate nutritional intake via EN    Nutrition Risk Screen    Nutrition Risk Screen: tube feeding or parenteral nutrition    Nutrition/Diet History    Spiritual, Cultural Beliefs, Adventism Practices, Values that Affect Care: no    Anthropometrics    Temp: 97.5 °F (36.4 °C)  Height: 6' (182.9 cm)  Height (inches): 72 in  Weight: 79.4 kg  (175 lb)  Weight (lb): 175 lb  Ideal Body Weight (IBW), Male: 178 lb  % Ideal Body Weight, Male (lb): 98.31 %  BMI (Calculated): 23.7    Lab/Procedures/Meds    Pertinent Labs Reviewed: reviewed  Pertinent Labs Comments: Gluc: 114, Ca: 8.2    Pertinent Medications Reviewed: reviewed  Pertinent Medications Comments: Abx, statin, enoxaparin, ezetimibe, MVI, pantoprazole, polyethylene glycol, thiamine    Estimated/Assessed Needs    Weight Used For Calorie Calculations: 79.4 kg (175 lb 0.7 oz)  Energy Calorie Requirements (kcal): 1588- 1985 kcal  Energy Need Method: Kcal/kg (20-25 kcal/kg)    Protein Requirements: 95- 119g  Weight Used For Protein Calculations: 79.4 kg (175 lb 0.7 oz)    Fluid Requirements (mL): 1ml/1kcal or per MD  Estimated Fluid Requirement Method: RDA Method  RDA Method (mL): 1588    Nutrition Prescription Ordered    Current Diet Order: NPO  Current Nutrition Support Formula Ordered: Impact Peptide 1.5  Current Nutrition Support Rate Ordered: 50 (ml)    Evaluation of Received Nutrient/Fluid Intake    Enteral Calories (kcal): 1800  Enteral Protein (gm): 113  Enteral (Free Water) Fluid (mL): 924  Other Calories (kcal):  (-)  I/O: +4.4L since admit  Energy Calories Required: meeting needs  Protein Required: meeting needs  Fluid Required:  (as per MD)  Comments: LBM PTA  % Intake of Estimated Energy Needs: 75 - 100 %  % Meal Intake: NPO    Nutrition Risk    Level of Risk/Frequency of Follow-up:  (RD to f/u x 1-2/week)     Monitor and Evaluation    Food and Nutrient Intake: energy intake, enteral nutrition intake  Food and Nutrient Adminstration: enteral and parenteral nutrition administration  Physical Activity and Function: nutrition-related ADLs and IADLs  Anthropometric Measurements: weight, weight change, body mass index  Biochemical Data, Medical Tests and Procedures: electrolyte and renal panel, gastrointestinal profile, glucose/endocrine profile, inflammatory profile, lipid  profile  Nutrition-Focused Physical Findings: overall appearance, skin     Nutrition Follow-Up    RD Follow-up?: Yes

## 2024-02-27 NOTE — SUBJECTIVE & OBJECTIVE
Interval History: NAEON. Sitting up in chair this morning. Tolerating slow advancement of TFs.    Medications:  Continuous Infusions:      Scheduled Meds:   acetaminophen  650 mg Per G Tube Q6H    amLODIPine  2.5 mg Per G Tube Daily    ampicillin-sulbactam  3 g Intravenous Q6H    aspirin  81 mg Per G Tube Daily    atorvastatin  80 mg Per G Tube Daily    enoxparin  40 mg Subcutaneous Daily    ezetimibe  10 mg Per G Tube Daily    multivitamin  1 tablet Per G Tube Daily    mupirocin   Nasal BID    pantoprazole  40 mg Intravenous Daily    polyethylene glycol  17 g Per G Tube Daily    silodosin  4 mg Per G Tube Daily    thiamine  100 mg Per G Tube Daily     PRN Meds:calcium gluconate IVPB, calcium gluconate IVPB, calcium gluconate IVPB, LORazepam, magnesium sulfate IVPB, magnesium sulfate IVPB, melatonin, ondansetron, oxyCODONE, oxyCODONE, potassium chloride **AND** potassium chloride **AND** potassium chloride, sodium chloride 0.9%, sodium phosphate 15 mmol in dextrose 5 % (D5W) 250 mL IVPB, sodium phosphate 20.01 mmol in dextrose 5 % (D5W) 250 mL IVPB, sodium phosphate 30 mmol in dextrose 5 % (D5W) 250 mL IVPB     Review of patient's allergies indicates:  No Known Allergies  Objective:     Vital Signs (24h Range):  Temp:  [97.7 °F (36.5 °C)-99.1 °F (37.3 °C)] 98.3 °F (36.8 °C)  Pulse:  [] 81  Resp:  [5-40] 15  SpO2:  [96 %-100 %] 100 %  BP: ()/(51-89) 127/68         Lines/Drains/Airways       Drain  Duration                  Closed/Suction Drain 02/23/24 1333 Tube - 1 Right;Lateral Chest Bulb 15 Fr. 3 days         Closed/Suction Drain 02/23/24 1338 Tube - 2 Right;Medial Chest Bulb 15 Fr. 3 days         Closed/Suction Drain 02/23/24 1414 Tube - 3 Left;Anterior Neck Bulb 15 Fr. 3 days         Closed/Suction Drain 02/23/24 1445 Tube - 4 Right;Anterior Neck Bulb 15 Fr. 3 days         Gastrostomy/Enterostomy 02/23/24 0750 Percutaneous endoscopic gastrostomy (PEG) LUQ feeding 3 days         Urethral Catheter  02/26/24 0644 Non-latex 16 Fr. 1 day              Airway  Duration             Adult Surgical Airway 02/23/24 1504 Shiley Cuffed 6.0/ 75mm 3 days              Peripheral Intravenous Line  Duration                  Peripheral IV - Single Lumen 02/23/24 0555 18 G Left;Posterior Forearm 4 days         Peripheral IV - Single Lumen 02/23/24 0739 18 G Left;Posterior Forearm 3 days         Peripheral IV - Single Lumen 02/23/24 1800 20 G Anterior;Proximal;Right Forearm 3 days                     Physical Exam  Awake, sitting up in chair, nods head to answer questions  Native tongue edematous/congested and completely occluding oral cavity, cannot visualize oropharynx; improving slightly   No bleeding from nose  6-0 cuffed tracheostomy in place, cuff deflated  2 neck MARU in place with serosanguinous drainage  Neck is soft  Neck incision intact with staples  Chest incision intact with staples  Chest is soft, right bulk tunneling to neck 2/2 pec flap  2 MARU in chest with serosanguinous drainage     Significant Labs:  CBC:   Recent Labs   Lab 02/27/24  0510   WBC 6.58   RBC 2.37*   HGB 7.5*   HCT 22.1*      MCV 93   MCH 31.6*   MCHC 33.9       CMP:   Recent Labs   Lab 02/26/24  0350 02/27/24  0510   GLU 97 114*   CALCIUM 7.6* 8.2*   ALBUMIN 2.0*  --    PROT 4.3*  --     139   K 3.4* 4.1   CO2 27 27    108   BUN 15 16   CREATININE 0.7 0.6   ALKPHOS 40*  --    ALT 19  --    AST 25  --    BILITOT 0.4  --          Significant Diagnostics:  None

## 2024-02-27 NOTE — SUBJECTIVE & OBJECTIVE
Interval History/Significant Events: Pt seen and examined at bedside. AMARILYS GORDILLO. Reported improved pain overnight. Reports no new symptoms.      Follow-up For: Procedure(s) (LRB):  GLOSSECTOMY (Right)  DISSECTION, NECK (Bilateral)  TRACHEOTOMY (N/A)  CREATION, FLAP, MUSCLE ROTATION (N/A)  INSERTION, PEG TUBE (Right)    Post-Operative Day: 4 Days Post-Op    Objective:     Vital Signs (Most Recent):  Temp: 98.3 °F (36.8 °C) (02/27/24 0400)  Pulse: 71 (02/27/24 0600)  Resp: 13 (02/27/24 0600)  BP: 127/68 (02/27/24 0600)  SpO2: 100 % (02/27/24 0600) Vital Signs (24h Range):  Temp:  [97.7 °F (36.5 °C)-99.1 °F (37.3 °C)] 98.3 °F (36.8 °C)  Pulse:  [] 71  Resp:  [5-40] 13  SpO2:  [96 %-100 %] 100 %  BP: ()/(51-89) 127/68     Weight: 79.4 kg (175 lb)  Body mass index is 23.73 kg/m².      Intake/Output Summary (Last 24 hours) at 2/27/2024 0616  Last data filed at 2/27/2024 0600  Gross per 24 hour   Intake 2236.33 ml   Output 2963 ml   Net -726.67 ml          Physical Exam     Constitutional:       General: He is not in acute distress.  Neck:      Comments: Neck incision c/d/I  Swelling at inferior portion of incision  2x neck MARU drains w/ SS output     Tracheostomy tube in place w/ pooled clotted blood     Cardiovascular:      Rate and Rhythm: Normal rate and regular rhythm.      Comments: 2x MARU drains to flap site w/ SS output  Incision c/d/i  Pulmonary:      Comments: Tracheostomy  Abdominal:      General: There is no distension.      Palpations: Abdomen is soft.      Tenderness: There is no abdominal tenderness.      Comments: PEG tube in place   Genitourinary:     Comments: [place]  Musculoskeletal:      Right lower leg: No edema.      Left lower leg: No edema.   Skin:     General: Skin is warm and dry.   Neurological:      Mental Status: He is alert.    Vents:  Vent Mode: Spont (02/24/24 1904)  Set Rate: 16 BPM (02/24/24 1122)  Vt Set: 480 mL (02/24/24 1122)  Pressure Support: 5 cmH20 (02/24/24  1904)  PEEP/CPAP: 5 cmH20 (02/24/24 1904)  Oxygen Concentration (%): 28 (02/27/24 0400)  Peak Airway Pressure: 11 cmH20 (02/24/24 1904)  Plateau Pressure: 0 cmH20 (02/24/24 1904)  Total Ve: 0 L/m (02/24/24 1904)  Negative Inspiratory Force (cm H2O): 0 (02/24/24 1904)  F/VT Ratio<105 (RSBI): (!) 5.58 (02/24/24 1511)    Lines/Drains/Airways       Drain  Duration                  Closed/Suction Drain 02/23/24 1333 Tube - 1 Right;Lateral Chest Bulb 15 Fr. 3 days         Closed/Suction Drain 02/23/24 1338 Tube - 2 Right;Medial Chest Bulb 15 Fr. 3 days         Closed/Suction Drain 02/23/24 1414 Tube - 3 Left;Anterior Neck Bulb 15 Fr. 3 days         Closed/Suction Drain 02/23/24 1445 Tube - 4 Right;Anterior Neck Bulb 15 Fr. 3 days         Gastrostomy/Enterostomy 02/23/24 0750 Percutaneous endoscopic gastrostomy (PEG) LUQ feeding 3 days         Urethral Catheter 02/26/24 0644 Non-latex 16 Fr. <1 day              Airway  Duration             Adult Surgical Airway 02/23/24 1504 Shiley Cuffed 6.0/ 75mm 3 days              Peripheral Intravenous Line  Duration                  Peripheral IV - Single Lumen 02/23/24 0555 18 G Left;Posterior Forearm 4 days         Peripheral IV - Single Lumen 02/23/24 0739 18 G Left;Posterior Forearm 3 days         Peripheral IV - Single Lumen 02/23/24 1800 20 G Anterior;Proximal;Right Forearm 3 days                    Significant Labs:    CBC/Anemia Profile:  Recent Labs   Lab 02/26/24  0350 02/26/24  1414 02/27/24  0510   WBC 7.30 6.52 6.58   HGB 7.2* 7.5* 7.5*   HCT 20.6* 21.7* 22.1*   * 148* 174   MCV 92 94 93   RDW 13.0 12.9 13.0        Chemistries:  Recent Labs   Lab 02/26/24  0350 02/27/24  0510    139   K 3.4* 4.1    108   CO2 27 27   BUN 15 16   CREATININE 0.7 0.6   CALCIUM 7.6* 8.2*   ALBUMIN 2.0*  --    PROT 4.3*  --    BILITOT 0.4  --    ALKPHOS 40*  --    ALT 19  --    AST 25  --    MG 1.8 2.1   PHOS 1.7* 2.7         Significant Imaging:  I have reviewed all  pertinent imaging results/findings within the past 24 hours.

## 2024-02-27 NOTE — PROGRESS NOTES
[facility]  Critical Care - Surgery  Progress Note    Patient Name: Timothy GREENE Allday  MRN: [id number]  Admission Date: 2/23/2024  Hospital Length of Stay: 4 days  Code Status: Full Code  Attending Provider: Jeferson Ventura MD  Primary Care Provider: Young Lanier MD   Principal Problem: Tongue cancer    Subjective:     Hospital/ICU Course:  No notes on file    Interval History/Significant Events: Pt seen and examined at bedside. VSS.  NATINO. Reported improved pain overnight. Reports no new symptoms.      Follow-up For: Procedure(s) (LRB):  GLOSSECTOMY (Right)  DISSECTION, NECK (Bilateral)  TRACHEOTOMY (N/A)  CREATION, FLAP, MUSCLE ROTATION (N/A)  INSERTION, PEG TUBE (Right)    Post-Operative Day: 4 Days Post-Op    Objective:     Vital Signs (Most Recent):  Temp: 98.3 °F (36.8 °C) (02/27/24 0400)  Pulse: 71 (02/27/24 0600)  Resp: 13 (02/27/24 0600)  BP: 127/68 (02/27/24 0600)  SpO2: 100 % (02/27/24 0600) Vital Signs (24h Range):  Temp:  [97.7 °F (36.5 °C)-99.1 °F (37.3 °C)] 98.3 °F (36.8 °C)  Pulse:  [] 71  Resp:  [5-40] 13  SpO2:  [96 %-100 %] 100 %  BP: ()/(51-89) 127/68     Weight: 79.4 kg (175 lb)  Body mass index is 23.73 kg/m².      Intake/Output Summary (Last 24 hours) at 2/27/2024 0616  Last data filed at 2/27/2024 0600  Gross per 24 hour   Intake 2236.33 ml   Output 2963 ml   Net -726.67 ml          Physical Exam     Constitutional:       General: He is not in acute distress.  Neck:      Comments: Neck incision c/d/I  Swelling at inferior portion of incision  2x neck MARU drains w/ SS output     Tracheostomy tube in place w/ pooled clotted blood     Cardiovascular:      Rate and Rhythm: Normal rate and regular rhythm.      Comments: 2x MARU drains to flap site w/ SS output  Incision c/d/i  Pulmonary:      Comments: Tracheostomy  Abdominal:      General: There is no distension.      Palpations: Abdomen is soft.      Tenderness: There is no abdominal tenderness.       Comments: PEG tube in place   Genitourinary:     Comments: [place]  Musculoskeletal:      Right lower leg: No edema.      Left lower leg: No edema.   Skin:     General: Skin is warm and dry.   Neurological:      Mental Status: He is alert.      Lines/Drains/Airways       Drain  Duration                  Closed/Suction Drain 02/23/24 1333 Tube - 1 Right;Lateral Chest Bulb 15 Fr. 3 days         Closed/Suction Drain 02/23/24 1338 Tube - 2 Right;Medial Chest Bulb 15 Fr. 3 days         Closed/Suction Drain 02/23/24 1414 Tube - 3 Left;Anterior Neck Bulb 15 Fr. 3 days         Closed/Suction Drain 02/23/24 1445 Tube - 4 Right;Anterior Neck Bulb 15 Fr. 3 days         Gastrostomy/Enterostomy 02/23/24 0750 Percutaneous endoscopic gastrostomy (PEG) LUQ feeding 3 days         Urethral Catheter 02/26/24 0644 Non-latex 16 Fr. <1 day              Airway  Duration             Adult Surgical Airway 02/23/24 1504 Shiley Cuffed 6.0/ 75mm 3 days              Peripheral Intravenous Line  Duration                  Peripheral IV - Single Lumen 02/23/24 0555 18 G Left;Posterior Forearm 4 days         Peripheral IV - Single Lumen 02/23/24 0739 18 G Left;Posterior Forearm 3 days         Peripheral IV - Single Lumen 02/23/24 1800 20 G Anterior;Proximal;Right Forearm 3 days                    Significant Labs:    CBC/Anemia Profile:  Recent Labs   Lab 02/26/24  0350 02/26/24  1414 02/27/24  0510   WBC 7.30 6.52 6.58   HGB 7.2* 7.5* 7.5*   HCT 20.6* 21.7* 22.1*   * 148* 174   MCV 92 94 93   RDW 13.0 12.9 13.0        Chemistries:  Recent Labs   Lab 02/26/24  0350 02/27/24  0510    139   K 3.4* 4.1    108   CO2 27 27   BUN 15 16   CREATININE 0.7 0.6   CALCIUM 7.6* 8.2*   ALBUMIN 2.0*  --    PROT 4.3*  --    BILITOT 0.4  --    ALKPHOS 40*  --    ALT 19  --    AST 25  --    MG 1.8 2.1   PHOS 1.7* 2.7         Significant Imaging:  I have reviewed all pertinent imaging results/findings within the past 24 hours.  Assessment/Plan:      Oncology  * Tongue cancer  Timothy Galdamez is a 68 y.o. yo male who is s/p glossectomy,neck dissection, rotational neck flap and G-tube placement by Dr. Ventura and Dr. Wu on 02/23/24. He was a difficult intubation in the OR and is currently sedated with a tracheostomy tube. Now tolerating trach collar       Neuro/Psych:   -- Sedation: none  -- Pain: MMD pain regimen  -- On CIWA protocol for hx EtOH, CIWA scores 0   -- thiamine daily, multivitamin daily, ativan 2 prn q4             Cards:   -- HDS off pressors.   -- goal SBP < 180  -- home amlodipine 2.5, statin, ezetimibe      Pulm:   -- S/p Tracheostomy creation, tolerating trach collar   -- O2 sat > 90%      Renal:  -- [place]. Maintain strict I/O  -- Replace lytes as needed  -- UOP: 1.1cc/kg/hr      FEN / GI:   -- mIVF at 50  -- Replace lytes as needed  -- pantoprazole (on home omeprazole)  -- Nutrition: trickle TF   --TF of Impact Peptide 1.5 at 40cc/hr. Ok to advance to goal rate of 50ml/hr   -- Miralax for bowel regimen      ID:   -- Tm: afebrile  -- unasyn, last dose 02/28      Heme/Onc:  -- H/H: 7.5(7.2)  - Daily CBC      Endo:   -- Gluc goal 140-180  -- Blood glucose at goal     PPx:   Feeding: TF  Analgesia/Sedation: mmd, no sedation  Thromboembolic prevention: lovenox  HOB >30: Y  Stress Ulcer ppx: none  Glucose control: Critical care goal 140-180 g/dl, SSI  Bowel reg: miralax   Invasive Lines/Drains/Airway: [place], Trach, 2x neck MARU drains 2x flap incision MARU drains  Deescalation: off vent, advancing tube feeds        Dispo/Code Status/Palliative:   -- SICU / Full Code  -- stable for SD today       Gelacio Norman MD  Critical Care - Surgery  [facility]

## 2024-02-27 NOTE — ASSESSMENT & PLAN NOTE
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 He had bleeding from the oral cavity post-op that has overall improved. Hgb stabilized. Will continue to monitor closely for now and transfuse as needed.    ENT   - Keep JPs in place to neck and chest  - Of note, patient cannot be intubated by mouth 2/2 tongue edema from surgery, must maintain tracheostomy    Neuro  - Multimodal pain control    CV  - Currently HDS    Resp  - On trach collar  - Routine trach care    FEN/GI  - NPO  - Ok to slowly advance tube feeds to goal by 10 cc q6h but hold for ANY nausea/vomiting/fullness    Renal  - Cr stable    Heme/ID   - Unasyn for post-op prophylaxis  - Monitor H/H    MSK   - PT/OT/OOB    Ppx: L40    Dispo: ok for step down to [facility] only

## 2024-02-27 NOTE — PLAN OF CARE
Recommendations     1.) TF recs: Once pt is off Propofol sedation, recommend TF of Impact Peptide 1.5 at trickle rate and slowly advancing to goal rate of 50ml/hr to provide 1800 kcal, 113g PRO, and 924ml FF- FWF per MD.      2.) Monitor for s/s of intolerance such as residuals >500ml, n/v/d, or abdominal distension.       3.) Re-consult nutrition when bolus feeds are medically appropriate.     4.) RD to monitor wt, tolerance, skin, labs, vent settings.        Goals: to meet % of EEN/EPN by next RD f/u  Nutrition Goal Status: progressing towards goal  Communication of RD Recs:  (POC)

## 2024-02-27 NOTE — PT/OT/SLP PROGRESS
"Occupational Therapy   Co-Treatment    Name: Timothy Galdamez  MRN: [id number]  Admitting Diagnosis:  Tongue cancer  4 Days Post-Op    Recommendations:     Discharge Recommendations: Low Intensity Therapy  Discharge Equipment Recommendations:  none  Barriers to discharge:  None    Assessment:     Timothy Galdamez is a 68 y.o. male with a medical diagnosis of Tongue cancer.  He presents with the following performance deficits affecting function are weakness, impaired endurance, impaired self care skills, impaired functional mobility, gait instability, impaired balance, impaired cardiopulmonary response to activity, impaired skin. Pt tolerated session fairly well, which focused on improving functional endurance and ADL participation at the sink. He was able to demo improved mobility to sink, however required sitting rest break 2/2 feeling "woozy," which subsided with seated rest. Pt educated on sitting with feet down in bedside chair as opposed to reclined for improving OOB tolerance. Pt would benefit from continued skilled acute OT services in order to maximize (I) and with ADLs and functional mobility to ensure safe return to PLOF in the least restrictive environment. OT recommending low intensity therapy once pt is medically appropriate for d/c.       Rehab Prognosis:  Good; patient would benefit from acute skilled OT services to address these deficits and reach maximum level of function.       Plan:     Patient to be seen 4 x/week to address the above listed problems via self-care/home management, therapeutic activities, therapeutic exercises  Plan of Care Expires: 03/10/24  Plan of Care Reviewed with: patient, spouse    Subjective     Chief Complaint: None stated   Patient/Family Comments/goals: return to PLOF   Pain/Comfort:  Pain Rating 1: 0/10  Pain Rating Post-Intervention 1: 0/10    Objective:     Communicated with: RN prior to session.  Patient found up in chair with blood pressure cuff, telemetry, pulse ox " (continuous), Tracheostomy, [place] catheter, oxygen upon OT entry to room.    General Precautions: Standard, fall    Orthopedic Precautions:N/A  Braces: N/A  Respiratory Status:  trach- 5L     Occupational Performance:     Bed Mobility:    Pt found and left up in bedside chair      Functional Mobility/Transfers:  Patient completed x2 Sit <> Stand Transfer with contact guard assistance  with  no assistive device   Functional Mobility: Pt participated in room mobility to simulate home and community distances for 10 ft with standing break at sink, then requested to sink down. Mobilized another 45ft after with CGA and no AD.     Activities of Daily Living:  Feeding:  dependence via G tube    Grooming: stand by assistance for facial hygiene, set up assistance for oral hygiene (secretion management)   Toileting: pt with [place] catheter; no further needs at this time        [facility] 6 Click ADL: 12    Treatment & Education:  Pt utilized the white board to communicate in session    Pt and spouse educated on:   Role of OT, POC, and d/c planning.   Safe transfer techniques and proper body mechanics for fall prevention and improved independence with functional transfers   Importance of OOB activities to increase endurance and tolerance for increased participation in daily ADLs.   Utilizing the call bell to request for assistance with all functional mobility to ensure safety during hospital stay.      Pt and spouse demo understanding and all questions were addressed within the scope of OT.    Patient left up in chair with all lines intact, call button in reach, and RN and spouse present    GOALS:   Multidisciplinary Problems       Occupational Therapy Goals          Problem: Occupational Therapy    Goal Priority Disciplines Outcome Interventions   Occupational Therapy Goal     OT, PT/OT Ongoing, Progressing    Description: Goals to be met by: 3/10/24     Patient will increase functional independence with ADLs by  performing:    Feeding with [place].  UE Dressing with Set-up Assistance.  LE Dressing with Set-up Assistance.  Grooming while standing at sink with Modified [place].  Toileting from toilet with Modified [place] for hygiene and clothing management.   Toilet transfer to toilet with Modified [place].                         Time Tracking:     OT Date of Treatment: 02/27/24  OT Start Time: 1348  OT Stop Time: 1405  OT Total Time (min): 17 min    Billable Minutes:Self Care/Home Management 17    OT/DESHAUN: OT          2/27/2024

## 2024-02-27 NOTE — PT/OT/SLP PROGRESS
Physical Therapy   Progress Note    Patient Name:  Timothy Galdamez  MRN: [id number]    Admit Date: 2/23/2024  Admitting Diagnosis:  Tongue cancer  Length of Stay: 4 days  Recent Surgery: Procedure(s) (LRB):  GLOSSECTOMY (Right)  DISSECTION, NECK (Bilateral)  TRACHEOTOMY (N/A)  CREATION, FLAP, MUSCLE ROTATION (N/A)  INSERTION, PEG TUBE (Right) 4 Days Post-Op    Recommendations:     Discharge Recommendations: low intensity therapy  Equipment recommendations: none  Barriers to discharge: None Identified     Assessment:     Timothy Galdamez is a 68 y.o. male admitted to [facility] on [place]/23/2024 with medical diagnosis of Tongue cancer. Pt presents with weakness, impaired endurance, impaired self care skills, impaired functional mobility, gait instability, impaired balance, impaired cardiopulmonary response to activity. Pt is progressing towards goals, but has not yet reached prior level of function.     Pt agreeable to therapy session with wife in room; sitting up in recliner. Pt able to stand and ambulate to sink. At sink, pt performed ADLs and then requested to sink d/t feeling woozy. Pt indicated that he wanted to try walking around room again. Able to stand and ambulate 2 laps around room. Left up in recliner with all VSS and nsg present.    Timothy Galdamez would benefit from continued acute PT intervention to improve quality of life, focus on recovery of impairments, provide patient/caregiver education, reduce fall risk, and maximize (I) and safety with functional mobility. Once medically stable, recommending pt discharge to no therapy indicated.      Rehab Prognosis: Good    Plan:     During this hospitalization, patient to be seen 4 x/week to address the identified rehab impairments via gait training, therapeutic activities, therapeutic exercises, neuromuscular re-education and progress towards stated goals.     Plan of Care Expires:  03/26/24  Plan of Care reviewed with: patient and spouse    This plan of care has been discussed  "with the patient/caregiver, who was included in its development and is in agreement with the identified goals and treatment plan.     Subjective     Communicated with RN prior to session.  Patient found up in chair upon PT entry to room, agreeable to therapy session. Pt's wife present during session.    Patient/Family Comments/goals: "How far do you want to walk" - written on board    Pain/Comfort:  Pain Rating 1: 0/10  Pain Rating Post-Intervention 1: 0/10    Patients cultural, spiritual, Spiritism conflicts given the current situation: None identified     Objective:   OT present for cotreat due to pt's multiple medical comorbidities and functional/cognition deficits requiring two skilled therapists to appropriately progress pt's musculoskeletal strength, neuromuscular control, and endurance while taking into consideration medical acuity and pt safety.    Patient found with: blood pressure cuff, telemetry, pulse ox (continuous), PEG Tube, Tracheostomy, oxygen, [place] catheter    General Precautions: Standard, fall   Orthopedic Precautions:    Braces:     Oxygen Device: trach collar 5L/28%    Cognition:  Pt is Alert during session.    Therapist provided skilled verbal and tactile cueing to facilitate the following functional mobility tasks. Listed tasks are focused on recovery of impairments and improving pt's independence and efficiency with bed mobility, transfers and ambulation as able.     Bed Mobility:  Not assessed d/t up in chair    Transfers:   Sit <> Stand Transfer: Contact Guard Assistance from recliner with no AD x2 trials                 Gait:  Distance: 10 ft + 45 ft  Assistance level: Contact Guard Assistance  Assistive Device: none  Gait Assessment: decreased step length , decreased benjy, and reduced reciprocal arm swing     Balance:  Dynamic Sitting: GOOD: Maintains balance through MODERATE excursions of active trunk movement  Standing:  Static: FAIR+: Takes MINIMAL challenges from all directions "   Dynamic: FAIR: Needs CONTACT GUARD during gait    Outcome Measure: AM-PAC 6 CLICK MOBILITY  Total Score:18     Patient/Caregiver Education and Additional Therapeutic Activities/Exercises       Provided pt/caregiver education regarding:   PT POC and goals for therapy   Safety with mobility and fall risk   Safe management of AD as needed   Energy conservation techniques   Instruction on use of call button and importance of calling nursing staff for assistance with mobility     Patient/caregiver able to verbalize understanding; will follow-up with pt/caregiver during current admit for additional questions/concerns within scope of practice.     White board updated.     Patient left up in chair with all lines intact, call button in reach, and nsg present.    Goals:     Multidisciplinary Problems       Physical Therapy Goals          Problem: Physical Therapy    Goal Priority Disciplines Outcome Goal Variances Interventions   Physical Therapy Goal     PT, PT/OT Ongoing, Progressing     Description: Goals to be met by: 3/26/24     Patient will increase functional independence with mobility by performin. Supine to sit with [place]  2. Sit to supine with [place]  3. Sit to stand transfer with [place]  4. Bed to chair transfer with [place] using No Assistive Device  5. Gait  x 250 feet with [place] using No Assistive Device.                          Time Tracking:       PT Received On: 24  PT Start Time: 1348     PT Stop Time: 1405  PT Total Time (min): 17 min     Billable Minutes: Gait Training 17    2024

## 2024-02-28 LAB
ANION GAP SERPL CALC-SCNC: 7 MMOL/L (ref 8–16)
BUN SERPL-MCNC: 19 MG/DL (ref 8–23)
CALCIUM SERPL-MCNC: 8.1 MG/DL (ref 8.7–10.5)
CHLORIDE SERPL-SCNC: 109 MMOL/L (ref 95–110)
CO2 SERPL-SCNC: 26 MMOL/L (ref 23–29)
CREAT SERPL-MCNC: 0.7 MG/DL (ref 0.5–1.4)
ERYTHROCYTE [DISTWIDTH] IN BLOOD BY AUTOMATED COUNT: 13 % (ref 11.5–14.5)
EST. GFR  (NO RACE VARIABLE): >60 ML/MIN/1.73 M^2
GLUCOSE SERPL-MCNC: 106 MG/DL (ref 70–110)
HCT VFR BLD AUTO: 20.9 % (ref 40–54)
HGB BLD-MCNC: 7 G/DL (ref 14–18)
MAGNESIUM SERPL-MCNC: 2 MG/DL (ref 1.6–2.6)
MCH RBC QN AUTO: 31.7 PG (ref 27–31)
MCHC RBC AUTO-ENTMCNC: 33.5 G/DL (ref 32–36)
MCV RBC AUTO: 95 FL (ref 82–98)
PHOSPHATE SERPL-MCNC: 3.2 MG/DL (ref 2.7–4.5)
PLATELET # BLD AUTO: 195 K/UL (ref 150–450)
PMV BLD AUTO: 10.9 FL (ref 9.2–12.9)
POTASSIUM SERPL-SCNC: 3.9 MMOL/L (ref 3.5–5.1)
RBC # BLD AUTO: 2.21 M/UL (ref 4.6–6.2)
SODIUM SERPL-SCNC: 142 MMOL/L (ref 136–145)
WBC # BLD AUTO: 5.55 K/UL (ref 3.9–12.7)

## 2024-02-28 PROCEDURE — 27201112

## 2024-02-28 PROCEDURE — 25000003 PHARM REV CODE 250: Performed by: STUDENT IN AN ORGANIZED HEALTH CARE EDUCATION/TRAINING PROGRAM

## 2024-02-28 PROCEDURE — 99900035 HC TECH TIME PER 15 MIN (STAT)

## 2024-02-28 PROCEDURE — 85027 COMPLETE CBC AUTOMATED: CPT | Performed by: STUDENT IN AN ORGANIZED HEALTH CARE EDUCATION/TRAINING PROGRAM

## 2024-02-28 PROCEDURE — 63600175 PHARM REV CODE 636 W HCPCS

## 2024-02-28 PROCEDURE — C9113 INJ PANTOPRAZOLE SODIUM, VIA: HCPCS

## 2024-02-28 PROCEDURE — 63600175 PHARM REV CODE 636 W HCPCS: Performed by: STUDENT IN AN ORGANIZED HEALTH CARE EDUCATION/TRAINING PROGRAM

## 2024-02-28 PROCEDURE — 80048 BASIC METABOLIC PNL TOTAL CA: CPT | Performed by: STUDENT IN AN ORGANIZED HEALTH CARE EDUCATION/TRAINING PROGRAM

## 2024-02-28 PROCEDURE — 25000003 PHARM REV CODE 250

## 2024-02-28 PROCEDURE — 20600001 HC STEP DOWN PRIVATE ROOM

## 2024-02-28 PROCEDURE — 83735 ASSAY OF MAGNESIUM: CPT | Performed by: STUDENT IN AN ORGANIZED HEALTH CARE EDUCATION/TRAINING PROGRAM

## 2024-02-28 PROCEDURE — 84100 ASSAY OF PHOSPHORUS: CPT | Performed by: STUDENT IN AN ORGANIZED HEALTH CARE EDUCATION/TRAINING PROGRAM

## 2024-02-28 PROCEDURE — 99900026 HC AIRWAY MAINTENANCE (STAT)

## 2024-02-28 PROCEDURE — 27200966 HC CLOSED SUCTION SYSTEM

## 2024-02-28 PROCEDURE — 94761 N-INVAS EAR/PLS OXIMETRY MLT: CPT

## 2024-02-28 PROCEDURE — 27000221 HC OXYGEN, UP TO 24 HOURS

## 2024-02-28 RX ORDER — HYDROCORTISONE 1 %
CREAM (GRAM) TOPICAL 2 TIMES DAILY
Status: DISCONTINUED | OUTPATIENT
Start: 2024-02-28 | End: 2024-03-08 | Stop reason: HOSPADM

## 2024-02-28 RX ORDER — SENNOSIDES 8.8 MG/5ML
5 LIQUID ORAL 2 TIMES DAILY
Status: DISCONTINUED | OUTPATIENT
Start: 2024-02-28 | End: 2024-02-28

## 2024-02-28 RX ORDER — AMOXICILLIN 250 MG
1 CAPSULE ORAL 2 TIMES DAILY
Status: DISCONTINUED | OUTPATIENT
Start: 2024-02-28 | End: 2024-03-08 | Stop reason: HOSPADM

## 2024-02-28 RX ORDER — ACETAMINOPHEN 650 MG/20.3ML
650 LIQUID ORAL EVERY 6 HOURS PRN
Status: DISCONTINUED | OUTPATIENT
Start: 2024-02-28 | End: 2024-03-08 | Stop reason: HOSPADM

## 2024-02-28 RX ADMIN — ASPIRIN 81 MG CHEWABLE TABLET 81 MG: 81 TABLET CHEWABLE at 09:02

## 2024-02-28 RX ADMIN — SENNOSIDES AND DOCUSATE SODIUM 1 TABLET: 8.6; 5 TABLET ORAL at 09:02

## 2024-02-28 RX ADMIN — AMLODIPINE BESYLATE 2.5 MG: 10 TABLET ORAL at 09:02

## 2024-02-28 RX ADMIN — ATORVASTATIN CALCIUM 80 MG: 40 TABLET, FILM COATED ORAL at 09:02

## 2024-02-28 RX ADMIN — MUPIROCIN: 20 OINTMENT TOPICAL at 08:02

## 2024-02-28 RX ADMIN — THERA TABS 1 TABLET: TAB at 09:02

## 2024-02-28 RX ADMIN — HYDROCORTISONE: 1 CREAM TOPICAL at 09:02

## 2024-02-28 RX ADMIN — IBUPROFEN 400 MG: 400 TABLET ORAL at 05:02

## 2024-02-28 RX ADMIN — ENOXAPARIN SODIUM 40 MG: 40 INJECTION SUBCUTANEOUS at 05:02

## 2024-02-28 RX ADMIN — SILODOSIN 4 MG: 4 CAPSULE ORAL at 09:02

## 2024-02-28 RX ADMIN — AMPICILLIN SODIUM AND SULBACTAM SODIUM 3 G: 2; 1 INJECTION, POWDER, FOR SOLUTION INTRAMUSCULAR; INTRAVENOUS at 12:02

## 2024-02-28 RX ADMIN — SENNOSIDES AND DOCUSATE SODIUM 1 TABLET: 8.6; 5 TABLET ORAL at 08:02

## 2024-02-28 RX ADMIN — IBUPROFEN 400 MG: 400 TABLET ORAL at 12:02

## 2024-02-28 RX ADMIN — ACETAMINOPHEN 650 MG: 650 SOLUTION ORAL at 12:02

## 2024-02-28 RX ADMIN — ACETAMINOPHEN 650 MG: 650 SOLUTION ORAL at 05:02

## 2024-02-28 RX ADMIN — IBUPROFEN 400 MG: 400 TABLET ORAL at 06:02

## 2024-02-28 RX ADMIN — AMPICILLIN SODIUM AND SULBACTAM SODIUM 3 G: 2; 1 INJECTION, POWDER, FOR SOLUTION INTRAMUSCULAR; INTRAVENOUS at 01:02

## 2024-02-28 RX ADMIN — PANTOPRAZOLE SODIUM 40 MG: 40 INJECTION, POWDER, FOR SOLUTION INTRAVENOUS at 09:02

## 2024-02-28 RX ADMIN — MUPIROCIN: 20 OINTMENT TOPICAL at 09:02

## 2024-02-28 RX ADMIN — AMPICILLIN SODIUM AND SULBACTAM SODIUM 3 G: 2; 1 INJECTION, POWDER, FOR SOLUTION INTRAMUSCULAR; INTRAVENOUS at 05:02

## 2024-02-28 RX ADMIN — POLYETHYLENE GLYCOL 3350 17 G: 17 POWDER, FOR SOLUTION ORAL at 09:02

## 2024-02-28 RX ADMIN — Medication 6 MG: at 10:02

## 2024-02-28 RX ADMIN — Medication 100 MG: at 09:02

## 2024-02-28 RX ADMIN — EZETIMIBE 10 MG: 10 TABLET ORAL at 09:02

## 2024-02-28 RX ADMIN — Medication 6 MG: at 01:02

## 2024-02-28 RX ADMIN — POTASSIUM CHLORIDE 40 MEQ: 7.46 INJECTION, SOLUTION INTRAVENOUS at 04:02

## 2024-02-28 NOTE — PROGRESS NOTES
[facility] - Surgical Intensive Care  Otorhinolaryngology-Head & Neck Surgery  Progress Note    Subjective:     Post-Op Info:  Procedure(s) (LRB):  GLOSSECTOMY (Right)  DISSECTION, NECK (Bilateral)  TRACHEOTOMY (N/A)  CREATION, FLAP, MUSCLE ROTATION (N/A)  INSERTION, PEG TUBE (Right)   5 Days Post-Op  Hospital Day: 6     Interval History: NAEON.    Medications:  Continuous Infusions:      Scheduled Meds:   acetaminophen  650 mg Per G Tube Q6H    amLODIPine  2.5 mg Per G Tube Daily    ampicillin-sulbactam  3 g Intravenous Q6H    aspirin  81 mg Per G Tube Daily    atorvastatin  80 mg Per G Tube Daily    enoxparin  40 mg Subcutaneous Daily    ezetimibe  10 mg Per G Tube Daily    ibuprofen  400 mg Per G Tube Q6H    multivitamin  1 tablet Per G Tube Daily    mupirocin   Nasal BID    pantoprazole  40 mg Intravenous Daily    polyethylene glycol  17 g Per G Tube Daily    silodosin  4 mg Per G Tube Daily    thiamine  100 mg Per G Tube Daily     PRN Meds:calcium gluconate IVPB, calcium gluconate IVPB, calcium gluconate IVPB, LORazepam, magnesium sulfate IVPB, magnesium sulfate IVPB, melatonin, ondansetron, oxyCODONE, potassium chloride **AND** potassium chloride **AND** potassium chloride, sodium chloride 0.9%, sodium phosphate 15 mmol in dextrose 5 % (D5W) 250 mL IVPB, sodium phosphate 20.01 mmol in dextrose 5 % (D5W) 250 mL IVPB, sodium phosphate 30 mmol in dextrose 5 % (D5W) 250 mL IVPB     Review of patient's allergies indicates:  No Known Allergies  Objective:     Vital Signs (24h Range):  Temp:  [97.5 °F (36.4 °C)-98.3 °F (36.8 °C)] 98.3 °F (36.8 °C)  Pulse:  [] 86  Resp:  [8-39] 39  SpO2:  [97 %-100 %] 98 %  BP: (103-141)/(55-82) 119/82         Lines/Drains/Airways       Drain  Duration                  Closed/Suction Drain 02/23/24 1333 Tube - 1 Right;Lateral Chest Bulb 15 Fr. 4 days         Closed/Suction Drain 02/23/24 1338 Tube - 2 Right;Medial Chest Bulb 15 Fr. 4 days         Closed/Suction Drain 02/23/24 1414  Tube - 3 Left;Anterior Neck Bulb 15 Fr. 4 days         Closed/Suction Drain 02/23/24 1445 Tube - 4 Right;Anterior Neck Bulb 15 Fr. 4 days         Gastrostomy/Enterostomy 02/23/24 0750 Percutaneous endoscopic gastrostomy (PEG) LUQ feeding 4 days              Airway  Duration             Adult Surgical Airway 02/23/24 1504 Shiley Cuffed 6.0/ 75mm 4 days              Peripheral Intravenous Line  Duration                  Peripheral IV - Single Lumen 02/23/24 0555 18 G Left;Posterior Forearm 5 days         Peripheral IV - Single Lumen 02/23/24 0739 18 G Left;Posterior Forearm 4 days         Peripheral IV - Single Lumen 02/28/24 0345 20 G Anterior;Right Wrist <1 day                    Physical Exam  NAD, nods head to answer questions  Native tongue edematous/congested and completely occluding oral cavity, cannot visualize oropharynx; stable  No bleeding from nose  6-0 cuffed tracheostomy in place, cuff deflated, small amount of bloody secretions/clot  2 neck MARU in place with serosanguinous drainage  Neck is soft  Neck incision intact with staples  Chest incision intact with staples  Chest is soft, right bulk tunneling to neck 2/2 pec flap  2 MARU in chest with serosanguinous drainage     Significant Labs:  CBC:   Recent Labs   Lab 02/28/24  0255   WBC 5.55   RBC 2.21*   HGB 7.0*   HCT 20.9*      MCV 95   MCH 31.7*   MCHC 33.5       CMP:   Recent Labs   Lab 02/26/24  0350 02/27/24  0510 02/28/24  0255   GLU 97   < > 106   CALCIUM 7.6*   < > 8.1*   ALBUMIN 2.0*  --   --    PROT 4.3*  --   --       < > 142   K 3.4*   < > 3.9   CO2 27   < > 26      < > 109   BUN 15   < > 19   CREATININE 0.7   < > 0.7   ALKPHOS 40*  --   --    ALT 19  --   --    AST 25  --   --    BILITOT 0.4  --   --     < > = values in this interval not displayed.         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 He had bleeding from the oral cavity post-op that has overall improved. Hgb stabilized. Will continue to monitor closely for now and transfuse as needed.    ENT   - Keep JPs in place to neck and chest  - Of note, patient cannot be intubated by mouth 2/2 tongue edema from surgery, must maintain tracheostomy    Neuro  - Multimodal pain control    CV  - Currently HDS    Resp  - On trach collar  - Routine trach care  - Will plan to exchange to cuffless trach on 3/1/24    FEN/GI  - NPO  - Ok to slowly advance tube feeds to goal by 10 cc q6h but hold for ANY nausea/vomiting/fullness    Renal  - Cr stable    Heme/ID   - Unasyn for post-op prophylaxis  - Monitor H/H, 7.0/20.9 today     MSK   - PT/OT/OOB    Ppx: L40    Dispo: ok for step down to GISSU only          Buck Nelson MD  Otorhinolaryngology-Head & Neck Surgery  [facility] - Surgical Intensive Care

## 2024-02-28 NOTE — ASSESSMENT & PLAN NOTE
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 He had bleeding from the oral cavity post-op that has overall improved. Hgb stabilized. Will continue to monitor closely for now and transfuse as needed.    ENT   - Keep JPs in place to neck and chest  - Of note, patient cannot be intubated by mouth 2/2 tongue edema from surgery, must maintain tracheostomy    Neuro  - Multimodal pain control    CV  - Currently HDS    Resp  - On trach collar  - Routine trach care  - Will plan to exchange to cuffless trach on 3/1/24    FEN/GI  - NPO  - Ok to slowly advance tube feeds to goal by 10 cc q6h but hold for ANY nausea/vomiting/fullness    Renal  - Cr stable    Heme/ID   - Unasyn for post-op prophylaxis  - Monitor H/H, 7.0/20.9 today     MSK   - PT/OT/OOB    Ppx: L40    Dispo: ok for step down to GISSU only

## 2024-02-28 NOTE — PLAN OF CARE
SICU PLAN OF CARE    Dx: Tongue cancer    Goals of Care: MAP >65    Vital Signs (last 12 hours):   Temp:  [97.7 °F (36.5 °C)-98.3 °F (36.8 °C)]   Pulse:  [68-94]   Resp:  [11-29]   BP: (103-139)/(59-73)   SpO2:  [97 %-100 %]      Neuro: AAOx4, Follows commands , and Moves all extremities spontaneously     Cardiac: NSR    Respiratory: Tracheostomy Collar; 5L, 28% FiO2    Gtts: n/a    Urine Output: Urethral Catheter  740 mL/shift    Drains: MARU #1, total output 10mL /shift, MARU #2, total output 20mL/shift, MARU #3, total output 8mL/shift, MARU #4, total output 20mL/shift    Diet: Tube Feeds at 50mL/hour     Labs/Accuchecks: all labs trended and reviewed     Skin:  All skin remains free from new injury, bilateral neck incision-stapes intact, pectoralis flap-DESHAUN, staples intact, foams in place, patient changes position independently, mattress inflated, and bed working correctly.    Shift Events:  Plan of care ongoing, VSS, no s/s of distress, bed in lowest position, side rails x2, call light within reach. See flowsheet for further assessment/details. Family updated on current condition/plan of care, questions answered, and emotional support provided.  MD updated on current condition, vitals, labs, and gtts.

## 2024-02-28 NOTE — SUBJECTIVE & OBJECTIVE
Interval History: NAEON.    Medications:  Continuous Infusions:      Scheduled Meds:   acetaminophen  650 mg Per G Tube Q6H    amLODIPine  2.5 mg Per G Tube Daily    ampicillin-sulbactam  3 g Intravenous Q6H    aspirin  81 mg Per G Tube Daily    atorvastatin  80 mg Per G Tube Daily    enoxparin  40 mg Subcutaneous Daily    ezetimibe  10 mg Per G Tube Daily    ibuprofen  400 mg Per G Tube Q6H    multivitamin  1 tablet Per G Tube Daily    mupirocin   Nasal BID    pantoprazole  40 mg Intravenous Daily    polyethylene glycol  17 g Per G Tube Daily    silodosin  4 mg Per G Tube Daily    thiamine  100 mg Per G Tube Daily     PRN Meds:calcium gluconate IVPB, calcium gluconate IVPB, calcium gluconate IVPB, LORazepam, magnesium sulfate IVPB, magnesium sulfate IVPB, melatonin, ondansetron, oxyCODONE, potassium chloride **AND** potassium chloride **AND** potassium chloride, sodium chloride 0.9%, sodium phosphate 15 mmol in dextrose 5 % (D5W) 250 mL IVPB, sodium phosphate 20.01 mmol in dextrose 5 % (D5W) 250 mL IVPB, sodium phosphate 30 mmol in dextrose 5 % (D5W) 250 mL IVPB     Review of patient's allergies indicates:  No Known Allergies  Objective:     Vital Signs (24h Range):  Temp:  [97.5 °F (36.4 °C)-98.3 °F (36.8 °C)] 98.3 °F (36.8 °C)  Pulse:  [] 86  Resp:  [8-39] 39  SpO2:  [97 %-100 %] 98 %  BP: (103-141)/(55-82) 119/82         Lines/Drains/Airways       Drain  Duration                  Closed/Suction Drain 02/23/24 1333 Tube - 1 Right;Lateral Chest Bulb 15 Fr. 4 days         Closed/Suction Drain 02/23/24 1338 Tube - 2 Right;Medial Chest Bulb 15 Fr. 4 days         Closed/Suction Drain 02/23/24 1414 Tube - 3 Left;Anterior Neck Bulb 15 Fr. 4 days         Closed/Suction Drain 02/23/24 1445 Tube - 4 Right;Anterior Neck Bulb 15 Fr. 4 days         Gastrostomy/Enterostomy 02/23/24 0750 Percutaneous endoscopic gastrostomy (PEG) LUQ feeding 4 days              Airway  Duration             Adult Surgical Airway 02/23/24  1504 Shiley Cuffed 6.0/ 75mm 4 days              Peripheral Intravenous Line  Duration                  Peripheral IV - Single Lumen 02/23/24 0555 18 G Left;Posterior Forearm 5 days         Peripheral IV - Single Lumen 02/23/24 0739 18 G Left;Posterior Forearm 4 days         Peripheral IV - Single Lumen 02/28/24 0345 20 G Anterior;Right Wrist <1 day                     Physical Exam  NAD, nods head to answer questions  Native tongue edematous/congested and completely occluding oral cavity, cannot visualize oropharynx; stable  No bleeding from nose  6-0 cuffed tracheostomy in place, cuff deflated, small amount of bloody secretions/clot  2 neck MRAU in place with serosanguinous drainage  Neck is soft  Neck incision intact with staples  Chest incision intact with staples  Chest is soft, right bulk tunneling to neck 2/2 pec flap  2 MARU in chest with serosanguinous drainage     Significant Labs:  CBC:   Recent Labs   Lab 02/28/24  0255   WBC 5.55   RBC 2.21*   HGB 7.0*   HCT 20.9*      MCV 95   MCH 31.7*   MCHC 33.5       CMP:   Recent Labs   Lab 02/26/24  0350 02/27/24  0510 02/28/24  0255   GLU 97   < > 106   CALCIUM 7.6*   < > 8.1*   ALBUMIN 2.0*  --   --    PROT 4.3*  --   --       < > 142   K 3.4*   < > 3.9   CO2 27   < > 26      < > 109   BUN 15   < > 19   CREATININE 0.7   < > 0.7   ALKPHOS 40*  --   --    ALT 19  --   --    AST 25  --   --    BILITOT 0.4  --   --     < > = values in this interval not displayed.         Significant Diagnostics:  None

## 2024-02-28 NOTE — NURSING
Pt AAOx4, vital signs stable, no acute distress and/or changes noted in assessment; report given to RN Skylar NEWBY, orders, and plan of care reviewed; pt transported via wheelchair, on continuous O2 monitoring with RT; chart and belongings sent with pt, pt's wife at bedside, pt care transferred to receiving nurse.

## 2024-02-28 NOTE — NURSING
Notified SICU team that patient's  h/h is 7.0/20.9. No new orders at this time. VSS. Plan of care ongoing.

## 2024-02-28 NOTE — NURSING
Nurses Note -- 4 Eyes      2/28/2024   1:45 PM      Skin assessed during: Transfer      [] No Altered Skin Integrity Present    []Prevention Measures Documented      [x] Yes- Altered Skin Integrity Present or Discovered   [x] LDA Added if Not in Epic (See Skin Flowsheet and LDA)   [] New Altered Skin Integrity was Present on Admit and Documented in LDA   [] Wound Image Taken    Wound Care Consulted? No    Attending Nurse:  Elizabeth Casper RN/Staff Member:  DAMON Courtney

## 2024-02-29 ENCOUNTER — PATIENT MESSAGE (OUTPATIENT)
Dept: OTOLARYNGOLOGY | Facility: CLINIC | Age: 69
End: 2024-02-29
Payer: MEDICARE

## 2024-02-29 LAB
ANION GAP SERPL CALC-SCNC: 7 MMOL/L (ref 8–16)
BUN SERPL-MCNC: 20 MG/DL (ref 8–23)
CALCIUM SERPL-MCNC: 8.4 MG/DL (ref 8.7–10.5)
CHLORIDE SERPL-SCNC: 107 MMOL/L (ref 95–110)
CO2 SERPL-SCNC: 25 MMOL/L (ref 23–29)
CREAT SERPL-MCNC: 0.7 MG/DL (ref 0.5–1.4)
ERYTHROCYTE [DISTWIDTH] IN BLOOD BY AUTOMATED COUNT: 13.5 % (ref 11.5–14.5)
EST. GFR  (NO RACE VARIABLE): >60 ML/MIN/1.73 M^2
GLUCOSE SERPL-MCNC: 122 MG/DL (ref 70–110)
HCT VFR BLD AUTO: 22.8 % (ref 40–54)
HGB BLD-MCNC: 7.9 G/DL (ref 14–18)
MAGNESIUM SERPL-MCNC: 1.9 MG/DL (ref 1.6–2.6)
MCH RBC QN AUTO: 32.2 PG (ref 27–31)
MCHC RBC AUTO-ENTMCNC: 34.6 G/DL (ref 32–36)
MCV RBC AUTO: 93 FL (ref 82–98)
PHOSPHATE SERPL-MCNC: 2.8 MG/DL (ref 2.7–4.5)
PLATELET # BLD AUTO: 235 K/UL (ref 150–450)
PMV BLD AUTO: 10.9 FL (ref 9.2–12.9)
POTASSIUM SERPL-SCNC: 3.7 MMOL/L (ref 3.5–5.1)
RBC # BLD AUTO: 2.45 M/UL (ref 4.6–6.2)
SODIUM SERPL-SCNC: 139 MMOL/L (ref 136–145)
WBC # BLD AUTO: 7.53 K/UL (ref 3.9–12.7)

## 2024-02-29 PROCEDURE — 99900035 HC TECH TIME PER 15 MIN (STAT)

## 2024-02-29 PROCEDURE — 99900031 HC PATIENT EDUCATION (STAT)

## 2024-02-29 PROCEDURE — 20600001 HC STEP DOWN PRIVATE ROOM

## 2024-02-29 PROCEDURE — 99900026 HC AIRWAY MAINTENANCE (STAT)

## 2024-02-29 PROCEDURE — 83735 ASSAY OF MAGNESIUM: CPT

## 2024-02-29 PROCEDURE — 25000003 PHARM REV CODE 250: Performed by: STUDENT IN AN ORGANIZED HEALTH CARE EDUCATION/TRAINING PROGRAM

## 2024-02-29 PROCEDURE — 25000003 PHARM REV CODE 250

## 2024-02-29 PROCEDURE — C9113 INJ PANTOPRAZOLE SODIUM, VIA: HCPCS

## 2024-02-29 PROCEDURE — 63600175 PHARM REV CODE 636 W HCPCS

## 2024-02-29 PROCEDURE — 36415 COLL VENOUS BLD VENIPUNCTURE: CPT

## 2024-02-29 PROCEDURE — 94761 N-INVAS EAR/PLS OXIMETRY MLT: CPT

## 2024-02-29 PROCEDURE — 27000221 HC OXYGEN, UP TO 24 HOURS

## 2024-02-29 PROCEDURE — 97116 GAIT TRAINING THERAPY: CPT | Mod: CQ

## 2024-02-29 PROCEDURE — 84100 ASSAY OF PHOSPHORUS: CPT

## 2024-02-29 PROCEDURE — 27201112

## 2024-02-29 PROCEDURE — 85027 COMPLETE CBC AUTOMATED: CPT

## 2024-02-29 PROCEDURE — 80048 BASIC METABOLIC PNL TOTAL CA: CPT

## 2024-02-29 RX ADMIN — HYDROCORTISONE: 1 CREAM TOPICAL at 09:02

## 2024-02-29 RX ADMIN — ENOXAPARIN SODIUM 40 MG: 40 INJECTION SUBCUTANEOUS at 05:02

## 2024-02-29 RX ADMIN — HYDROCORTISONE: 1 CREAM TOPICAL at 08:02

## 2024-02-29 RX ADMIN — SENNOSIDES AND DOCUSATE SODIUM 1 TABLET: 8.6; 5 TABLET ORAL at 08:02

## 2024-02-29 RX ADMIN — AMLODIPINE BESYLATE 2.5 MG: 10 TABLET ORAL at 09:02

## 2024-02-29 RX ADMIN — ACETAMINOPHEN 650 MG: 160 SOLUTION ORAL at 01:02

## 2024-02-29 RX ADMIN — ATORVASTATIN CALCIUM 80 MG: 40 TABLET, FILM COATED ORAL at 09:02

## 2024-02-29 RX ADMIN — Medication 100 MG: at 09:02

## 2024-02-29 RX ADMIN — PANTOPRAZOLE SODIUM 40 MG: 40 INJECTION, POWDER, FOR SOLUTION INTRAVENOUS at 09:02

## 2024-02-29 RX ADMIN — POLYETHYLENE GLYCOL 3350 17 G: 17 POWDER, FOR SOLUTION ORAL at 09:02

## 2024-02-29 RX ADMIN — ACETAMINOPHEN 650 MG: 160 SOLUTION ORAL at 09:02

## 2024-02-29 RX ADMIN — MUPIROCIN: 20 OINTMENT TOPICAL at 09:02

## 2024-02-29 RX ADMIN — ASPIRIN 81 MG CHEWABLE TABLET 81 MG: 81 TABLET CHEWABLE at 09:02

## 2024-02-29 RX ADMIN — SENNOSIDES AND DOCUSATE SODIUM 1 TABLET: 8.6; 5 TABLET ORAL at 09:02

## 2024-02-29 RX ADMIN — Medication 6 MG: at 09:02

## 2024-02-29 RX ADMIN — EZETIMIBE 10 MG: 10 TABLET ORAL at 09:02

## 2024-02-29 RX ADMIN — SILODOSIN 4 MG: 4 CAPSULE ORAL at 09:02

## 2024-02-29 RX ADMIN — THERA TABS 1 TABLET: TAB at 09:02

## 2024-02-29 NOTE — PT/OT/SLP PROGRESS
"Physical Therapy Treatment    Patient Name:  Timothy Galdamez   MRN:  [id number]    Recommendations:     Discharge Recommendations: Low Intensity Therapy  Discharge Equipment Recommendations: none  Barriers to discharge: None    Assessment:     Timothy Galdamez is a 68 y.o. male admitted with a medical diagnosis of Tongue cancer.  He presents with the following impairments/functional limitations: weakness, impaired endurance, impaired self care skills, impaired functional mobility, gait instability, impaired balance, impaired cardiopulmonary response to activity Pt tolerated well, pt amb with increased distance and close SBA using IV pole for balance. Pt educated on importance of OOB activity/ TE when able, pt acknowledged and agreed and was able to perform all TE with good tolerance sitting up in BSC. Pt communicating by shaking head yes/no and also with white board. Pt would continue to benefit from skilled PT services to improve overall functional mobility, strength and endurance.    Rehab Prognosis: Good; patient would benefit from acute skilled PT services to address these deficits and reach maximum level of function.    Recent Surgery: Procedure(s) (LRB):  GLOSSECTOMY (Right)  DISSECTION, NECK (Bilateral)  TRACHEOTOMY (N/A)  CREATION, FLAP, MUSCLE ROTATION (N/A)  INSERTION, PEG TUBE (Right) 6 Days Post-Op    Plan:     During this hospitalization, patient to be seen 4 x/week to address the identified rehab impairments via gait training, therapeutic activities, therapeutic exercises, neuromuscular re-education and progress toward the following goals:    Plan of Care Expires:  03/26/24    Subjective     Chief Complaint: N/A  Patient/Family Comments/goals: pt asked "Is it okay to walk the hallways with Della?" - Pt wrote this on white board  Pain/Comfort:  Pain Rating 1:  (pt reported mild pain)  Location - Side 1: Bilateral  Location - Orientation 1: generalized  Location 1: neck  Pain Addressed 1: Reposition, " Distraction, Cessation of Activity  Pain Rating Post-Intervention 1:  (not mentioned again)      Objective:      Patient found  sitting in bedside chair  with telemetry, pulse ox (continuous), PEG Tube, Tracheostomy upon PT entry to room.     General Precautions: Standard, fall  Orthopedic Precautions: N/A  Braces: N/A  Respiratory Status:  Trach Collar 5L     Functional Mobility:  Transfers:     Sit to Stand:  stand by assistance with no AD  Gait: pt amb ~400' SBA with IV pole, occasional unsteady gait when turning, decreased step length/benjy  Balance: pt completed static/dynamic standing balance with no AD SBA      AM-PAC 6 CLICK MOBILITY  Turning over in bed (including adjusting bedclothes, sheets and blankets)?: 3  Sitting down on and standing up from a chair with arms (e.g., wheelchair, bedside commode, etc.): 3  Moving from lying on back to sitting on the side of the bed?: 3  Moving to and from a bed to a chair (including a wheelchair)?: 3  Need to walk in hospital room?: 3  Climbing 3-5 steps with a railing?: 3  Basic Mobility Total Score: 18       Treatment & Education: pt educated on importance of TE/ OOB activity when able/ as tolerated. Pt demonstrates good tolerance to seated TE when sitting in Bedside chair.   Patient educated on role of therapy, goals of session, and benefits of out of bed mobility.   Instructed on use of call button and importance of calling nursing staff for assistance with mobility   Questions/concerns addressed within PTA scope of practice  Pt verbalized understanding.    Patient left  in bedside chair  with all lines intact, call button in reach, and wife present..    GOALS:   Multidisciplinary Problems       Physical Therapy Goals          Problem: Physical Therapy    Goal Priority Disciplines Outcome Goal Variances Interventions   Physical Therapy Goal     PT, PT/OT Ongoing, Progressing     Description: Goals to be met by: 3/26/24     Patient will increase functional  independence with mobility by performin. Supine to sit with [place]  2. Sit to supine with [place]  3. Sit to stand transfer with [place]  4. Bed to chair transfer with [place] using No Assistive Device  5. Gait  x 250 feet with [place] using No Assistive Device.                          Time Tracking:     PT Received On: 24  PT Start Time: 1237     PT Stop Time: 1257  PT Total Time (min): 20 min     Billable Minutes: Gait Training 20    Treatment Type: Treatment  PT/PTA: PTA     Number of PTA visits since last PT visit: 2024

## 2024-02-29 NOTE — SUBJECTIVE & OBJECTIVE
"Interval History: Stepped down to floor. RAND. Says he has felt a little "queezy" since yesterday, but denies nausea or fullness.    Medications:  Continuous Infusions:      Scheduled Meds:   amLODIPine  2.5 mg Per G Tube Daily    aspirin  81 mg Per G Tube Daily    atorvastatin  80 mg Per G Tube Daily    enoxparin  40 mg Subcutaneous Daily    ezetimibe  10 mg Per G Tube Daily    hydrocortisone   Topical (Top) BID    multivitamin  1 tablet Per G Tube Daily    mupirocin   Nasal BID    pantoprazole  40 mg Intravenous Daily    polyethylene glycol  17 g Per G Tube Daily    senna-docusate 8.6-50 mg  1 tablet Per G Tube BID    silodosin  4 mg Per G Tube Daily    thiamine  100 mg Per G Tube Daily     PRN Meds:acetaminophen, melatonin, ondansetron, sodium chloride 0.9%     Review of patient's allergies indicates:  No Known Allergies  Objective:     Vital Signs (24h Range):  Temp:  [97.6 °F (36.4 °C)-98.3 °F (36.8 °C)] 98 °F (36.7 °C)  Pulse:  [] 82  Resp:  [10-20] 16  SpO2:  [94 %-100 %] 95 %  BP: (105-152)/(65-78) 136/78         Lines/Drains/Airways       Drain  Duration                  Closed/Suction Drain 02/23/24 1333 Tube - 1 Right;Lateral Chest Bulb 15 Fr. 5 days         Closed/Suction Drain 02/23/24 1338 Tube - 2 Right;Medial Chest Bulb 15 Fr. 5 days         Closed/Suction Drain 02/23/24 1414 Tube - 3 Left;Anterior Neck Bulb 15 Fr. 5 days         Closed/Suction Drain 02/23/24 1445 Tube - 4 Right;Anterior Neck Bulb 15 Fr. 5 days         Gastrostomy/Enterostomy 02/23/24 0750 Percutaneous endoscopic gastrostomy (PEG) LUQ feeding 5 days              Airway  Duration             Adult Surgical Airway 02/23/24 1504 Shiley Cuffed 6.0/ 75mm 5 days              Peripheral Intravenous Line  Duration                  Peripheral IV - Single Lumen 02/23/24 0555 18 G Left;Posterior Forearm 6 days         Peripheral IV - Single Lumen 02/23/24 0739 18 G Left;Posterior Forearm 5 days                     Physical Exam  NAD, " nods head to answer questions  Native tongue edematous/congested and completely occluding oral cavity, cannot visualize oropharynx; stable  No bleeding from nose  6-0 cuffed tracheostomy in place, cuff deflated, small amount of bloody secretions/clot  2 neck MARU in place with serosanguinous drainage  Neck is soft  Neck incision intact with staples  Chest incision intact with staples  Chest is soft, right bulk tunneling to neck 2/2 pec flap  2 MARU in chest with serosanguinous drainage     Significant Labs:  CBC:   Recent Labs   Lab 02/28/24  0255   WBC 5.55   RBC 2.21*   HGB 7.0*   HCT 20.9*      MCV 95   MCH 31.7*   MCHC 33.5       CMP:   Recent Labs   Lab 02/26/24  0350 02/27/24  0510 02/28/24  0255   GLU 97   < > 106   CALCIUM 7.6*   < > 8.1*   ALBUMIN 2.0*  --   --    PROT 4.3*  --   --       < > 142   K 3.4*   < > 3.9   CO2 27   < > 26      < > 109   BUN 15   < > 19   CREATININE 0.7   < > 0.7   ALKPHOS 40*  --   --    ALT 19  --   --    AST 25  --   --    BILITOT 0.4  --   --     < > = values in this interval not displayed.         Significant Diagnostics:  None

## 2024-02-29 NOTE — CONSULTS
"  [facility] - [facility]  Adult Nutrition  Consult Note    SUMMARY     Recommendations    1.) Discontinue impact peptide 1.4 due to patient extubated    2.) TF RECS  - Continuous: Marsha Herrera Peptide 1.5 @ 50 ml x 24 hours to provide 1846 kcals, 89 g Pro, 840 ml fluid    - Bolus: Marsha Herrera Peptide 1.5 - 4 cans/day to provide 2000 kcals, 96 g PRO, 912 ml fluid w/ additional FWF per MD     3.) Monitor for s/s of intolerance such as residuals >500ml, n/v/d, or abdominal distension.          4.) RD to monitor wt, tolerance, skin, labs, vent settings.        Goals: to meet % of EEN/EPN by next RD f/u  Nutrition Goal Status: progressing towards goal  Communication of RD Recs:  (POC)    Assessment and Plan    Nutrition Problem  Inadequate oral intake     Related to (etiology):   Inability to consume adequate nutrition     Signs and Symptoms (as evidenced by):   NPO, need for EN      Interventions/Recommendations (treatment strategy):  Collaboration of nutritional care with other providers.  EN     Nutrition Diagnosis Status:   Continues    Reason for Assessment    Reason For Assessment: RD follow-up  Diagnosis: cancer diagnosis/related complications (Tongue cancer; S/p glossectomy, tracheotomy, PEG- placed)  Relevant Medical History: HTN, HLD  Interdisciplinary Rounds: did not attend  General Information Comments: RD consulted for bolus Tf Recs. Pt being followed by RD. 6 days post op. Stepped down to [facility]. Continues with TF via PEG tube. Per MD note endorses feeling "queezy" w/o N/V. Per SICU RD: Pt appears nourished at this time, with no visible s/s of malnutrition. NFPE not warranted at this time. RD team to continue to monitor.  Nutrition Discharge Planning: adequate nutritional intake via EN    Nutrition Risk Screen    Nutrition Risk Screen: tube feeding or parenteral nutrition    Nutrition/Diet History    Spiritual, Cultural Beliefs, Yarsanism Practices, Values that Affect Care: no    Anthropometrics    Temp: 98.7 " °F (37.1 °C)  Height: 6' (182.9 cm)  Height (inches): 72 in  Weight Method: Bed Scale  Weight: 80 kg (176 lb 5.9 oz)  Weight (lb): 176.37 lb  Ideal Body Weight (IBW), Male: 178 lb  % Ideal Body Weight, Male (lb): 98.31 %  BMI (Calculated): 23.9       Lab/Procedures/Meds    Pertinent Labs Reviewed: reviewed  Pertinent Labs Comments: H/h: 7.9/22.8, MCH: 32.2, Glu: 122  Pertinent Medications Reviewed: reviewed  Pertinent Medications Comments: Atorvastatin, enoxaparin, mvi, pantoprazole, polyethylene glycol, senna-docusate, thiamine    Estimated/Assessed Needs    Weight Used For Calorie Calculations: 79.4 kg (175 lb 0.7 oz)  Energy Calorie Requirements (kcal): 1588- 1985 kcal  Energy Need Method: Kcal/kg (20-25 kcal/kg)  Protein Requirements: 95- 119g  Weight Used For Protein Calculations: 79.4 kg (175 lb 0.7 oz)  Fluid Requirements (mL): 1ml/1kcal or per MD  Estimated Fluid Requirement Method: RDA Method  RDA Method (mL): 1588         Nutrition Prescription Ordered    Current Diet Order: NPO  Current Nutrition Support Formula Ordered: Impact Peptide 1.5  Current Nutrition Support Rate Ordered: 50 (ml)    Evaluation of Received Nutrient/Fluid Intake    Enteral Calories (kcal): 1800  Enteral Protein (gm): 113  Enteral (Free Water) Fluid (mL): 924  Other Calories (kcal):  (-)  I/O: +3.9 L since admit  Energy Calories Required: meeting needs  Protein Required: meeting needs  Fluid Required:  (as per MD)  Comments: LBM 2/28  % Intake of Estimated Energy Needs: 75 - 100 %  % Meal Intake: NPO    Nutrition Risk    Level of Risk/Frequency of Follow-up:  (RD to f/u x 1-2/week)       Monitor and Evaluation    Food and Nutrient Intake: energy intake, enteral nutrition intake  Food and Nutrient Adminstration: enteral and parenteral nutrition administration  Physical Activity and Function: nutrition-related ADLs and IADLs  Anthropometric Measurements: weight, weight change, body mass index  Biochemical Data, Medical Tests and  Procedures: electrolyte and renal panel, gastrointestinal profile, glucose/endocrine profile, inflammatory profile, lipid profile  Nutrition-Focused Physical Findings: overall appearance, skin       Nutrition Follow-Up    RD Follow-up?: Yes    Mary Moran RD, LDN

## 2024-02-29 NOTE — PROGRESS NOTES
"[facility] - [facility]  Otorhinolaryngology-Head & Neck Surgery  Progress Note    Subjective:     Post-Op Info:  Procedure(s) (LRB):  GLOSSECTOMY (Right)  DISSECTION, NECK (Bilateral)  TRACHEOTOMY (N/A)  CREATION, FLAP, MUSCLE ROTATION (N/A)  INSERTION, PEG TUBE (Right)   6 Days Post-Op  Hospital Day: 7     Interval History: Stepped down to floor. RAND. Says he has felt a little "queezy" since yesterday, but denies nausea or fullness.    Medications:  Continuous Infusions:      Scheduled Meds:   amLODIPine  2.5 mg Per G Tube Daily    aspirin  81 mg Per G Tube Daily    atorvastatin  80 mg Per G Tube Daily    enoxparin  40 mg Subcutaneous Daily    ezetimibe  10 mg Per G Tube Daily    hydrocortisone   Topical (Top) BID    multivitamin  1 tablet Per G Tube Daily    mupirocin   Nasal BID    pantoprazole  40 mg Intravenous Daily    polyethylene glycol  17 g Per G Tube Daily    senna-docusate 8.6-50 mg  1 tablet Per G Tube BID    silodosin  4 mg Per G Tube Daily    thiamine  100 mg Per G Tube Daily     PRN Meds:acetaminophen, melatonin, ondansetron, sodium chloride 0.9%     Review of patient's allergies indicates:  No Known Allergies  Objective:     Vital Signs (24h Range):  Temp:  [97.6 °F (36.4 °C)-98.3 °F (36.8 °C)] 98 °F (36.7 °C)  Pulse:  [] 82  Resp:  [10-20] 16  SpO2:  [94 %-100 %] 95 %  BP: (105-152)/(65-78) 136/78         Lines/Drains/Airways       Drain  Duration                  Closed/Suction Drain 02/23/24 1333 Tube - 1 Right;Lateral Chest Bulb 15 Fr. 5 days         Closed/Suction Drain 02/23/24 1338 Tube - 2 Right;Medial Chest Bulb 15 Fr. 5 days         Closed/Suction Drain 02/23/24 1414 Tube - 3 Left;Anterior Neck Bulb 15 Fr. 5 days         Closed/Suction Drain 02/23/24 1445 Tube - 4 Right;Anterior Neck Bulb 15 Fr. 5 days         Gastrostomy/Enterostomy 02/23/24 0750 Percutaneous endoscopic gastrostomy (PEG) LUQ feeding 5 days              Airway  Duration             Adult Surgical Airway 02/23/24 1504 " Ralph Cuffed 6.0/ 75mm 5 days              Peripheral Intravenous Line  Duration                  Peripheral IV - Single Lumen 02/23/24 0555 18 G Left;Posterior Forearm 6 days         Peripheral IV - Single Lumen 02/23/24 0739 18 G Left;Posterior Forearm 5 days                    Physical Exam  NAD, nods head to answer questions  Native tongue edematous/congested and completely occluding oral cavity, cannot visualize oropharynx; stable  No bleeding from nose  6-0 cuffed tracheostomy in place, cuff deflated, small amount of bloody secretions/clot  2 neck MARU in place with serosanguinous drainage  Neck is soft  Neck incision intact with staples  Chest incision intact with staples  Chest is soft, right bulk tunneling to neck 2/2 pec flap  2 MARU in chest with serosanguinous drainage     Significant Labs:  CBC:   Recent Labs   Lab 02/28/24  0255   WBC 5.55   RBC 2.21*   HGB 7.0*   HCT 20.9*      MCV 95   MCH 31.7*   MCHC 33.5       CMP:   Recent Labs   Lab 02/26/24  0350 02/27/24  0510 02/28/24  0255   GLU 97   < > 106   CALCIUM 7.6*   < > 8.1*   ALBUMIN 2.0*  --   --    PROT 4.3*  --   --       < > 142   K 3.4*   < > 3.9   CO2 27   < > 26      < > 109   BUN 15   < > 19   CREATININE 0.7   < > 0.7   ALKPHOS 40*  --   --    ALT 19  --   --    AST 25  --   --    BILITOT 0.4  --   --     < > = values in this interval not displayed.         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 He had bleeding from the oral cavity post-op that has overall improved. Hgb stabilized. Will continue to monitor closely for now and transfuse as needed.     ENT   - Keep JPs in place to neck and chest  - Of note, patient cannot be intubated by mouth 2/2 tongue edema from surgery, must maintain tracheostomy    Neuro  -  Multimodal pain control    CV  - Currently HDS    Resp  - On trach collar  - Routine trach care  - Will plan to exchange to cuffless trach on 3/1/24    FEN/GI  - NPO  - Ok to slowly advance tube feeds to goal by 10 cc q6h but hold for ANY nausea/vomiting/fullness; will plan to transition to bolus tube feeds pending nutrition recs    Renal  - Cr stable    Heme/ID   - Unasyn for post-op prophylaxis  - Monitor H/H, labs pending    MSK   - PT/OT/OOB    Ppx: L40    Dispo: continue floor care          Buck Nelson MD  Otorhinolaryngology-Head & Neck Surgery  [facility]

## 2024-02-29 NOTE — PLAN OF CARE
Recommendations    1.) Discontinue impact peptide 1.4 due to patient extubated    2.) TF RECS  - Continuous: [email or web address] Peptide 1.5 @ 50 ml x 24 hours to provide 1846 kcals, 89 g Pro, 840 ml fluid    - Bolus: [email or web address] Peptide 1.5 - 4 cans/day to provide 2000 kcals, 96 g PRO, 912 ml fluid w/ additional FWF per MD     3.) Monitor for s/s of intolerance such as residuals >500ml, n/v/d, or abdominal distension.          4.) RD to monitor wt, tolerance, skin, labs, vent settings.        Goals: to meet % of EEN/EPN by next RD f/u  Nutrition Goal Status: progressing towards goal  Communication of RD Recs:  (POC)

## 2024-02-29 NOTE — ASSESSMENT & PLAN NOTE
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 He had bleeding from the oral cavity post-op that has overall improved. Hgb stabilized. Will continue to monitor closely for now and transfuse as needed.     ENT   - Keep JPs in place to neck and chest  - Of note, patient cannot be intubated by mouth 2/2 tongue edema from surgery, must maintain tracheostomy    Neuro  - Multimodal pain control    CV  - Currently HDS    Resp  - On trach collar  - Routine trach care  - Will plan to exchange to cuffless trach on 3/1/24    FEN/GI  - NPO  - Ok to slowly advance tube feeds to goal by 10 cc q6h but hold for ANY nausea/vomiting/fullness; will plan to transition to bolus tube feeds pending nutrition recs    Renal  - Cr stable    Heme/ID   - Unasyn for post-op prophylaxis  - Monitor H/H, labs pending collection today despite order    MSK   - PT/OT/OOB    Ppx: L40    Dispo: continue floor care

## 2024-02-29 NOTE — RESPIRATORY THERAPY
"RAPID RESPONSE RESPIRATORY THERAPY PROACTIVE NOTE           Time of visit: 912      Code Status: Full Code   : 1955  Bed: [facility] A:   MRN: [id number]  Time spent at the bedside: < 15 min    SITUATION    Evaluated patient for: LDA Check     BACKGROUND    Patient has a past medical history of Cancer, Hyperlipidemia, and Hypertension.  Clinically Significant Surgical Hx: tracheostomy    24 Hours Vitals Range:  Temp:  [97.6 °F (36.4 °C)-98.7 °F (37.1 °C)]   Pulse:  []   Resp:  [12-20]   BP: (105-136)/(61-78)   SpO2:  [94 %-100 %]     Labs:    Recent Labs     24  0510 24  0255 24  0705    142 139   K 4.1 3.9 3.7    109 107   CO2 27 26 25   BUN 16 19 20   CREATININE 0.6 0.7 0.7   * 106 122*   PHOS 2.7 3.2 2.8   MG 2.1 2.0 1.9        No results for input(s): "PH", "PCO2", "PO2", "HCO3", "POCSATURATED", "BE" in the last 72 hours.    ASSESSMENT/INTERVENTIONS  Pt resting with no respiratory interventions required at this time.      Last VS   Temp: 98 °F (36.7 °C) ( 1156)  Pulse: 89 ( 1213)  Resp: 18 ( 1213)  BP: 126/61 ( 1156)  SpO2: 96 % ( 1213)      Extra trachs at bedside: y  Level of Consciousness: Level of Consciousness (AVPU): alert  Respiratory Effort: Respiratory Effort: Normal, Unlabored Expansion/Accessory Muscle Usage: Expansion/Accessory Muscles/Retractions: no use of accessory muscles  All Lung Field Breath Sounds: All Lung Fields Breath Sounds: Anterior:, Lateral:, diminished, clear  O2 Device/Concentration:   Surgical airway: Yes, Type: Shiley Size: 6, cuffed  Ambu at bedside:       Active Orders   Respiratory Care    Oxygen Continuous     Frequency: Continuous     Number of Occurrences: Until Specified     Order Questions:      Device type: Low flow      Device: Trach Collar      Titrate O2 per Oxygen Titration Protocol: Yes      To maintain SpO2 goal of: >= 92%      Notify MD of: Inability to achieve desired SpO2; Sudden change in " patient status and requires 20% increase in FiO2; Patient requires >60% FiO2    Pulse Oximetry Continuous     Frequency: Continuous     Number of Occurrences: Until Specified    Routine tracheostomy care     Frequency: BID     Number of Occurrences: Until Specified     Order Comments: Suction prn, exchange inner cannula daily or prn, please tape obturator to head of bed, have extra 6-0 and 4-0 cuffed trached available at bedside, have soft tip suction catheters available at bedside         RECOMMENDATIONS    We recommend: RRT Recs: Continue POC per primary team.      FOLLOW-UP    Please call back the Rapid Response RT, Eder Stapleton RRT at x [phone] for any questions or concerns.

## 2024-02-29 NOTE — NURSING
[facility] Plan of Care Note  Dx Tongue Cancer    Shift Events no respiratory issues, trach care, suctioned, tube feeding tolerated     Goals of Care: airway protection and pain control    Neuro: AAOx4    Vital Signs: VSS    Respiratory: trach @5L, tracheal suction    Diet: tube feeding impact peptide    Is patient tolerating current diet? yes    GTTS: N/A    Urine Output/Bowel Movement: LBM 2/28/24    Drains/Tubes/Tube Feeds (include total output/shift): JPx4    Lines: PIVx3      Accuchecks: N/A    Skin:  bilateral neck incisions, MARU drains    Fall Risk Score: 11    Activity level? Up with assistance    Any scheduled procedures? N/a    Any safety concerns? Airway, falls    Other: n/a

## 2024-02-29 NOTE — PLAN OF CARE
"[facility] - [facility]  Discharge Reassessment    Patient stepped down from SICU. Per MD team, patient is not medically ready for d/c at this time. Patient has a new trach and PEG. Plan is to d/c to home w/home health and home infusion when ready.     HUGO met with patient at bedside to review discharge recommendation of home health and home infusion for TF and is agreeable to plan. HUGO faxed referral for suction machine for home use to [employer] at [phone].     Your fax has been successfully sent to [phone] at [phone].  ------------------------------------------------------------  From: [id number]  ------------------------------------------------------------  2/29/2024 3:16:50 PM Transmission Record   Sent to [phone] with remote ID "   Result: (4/3;0/0) Line broken (no loop current)   Page record: NONE SENT   Elapsed time: 00:03 on channel 54    2/29/2024 3:21:57 PM Transmission Record   Sent to [phone] with remote ID "[id number]"   Result: (0/339;0/0) Success   Page record: 1 - 23   Elapsed time: 08:59 on channel 54    Patient/family provided list of facilities in-network with patient's payor plan. Providers that are owned, operated, or affiliated with [facility] are included on the list.     Notified that referrals were sent to below listed facilities from in-network list based on proximity to home/family support:     1. [facility] Phone: [phone]    2. [facility] Phone: [phone]     Patient/family instructed to identify preference.    Preferred Facility: (if more than 1, listed in order of descending preference)  1.    If an additional preferred facility not listed above is identified, additional referral to be sent. If above facilities unable to accept, will send additional referrals to in-network providers.     Will continue to follow for needs.    Primary Care Provider: Young Lanier MD    Expected Discharge " Date: 3/3/2024    Reassessment (most recent)       Discharge Reassessment - 02/29/24 1054          Discharge Reassessment    Assessment Type Discharge Planning Reassessment     Did the patient's condition or plan change since previous assessment? No     Discharge Plan discussed with: Patient     Communicated AMRIK with patient/caregiver Yes     Discharge Plan A Home with family;Home Health     DME Needed Upon Discharge  suction machine;respiratory supplies;other (see comments)   TBD    Why the patient remains in the hospital Requires continued medical care        Post-Acute Status    Post-Acute Authorization HME;Home Health;IV Infusion (P)      HME Status Referrals Sent (P)      Home Health Status Referrals Sent (P)      Coverage [employer] MANAGED MEDICARE - [employer] MEDICARE HMO - (P)      IV Infusion Status Referral(s) sent (P)      Discharge Delays None known at this time (P)                    Discharge Plan A and Plan B have been determined by review of patient's clinical status, future medical and therapeutic needs, and coverage/benefits for post-acute care in coordination with multidisciplinary team members.    Ladonna Wheeler, RICKEY, LMSW    Case Management Department  [facility]

## 2024-03-01 LAB
ANION GAP SERPL CALC-SCNC: 8 MMOL/L (ref 8–16)
BUN SERPL-MCNC: 22 MG/DL (ref 8–23)
CALCIUM SERPL-MCNC: 8.3 MG/DL (ref 8.7–10.5)
CHLORIDE SERPL-SCNC: 108 MMOL/L (ref 95–110)
CO2 SERPL-SCNC: 23 MMOL/L (ref 23–29)
COMMENT: NORMAL
CREAT SERPL-MCNC: 0.7 MG/DL (ref 0.5–1.4)
ERYTHROCYTE [DISTWIDTH] IN BLOOD BY AUTOMATED COUNT: 13.8 % (ref 11.5–14.5)
EST. GFR  (NO RACE VARIABLE): >60 ML/MIN/1.73 M^2
FINAL PATHOLOGIC DIAGNOSIS: NORMAL
FROZEN SECTION DIAGNOSIS: NORMAL
FROZEN SECTION FOOTNOTE: NORMAL
GLUCOSE SERPL-MCNC: 97 MG/DL (ref 70–110)
GROSS: NORMAL
HCT VFR BLD AUTO: 23.3 % (ref 40–54)
HGB BLD-MCNC: 7.6 G/DL (ref 14–18)
Lab: NORMAL
MAGNESIUM SERPL-MCNC: 2 MG/DL (ref 1.6–2.6)
MCH RBC QN AUTO: 31.1 PG (ref 27–31)
MCHC RBC AUTO-ENTMCNC: 32.6 G/DL (ref 32–36)
MCV RBC AUTO: 96 FL (ref 82–98)
MICROSCOPIC EXAM: NORMAL
PHOSPHATE SERPL-MCNC: 3.6 MG/DL (ref 2.7–4.5)
PLATELET # BLD AUTO: 254 K/UL (ref 150–450)
PMV BLD AUTO: 11.7 FL (ref 9.2–12.9)
POTASSIUM SERPL-SCNC: 3.8 MMOL/L (ref 3.5–5.1)
RBC # BLD AUTO: 2.44 M/UL (ref 4.6–6.2)
SODIUM SERPL-SCNC: 139 MMOL/L (ref 136–145)
WBC # BLD AUTO: 7.27 K/UL (ref 3.9–12.7)

## 2024-03-01 PROCEDURE — 85027 COMPLETE CBC AUTOMATED: CPT

## 2024-03-01 PROCEDURE — 99900035 HC TECH TIME PER 15 MIN (STAT)

## 2024-03-01 PROCEDURE — 25000003 PHARM REV CODE 250

## 2024-03-01 PROCEDURE — 36415 COLL VENOUS BLD VENIPUNCTURE: CPT

## 2024-03-01 PROCEDURE — 97535 SELF CARE MNGMENT TRAINING: CPT | Mod: CO

## 2024-03-01 PROCEDURE — 99900026 HC AIRWAY MAINTENANCE (STAT)

## 2024-03-01 PROCEDURE — 97530 THERAPEUTIC ACTIVITIES: CPT | Mod: CO

## 2024-03-01 PROCEDURE — 92597 ORAL SPEECH DEVICE EVAL: CPT

## 2024-03-01 PROCEDURE — 27200966 HC CLOSED SUCTION SYSTEM

## 2024-03-01 PROCEDURE — 92507 TX SP LANG VOICE COMM INDIV: CPT

## 2024-03-01 PROCEDURE — 83735 ASSAY OF MAGNESIUM: CPT

## 2024-03-01 PROCEDURE — 25000003 PHARM REV CODE 250: Performed by: STUDENT IN AN ORGANIZED HEALTH CARE EDUCATION/TRAINING PROGRAM

## 2024-03-01 PROCEDURE — 20600001 HC STEP DOWN PRIVATE ROOM

## 2024-03-01 PROCEDURE — 80048 BASIC METABOLIC PNL TOTAL CA: CPT

## 2024-03-01 PROCEDURE — 94761 N-INVAS EAR/PLS OXIMETRY MLT: CPT

## 2024-03-01 PROCEDURE — 27000221 HC OXYGEN, UP TO 24 HOURS

## 2024-03-01 PROCEDURE — C9113 INJ PANTOPRAZOLE SODIUM, VIA: HCPCS

## 2024-03-01 PROCEDURE — 0B21XFZ CHANGE TRACHEOSTOMY DEVICE IN TRACHEA, EXTERNAL APPROACH: ICD-10-PCS | Performed by: OTOLARYNGOLOGY

## 2024-03-01 PROCEDURE — 97535 SELF CARE MNGMENT TRAINING: CPT

## 2024-03-01 PROCEDURE — 84100 ASSAY OF PHOSPHORUS: CPT

## 2024-03-01 PROCEDURE — 63600175 PHARM REV CODE 636 W HCPCS

## 2024-03-01 RX ADMIN — HYDROCORTISONE: 1 CREAM TOPICAL at 09:03

## 2024-03-01 RX ADMIN — EZETIMIBE 10 MG: 10 TABLET ORAL at 09:03

## 2024-03-01 RX ADMIN — PANTOPRAZOLE SODIUM 40 MG: 40 INJECTION, POWDER, FOR SOLUTION INTRAVENOUS at 09:03

## 2024-03-01 RX ADMIN — AMLODIPINE BESYLATE 2.5 MG: 10 TABLET ORAL at 09:03

## 2024-03-01 RX ADMIN — ASPIRIN 81 MG CHEWABLE TABLET 81 MG: 81 TABLET CHEWABLE at 09:03

## 2024-03-01 RX ADMIN — THERA TABS 1 TABLET: TAB at 09:03

## 2024-03-01 RX ADMIN — ACETAMINOPHEN 650 MG: 160 SOLUTION ORAL at 02:03

## 2024-03-01 RX ADMIN — Medication 6 MG: at 11:03

## 2024-03-01 RX ADMIN — ENOXAPARIN SODIUM 40 MG: 40 INJECTION SUBCUTANEOUS at 04:03

## 2024-03-01 RX ADMIN — ACETAMINOPHEN 650 MG: 160 SOLUTION ORAL at 11:03

## 2024-03-01 RX ADMIN — SILODOSIN 4 MG: 4 CAPSULE ORAL at 09:03

## 2024-03-01 RX ADMIN — Medication 100 MG: at 09:03

## 2024-03-01 RX ADMIN — ACETAMINOPHEN 650 MG: 160 SOLUTION ORAL at 09:03

## 2024-03-01 RX ADMIN — ATORVASTATIN CALCIUM 80 MG: 40 TABLET, FILM COATED ORAL at 09:03

## 2024-03-01 NOTE — SUBJECTIVE & OBJECTIVE
Interval History: AFVSS. NAEON. Trach changed this am to 6UN75H cuffless, tolerated well. No new complaints.     Medications:  Continuous Infusions:  Scheduled Meds:   amLODIPine  2.5 mg Per G Tube Daily    aspirin  81 mg Per G Tube Daily    atorvastatin  80 mg Per G Tube Daily    enoxparin  40 mg Subcutaneous Daily    ezetimibe  10 mg Per G Tube Daily    hydrocortisone   Topical (Top) BID    multivitamin  1 tablet Per G Tube Daily    pantoprazole  40 mg Intravenous Daily    polyethylene glycol  17 g Per G Tube Daily    senna-docusate 8.6-50 mg  1 tablet Per G Tube BID    silodosin  4 mg Per G Tube Daily    thiamine  100 mg Per G Tube Daily     PRN Meds:acetaminophen, melatonin, ondansetron, sodium chloride 0.9%     Review of patient's allergies indicates:  No Known Allergies  Objective:     Vital Signs (24h Range):  Temp:  [97.7 °F (36.5 °C)-99.1 °F (37.3 °C)] 98.2 °F (36.8 °C)  Pulse:  [] 73  Resp:  [12-19] 19  SpO2:  [92 %-100 %] 92 %  BP: (117-134)/(61-79) 134/79       Lines/Drains/Airways       Drain  Duration                  Closed/Suction Drain 02/23/24 1333 Tube - 1 Right;Lateral Chest Bulb 15 Fr. 6 days         Closed/Suction Drain 02/23/24 1338 Tube - 2 Right;Medial Chest Bulb 15 Fr. 6 days         Closed/Suction Drain 02/23/24 1414 Tube - 3 Left;Anterior Neck Bulb 15 Fr. 6 days         Closed/Suction Drain 02/23/24 1445 Tube - 4 Right;Anterior Neck Bulb 15 Fr. 6 days         Gastrostomy/Enterostomy 02/23/24 0750 Percutaneous endoscopic gastrostomy (PEG) LUQ feeding 6 days              Airway  Duration             Adult Surgical Airway 02/23/24 1504 Shiley Cuffed 6.0/ 75mm 6 days              Peripheral Intravenous Line  Duration                  Peripheral IV - Single Lumen 02/23/24 0555 18 G Left;Posterior Forearm 7 days         Peripheral IV - Single Lumen 02/23/24 0739 18 G Left;Posterior Forearm 6 days                     Physical Exam  NAD, communicates with writing board  Native tongue  edematous/congested poor visualization of the oropharynx; stable  No bleeding from nose  6-0 cuffed tracheostomy in place, cuff deflated, small amount of bloody secretions/clot - replaced at bedside for 6UN75H cuffless trach, secured with soft collar  2 neck MARU in place with serosanguinous drainage - murky fluid in tubing of R neck MARU drain, ? From muscle necrosis  Neck is soft  Neck incision intact with staples  Chest incision intact with staples  Chest is soft, right bulk tunneling to neck 2/2 pec flap  2 MARU in chest with serosanguinous drainage     Significant Labs:  BMP:   Recent Labs   Lab 03/01/24  0454   GLU 97      CO2 23   BUN 22   CREATININE 0.7   CALCIUM 8.3*   MG 2.0     CBC:   Recent Labs   Lab 03/01/24  0454   WBC 7.27   RBC 2.44*   HGB 7.6*   HCT 23.3*      MCV 96   MCH 31.1*   MCHC 32.6       Significant Diagnostics:  I have reviewed and interpreted all pertinent imaging results/findings within the past 24 hours.

## 2024-03-01 NOTE — RESPIRATORY THERAPY
"RAPID RESPONSE RESPIRATORY THERAPY PROACTIVE NOTE           Time of visit: 826     Code Status: Full Code   : 1955  Bed: [facility]6/1026 A:   MRN: [id number]  Time spent at the bedside: < 15 min    SITUATION    Evaluated patient for: LDA Check     BACKGROUND    Patient has a past medical history of Cancer, Hyperlipidemia, and Hypertension.  Clinically Significant Surgical Hx: tracheostomy    24 Hours Vitals Range:  Temp:  [97.5 °F (36.4 °C)-99.1 °F (37.3 °C)]   Pulse:  []   Resp:  [16-19]   BP: (113-134)/(67-79)   SpO2:  [92 %-100 %]     Labs:    Recent Labs     24  0255 24  0705 24  0454    139 139   K 3.9 3.7 3.8    107 108   CO2 26 25 23   BUN 19 20 22   CREATININE 0.7 0.7 0.7    122* 97   PHOS 3.2 2.8 3.6   MG 2.0 1.9 2.0        No results for input(s): "PH", "PCO2", "PO2", "HCO3", "POCSATURATED", "BE" in the last 72 hours.    ASSESSMENT/INTERVENTIONS  Pt on 5L 21% trach collar. Per pt wife, LDA exchanged from cuffed to cuffless this morning. PT about to walk in the hallway with wife at side. Shiley size 6 uncuffed trach in place. All supplies in room. Extra trachs at bedside - 4, 6(2), and 8, all cuffed.       Last VS   Temp: 97.5 °F (36.4 °C) ( 1218)  Pulse: 94 ( 1451)  Resp: 19 ( 1110)  BP: 113/72 ( 1218)  SpO2: 100 % ( 1451)      Extra trachs at bedside: 4, 6(2), and 8, all cuffed.  Level of Consciousness: Level of Consciousness (AVPU): alert  Respiratory Effort: Respiratory Effort: Normal, Unlabored Expansion/Accessory Muscle Usage: Expansion/Accessory Muscles/Retractions: expansion symmetric, no retractions, no use of accessory muscles  All Lung Field Breath Sounds: All Lung Fields Breath Sounds: Anterior:, Lateral:, coarse, diminished  O2 Device/Concentration: 5L 21% TC.  Surgical airway: Yes, Type: Shiley Size: 6, uncuffed  Ambu at bedside:       Active Orders   Respiratory Care    Oxygen Continuous     Frequency: Continuous     Number of " Occurrences: Until Specified     Order Questions:      Device type: Low flow      Device: Trach Collar      Titrate O2 per Oxygen Titration Protocol: Yes      To maintain SpO2 goal of: >= 92%      Notify MD of: Inability to achieve desired SpO2; Sudden change in patient status and requires 20% increase in FiO2; Patient requires >60% FiO2    Pulse Oximetry Continuous     Frequency: Continuous     Number of Occurrences: Until Specified    Routine tracheostomy care     Frequency: BID     Number of Occurrences: Until Specified     Order Comments: Suction prn, exchange inner cannula daily or prn, please tape obturator to head of bed, have extra 6-0 and 4-0 cuffed trached available at bedside, have soft tip suction catheters available at bedside         RECOMMENDATIONS    We recommend: RRT Recs: Continue POC per primary team.      FOLLOW-UP    Please call back the Rapid Response RT, Olaf Torres RRT at x [phone] for any questions or concerns.

## 2024-03-01 NOTE — NURSING
Peg tube feeding administration teaching performed with patient's spouse. The patient's spouse watched nurse perform bolus feed, and then proceeded to successfully finish the bolus feed and flush the peg tube appropriately. Minimal instruction was required regarding technique. The patient tolerated feeding well with no nausea. Plan to perform the next bolus feed and flush by spouse with nursing supervision as to technique, and more instruction provided for adverse events/intolerance of tube feedings to be taught. Overall, very well done performance, and adequate understanding verbalized as well as demonstrated.

## 2024-03-01 NOTE — PLAN OF CARE
Escalated DME delivery/authorization request to patient's payor for expedited review.     Lisa Wilson, MSW, LCSW   - Case

## 2024-03-01 NOTE — PROGRESS NOTES
[facility] - [facility]  Otorhinolaryngology-Head & Neck Surgery  Progress Note    Subjective:     Post-Op Info:  Procedure(s) (LRB):  GLOSSECTOMY (Right)  DISSECTION, NECK (Bilateral)  TRACHEOTOMY (N/A)  CREATION, FLAP, MUSCLE ROTATION (N/A)  INSERTION, PEG TUBE (Right)   7 Days Post-Op  Hospital Day: 8     Interval History: AFVSS. NAEON. Trach changed this am to 6UN75H cuffless, tolerated well. No new complaints.     Medications:  Continuous Infusions:  Scheduled Meds:   amLODIPine  2.5 mg Per G Tube Daily    aspirin  81 mg Per G Tube Daily    atorvastatin  80 mg Per G Tube Daily    enoxparin  40 mg Subcutaneous Daily    ezetimibe  10 mg Per G Tube Daily    hydrocortisone   Topical (Top) BID    multivitamin  1 tablet Per G Tube Daily    pantoprazole  40 mg Intravenous Daily    polyethylene glycol  17 g Per G Tube Daily    senna-docusate 8.6-50 mg  1 tablet Per G Tube BID    silodosin  4 mg Per G Tube Daily    thiamine  100 mg Per G Tube Daily     PRN Meds:acetaminophen, melatonin, ondansetron, sodium chloride 0.9%     Review of patient's allergies indicates:  No Known Allergies  Objective:     Vital Signs (24h Range):  Temp:  [97.7 °F (36.5 °C)-99.1 °F (37.3 °C)] 98.2 °F (36.8 °C)  Pulse:  [] 73  Resp:  [12-19] 19  SpO2:  [92 %-100 %] 92 %  BP: (117-134)/(61-79) 134/79       Lines/Drains/Airways       Drain  Duration                  Closed/Suction Drain 02/23/24 1333 Tube - 1 Right;Lateral Chest Bulb 15 Fr. 6 days         Closed/Suction Drain 02/23/24 1338 Tube - 2 Right;Medial Chest Bulb 15 Fr. 6 days         Closed/Suction Drain 02/23/24 1414 Tube - 3 Left;Anterior Neck Bulb 15 Fr. 6 days         Closed/Suction Drain 02/23/24 1445 Tube - 4 Right;Anterior Neck Bulb 15 Fr. 6 days         Gastrostomy/Enterostomy 02/23/24 0750 Percutaneous endoscopic gastrostomy (PEG) LUQ feeding 6 days              Airway  Duration             Adult Surgical Airway 02/23/24 1504 Shiley Cuffed 6.0/ 75mm 6 days               Peripheral Intravenous Line  Duration                  Peripheral IV - Single Lumen 02/23/24 0555 18 G Left;Posterior Forearm 7 days         Peripheral IV - Single Lumen 02/23/24 0739 18 G Left;Posterior Forearm 6 days                     Physical Exam  NAD, communicates with writing board  Native tongue edematous/congested poor visualization of the oropharynx; stable  No bleeding from nose  6-0 cuffed tracheostomy in place, cuff deflated, small amount of bloody secretions/clot - replaced at bedside for 6UN75H cuffless trach, secured with soft collar  2 neck MARU in place with serosanguinous drainage - murky fluid in tubing of R neck MARU drain, ? From muscle necrosis  Neck is soft  Neck incision intact with staples  Chest incision intact with staples  Chest is soft, right bulk tunneling to neck 2/2 pec flap  2 MARU in chest with serosanguinous drainage     Significant Labs:  BMP:   Recent Labs   Lab 03/01/24  0454   GLU 97      CO2 23   BUN 22   CREATININE 0.7   CALCIUM 8.3*   MG 2.0     CBC:   Recent Labs   Lab 03/01/24  0454   WBC 7.27   RBC 2.44*   HGB 7.6*   HCT 23.3*      MCV 96   MCH 31.1*   MCHC 32.6       Significant Diagnostics:  I have reviewed and interpreted all pertinent imaging results/findings within the past 24 hours.  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 He had bleeding from the oral cavity post-op that has overall improved. Hgb stabilized. Will continue to monitor closely for now and transfuse as needed.     ENT   - Keep JPs in place to neck and chest  - Of note, patient cannot be intubated by mouth 2/2 tongue edema from surgery, must maintain tracheostomy    Neuro  - Multimodal pain control    CV  - Currently HDS    Resp  - On trach collar  - Routine trach care  - Trach exchanged to 6UN75H cuffless 3/1/24,  secured w soft collar    FEN/GI  - NPO  - On bolus feeds, tolerating well    Renal  - Cr stable    Heme/ID   - Unasyn for post-op prophylaxis - complete  - Monitor H/H    MSK   - PT/OT/OOB    Ppx: L40    Dispo: continue floor care          Stuart De Leon MD  Otorhinolaryngology-Head & Neck Surgery  [facility]

## 2024-03-01 NOTE — PT/OT/SLP EVAL
Speech Language Pathology Evaluation  One Way Speaking Valve Evaluation    Patient Name:  Timothy Galdamez   MRN:  [id number]  Admitting Diagnosis: Tongue cancer    IMPORTANT  CAUTION: CUFF MUST ALWAYS BE DEFLATED WHEN VALVE IN USE    Recommendations:                 General Recommendations:  Dysphagia therapy  Diet recommendations:  NPO, NPO    - Continue with PEG as primary means of nutrition   Aspiration Precautions: Strict aspiration precautions   General Precautions: Standard, fall, aspiration  Communication strategies:  none  PMSV Precautions: Only wear when cuff is deflated, remove valve with any S/S respiratory distress, remove valve when sleeping. Please note, to clean valve:  1. Swish valve in pure soap and warm water, 2. Rinse valve thoroughly in warm running water, 3. Allow valve to air dry thoroughly before placing it in storage container, 4. DO NOT use hot water, peroxide, bleach, vinegar, alcohol, brushes, or cotton swabs to clean valve.    Only wear speaking valve for 5-10 minutes at a time throughout the day   Speaking valve tolerance may increase as secretions and swelling resolve and allowed longer durations of use as tolerated  Do not wear during sleeping hours   Assessment:     Timothy Galdamez is a 68 y.o. male with an SLP diagnosis of S/P Trach and One Way Speaking Valve Training.      History:     Past Medical History:   Diagnosis Date    Cancer     Hyperlipidemia     Hypertension        Past Surgical History:   Procedure Laterality Date    ANKLE SURGERY      L ankle    BIOPSY OF TISSUE OF NECK Left 2013    COLONOSCOPY N/A 08/21/2017    Procedure: COLONOSCOPY;  Surgeon: Danny Jane MD;  Location: [facility] ([facility]);  Service: Endoscopy;  Laterality: N/A;  Do not cancel this order    CREATION OF MUSCLE ROTATIONAL FLAP N/A 2/23/2024    Procedure: CREATION, FLAP, MUSCLE ROTATION;  Surgeon: Jeferson Ventura MD;  Location: [address];  Service: ENT;  Laterality: N/A;    DIRECT  LARYNGOBRONCHOSCOPY N/A 12/11/2023    Procedure: LARYNGOSCOPY, DIRECT, WITH BRONCHOSCOPY;  Surgeon: Micaela Poole MD;  Location: [facility] OR [address] [place] [address];  Service: ENT;  Laterality: N/A;    DISSECTION OF NECK Bilateral 2/23/2024    Procedure: DISSECTION, NECK;  Surgeon: Jeferson Ventura MD;  [facility];  Service: ENT;  Laterality: Bilateral;    ESOPHAGOGASTRODUODENOSCOPY N/A 09/18/2023    Procedure: EGD (ESOPHAGOGASTRODUODENOSCOPY);  Surgeon: Basilia Villavicencio MD;  Location: [facility] ENDOSCOPY;  Service: Gastroenterology;  Laterality: N/A;  9.13 instr sent via portal [facility]  pre call complete, stated he would have a ride -HH    GLOSSECTOMY Right 2/23/2024    Procedure: GLOSSECTOMY;  Surgeon: Jeferson Ventura MD;  [facility];  Service: ENT;  Laterality: Right;    INSERTION, PEG TUBE Right 2/23/2024    Procedure: INSERTION, PEG TUBE;  Surgeon: Alpesh Wu MD;  Location: [facility] OR [address] [place] [address];  Service: General;  Laterality: Right;    TONSILLECTOMY      TRACHEOTOMY N/A 2/23/2024    Procedure: TRACHEOTOMY;  Surgeon: Jeferson Ventura MD;  Location: [facility] OR [address] [place] [address];  Service: ENT;  Laterality: N/A;    TUMOR REMOVAL Right 2015    at     VASECTOMY      WRIST FRACTURE SURGERY Right      Prior Intubation HX:  2/23    Modified Barium Swallow none on file     Chest X-Rays:   FINDINGS: 2/24  Tracheostomy cannula and skin staples noted.  Numerous monitoring leads in place.  Surgical drain at the base of the neck as well.  Skin staples overlie the right chest.  Heart size and pulmonary vessels are normal.  There may be some pleural fluid or thickening capping the lung apices.  No pneumothorax.    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 Wife at the bedside.   Patient goals: none stated     Objective:     Passy Lc Speaking Valve  Trach size/type: shiley 6.0 cuffless  \Able to phonate around trach: not  appreciated this session   Vocal quality with digital finger occlusion: fleeting, hoarse and strained   O2 sats at rest: WFL  O2 sats with PMSV in place: WFL  Vocal quality with valve in place: Hoarse and strained   Back pressure upon removal:  minimal   Minutes PMSV worn:  approx 15 minutes, doffed at the completion of the session 2/2 pt resting  Education topics addressed: one-way technology, anatomy of trach, PMSV parameters including only donning for 5-10 minutes at a time throughout the day, ensuring it is removed when asleep.     Education provided: Discussed tracheostomy, PMSV one way technology, laryngeal anatomy including utilizing pocket PARDEEP as a visual aid, post surgical changes and ongoing SLP POC including swallow assessment in the future before the initiation of a diet for pleasure, and use of PEG tube as primary means of nutrition at this time. Speech intelligibly further complicated by imprecise articulation consistent with post surgical changes s/p glossectomy. Spouse was taught to ruthie and doff speaking valve and demonstrated understanding of speaking valve. Briefly touched on long term SLP journey following discharge. Pt and spouse verbalized understanding         Goals:   Multidisciplinary Problems       SLP Goals          Problem: SLP    Goal Priority Disciplines Outcome   SLP Goal     SLP Ongoing, Progressing   Description: Speech Language Pathology Goals:   1. Pt will tolerate PMSV trials.                        Plan:     Patient to be seen:  4 x/week   Plan of Care expires:   3/17  Plan of Care reviewed with:    patient and spouse   SLP Follow-Up:  Yes       Discharge recommendations:  Low Intensity Therapy   Barriers to Discharge:  None    Time Tracking:     SLP Treatment Date:   03/01/24  Speech Start Time:  1150  Speech Stop Time:  1215     Speech Total Time (min):  25 min    Billable Minutes: Speech Therapy Individual 9, Evaluation Use/Fit Voiced Prosthetic 8, and Self Care/Home  Management Training 8    03/01/2024

## 2024-03-01 NOTE — ASSESSMENT & PLAN NOTE
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 He had bleeding from the oral cavity post-op that has overall improved. Hgb stabilized. Will continue to monitor closely for now and transfuse as needed.     ENT   - Keep JPs in place to neck and chest  - Of note, patient cannot be intubated by mouth 2/2 tongue edema from surgery, must maintain tracheostomy    Neuro  - Multimodal pain control    CV  - Currently HDS    Resp  - On trach collar  - Routine trach care  - Trach exchanged to 6UN75H cuffless 3/1/24, secured w soft collar    FEN/GI  - NPO  - On bolus feeds, tolerating well    Renal  - Cr stable    Heme/ID   - Unasyn for post-op prophylaxis - complete  - Monitor H/H    MSK   - PT/OT/OOB    Ppx: L40    Dispo: continue floor care

## 2024-03-02 LAB
ANION GAP SERPL CALC-SCNC: 6 MMOL/L (ref 8–16)
BUN SERPL-MCNC: 22 MG/DL (ref 8–23)
CALCIUM SERPL-MCNC: 8.9 MG/DL (ref 8.7–10.5)
CHLORIDE SERPL-SCNC: 110 MMOL/L (ref 95–110)
CO2 SERPL-SCNC: 24 MMOL/L (ref 23–29)
CREAT SERPL-MCNC: 0.7 MG/DL (ref 0.5–1.4)
ERYTHROCYTE [DISTWIDTH] IN BLOOD BY AUTOMATED COUNT: 14 % (ref 11.5–14.5)
EST. GFR  (NO RACE VARIABLE): >60 ML/MIN/1.73 M^2
GLUCOSE SERPL-MCNC: 102 MG/DL (ref 70–110)
HCT VFR BLD AUTO: 25.2 % (ref 40–54)
HGB BLD-MCNC: 8.2 G/DL (ref 14–18)
MAGNESIUM SERPL-MCNC: 2.1 MG/DL (ref 1.6–2.6)
MCH RBC QN AUTO: 31.8 PG (ref 27–31)
MCHC RBC AUTO-ENTMCNC: 32.5 G/DL (ref 32–36)
MCV RBC AUTO: 98 FL (ref 82–98)
PHOSPHATE SERPL-MCNC: 3.7 MG/DL (ref 2.7–4.5)
PLATELET # BLD AUTO: 316 K/UL (ref 150–450)
PMV BLD AUTO: 11.3 FL (ref 9.2–12.9)
POTASSIUM SERPL-SCNC: 4.6 MMOL/L (ref 3.5–5.1)
RBC # BLD AUTO: 2.58 M/UL (ref 4.6–6.2)
SODIUM SERPL-SCNC: 140 MMOL/L (ref 136–145)
WBC # BLD AUTO: 9.59 K/UL (ref 3.9–12.7)

## 2024-03-02 PROCEDURE — 20600001 HC STEP DOWN PRIVATE ROOM

## 2024-03-02 PROCEDURE — 85027 COMPLETE CBC AUTOMATED: CPT

## 2024-03-02 PROCEDURE — 27000221 HC OXYGEN, UP TO 24 HOURS

## 2024-03-02 PROCEDURE — 84100 ASSAY OF PHOSPHORUS: CPT

## 2024-03-02 PROCEDURE — 25000003 PHARM REV CODE 250

## 2024-03-02 PROCEDURE — 25000003 PHARM REV CODE 250: Performed by: STUDENT IN AN ORGANIZED HEALTH CARE EDUCATION/TRAINING PROGRAM

## 2024-03-02 PROCEDURE — C9113 INJ PANTOPRAZOLE SODIUM, VIA: HCPCS

## 2024-03-02 PROCEDURE — 36415 COLL VENOUS BLD VENIPUNCTURE: CPT

## 2024-03-02 PROCEDURE — 83735 ASSAY OF MAGNESIUM: CPT

## 2024-03-02 PROCEDURE — 94761 N-INVAS EAR/PLS OXIMETRY MLT: CPT

## 2024-03-02 PROCEDURE — 80048 BASIC METABOLIC PNL TOTAL CA: CPT

## 2024-03-02 PROCEDURE — 99900035 HC TECH TIME PER 15 MIN (STAT)

## 2024-03-02 PROCEDURE — 63600175 PHARM REV CODE 636 W HCPCS

## 2024-03-02 RX ADMIN — ATORVASTATIN CALCIUM 80 MG: 40 TABLET, FILM COATED ORAL at 09:03

## 2024-03-02 RX ADMIN — ACETAMINOPHEN 650 MG: 160 SOLUTION ORAL at 11:03

## 2024-03-02 RX ADMIN — ASPIRIN 81 MG CHEWABLE TABLET 81 MG: 81 TABLET CHEWABLE at 09:03

## 2024-03-02 RX ADMIN — SENNOSIDES AND DOCUSATE SODIUM 1 TABLET: 8.6; 5 TABLET ORAL at 08:03

## 2024-03-02 RX ADMIN — ACETAMINOPHEN 650 MG: 160 SOLUTION ORAL at 09:03

## 2024-03-02 RX ADMIN — SILODOSIN 4 MG: 4 CAPSULE ORAL at 09:03

## 2024-03-02 RX ADMIN — Medication 6 MG: at 11:03

## 2024-03-02 RX ADMIN — PANTOPRAZOLE SODIUM 40 MG: 40 INJECTION, POWDER, FOR SOLUTION INTRAVENOUS at 09:03

## 2024-03-02 RX ADMIN — Medication 100 MG: at 09:03

## 2024-03-02 RX ADMIN — EZETIMIBE 10 MG: 10 TABLET ORAL at 09:03

## 2024-03-02 RX ADMIN — AMLODIPINE BESYLATE 2.5 MG: 10 TABLET ORAL at 09:03

## 2024-03-02 RX ADMIN — ENOXAPARIN SODIUM 40 MG: 40 INJECTION SUBCUTANEOUS at 05:03

## 2024-03-02 RX ADMIN — THERA TABS 1 TABLET: TAB at 09:03

## 2024-03-02 NOTE — PROGRESS NOTES
[facility] - [facility]  Otorhinolaryngology-Head & Neck Surgery  Progress Note    Subjective:     Post-Op Info:  Procedure(s) (LRB):  GLOSSECTOMY (Right)  DISSECTION, NECK (Bilateral)  TRACHEOTOMY (N/A)  CREATION, FLAP, MUSCLE ROTATION (N/A)  INSERTION, PEG TUBE (Right)   8 Days Post-Op  Hospital Day: 9     NAEON    Vitals:    03/02/24 0738   BP: 111/69   Pulse: 96   Resp: 18   Temp: 97.6 °F (36.4 °C)     PE:  Neck, chest incisions clean, dry and intact.  Staples in place.  Drains her all serosanguineous.  No sandy evidence of fistula.  Lateral chest drain was removed.  Trach in place  Native tongue congested with slightly improved from yesterday.    Recent Labs   Lab 03/02/24  0723   WBC 9.59   RBC 2.58*   HGB 8.2*   HCT 25.2*      MCV 98   MCH 31.8*   MCHC 32.5     Recent Labs   Lab 03/02/24  0723   CALCIUM 8.9      K 4.6   CO2 24      BUN 22   CREATININE 0.7     Drains:  Lateral chest 10 mL-removed  Medial chest 40 mL  Left neck 15 mL  Right neck 15 mL        Assessment/Plan:     * Tongue cancer  The patient is a 68 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 He had bleeding from the oral cavity post-op that has overall improved. Hgb stabilized. Will continue to monitor closely for now and transfuse as needed.     ENT   - Keep JPs in place to neck and chest  - Of note, patient cannot be intubated by mouth 2/2 tongue edema from surgery, must maintain tracheostomy    Neuro  - Multimodal pain control    CV  - Currently HDS    Resp  - On trach collar  - Routine trach care  - Trach exchanged to 6UN75H cuffless 3/1/24, secured w soft collar    FEN/GI  - NPO  - On bolus feeds, tolerating well    Renal  - Cr stable    Heme/ID   - Unasyn for post-op prophylaxis - complete  - Monitor H/H    MSK   - PT/OT/OOB    Ppx: L40    Dispo: Tentatively plan for discharge on 3/4.  Pathology report  reviewed with patient.          Jeferson Ventura MD  Otorhinolaryngology-Head & Neck Surgery  [facility] [address] [facility]

## 2024-03-02 NOTE — PLAN OF CARE
03/01/24 2143   Post-Acute Status   Post-Acute Authorization Home Health   Home Health Status Referrals Sent   Discharge Plan   Discharge Plan A Home Health   Discharge Plan B Home with family       Met with patient to review discharge recommendation of home health  and is agreeable to plan    Patient/family provided list of facilities in-network with patient's payor plan. Providers that are owned, operated, or affiliated with [facility] are included on the list.     Notified that referral sent to below listed facilities from in-network list based on proximity to home/family support:     [facility] Phone: [phone]      * COVID-19: Services not specified [address] [facility], [address] [place] [address] 2/29/2024 15:14 (CT)  2/29/2024 17:12 (CT)  -  2/29/2024 15:32 (CT)  2/29/2024 15:32 (CT)  Response: No, unable to accept patient  Reason: Other (see comments)  Comments: I'm sorry we are unable to initiate timely care at this time.   Waiting for you     [facility]  [employer] ([id number]) Phone: [phone]    .  * COVID-19: Positive patients under care,* COVID-19: Willing/Equipped to accept COVID-19 positive patients,* High-Risk Isolation Patients: Willing/Equipped to accept High-Risk Isolation Patients [address] [facility] [address] [place] [address] -  3/4/2024 10:00 (CT)  -  -  -  -       [facility] ([id number])      * COVID-19: Positive patients under care,* COVID-19: Services not specified,* COVID-19: Willing/Equipped to accept COVID-19 positive patients,* High-Risk Isolation Patients: Willing/Equipped to accept High-Risk Isolation Patients [address] [facility] [address] [place] [address] -  3/4/2024 10:00 (CT)  -  -  -  -       [facility] Phone: [phone]      * COVID-19: Positive patients under care,* COVID-19: Willing/Equipped to accept COVID-19 positive patients,* High-Risk Isolation  Patients: Willing/Equipped to accept High-Risk Isolation Patients [address] [facility], [address] [place] [address] -  3/4/2024 10:00 (CT)  -  -  -  -       [facility] Phone: [phone]    NA  * COVID-19: Positive patients under care,* COVID-19: Willing/Equipped to accept COVID-19 positive patients,* High-Risk Isolation Patients: Willing/Equipped to accept High-Risk Isolation Patients [address], [place] [address] -  3/4/2024 10:00 (CT)  -  -  -  -       [facility] Phone: [phone]      * COVID-19: Willing/Equipped to accept COVID-19 positive patients [address] [place] [address] -  3/4/2024 10:00 (CT)  -  -  -  -       [facility] Phone: [phone]      * COVID-19: Positive patients under care,* COVID-19: Willing/Equipped to accept COVID-19 positive patients [address] [facility] [address], [place] [address] -  3/4/2024 10:00 (CT)  -  -  -  -       [facility] Phone: [phone]    N/A  * COVID-19: NOT able to accept COVID-19 positive patients [address] [facility], [address] [place] [address] -  3/4/2024 10:00 (CT)  -  -  -  -       [facility] Phone: [phone]      * COVID-19: Positive patients under care,* COVID-19: Willing/Equipped to accept COVID-19 positive patients,* High-Risk Isolation Patients: Willing/Equipped to accept High-Risk Isolation Patients [address] [facility],, [place] [address] -  3/4/2024 10:00 (CT)  -  -  -  -       [facility] [place] [facility] Phone: [phone]      * COVID-19: Positive patients under care,* COVID-19: Services not specified,* High-Risk Isolation Patients: Willing/Equipped to accept High-Risk Isolation Patients [address] [place] [address] -  3/4/2024 10:00 (CT)  -  -  -  -       [facility] Phone: [phone]      * COVID-19: Positive  patients under care,* COVID-19: Willing/Equipped to accept COVID-19 positive patients,* High-Risk Isolation Patients: Willing/Equipped to accept High-Risk Isolation Patients [address], [place] [address] -  3/4/2024 10:00 (CT)  -  -  -  -       [facility] [place] [facility] Phone: [phone]    We are limited on staff due to ELIUD but will do what we are able to  * COVID-19: NOT able to accept COVID-19 positive patients,* COVID-19: Positive patients under care,* High-Risk Isolation Patients: Willing/Equipped to accept High-Risk Isolation Patients [address] [facility] [address] [place] [address] -  3/4/2024 10:00 (CT)  -  -  -  -       [facility] Phone: [phone]      * COVID-19: Willing/Equipped to accept COVID-19 positive patients [address] [facility], [address] [place] [address] -  3/4/2024 10:00 (CT)  -  -  -  -       [facility] Phone: [phone]    Covid negative test or 10 days post positive test  * COVID-19: NOT able to accept COVID-19 positive patients [address] [facility] [place] [address] -  3/4/2024 10:00 (CT)  -  -  -  -       [facility] Phone: [phone]      * COVID-19: Positive patients under care,* COVID-19: Services not specified,* High-Risk Isolation Patients: Willing/Equipped to accept High-Risk Isolation Patients [address] [facility], [address] [place] [address] -  3/4/2024 10:00 (CT)  -  -  -  -       [facility] Phone: [phone]      * COVID-19: Positive patients under care,* COVID-19: Willing/Equipped to accept COVID-19 positive patients,* High-Risk Isolation Patients: Willing/Equipped to accept High-Risk Isolation Patients [address] [place] [address] -  3/4/2024 10:00 (CT)  -  -  -  -       [facility] Phone: [phone]      * COVID-19: Positive patients under care,* COVID-19: Willing/Equipped to accept  COVID-19 positive patients [address] [facility], [address] [place] [address] -  3/4/2024 10:00 (CT)  -  -  -  -       [facility] [place] Phone: [phone]      * COVID-19: Positive patients under care,* COVID-19: Services not specified,* High-Risk Isolation Patients: Willing/Equipped to accept High-Risk Isolation Patients [address] [facility] [place] [address] -  3/4/2024 10:00 (CT)  -  -  -  -       [facility] [place] Phone: [phone]      * COVID-19: Positive patients under care,* COVID-19: Willing/Equipped to accept COVID-19 positive patients [address] [facility] [place] [address] [place] [address] -  3/4/2024 10:00 (CT)  -  -  -  -       [facility] [place] Phone: [phone]      * COVID-19: Positive patients under care,* COVID-19: Willing/Equipped to accept COVID-19 positive patients [address], [address] [place] [address] -  3/4/2024 10:00 (CT)  -  -  -  -       [facility] Phone: [phone]      * COVID-19: Positive patients under care,* COVID-19: Willing/Equipped to accept COVID-19 positive patients,* High-Risk Isolation Patients: Willing/Equipped to accept High-Risk Isolation Patients [address] [facility], [address] [place] [address] -  3/4/2024 10:00 (CT)  -  -  -  -       [facility] Phone: [phone]      * COVID-19: Positive patients under care,* COVID-19: Willing/Equipped to accept COVID-19 positive patients [address] [place] [address] -  3/4/2024 10:00 (CT)  -  -  -  -       [facility] [place] Phone: [phone]      * COVID-19: Positive patients under care,* COVID-19: Willing/Equipped to accept COVID-19 positive patients [address] [place] [address] -  3/4/2024 10:00 (CT)  -  -  -  -           Patient/family instructed to identify preference.    Preferred Facility: (if more than 1, listed in order of descending preference)  [facility]     If  an additional preferred facility not listed above is identified, additional referral to be sent. If above facilities unable to accept, will send additional referrals to in-network providers.

## 2024-03-02 NOTE — RESPIRATORY THERAPY
"RAPID RESPONSE RESPIRATORY THERAPY PROACTIVE NOTE           Time of visit: 915     Code Status: Full Code   : 1955  Bed: [facility]6/1026 A:   MRN: [id number]  Time spent at the bedside: < 15 min    SITUATION    Evaluated patient for: LDA Check     BACKGROUND    Patient has a past medical history of Cancer, Hyperlipidemia, and Hypertension.  Clinically Significant Surgical Hx: tracheostomy    24 Hours Vitals Range:  Temp:  [97.5 °F (36.4 °C)-99.4 °F (37.4 °C)]   Pulse:  [76-96]   Resp:  [17-20]   BP: (111-141)/(68-75)   SpO2:  [92 %-100 %]     Labs:    Recent Labs     24  0705 244 24    139 140   K 3.7 3.8 4.6    108 110   CO2 25 23 24   BUN 20 22 22   CREATININE 0.7 0.7 0.7   * 97 102   PHOS 2.8 3.6 3.7   MG 1.9 2.0 2.1        No results for input(s): "PH", "PCO2", "PO2", "HCO3", "POCSATURATED", "BE" in the last 72 hours.    ASSESSMENT/INTERVENTIONS  Pt resting comfortably, no respiratory concern during visit.      Last VS   Temp: 97.6 °F (36.4 °C) (738)  Pulse: 96 (738)  Resp: 18 (738)  BP: 111/69 (738)  SpO2: 99 % (738)      Extra trachs at bedside: 6.0 & 4.0 Shiley cuffed  Level of Consciousness: Level of Consciousness (AVPU): alert  Respiratory Effort: Respiratory Effort: Unlabored Expansion/Accessory Muscle Usage: Expansion/Accessory Muscles/Retractions: no use of accessory muscles, no retractions, expansion symmetric  All Lung Field Breath Sounds: All Lung Fields Breath Sounds: Anterior:, Lateral:, coarse  O2 Device/Concentration: trach collar 5L/21%  Surgical airway: Yes, Type: Shiley Size: 6, uncuffed  Ambu at bedside:       Active Orders   Respiratory Care    Oxygen Continuous     Frequency: Continuous     Number of Occurrences: Until Specified     Order Questions:      Device type: Low flow      Device: Trach Collar      Titrate O2 per Oxygen Titration Protocol: Yes      To maintain SpO2 goal of: >= 92%      Notify MD of: " Inability to achieve desired SpO2; Sudden change in patient status and requires 20% increase in FiO2; Patient requires >60% FiO2    Pulse Oximetry Continuous     Frequency: Continuous     Number of Occurrences: Until Specified    Routine tracheostomy care     Frequency: BID     Number of Occurrences: Until Specified     Order Comments: Suction prn, exchange inner cannula daily or prn, please tape obturator to head of bed, have extra 6-0 and 4-0 cuffed trached available at bedside, have soft tip suction catheters available at bedside         RECOMMENDATIONS    We recommend: RRT Recs: Continue POC per primary team.      FOLLOW-UP    Please call back the Rapid Response RT, Gray Jessica, RRT at x [phone] for any questions or concerns.

## 2024-03-02 NOTE — PT/OT/SLP PROGRESS
Occupational Therapy   Treatment    Name: Timothy Galdamez  MRN: [id number]  Admitting Diagnosis:  Tongue cancer  7 Days Post-Op    Recommendations:     Discharge Recommendations: Low Intensity Therapy  Discharge Equipment Recommendations:  none  Barriers to discharge:       Assessment:     Timothy Galdamez is a 68 y.o. male with a medical diagnosis of Tongue cancer.  He presents with the fallowing performance deficits affecting function are weakness, impaired endurance, impaired self care skills, impaired functional mobility, gait instability, pain, impaired balance, impaired cardiopulmonary response to activity. Patient is demonstrating progress with functional transfers, mobility task, endurance, and self-care task. Patient would benefit from Low intensity therapy intervention in order to address over all functional decline 2/2 recent surgical procedure in order to gain functional independence to return to PLOF.     Rehab Prognosis:  Good; patient would benefit from acute skilled OT services to address these deficits and reach maximum level of function.       Plan:     Patient to be seen 4 x/week to address the above listed problems via self-care/home management, therapeutic activities, therapeutic exercises  Plan of Care Expires: 03/10/24  Plan of Care Reviewed with: patient    Subjective     Chief Complaint: patient did not stated any complains   Patient/Family Comments/goals: to get better and return to PLOF  Pain/Comfort:  Location - Orientation 1: generalized  Location 1: neck  Pain Addressed 1: Distraction, Reposition, Cessation of Activity  Pain Rating Post-Intervention 1: 0/10    Objective:     Communicated with: Nurse prior to session.  Patient found up in chair with telemetry, pulse ox (continuous), PEG Tube, Tracheostomy upon OT entry to room.  A client care conference was completed by the OTR and the DALLAS prior to treatment by the DALLAS to discuss the patient's POC and current status.   General Precautions:  Standard, fall    Orthopedic Precautions:N/A  Braces: N/A  Respiratory Status:  trach collar 5L      Occupational Performance:     Functional Mobility/Transfers:  Patient completed Sit <> Stand Transfer from bedside chair with stand by assistance  with  no assistive device and hand-held assist.   Functional Mobility: Patient ambulated 420ft with SBA with no AD and HHA slight instability wide SURJIT and one standing rest break.    Activities of Daily Living:  Grooming: independence to performed oral care task in standing at the sink   Bathing: moderate assistance to sponge bath upper and lower body  Upper Body Dressing: moderate assistance to don/doff gown for line mgmt   Lower Body Dressing: modified independence to don/doff socks     [facility] 6 Click ADL: 15    Treatment & Education:  DESHAUN educated patient on the importance to continue to performed OOB activities with the assistance from nursing staff to reduce the risk for debility to progress.  Addressed patient's questions and concerns within DALLAS scope of practice.    Patient left up in chair with all lines intact, call button in reach, and spouse present    GOALS:   Multidisciplinary Problems       Occupational Therapy Goals          Problem: Occupational Therapy    Goal Priority Disciplines Outcome Interventions   Occupational Therapy Goal     OT, PT/OT Ongoing, Progressing    Description: Goals to be met by: 3/10/24     Patient will increase functional independence with ADLs by performing:    Feeding with [place].  UE Dressing with Set-up Assistance.  LE Dressing with Set-up Assistance.  Grooming while standing at sink with Modified [place].  Toileting from toilet with Modified [place] for hygiene and clothing management.   Toilet transfer to toilet with Modified [place].                         Time Tracking:     OT Date of Treatment: 03/01/24  OT Start Time: 1250  OT Stop Time: 1336  OT Total Time (min): 46 min    Billable Minutes:Self  Care/Home Management 23  Therapeutic Activity 23    OT/DESHAUN: DESHAUN     Number of DESHAUN visits since last OT visit: 1    3/1/2024

## 2024-03-02 NOTE — PLAN OF CARE
[facility] Plan of Care Note    Dx:   Tongue cancer [C02.9]    Shift Events: Pt ambulated in room.      Goals of Care: safety    Neuro: A&OX4    Vital Signs: BP (!) 141/75 (BP Location: Left arm, Patient Position: Lying)   Pulse 79   Temp 98.5 °F (36.9 °C) (Axillary)   Resp 19   Ht 6' (1.829 m)   Wt 80 kg (176 lb 5.9 oz)   SpO2 (!) 94%   BMI 23.92 kg/m²     Respiratory: trach 6L 21%    Diet: Diet NPO  Tube Feedings/Formulas [facility]; Same Day Serves Peptide 1.5; Gastrostomy; Cans; 4    Is patient tolerating current diet? NPO    GTTS: n/a    Urine Output/Bowel Movement:   I/O this shift:  In: 0   Out: 30 [Drains:30]  Last Bowel Movement: 02/28/24      Drains/Tubes/Tube Feeds (include total output/shift):   I/O this shift:  In: 0   Out: 30 [Drains:30]      Lines: PIV      Accuchecks:n/a    Skin: see flowsheet    Fall Risk Score: 9    Activity level? OOB activities    Any scheduled procedures? N/a    Any safety concerns? fall risk    Other: n/a

## 2024-03-02 NOTE — PLAN OF CARE
[facility]  Discharge Reassessment    Primary Care Provider: Young Lanier MD    Expected Discharge Date: 3/5/2024    Reassessment (most recent)       Discharge Reassessment - 03/01/24 2144          Discharge Reassessment    Assessment Type Discharge Planning Reassessment     Did the patient's condition or plan change since previous assessment? No     Discharge Plan discussed with: Patient     Communicated AMRIK with patient/caregiver Yes     Discharge Plan A Home Health     Discharge Plan B Home with family     Transition of Care Barriers None     Why the patient remains in the hospital Requires continued medical care                     [facility] accepted for tube feeds.     Extended home health blast.     Awaiting Trach and Suction supplies.

## 2024-03-03 LAB
ANION GAP SERPL CALC-SCNC: 7 MMOL/L (ref 8–16)
BUN SERPL-MCNC: 27 MG/DL (ref 8–23)
CALCIUM SERPL-MCNC: 8.4 MG/DL (ref 8.7–10.5)
CHLORIDE SERPL-SCNC: 109 MMOL/L (ref 95–110)
CO2 SERPL-SCNC: 22 MMOL/L (ref 23–29)
CREAT SERPL-MCNC: 0.7 MG/DL (ref 0.5–1.4)
ERYTHROCYTE [DISTWIDTH] IN BLOOD BY AUTOMATED COUNT: 14.1 % (ref 11.5–14.5)
EST. GFR  (NO RACE VARIABLE): >60 ML/MIN/1.73 M^2
GLUCOSE SERPL-MCNC: 117 MG/DL (ref 70–110)
HCT VFR BLD AUTO: 22.2 % (ref 40–54)
HGB BLD-MCNC: 7.2 G/DL (ref 14–18)
MAGNESIUM SERPL-MCNC: 2.1 MG/DL (ref 1.6–2.6)
MCH RBC QN AUTO: 32 PG (ref 27–31)
MCHC RBC AUTO-ENTMCNC: 32.4 G/DL (ref 32–36)
MCV RBC AUTO: 99 FL (ref 82–98)
PHOSPHATE SERPL-MCNC: 3.6 MG/DL (ref 2.7–4.5)
PLATELET # BLD AUTO: 312 K/UL (ref 150–450)
PMV BLD AUTO: 11.6 FL (ref 9.2–12.9)
POTASSIUM SERPL-SCNC: 3.9 MMOL/L (ref 3.5–5.1)
RBC # BLD AUTO: 2.25 M/UL (ref 4.6–6.2)
SODIUM SERPL-SCNC: 138 MMOL/L (ref 136–145)
WBC # BLD AUTO: 7.83 K/UL (ref 3.9–12.7)

## 2024-03-03 PROCEDURE — 84100 ASSAY OF PHOSPHORUS: CPT

## 2024-03-03 PROCEDURE — 99900035 HC TECH TIME PER 15 MIN (STAT)

## 2024-03-03 PROCEDURE — 20600001 HC STEP DOWN PRIVATE ROOM

## 2024-03-03 PROCEDURE — 25000003 PHARM REV CODE 250

## 2024-03-03 PROCEDURE — 63600175 PHARM REV CODE 636 W HCPCS

## 2024-03-03 PROCEDURE — 36415 COLL VENOUS BLD VENIPUNCTURE: CPT

## 2024-03-03 PROCEDURE — 27000221 HC OXYGEN, UP TO 24 HOURS

## 2024-03-03 PROCEDURE — C9113 INJ PANTOPRAZOLE SODIUM, VIA: HCPCS

## 2024-03-03 PROCEDURE — 85027 COMPLETE CBC AUTOMATED: CPT

## 2024-03-03 PROCEDURE — 99900026 HC AIRWAY MAINTENANCE (STAT)

## 2024-03-03 PROCEDURE — 80048 BASIC METABOLIC PNL TOTAL CA: CPT

## 2024-03-03 PROCEDURE — 83735 ASSAY OF MAGNESIUM: CPT

## 2024-03-03 PROCEDURE — 25000003 PHARM REV CODE 250: Performed by: STUDENT IN AN ORGANIZED HEALTH CARE EDUCATION/TRAINING PROGRAM

## 2024-03-03 PROCEDURE — 94761 N-INVAS EAR/PLS OXIMETRY MLT: CPT

## 2024-03-03 RX ADMIN — SILODOSIN 4 MG: 4 CAPSULE ORAL at 09:03

## 2024-03-03 RX ADMIN — ACETAMINOPHEN 650 MG: 160 SOLUTION ORAL at 01:03

## 2024-03-03 RX ADMIN — Medication 100 MG: at 09:03

## 2024-03-03 RX ADMIN — THERA TABS 1 TABLET: TAB at 09:03

## 2024-03-03 RX ADMIN — ATORVASTATIN CALCIUM 80 MG: 40 TABLET, FILM COATED ORAL at 09:03

## 2024-03-03 RX ADMIN — AMLODIPINE BESYLATE 2.5 MG: 10 TABLET ORAL at 09:03

## 2024-03-03 RX ADMIN — ASPIRIN 81 MG CHEWABLE TABLET 81 MG: 81 TABLET CHEWABLE at 09:03

## 2024-03-03 RX ADMIN — PANTOPRAZOLE SODIUM 40 MG: 40 INJECTION, POWDER, FOR SOLUTION INTRAVENOUS at 09:03

## 2024-03-03 RX ADMIN — Medication 6 MG: at 11:03

## 2024-03-03 RX ADMIN — ACETAMINOPHEN 650 MG: 160 SOLUTION ORAL at 11:03

## 2024-03-03 RX ADMIN — EZETIMIBE 10 MG: 10 TABLET ORAL at 09:03

## 2024-03-03 RX ADMIN — ENOXAPARIN SODIUM 40 MG: 40 INJECTION SUBCUTANEOUS at 04:03

## 2024-03-03 NOTE — PROGRESS NOTES
[facility]  Otorhinolaryngology-Head & Neck Surgery  Progress Note    Subjective:     Post-Op Info:  Procedure(s) (LRB):  GLOSSECTOMY (Right)  DISSECTION, NECK (Bilateral)  TRACHEOTOMY (N/A)  CREATION, FLAP, MUSCLE ROTATION (N/A)  INSERTION, PEG TUBE (Right)   9 Days Post-Op  Hospital Day: 10     Interval History: NAEO. Hgb decreasing again. One chest drain removed yesterday    Medications:  Continuous Infusions:  Scheduled Meds:   amLODIPine  2.5 mg Per G Tube Daily    aspirin  81 mg Per G Tube Daily    atorvastatin  80 mg Per G Tube Daily    enoxparin  40 mg Subcutaneous Daily    ezetimibe  10 mg Per G Tube Daily    hydrocortisone   Topical (Top) BID    multivitamin  1 tablet Per G Tube Daily    pantoprazole  40 mg Intravenous Daily    polyethylene glycol  17 g Per G Tube Daily    senna-docusate 8.6-50 mg  1 tablet Per G Tube BID    silodosin  4 mg Per G Tube Daily    thiamine  100 mg Per G Tube Daily     PRN Meds:acetaminophen, melatonin, ondansetron, sodium chloride 0.9%     Review of patient's allergies indicates:  No Known Allergies  Objective:     Vital Signs (24h Range):  Temp:  [97.6 °F (36.4 °C)-99.6 °F (37.6 °C)] 98.6 °F (37 °C)  Pulse:  [] 81  Resp:  [16-18] 18  SpO2:  [95 %-100 %] 96 %  BP: (111-139)/(61-77) 120/65       Lines/Drains/Airways       Drain  Duration                  Closed/Suction Drain 02/23/24 1338 Tube - 2 Right;Medial Chest Bulb 15 Fr. 8 days         Closed/Suction Drain 02/23/24 1414 Tube - 3 Left;Anterior Neck Bulb 15 Fr. 8 days         Closed/Suction Drain 02/23/24 1445 Tube - 4 Right;Anterior Neck Bulb 15 Fr. 8 days         Gastrostomy/Enterostomy 02/23/24 0750 Percutaneous endoscopic gastrostomy (PEG) LUQ feeding 8 days              Airway  Duration             Adult Surgical Airway 03/01/24 0700 Ralph Uncuffed 6.0/ 75mm 1 day              Peripheral Intravenous Line  Duration                  Peripheral IV - Single Lumen 02/23/24 0555 18 G Left;Posterior Forearm 9  days         Peripheral IV - Single Lumen 02/23/24 0739 18 G Left;Posterior Forearm 8 days                     Physical Exam     NAD, communicates with writing board  Native tongue edematous/congested poor visualization of the oropharynx; stable  No bleeding from nose  6-0 cuffless trach secured with jesus collar  2 neck MARU in place with serosanguinous drainage   Neck is soft  Neck incision intact with staples  Chest incision intact with staples  Chest is soft, right bulk tunneling to neck 2/2 pec flap  1 MARU in chest with serosanguinous drainage    Significant Labs:  BMP:   Recent Labs   Lab 03/03/24  0316   *      CO2 22*   BUN 27*   CREATININE 0.7   CALCIUM 8.4*   MG 2.1     CBC:   Recent Labs   Lab 03/03/24  0316   WBC 7.83   RBC 2.25*   HGB 7.2*   HCT 22.2*      MCV 99*   MCH 32.0*   MCHC 32.4       Significant Diagnostics:  I have reviewed all pertinent imaging results/findings within the past 24 hours.  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 He had bleeding from the oral cavity post-op that has overall improved.     Hgb decreasing again 3/3, will CTM    ENT   - Keep JPs in place to neck and chest  - Of note, patient cannot be intubated by mouth 2/2 tongue edema from surgery, must maintain tracheostomy    Neuro  - Multimodal pain control    CV  - Currently HDS    Resp  - On trach collar  - Routine trach care  - Trach exchanged to [id number] cuffless 3/1/24, secured w soft collar    FEN/GI  - NPO  - On bolus feeds, tolerating well    Renal  - Cr stable    Heme/ID   - Unasyn for post-op prophylaxis - complete  - Monitor H/H    MSK   - PT/OT/OOB    Ppx: L40    Dispo: pending HH, trach, suction, TF supplies          Mynor Dolan MD  Otorhinolaryngology-Head & Neck Surgery  [facility]

## 2024-03-03 NOTE — RESPIRATORY THERAPY
"RAPID RESPONSE RESPIRATORY THERAPY PROACTIVE NOTE           Time of visit: 833    Code Status: Full Code   : 1955  Bed: [facility]/1026 A:   MRN: [id number]  Time spent at the bedside: < 15 min    SITUATION    Evaluated patient for: LDA Check     BACKGROUND    Patient has a past medical history of Cancer, Hyperlipidemia, and Hypertension.  Clinically Significant Surgical Hx: tracheostomy    24 Hours Vitals Range:  Temp:  [98 °F (36.7 °C)-99.6 °F (37.6 °C)]   Pulse:  []   Resp:  [16-18]   BP: (111-139)/(61-77)   SpO2:  [95 %-100 %]     Labs:    Recent Labs     24  0454 24  0723 24  0316    140 138   K 3.8 4.6 3.9    110 109   CO2 23 24 22*   BUN 22 22 27*   CREATININE 0.7 0.7 0.7   GLU 97 102 117*   PHOS 3.6 3.7 3.6   MG 2.0 2.1 2.1        No results for input(s): "PH", "PCO2", "PO2", "HCO3", "POCSATURATED", "BE" in the last 72 hours.    ASSESSMENT/INTERVENTIONS  Pt resting comfortably, no respiratory concern during visit.      Last VS   Temp: 98 °F (36.7 °C) (741)  Pulse: 80 (741)  Resp: 18 (741)  BP: 129/72 (741)  SpO2: 96 % (808)      Extra trachs at bedside: 6.0 & 4.0 Shiley cuffed  Level of Consciousness: Level of Consciousness (AVPU): alert  Respiratory Effort: Respiratory Effort: Normal, Unlabored Expansion/Accessory Muscle Usage: Expansion/Accessory Muscles/Retractions: no use of accessory muscles, no retractions, expansion symmetric  All Lung Field Breath Sounds: All Lung Fields Breath Sounds: coarse  O2 Device/Concentration: trach collar 5L/21%  Surgical airway: Yes, Type: Shiley Size: 6, uncuffed  Ambu at bedside: Yes     Active Orders   Respiratory Care    Oxygen Continuous     Frequency: Continuous     Number of Occurrences: Until Specified     Order Questions:      Device type: Low flow      Device: Trach Collar      Titrate O2 per Oxygen Titration Protocol: Yes      To maintain SpO2 goal of: >= 92%      Notify MD of: Inability to " achieve desired SpO2; Sudden change in patient status and requires 20% increase in FiO2; Patient requires >60% FiO2    Pulse Oximetry Continuous     Frequency: Continuous     Number of Occurrences: Until Specified    Routine tracheostomy care     Frequency: BID     Number of Occurrences: Until Specified     Order Comments: Suction prn, exchange inner cannula daily or prn, please tape obturator to head of bed, have extra 6-0 and 4-0 cuffed trached available at bedside, have soft tip suction catheters available at bedside         RECOMMENDATIONS    We recommend: RRT Recs: Continue POC per primary team.      FOLLOW-UP    Please call back the Rapid Response RT, Gray Jessica, RRT at x [phone] for any questions or concerns.

## 2024-03-03 NOTE — PT/OT/SLP DISCHARGE
Occupational Therapy Discharge Summary    Timothy Galdamez  MRN: [id number]   Principal Problem: Tongue cancer      Patient Discharged from acute Occupational Therapy on 3/3/24.  Please refer to prior OT note dated 3/3/24 for functional status.    Assessment:      Patient has met all goals and is not appropriate for therapy. Patient is completing all mobility and ADL tasks independently or with help from wife.    Objective:     GOALS:   Multidisciplinary Problems       Occupational Therapy Goals          Problem: Occupational Therapy    Goal Priority Disciplines Outcome Interventions   Occupational Therapy Goal     OT, PT/OT Ongoing, Progressing    Description: Goals to be met by: 3/10/24     Patient will increase functional independence with ADLs by performing:    Feeding with [place].  UE Dressing with Set-up Assistance.  LE Dressing with Set-up Assistance.  Grooming while standing at sink with Modified [place].  Toileting from toilet with Modified [place] for hygiene and clothing management.   Toilet transfer to toilet with Modified [place].                         Reasons for Discontinuation of Therapy Services  Satisfactory goal achievement.      Plan:     Patient Discharged to: Home no OT services needed    3/3/2024

## 2024-03-03 NOTE — SUBJECTIVE & OBJECTIVE
Interval History: NAEO. Hgb decreasing again. One chest drain removed yesterday    Medications:  Continuous Infusions:  Scheduled Meds:   amLODIPine  2.5 mg Per G Tube Daily    aspirin  81 mg Per G Tube Daily    atorvastatin  80 mg Per G Tube Daily    enoxparin  40 mg Subcutaneous Daily    ezetimibe  10 mg Per G Tube Daily    hydrocortisone   Topical (Top) BID    multivitamin  1 tablet Per G Tube Daily    pantoprazole  40 mg Intravenous Daily    polyethylene glycol  17 g Per G Tube Daily    senna-docusate 8.6-50 mg  1 tablet Per G Tube BID    silodosin  4 mg Per G Tube Daily    thiamine  100 mg Per G Tube Daily     PRN Meds:acetaminophen, melatonin, ondansetron, sodium chloride 0.9%     Review of patient's allergies indicates:  No Known Allergies  Objective:     Vital Signs (24h Range):  Temp:  [97.6 °F (36.4 °C)-99.6 °F (37.6 °C)] 98.6 °F (37 °C)  Pulse:  [] 81  Resp:  [16-18] 18  SpO2:  [95 %-100 %] 96 %  BP: (111-139)/(61-77) 120/65       Lines/Drains/Airways       Drain  Duration                  Closed/Suction Drain 02/23/24 1338 Tube - 2 Right;Medial Chest Bulb 15 Fr. 8 days         Closed/Suction Drain 02/23/24 1414 Tube - 3 Left;Anterior Neck Bulb 15 Fr. 8 days         Closed/Suction Drain 02/23/24 1445 Tube - 4 Right;Anterior Neck Bulb 15 Fr. 8 days         Gastrostomy/Enterostomy 02/23/24 0750 Percutaneous endoscopic gastrostomy (PEG) LUQ feeding 8 days              Airway  Duration             Adult Surgical Airway 03/01/24 0700 Ralph Burrelluffed 6.0/ 75mm 1 day              Peripheral Intravenous Line  Duration                  Peripheral IV - Single Lumen 02/23/24 0555 18 G Left;Posterior Forearm 9 days         Peripheral IV - Single Lumen 02/23/24 0739 18 G Left;Posterior Forearm 8 days                     Physical Exam     NAD, communicates with writing board  Native tongue edematous/congested poor visualization of the oropharynx; stable  No bleeding from nose  6-0 cuffless trach secured with jesus  collar  2 neck MARU in place with serosanguinous drainage   Neck is soft  Neck incision intact with staples  Chest incision intact with staples  Chest is soft, right bulk tunneling to neck 2/2 pec flap  1 MARU in chest with serosanguinous drainage    Significant Labs:  BMP:   Recent Labs   Lab 03/03/24  0316   *      CO2 22*   BUN 27*   CREATININE 0.7   CALCIUM 8.4*   MG 2.1     CBC:   Recent Labs   Lab 03/03/24  0316   WBC 7.83   RBC 2.25*   HGB 7.2*   HCT 22.2*      MCV 99*   MCH 32.0*   MCHC 32.4       Significant Diagnostics:  I have reviewed all pertinent imaging results/findings within the past 24 hours.

## 2024-03-03 NOTE — PLAN OF CARE
[facility]    HOME HEALTH ORDERS  FACE TO FACE ENCOUNTER    Patient Name: Timothy Galdamez  YOB: 1955     Physician: Dr. 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 Sae Sandhu      Acute blood loss anemia [D62]  No    Hypokalemia [E87.6]  No    Hypoalbuminemia [E88.09]  Yes    Hypophosphatemia [E83.39]  No    S/P percutaneous endoscopic gastrostomy (PEG) tube placement [Z93.1]  Not Applicable    Status post tracheostomy [Z93.0]  Not Applicable    On supplemental oxygen therapy [Z99.81]  Not Applicable    Primary hypertension [I10]  Yes    Hyperlipidemia [E78.5]  Yes      Resolved Hospital Problems   No resolved problems to display.         Follow up Appointment: 1 week following discharge home    I have seen and examined this patient face to face today. My clinical findings that support the need for the home health skilled services and home bound status are the following:  Tracheostomy and PEG tube care    Allergies:  No Known Allergies    Diet: Tube feed diet:  Formula: [employer] Peptide 1.5  4 cans per day. 250 cc FWF QID. Hold for any nausea, vomiting, or fullness. Hold for residuals >500 cc.    Activities: Light activity only. No heavy lifting, straining, stooping, exercising.       Nursing:   SN to complete comprehensive assessment including routine vital signs. Instruct on disease process and s/s of complications to report to MD. Review/verify medication list sent home with the patient at time of discharge  and instruct patient/caregiver as needed. Frequency may be adjusted depending on start of care date.    Notify MD if SBP > 160 or < 90; DBP > 90 or < 50; HR > 120 or < 50; Temp > 101; Other:  Problems with incisions    CONSULTS:      None        WOUND CARE ORDERS  No heavy lifting > 10 lbs x 2 weeks. Keep incisions clean and dry. Keep open to air .Okay to get wet. DO NOT scrub, soak, or submerge incision. Pat to dry.       Medications: Review discharge medications with patient and family and provide education.       Medication List        ASK your doctor about these medications      amLODIPine 2.5 MG tablet  Commonly known as: NORVASC  Take 1 tablet (2.5 mg total) by mouth once daily.     aspirin 81 MG EC tablet  Commonly known as: ECOTRIN     calcium carbonate 600 mg calcium (1,500 mg) Tab  Commonly known as: OS-HERMES     ezetimibe 10 mg tablet  Commonly known as: ZETIA  Take 1 tablet (10 mg total) by mouth once daily.     HYDROcodone-acetaminophen 5-325 mg per tablet  Commonly known as: NORCO  Take 1 tablet by mouth every 6 (six) hours as needed for Pain.     NON FORMULARY MEDICATION     OMEGA-3 2100 ORAL     omeprazole 40 MG capsule  Commonly known as: PRILOSEC  Take 1 capsule (40 mg total) by mouth once daily.     ondansetron 4 MG Tbdl  Commonly known as: ZOFRAN-ODT  Dissolve 1 tablet (4 mg total) by mouth every 8 (eight) hours as needed (nausea).     ONE DAILY MULTIVITAMIN per tablet  Generic drug: multivitamin     rosuvastatin 20 MG tablet  Commonly known as: CRESTOR  TAKE 1 TABLET ONE TIME DAILY     VITAMIN C 100 MG tablet  Generic drug: ascorbic acid (vitamin C)     VITAMIN D ORAL     vitamin E 100 UNIT capsule                I certify that this patient is confined to  home and needs nursing care, physical therapy, speech therapy and occupational therapy.  Please call our office for any problems or concerns.    Mynor Dolan MD  Otolaryngology-Head and Neck Surgery

## 2024-03-03 NOTE — NURSING
[facility] Plan of Care Note  Dx Tongue cancer    Shift Events: PRN tylenol and melatonin given    Goals of Care: pain management, airway management    Neuro: A&Ox4    Vital Signs: WDL    Respiratory: ATC    Diet: NPO    Is patient tolerating current diet? yes    GTTS: none    Urine Output/Bowel Movement: continentx2    Drains/Tubes/Tube Feeds (include total output/shift): PEG, JPx3    Lines: 18G LFAx2      Accuchecks:none    Skin: incisions chest and neck, flap    Fall Risk Score: see flowsheet    Activity level? See flowsheet    Any scheduled procedures? none    Any safety concerns? falls    Other: none

## 2024-03-03 NOTE — ASSESSMENT & PLAN NOTE
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 He had bleeding from the oral cavity post-op that has overall improved.     Hgb decreasing again 3/3, will CTM    ENT   - Keep JPs in place to neck and chest  - Of note, patient cannot be intubated by mouth 2/2 tongue edema from surgery, must maintain tracheostomy    Neuro  - Multimodal pain control    CV  - Currently HDS    Resp  - On trach collar  - Routine trach care  - Trach exchanged to 6UN75H cuffless 3/1/24, secured w soft collar    FEN/GI  - NPO  - On bolus feeds, tolerating well    Renal  - Cr stable    Heme/ID   - Unasyn for post-op prophylaxis - complete  - Monitor H/H    MSK   - PT/OT/OOB    Ppx: L40    Dispo: pending HH, trach, suction, TF supplies

## 2024-03-04 LAB
ERYTHROCYTE [DISTWIDTH] IN BLOOD BY AUTOMATED COUNT: 14.4 % (ref 11.5–14.5)
HCT VFR BLD AUTO: 22.5 % (ref 40–54)
HGB BLD-MCNC: 7.2 G/DL (ref 14–18)
MCH RBC QN AUTO: 31.9 PG (ref 27–31)
MCHC RBC AUTO-ENTMCNC: 32 G/DL (ref 32–36)
MCV RBC AUTO: 100 FL (ref 82–98)
PLATELET # BLD AUTO: 348 K/UL (ref 150–450)
PMV BLD AUTO: 11.4 FL (ref 9.2–12.9)
RBC # BLD AUTO: 2.26 M/UL (ref 4.6–6.2)
WBC # BLD AUTO: 10.4 K/UL (ref 3.9–12.7)

## 2024-03-04 PROCEDURE — 94761 N-INVAS EAR/PLS OXIMETRY MLT: CPT

## 2024-03-04 PROCEDURE — 97535 SELF CARE MNGMENT TRAINING: CPT

## 2024-03-04 PROCEDURE — 25000003 PHARM REV CODE 250: Performed by: STUDENT IN AN ORGANIZED HEALTH CARE EDUCATION/TRAINING PROGRAM

## 2024-03-04 PROCEDURE — 36415 COLL VENOUS BLD VENIPUNCTURE: CPT

## 2024-03-04 PROCEDURE — 99900026 HC AIRWAY MAINTENANCE (STAT)

## 2024-03-04 PROCEDURE — 63600175 PHARM REV CODE 636 W HCPCS

## 2024-03-04 PROCEDURE — 99900035 HC TECH TIME PER 15 MIN (STAT)

## 2024-03-04 PROCEDURE — C9113 INJ PANTOPRAZOLE SODIUM, VIA: HCPCS

## 2024-03-04 PROCEDURE — 20600001 HC STEP DOWN PRIVATE ROOM

## 2024-03-04 PROCEDURE — 25000003 PHARM REV CODE 250

## 2024-03-04 PROCEDURE — 92507 TX SP LANG VOICE COMM INDIV: CPT

## 2024-03-04 PROCEDURE — 85027 COMPLETE CBC AUTOMATED: CPT

## 2024-03-04 RX ORDER — AMOXICILLIN AND CLAVULANATE POTASSIUM 875; 125 MG/1; MG/1
1 TABLET, FILM COATED ORAL EVERY 12 HOURS
Status: DISCONTINUED | OUTPATIENT
Start: 2024-03-04 | End: 2024-03-06

## 2024-03-04 RX ADMIN — AMLODIPINE BESYLATE 2.5 MG: 10 TABLET ORAL at 08:03

## 2024-03-04 RX ADMIN — PANTOPRAZOLE SODIUM 40 MG: 40 INJECTION, POWDER, FOR SOLUTION INTRAVENOUS at 08:03

## 2024-03-04 RX ADMIN — ACETAMINOPHEN 650 MG: 160 SOLUTION ORAL at 10:03

## 2024-03-04 RX ADMIN — ASPIRIN 81 MG CHEWABLE TABLET 81 MG: 81 TABLET CHEWABLE at 08:03

## 2024-03-04 RX ADMIN — ENOXAPARIN SODIUM 40 MG: 40 INJECTION SUBCUTANEOUS at 05:03

## 2024-03-04 RX ADMIN — SENNOSIDES AND DOCUSATE SODIUM 1 TABLET: 8.6; 5 TABLET ORAL at 10:03

## 2024-03-04 RX ADMIN — ACETAMINOPHEN 650 MG: 160 SOLUTION ORAL at 08:03

## 2024-03-04 RX ADMIN — SENNOSIDES AND DOCUSATE SODIUM 1 TABLET: 8.6; 5 TABLET ORAL at 08:03

## 2024-03-04 RX ADMIN — Medication 6 MG: at 10:03

## 2024-03-04 RX ADMIN — AMOXICILLIN AND CLAVULANATE POTASSIUM 1 TABLET: 875; 125 TABLET, FILM COATED ORAL at 10:03

## 2024-03-04 RX ADMIN — Medication 100 MG: at 08:03

## 2024-03-04 RX ADMIN — SILODOSIN 4 MG: 4 CAPSULE ORAL at 08:03

## 2024-03-04 RX ADMIN — THERA TABS 1 TABLET: TAB at 08:03

## 2024-03-04 RX ADMIN — AMOXICILLIN AND CLAVULANATE POTASSIUM 1 TABLET: 875; 125 TABLET, FILM COATED ORAL at 11:03

## 2024-03-04 RX ADMIN — EZETIMIBE 10 MG: 10 TABLET ORAL at 08:03

## 2024-03-04 RX ADMIN — ATORVASTATIN CALCIUM 80 MG: 40 TABLET, FILM COATED ORAL at 08:03

## 2024-03-04 NOTE — PROGRESS NOTES
[facility]  Otorhinolaryngology-Head & Neck Surgery  Progress Note    Subjective:     Post-Op Info:  Procedure(s) (LRB):  GLOSSECTOMY (Right)  DISSECTION, NECK (Bilateral)  TRACHEOTOMY (N/A)  CREATION, FLAP, MUSCLE ROTATION (N/A)  INSERTION, PEG TUBE (Right)   10 Days Post-Op  Hospital Day: 11     Interval History: Patient feeling well this morning. He did have a fever overnight to 101.5. He denies any difficulty breathing or painful neck swelling.    Medications:  Continuous Infusions:  Scheduled Meds:   amLODIPine  2.5 mg Per G Tube Daily    aspirin  81 mg Per G Tube Daily    atorvastatin  80 mg Per G Tube Daily    enoxparin  40 mg Subcutaneous Daily    ezetimibe  10 mg Per G Tube Daily    hydrocortisone   Topical (Top) BID    multivitamin  1 tablet Per G Tube Daily    pantoprazole  40 mg Intravenous Daily    polyethylene glycol  17 g Per G Tube Daily    senna-docusate 8.6-50 mg  1 tablet Per G Tube BID    silodosin  4 mg Per G Tube Daily    thiamine  100 mg Per G Tube Daily     PRN Meds:acetaminophen, melatonin, ondansetron, sodium chloride 0.9%     Review of patient's allergies indicates:  No Known Allergies  Objective:     Vital Signs (24h Range):  Temp:  [97.7 °F (36.5 °C)-101.5 °F (38.6 °C)] 100 °F (37.8 °C)  Pulse:  [] 89  Resp:  [18-19] 19  SpO2:  [93 %-99 %] 95 %  BP: (118-129)/(58-76) 118/68       Lines/Drains/Airways       Drain  Duration                  Closed/Suction Drain 02/23/24 1338 Tube - 2 Right;Medial Chest Bulb 15 Fr. 9 days         Closed/Suction Drain 02/23/24 1414 Tube - 3 Left;Anterior Neck Bulb 15 Fr. 9 days         Closed/Suction Drain 02/23/24 1445 Tube - 4 Right;Anterior Neck Bulb 15 Fr. 9 days         Gastrostomy/Enterostomy 02/23/24 0750 Percutaneous endoscopic gastrostomy (PEG) LUQ feeding 9 days              Airway  Duration             Adult Surgical Airway 03/01/24 0700 Ralph Uncuffed 6.0/ 75mm 2 days              Peripheral Intravenous Line  Duration                   Peripheral IV - Single Lumen 02/23/24 0555 18 G Left;Posterior Forearm 10 days         Peripheral IV - Single Lumen 02/23/24 0739 18 G Left;Posterior Forearm 9 days                     Physical Exam  NAD, communicates with writing board  Native tongue edematous but improved  No bleeding from nose  6-0 cuffless trach secured with jesus collar  2 neck MARU in place with serosanguinous drainage  Neck is soft  Neck incision intact with staples  Chest incision intact with staples  Chest is soft, right bulk tunneling to neck 2/2 pec flap  1 MARU in chest with serosanguinous drainage     Significant Labs:  CBC:   Recent Labs   Lab 03/03/24  0316   WBC 7.83   RBC 2.25*   HGB 7.2*   HCT 22.2*      MCV 99*   MCH 32.0*   MCHC 32.4     CMP:   Recent Labs   Lab 03/03/24 0316   *   CALCIUM 8.4*      K 3.9   CO2 22*      BUN 27*   CREATININE 0.7       Significant Diagnostics:  None  Assessment/Plan:     * Tongue cancer  The patient is a 68 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 He had bleeding from the oral cavity post-op that has overall improved.     Continue to monitor Hb. Pending CBC and CXR for fever overnight.    ENT   - Keep JPs in place to neck and chest  - Of note, patient cannot be intubated by mouth 2/2 tongue edema from surgery, must maintain tracheostomy    Neuro  - Multimodal pain control    CV  - Currently HDS    Resp  - On trach collar  - Routine trach care  - Trach exchanged to 6UN75H cuffless 3/1/24, secured w soft collar    FEN/GI  - NPO  - On bolus feeds, tolerating well    Renal  - Cr stable    Heme/ID   - Unasyn for post-op prophylaxis - complete  - Monitor H/H  - Antibiotics pending fever work-up    MSK   - PT/OT/OOB    Ppx: L40    Dispo: pending HH, trach, suction, TF supplies          Trixie Sandy MD  Otorhinolaryngology-Head & Neck Surgery  [facility]

## 2024-03-04 NOTE — PLAN OF CARE
BEATRIS contacted Arline with [facility] ([phone]) for follow up on referral placed for suction machine and trache supplies.   CM informed that Trach Patient Order form needs to be completed and returned to [facility] for completion of referral.    CM informed ENT service.     Patient has received multiple denials for home health service.   HH referral is currently under review with [facility].   Home health orders entered in [facility] for review.    UPDATE (1:32 PM):  [facility]  was unable to accept patient.   No accepting home health agencies at this time.   Additional home health referrals sent to:    *[facility]   *[facility]   *[facility]   *[facility]  *[facility]  *[facility]   *[facility]   *[facility]

## 2024-03-04 NOTE — DISCHARGE INSTRUCTIONS
DISCHARGE INSTRUCTIONS TRACHEOSTOMY    Tracheostomy Model: 6UN75H [facility] 6-0 Uncuffed    Home Supplies Needed:  Suction machine  Suction tubing  Suction catheters  Extra trach tube  Inner cannula  Trach ties / soft collar    HOW TO CARE FOR TRACHEOSTOMY    About this topic   A tracheostomy or trach is an opening made by a cut in your neck. The opening is called a stoma. A small tube is placed through the stoma. It goes into your windpipe or trachea. This is called a trach tube. It helps keep your airway open and removes secretions from your lungs.  A trach is done for many reasons. Some people need it to help them breathe more easily. Others have health problems caused by an injury, scarring, or surgeries. Some trachs are needed only for a short time. Others are needed for a long time. A trach may be connected to a breathing machine or covered with a small cap.    General   It is important to learn to care for your trach. You will need many supplies to help you. Keep all supplies together in one place and set them up before use. Practice caring for your trach before going home. Be sure you are comfortable caring for your trach. Be sure to ask your doctor or nurse any questions that you may have.  Learn the parts of your trach tube.  Inner cannula ? Fits inside the outer cannula. Locks into the outer cannula to keep it from being coughed out. It should be removed and cleaned more often.  Trach plate ? Has holes on the sides. This is where ties are placed and then tied around the neck. This may also be called the flange.  Trach ties ? These secure the trach to help keep it from falling out.  Obturator ? Act as a guide to help place the trach tube in the trachea.  Cuff ? This may be made of foam or a balloon. It helps block air from leaking back out of the windpipe.    How to Replace the Inner Cannula   Wash your hands carefully.  Remove the inner cannula from your trach tube.  Put the new inner cannula back in the  trach tube and carefully lock it in place.  Replace it if you think mucus is plugged at the end of the outer cannula.   Wash your hands carefully.    How to Change the Tracheostomy Ties   You may need someone to help you hold the trach tube so it will not move out of place.  Remove the old ties.  Put one end of each tie into the opening of the neck plate.  Secure both ends together to the side of your neck. Make sure that only one finger can slip under it.    How to Suction the Trach Tube   It is important to keep the trach tube free of thick mucus. Always have a cloth or tissue ready when you cough. This will help catch the mucus coming out from your tube. If you have trouble breathing or hear noisy breathing you may need to use a suction machine to help clear your trach tube.  Wash your hands carefully.  Attach the suction tube to the suction machine.  Turn the suction machine on.  Take a deep breath. Gently put the suction tube into your trach tube. Do not apply the suction while putting in the tube. If you feel uneasy, remove the suction tube slightly.  Cover the suction control with your thumb to apply suction. Gently rotate the tube while removing it as you apply suction.  Take a deep breath after you suction.  Soak the suction tube in water and rinse carefully.  Wash your hands carefully.  Talk to your doctor if you are not able to clear your secretions.    How to Keep Your Breathing Moist   Keep relative humidity in your home. Keep a humidifier in your living area. Sleep in a cool room at night.  Drink lots of fluids to keep mucus thin. Drink at least 6 to 8 glasses of water a day.     What lifestyle changes are needed?   While your trach tube is in place, you may not be able to speak for a while. You may have breathing and vocal changes. You may need to communicate with other people by:  Writing on a piece of paper  Gestures or lip reading  Communication board, such as a dry erase board or a picture  board  Electronic devices like artificial sound source  Use of a talking trach tube. An outside air source is used to force air into your vocal cords. Your doctor and a speech therapist can teach you how to do this.    Will there be any other care needed?   Do not get water in your trach tube. Use a movable shower head to control water flow. Cover the tube with plastic or clothing to keep water away from your trach tube. Avoid swimming pools. Water can easily flow into your trach tube. You cannot hold your breath under water, and if the trach tube is submerged, you can easily drown.  Keep small elements from getting into your trach tube. Take extra care around food bits, dust, powders, and sprays. Cover your tracheostomy with light clothing or scarves. This is helpful when going outdoors to keep you from getting infections.  If your trach tube falls out, tilt your back to keep the stoma wider.  Practice proper mouth care and hand washing.  Get a flu shot each year and pneumonia shot as often as your doctor advises.  Avoid crowded places and people. Try to avoid people who are sick.  It is important to always do trach care in a clean environment.    When do I need to call the doctor?   Go to the ER right away if:  Your trach tube falls out and you cannot replace it.  Call the doctor for:  Signs of wound infection. These include swelling, redness, warmth around the wound; too much pain when touched; yellowish, greenish, or bloody discharge; foul smell coming from the cut site; cut site opens up.  Trouble breathing that is not relieved by clearing out the secretions    Helpful tips   Do not smoke and avoid secondhand smoke.  Avoid triggers that can make your breathing worse such as very cold air.    Teach Back: Helping You Understand   The Teach Back Method helps you understand the information we are giving you. After you talk with the staff, tell them in your own words what you learned. This helps to make sure the  staff has described each thing clearly. It also helps to explain things that may have been confusing. Before going home, make sure you can do these:  I can tell you about my procedure.  I can tell you how I will care for my trach and how to clean the cannula.  I can tell you how to suction and change my trach.  I can tell you what I will do if I have trouble breathing or cannot replace my trach tube.       Discharge home draine care instructions:    Measure output of drain twice per day. When output is less than 30cc over 24 hours, call clinic to schedule appointment to remove.    Your RN should provide home drain care instructions prior to discharge. Patient to strip drain and record output three times per day. Please call clinic and notify nurse of drain output the morning of follow up appointment.      Home drain care  Dont sleep on the same side as the tube.  Secure the tube and bag inside your clothing with a safety pin. This helps keep the tube from being pulled out.  Empty your drain at least twice a day. Empty it more often if the drain is full. Wash  and dry your hands before emptying the drain.  Lift the opening on the drain.  Drain the fluid into a measuring cup.  Record the amount of fluid each time you empty the drain. Include the date and time it was emptied. Share this information with your doctor on your next visit.  Squeeze the bulb with your hands until you hear air coming out of the bulb.   Close the opening.  Wash your hands again.Wet a cotton swab and clean the skin around the incision and tube site. Use normal saline solution (salt and water). Or, you can use warm, soapy water.  Keep the tube site dry when you shower, until your follow-up appt it may be easiest to simply bathe from the neck down to avoid getting the drain site wet.   Stripping the tube helps keep blood clots from blocking the tube. You should strip the tube 3x per day.  Hold the tubing where it leaves the skin, with one  hand. This keeps it from pulling on the skin.  Pinch the tubing with the thumb and first finger of your other hand.  Slowly and firmly pull your thumb and first finger down the tubing. You may find it helpful to hold an alcohol swab between your fingers and the tube to lubricate the tubing.  If the pulling hurts or feels like its coming out of the skin, stop. Begin again more gently.    When to seek medical care and call your healthcare provider:  New or increased pain around the tube  Sudden increase in redness, swelling, or warmth around the incision or tube  Drainage that is foul-smelling  Vomiting  Fever of 100.4°F (38°C)  Excessive fluid/blood leaking around the tube  Incision seems not to be healing  Stitches become loose  Tube falls out or breaks  Drainage that changes from light pink to dark red  Blood clots in the drainage bulb preventing any drainage  A sudden increase or decrease in the amount of drainage (over 30 mL)

## 2024-03-04 NOTE — RESPIRATORY THERAPY
"RAPID RESPONSE RESPIRATORY THERAPY PROACTIVE NOTE           Time of visit: 752     Code Status: Full Code   : 1955  [facility]   MRN: [id number]  Time spent at the bedside: < 15 min    SITUATION    Evaluated patient for: LDA Check     BACKGROUND    Patient has a past medical history of Cancer, Hyperlipidemia, and Hypertension.  Clinically Significant Surgical Hx: tracheostomy    24 Hours Vitals Range:  Temp:  [97.7 °F (36.5 °C)-101.5 °F (38.6 °C)]   Pulse:  []   Resp:  [18-20]   BP: (118-137)/(58-76)   SpO2:  [90 %-99 %]     Labs:    Recent Labs     24  0723 24  0316    138   K 4.6 3.9    109   CO2 24 22*   BUN 22 27*   CREATININE 0.7 0.7    117*   PHOS 3.7 3.6   MG 2.1 2.1        No results for input(s): "PH", "PCO2", "PO2", "HCO3", "POCSATURATED", "BE" in the last 72 hours.    ASSESSMENT/INTERVENTIONS  All safety supplies visible at bedside.  No respiratory concerns at this time.      Last VS   Temp: 98.6 °F (37 °C) (826)  Pulse: 85 (826)  Resp: 20 (826)  BP: 137/71 (826)  SpO2: 90 % (826)      Extra trachs at bedside: 4, 6, 8 cuffed  Level of Consciousness: Level of Consciousness (AVPU): alert  Respiratory Effort: Respiratory Effort: Unlabored Expansion/Accessory Muscle Usage: Expansion/Accessory Muscles/Retractions: expansion symmetric, no retractions, no use of accessory muscles  All Lung Field Breath Sounds: All Lung Fields Breath Sounds: Anterior:, Lateral:, diminished, rhonchi  O2 Device/Concentration: 5L 21% TC  Surgical airway: Yes, Type: Shiley Size: 6, uncuffed  Ambu at bedside:       Active Orders   Respiratory Care    Oxygen Continuous     Frequency: Continuous     Number of Occurrences: Until Specified     Order Questions:      Device type: Low flow      Device: Trach Collar      Titrate O2 per Oxygen Titration Protocol: Yes      To maintain SpO2 goal of: >= 92%      Notify MD of: Inability to achieve desired SpO2; " Sudden change in patient status and requires 20% increase in FiO2; Patient requires >60% FiO2    Pulse Oximetry Continuous     Frequency: Continuous     Number of Occurrences: Until Specified    Routine tracheostomy care     Frequency: BID     Number of Occurrences: Until Specified     Order Comments: Suction prn, exchange inner cannula daily or prn, please tape obturator to head of bed, have extra 6-0 and 4-0 cuffed trached available at bedside, have soft tip suction catheters available at bedside         RECOMMENDATIONS    We recommend: RRT Recs: Continue POC per primary team.      FOLLOW-UP    Please call back the Rapid Response RT, Sonia Robin, RRT at x [phone] for any questions or concerns.

## 2024-03-04 NOTE — ASSESSMENT & PLAN NOTE
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 He had bleeding from the oral cavity post-op that has overall improved.     Continue to monitor Hb. Pending CBC and CXR for fever overnight.    ENT   - Keep JPs in place to neck and chest  - Of note, patient cannot be intubated by mouth 2/2 tongue edema from surgery, must maintain tracheostomy    Neuro  - Multimodal pain control    CV  - Currently HDS    Resp  - On trach collar  - Routine trach care  - Trach exchanged to 6UN75H cuffless 3/1/24, secured w soft collar    FEN/GI  - NPO  - On bolus feeds, tolerating well    Renal  - Cr stable    Heme/ID   - Unasyn for post-op prophylaxis - complete  - Monitor H/H  - Antibiotics pending fever work-up    MSK   - PT/OT/OOB    Ppx: L40    Dispo: pending HH, trach, suction, TF supplies

## 2024-03-04 NOTE — PROGRESS NOTES
[facility]  Adult Nutrition  Progress Note    SUMMARY       Recommendations    1.) Continue Bolus Tube Feeding; [employer] Peptide 1.5 - 4 cans/day to provide 2000 kcals, 96 g PRO, 912 ml fluid w/ additional FWF per MD     2.) Monitor for s/s of intolerance such as residuals >500ml, n/v/d, or abdominal distension.       3.) RD to monitor wt, tolerance, skin, labs, vent settings.    Goals: to meet % of EEN/EPN by next RD f/u  Nutrition Goal Status: goal met  Communication of RD Recs:  (POC)    Assessment and Plan    Nutrition Problem  Inadequate oral intake     Related to (etiology):   Inability to consume adequate nutrition     Signs and Symptoms (as evidenced by):   NPO, need for EN      Interventions/Recommendations (treatment strategy):  Collaboration of nutritional care with other providers.  EN     Nutrition Diagnosis Status:   Continues    Reason for Assessment    Reason For Assessment: RD follow-up  Diagnosis: cancer diagnosis/related complications (Tongue cancer; S/p glossectomy, tracheotomy, PEG- placed)  Relevant Medical History: HTN, HLD  Interdisciplinary Rounds: did not attend  General Information Comments: RD f/u. Pt receiving bolus tube feeding. Denies N/V/D/C or bloating. #. Noted with 5.3% wt loss in 5 months (not significant). RD following.  Nutrition Discharge Planning: adequate nutritional intake via EN    Nutrition Risk Screen    Nutrition Risk Screen: tube feeding or parenteral nutrition    Nutrition/Diet History    Spiritual, Cultural Beliefs, Alevism Practices, Values that Affect Care: no    Anthropometrics    Temp: 97.7 °F (36.5 °C)  Height: 6' (182.9 cm)  Height (inches): 72 in  Weight Method: Bed Scale  Weight: 80 kg (176 lb 5.9 oz)  Weight (lb): 176.37 lb  Ideal Body Weight (IBW), Male: 178 lb  % Ideal Body Weight, Male (lb): 98.31 %  BMI (Calculated): 23.9       Lab/Procedures/Meds    Pertinent Labs Reviewed: reviewed  Pertinent Labs Comments: H/h: 7.2/22.5, mcv:  100, mch: 31.9, BUN: 27, Glu: 117  Pertinent Medications Reviewed: reviewed  Pertinent Medications Comments: Abx, Atorvastatin, enoxaparin, mvi, pantoprazole, polyethylene glycol, senna-docusate, thiamine      Estimated/Assessed Needs    Weight Used For Calorie Calculations: 79.4 kg (175 lb 0.7 oz)  Energy Calorie Requirements (kcal): 1588- 1985 kcal  Energy Need Method: Kcal/kg (20-25 kcal/kg)  Protein Requirements: 95- 119g  Weight Used For Protein Calculations: 79.4 kg (175 lb 0.7 oz)  Fluid Requirements (mL): 1ml/1kcal or per MD  Estimated Fluid Requirement Method: RDA Method  RDA Method (mL): 1588         Nutrition Prescription Ordered    Current Diet Order: NPO  Current Nutrition Support Formula Ordered: [employer] Peptide 1.5  Current Nutrition Support Rate Ordered: 4 (ml) (bolus)  Current Nutrition Support Frequency Ordered: Bolus 4 cans/day    Evaluation of Received Nutrient/Fluid Intake    Enteral Calories (kcal): 2000  Enteral Protein (gm): 96  Enteral (Free Water) Fluid (mL): 912  Other Calories (kcal):  (-)  % Kcal Needs: 100  % Protein Needs: 100  I/O: -8.3 L since admit  Energy Calories Required: meeting needs  Protein Required: meeting needs  Fluid Required:  (as per MD)  Comments: LBM 3/3  % Intake of Estimated Energy Needs: 75 - 100 %  % Meal Intake: NPO    Nutrition Risk    Level of Risk/Frequency of Follow-up:  (RD to f/u x 1-2/week)     Monitor and Evaluation    Food and Nutrient Intake: energy intake, enteral nutrition intake  Food and Nutrient Adminstration: enteral and parenteral nutrition administration  Physical Activity and Function: nutrition-related ADLs and IADLs  Anthropometric Measurements: weight, weight change, body mass index  Biochemical Data, Medical Tests and Procedures: electrolyte and renal panel, gastrointestinal profile, glucose/endocrine profile, inflammatory profile, lipid profile  Nutrition-Focused Physical Findings: overall appearance, skin     Nutrition Follow-Up    RD  Follow-up?: Yes    Mary Moran RD, LDN

## 2024-03-04 NOTE — PLAN OF CARE
Recommendations    1.) Continue Bolus Tube Feeding; [employer] Peptide 1.5 - 4 cans/day to provide 2000 kcals, 96 g PRO, 912 ml fluid w/ additional FWF per MD     2.) Monitor for s/s of intolerance such as residuals >500ml, n/v/d, or abdominal distension.       3.) RD to monitor wt, tolerance, skin, labs, vent settings.    Goals: to meet % of EEN/EPN by next RD f/u  Nutrition Goal Status: goal met  Communication of RD Recs:  (POC)

## 2024-03-04 NOTE — SUBJECTIVE & OBJECTIVE
Interval History: Patient feeling well this morning. He did have a fever overnight to 101.5. He denies any difficulty breathing or painful neck swelling.    Medications:  Continuous Infusions:  Scheduled Meds:   amLODIPine  2.5 mg Per G Tube Daily    aspirin  81 mg Per G Tube Daily    atorvastatin  80 mg Per G Tube Daily    enoxparin  40 mg Subcutaneous Daily    ezetimibe  10 mg Per G Tube Daily    hydrocortisone   Topical (Top) BID    multivitamin  1 tablet Per G Tube Daily    pantoprazole  40 mg Intravenous Daily    polyethylene glycol  17 g Per G Tube Daily    senna-docusate 8.6-50 mg  1 tablet Per G Tube BID    silodosin  4 mg Per G Tube Daily    thiamine  100 mg Per G Tube Daily     PRN Meds:acetaminophen, melatonin, ondansetron, sodium chloride 0.9%     Review of patient's allergies indicates:  No Known Allergies  Objective:     Vital Signs (24h Range):  Temp:  [97.7 °F (36.5 °C)-101.5 °F (38.6 °C)] 100 °F (37.8 °C)  Pulse:  [] 89  Resp:  [18-19] 19  SpO2:  [93 %-99 %] 95 %  BP: (118-129)/(58-76) 118/68       Lines/Drains/Airways       Drain  Duration                  Closed/Suction Drain 02/23/24 1338 Tube - 2 Right;Medial Chest Bulb 15 Fr. 9 days         Closed/Suction Drain 02/23/24 1414 Tube - 3 Left;Anterior Neck Bulb 15 Fr. 9 days         Closed/Suction Drain 02/23/24 1445 Tube - 4 Right;Anterior Neck Bulb 15 Fr. 9 days         Gastrostomy/Enterostomy 02/23/24 0750 Percutaneous endoscopic gastrostomy (PEG) LUQ feeding 9 days              Airway  Duration             Adult Surgical Airway 03/01/24 0700 Ralph Uncuffed 6.0/ 75mm 2 days              Peripheral Intravenous Line  Duration                  Peripheral IV - Single Lumen 02/23/24 0555 18 G Left;Posterior Forearm 10 days         Peripheral IV - Single Lumen 02/23/24 0739 18 G Left;Posterior Forearm 9 days                     Physical Exam  NAD, communicates with writing board  Native tongue edematous but improved  No bleeding from nose  6-0  cuffless trach secured with jesus collar  2 neck MARU in place with serosanguinous drainage  Neck is soft  Neck incision intact with staples  Chest incision intact with staples  Chest is soft, right bulk tunneling to neck 2/2 pec flap  1 MARU in chest with serosanguinous drainage     Significant Labs:  CBC:   Recent Labs   Lab 03/03/24 0316   WBC 7.83   RBC 2.25*   HGB 7.2*   HCT 22.2*      MCV 99*   MCH 32.0*   MCHC 32.4     CMP:   Recent Labs   Lab 03/03/24 0316   *   CALCIUM 8.4*      K 3.9   CO2 22*      BUN 27*   CREATININE 0.7       Significant Diagnostics:  None

## 2024-03-04 NOTE — PT/OT/SLP PROGRESS
Orders were placed and lab appt scheduled. Patient understood and had no further questions at this time.   Speech Language Pathology Treatment    Patient Name:  Timothy Galdamez   MRN:  [id number]  Admitting Diagnosis: Tongue cancer    Recommendations:               General Recommendations:  Dysphagia therapy  Diet recommendations:  NPO, NPO    - Continue with PEG as primary means of nutrition   Aspiration Precautions: Strict aspiration precautions   General Precautions: Standard, fall, aspiration  Communication strategies:  none  PMSV Precautions  Work towards wearing speaking valve all waking hours as tolerated   Please note, to clean valve:    1. Swish valve in pure soap and warm water,   2. Rinse valve thoroughly in warm running water   3. Allow valve to air dry thoroughly before placing it in storage container  4. DO NOT use hot water, peroxide, bleach, vinegar, alcohol, brushes, or cotton swabs to clean valve.      Assessment:     Timothy Galdamez is a 68 y.o. male with an SLP diagnosis of S/P Trach and One Way Speaking Valve Training.    Subjective     Pt awake and alert   Spouse present     Pain/Comfort:  Pain Rating Post-Intervention 1: 0/10    Respiratory Status:  trach collar     Objective:     Has the patient been evaluated by SLP for swallowing?   Yes  Keep patient NPO? No   Current Respiratory Status:        Pt seen for ongoing speaking valve training. Pt awake and upright in bedside chair upon arrival. Pt and spouse report having worn speaking valve periodically over the weekend with good success to communicate with visitors. Pt does remain with some thick blood laden tracheal secretions which he is able to successfully expectorate. Despite good strong cough inner cannula of trach remained partially occluded with tracheal secretions and SLP exchanged.  SLP donned speaking valve and pt tolerated for 20 minutes without difficulty. Vocal quality strong and clear. Despite articulation deficits s/p glossectomy + muscle flap pt speech intelligibility ~ 80% in spontaneous conversation. Pt and spouse without any  additional question re: PMSV use and care. Pt with questions re: swallow advancement reporting difficulty initiating a swallow even during oral care. SLP reviewed with pt swallow anatomy and physiology and post op changes impacting function. SLP discussed with pt using cold water during oral care as a method of incidental thermal tactile stimulation. SLP discussed following up with [facility] Cancer center as an outpatient for ongoing guidance re: swallowing. All parties in agreement with plan, pt and family appreciative of SLP time spent in room. Speech to follow.     Goals:   Multidisciplinary Problems       SLP Goals          Problem: SLP    Goal Priority Disciplines Outcome   SLP Goal     SLP Ongoing, Progressing   Description: Speech Language Pathology Goals:   1. Pt will tolerate PMSV trials.                        Plan:     Patient to be seen:  4 x/week   Plan of Care expires:     Plan of Care reviewed with:  spouse, patient   SLP Follow-Up:  Yes       Discharge recommendations:  Low Intensity Therapy   Barriers to Discharge:  None    Time Tracking:     SLP Treatment Date:   03/04/24  Speech Start Time:  0919  Speech Stop Time:  0937     Speech Total Time (min):  18 min    Billable Minutes: Speech Therapy Individual 8 and Self Care/Home Management Training 10    03/04/2024

## 2024-03-04 NOTE — NURSING
[facility] Plan of Care Note  Dx Tongue cancer     Shift Events: PRN tylenol and melatonin given, low grade fever 101.5     Goals of Care: pain management, airway management     Neuro: A&Ox4     Vital Signs: WDL     Respiratory: ATC     Diet: NPO     Is patient tolerating current diet? yes     GTTS: none     Urine Output/Bowel Movement: continentx2     Drains/Tubes/Tube Feeds (include total output/shift): PEG, JPx3     Lines: 18G LFAx2        Accuchecks:none     Skin: incisions chest and neck, flap     Fall Risk Score: see flowsheet     Activity level? See flowsheet     Any scheduled procedures? none     Any safety concerns? falls     Other: none

## 2024-03-04 NOTE — PLAN OF CARE
Patient ambulating multiple trials in hallway with spouse without adverse reaction. PT services not longer needed at this time. D/C from caseload.     Problem: Physical Therapy  Goal: Physical Therapy Goal  Description: Goals to be met by: 3/26/24     Patient will increase functional independence with mobility by performin. Supine to sit with [place]  2. Sit to supine with [place]  3. Sit to stand transfer with [place]  4. Bed to chair transfer with [place] using No Assistive Device  5. Gait  x 250 feet with [place] using No Assistive Device.     Outcome: Adequate for Care Transition   3/4/2024

## 2024-03-04 NOTE — PT/OT/SLP DISCHARGE
Physical Therapy Discharge Summary    Name: Timothy Galdamez  MRN: [id number]   Principal Problem: Tongue cancer     Patient Discharged from acute Physical Therapy on 3/4/24.     Assessment:     Patient observed ambulating in hallway while holding hand of spouse this AM. Patient reports ambulating multiple trials throughout day with spouse and without adverse reaction or difficulty. PT services no longer needed at this time. D/C from caseload.     Objective:     GOALS:   Multidisciplinary Problems       Physical Therapy Goals          Problem: Physical Therapy    Goal Priority Disciplines Outcome Goal Variances Interventions   Physical Therapy Goal     PT, PT/OT Adequate for Care Transition     Description: Goals to be met by: 3/26/24     Patient will increase functional independence with mobility by performin. Supine to sit with [place]  2. Sit to supine with [place]  3. Sit to stand transfer with [place]  4. Bed to chair transfer with [place] using No Assistive Device  5. Gait  x 250 feet with [place] using No Assistive Device.                          Reasons for Discontinuation of Therapy Services  Satisfactory goal achievement.      Plan:     Patient Discharged to: Home with Home Health Service as deemed appropriate.      3/4/2024

## 2024-03-05 LAB
BACTERIA #/AREA URNS AUTO: NORMAL /HPF
BILIRUB UR QL STRIP: NEGATIVE
CLARITY UR REFRACT.AUTO: ABNORMAL
COLOR UR AUTO: YELLOW
ERYTHROCYTE [DISTWIDTH] IN BLOOD BY AUTOMATED COUNT: 14.1 % (ref 11.5–14.5)
ERYTHROCYTE [DISTWIDTH] IN BLOOD BY AUTOMATED COUNT: 14.3 % (ref 11.5–14.5)
FERRITIN SERPL-MCNC: 232 NG/ML (ref 20–300)
FOLATE SERPL-MCNC: 14.7 NG/ML (ref 4–24)
GLUCOSE UR QL STRIP: NEGATIVE
HCT VFR BLD AUTO: 21.3 % (ref 40–54)
HCT VFR BLD AUTO: 24.5 % (ref 40–54)
HGB BLD-MCNC: 6.9 G/DL (ref 14–18)
HGB BLD-MCNC: 7.9 G/DL (ref 14–18)
HGB UR QL STRIP: NEGATIVE
IRON SERPL-MCNC: 12 UG/DL (ref 45–160)
KETONES UR QL STRIP: ABNORMAL
LEUKOCYTE ESTERASE UR QL STRIP: NEGATIVE
MCH RBC QN AUTO: 31.5 PG (ref 27–31)
MCH RBC QN AUTO: 31.9 PG (ref 27–31)
MCHC RBC AUTO-ENTMCNC: 32.2 G/DL (ref 32–36)
MCHC RBC AUTO-ENTMCNC: 32.4 G/DL (ref 32–36)
MCV RBC AUTO: 97 FL (ref 82–98)
MCV RBC AUTO: 99 FL (ref 82–98)
MICROSCOPIC COMMENT: NORMAL
NITRITE UR QL STRIP: NEGATIVE
PH UR STRIP: 5 [PH] (ref 5–8)
PLATELET # BLD AUTO: 323 K/UL (ref 150–450)
PLATELET # BLD AUTO: 355 K/UL (ref 150–450)
PMV BLD AUTO: 11 FL (ref 9.2–12.9)
PMV BLD AUTO: 11.5 FL (ref 9.2–12.9)
PROT UR QL STRIP: NEGATIVE
RBC # BLD AUTO: 2.19 M/UL (ref 4.6–6.2)
RBC # BLD AUTO: 2.48 M/UL (ref 4.6–6.2)
RBC #/AREA URNS AUTO: 3 /HPF (ref 0–4)
SARS-COV-2 RNA RESP QL NAA+PROBE: NOT DETECTED
SATURATED IRON: 5 % (ref 20–50)
SP GR UR STRIP: 1.03 (ref 1–1.03)
SQUAMOUS #/AREA URNS AUTO: 1 /HPF
TOTAL IRON BINDING CAPACITY: 265 UG/DL (ref 250–450)
TRANSFERRIN SERPL-MCNC: 179 MG/DL (ref 200–375)
URN SPEC COLLECT METH UR: ABNORMAL
WBC # BLD AUTO: 10.85 K/UL (ref 3.9–12.7)
WBC # BLD AUTO: 13.06 K/UL (ref 3.9–12.7)
WBC #/AREA URNS AUTO: 2 /HPF (ref 0–5)

## 2024-03-05 PROCEDURE — 25000003 PHARM REV CODE 250: Performed by: STUDENT IN AN ORGANIZED HEALTH CARE EDUCATION/TRAINING PROGRAM

## 2024-03-05 PROCEDURE — 81001 URINALYSIS AUTO W/SCOPE: CPT | Performed by: STUDENT IN AN ORGANIZED HEALTH CARE EDUCATION/TRAINING PROGRAM

## 2024-03-05 PROCEDURE — 85027 COMPLETE CBC AUTOMATED: CPT

## 2024-03-05 PROCEDURE — 36415 COLL VENOUS BLD VENIPUNCTURE: CPT | Mod: XB | Performed by: STUDENT IN AN ORGANIZED HEALTH CARE EDUCATION/TRAINING PROGRAM

## 2024-03-05 PROCEDURE — C9113 INJ PANTOPRAZOLE SODIUM, VIA: HCPCS

## 2024-03-05 PROCEDURE — 85027 COMPLETE CBC AUTOMATED: CPT | Mod: 91 | Performed by: STUDENT IN AN ORGANIZED HEALTH CARE EDUCATION/TRAINING PROGRAM

## 2024-03-05 PROCEDURE — 63600175 PHARM REV CODE 636 W HCPCS

## 2024-03-05 PROCEDURE — 82728 ASSAY OF FERRITIN: CPT | Performed by: STUDENT IN AN ORGANIZED HEALTH CARE EDUCATION/TRAINING PROGRAM

## 2024-03-05 PROCEDURE — 25000003 PHARM REV CODE 250

## 2024-03-05 PROCEDURE — 92507 TX SP LANG VOICE COMM INDIV: CPT

## 2024-03-05 PROCEDURE — 97535 SELF CARE MNGMENT TRAINING: CPT

## 2024-03-05 PROCEDURE — 94761 N-INVAS EAR/PLS OXIMETRY MLT: CPT

## 2024-03-05 PROCEDURE — 99900026 HC AIRWAY MAINTENANCE (STAT)

## 2024-03-05 PROCEDURE — 99900035 HC TECH TIME PER 15 MIN (STAT)

## 2024-03-05 PROCEDURE — 83540 ASSAY OF IRON: CPT | Performed by: STUDENT IN AN ORGANIZED HEALTH CARE EDUCATION/TRAINING PROGRAM

## 2024-03-05 PROCEDURE — 36415 COLL VENOUS BLD VENIPUNCTURE: CPT

## 2024-03-05 PROCEDURE — 82746 ASSAY OF FOLIC ACID SERUM: CPT | Performed by: STUDENT IN AN ORGANIZED HEALTH CARE EDUCATION/TRAINING PROGRAM

## 2024-03-05 PROCEDURE — 27000221 HC OXYGEN, UP TO 24 HOURS

## 2024-03-05 PROCEDURE — 82607 VITAMIN B-12: CPT | Performed by: STUDENT IN AN ORGANIZED HEALTH CARE EDUCATION/TRAINING PROGRAM

## 2024-03-05 PROCEDURE — 20600001 HC STEP DOWN PRIVATE ROOM

## 2024-03-05 PROCEDURE — 87040 BLOOD CULTURE FOR BACTERIA: CPT | Performed by: STUDENT IN AN ORGANIZED HEALTH CARE EDUCATION/TRAINING PROGRAM

## 2024-03-05 PROCEDURE — 87635 SARS-COV-2 COVID-19 AMP PRB: CPT | Performed by: STUDENT IN AN ORGANIZED HEALTH CARE EDUCATION/TRAINING PROGRAM

## 2024-03-05 RX ORDER — FERROUS SULFATE 300 MG/5ML
300 LIQUID (ML) ORAL DAILY
Status: DISCONTINUED | OUTPATIENT
Start: 2024-03-05 | End: 2024-03-06

## 2024-03-05 RX ADMIN — SILODOSIN 4 MG: 4 CAPSULE ORAL at 08:03

## 2024-03-05 RX ADMIN — THERA TABS 1 TABLET: TAB at 08:03

## 2024-03-05 RX ADMIN — SENNOSIDES AND DOCUSATE SODIUM 1 TABLET: 8.6; 5 TABLET ORAL at 09:03

## 2024-03-05 RX ADMIN — ACETAMINOPHEN 650 MG: 160 SOLUTION ORAL at 04:03

## 2024-03-05 RX ADMIN — AMOXICILLIN AND CLAVULANATE POTASSIUM 1 TABLET: 875; 125 TABLET, FILM COATED ORAL at 09:03

## 2024-03-05 RX ADMIN — PANTOPRAZOLE SODIUM 40 MG: 40 INJECTION, POWDER, FOR SOLUTION INTRAVENOUS at 08:03

## 2024-03-05 RX ADMIN — ASPIRIN 81 MG CHEWABLE TABLET 81 MG: 81 TABLET CHEWABLE at 08:03

## 2024-03-05 RX ADMIN — Medication 6 MG: at 11:03

## 2024-03-05 RX ADMIN — MINERAL SUPPLEMENT IRON 300 MG / 5 ML STRENGTH LIQUID 100 PER BOX UNFLAVORED 300 MG: at 01:03

## 2024-03-05 RX ADMIN — AMLODIPINE BESYLATE 2.5 MG: 10 TABLET ORAL at 08:03

## 2024-03-05 RX ADMIN — EZETIMIBE 10 MG: 10 TABLET ORAL at 08:03

## 2024-03-05 RX ADMIN — ACETAMINOPHEN 650 MG: 160 SOLUTION ORAL at 11:03

## 2024-03-05 RX ADMIN — ATORVASTATIN CALCIUM 80 MG: 40 TABLET, FILM COATED ORAL at 08:03

## 2024-03-05 RX ADMIN — Medication 100 MG: at 08:03

## 2024-03-05 RX ADMIN — ACETAMINOPHEN 650 MG: 160 SOLUTION ORAL at 12:03

## 2024-03-05 RX ADMIN — SENNOSIDES AND DOCUSATE SODIUM 1 TABLET: 8.6; 5 TABLET ORAL at 08:03

## 2024-03-05 RX ADMIN — AMOXICILLIN AND CLAVULANATE POTASSIUM 1 TABLET: 875; 125 TABLET, FILM COATED ORAL at 08:03

## 2024-03-05 RX ADMIN — ENOXAPARIN SODIUM 40 MG: 40 INJECTION SUBCUTANEOUS at 04:03

## 2024-03-05 NOTE — PROGRESS NOTES
[facility]  Otorhinolaryngology-Head & Neck Surgery  Progress Note    Subjective:     Post-Op Info:  Procedure(s) (LRB):  GLOSSECTOMY (Right)  DISSECTION, NECK (Bilateral)  TRACHEOTOMY (N/A)  CREATION, FLAP, MUSCLE ROTATION (N/A)  INSERTION, PEG TUBE (Right)   11 Days Post-Op  Hospital Day: 12     Interval History: Persistent fever. Subjectively reports feels he has COVID 2/2 chest congestion. Hg dropped this AM to 6.9.    Medications:  Continuous Infusions:  Scheduled Meds:   amLODIPine  2.5 mg Per G Tube Daily    amoxicillin-clavulanate 875-125mg  1 tablet Per G Tube Q12H    aspirin  81 mg Per G Tube Daily    atorvastatin  80 mg Per G Tube Daily    enoxparin  40 mg Subcutaneous Daily    ezetimibe  10 mg Per G Tube Daily    hydrocortisone   Topical (Top) BID    multivitamin  1 tablet Per G Tube Daily    pantoprazole  40 mg Intravenous Daily    polyethylene glycol  17 g Per G Tube Daily    senna-docusate 8.6-50 mg  1 tablet Per G Tube BID    silodosin  4 mg Per G Tube Daily    thiamine  100 mg Per G Tube Daily     PRN Meds:acetaminophen, melatonin, ondansetron, sodium chloride 0.9%     Review of patient's allergies indicates:  No Known Allergies  Objective:     Vital Signs (24h Range):  Temp:  [97.7 °F (36.5 °C)-101.2 °F (38.4 °C)] 98.7 °F (37.1 °C)  Pulse:  [] 83  Resp:  [18-20] 19  SpO2:  [90 %-99 %] 98 %  BP: (110-137)/(55-71) 110/71       Lines/Drains/Airways       Drain  Duration                  Gastrostomy/Enterostomy 02/23/24 0750 Percutaneous endoscopic gastrostomy (PEG) LUQ feeding 11 days         Closed/Suction Drain 02/23/24 1338 Tube - 2 Right;Medial Chest Bulb 15 Fr. 10 days         Closed/Suction Drain 02/23/24 1414 Tube - 3 Left;Anterior Neck Bulb 15 Fr. 10 days         Closed/Suction Drain 02/23/24 1445 Tube - 4 Right;Anterior Neck Bulb 15 Fr. 10 days              Airway  Duration             Adult Surgical Airway 03/01/24 0700 Ralph Burrelluffed 6.0/ 75mm 4 days              Peripheral  Intravenous Line  Duration                  Peripheral IV - Single Lumen 02/23/24 0555 18 G Left;Posterior Forearm 11 days         Peripheral IV - Single Lumen 02/23/24 0739 18 G Left;Posterior Forearm 11 days                     Physical Exam   NAD, communicates with writing board  Native tongue edematous and congested, improved  No bleeding from nose  6-0 cuffless trach secured with jesus collar  2 neck MARU in place with fibrinous drainage  Neck with doughy edema   Neck incision intact with staples  Chest incision intact with staples  Chest is soft, right bulk tunneling to neck 2/2 pec flap, soft to palpation, no fluctuance   1 MARU in chest with serosanguinous drainage    Significant Labs:  CBC:   Recent Labs   Lab 03/05/24  0554   WBC 10.85   RBC 2.19*   HGB 6.9*   HCT 21.3*      MCV 97   MCH 31.5*   MCHC 32.4     CMP:   Recent Labs   Lab 03/03/24  0316   *   CALCIUM 8.4*      K 3.9   CO2 22*      BUN 27*   CREATININE 0.7       Significant Diagnostics:  X-Ray: I have reviewed all pertinent results/findings within the past 24 hours and my personal findings are:  Atelectasis vs developing pneumonia   Assessment/Plan:     * Tongue cancer  The patient is a 68 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 He had bleeding from the oral cavity post-op that has overall improved.     Now persistently febrile. CXR with atelectasis vs possible developing pneumonia.     ENT   - Keep JPs in place to neck and chest   - CTM output   - Of note, patient cannot be intubated by mouth 2/2 tongue edema from surgery, must maintain tracheostomy    Neuro  - Multimodal pain control    CV  - Currently HDS    Resp  - On trach collar  - Routine trach care  - Trach exchanged to [id number] cuffless 3/1/24, secured w soft collar  - Repeat CXR   - Consider sputum cultures pending FUO workup     FEN/GI  -  NPO  - On bolus feeds, tolerating well    Renal  - Cr stable    Heme/ID   - Unasyn for post-op prophylaxis - complete  - Monitor H/H   - Decreased this AM, no obvious source of bleeding   - Augmentin   - Will adjust pending FUO workup     MSK   - PT/OT/OOB    Ppx: L40    Dispo: Fever workup, supplies. Has demonstrated good trach care..           Lulu Workman MD  Otorhinolaryngology-Head & Neck Surgery  [facility]

## 2024-03-05 NOTE — PT/OT/SLP PROGRESS
Speech Language Pathology Treatment    Patient Name:  Timothy Galdamez   MRN:  [id number]  Admitting Diagnosis: Tongue cancer    Recommendations:               General Recommendations:  Dysphagia therapy  Diet recommendations:  NPO, NPO    - Continue with PEG as primary means of nutrition until cleared for PO trials by ENT  Aspiration Precautions: Strict aspiration precautions   General Precautions: Standard, fall, aspiration  Communication strategies:  none  PMSV Precautions  Work towards wearing speaking valve all waking hours as tolerated   Please note, to clean valve:    1. Swish valve in pure soap and warm water,   2. Rinse valve thoroughly in warm running water   3. Allow valve to air dry thoroughly before placing it in storage container  4. DO NOT use hot water, peroxide, bleach, vinegar, alcohol, brushes, or cotton swabs to clean valve.      Assessment:     Timothy Galdamez is a 68 y.o. male with an SLP diagnosis of S/P Trach and One Way Speaking Valve Training.    Subjective     Pt awake and alert   Spouse present     Pain/Comfort:  Pain Rating Post-Intervention 1: 0/10    Respiratory Status:  trach collar     Objective:     Has the patient been evaluated by SLP for swallowing?   Yes  Keep patient NPO? No   Current Respiratory Status:        Pt seen for ongoing speaking valve training. Pt awake and upright in bedside chair upon arrival. Pt reporting tolerating speaking valve for near full waking hours. Pt reports can bring most secretions to oral cavity but at times must remove valve and expectorate via trach. Vocal quality strong and clear. Despite articulation deficits s/p glossectomy + muscle flap pt speech intelligibility ~ 80% in spontaneous conversation. Pt and spouse without any additional question re: PMSV use and care. Pt pending discharge next few days pending underlying fever.  SLP discussed with pt using cold water during oral care as a method of incidental thermal tactile stimulation. SLP discussed  [facility] for ongoing guidance re: swallowing. All parties in agreement with plan, pt and family appreciative of SLP time spent in room. Speech to follow.     Goals:   Multidisciplinary Problems       SLP Goals          Problem: SLP    Goal Priority Disciplines Outcome   SLP Goal     SLP Ongoing, Progressing   Description: Speech Language Pathology Goals:   1. Pt will tolerate PMSV trials.                        Plan:     Patient to be seen:  3 x/week   Plan of Care expires:     Plan of Care reviewed with:  spouse, patient   SLP Follow-Up:  Yes       Discharge recommendations:  Low Intensity Therapy   Barriers to Discharge:  None    Time Tracking:     SLP Treatment Date:   03/05/24  Speech Start Time:  1038  Speech Stop Time:  1054     Speech Total Time (min):  16 min    Billable Minutes: Speech Therapy Individual 8 and Self Care/Home Management Training 8    03/05/2024

## 2024-03-05 NOTE — RESPIRATORY THERAPY
"RAPID RESPONSE RESPIRATORY THERAPY PROACTIVE NOTE           Time of visit: 953     Code Status: Full Code   : 1955  Bed: [facility] A:   MRN: [id number]  Time spent at the bedside: < 15 min    SITUATION    Evaluated patient for: LDA Check     BACKGROUND    Patient has a past medical history of Cancer, Hyperlipidemia, and Hypertension.  Clinically Significant Surgical Hx: tracheostomy    24 Hours Vitals Range:  Temp:  [97.7 °F (36.5 °C)-101.2 °F (38.4 °C)]   Pulse:  []   Resp:  [18-19]   BP: (110-135)/(55-71)   SpO2:  [92 %-99 %]     Labs:    Recent Labs     24  0316      K 3.9      CO2 22*   BUN 27*   CREATININE 0.7   *   PHOS 3.6   MG 2.1        No results for input(s): "PH", "PCO2", "PO2", "HCO3", "POCSATURATED", "BE" in the last 72 hours.    ASSESSMENT/INTERVENTIONS  Pt resting comfortably with no respiratory concerns during visit. Surgical airway secured, patent, and intact. All supplies at bedside.       Last VS   Temp: 98.7 °F (37.1 °C) ( 0749)  Pulse: 89 ( 1021)  Resp: 19 ( 0441)  BP: 110/71 ( 0749)  SpO2: 98 % ( 1021)      Extra trachs at bedside: 4 and 6  Level of Consciousness: Level of Consciousness (AVPU): alert  Respiratory Effort: Respiratory Effort: Normal, Unlabored Expansion/Accessory Muscle Usage: Expansion/Accessory Muscles/Retractions: expansion symmetric, no retractions, no use of accessory muscles  All Lung Field Breath Sounds: All Lung Fields Breath Sounds: Anterior:, Lateral:, diminished  O2 Device/Concentration: 5L/21% trach collar  Surgical airway: Yes, Type: Shiley Size: 6, uncuffed  Ambu at bedside:       Active Orders   Respiratory Care    Oxygen Continuous     Frequency: Continuous     Number of Occurrences: Until Specified     Order Questions:      Device type: Low flow      Device: Trach Collar      Titrate O2 per Oxygen Titration Protocol: Yes      To maintain SpO2 goal of: >= 92%      Notify MD of: Inability to achieve " desired SpO2; Sudden change in patient status and requires 20% increase in FiO2; Patient requires >60% FiO2    Pulse Oximetry Continuous     Frequency: Continuous     Number of Occurrences: Until Specified    Routine tracheostomy care     Frequency: BID     Number of Occurrences: Until Specified     Order Comments: Suction prn, exchange inner cannula daily or prn, please tape obturator to head of bed, have extra 6-0 and 4-0 cuffed trached available at bedside, have soft tip suction catheters available at bedside         RECOMMENDATIONS    We recommend: RRT Recs: Continue POC per primary team.      FOLLOW-UP    Please call back the Rapid Response RT, Beverly Ambrose, RRT at x [phone] for any questions or concerns.

## 2024-03-05 NOTE — ASSESSMENT & PLAN NOTE
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 He had bleeding from the oral cavity post-op that has overall improved.     Now persistently febrile. CXR with atelectasis vs possible developing pneumonia.     ENT   - Keep JPs in place to neck and chest   - CTM output   - Of note, patient cannot be intubated by mouth 2/2 tongue edema from surgery, must maintain tracheostomy    Neuro  - Multimodal pain control    CV  - Currently HDS    Resp  - On trach collar  - Routine trach care  - Trach exchanged to 6UN75H cuffless 3/1/24, secured w soft collar  - Repeat CXR   - Consider sputum cultures pending FUO workup     FEN/GI  - NPO  - On bolus feeds, tolerating well    Renal  - Cr stable    Heme/ID   - Unasyn for post-op prophylaxis - complete  - Monitor H/H   - Decreased this AM, no obvious source of bleeding   - Augmentin   - Will adjust pending FUO workup     MSK   - PT/OT/OOB    Ppx: L40    Dispo: Fever workup, supplies. Has demonstrated good trach care..

## 2024-03-05 NOTE — NURSING
[facility] Plan of Care Note  Dx Tongue cancer     Shift Events: PRN tylenol and melatonin given, low grade fever 100.8     Goals of Care: pain management, airway management     Neuro: A&Ox4     Vital Signs: WDL     Respiratory: ATC     Diet: NPO     Is patient tolerating current diet? yes     GTTS: none     Urine Output/Bowel Movement: continentx2     Drains/Tubes/Tube Feeds (include total output/shift): PEG, JPx3     Lines: 18G LFAx2        Accuchecks:none     Skin: incisions chest and neck, flap     Fall Risk Score: see flowsheet     Activity level? See flowsheet     Any scheduled procedures? none     Any safety concerns? falls     Other: none

## 2024-03-05 NOTE — PLAN OF CARE
Problem: Adult Inpatient Plan of Care  Goal: Plan of Care Review  Outcome: Ongoing, Not Progressing  Goal: Patient-Specific Goal (Individualized)  Outcome: Ongoing, Not Progressing  Goal: Absence of Hospital-Acquired Illness or Injury  Outcome: Ongoing, Not Progressing  Goal: Optimal Comfort and Wellbeing  Outcome: Ongoing, Not Progressing  Goal: Readiness for Transition of Care  Outcome: Ongoing, Not Progressing     Problem: Infection  Goal: Absence of Infection Signs and Symptoms  Outcome: Ongoing, Not Progressing     Problem: Fall Injury Risk  Goal: Absence of Fall and Fall-Related Injury  Outcome: Ongoing, Not Progressing

## 2024-03-05 NOTE — SUBJECTIVE & OBJECTIVE
Interval History: Persistent fever. Subjectively reports feels he has COVID 2/2 chest congestion. Hg dropped this AM to 6.9.    Medications:  Continuous Infusions:  Scheduled Meds:   amLODIPine  2.5 mg Per G Tube Daily    amoxicillin-clavulanate 875-125mg  1 tablet Per G Tube Q12H    aspirin  81 mg Per G Tube Daily    atorvastatin  80 mg Per G Tube Daily    enoxparin  40 mg Subcutaneous Daily    ezetimibe  10 mg Per G Tube Daily    hydrocortisone   Topical (Top) BID    multivitamin  1 tablet Per G Tube Daily    pantoprazole  40 mg Intravenous Daily    polyethylene glycol  17 g Per G Tube Daily    senna-docusate 8.6-50 mg  1 tablet Per G Tube BID    silodosin  4 mg Per G Tube Daily    thiamine  100 mg Per G Tube Daily     PRN Meds:acetaminophen, melatonin, ondansetron, sodium chloride 0.9%     Review of patient's allergies indicates:  No Known Allergies  Objective:     Vital Signs (24h Range):  Temp:  [97.7 °F (36.5 °C)-101.2 °F (38.4 °C)] 98.7 °F (37.1 °C)  Pulse:  [] 83  Resp:  [18-20] 19  SpO2:  [90 %-99 %] 98 %  BP: (110-137)/(55-71) 110/71       Lines/Drains/Airways       Drain  Duration                  Gastrostomy/Enterostomy 02/23/24 0750 Percutaneous endoscopic gastrostomy (PEG) LUQ feeding 11 days         Closed/Suction Drain 02/23/24 1338 Tube - 2 Right;Medial Chest Bulb 15 Fr. 10 days         Closed/Suction Drain 02/23/24 1414 Tube - 3 Left;Anterior Neck Bulb 15 Fr. 10 days         Closed/Suction Drain 02/23/24 1445 Tube - 4 Right;Anterior Neck Bulb 15 Fr. 10 days              Airway  Duration             Adult Surgical Airway 03/01/24 0700 Ralph Uncuffed 6.0/ 75mm 4 days              Peripheral Intravenous Line  Duration                  Peripheral IV - Single Lumen 02/23/24 0555 18 G Left;Posterior Forearm 11 days         Peripheral IV - Single Lumen 02/23/24 0739 18 G Left;Posterior Forearm 11 days                     Physical Exam   NAD, communicates with writing board  Native tongue edematous  and congested, improved  No bleeding from nose  6-0 cuffless trach secured with jesus collar  2 neck MARU in place with fibrinous drainage  Neck with doughy edema   Neck incision intact with staples  Chest incision intact with staples  Chest is soft, right bulk tunneling to neck 2/2 pec flap, soft to palpation, no fluctuance   1 MARU in chest with serosanguinous drainage    Significant Labs:  CBC:   Recent Labs   Lab 03/05/24  0554   WBC 10.85   RBC 2.19*   HGB 6.9*   HCT 21.3*      MCV 97   MCH 31.5*   MCHC 32.4     CMP:   Recent Labs   Lab 03/03/24  0316   *   CALCIUM 8.4*      K 3.9   CO2 22*      BUN 27*   CREATININE 0.7       Significant Diagnostics:  X-Ray: I have reviewed all pertinent results/findings within the past 24 hours and my personal findings are:  Atelectasis vs developing pneumonia

## 2024-03-06 PROBLEM — Z99.11 ENCOUNTER FOR WEANING FROM VENTILATOR: Status: ACTIVE | Noted: 2024-03-06

## 2024-03-06 LAB
ERYTHROCYTE [DISTWIDTH] IN BLOOD BY AUTOMATED COUNT: 14.1 % (ref 11.5–14.5)
HCT VFR BLD AUTO: 22.6 % (ref 40–54)
HGB BLD-MCNC: 7.3 G/DL (ref 14–18)
MCH RBC QN AUTO: 31.9 PG (ref 27–31)
MCHC RBC AUTO-ENTMCNC: 32.3 G/DL (ref 32–36)
MCV RBC AUTO: 99 FL (ref 82–98)
PLATELET # BLD AUTO: 367 K/UL (ref 150–450)
PMV BLD AUTO: 11.7 FL (ref 9.2–12.9)
RBC # BLD AUTO: 2.29 M/UL (ref 4.6–6.2)
VIT B12 SERPL-MCNC: 584 NG/L (ref 180–914)
WBC # BLD AUTO: 9.96 K/UL (ref 3.9–12.7)

## 2024-03-06 PROCEDURE — 27000221 HC OXYGEN, UP TO 24 HOURS

## 2024-03-06 PROCEDURE — 99900026 HC AIRWAY MAINTENANCE (STAT)

## 2024-03-06 PROCEDURE — 25000003 PHARM REV CODE 250

## 2024-03-06 PROCEDURE — 25000003 PHARM REV CODE 250: Performed by: STUDENT IN AN ORGANIZED HEALTH CARE EDUCATION/TRAINING PROGRAM

## 2024-03-06 PROCEDURE — 97535 SELF CARE MNGMENT TRAINING: CPT

## 2024-03-06 PROCEDURE — 63600175 PHARM REV CODE 636 W HCPCS: Performed by: STUDENT IN AN ORGANIZED HEALTH CARE EDUCATION/TRAINING PROGRAM

## 2024-03-06 PROCEDURE — 99900035 HC TECH TIME PER 15 MIN (STAT)

## 2024-03-06 PROCEDURE — 94761 N-INVAS EAR/PLS OXIMETRY MLT: CPT

## 2024-03-06 PROCEDURE — 36415 COLL VENOUS BLD VENIPUNCTURE: CPT

## 2024-03-06 PROCEDURE — 85027 COMPLETE CBC AUTOMATED: CPT

## 2024-03-06 PROCEDURE — 63600175 PHARM REV CODE 636 W HCPCS

## 2024-03-06 PROCEDURE — 92507 TX SP LANG VOICE COMM INDIV: CPT

## 2024-03-06 PROCEDURE — C9113 INJ PANTOPRAZOLE SODIUM, VIA: HCPCS

## 2024-03-06 PROCEDURE — 20600001 HC STEP DOWN PRIVATE ROOM

## 2024-03-06 RX ORDER — FERROUS SULFATE 300 MG/5ML
300 LIQUID (ML) ORAL EVERY OTHER DAY
Status: DISCONTINUED | OUTPATIENT
Start: 2024-03-08 | End: 2024-03-08 | Stop reason: HOSPADM

## 2024-03-06 RX ADMIN — ENOXAPARIN SODIUM 40 MG: 40 INJECTION SUBCUTANEOUS at 04:03

## 2024-03-06 RX ADMIN — HYDROCORTISONE: 1 CREAM TOPICAL at 09:03

## 2024-03-06 RX ADMIN — ACETAMINOPHEN 650 MG: 160 SOLUTION ORAL at 11:03

## 2024-03-06 RX ADMIN — Medication 100 MG: at 09:03

## 2024-03-06 RX ADMIN — THERA TABS 1 TABLET: TAB at 09:03

## 2024-03-06 RX ADMIN — EZETIMIBE 10 MG: 10 TABLET ORAL at 09:03

## 2024-03-06 RX ADMIN — ACETAMINOPHEN 650 MG: 160 SOLUTION ORAL at 09:03

## 2024-03-06 RX ADMIN — ATORVASTATIN CALCIUM 80 MG: 40 TABLET, FILM COATED ORAL at 09:03

## 2024-03-06 RX ADMIN — AMPICILLIN AND SULBACTAM 1.5 G: 1; .5 INJECTION, POWDER, FOR SOLUTION INTRAVENOUS at 09:03

## 2024-03-06 RX ADMIN — AMLODIPINE BESYLATE 2.5 MG: 10 TABLET ORAL at 09:03

## 2024-03-06 RX ADMIN — MINERAL SUPPLEMENT IRON 300 MG / 5 ML STRENGTH LIQUID 100 PER BOX UNFLAVORED 300 MG: at 09:03

## 2024-03-06 RX ADMIN — AMPICILLIN AND SULBACTAM 1.5 G: 1; .5 INJECTION, POWDER, FOR SOLUTION INTRAVENOUS at 04:03

## 2024-03-06 RX ADMIN — SILODOSIN 4 MG: 4 CAPSULE ORAL at 09:03

## 2024-03-06 RX ADMIN — Medication 6 MG: at 11:03

## 2024-03-06 RX ADMIN — ASPIRIN 81 MG CHEWABLE TABLET 81 MG: 81 TABLET CHEWABLE at 09:03

## 2024-03-06 RX ADMIN — SENNOSIDES AND DOCUSATE SODIUM 1 TABLET: 8.6; 5 TABLET ORAL at 08:03

## 2024-03-06 RX ADMIN — PANTOPRAZOLE SODIUM 40 MG: 40 INJECTION, POWDER, FOR SOLUTION INTRAVENOUS at 09:03

## 2024-03-06 NOTE — PROGRESS NOTES
[facility]  Otorhinolaryngology-Head & Neck Surgery  Progress Note    Subjective:     Post-Op Info:  Procedure(s) (LRB):  GLOSSECTOMY (Right)  DISSECTION, NECK (Bilateral)  TRACHEOTOMY (N/A)  CREATION, FLAP, MUSCLE ROTATION (N/A)  INSERTION, PEG TUBE (Right)   12 Days Post-Op  Hospital Day: 13     Interval History: Stil with persistent fever. No leukocytosis. Current workup negative so far. Has increasingly thick secretions. Repeat H/H improved.     Medications:  Continuous Infusions:  Scheduled Meds:   amLODIPine  2.5 mg Per G Tube Daily    amoxicillin-clavulanate 875-125mg  1 tablet Per G Tube Q12H    aspirin  81 mg Per G Tube Daily    atorvastatin  80 mg Per G Tube Daily    enoxparin  40 mg Subcutaneous Daily    ezetimibe  10 mg Per G Tube Daily    ferrous sulfate  300 mg Per G Tube Daily    hydrocortisone   Topical (Top) BID    multivitamin  1 tablet Per G Tube Daily    pantoprazole  40 mg Intravenous Daily    polyethylene glycol  17 g Per G Tube Daily    senna-docusate 8.6-50 mg  1 tablet Per G Tube BID    silodosin  4 mg Per G Tube Daily    thiamine  100 mg Per G Tube Daily     PRN Meds:acetaminophen, melatonin, ondansetron, sodium chloride 0.9%     Review of patient's allergies indicates:  No Known Allergies  Objective:     Vital Signs (24h Range):  Temp:  [97.5 °F (36.4 °C)-100.4 °F (38 °C)] 98 °F (36.7 °C)  Pulse:  [] 81  Resp:  [17-20] 19  SpO2:  [92 %-99 %] 92 %  BP: ()/(55-73) 116/73       Lines/Drains/Airways       Drain  Duration                  Gastrostomy/Enterostomy 02/23/24 0750 Percutaneous endoscopic gastrostomy (PEG) LUQ feeding 12 days         Closed/Suction Drain 02/23/24 1338 Tube - 2 Right;Medial Chest Bulb 15 Fr. 11 days         Closed/Suction Drain 02/23/24 1414 Tube - 3 Left;Anterior Neck Bulb 15 Fr. 11 days         Closed/Suction Drain 02/23/24 1445 Tube - 4 Right;Anterior Neck Bulb 15 Fr. 11 days              Airway  Duration             Adult Surgical Airway 03/01/24  0700 Ralph Uncuffed 6.0/ 75mm 5 days              Peripheral Intravenous Line  Duration                  Peripheral IV - Single Lumen 02/23/24 0555 18 G Left;Posterior Forearm 12 days         Peripheral IV - Single Lumen 02/23/24 0739 18 G Left;Posterior Forearm 12 days                     Physical Exam    NAD, communicates with writing board  Native tongue edematous and congested, improved  No bleeding from nose  6-0 cuffless trach secured with jesus collar  2 neck MARU in place with fibrinous drainage  Neck with doughy edema   Neck incision intact with staples  Chest incision intact with staples  Chest is soft, right bulk tunneling to neck 2/2 pec flap, soft to palpation, no fluctuance   1 MARU in chest with serosanguinous drainage    Significant Labs:  CBC:   Recent Labs   Lab 03/06/24  0332   WBC 9.96   RBC 2.29*   HGB 7.3*   HCT 22.6*      MCV 99*   MCH 31.9*   MCHC 32.3     CMP:   Recent Labs   Lab 03/03/24  0316   *   CALCIUM 8.4*      K 3.9   CO2 22*      BUN 27*   CREATININE 0.7       Significant Diagnostics:  None  Assessment/Plan:     * Tongue cancer  The patient is a 68 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 He had bleeding from the oral cavity post-op that has overall improved.     Persistent fevers, workup negative.     ENT   - Keep JPs in place to neck and chest   - CTM output   - Of note, patient cannot be intubated by mouth 2/2 tongue edema from surgery, must maintain tracheostomy    Neuro  - Multimodal pain control    CV  - Currently HDS    Resp  - On trach collar  - Routine trach care  - Trach exchanged to 6UN75H cuffless 3/1/24, secured w soft collar  - Sputum cultures today     FEN/GI  - NPO  - On bolus feeds, tolerating well    Renal  - Cr stable    Heme/ID   - Unasyn for post-op prophylaxis - complete  - Monitor H/H   - At baseline, workup  consistetn with iron deficiency anemia, iron supplementation started   - Augmentin   - Plan to escalate     MSK   - PT/OT/OOB    Ppx: L40    Dispo: Fever workup, supplies. Has demonstrated good trach care..           Lulu Workman MD  Otorhinolaryngology-Head & Neck Surgery  [facility]

## 2024-03-06 NOTE — HOSPITAL COURSE
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 He had bleeding from the oral cavity post-op that has resolved. He was noted to have fevers on 3/4 so a fever work-up was initiated, which revealed some atelectasis on CXR. He was started on Augmentin but continued to have fevers, so this was advanced to Unasyn. His left neck MAUR drain looked concerning for a fistula 3/6 but this has remained contained overall.

## 2024-03-06 NOTE — ASSESSMENT & PLAN NOTE
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 He had bleeding from the oral cavity post-op that has overall improved.     Persistent fevers, workup negative.     ENT   - Keep JPs in place to neck and chest   - CTM output   - Of note, patient cannot be intubated by mouth 2/2 tongue edema from surgery, must maintain tracheostomy    Neuro  - Multimodal pain control    CV  - Currently HDS    Resp  - On trach collar  - Routine trach care  - Trach exchanged to 6UN75H cuffless 3/1/24, secured w soft collar  - Sputum cultures today     FEN/GI  - NPO  - On bolus feeds, tolerating well    Renal  - Cr stable    Heme/ID   - Unasyn for post-op prophylaxis - complete  - Monitor H/H   - At baseline, workup consistetn with iron deficiency anemia, iron supplementation started   - Augmentin   - Plan to escalate     MSK   - PT/OT/OOB    Ppx: L40    Dispo: Fever workup, supplies. Has demonstrated good trach care..

## 2024-03-06 NOTE — PLAN OF CARE
[facility]  Discharge Reassessment    Primary Care Provider: Young Lanier MD    Expected Discharge Date: 3/8/2024    Reassessment (most recent)       Discharge Reassessment - 03/06/24 1517          Discharge Reassessment    Assessment Type Discharge Planning Reassessment     Did the patient's condition or plan change since previous assessment? No     Discharge Plan discussed with: Patient     Communicated AMRIK with patient/caregiver Yes     Discharge Plan A Home Health     Discharge Plan B Home with family     Transition of Care Barriers None     Why the patient remains in the hospital Requires continued medical care                     [facility]     Patient has all trach and suction supplies at bedside.     Teaching has been done with patient and family.     Home health referrals sent out. No accepting home heaths at this time.

## 2024-03-06 NOTE — RESPIRATORY THERAPY
"RAPID RESPONSE RESPIRATORY THERAPY PROACTIVE NOTE           Time of visit: 075     Code Status: Full Code   : 1955  [facility]   MRN: [id number]  Time spent at the bedside: < 15 min    SITUATION    Evaluated patient for: LDA Check     BACKGROUND    Patient has a past medical history of Cancer, Hyperlipidemia, and Hypertension.  Clinically Significant Surgical Hx: tracheostomy    24 Hours Vitals Range:  Temp:  [98 °F (36.7 °C)-100.4 °F (38 °C)]   Pulse:  []   Resp:  [17-20]   BP: (111-126)/(55-73)   SpO2:  [92 %-98 %]     Labs:    No results for input(s): "NA", "K", "CL", "CO2", "BUN", "CREATININE", "GLU", "PHOS", "MG" in the last 72 hours.    Invalid input(s): "CMP", "TBIL"     No results for input(s): "PH", "PCO2", "PO2", "HCO3", "POCSATURATED", "BE" in the last 72 hours.    ASSESSMENT/INTERVENTIONS  Supplies at the pts bedside       Last VS   Temp: 98 °F (36.7 °C) ( 1136)  Pulse: 88 ( 1220)  Resp: 18 ( 1220)  BP: 113/60 ( 1136)  SpO2: 98 % ( 1220)      Extra trachs at bedside: 4/6  Level of Consciousness: Level of Consciousness (AVPU): alert  Respiratory Effort: Respiratory Effort: Unlabored Expansion/Accessory Muscle Usage: Expansion/Accessory Muscles/Retractions: expansion symmetric  All Lung Field Breath Sounds: All Lung Fields Breath Sounds: Anterior:, Posterior:, coarse  O2 Device/Concentration: 5l 21%  Surgical airway: Yes, Type: Shiley Size: 6, uncuffed  Ambu at bedside:       Active Orders   Respiratory Care    Oxygen Continuous     Frequency: Continuous     Number of Occurrences: Until Specified     Order Questions:      Device type: Low flow      Device: Trach Collar      Titrate O2 per Oxygen Titration Protocol: Yes      To maintain SpO2 goal of: >= 92%      Notify MD of: Inability to achieve desired SpO2; Sudden change in patient status and requires 20% increase in FiO2; Patient requires >60% FiO2    Pulse Oximetry Continuous     Frequency: Continuous     " Number of Occurrences: Until Specified    Routine tracheostomy care     Frequency: BID     Number of Occurrences: Until Specified     Order Comments: Suction prn, exchange inner cannula daily or prn, please tape obturator to head of bed, have extra 6-0 and 4-0 cuffed trached available at bedside, have soft tip suction catheters available at bedside         RECOMMENDATIONS    We recommend: RRT Recs: Continue POC per primary team.      FOLLOW-UP    Please call back the Rapid Response RT, Dominique Moreno, RRT at x [phone] for any questions or concerns.

## 2024-03-06 NOTE — PLAN OF CARE
..[facility] Plan of Care Note  Dx tongue ca    Shift Events temp 100.4    Goals of Care: dc    Neuro: intact    Vital Signs: wdl despite temp    Respiratory: trach 5L 28%    Diet: npo- TF    Is patient tolerating current diet? Mostly- 3/4 cans of tube feed 3/5, did not give last can as pt had 500cc residual TF. So symptoms    GTTS: none    Urine Output/Bowel Movement: wdl, lbm 3/4    Drains/Tubes/Tube Feeds (include total output/shift): shannan's x3    Lines: piv x2      Accuchecks:none    Skin: R chest staples, bilat neck staples    Fall Risk Score: 12    Activity level? IND    Any scheduled procedures? none    Any safety concerns? N/a    Other: n/a

## 2024-03-06 NOTE — PLAN OF CARE
Nurse and PCT Jeferson attempted multiple times overnight to change pt's gown that was soiled with bloody secretions and patient refused. Residents rounded in AM and instructed nurse to change pt's gown and trach ties. Communicated with them we have been attempting and pt is refusing. Nurse attempted to change again this am after MD rounds and pt refused to change soiled gown stating he will later. Nathanael RN is back today and pt may feel more comfortable changing with a male nurse.

## 2024-03-06 NOTE — SUBJECTIVE & OBJECTIVE
Interval History: Stil with persistent fever. No leukocytosis. Current workup negative so far. Has increasingly thick secretions. Repeat H/H improved.     Medications:  Continuous Infusions:  Scheduled Meds:   amLODIPine  2.5 mg Per G Tube Daily    amoxicillin-clavulanate 875-125mg  1 tablet Per G Tube Q12H    aspirin  81 mg Per G Tube Daily    atorvastatin  80 mg Per G Tube Daily    enoxparin  40 mg Subcutaneous Daily    ezetimibe  10 mg Per G Tube Daily    ferrous sulfate  300 mg Per G Tube Daily    hydrocortisone   Topical (Top) BID    multivitamin  1 tablet Per G Tube Daily    pantoprazole  40 mg Intravenous Daily    polyethylene glycol  17 g Per G Tube Daily    senna-docusate 8.6-50 mg  1 tablet Per G Tube BID    silodosin  4 mg Per G Tube Daily    thiamine  100 mg Per G Tube Daily     PRN Meds:acetaminophen, melatonin, ondansetron, sodium chloride 0.9%     Review of patient's allergies indicates:  No Known Allergies  Objective:     Vital Signs (24h Range):  Temp:  [97.5 °F (36.4 °C)-100.4 °F (38 °C)] 98 °F (36.7 °C)  Pulse:  [] 81  Resp:  [17-20] 19  SpO2:  [92 %-99 %] 92 %  BP: ()/(55-73) 116/73       Lines/Drains/Airways       Drain  Duration                  Gastrostomy/Enterostomy 02/23/24 0750 Percutaneous endoscopic gastrostomy (PEG) LUQ feeding 12 days         Closed/Suction Drain 02/23/24 1338 Tube - 2 Right;Medial Chest Bulb 15 Fr. 11 days         Closed/Suction Drain 02/23/24 1414 Tube - 3 Left;Anterior Neck Bulb 15 Fr. 11 days         Closed/Suction Drain 02/23/24 1445 Tube - 4 Right;Anterior Neck Bulb 15 Fr. 11 days              Airway  Duration             Adult Surgical Airway 03/01/24 0700 Nayaley Uncuffed 6.0/ 75mm 5 days              Peripheral Intravenous Line  Duration                  Peripheral IV - Single Lumen 02/23/24 0555 18 G Left;Posterior Forearm 12 days         Peripheral IV - Single Lumen 02/23/24 0739 18 G Left;Posterior Forearm 12 days                     Physical  Exam    NAD, communicates with writing board  Native tongue edematous and congested, improved  No bleeding from nose  6-0 cuffless trach secured with jesus collar  2 neck MARU in place with fibrinous drainage  Neck with doughy edema   Neck incision intact with staples  Chest incision intact with staples  Chest is soft, right bulk tunneling to neck 2/2 pec flap, soft to palpation, no fluctuance   1 MARU in chest with serosanguinous drainage    Significant Labs:  CBC:   Recent Labs   Lab 03/06/24  0332   WBC 9.96   RBC 2.29*   HGB 7.3*   HCT 22.6*      MCV 99*   MCH 31.9*   MCHC 32.3     CMP:   Recent Labs   Lab 03/03/24  0316   *   CALCIUM 8.4*      K 3.9   CO2 22*      BUN 27*   CREATININE 0.7       Significant Diagnostics:  None

## 2024-03-06 NOTE — PT/OT/SLP PROGRESS
Speech Language Pathology Treatment    Patient Name:  Timothy Galdamez   MRN:  [id number]  Admitting Diagnosis: Tongue cancer    Recommendations:               General Recommendations:  Dysphagia therapy  Diet recommendations:  NPO, NPO    - Continue with PEG as primary means of nutrition until cleared for PO trials by ENT  Aspiration Precautions: Strict aspiration precautions   General Precautions: Standard, fall, aspiration  Communication strategies:  none  PMSV Precautions  Work towards wearing speaking valve all waking hours as tolerated   Please note, to clean valve:    1. Swish valve in pure soap and warm water,   2. Rinse valve thoroughly in warm running water   3. Allow valve to air dry thoroughly before placing it in storage container  4. DO NOT use hot water, peroxide, bleach, vinegar, alcohol, brushes, or cotton swabs to clean valve.      Assessment:     Timothy Galdamez is a 68 y.o. male with an SLP diagnosis of S/P Trach and One Way Speaking Valve Training. SLP to continue to follow.     Subjective     Pt found resting in bed with nursing at bedside upon SLP entry into room. Pt agreeable to participate in all aspects of session.      Pain/Comfort:  Pain Rating 1: 0/10    Respiratory Status:  trach collar     Objective:     Has the patient been evaluated by SLP for swallowing?   Yes  Keep patient NPO? No   Current Respiratory Status:        Pt seen for ongoing speaking valve training. Pt awake and upright in bedside chair upon arrival. He donned speaking valve to trach hub independently upon SLP arrival. Vocal quality strong and clear. Despite articulation deficits s/p glossectomy + muscle flap pt speech intelligibility ~ 80% in spontaneous conversation with unfamiliar communication partner. Whiteboard noted at bedside which pt reported he uses intermittently during periods of breakdown in verbal communication. SLP spoke with pt regarding donning/doffing valve, usage of valve throughout all waking hours as  tolerated, ongoing dysphagia therapy at next level of care, emphasis on continuation of speech services upon D/C to work towards improving speech intelligibility and oral intake, and ongoing SLP POC. Pt asking appropriate questions which were answered to satisfaction.     Goals:   Multidisciplinary Problems       SLP Goals          Problem: SLP    Goal Priority Disciplines Outcome   SLP Goal     SLP Ongoing, Progressing   Description: Speech Language Pathology Goals:   1. Pt will tolerate PMSV trials.                        Plan:     Patient to be seen:  3 x/week   Plan of Care expires:     Plan of Care reviewed with:  patient   SLP Follow-Up:  Yes       Discharge recommendations:  Low Intensity Therapy   Barriers to Discharge:  None    Time Tracking:     SLP Treatment Date:   03/06/24  Speech Start Time:  1002  Speech Stop Time:  1019     Speech Total Time (min):  17 min    Billable Minutes: Speech Therapy Individual 8 and Self Care/Home Management Training 9      03/06/2024

## 2024-03-07 LAB
ERYTHROCYTE [DISTWIDTH] IN BLOOD BY AUTOMATED COUNT: 13.6 % (ref 11.5–14.5)
HCT VFR BLD AUTO: 23.9 % (ref 40–54)
HGB BLD-MCNC: 7.8 G/DL (ref 14–18)
MCH RBC QN AUTO: 32.4 PG (ref 27–31)
MCHC RBC AUTO-ENTMCNC: 32.6 G/DL (ref 32–36)
MCV RBC AUTO: 99 FL (ref 82–98)
PLATELET # BLD AUTO: 435 K/UL (ref 150–450)
PMV BLD AUTO: 11.2 FL (ref 9.2–12.9)
RBC # BLD AUTO: 2.41 M/UL (ref 4.6–6.2)
WBC # BLD AUTO: 7.34 K/UL (ref 3.9–12.7)

## 2024-03-07 PROCEDURE — 25000003 PHARM REV CODE 250: Performed by: STUDENT IN AN ORGANIZED HEALTH CARE EDUCATION/TRAINING PROGRAM

## 2024-03-07 PROCEDURE — 25000003 PHARM REV CODE 250

## 2024-03-07 PROCEDURE — 27200966 HC CLOSED SUCTION SYSTEM

## 2024-03-07 PROCEDURE — 94761 N-INVAS EAR/PLS OXIMETRY MLT: CPT

## 2024-03-07 PROCEDURE — 63600175 PHARM REV CODE 636 W HCPCS: Performed by: STUDENT IN AN ORGANIZED HEALTH CARE EDUCATION/TRAINING PROGRAM

## 2024-03-07 PROCEDURE — 99900026 HC AIRWAY MAINTENANCE (STAT)

## 2024-03-07 PROCEDURE — 63600175 PHARM REV CODE 636 W HCPCS

## 2024-03-07 PROCEDURE — 20600001 HC STEP DOWN PRIVATE ROOM

## 2024-03-07 PROCEDURE — 99900035 HC TECH TIME PER 15 MIN (STAT)

## 2024-03-07 PROCEDURE — 27000221 HC OXYGEN, UP TO 24 HOURS

## 2024-03-07 PROCEDURE — C9113 INJ PANTOPRAZOLE SODIUM, VIA: HCPCS

## 2024-03-07 PROCEDURE — 85027 COMPLETE CBC AUTOMATED: CPT

## 2024-03-07 PROCEDURE — 36415 COLL VENOUS BLD VENIPUNCTURE: CPT

## 2024-03-07 RX ADMIN — AMPICILLIN AND SULBACTAM 1.5 G: 1; .5 INJECTION, POWDER, FOR SOLUTION INTRAVENOUS at 04:03

## 2024-03-07 RX ADMIN — ENOXAPARIN SODIUM 40 MG: 40 INJECTION SUBCUTANEOUS at 04:03

## 2024-03-07 RX ADMIN — Medication 6 MG: at 11:03

## 2024-03-07 RX ADMIN — ACETAMINOPHEN 650 MG: 160 SOLUTION ORAL at 11:03

## 2024-03-07 RX ADMIN — PANTOPRAZOLE SODIUM 40 MG: 40 INJECTION, POWDER, FOR SOLUTION INTRAVENOUS at 08:03

## 2024-03-07 RX ADMIN — POLYETHYLENE GLYCOL 3350 17 G: 17 POWDER, FOR SOLUTION ORAL at 08:03

## 2024-03-07 RX ADMIN — Medication 100 MG: at 08:03

## 2024-03-07 RX ADMIN — HYDROCORTISONE: 1 CREAM TOPICAL at 08:03

## 2024-03-07 RX ADMIN — AMPICILLIN AND SULBACTAM 1.5 G: 1; .5 INJECTION, POWDER, FOR SOLUTION INTRAVENOUS at 08:03

## 2024-03-07 RX ADMIN — AMPICILLIN AND SULBACTAM 1.5 G: 1; .5 INJECTION, POWDER, FOR SOLUTION INTRAVENOUS at 12:03

## 2024-03-07 RX ADMIN — THERA TABS 1 TABLET: TAB at 08:03

## 2024-03-07 RX ADMIN — ASPIRIN 81 MG CHEWABLE TABLET 81 MG: 81 TABLET CHEWABLE at 08:03

## 2024-03-07 RX ADMIN — ACETAMINOPHEN 650 MG: 160 SOLUTION ORAL at 08:03

## 2024-03-07 RX ADMIN — SENNOSIDES AND DOCUSATE SODIUM 1 TABLET: 8.6; 5 TABLET ORAL at 08:03

## 2024-03-07 RX ADMIN — ATORVASTATIN CALCIUM 80 MG: 40 TABLET, FILM COATED ORAL at 08:03

## 2024-03-07 RX ADMIN — AMLODIPINE BESYLATE 2.5 MG: 10 TABLET ORAL at 08:03

## 2024-03-07 RX ADMIN — SILODOSIN 4 MG: 4 CAPSULE ORAL at 08:03

## 2024-03-07 RX ADMIN — EZETIMIBE 10 MG: 10 TABLET ORAL at 08:03

## 2024-03-07 NOTE — SUBJECTIVE & OBJECTIVE
Interval History: Left drain with evidence of salivary output. Afebrile. CBC pending this AM. Reports he still feels some chest congestion. Other infectious workup negative so far.     Medications:  Continuous Infusions:  Scheduled Meds:   amLODIPine  2.5 mg Per G Tube Daily    ampicillin-sulbactam  1.5 g Intravenous Q8H    aspirin  81 mg Per G Tube Daily    atorvastatin  80 mg Per G Tube Daily    enoxparin  40 mg Subcutaneous Daily    ezetimibe  10 mg Per G Tube Daily    [START ON 3/8/2024] ferrous sulfate  300 mg Per G Tube Every other day    hydrocortisone   Topical (Top) BID    multivitamin  1 tablet Per G Tube Daily    pantoprazole  40 mg Intravenous Daily    polyethylene glycol  17 g Per G Tube Daily    senna-docusate 8.6-50 mg  1 tablet Per G Tube BID    silodosin  4 mg Per G Tube Daily    thiamine  100 mg Per G Tube Daily     PRN Meds:acetaminophen, artificial tears, melatonin, ondansetron, sodium chloride 0.9%     Review of patient's allergies indicates:  No Known Allergies  Objective:     Vital Signs (24h Range):  Temp:  [97.4 °F (36.3 °C)-98.4 °F (36.9 °C)] 98.2 °F (36.8 °C)  Pulse:  [73-91] 73  Resp:  [17-19] 19  SpO2:  [92 %-98 %] 98 %  BP: (113-121)/(60-73) 120/66       Lines/Drains/Airways       Drain  Duration                  Closed/Suction Drain 02/23/24 1338 Tube - 2 Right;Medial Chest Bulb 15 Fr. 12 days         Closed/Suction Drain 02/23/24 1414 Tube - 3 Left;Anterior Neck Bulb 15 Fr. 12 days         Closed/Suction Drain 02/23/24 1445 Tube - 4 Right;Anterior Neck Bulb 15 Fr. 12 days         Gastrostomy/Enterostomy 02/23/24 0750 Percutaneous endoscopic gastrostomy (PEG) LUQ feeding 12 days              Airway  Duration             Adult Surgical Airway 03/01/24 0700 Ralph Burrelluffed 6.0/ 75mm 6 days              Peripheral Intravenous Line  Duration                  Peripheral IV - Single Lumen 02/23/24 0555 18 G Left;Posterior Forearm 13 days         Peripheral IV - Single Lumen 02/23/24 0739 18  G Left;Posterior Forearm 12 days                     Physical Exam     NAD, communicates with writing board  Native tongue edematous and congested, improved  No bleeding from nose  6-0 cuffless trach secured with jesus collar  2 neck MARU in place with fibrinous drainage  Neck with doughy edema   Neck incision intact with staples  Chest incision intact with staples  Chest is soft, right bulk tunneling to neck 2/2 pec flap, soft to palpation, no fluctuance   1 MARU in chest with serosanguinous drainage       Significant Labs:  CBC:   Recent Labs   Lab 03/06/24  0332   WBC 9.96   RBC 2.29*   HGB 7.3*   HCT 22.6*      MCV 99*   MCH 31.9*   MCHC 32.3     CMP:   Recent Labs   Lab 03/03/24  0316   *   CALCIUM 8.4*      K 3.9   CO2 22*      BUN 27*   CREATININE 0.7       Significant Diagnostics:  None

## 2024-03-07 NOTE — RESPIRATORY THERAPY
"RAPID RESPONSE RESPIRATORY THERAPY PROACTIVE NOTE           Time of visit: 911     Code Status: Full Code   : 1955  Bed: [facility]:   MRN: [id number]  Time spent at the bedside: < 15 min    SITUATION    Evaluated patient for: LDA Check     BACKGROUND    Patient has a past medical history of Cancer, Hyperlipidemia, and Hypertension.  Clinically Significant Surgical Hx: tracheostomy    24 Hours Vitals Range:  Temp:  [97.4 °F (36.3 °C)-98.4 °F (36.9 °C)]   Pulse:  [72-91]   Resp:  [16-20]   BP: (119-129)/(58-74)   SpO2:  [95 %-100 %]     Labs:    No results for input(s): "NA", "K", "CL", "CO2", "BUN", "CREATININE", "GLU", "PHOS", "MG" in the last 72 hours.    Invalid input(s): "CMP", "TBIL"     No results for input(s): "PH", "PCO2", "PO2", "HCO3", "POCSATURATED", "BE" in the last 72 hours.    ASSESSMENT/INTERVENTIONS  Supply check      Last VS   Temp: 97.7 °F (36.5 °C) (1558)  Pulse: 85 (1558)  Resp: 16 (1558)  BP: 129/64 (1558)  SpO2: 100 % (1558)      Extra trachs at bedside: 4CN/6CN  Level of Consciousness: Level of Consciousness (AVPU): alert  Respiratory Effort: Respiratory Effort: Unlabored Expansion/Accessory Muscle Usage: Expansion/Accessory Muscles/Retractions: no use of accessory muscles  All Lung Field Breath Sounds: All Lung Fields Breath Sounds: Anterior:, Lateral:, diminished, coarse  O2 Device/Concentration: 5 lpm/21% FiO2 trach collar  Surgical airway: Yes, Type: Shiley Size: 6, uncuffed  Ambu at bedside:       Active Orders   Respiratory Care    Oxygen Continuous     Frequency: Continuous     Number of Occurrences: Until Specified     Order Questions:      Device type: Low flow      Device: Trach Collar      Titrate O2 per Oxygen Titration Protocol: Yes      To maintain SpO2 goal of: >= 92%      Notify MD of: Inability to achieve desired SpO2; Sudden change in patient status and requires 20% increase in FiO2; Patient requires >60% FiO2    Pulse Oximetry Continuous "     Frequency: Continuous     Number of Occurrences: Until Specified    Routine tracheostomy care     Frequency: BID     Number of Occurrences: Until Specified     Order Comments: Suction prn, exchange inner cannula daily or prn, please tape obturator to head of bed, have extra 6-0 and 4-0 cuffed trached available at bedside, have soft tip suction catheters available at bedside         RECOMMENDATIONS    We recommend: RRT Recs: Continue POC per primary team.      FOLLOW-UP    Please call back the Rapid Response RT, Beverly Ambrose, RRT at x [phone] for any questions or concerns.

## 2024-03-07 NOTE — PROGRESS NOTES
[facility] - [facility]  Otorhinolaryngology-Head & Neck Surgery  Progress Note    Subjective:     Post-Op Info:  Procedure(s) (LRB):  GLOSSECTOMY (Right)  DISSECTION, NECK (Bilateral)  TRACHEOTOMY (N/A)  CREATION, FLAP, MUSCLE ROTATION (N/A)  INSERTION, PEG TUBE (Right)   13 Days Post-Op  Hospital Day: 14     Interval History: Left drain with evidence of salivary output. Afebrile. CBC pending this AM. Reports he still feels some chest congestion. Other infectious workup negative so far.     Medications:  Continuous Infusions:  Scheduled Meds:   amLODIPine  2.5 mg Per G Tube Daily    ampicillin-sulbactam  1.5 g Intravenous Q8H    aspirin  81 mg Per G Tube Daily    atorvastatin  80 mg Per G Tube Daily    enoxparin  40 mg Subcutaneous Daily    ezetimibe  10 mg Per G Tube Daily    [START ON 3/8/2024] ferrous sulfate  300 mg Per G Tube Every other day    hydrocortisone   Topical (Top) BID    multivitamin  1 tablet Per G Tube Daily    pantoprazole  40 mg Intravenous Daily    polyethylene glycol  17 g Per G Tube Daily    senna-docusate 8.6-50 mg  1 tablet Per G Tube BID    silodosin  4 mg Per G Tube Daily    thiamine  100 mg Per G Tube Daily     PRN Meds:acetaminophen, artificial tears, melatonin, ondansetron, sodium chloride 0.9%     Review of patient's allergies indicates:  No Known Allergies  Objective:     Vital Signs (24h Range):  Temp:  [97.4 °F (36.3 °C)-98.4 °F (36.9 °C)] 98.2 °F (36.8 °C)  Pulse:  [73-91] 73  Resp:  [17-19] 19  SpO2:  [92 %-98 %] 98 %  BP: (113-121)/(60-73) 120/66       Lines/Drains/Airways       Drain  Duration                  Closed/Suction Drain 02/23/24 1338 Tube - 2 Right;Medial Chest Bulb 15 Fr. 12 days         Closed/Suction Drain 02/23/24 1414 Tube - 3 Left;Anterior Neck Bulb 15 Fr. 12 days         Closed/Suction Drain 02/23/24 1445 Tube - 4 Right;Anterior Neck Bulb 15 Fr. 12 days         Gastrostomy/Enterostomy 02/23/24 0750 Percutaneous endoscopic gastrostomy (PEG) LUQ feeding 12 days               Airway  Duration             Adult Surgical Airway 03/01/24 0700 Ralph Uncuffed 6.0/ 75mm 6 days              Peripheral Intravenous Line  Duration                  Peripheral IV - Single Lumen 02/23/24 0555 18 G Left;Posterior Forearm 13 days         Peripheral IV - Single Lumen 02/23/24 0739 18 G Left;Posterior Forearm 12 days                     Physical Exam     NAD, communicates with writing board  Native tongue edematous and congested, improved  No bleeding from nose  6-0 cuffless trach secured with jesus collar  2 neck MARU in place with fibrinous drainage  Neck with doughy edema   Neck incision intact with staples  Chest incision intact with staples  Chest is soft, right bulk tunneling to neck 2/2 pec flap, soft to palpation, no fluctuance   1 MARU in chest with serosanguinous drainage       Significant Labs:  CBC:   Recent Labs   Lab 03/06/24  0332   WBC 9.96   RBC 2.29*   HGB 7.3*   HCT 22.6*      MCV 99*   MCH 31.9*   MCHC 32.3     CMP:   Recent Labs   Lab 03/03/24  0316   *   CALCIUM 8.4*      K 3.9   CO2 22*      BUN 27*   CREATININE 0.7       Significant Diagnostics:  None  Assessment/Plan:     * Tongue cancer  The patient is a 68 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 He had bleeding from the oral cavity post-op that has overall improved.     Persistent fevers, workup negative. 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     ENT   - Keep JPs in place to neck and chest   - CTM output   - Of note, patient cannot be intubated by mouth 2/2 tongue edema from surgery, must maintain tracheostomy    Neuro  - Multimodal pain control    CV  - Currently HDS    Resp  - On trach collar  - Routine trach care  - Trach exchanged to [id number] cuffless 3/1/24, secured w soft collar    FEN/GI  - NPO  - On bolus feeds, tolerating well    Renal  - Cr  stable    Heme/ID   - Unasyn for post-op prophylaxis - complete  - Monitor H/H   - At baseline, workup consistetn with iron deficiency anemia, iron supplementation started   - Unasyn    MSK   - PT/OT/OOB    Ppx: L40    Dispo: Fever workup, supplies. Has demonstrated good trach care..           Lulu Workman MD  Otorhinolaryngology-Head & Neck Surgery  [facility]

## 2024-03-07 NOTE — ASSESSMENT & PLAN NOTE
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 He had bleeding from the oral cavity post-op that has overall improved.     Persistent fevers, workup negative. 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     ENT   - Keep JPs in place to neck and chest   - CTM output   - Of note, patient cannot be intubated by mouth 2/2 tongue edema from surgery, must maintain tracheostomy    Neuro  - Multimodal pain control    CV  - Currently HDS    Resp  - On trach collar  - Routine trach care  - Trach exchanged to [id number] cuffless 3/1/24, secured w soft collar    FEN/GI  - NPO  - On bolus feeds, tolerating well    Renal  - Cr stable    Heme/ID   - Unasyn for post-op prophylaxis - complete  - Monitor H/H   - At baseline, workup consistetn with iron deficiency anemia, iron supplementation started   - Unasyn    MSK   - PT/OT/OOB    Ppx: L40    Dispo: Fever workup, supplies. Has demonstrated good trach care..

## 2024-03-07 NOTE — PLAN OF CARE
[facility] Plan of Care Note  Dx tongue ca     Shift Events: none     Goals of Care: dc     Neuro: intact     Vital Signs: wdl      Respiratory: trach 5L 28%     Diet: npo- TF     Is patient tolerating current diet? yes     GTTS: none     Urine Output/Bowel Movement: wdl, lbm 3/4     Drains/Tubes/Tube Feeds (include total output/shift): shannan's x3     Lines: piv x2        Accuchecks:none     Skin: R chest staples, bilat neck staples     Fall Risk Score: 12     Activity level? IND     Any scheduled procedures? none     Any safety concerns? N/a     Other: n/a

## 2024-03-08 VITALS
TEMPERATURE: 99 F | HEIGHT: 72 IN | HEART RATE: 86 BPM | BODY MASS INDEX: 23.89 KG/M2 | OXYGEN SATURATION: 95 % | WEIGHT: 176.38 LBS | SYSTOLIC BLOOD PRESSURE: 115 MMHG | DIASTOLIC BLOOD PRESSURE: 58 MMHG | RESPIRATION RATE: 18 BRPM

## 2024-03-08 LAB
ERYTHROCYTE [DISTWIDTH] IN BLOOD BY AUTOMATED COUNT: 13.6 % (ref 11.5–14.5)
HCT VFR BLD AUTO: 23.5 % (ref 40–54)
HGB BLD-MCNC: 7.5 G/DL (ref 14–18)
MCH RBC QN AUTO: 31.4 PG (ref 27–31)
MCHC RBC AUTO-ENTMCNC: 31.9 G/DL (ref 32–36)
MCV RBC AUTO: 98 FL (ref 82–98)
PLATELET # BLD AUTO: 436 K/UL (ref 150–450)
PMV BLD AUTO: 11.2 FL (ref 9.2–12.9)
RBC # BLD AUTO: 2.39 M/UL (ref 4.6–6.2)
WBC # BLD AUTO: 6.27 K/UL (ref 3.9–12.7)

## 2024-03-08 PROCEDURE — 99900035 HC TECH TIME PER 15 MIN (STAT)

## 2024-03-08 PROCEDURE — 94761 N-INVAS EAR/PLS OXIMETRY MLT: CPT

## 2024-03-08 PROCEDURE — 25000003 PHARM REV CODE 250

## 2024-03-08 PROCEDURE — 97535 SELF CARE MNGMENT TRAINING: CPT

## 2024-03-08 PROCEDURE — 25000003 PHARM REV CODE 250: Performed by: STUDENT IN AN ORGANIZED HEALTH CARE EDUCATION/TRAINING PROGRAM

## 2024-03-08 PROCEDURE — 27000221 HC OXYGEN, UP TO 24 HOURS

## 2024-03-08 PROCEDURE — 63600175 PHARM REV CODE 636 W HCPCS

## 2024-03-08 PROCEDURE — 63600175 PHARM REV CODE 636 W HCPCS: Performed by: STUDENT IN AN ORGANIZED HEALTH CARE EDUCATION/TRAINING PROGRAM

## 2024-03-08 PROCEDURE — 27200966 HC CLOSED SUCTION SYSTEM

## 2024-03-08 PROCEDURE — 85027 COMPLETE CBC AUTOMATED: CPT

## 2024-03-08 PROCEDURE — C9113 INJ PANTOPRAZOLE SODIUM, VIA: HCPCS

## 2024-03-08 PROCEDURE — 99900026 HC AIRWAY MAINTENANCE (STAT)

## 2024-03-08 PROCEDURE — 36415 COLL VENOUS BLD VENIPUNCTURE: CPT

## 2024-03-08 PROCEDURE — 92507 TX SP LANG VOICE COMM INDIV: CPT

## 2024-03-08 RX ORDER — AMOXICILLIN AND CLAVULANATE POTASSIUM 875; 125 MG/1; MG/1
1 TABLET, FILM COATED ORAL 2 TIMES DAILY
Qty: 20 TABLET | Refills: 0 | Status: SHIPPED | OUTPATIENT
Start: 2024-03-08 | End: 2024-03-18

## 2024-03-08 RX ORDER — EZETIMIBE 10 MG/1
10 TABLET ORAL DAILY
Qty: 90 TABLET | Refills: 3 | Status: SHIPPED | OUTPATIENT
Start: 2024-03-08 | End: 2025-03-08

## 2024-03-08 RX ORDER — ACETAMINOPHEN 160 MG/5ML
650 LIQUID ORAL EVERY 6 HOURS PRN
Qty: 508 ML | Refills: 1 | Status: SHIPPED | OUTPATIENT
Start: 2024-03-08

## 2024-03-08 RX ORDER — AMOXICILLIN 250 MG
1 CAPSULE ORAL 2 TIMES DAILY
Qty: 60 TABLET | Refills: 0 | Status: SHIPPED | OUTPATIENT
Start: 2024-03-08 | End: 2024-04-07

## 2024-03-08 RX ORDER — FERROUS SULFATE 220 (44)/5
300 ELIXIR ORAL EVERY OTHER DAY
Qty: 48 ML | Refills: 2 | Status: ON HOLD | OUTPATIENT
Start: 2024-03-08 | End: 2024-03-25 | Stop reason: HOSPADM

## 2024-03-08 RX ORDER — POLYETHYLENE GLYCOL 3350 17 G/17G
17 POWDER, FOR SOLUTION ORAL DAILY
Qty: 510 G | Refills: 2 | Status: SHIPPED | OUTPATIENT
Start: 2024-03-08 | End: 2024-04-07

## 2024-03-08 RX ORDER — ATORVASTATIN CALCIUM 80 MG/1
80 TABLET, FILM COATED ORAL DAILY
Qty: 90 TABLET | Refills: 3 | Status: SHIPPED | OUTPATIENT
Start: 2024-03-08 | End: 2025-03-08

## 2024-03-08 RX ORDER — SILODOSIN 4 MG/1
4 CAPSULE ORAL DAILY
Qty: 30 CAPSULE | Refills: 11 | Status: SHIPPED | OUTPATIENT
Start: 2024-03-08 | End: 2025-03-08

## 2024-03-08 RX ORDER — LANOLIN ALCOHOL/MO/W.PET/CERES
100 CREAM (GRAM) TOPICAL DAILY
Qty: 30 TABLET | Refills: 0 | Status: SHIPPED | OUTPATIENT
Start: 2024-03-08 | End: 2024-04-07

## 2024-03-08 RX ORDER — AMLODIPINE BESYLATE 2.5 MG/1
2.5 TABLET ORAL DAILY
Qty: 30 TABLET | Refills: 11 | Status: SHIPPED | OUTPATIENT
Start: 2024-03-08 | End: 2025-03-08

## 2024-03-08 RX ADMIN — SILODOSIN 4 MG: 4 CAPSULE ORAL at 08:03

## 2024-03-08 RX ADMIN — SENNOSIDES AND DOCUSATE SODIUM 1 TABLET: 8.6; 5 TABLET ORAL at 08:03

## 2024-03-08 RX ADMIN — ATORVASTATIN CALCIUM 80 MG: 40 TABLET, FILM COATED ORAL at 08:03

## 2024-03-08 RX ADMIN — THERA TABS 1 TABLET: TAB at 08:03

## 2024-03-08 RX ADMIN — POLYETHYLENE GLYCOL 3350 17 G: 17 POWDER, FOR SOLUTION ORAL at 08:03

## 2024-03-08 RX ADMIN — PANTOPRAZOLE SODIUM 40 MG: 40 INJECTION, POWDER, FOR SOLUTION INTRAVENOUS at 08:03

## 2024-03-08 RX ADMIN — AMPICILLIN AND SULBACTAM 1.5 G: 1; .5 INJECTION, POWDER, FOR SOLUTION INTRAVENOUS at 12:03

## 2024-03-08 RX ADMIN — AMPICILLIN AND SULBACTAM 1.5 G: 1; .5 INJECTION, POWDER, FOR SOLUTION INTRAVENOUS at 08:03

## 2024-03-08 RX ADMIN — ASPIRIN 81 MG CHEWABLE TABLET 81 MG: 81 TABLET CHEWABLE at 08:03

## 2024-03-08 RX ADMIN — ACETAMINOPHEN 650 MG: 160 SOLUTION ORAL at 08:03

## 2024-03-08 RX ADMIN — MINERAL SUPPLEMENT IRON 300 MG / 5 ML STRENGTH LIQUID 100 PER BOX UNFLAVORED 300 MG: at 08:03

## 2024-03-08 RX ADMIN — EZETIMIBE 10 MG: 10 TABLET ORAL at 08:03

## 2024-03-08 RX ADMIN — Medication 100 MG: at 08:03

## 2024-03-08 RX ADMIN — AMLODIPINE BESYLATE 2.5 MG: 10 TABLET ORAL at 08:03

## 2024-03-08 NOTE — PT/OT/SLP PROGRESS
Speech Language Pathology Treatment    Patient Name:  Timothy Galdamez   MRN:  [id number]  Admitting Diagnosis: Tongue cancer    Recommendations:               General Recommendations:  Dysphagia therapy  Diet recommendations:  NPO, NPO    - Continue with PEG as primary means of nutrition until cleared for PO trials by ENT  Aspiration Precautions: Strict aspiration precautions   General Precautions: Standard, fall, aspiration  Communication strategies:  none  PMSV Precautions  Work towards wearing speaking valve all waking hours as tolerated   Please note, to clean valve:    1. Swish valve in pure soap and warm water,   2. Rinse valve thoroughly in warm running water   3. Allow valve to air dry thoroughly before placing it in storage container  4. DO NOT use hot water, peroxide, bleach, vinegar, alcohol, brushes, or cotton swabs to clean valve.      Assessment:     Timothy Galdamez is a 68 y.o. male with an SLP diagnosis of S/P Trach and One Way Speaking Valve Training.    Subjective     Pt awake and alert   No family present    Pain/Comfort:  Pain Rating 1: 0/10  Pain Rating Post-Intervention 1: 0/10    Respiratory Status:  trach collar     Objective:     Has the patient been evaluated by SLP for swallowing?   Yes  Keep patient NPO? Yes   Current Respiratory Status:        Pt seen for ongoing speaking valve training. Pt awake and alert. Pt reporting had had low grade fever and concerns for salivary fistula. SLP placed PMSV on trach hub. Pt reports pt tolerating for most waking hours. Vocal quality strong and clear. Despite articulation deficits s/p glossectomy + muscle flap pt speech intelligibility ~ 80% in spontaneous conversation. Pt e without any additional question re: PMSV use and care. Pt pending discharge later this date.  SLP discussed following up with [facility] as an outpatient for ongoing guidance re: swallowing. All parties in agreement with plan, pt and family appreciative of SLP time spent in room.  Speech to follow.     Goals:   Multidisciplinary Problems       SLP Goals          Problem: SLP    Goal Priority Disciplines Outcome   SLP Goal     SLP Ongoing, Progressing   Description: Speech Language Pathology Goals:   1. Pt will tolerate PMSV trials.                        Plan:     Patient to be seen:  3 x/week   Plan of Care expires:     Plan of Care reviewed with:  patient   SLP Follow-Up:  Yes       Discharge recommendations:  Low Intensity Therapy   Barriers to Discharge:  None    Time Tracking:     SLP Treatment Date:   03/05/24  Speech Start Time:  0921  Speech Stop Time:  0935     Speech Total Time (min):  14 min    Billable Minutes: Speech Therapy Individual 6 and Self Care/Home Management Training 8    03/08/2024

## 2024-03-08 NOTE — SUBJECTIVE & OBJECTIVE
Interval History: ***    Medications:  Continuous Infusions:  Scheduled Meds:   amLODIPine  2.5 mg Per G Tube Daily    ampicillin-sulbactam  1.5 g Intravenous Q8H    aspirin  81 mg Per G Tube Daily    atorvastatin  80 mg Per G Tube Daily    enoxparin  40 mg Subcutaneous Daily    ezetimibe  10 mg Per G Tube Daily    ferrous sulfate  300 mg Per G Tube Every other day    hydrocortisone   Topical (Top) BID    multivitamin  1 tablet Per G Tube Daily    pantoprazole  40 mg Intravenous Daily    polyethylene glycol  17 g Per G Tube Daily    senna-docusate 8.6-50 mg  1 tablet Per G Tube BID    silodosin  4 mg Per G Tube Daily    thiamine  100 mg Per G Tube Daily     PRN Meds:acetaminophen, artificial tears, melatonin, ondansetron, sodium chloride 0.9%     Review of patient's allergies indicates:  No Known Allergies  Objective:     Vital Signs (24h Range):  Temp:  [97.5 °F (36.4 °C)-99 °F (37.2 °C)] 99 °F (37.2 °C)  Pulse:  [72-96] 80  Resp:  [16-20] 20  SpO2:  [96 %-100 %] 98 %  BP: (123-133)/(58-80) 124/73       Lines/Drains/Airways       Drain  Duration                  Closed/Suction Drain 02/23/24 1338 Tube - 2 Right;Medial Chest Bulb 15 Fr. 13 days         Closed/Suction Drain 02/23/24 1414 Tube - 3 Left;Anterior Neck Bulb 15 Fr. 13 days         Closed/Suction Drain 02/23/24 1445 Tube - 4 Right;Anterior Neck Bulb 15 Fr. 13 days         Gastrostomy/Enterostomy 02/23/24 0750 Percutaneous endoscopic gastrostomy (PEG) LUQ feeding 13 days              Airway  Duration             Adult Surgical Airway 03/01/24 0700 Ralph Uncuffed 6.0/ 75mm 7 days              Peripheral Intravenous Line  Duration                  Peripheral IV - Single Lumen 02/23/24 0555 18 G Left;Posterior Forearm 14 days         Peripheral IV - Single Lumen 02/23/24 0739 18 G Left;Posterior Forearm 13 days                     Physical Exam      NAD, communicates with writing board  Native tongue edematous and congested, improved  No bleeding from  nose  6-0 cuffless trach secured with jesus collar  2 neck MARU in place with fibrinous drainage  Neck with doughy edema   Neck incision intact with staples  Chest incision intact with staples  Chest is soft, right bulk tunneling to neck 2/2 pec flap, soft to palpation, no fluctuance   1 MARU in chest with serosanguinous drainage    Significant Labs:  CBC:   Recent Labs   Lab 03/08/24  0449   WBC 6.27   RBC 2.39*   HGB 7.5*   HCT 23.5*      MCV 98   MCH 31.4*   MCHC 31.9*     CMP:   Recent Labs   Lab 03/03/24  0316   *   CALCIUM 8.4*      K 3.9   CO2 22*      BUN 27*   CREATININE 0.7       Significant Diagnostics:  None

## 2024-03-08 NOTE — CONSULTS
[facility]  Wound Care    Patient Name:  Timothy Galdamez   MRN:  [id number]  Date: 3/7/2024  Diagnosis: Tongue cancer    History:     Past Medical History:   Diagnosis Date    Cancer     Hyperlipidemia     Hypertension        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                 Social Determinants of Health     Financial Resource Strain: Low Risk  (1/22/2024)    Overall Financial Resource Strain (CARDIA)     Difficulty of Paying Living Expenses: Not hard at all   Food Insecurity: No Food Insecurity (1/22/2024)    Hunger Vital Sign     Worried About Running Out of Food in the Last Year: Never true     Ran Out of Food in the Last Year: Never true   Transportation Needs: No Transportation Needs (1/22/2024)    PRAPARE - Transportation     Lack of Transportation (Medical): No     Lack of Transportation (Non-Medical): No   Physical Activity: Insufficiently Active (1/22/2024)    Exercise Vital Sign     Days of Exercise per Week: 3 days     Minutes of Exercise per Session: 30 min   Stress: Stress Concern Present (1/22/2024)    [place] [employer] - Occupational Stress Questionnaire     Feeling of Stress : Rather much   Social Connections: Unknown (1/22/2024)    Social Connection and Isolation Panel [NHANES]     Frequency of Communication with Friends and Family: Never     Frequency of Social Gatherings with Friends and Family: Patient declined     Active Member of Clubs or Organizations: No     Attends Club or Organization Meetings: Never      Marital Status:    Housing Stability: Low Risk  (1/22/2024)    Housing Stability Vital Sign     Unable to Pay for Housing in the Last Year: No     Number of Places Lived in the Last Year: 1     Unstable Housing in the Last Year: No       Precautions:     Allergies as of 02/15/2024    (No Known Allergies)       [facility] Assessment Details/Treatment     Patient seen for wound care consultation. -placed per RRT for trach site.    Reviewed chart for this encounter.   See Flow Sheet for findings.    Pt awake/ alert- just back from ambulating in [place]. Agreeable to targeted assmt of trach site. Drain sponge with small amount of drainage present- skin beneath intact but moist. Rec for Mepilex Ag foam beneath for increased absorption- and bacteriocidal property. Pt reports that RRT planned to return for care- supplies provided for use. Pt communicates basic undertstanding re plan- will continue to follow for improvements.    Discussed POC with patient and primary nurse.   See EMR for orders   Bedside nursing to continue care & monitoring.  Bedside nursing to maintain pressure injury prevention interventions.  Current documented Pedro score is 21 with a nutrition sub-scale score of 3.     03/07/24 1145   WOCN Assessment   WOCN Total Time (mins) 30   Visit Date 03/07/24   Visit Time 1145   Consult Type New   WOCN Speciality Wound   Intervention assessed;chart review   Teaching on-going  (pt- targeted skin assmt at trach site.)   Skin Interventions   Skin Protection adhesive use limited  (mepilex Ag provided to use with fistula site care.)   Positioning   Body Position   (up in bedside chair.)   Head of Bed (HOB) Positioning HOB elevated   Positioning/Transfer Devices pillows   Pressure Injury Prevention    Check Medical Devices Done   Adult Surgical Airway 03/01/24 0700 Ralph Burrelluffed 6.0/ 75mm   Placement Date/Time: 03/01/24 0700   Present Prior to Hospital Arrival?: No  Inserted by: (c) Other  Placed By: Resident   Type:  Tracheostomy  Brand: Shiley  Airway Device Style: Uncuffed  Airway Device Size: 6.0/ 75mm   Site Assessment Drainage;Other (Comment)  (pink/ moist- skin remains intact.)   Site Care   (pending per RRT- Mepilex Ag provided for increased absorption and bacteriocidal property.)     Will continue to follow as needed and/ or as directed until discharge.    Bella Anaya BSN, RN, CWOCN  03/07/2024

## 2024-03-08 NOTE — NURSING
All medical equipment home supplies delivered to bedside, instructions and indications for the equipment explained including: trach cannula care, use of home suction machine, peg tube feedings, ree hartley drainage, and possible adverse events.   Patient and wife proficient with all equipment, providing verbal teachback, as well as demonstration of equipment use.   Patient ready for dc at this time.

## 2024-03-08 NOTE — RESPIRATORY THERAPY
"RAPID RESPONSE RESPIRATORY THERAPY PROACTIVE NOTE           Time of visit: 1041     Code Status: Full Code   : 1955  Bed: [facility]6/1026 A:   MRN: [id number]  Time spent at the bedside: < 15 min    SITUATION    Evaluated patient for: LDA Check     BACKGROUND    Patient has a past medical history of Cancer, Hyperlipidemia, and Hypertension.  Clinically Significant Surgical Hx: tracheostomy    24 Hours Vitals Range:  Temp:  [97.6 °F (36.4 °C)-99 °F (37.2 °C)]   Pulse:  [78-96]   Resp:  [18-20]   BP: (115-133)/(58-80)   SpO2:  [95 %-100 %]     Labs:    No results for input(s): "NA", "K", "CL", "CO2", "BUN", "CREATININE", "GLU", "PHOS", "MG" in the last 72 hours.    Invalid input(s): "CMP", "TBIL"     No results for input(s): "PH", "PCO2", "PO2", "HCO3", "POCSATURATED", "BE" in the last 72 hours.    ASSESSMENT/INTERVENTIONS  Pt resting comfortably with no respiratory needs at this time.      Last VS   Temp: 98.9 °F (37.2 °C) ( 120)  Pulse: 86 ( 1202)  Resp: 18 ( 120)  BP: 115/58 ( 1202)  SpO2: 95 % ( 120)      Extra trachs at bedside: y  Level of Consciousness: Level of Consciousness (AVPU): alert  Respiratory Effort: Respiratory Effort: Normal, Unlabored Expansion/Accessory Muscle Usage: Expansion/Accessory Muscles/Retractions: expansion symmetric, no retractions, no use of accessory muscles  All Lung Field Breath Sounds: All Lung Fields Breath Sounds: Anterior:, Lateral:, equal bilaterally, coarse  O2 Device/Concentration: R.A.  Surgical airway: Yes, Type: Shiley Size: 6, uncuffed  Ambu at bedside:       Active Orders   Respiratory Care    Oxygen Continuous     Frequency: Continuous     Number of Occurrences: Until Specified     Order Questions:      Device type: Low flow      Device: Trach Collar      Titrate O2 per Oxygen Titration Protocol: Yes      To maintain SpO2 goal of: >= 92%      Notify MD of: Inability to achieve desired SpO2; Sudden change in patient status and requires 20% " increase in FiO2; Patient requires >60% FiO2    Pulse Oximetry Continuous     Frequency: Continuous     Number of Occurrences: Until Specified    Routine tracheostomy care     Frequency: BID     Number of Occurrences: Until Specified     Order Comments: Suction prn, exchange inner cannula daily or prn, please tape obturator to head of bed, have extra 6-0 and 4-0 cuffed trached available at bedside, have soft tip suction catheters available at bedside         RECOMMENDATIONS    We recommend: RRT Recs: Continue POC per primary team.      FOLLOW-UP    Please call back the Rapid Response RT, Eder Stapleton, RRT at x [phone] for any questions or concerns.

## 2024-03-08 NOTE — ASSESSMENT & PLAN NOTE
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 He had bleeding from the oral cavity post-op that has overall improved.     Persistent fevers, workup negative. 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     ENT   - Keep JPs in place to neck and chest   - CTM output   - Of note, patient cannot be intubated by mouth 2/2 tongue edema from surgery, must maintain tracheostomy    Neuro  - Multimodal pain control    CV  - Currently HDS    Resp  - On trach collar  - Routine trach care  - Trach exchanged to [id number] cuffless 3/1/24, secured w soft collar    FEN/GI  - NPO  - On bolus feeds, tolerating well    Renal  - Cr stable    Heme/ID   - Unasyn for post-op prophylaxis - complete  - Monitor H/H   - At baseline, workup consistetn with iron deficiency anemia, iron supplementation started   - Unasyn    MSK   - PT/OT/OOB    Ppx: L40    Dispo: Has remained afebrile, likely d/c today

## 2024-03-08 NOTE — DISCHARGE SUMMARY
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 He underwent RT at [facility]. Persistent disease of right tongue showed p16 negative SCC.     Procedure(s) (LRB):  GLOSSECTOMY (Right)  DISSECTION, NECK (Bilateral)  TRACHEOTOMY (N/A)  CREATION, FLAP, MUSCLE ROTATION (N/A)  INSERTION, PEG TUBE (Right)     Hospital Course:   The patient is a 68 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 He had bleeding from the oral cavity post-op that has resolved. He was noted to have fevers on 3/4 so a fever work-up was initiated, which revealed some atelectasis on CXR. He was started on Augmentin but continued to have fevers, so this was advanced to Unasyn. His left neck MARU drain looked concerning for a fistula 3/6 but this has remained contained overall. Fevers resolved with unasyn. His Hgb has range from 7-8 for most of the admission but has not needed a transfusion. Iron studies showed iron deficiency. So, he was started on iron supplement every other day. Drain output amount stable. Patient is stable for discharge and continued outpatient management with oral abx. He will follow up closely with Dr. Ventura on Tuesday. He has shown proficiency in self trach care and PEG care.        Consults:   Consults (From admission, onward)          Status Ordering Provider     Inpatient consult to Social Work  Once        Provider:  (Not yet assigned)    Acknowledged EILEEN DYSON     Inpatient consult to Registered  Dietitian/Nutritionist  Once        Provider:  (Not yet assigned)    Completed SHELLEY BALTAZAR     Inpatient consult to Registered Dietitian/Nutritionist  Once        Provider:  (Not yet assigned)    Completed SHELLEY BALTAZAR            Significant Diagnostic Studies: Labs: CBC   Recent Labs   Lab 03/07/24  0726 03/08/24  0449   WBC 7.34 6.27   HGB 7.8* 7.5*   HCT 23.9* 23.5*    436       Pending Diagnostic Studies:       None          Final Active Diagnoses:    Diagnosis Date Noted POA    PRINCIPAL PROBLEM:  Tongue cancer [C02.9] 06/29/2015 Yes    Encounter for weaning from ventilator [Z99.11] 03/06/2024 Not Applicable    Acute blood loss anemia [D62] 02/26/2024 No    Hypokalemia [E87.6] 02/26/2024 No    Hypoalbuminemia [E88.09] 02/26/2024 Yes    Hypophosphatemia [E83.39] 02/26/2024 No    S/P percutaneous endoscopic gastrostomy (PEG) tube placement [Z93.1] 02/24/2024 Not Applicable    Status post tracheostomy [Z93.0] 02/23/2024 Not Applicable    On supplemental oxygen therapy [Z99.81] 02/23/2024 Not Applicable    Primary hypertension [I10] 01/24/2024 Yes    Hyperlipidemia [E78.5] 10/17/2012 Yes      Problems Resolved During this Admission:      Discharged Condition: fair    Disposition: Home or Self Care    Follow Up:   Follow-up Information       Jeferson Ventura MD Follow up in 1 week(s).    Specialty: Otolaryngology  Why: post op, For wound re-check    Clinic will call you  Contact information:  [address] [place]  [facility] [address]  [phone]               Young Lanier MD Follow up.    Specialty: Family Medicine  Why: Make appointment to follow up on iron labs and other new medicines  Contact information:  [address] [facility] [id number] [phone]                           Patient Instructions:      TRACH CARE SUPPLIES FOR HOME USE     Order Specific Question Answer Comments   Height: 6' (1.829 m)    Weight: 80 kg (176 lb 5.9 oz)    Length of need (1-99 months): 12     Trach type: Shiley    Trach cannula: Cuffless    Size Grenadian: 14    Trach care kits (per month)(1-30): 30    Trach collar? Yes    Disposable inter-cannula? Yes    Speaking valve? Yes    Trach cap? No    Split drain gauze? Yes    Does patient have medical equipment at home? none      SUCTION MACHINE FOR HOME USE     Order Specific Question Answer Comments   Height: 6' (1.829 m)    Weight: 80 kg (176 lb 5.9 oz)    Type of suction: Intermittent    Frequency: QID    Disposable cannisters? Yes    Connective tubing? Yes    Yankauer? Yes    Disposable suction catheters? Yes    Catheter size Grenadian: 14    Quantity of catheters (per month): 60    Length of need (1-99 months): 12    Does patient have medical equipment at home? none      G-TUBE SUPPLIES FOR HOME USE   Order Comments: Formula: Marsha Herrera Peptide 1.5    Mechanism: Bolus    4 cans per day with free water flushes     Order Specific Question Answer Comments   Height: 6' (1.829 m)    Weight: 80 kg (176 lb 5.9 oz)    Does patient have medical equipment at home? none    Length of need (1-99 months): 12      Ambulatory referral/consult to Outpatient Case Management   Referral Priority: Routine Referral Type: Consultation   Referral Reason: Specialty Services Required   Number of Visits Requested: 1     Ambulatory referral/consult to [facility]   Standing Status: Future   Referral Priority: Routine Referral Type: Consultation   Referral Reason: Specialty Services Required   Number of Visits Requested: 1     Notify your health care provider if you experience any of the following:  temperature >100.4     Notify your health care provider if you experience any of the following:  persistent nausea and vomiting or diarrhea     Notify your health care provider if you experience any of the following:  severe uncontrolled pain     Notify your health care provider if you experience any of the following:  redness, tenderness, or signs of infection (pain,  swelling, redness, odor or green/yellow discharge around incision site)     Notify your health care provider if you experience any of the following:  difficulty breathing or increased cough     Notify your health care provider if you experience any of the following:  severe persistent headache     Tube Feedings/Formulas   Order Comments: 4 cans per day. 250 cc FWF QID. Hold for any nausea, vomiting, or fullness.     Order Specific Question Answer Comments   Select Adult Formula: Other DuXplore Peptide 1.5   Route: Gastrostomy      Activity as tolerated     Ambulatory Request for Ready Responder Services   Standing Status: Future Standing Exp. Date: 03/08/25     Order Specific Question Answer Comments   Program referred to: COVID-19 Vaccine      Medications:  Reconciled Home Medications:      Medication List        START taking these medications      acetaminophen 650 mg/20.3 mL Soln  Commonly known as: TYLENOL  20.3 mLs (650 mg total) by Per G Tube route every 6 (six) hours as needed for Pain.     amoxicillin-clavulanate 875-125mg 875-125 mg per tablet  Commonly known as: AUGMENTIN  1 tablet by Per G Tube route 2 (two) times daily. for 10 days     atorvastatin 80 MG tablet  Commonly known as: LIPITOR  1 tablet (80 mg total) by Per G Tube route once daily.  Replaces: rosuvastatin 20 MG tablet     ferrous sulfate 220 mg (44 mg iron)/5 mL Elix  Commonly known as: FEROSUL  Use 6.8 mLs (300.08 mg total) by Per G Tube route every other day. for 15 days     multivitamin tablet  Commonly known as: THERAGRAN  1 tablet by Per G Tube route once daily.     polyethylene glycol 17 gram/dose powder  Commonly known as: GLYCOLAX  Use cap to measure 17 grams, mix in liquid and take by Per G Tube route once daily.     senna-docusate 8.6-50 mg 8.6-50 mg per tablet  Commonly known as: PERICOLACE  1 tablet by Per G Tube route 2 (two) times daily.     silodosin 4 mg Cap capsule  Commonly known as: RAPAFLO  Take 1 capsule (4 mg total) by  mouth once daily. Open capsule, place in water and administer into feeding tube     thiamine 100 MG tablet  1 tablet (100 mg total) by Per G Tube route once daily.            CHANGE how you take these medications      amLODIPine 2.5 MG tablet  Commonly known as: NORVASC  1 tablet (2.5 mg total) by Per G Tube route once daily.  What changed: how to take this     ezetimibe 10 mg tablet  Commonly known as: ZETIA  1 tablet (10 mg total) by Per G Tube route once daily.  What changed: how to take this            CONTINUE taking these medications      aspirin 81 MG EC tablet  Commonly known as: ECOTRIN  Take 81 mg by mouth once daily.     calcium carbonate 600 mg calcium (1,500 mg) Tab  Commonly known as: OS-HERMES  Take 600 mg by mouth once daily.     NON FORMULARY MEDICATION  Prevegan once daily     OMEGA-3 2100 ORAL  Take 1 capsule by mouth once daily.     VITAMIN C 100 MG tablet  Generic drug: ascorbic acid (vitamin C)  Take 100 mg by mouth once daily.     VITAMIN D ORAL  Take 1 tablet by mouth once daily.     vitamin E 100 UNIT capsule  Take 100 Units by mouth once daily.            STOP taking these medications      HYDROcodone-acetaminophen 5-325 mg per tablet  Commonly known as: NORCO     omeprazole 40 MG capsule  Commonly known as: PRILOSEC     ondansetron 4 MG Tbdl  Commonly known as: ZOFRAN-ODT     ONE DAILY MULTIVITAMIN per tablet  Generic drug: multivitamin     rosuvastatin 20 MG tablet  Commonly known as: CRESTOR  Replaced by: atorvastatin 80 MG tablet              Mynor Dolan MD  Otorhinolaryngology-Head & Neck Surgery  [facility]

## 2024-03-08 NOTE — PLAN OF CARE
[facility] Plan of Care Note  Dx tongue ca     Shift Events: none     Goals of Care: dc     Neuro: intact     Vital Signs: wdl      Respiratory: trach 5L 28%     Diet: npo- TF     Is patient tolerating current diet? yes     GTTS: none     Urine Output/Bowel Movement: wdl, lbm 3/4     Drains/Tubes/Tube Feeds (include total output/shift): shannan's x3     Lines: piv x2        Accuchecks:none     Skin: R chest staples, bilat neck staples     Fall Risk Score: 12     Activity level? IND     Any scheduled procedures? none     Any safety concerns? N/a     Other: n/a

## 2024-03-08 NOTE — PLAN OF CARE
[facility] [place] [facility]  Discharge Final Note    Primary Care Provider: Young Lanier MD  Expected Discharge Date: 3/8/2024    Patient medically ready for discharge to home with [facility].     [facility] is now accepting and will see patient Monday. 3/11/24.     Ambulatory referral for care at home placed.     [facility].     Suction [facility] Tr[facility]     [facility] will provide trach supplies as well.     Transportation by family.     Is family/patient aware of discharge: yes     Hospital follow up scheduled: yes       Final Discharge Note (most recent)       Final Note - 03/08/24 1509          Final Note    Assessment Type Final Discharge Note     Anticipated Discharge Disposition [place] [facility]     Hospital Resources/Appts/Education Provided Provided patient/caregiver with written discharge plan information                     Important Message from Medicare  Important Message from Medicare regarding Discharge Appeal Rights: Given to patient/caregiver, Explained to patient/caregiver, Signed/date by patient/caregiver   Date IMM was signed: 03/08/24  Time IMM was signed: 1407  Referral Info (most recent)       Referral Info    No documentation.                 Contact Info       Jeferson Ventura MD   Specialty: Otolaryngology   Relationship: Consulting Physician    [address]  [facility] [address]   Phone: [phone]       Next Steps: Follow up in 1 week(s)    Instructions: post op, For wound re-check    Clinic will call you    Young Lanier MD   Specialty: Family Medicine   Relationship: PCP - General    [address] [facility] [address]   Phone: [phone]       Next Steps: Follow up    Instructions: Make appointment to follow up on iron labs and other new medicines          Future Appointments   Date Time Provider Department Center   3/12/2024  3:30 PM Jeferson Ventura MD [facility] [place]      Terry Weinstein RN  Case Management  Ext: [phone]  03/08/2024

## 2024-03-08 NOTE — PROGRESS NOTES
[facility] Home (Bolus) Tube Feeding Education/Discharge Planning Note:     Bedside home Bolus Feeding education completed with patient on 3/8/2024. Patient has been participating in his own care, and stated he has been self-administering his tube feeds while admitted. Home tube feeding regimen (Bolus 4 containers of Marsha Farms Peptide 1.5 daily + 1 syringe water flush before and 2 syringe water flush after each feed) reviewed and provided. Teach back by patient demonstrated. Also provided review/education on flushing requirements, formula safety, HOB elevation requirements, giving medications through feeding tube, and feeding tube site care. Additional education materials also provided. Time allotted for questions; questions addressed appropriately. Patient and family agreeable with the enteral discharge plan. Patients home tube feeding supplies and formula will be delivered to the home. Provided patient with [facility] support number [phone]. Patient is ready discharge home from [facility] perspective. Patient's discharge planning team and bedside nurse updated with the information above.      Please do not hesitate reach out for any additional needs.     Yamel Delgadillo MS, RDN, LDN  Clinical Liaison & Dietitian  [facility]   Phone: [phone]  [email or web address]

## 2024-03-10 LAB — BACTERIA BLD CULT: NORMAL

## 2024-03-11 ENCOUNTER — TELEPHONE (OUTPATIENT)
Dept: INTERNAL MEDICINE | Facility: CLINIC | Age: 69
End: 2024-03-11
Payer: MEDICARE

## 2024-03-11 PROCEDURE — G0180 MD CERTIFICATION HHA PATIENT: HCPCS | Mod: ,,, | Performed by: OTOLARYNGOLOGY

## 2024-03-12 ENCOUNTER — NURSE TRIAGE (OUTPATIENT)
Dept: ADMINISTRATIVE | Facility: CLINIC | Age: 69
End: 2024-03-12
Payer: MEDICARE

## 2024-03-12 ENCOUNTER — OFFICE VISIT (OUTPATIENT)
Dept: OTOLARYNGOLOGY | Facility: CLINIC | Age: 69
End: 2024-03-12
Payer: MEDICARE

## 2024-03-12 DIAGNOSIS — C02.9 TONGUE CANCER: Primary | ICD-10-CM

## 2024-03-12 PROCEDURE — 1159F MED LIST DOCD IN RCRD: CPT | Mod: CPTII,S$GLB,, | Performed by: OTOLARYNGOLOGY

## 2024-03-12 PROCEDURE — 99999 PR PBB SHADOW E&M-EST. PATIENT-LVL III: CPT | Mod: PBBFAC,,, | Performed by: OTOLARYNGOLOGY

## 2024-03-12 PROCEDURE — 1160F RVW MEDS BY RX/DR IN RCRD: CPT | Mod: CPTII,S$GLB,, | Performed by: OTOLARYNGOLOGY

## 2024-03-12 PROCEDURE — 99024 POSTOP FOLLOW-UP VISIT: CPT | Mod: S$GLB,,, | Performed by: OTOLARYNGOLOGY

## 2024-03-12 NOTE — PROGRESS NOTES
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  Doing well overall.  His drains continued put out scant amounts of turbid drainage.  He is mainly bothered by cough associated with his trach and secretions.    On exam, his incisions were clean, dry and intact.  His neck is without evidence of drainage, warmth or erythema.  His drains are remarkable for small amounts of turbid out his trach is in place.  His remaining tongue is dark in color and is sloughing.    A/P:  Doing well.  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  His neck looks great.  Continue drains for now.  Return early next week for further assessment.  He may require operative debridement with possible flap closure.

## 2024-03-12 NOTE — TELEPHONE ENCOUNTER
Pt had recent tongue surgery for cancer, discharged from hospital this past Friday. Starting this evening, current temp 101.3, pt having no other symptoms. Saw his surgeon in office today.    Dispo- see hcp or provider triage within 4 hours. Successfully reached on call provider dr. Chavez. Advised pt should treat fever with tylenol but if it does not come down, he should go to ED to be seen. Caller advised, VU.  Reason for Disposition   Fever > 100.4 F (38.0 C)    Additional Information   Negative: [1] Widespread rash AND [2] bright red, sunburn-like   Negative: [1] SEVERE headache AND [2] after spinal (epidural) anesthesia   Negative: [1] Vomiting AND [2] persists > 4 hours   Negative: [1] Vomiting AND [2] abdomen looks much more swollen than usual   Negative: [1] Drinking very little AND [2] dehydration suspected (e.g., no urine > 12 hours, very dry mouth, very lightheaded)   Negative: Patient sounds very sick or weak to the triager   Negative: Sounds like a serious complication to the triager    Protocols used: Post-Op Symptoms and [id number]

## 2024-03-13 ENCOUNTER — PATIENT MESSAGE (OUTPATIENT)
Dept: OTOLARYNGOLOGY | Facility: CLINIC | Age: 69
End: 2024-03-13
Payer: MEDICARE

## 2024-03-13 DIAGNOSIS — C02.9 TONGUE CANCER: Primary | ICD-10-CM

## 2024-03-14 ENCOUNTER — PATIENT OUTREACH (OUTPATIENT)
Dept: ADMINISTRATIVE | Facility: CLINIC | Age: 69
End: 2024-03-14
Payer: MEDICARE

## 2024-03-14 ENCOUNTER — HOSPITAL ENCOUNTER (OUTPATIENT)
Dept: RADIOLOGY | Facility: HOSPITAL | Age: 69
Discharge: HOME | End: 2024-03-14
Attending: OTOLARYNGOLOGY
Payer: MEDICARE

## 2024-03-14 DIAGNOSIS — C02.9 TONGUE CANCER: ICD-10-CM

## 2024-03-14 PROCEDURE — 70491 CT SOFT TISSUE NECK W/DYE: CPT | Mod: 26,,, | Performed by: RADIOLOGY

## 2024-03-14 PROCEDURE — 25500020 PHARM REV CODE 255: Performed by: OTOLARYNGOLOGY

## 2024-03-14 PROCEDURE — 70491 CT SOFT TISSUE NECK W/DYE: CPT | Mod: TC

## 2024-03-14 RX ADMIN — IOHEXOL 100 ML: 350 INJECTION, SOLUTION INTRAVENOUS at 11:03

## 2024-03-14 NOTE — PROGRESS NOTES
C3 nurse spoke with Timothy GREENE Rudolph  for a TCC post hospital discharge follow up call. The patient does not have a scheduled HOSFU appointment with Young Lanier MD  within 5-7 days post hospital discharge date 03/08/2024. C3 nurse was unable to schedule HOSFU appointment in [facility].  Please contact patient and schedule follow up appointment using HOSFU visit type on or before 03/18/2024.    Message sent PCP staff. Declined scheduling

## 2024-03-14 NOTE — PROGRESS NOTES
C3 nurse attempted to contact Timothy Galdamez ( wife)  for a TCC post hospital discharge follow up call. The patient is unable to conduct the call @ this time. The patient requested a callback.

## 2024-03-17 ENCOUNTER — TELEPHONE (OUTPATIENT)
Dept: ADMINISTRATIVE | Facility: CLINIC | Age: 69
End: 2024-03-17
Payer: MEDICARE

## 2024-03-17 ENCOUNTER — PATIENT OUTREACH (OUTPATIENT)
Dept: ADMINISTRATIVE | Facility: OTHER | Age: 69
End: 2024-03-17
Payer: MEDICARE

## 2024-03-17 NOTE — PROGRESS NOTES
CHW - Case Closure    This Community Health Worker spoke to caregiver via telephone today.   Pt/Caregiver reported: Patient's spouse/caregiver states pt has no needs or request for assistance at this time. Pt's spouse/caregiver agreed to case closure.   Pt/Caregiver denied any additional needs at this time and agrees with episode closure at this time.  Provided caregiver with Community Health Worker's contact information and encouraged her to contact this Community Health Worker if additional needs arise.

## 2024-03-18 ENCOUNTER — OFFICE VISIT (OUTPATIENT)
Dept: OTOLARYNGOLOGY | Facility: CLINIC | Age: 69
End: 2024-03-18
Payer: MEDICARE

## 2024-03-18 VITALS
SYSTOLIC BLOOD PRESSURE: 116 MMHG | WEIGHT: 163 LBS | HEART RATE: 80 BPM | DIASTOLIC BLOOD PRESSURE: 74 MMHG | BODY MASS INDEX: 22.11 KG/M2

## 2024-03-18 DIAGNOSIS — C02.9 TONGUE CANCER: Primary | ICD-10-CM

## 2024-03-18 PROCEDURE — 1160F RVW MEDS BY RX/DR IN RCRD: CPT | Mod: CPTII,S$GLB,, | Performed by: OTOLARYNGOLOGY

## 2024-03-18 PROCEDURE — 0BJ18ZZ INSPECTION OF TRACHEA, VIA NATURAL OR ARTIFICIAL OPENING ENDOSCOPIC: ICD-10-PCS | Performed by: OTOLARYNGOLOGY

## 2024-03-18 PROCEDURE — 1125F AMNT PAIN NOTED PAIN PRSNT: CPT | Mod: CPTII,S$GLB,, | Performed by: OTOLARYNGOLOGY

## 2024-03-18 PROCEDURE — 0CJS8ZZ INSPECTION OF LARYNX, VIA NATURAL OR ARTIFICIAL OPENING ENDOSCOPIC: ICD-10-PCS | Performed by: OTOLARYNGOLOGY

## 2024-03-18 PROCEDURE — 99024 POSTOP FOLLOW-UP VISIT: CPT | Mod: S$GLB,,, | Performed by: OTOLARYNGOLOGY

## 2024-03-18 PROCEDURE — 1159F MED LIST DOCD IN RCRD: CPT | Mod: CPTII,S$GLB,, | Performed by: OTOLARYNGOLOGY

## 2024-03-18 PROCEDURE — 99999 PR PBB SHADOW E&M-EST. PATIENT-LVL V: CPT | Mod: PBBFAC,,, | Performed by: OTOLARYNGOLOGY

## 2024-03-18 PROCEDURE — 3078F DIAST BP <80 MM HG: CPT | Mod: CPTII,S$GLB,, | Performed by: OTOLARYNGOLOGY

## 2024-03-18 PROCEDURE — 3074F SYST BP LT 130 MM HG: CPT | Mod: CPTII,S$GLB,, | Performed by: OTOLARYNGOLOGY

## 2024-03-18 RX ORDER — SODIUM CHLORIDE 0.9 % (FLUSH) 0.9 %
10 SYRINGE (ML) INJECTION
Status: CANCELLED | OUTPATIENT
Start: 2024-03-18

## 2024-03-18 RX ORDER — LIDOCAINE HYDROCHLORIDE 10 MG/ML
1 INJECTION, SOLUTION EPIDURAL; INFILTRATION; INTRACAUDAL; PERINEURAL ONCE
Status: CANCELLED | OUTPATIENT
Start: 2024-03-18 | End: 2024-03-18

## 2024-03-18 NOTE — PROGRESS NOTES
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  Doing well overall.  His drains continued put out scant amounts of turbid drainage.  He is mainly bothered by cough associated with his trach and secretions.  Recent neck CT revealed no evidence of neck abscess.  He is bothered by a persistent cough.    On exam, his incisions were clean, dry and intact.  His neck is without evidence of drainage, warmth or erythema.  His drains are remarkable for small amounts of turbid out his trach is in place.  His remaining tongue is dark in color and is sloughing.  His cuff was trach tube was exchanged for a cuffed tube.  The cuff was inflated an effort to minimize aspiration from above leading to cough.    A/P:  Doing well overall.  His native tongue continues to slough.  Plan for debridement and possible ALT free flap in the operating room on Friday, March 22nd.

## 2024-03-20 ENCOUNTER — HOSPITAL ENCOUNTER (INPATIENT)
Facility: HOSPITAL | Age: 69
LOS: 5 days | Discharge: HOME HEALTH | DRG: 908 | End: 2024-03-25
Attending: STUDENT IN AN ORGANIZED HEALTH CARE EDUCATION/TRAINING PROGRAM | Admitting: OTOLARYNGOLOGY
Payer: MEDICARE

## 2024-03-20 ENCOUNTER — ANESTHESIA (OUTPATIENT)
Dept: ENDOSCOPY | Facility: HOSPITAL | Age: 69
DRG: 908 | End: 2024-03-20
Payer: MEDICARE

## 2024-03-20 ENCOUNTER — ANESTHESIA EVENT (OUTPATIENT)
Dept: ENDOSCOPY | Facility: HOSPITAL | Age: 69
DRG: 908 | End: 2024-03-20
Payer: MEDICARE

## 2024-03-20 DIAGNOSIS — K13.79 ORAL BLEEDING: Primary | ICD-10-CM

## 2024-03-20 DIAGNOSIS — C02.9 TONGUE CANCER: ICD-10-CM

## 2024-03-20 DIAGNOSIS — R04.2 HEMOPTYSIS: ICD-10-CM

## 2024-03-20 DIAGNOSIS — Z90.49 S/P GLOSSECTOMY: ICD-10-CM

## 2024-03-20 PROBLEM — E87.1 HYPONATREMIA: Status: ACTIVE | Noted: 2024-03-19

## 2024-03-20 PROBLEM — D62 ACUTE ON CHRONIC BLOOD LOSS ANEMIA: Status: ACTIVE | Noted: 2024-03-19

## 2024-03-20 LAB
ABO + RH BLD: NORMAL
ALBUMIN SERPL BCP-MCNC: 2.2 G/DL (ref 3.5–5.2)
ALBUMIN SERPL BCP-MCNC: 2.6 G/DL (ref 3.5–5.2)
ALLENS TEST: ABNORMAL
ALP SERPL-CCNC: 82 U/L (ref 55–135)
ALP SERPL-CCNC: 98 U/L (ref 55–135)
ALT SERPL W/O P-5'-P-CCNC: 43 U/L (ref 10–44)
ALT SERPL W/O P-5'-P-CCNC: 52 U/L (ref 10–44)
ANION GAP SERPL CALC-SCNC: 5 MMOL/L (ref 8–16)
ANION GAP SERPL CALC-SCNC: 7 MMOL/L (ref 8–16)
AST SERPL-CCNC: 21 U/L (ref 10–40)
AST SERPL-CCNC: 24 U/L (ref 10–40)
BASOPHILS # BLD AUTO: 0.02 K/UL (ref 0–0.2)
BASOPHILS # BLD AUTO: 0.03 K/UL (ref 0–0.2)
BASOPHILS # BLD AUTO: 0.05 K/UL (ref 0–0.2)
BASOPHILS # BLD AUTO: 0.07 K/UL (ref 0–0.2)
BASOPHILS NFR BLD: 0.1 % (ref 0–1.9)
BASOPHILS NFR BLD: 0.2 % (ref 0–1.9)
BASOPHILS NFR BLD: 0.2 % (ref 0–1.9)
BASOPHILS NFR BLD: 0.6 % (ref 0–1.9)
BILIRUB SERPL-MCNC: 0.2 MG/DL (ref 0.1–1)
BILIRUB SERPL-MCNC: 0.2 MG/DL (ref 0.1–1)
BLD GP AB SCN CELLS X3 SERPL QL: NORMAL
BUN SERPL-MCNC: 18 MG/DL (ref 6–30)
BUN SERPL-MCNC: 18 MG/DL (ref 8–23)
BUN SERPL-MCNC: 19 MG/DL (ref 8–23)
CALCIUM SERPL-MCNC: 8.3 MG/DL (ref 8.7–10.5)
CALCIUM SERPL-MCNC: 8.6 MG/DL (ref 8.7–10.5)
CHLORIDE SERPL-SCNC: 103 MMOL/L (ref 95–110)
CHLORIDE SERPL-SCNC: 105 MMOL/L (ref 95–110)
CHLORIDE SERPL-SCNC: 97 MMOL/L (ref 95–110)
CO2 SERPL-SCNC: 23 MMOL/L (ref 23–29)
CO2 SERPL-SCNC: 26 MMOL/L (ref 23–29)
CREAT SERPL-MCNC: 0.8 MG/DL (ref 0.5–1.4)
DIFFERENTIAL METHOD BLD: ABNORMAL
EOSINOPHIL # BLD AUTO: 0 K/UL (ref 0–0.5)
EOSINOPHIL # BLD AUTO: 0.4 K/UL (ref 0–0.5)
EOSINOPHIL NFR BLD: 0.1 % (ref 0–8)
EOSINOPHIL NFR BLD: 0.1 % (ref 0–8)
EOSINOPHIL NFR BLD: 0.2 % (ref 0–8)
EOSINOPHIL NFR BLD: 3.2 % (ref 0–8)
ERYTHROCYTE [DISTWIDTH] IN BLOOD BY AUTOMATED COUNT: 13.6 % (ref 11.5–14.5)
ERYTHROCYTE [DISTWIDTH] IN BLOOD BY AUTOMATED COUNT: 13.6 % (ref 11.5–14.5)
ERYTHROCYTE [DISTWIDTH] IN BLOOD BY AUTOMATED COUNT: 13.9 % (ref 11.5–14.5)
ERYTHROCYTE [DISTWIDTH] IN BLOOD BY AUTOMATED COUNT: 14.1 % (ref 11.5–14.5)
EST. GFR  (NO RACE VARIABLE): >60 ML/MIN/1.73 M^2
EST. GFR  (NO RACE VARIABLE): >60 ML/MIN/1.73 M^2
GLUCOSE SERPL-MCNC: 125 MG/DL (ref 70–110)
GLUCOSE SERPL-MCNC: 142 MG/DL (ref 70–110)
GLUCOSE SERPL-MCNC: 144 MG/DL (ref 70–110)
HCO3 UR-SCNC: 26.5 MMOL/L (ref 24–28)
HCT VFR BLD AUTO: 22.5 % (ref 40–54)
HCT VFR BLD AUTO: 22.7 % (ref 40–54)
HCT VFR BLD AUTO: 24.8 % (ref 40–54)
HCT VFR BLD AUTO: 27.4 % (ref 40–54)
HCT VFR BLD CALC: 26 %PCV (ref 36–54)
HCT VFR BLD CALC: 27 %PCV (ref 36–54)
HGB BLD-MCNC: 7 G/DL (ref 14–18)
HGB BLD-MCNC: 7.2 G/DL (ref 14–18)
HGB BLD-MCNC: 7.8 G/DL (ref 14–18)
HGB BLD-MCNC: 8.5 G/DL (ref 14–18)
IMM GRANULOCYTES # BLD AUTO: 0.12 K/UL (ref 0–0.04)
IMM GRANULOCYTES # BLD AUTO: 0.16 K/UL (ref 0–0.04)
IMM GRANULOCYTES # BLD AUTO: 0.18 K/UL (ref 0–0.04)
IMM GRANULOCYTES # BLD AUTO: 0.18 K/UL (ref 0–0.04)
IMM GRANULOCYTES NFR BLD AUTO: 0.9 % (ref 0–0.5)
IMM GRANULOCYTES NFR BLD AUTO: 0.9 % (ref 0–0.5)
IMM GRANULOCYTES NFR BLD AUTO: 1 % (ref 0–0.5)
IMM GRANULOCYTES NFR BLD AUTO: 1.6 % (ref 0–0.5)
LYMPHOCYTES # BLD AUTO: 0.7 K/UL (ref 1–4.8)
LYMPHOCYTES # BLD AUTO: 0.9 K/UL (ref 1–4.8)
LYMPHOCYTES # BLD AUTO: 0.9 K/UL (ref 1–4.8)
LYMPHOCYTES # BLD AUTO: 1.8 K/UL (ref 1–4.8)
LYMPHOCYTES NFR BLD: 16.3 % (ref 18–48)
LYMPHOCYTES NFR BLD: 3.5 % (ref 18–48)
LYMPHOCYTES NFR BLD: 5.6 % (ref 18–48)
LYMPHOCYTES NFR BLD: 6.8 % (ref 18–48)
MAGNESIUM SERPL-MCNC: 1.8 MG/DL (ref 1.6–2.6)
MCH RBC QN AUTO: 29.2 PG (ref 27–31)
MCH RBC QN AUTO: 29.8 PG (ref 27–31)
MCH RBC QN AUTO: 29.9 PG (ref 27–31)
MCH RBC QN AUTO: 29.9 PG (ref 27–31)
MCHC RBC AUTO-ENTMCNC: 30.8 G/DL (ref 32–36)
MCHC RBC AUTO-ENTMCNC: 31 G/DL (ref 32–36)
MCHC RBC AUTO-ENTMCNC: 31.5 G/DL (ref 32–36)
MCHC RBC AUTO-ENTMCNC: 32 G/DL (ref 32–36)
MCV RBC AUTO: 93 FL (ref 82–98)
MCV RBC AUTO: 95 FL (ref 82–98)
MCV RBC AUTO: 95 FL (ref 82–98)
MCV RBC AUTO: 97 FL (ref 82–98)
MONOCYTES # BLD AUTO: 0.7 K/UL (ref 0.3–1)
MONOCYTES # BLD AUTO: 0.8 K/UL (ref 0.3–1)
MONOCYTES # BLD AUTO: 0.9 K/UL (ref 0.3–1)
MONOCYTES # BLD AUTO: 1 K/UL (ref 0.3–1)
MONOCYTES NFR BLD: 3.5 % (ref 4–15)
MONOCYTES NFR BLD: 5.5 % (ref 4–15)
MONOCYTES NFR BLD: 6 % (ref 4–15)
MONOCYTES NFR BLD: 9.2 % (ref 4–15)
NEUTROPHILS # BLD AUTO: 11.9 K/UL (ref 1.8–7.7)
NEUTROPHILS # BLD AUTO: 14 K/UL (ref 1.8–7.7)
NEUTROPHILS # BLD AUTO: 18.5 K/UL (ref 1.8–7.7)
NEUTROPHILS # BLD AUTO: 7.7 K/UL (ref 1.8–7.7)
NEUTROPHILS NFR BLD: 69.1 % (ref 38–73)
NEUTROPHILS NFR BLD: 86.1 % (ref 38–73)
NEUTROPHILS NFR BLD: 87.6 % (ref 38–73)
NEUTROPHILS NFR BLD: 91.7 % (ref 38–73)
NRBC BLD-RTO: 0 /100 WBC
PCO2 BLDA: 42 MMHG (ref 35–45)
PH SMN: 7.41 [PH] (ref 7.35–7.45)
PHOSPHATE SERPL-MCNC: 4 MG/DL (ref 2.7–4.5)
PLATELET # BLD AUTO: 476 K/UL (ref 150–450)
PLATELET # BLD AUTO: 497 K/UL (ref 150–450)
PLATELET # BLD AUTO: 576 K/UL (ref 150–450)
PLATELET # BLD AUTO: 659 K/UL (ref 150–450)
PMV BLD AUTO: 10.5 FL (ref 9.2–12.9)
PO2 BLDA: 14 MMHG (ref 40–60)
POC BE: 2 MMOL/L
POC IONIZED CALCIUM: 1.22 MMOL/L (ref 1.06–1.42)
POC SATURATED O2: 18 % (ref 95–100)
POC TCO2 (MEASURED): 26 MMOL/L (ref 23–29)
POC TCO2: 28 MMOL/L (ref 24–29)
POTASSIUM BLD-SCNC: 3.6 MMOL/L (ref 3.5–5.1)
POTASSIUM SERPL-SCNC: 4 MMOL/L (ref 3.5–5.1)
POTASSIUM SERPL-SCNC: 4.9 MMOL/L (ref 3.5–5.1)
PROT SERPL-MCNC: 5.2 G/DL (ref 6–8.4)
PROT SERPL-MCNC: 5.6 G/DL (ref 6–8.4)
RBC # BLD AUTO: 2.4 M/UL (ref 4.6–6.2)
RBC # BLD AUTO: 2.41 M/UL (ref 4.6–6.2)
RBC # BLD AUTO: 2.62 M/UL (ref 4.6–6.2)
RBC # BLD AUTO: 2.84 M/UL (ref 4.6–6.2)
SAMPLE: ABNORMAL
SAMPLE: ABNORMAL
SITE: ABNORMAL
SODIUM BLD-SCNC: 135 MMOL/L (ref 136–145)
SODIUM SERPL-SCNC: 133 MMOL/L (ref 136–145)
SODIUM SERPL-SCNC: 136 MMOL/L (ref 136–145)
SPECIMEN OUTDATE: NORMAL
WBC # BLD AUTO: 11.17 K/UL (ref 3.9–12.7)
WBC # BLD AUTO: 13.81 K/UL (ref 3.9–12.7)
WBC # BLD AUTO: 15.96 K/UL (ref 3.9–12.7)
WBC # BLD AUTO: 20.14 K/UL (ref 3.9–12.7)

## 2024-03-20 PROCEDURE — 80053 COMPREHEN METABOLIC PANEL: CPT | Mod: 91 | Performed by: STUDENT IN AN ORGANIZED HEALTH CARE EDUCATION/TRAINING PROGRAM

## 2024-03-20 PROCEDURE — 99900035 HC TECH TIME PER 15 MIN (STAT)

## 2024-03-20 PROCEDURE — 84100 ASSAY OF PHOSPHORUS: CPT

## 2024-03-20 PROCEDURE — D9220A PRA ANESTHESIA: Mod: ANES,,, | Performed by: ANESTHESIOLOGY

## 2024-03-20 PROCEDURE — 20000000 HC ICU ROOM

## 2024-03-20 PROCEDURE — 99900026 HC AIRWAY MAINTENANCE (STAT)

## 2024-03-20 PROCEDURE — 03LM3DZ OCCLUSION OF RIGHT EXTERNAL CAROTID ARTERY WITH INTRALUMINAL DEVICE, PERCUTANEOUS APPROACH: ICD-10-PCS | Performed by: RADIOLOGY

## 2024-03-20 PROCEDURE — 25000003 PHARM REV CODE 250: Performed by: STUDENT IN AN ORGANIZED HEALTH CARE EDUCATION/TRAINING PROGRAM

## 2024-03-20 PROCEDURE — 85025 COMPLETE CBC W/AUTO DIFF WBC: CPT | Mod: 91 | Performed by: STUDENT IN AN ORGANIZED HEALTH CARE EDUCATION/TRAINING PROGRAM

## 2024-03-20 PROCEDURE — 99223 1ST HOSP IP/OBS HIGH 75: CPT | Mod: GC,,, | Performed by: SURGERY

## 2024-03-20 PROCEDURE — 37000009 HC ANESTHESIA EA ADD 15 MINS

## 2024-03-20 PROCEDURE — 85025 COMPLETE CBC W/AUTO DIFF WBC: CPT | Performed by: STUDENT IN AN ORGANIZED HEALTH CARE EDUCATION/TRAINING PROGRAM

## 2024-03-20 PROCEDURE — 80053 COMPREHEN METABOLIC PANEL: CPT | Performed by: STUDENT IN AN ORGANIZED HEALTH CARE EDUCATION/TRAINING PROGRAM

## 2024-03-20 PROCEDURE — 94761 N-INVAS EAR/PLS OXIMETRY MLT: CPT

## 2024-03-20 PROCEDURE — 0BJ18ZZ INSPECTION OF TRACHEA, VIA NATURAL OR ARTIFICIAL OPENING ENDOSCOPIC: ICD-10-PCS | Performed by: OTOLARYNGOLOGY

## 2024-03-20 PROCEDURE — 25000003 PHARM REV CODE 250

## 2024-03-20 PROCEDURE — D9220A PRA ANESTHESIA: Mod: CRNA,,, | Performed by: NURSE ANESTHETIST, CERTIFIED REGISTERED

## 2024-03-20 PROCEDURE — 37000008 HC ANESTHESIA 1ST 15 MINUTES

## 2024-03-20 PROCEDURE — 63600175 PHARM REV CODE 636 W HCPCS: Performed by: NURSE ANESTHETIST, CERTIFIED REGISTERED

## 2024-03-20 PROCEDURE — 94640 AIRWAY INHALATION TREATMENT: CPT

## 2024-03-20 PROCEDURE — 25000003 PHARM REV CODE 250: Performed by: OTOLARYNGOLOGY

## 2024-03-20 PROCEDURE — 85025 COMPLETE CBC W/AUTO DIFF WBC: CPT | Mod: 91 | Performed by: OTOLARYNGOLOGY

## 2024-03-20 PROCEDURE — 0CJS8ZZ INSPECTION OF LARYNX, VIA NATURAL OR ARTIFICIAL OPENING ENDOSCOPIC: ICD-10-PCS | Performed by: OTOLARYNGOLOGY

## 2024-03-20 PROCEDURE — B31CYZZ FLUOROSCOPY OF BILATERAL EXTERNAL CAROTID ARTERIES USING OTHER CONTRAST: ICD-10-PCS | Performed by: RADIOLOGY

## 2024-03-20 PROCEDURE — 63600175 PHARM REV CODE 636 W HCPCS

## 2024-03-20 PROCEDURE — 27201112

## 2024-03-20 PROCEDURE — 25500020 PHARM REV CODE 255: Performed by: STUDENT IN AN ORGANIZED HEALTH CARE EDUCATION/TRAINING PROGRAM

## 2024-03-20 PROCEDURE — 86920 COMPATIBILITY TEST SPIN: CPT

## 2024-03-20 PROCEDURE — 86850 RBC ANTIBODY SCREEN: CPT | Performed by: STUDENT IN AN ORGANIZED HEALTH CARE EDUCATION/TRAINING PROGRAM

## 2024-03-20 PROCEDURE — 83735 ASSAY OF MAGNESIUM: CPT

## 2024-03-20 PROCEDURE — 27000221 HC OXYGEN, UP TO 24 HOURS

## 2024-03-20 PROCEDURE — B315YZZ FLUOROSCOPY OF BILATERAL COMMON CAROTID ARTERIES USING OTHER CONTRAST: ICD-10-PCS | Performed by: RADIOLOGY

## 2024-03-20 PROCEDURE — 25000003 PHARM REV CODE 250: Performed by: NURSE ANESTHETIST, CERTIFIED REGISTERED

## 2024-03-20 RX ORDER — PROPOFOL 10 MG/ML
VIAL (ML) INTRAVENOUS
Status: DISCONTINUED | OUTPATIENT
Start: 2024-03-20 | End: 2024-03-20

## 2024-03-20 RX ORDER — SODIUM CHLORIDE 0.9 % (FLUSH) 0.9 %
10 SYRINGE (ML) INJECTION
Status: DISCONTINUED | OUTPATIENT
Start: 2024-03-20 | End: 2024-03-21

## 2024-03-20 RX ORDER — PHENYLEPHRINE HYDROCHLORIDE 10 MG/ML
INJECTION INTRAVENOUS
Status: DISCONTINUED | OUTPATIENT
Start: 2024-03-20 | End: 2024-03-20

## 2024-03-20 RX ORDER — OXYCODONE HYDROCHLORIDE 5 MG/1
5 TABLET ORAL EVERY 6 HOURS PRN
Status: DISCONTINUED | OUTPATIENT
Start: 2024-03-20 | End: 2024-03-25 | Stop reason: HOSPADM

## 2024-03-20 RX ORDER — SODIUM CHLORIDE 0.9 % (FLUSH) 0.9 %
10 SYRINGE (ML) INJECTION
Status: DISCONTINUED | OUTPATIENT
Start: 2024-03-20 | End: 2024-03-20

## 2024-03-20 RX ORDER — HYDROCODONE BITARTRATE AND ACETAMINOPHEN 500; 5 MG/1; MG/1
TABLET ORAL
Status: DISCONTINUED | OUTPATIENT
Start: 2024-03-20 | End: 2024-03-21

## 2024-03-20 RX ORDER — VASOPRESSIN 20 [USP'U]/ML
INJECTION, SOLUTION INTRAMUSCULAR; SUBCUTANEOUS
Status: DISCONTINUED | OUTPATIENT
Start: 2024-03-20 | End: 2024-03-20

## 2024-03-20 RX ORDER — SODIUM,POTASSIUM PHOSPHATES 280-250MG
2 POWDER IN PACKET (EA) ORAL
Status: DISCONTINUED | OUTPATIENT
Start: 2024-03-20 | End: 2024-03-23

## 2024-03-20 RX ORDER — ONDANSETRON HYDROCHLORIDE 2 MG/ML
4 INJECTION, SOLUTION INTRAVENOUS EVERY 8 HOURS PRN
Status: DISCONTINUED | OUTPATIENT
Start: 2024-03-20 | End: 2024-03-25 | Stop reason: HOSPADM

## 2024-03-20 RX ORDER — FERROUS SULFATE 300 MG/5ML
300 LIQUID (ML) ORAL EVERY OTHER DAY
Status: DISCONTINUED | OUTPATIENT
Start: 2024-03-20 | End: 2024-03-25 | Stop reason: HOSPADM

## 2024-03-20 RX ORDER — FENTANYL CITRATE 50 UG/ML
INJECTION, SOLUTION INTRAMUSCULAR; INTRAVENOUS
Status: DISCONTINUED | OUTPATIENT
Start: 2024-03-20 | End: 2024-03-20

## 2024-03-20 RX ORDER — PROCHLORPERAZINE EDISYLATE 5 MG/ML
5 INJECTION INTRAMUSCULAR; INTRAVENOUS EVERY 30 MIN PRN
Status: CANCELLED | OUTPATIENT
Start: 2024-03-20

## 2024-03-20 RX ORDER — DEXMEDETOMIDINE HYDROCHLORIDE 100 UG/ML
INJECTION, SOLUTION INTRAVENOUS
Status: DISCONTINUED | OUTPATIENT
Start: 2024-03-20 | End: 2024-03-20

## 2024-03-20 RX ORDER — TALC
6 POWDER (GRAM) TOPICAL NIGHTLY PRN
Status: DISCONTINUED | OUTPATIENT
Start: 2024-03-20 | End: 2024-03-25 | Stop reason: HOSPADM

## 2024-03-20 RX ORDER — ATORVASTATIN CALCIUM 40 MG/1
80 TABLET, FILM COATED ORAL DAILY
Status: DISCONTINUED | OUTPATIENT
Start: 2024-03-20 | End: 2024-03-25 | Stop reason: HOSPADM

## 2024-03-20 RX ORDER — LANOLIN ALCOHOL/MO/W.PET/CERES
800 CREAM (GRAM) TOPICAL
Status: DISCONTINUED | OUTPATIENT
Start: 2024-03-20 | End: 2024-03-23

## 2024-03-20 RX ORDER — FERROUS SULFATE 220 (44)/5
300 ELIXIR ORAL EVERY OTHER DAY
Status: DISCONTINUED | OUTPATIENT
Start: 2024-03-20 | End: 2024-03-20

## 2024-03-20 RX ORDER — GUAIFENESIN 100 MG/5ML
200 SOLUTION ORAL EVERY 4 HOURS PRN
Status: DISCONTINUED | OUTPATIENT
Start: 2024-03-20 | End: 2024-03-25 | Stop reason: HOSPADM

## 2024-03-20 RX ORDER — ACETAMINOPHEN 325 MG/1
650 TABLET ORAL EVERY 6 HOURS PRN
Status: DISCONTINUED | OUTPATIENT
Start: 2024-03-20 | End: 2024-03-25 | Stop reason: HOSPADM

## 2024-03-20 RX ORDER — ENOXAPARIN SODIUM 100 MG/ML
40 INJECTION SUBCUTANEOUS EVERY 24 HOURS
Status: DISCONTINUED | OUTPATIENT
Start: 2024-03-20 | End: 2024-03-25 | Stop reason: HOSPADM

## 2024-03-20 RX ORDER — MIDAZOLAM HYDROCHLORIDE 1 MG/ML
INJECTION, SOLUTION INTRAMUSCULAR; INTRAVENOUS
Status: DISCONTINUED | OUTPATIENT
Start: 2024-03-20 | End: 2024-03-20

## 2024-03-20 RX ORDER — SILODOSIN 4 MG/1
4 CAPSULE ORAL DAILY
Status: DISCONTINUED | OUTPATIENT
Start: 2024-03-20 | End: 2024-03-25 | Stop reason: HOSPADM

## 2024-03-20 RX ORDER — HYDROCODONE BITARTRATE AND ACETAMINOPHEN 5; 325 MG/1; MG/1
1 TABLET ORAL EVERY 4 HOURS PRN
Status: DISCONTINUED | OUTPATIENT
Start: 2024-03-20 | End: 2024-03-20

## 2024-03-20 RX ORDER — ONDANSETRON HYDROCHLORIDE 2 MG/ML
INJECTION, SOLUTION INTRAVENOUS
Status: DISCONTINUED | OUTPATIENT
Start: 2024-03-20 | End: 2024-03-20

## 2024-03-20 RX ORDER — FENTANYL CITRATE 50 UG/ML
25 INJECTION, SOLUTION INTRAMUSCULAR; INTRAVENOUS EVERY 5 MIN PRN
Status: CANCELLED | OUTPATIENT
Start: 2024-03-20

## 2024-03-20 RX ORDER — TRANEXAMIC ACID 100 MG/ML
INJECTION, SOLUTION INTRAVENOUS
Status: DISPENSED
Start: 2024-03-20 | End: 2024-03-20

## 2024-03-20 RX ORDER — DEXAMETHASONE SODIUM PHOSPHATE 4 MG/ML
INJECTION, SOLUTION INTRA-ARTICULAR; INTRALESIONAL; INTRAMUSCULAR; INTRAVENOUS; SOFT TISSUE
Status: DISCONTINUED | OUTPATIENT
Start: 2024-03-20 | End: 2024-03-20

## 2024-03-20 RX ORDER — IODIXANOL 320 MG/ML
80 INJECTION, SOLUTION INTRAVASCULAR
Status: DISCONTINUED | OUTPATIENT
Start: 2024-03-20 | End: 2024-03-20

## 2024-03-20 RX ADMIN — FENTANYL CITRATE 25 MCG: 50 INJECTION, SOLUTION INTRAMUSCULAR; INTRAVENOUS at 03:03

## 2024-03-20 RX ADMIN — PHENYLEPHRINE HYDROCHLORIDE 200 MCG: 10 INJECTION INTRAVENOUS at 03:03

## 2024-03-20 RX ADMIN — VASOPRESSIN 2 UNITS: 20 INJECTION INTRAVENOUS at 04:03

## 2024-03-20 RX ADMIN — TRANEXAMIC ACID 500 MG: 100 INJECTION, SOLUTION INTRAVENOUS at 01:03

## 2024-03-20 RX ADMIN — VASOPRESSIN 2 UNITS: 20 INJECTION INTRAVENOUS at 03:03

## 2024-03-20 RX ADMIN — PROPOFOL 90 MG: 10 INJECTION, EMULSION INTRAVENOUS at 03:03

## 2024-03-20 RX ADMIN — ONDANSETRON 4 MG: 2 INJECTION INTRAMUSCULAR; INTRAVENOUS at 03:03

## 2024-03-20 RX ADMIN — SODIUM CHLORIDE, SODIUM LACTATE, POTASSIUM CHLORIDE, AND CALCIUM CHLORIDE: .6; .31; .03; .02 INJECTION, SOLUTION INTRAVENOUS at 03:03

## 2024-03-20 RX ADMIN — PHENYLEPHRINE HYDROCHLORIDE 100 MCG: 10 INJECTION INTRAVENOUS at 04:03

## 2024-03-20 RX ADMIN — OXYCODONE 5 MG: 5 TABLET ORAL at 09:03

## 2024-03-20 RX ADMIN — MIDAZOLAM HYDROCHLORIDE 2 MG: 2 INJECTION, SOLUTION INTRAMUSCULAR; INTRAVENOUS at 03:03

## 2024-03-20 RX ADMIN — ATORVASTATIN CALCIUM 80 MG: 40 TABLET, FILM COATED ORAL at 09:03

## 2024-03-20 RX ADMIN — DEXMEDETOMIDINE 12 MCG: 100 INJECTION, SOLUTION, CONCENTRATE INTRAVENOUS at 03:03

## 2024-03-20 RX ADMIN — IOHEXOL 75 ML: 350 INJECTION, SOLUTION INTRAVENOUS at 02:03

## 2024-03-20 RX ADMIN — MINERAL SUPPLEMENT IRON 300 MG / 5 ML STRENGTH LIQUID 100 PER BOX UNFLAVORED 300 MG: at 09:03

## 2024-03-20 RX ADMIN — PHENYLEPHRINE HYDROCHLORIDE 300 MCG: 10 INJECTION INTRAVENOUS at 04:03

## 2024-03-20 RX ADMIN — SODIUM CHLORIDE, SODIUM GLUCONATE, SODIUM ACETATE, POTASSIUM CHLORIDE, MAGNESIUM CHLORIDE, SODIUM PHOSPHATE, DIBASIC, AND POTASSIUM PHOSPHATE: .53; .5; .37; .037; .03; .012; .00082 INJECTION, SOLUTION INTRAVENOUS at 04:03

## 2024-03-20 RX ADMIN — Medication 6 MG: at 10:03

## 2024-03-20 RX ADMIN — ENOXAPARIN SODIUM 40 MG: 40 INJECTION SUBCUTANEOUS at 06:03

## 2024-03-20 RX ADMIN — GUAIFENESIN 200 MG: 200 SOLUTION ORAL at 10:03

## 2024-03-20 RX ADMIN — GUAIFENESIN 200 MG: 200 SOLUTION ORAL at 03:03

## 2024-03-20 RX ADMIN — ACETAMINOPHEN 650 MG: 325 TABLET ORAL at 10:03

## 2024-03-20 RX ADMIN — SILODOSIN 4 MG: 4 CAPSULE ORAL at 09:03

## 2024-03-20 RX ADMIN — DEXAMETHASONE SODIUM PHOSPHATE 4 MG: 4 INJECTION, SOLUTION INTRAMUSCULAR; INTRAVENOUS at 03:03

## 2024-03-20 NOTE — NURSING
Pt transferred to SICU from IR with no drips and 5L trach collar. Pt arrived stable with no bleeding at groin site. Will continue to monitor.

## 2024-03-20 NOTE — H&P
Radiology H&P    Timothy Galdamez is a 68 y.o. male with a history of extensive head and neck surgery presenting w oral bleeding. CTA worrisome for active hemorrage from lingual artery. Will plan for angio w intervention.      Past Medical History:   Diagnosis Date    Cancer     Hyperlipidemia     Hypertension      Past Surgical History:   Procedure Laterality Date    ANKLE SURGERY      L ankle    BIOPSY OF TISSUE OF NECK Left 2013    COLONOSCOPY N/A 08/21/2017    Procedure: COLONOSCOPY;  Surgeon: Danny Jane MD;  Location: [facility] ENDO (4TH FLR);  Service: Endoscopy;  Laterality: N/A;  Do not cancel this order    CREATION OF MUSCLE ROTATIONAL FLAP N/A 2/23/2024    Procedure: CREATION, FLAP, MUSCLE ROTATION;  Surgeon: Jeferson Ventura MD;  [facility];  Service: ENT;  Laterality: N/A;    DIRECT LARYNGOBRONCHOSCOPY N/A 12/11/2023    Procedure: LARYNGOSCOPY, DIRECT, WITH BRONCHOSCOPY;  Surgeon: Micaela Poole MD;  [facility];  Service: ENT;  Laterality: N/A;    DISSECTION OF NECK Bilateral 2/23/2024    Procedure: DISSECTION, NECK;  Surgeon: Jeferson Ventura MD;  [facility];  Service: ENT;  Laterality: Bilateral;    ESOPHAGOGASTRODUODENOSCOPY N/A 09/18/2023    Procedure: EGD (ESOPHAGOGASTRODUODENOSCOPY);  Surgeon: Basilia Villavicencio MD;  Location: [facility] ENDOSCOPY;  Service: Gastroenterology;  Laterality: N/A;  9.13 instr sent via portal [facility]  pre call complete, stated he would have a ride -    GLOSSECTOMY Right 2/23/2024    Procedure: GLOSSECTOMY;  Surgeon: Jeferson Ventura MD;  [facility];  Service: ENT;  Laterality: Right;    INSERTION, PEG TUBE Right 2/23/2024    Procedure: INSERTION, PEG TUBE;  Surgeon: Alpesh Wu MD;  [facility];  Service: General;  Laterality: Right;    TONSILLECTOMY      TRACHEOTOMY N/A 2/23/2024    Procedure: TRACHEOTOMY;  Surgeon: Jeferson Ventura MD;  Location: [facility] OR [address] [place] [address];  Service:  ENT;  Laterality: N/A;    TUMOR REMOVAL Right 2015    at     VASECTOMY      WRIST FRACTURE SURGERY Right        Discussed with primary team, Dr. Ventura.    Imaging reviewed with Radiology staff, Dr. Chapman.     Procedure: lingual artery angio    Scheduled Meds:    atorvastatin  80 mg Per G Tube Daily    ferrous sulfate  300.08 mg Per G Tube Every other day     Continuous Infusions:   PRN Meds:acetaminophen, HYDROcodone-acetaminophen, sodium chloride 0.9%    Allergies: Review of patient's allergies indicates:  No Known Allergies    Labs:  Recent Labs   Lab 03/18/24 2125   INR 1.1       Recent Labs   Lab 03/20/24 0056 03/20/24 0058   WBC 11.17  --    HGB 8.5*  --    HCT 27.4* 26*   MCV 97  --    *  --       Recent Labs   Lab 03/20/24 0056   *      K 4.0      CO2 26   BUN 19   CREATININE 0.8   CALCIUM 8.6*   ALT 52*   AST 24   ALBUMIN 2.6*   BILITOT 0.2       Physical Exam:  General: no acute distress  HEENT: oral bleeding  Cardio: mildly tachy in room  Pulm: unlabored breathing  Neuro: AAOx3  MSK: moves extremities        Vitals (Most Recent):  Temp: 97.6 °F (36.4 °C) (03/20/24 0053)  Pulse: 89 (03/20/24 0303)  Resp: 19 (03/20/24 0232)  BP: 118/69 (03/20/24 0303)  SpO2: 100 % (03/20/24 0303)    Plan:   Lingual artery angio with possible intervention    Class A w anesthesia

## 2024-03-20 NOTE — ASSESSMENT & PLAN NOTE
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      - See oral bleeding for current inpatient plan

## 2024-03-20 NOTE — ANESTHESIA PREPROCEDURE EVALUATION
03/20/2024  Timothy Galdamez is a 68 y.o., male.  [facility]  Anesthesia Pre-Operative Evaluation       Patient Name: Timothy Galdamez  YOB: 1955  MRN: [id number]  CSN: [id number]      Code Status: Full Code   Date of Procedure: 3/20/2024  Anesthesia: Choice Procedure: Procedure(s) (LRB):  EMBOLIZATION, BLOOD VESSEL (N/A)  Pre-Operative Diagnosis: Oral bleeding [K13.79]  Hemoptysis [R04.2]  Proceduralist: Surgeon(s) and Role:     * Surgeon, Edith - Primary        SUBJECTIVE:   Timothy Galdamez is a 68 y.o. male who is here today for Procedure(s) (LRB):  EMBOLIZATION, BLOOD VESSEL (N/A).   Pt is a 68M well know to ENT service, [facility] SCC of oropharynx who is s/p subtotal glossectomy, bilateral neck dissection, pectoralis rotational flap reconstruction, and tracheostomy 2/23/24. Was discharged from the hospital appx 2 weeks ago, has had regular outpatient follow up w Dr. Ventura. He was seen in clinic earlier this week and his trach was exchanged for a 6-0 cuffed and inflated for airway protection. He presented to the ED last night for small volume hempotysis, scope was negative and did not reveal a source of bleeding.     He returns to the ED tonight BIBEMS after waking up with large volume hemoptysis of reportedly bright red blood. Per ED, he was covered in bright red blood on arrival. The ED overinflated his cuff and the bleed eventually slowed down. ENT called for evaluation.        Anticoagulants   Medication Route Frequency       he has a current medication list which includes the following long-term medication(s): amlodipine, aspirin, atorvastatin, calcium carbonate, ezetimibe, and silodosin.   ALLERGIES:   Review of patient's allergies indicates:  No Known Allergies  LDA:   Oxygen Concentration (%):  [28-35] 28      Lines/Drains/Airways       Drain  Duration                   Closed/Suction Drain 02/23/24 1338 Tube - 2 Right;Medial Chest Bulb 15 Fr. 25 days         Closed/Suction Drain 02/23/24 1414 Tube - 3 Left;Anterior Neck Bulb 15 Fr. 25 days         Closed/Suction Drain 02/23/24 1445 Tube - 4 Right;Anterior Neck Bulb 15 Fr. 25 days         Gastrostomy/Enterostomy 02/23/24 0750 Percutaneous endoscopic gastrostomy (PEG) LUQ feeding 25 days              Airway  Duration             Adult Surgical Airway 03/01/24 0700 Shiley Uncuffed 6.0/ 75mm 18 days              Peripheral Intravenous Line  Duration                  Peripheral IV - Single Lumen 03/20/24 0218 20 G Anterior;Left Forearm <1 day         Peripheral IV - Single Lumen 03/20/24 0219 18 G Right Antecubital <1 day                   RELEVANT MEDICATIONS:     Antibiotics (From admission, onward)      None          VTE Risk Mitigation (From admission, onward)           Ordered     IP VTE HIGH RISK PATIENT  Once         03/20/24 0245     Place sequential compression device  Until discontinued         03/20/24 0245                    History:     Active Hospital Problems    Diagnosis  POA    *Oral bleeding [K13.79]  Unknown    Primary hypertension [I10]  Yes    Oropharyngeal mass [J39.2]  Yes    Coronary artery calcification [I25.10, I25.84]  Yes    Tongue cancer [C02.9]  Yes     followed by Dr. Sae Sandhu        Resolved Hospital Problems   No resolved problems to display.     Patient Active Problem List   Diagnosis    Primary insomnia    Hyperlipidemia    Tongue cancer    Rotator cuff syndrome of right shoulder    Memory change    RLS (restless legs syndrome)    Chronic right-sided low back pain    Ectatic aorta    Subclavian artery stenosis, left    Aortic atherosclerosis    Coronary artery calcification    Oropharyngeal mass    Primary hypertension    Elevated alanine aminotransferase (ALT) level    S/P percutaneous endoscopic gastrostomy (PEG) tube placement    Hypokalemia    Hypoalbuminemia    Hypophosphatemia    Status  post tracheostomy    On supplemental oxygen therapy    Encounter for weaning from ventilator    Oral bleeding     Medical History   Past Medical History:   Diagnosis Date    Cancer     Hyperlipidemia     Hypertension      Surgical History:    has a past surgical history that includes Vasectomy; Tonsillectomy; Ankle surgery; Colonoscopy (N/A, 08/21/2017); Tumor removal (Right, 2015); Biopsy of tissue of neck (Left, 2013); Esophagogastroduodenoscopy (N/A, 09/18/2023); Direct laryngobronchoscopy (N/A, 12/11/2023); Wrist fracture surgery (Right); Glossectomy (Right, 2/23/2024); Dissection of neck (Bilateral, 2/23/2024); Tracheotomy (N/A, 2/23/2024); Creation of muscle rotational flap (N/A, 2/23/2024); and insertion, peg tube (Right, 2/23/2024).   Social History:    reports that he has quit smoking. His smoking use included cigarettes. He has never used smokeless tobacco. He reports current alcohol use of about 7.0 - 8.0 standard drinks of alcohol per week. He reports that he does not use drugs.     OBJECTIVE:     Vital Signs (Most Recent):  Temp: 36.4 °C (97.6 °F) (03/20/24 0053)  Pulse: 89 (03/20/24 0303)  Resp: 19 (03/20/24 0232)  BP: 118/69 (03/20/24 0303)  SpO2: 100 % (03/20/24 0303) Vital Signs Range (Last 24H):  Temp:  [36.4 °C (97.6 °F)]   Pulse:  []   Resp:  [18-25]   BP: (103-164)/(58-84)   SpO2:  [95 %-100 %]      Last 3 Vitals:       3/18/2024     8:39 PM 3/20/2024    12:39 AM 3/20/2024    12:53 AM   Vitals - 1 value per visit   SYSTOLIC 149 164    DIASTOLIC 72 84    Pulse 94 106    Temp 37.1 °C (98.7 °F)  36.4 °C (97.6 °F)   Resp 21 18    SPO2 93 % 96 %    Weight (lb) 163 162.92    Weight (kg) 73.936 73.9    Height  6' (1.829 m)    BMI (Calculated)  22.1      Body mass index is 22.1 kg/m².   Wt Readings from Last 4 Encounters:   03/20/24 73.9 kg (162 lb 14.7 oz)   03/18/24 73.9 kg (163 lb)   03/18/24 73.9 kg (163 lb)   02/28/24 80 kg (176 lb 5.9 oz)     Significant Labs:  Lab Results   Component Value  Date    WBC 11.17 03/20/2024    HGB 8.5 (L) 03/20/2024    HCT 26 (L) 03/20/2024     (H) 03/20/2024     03/20/2024    K 4.0 03/20/2024     03/20/2024    CREATININE 0.8 03/20/2024    BUN 19 03/20/2024    CO2 26 03/20/2024     (H) 03/20/2024    CALCIUM 8.6 (L) 03/20/2024    MG 2.1 03/03/2024    PHOS 3.6 03/03/2024    ALKPHOS 98 03/20/2024    ALT 52 (H) 03/20/2024    AST 24 03/20/2024    ALBUMIN 2.6 (L) 03/20/2024    INR 1.1 03/18/2024    APTT 28.2 03/18/2024    HGBA1C 5.1 08/31/2023     No LMP for male patient.      EKG:   Results for orders placed or performed in visit on 09/22/23   IN OFFICE EKG 12-LEAD (to Heath)    Collection Time: 09/22/23  9:58 AM    Narrative    Test Reason : I10,Z13.6,I77.819,    Vent. Rate : 054 BPM     Atrial Rate : 054 BPM     P-R Int : 184 ms          QRS Dur : 130 ms      QT Int : 486 ms       P-R-T Axes : 052 058 055 degrees     QTc Int : 460 ms    Sinus bradycardia  Left bundle branch block  Abnormal ECG  When compared with ECG of 16-JUL-2014 15:00,  Vent. rate has decreased BY  26 BPM  Nonspecific T wave abnormality now evident in Inferior leads  QT has shortened  Confirmed by Dickson RITCHIE MD ([id number]) on 9/22/2023 5:18:47 PM    Referred By: SANDRA HDZ           Confirmed By:Dickson RITCHIE MD       TTE:  Results for orders placed or performed during the hospital encounter of 11/06/23   Echo   Result Value Ref Range    RV TB RVSP 5 mmHg    Est. RA pres 3 mmHg    Narrative      Left Ventricle: The left ventricle is normal in size. Ventricular mass   is normal. Normal wall thickness. Normal wall motion. There is normal   systolic function with a visually estimated ejection fraction of 55 - 60%.   There is normal diastolic function.    Right Ventricle: Normal right ventricular cavity size. Wall thickness   is normal. Right ventricle wall motion  is normal. Systolic function is   normal.    Pulmonary Artery: The estimated pulmonary artery systolic pressure is   24 mmHg.     IVC/SVC: Normal venous pressure at 3 mmHg.         ASSESSMENT/PLAN:         Pre-op Assessment    I have reviewed the Patient Summary Reports.     I have reviewed the Nursing Notes.    I have reviewed the Medications.     Review of Systems  Cardiovascular:     Hypertension   CAD                  Coronary Artery Disease:                            Hypertension             Physical Exam  General: Well nourished, Cooperative and Alert    Airway:  Neck ROM: Normal ROM  Oropharynx: Active Airway Bleeding  Pre-Existing Airway: Tracheostomy tube    Chest/Lungs:  Normal Respiratory Rate    Heart:  Rate: Normal  Rhythm: Regular Rhythm        Anesthesia Plan  Type of Anesthesia, risks & benefits discussed:    Anesthesia Type: Gen ETT, Gen Natural Airway  Intra-op Monitoring Plan: Standard ASA Monitors and Art Line  Post Op Pain Control Plan: IV/PO Opioids PRN  Induction:  IV, Inhalation and rapid sequence  Airway Plan: Via Tracheostomy  Informed Consent: Informed consent signed with the Patient and all parties understand the risks and agree with anesthesia plan.  All questions answered.   ASA Score: 3 Emergent  Day of Surgery Review of History & Physical: H&P Update referred to the surgeon/provider.    Ready For Surgery From Anesthesia Perspective.     .

## 2024-03-20 NOTE — ASSESSMENT & PLAN NOTE
67 yo M with a h/o SCC of oropharynx s/p subtotal glossectomy and tracheostomy 2/23/24 (follows with Dr. Ventura- ENT and Dr. Reid- Onc) and PEG tube dependence who presents with copious oral bleeding. Recently seen by Dr. Ventura, who noted that remaining tongue was sloughing and exchange trach tube for a cuffed tube which was inflated to help alleviate patient's cough. ENT evaluated patient and recommended obtaining CTA neck. Patient's vitals are stable at this time. Hg is 8.5, other labs are stable. Admitted to MICU for airway protection given oral bleeding in setting of tracheostomy dependence.      - fu CTA neck   - continue to manage vent as   - VBG PRN  - ENT consulted, appreciate assistance   - titrate pain medication as needed   - trend CBC QD

## 2024-03-20 NOTE — PROCEDURES
Interventional Neuroradiology Post-Procedure Note    Pre Op Diagnosis: Lingual bleeding    Post Op Diagnosis: Same- controlled    Procedure: Diagnostic cerebral angiogram    Procedure performed by: Ari RUFF, Francisco;  Russell RUFF, Rashida    Written Informed Consent Obtained: Yes    Specimen Removed: NO    Estimated Blood Loss: Minimal    Procedure report:     A 5F sheath was placed into the right femoral artery and a 5F Iain catheter was advanced into the aortic arch.  The left CCA, ECA arteries were subselected and angiography of the brain was performed after injection into each of these vessels.    Preliminary interpretation:  No extravasation of blood seen in angiogram.  We did embolization of the left lingual artery with PVA particles in its distal portion and proximal artery was coil embolized successfully.  Please see Imaging report for full details.    A right femoral artery angiogram was performed, the sheath removed and hemostasis achieved using Exo-seal.  No hematoma was present at the time of hemostasis.    The patient tolerated the procedure well.     Plan:  -To SICU for monitoring  -Bed rest for 2h  -Groin check and pulse check q2h   -Remainder of care, per ENT  -Avoid carrying heavy weights > 10 lbs x 24 hrs   -Remove groin dressing tomorrow   -Resume diet as prior   -Resume home medications                 Rashida Wells MD  Fellow, NeuroEndovascular Surgery, [facility] [place]  Board certified Vascular Neurologist  [place], [place]

## 2024-03-20 NOTE — NURSING
Ambulated pt; upon arrival back to room, pt coughed up copious amounts of blood clots from his mouth. ICU and ENT Mds notified. Pt VS are stable; no further orders at this time.

## 2024-03-20 NOTE — HPI
Pt is a 68M well know to ENT service, [facility] SCC of oropharynx who is s/p subtotal glossectomy, bilateral neck dissection, pectoralis rotational flap reconstruction, and tracheostomy 2/23/24. Was discharged from the hospital appx 2 weeks ago, has had regular outpatient follow up w Dr. Ventura. He was seen in clinic earlier this week and his trach was exchanged for a 6-0 cuffed and inflated for airway protection. He presented to the ED last night for small volume hempotysis, scope was negative and did not reveal a source of bleeding.     He returns to the ED tonight BIBEMS after waking up with large volume hemoptysis of reportedly bright red blood. Per ED, he was covered in bright red blood on arrival. The ED overinflated his cuff and the bleed eventually slowed down. ENT called for evaluation.

## 2024-03-20 NOTE — ED PROVIDER NOTES
Encounter Date: 3/20/2024       History     Chief Complaint   Patient presents with    Hemoptysis     BIB ems for bleeding from tongue surgical site. 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 EMS reports seeing 300cc blood loss in his sink when they collected him, and another 200cc blood loss via oral suction en route to the hospital. On arrival pt covered in bright red blood, moderate amount of sandy blood present in oral cavity, O2 sats via trach on RA 92%. Trach was suctioned, he was placed on supplemental O2 and given TXA nebs with improvement in breathing and decrease in active bleeding. He was seen in the ED yesterday d/t coughing blood clots out of his trach, but at the time of ENT evaluation pt was hemostatic and elected to return home w/ strict return precautions. He is currently HDS in ED pending repeat ENT evaluation.    Review of patient's allergies indicates:  No Known Allergies  Past Medical History:   Diagnosis Date    Cancer     Hyperlipidemia     Hypertension      Past Surgical History:   Procedure Laterality Date    ANKLE SURGERY      L ankle    BIOPSY OF TISSUE OF NECK Left 2013    COLONOSCOPY N/A 08/21/2017    Procedure: COLONOSCOPY;  Surgeon: Danny Jane MD;  Location: [facility] (4TH [facility]);  Service: Endoscopy;  Laterality: N/A;  Do not cancel this order    CREATION OF MUSCLE ROTATIONAL FLAP N/A 2/23/2024    Procedure: CREATION, FLAP, MUSCLE ROTATION;  Surgeon: Jeferson Ventura MD;  [facility];  Service: ENT;  Laterality: N/A;    DIRECT LARYNGOBRONCHOSCOPY N/A 12/11/2023    Procedure: LARYNGOSCOPY, DIRECT, WITH BRONCHOSCOPY;  Surgeon: Micaela Poole MD;  Location:  [facility] OR 2ND FLR;  Service: ENT;  Laterality: N/A;    DISSECTION OF NECK Bilateral 2/23/2024    Procedure: DISSECTION, NECK;  Surgeon: Jeferson Ventura MD;  Location: [facility] OR [facility] FLR;  Service: ENT;  Laterality: Bilateral;    ESOPHAGOGASTRODUODENOSCOPY N/A 09/18/2023    Procedure: EGD (ESOPHAGOGASTRODUODENOSCOPY);  Surgeon: Basilia Villavicencio MD;  Location: [facility] ENDOSCOPY;  Service: Gastroenterology;  Laterality: N/A;  9.13 instr sent via portal [facility]  pre call complete, stated he would have a ride -HH    GLOSSECTOMY Right 2/23/2024    Procedure: GLOSSECTOMY;  Surgeon: Jeferson Ventura MD;  [facility];  Service: ENT;  Laterality: Right;    INSERTION, PEG TUBE Right 2/23/2024    Procedure: INSERTION, PEG TUBE;  Surgeon: Alpesh Wu MD;  [facility];  Service: General;  Laterality: Right;    TONSILLECTOMY      TRACHEOTOMY N/A 2/23/2024    Procedure: TRACHEOTOMY;  Surgeon: Jeferson Ventura MD;  [facility];  Service: ENT;  Laterality: N/A;    TUMOR REMOVAL Right 2015    at     VASECTOMY      WRIST FRACTURE SURGERY Right      Family History   Problem Relation Age of Onset    Arthritis Mother     COPD Brother     Psoriasis Neg Hx     Eczema Neg Hx      Social History     Tobacco Use    Smoking status: Former     Types: Cigarettes    Smokeless tobacco: Never    Tobacco comments:     Quit in 1981   Substance Use Topics    Alcohol use: Yes     Alcohol/week: 7.0 - 8.0 standard drinks of alcohol     Types: 3 Glasses of wine, 4 - 5 Standard drinks or equivalent per week    Drug use: No     Review of Systems   HENT:          Oral bleeding, bleeding from the trach       Physical Exam     Initial Vitals   BP Pulse Resp Temp SpO2   03/20/24 0039 03/20/24 0039 03/20/24 0039 03/20/24 0053 03/20/24 0039   (!) 164/84 106 18 97.6 °F (36.4 °C) 96 %      MAP       --                Physical Exam    Constitutional: He appears distressed.   HENT:   Head: Normocephalic.   Moderate  amount of bright red blood in oral cavity  Pts clothes (neck, chest, abdomen) covered in blood  Visible clot in back of throat   Eyes: EOM are normal.   Neck:   Trach cannula w/ visible blood clots  Surgical staples in place, sites CDI aside from blood from oral cavity, drains in place   Cardiovascular:  Normal rate and regular rhythm.           Pulmonary/Chest: Breath sounds normal. No respiratory distress.   Musculoskeletal:         General: Normal range of motion.     Neurological: He is alert. GCS eye subscore is 4. GCS verbal subscore is 5. GCS motor subscore is 6.   Skin: Skin is warm and dry.   Psychiatric: His behavior is normal.                       ED Course   Critical Care    Date/Time: 3/20/2024 4:15 AM    Performed by: Adilson Arana MD  Authorized by: Adilson Arana MD  Direct patient critical care time: 15 minutes  Additional history critical care time: 5 minutes  Documentation critical care time: 5 minutes  Consulting other physicians critical care time: 5 minutes  Consult with family critical care time: 5 minutes  Total critical care time (exclusive of procedural time) : 35 minutes  Critical care was necessary to treat or prevent imminent or life-threatening deterioration of the following conditions: respiratory failure.  Critical care was time spent personally by me on the following activities: evaluation of patient's response to treatment, examination of patient, obtaining history from patient or surrogate, ordering and performing treatments and interventions, ordering and review of laboratory studies, ordering and review of radiographic studies and pulse oximetry.        Labs Reviewed   CBC W/ AUTO DIFFERENTIAL - Abnormal; Notable for the following components:       Result Value    RBC 2.84 (*)     Hemoglobin 8.5 (*)     Hematocrit 27.4 (*)     MCHC 31.0 (*)     Platelets 659 (*)     Immature Granulocytes 1.6 (*)     Immature Grans (Abs) 0.18 (*)     Lymph % 16.3 (*)     All other  components within normal limits   COMPREHENSIVE METABOLIC PANEL - Abnormal; Notable for the following components:    Glucose 144 (*)     Calcium 8.6 (*)     Total Protein 5.6 (*)     Albumin 2.6 (*)     ALT 52 (*)     Anion Gap 7 (*)     All other components within normal limits   ISTAT PROCEDURE - Abnormal; Notable for the following components:    POC Glucose 142 (*)     POC Sodium 135 (*)     POC Hematocrit 27 (*)     All other components within normal limits   ISTAT PROCEDURE - Abnormal; Notable for the following components:    POC PO2 14 (*)     POC Hematocrit 26 (*)     All other components within normal limits   COMPREHENSIVE METABOLIC PANEL   CBC W/ AUTO DIFFERENTIAL   TYPE & SCREEN   ISTAT CHEM8          Imaging Results              CTA Neck (Final result)  Result time 03/20/24 03:22:07      Final result by Luis Avendaño MD (03/20/24 03:22:07)                   Impression:      Status post subtotal glossectomy, bilateral neck dissections with flap reconstruction, and tracheostomy.  Allowing for exam limitations of non-contrast and delayed imaging, there is an irregular appearance of the distal lingual artery with apparent blush of contrast of the distal lingual artery.  Additionally, there is ill-defined hyperdense haziness along the left aspect of its course.  Findings are similar to 03/14/2024.  Differential considerations include active bleeding versus angiogenesis/postoperative change.    Occasional small bilateral patchy ground-glass pulmonary opacities of once certain etiology.  Considerations include aspiration, atypical infection, neoplasm, etc..  Correlate clinically, and with follow-up chest CT within 3-6 months.    The critical findings were discovered/discussed by Jd Parada MD with Jeferson Ventura M.D. at time of discovery/dictation approximating 02:44 on3/20/2024 via telephone.  Patient name and medical record number were verified, read back was performed.    Electronically signed by  resident: Jd Parada  Date:    03/20/2024  Time:    02:26    Electronically signed by: Luis Avendaño  Date:    03/20/2024  Time:    03:22               Narrative:    EXAMINATION:  CTA NECK    CLINICAL HISTORY:  Esophageal mass;    TECHNIQUE:  CTA of the neck was performed following 75 mL of IV contrast.  Coronal and sagittal reformats are provided.    COMPARISON:  CT neck 03/14/2024.    FINDINGS:  The aortic arch appears within normal limits.    The common carotid arteries are normal course and caliber.  No significant stenosis at either carotid bifurcation.  The internal carotid arteries appear within normal limits.    The vertebral origins appear patent.  Vertebral arteries normal throughout their visualized course.    Extensive postsurgical changes of the head and neck consisting of subtotal glossectomy, bilateral neck dissections, pectoralis flap reconstruction and tracheostomy.  Irregular appearance of the lingual artery with apparent blush of contrast about its distal aspect (axial series 2, image 358).  Additionally, there is ill-defined haziness throughout the left aspect of its course (for example axial series 2, image 340).  Findings may relate to a postoperative change/angiogenesis, however active bleeding cannot be excluded.  Findings appear similar to prior CT from 03/14/2024.    Surgical drain remains in place with a small fluid collection along the left lateral margin of the flap within the submandibular region, decreased in size.    Patchy opacification of the paranasal sinuses.    Ground-glass nodular opacity in the left lower lobe partially imaged.    Faint patchy clustered areas of ground-glass opacification in the right upper lobe (such as on series 2, image 87, for example).    Limited evaluation of the intracranial vasculature demonstrates no significant intracranial abnormality.    No acute abnormalities in soft tissue structures of the neck.  Limited intracranial evaluation is within  normal limits.    There are minor osseous degenerative changes, but no lytic or blastic lesions are evident.                                       Medications   atorvastatin tablet 80 mg (has no administration in time range)   ferrous sulfate Elix 300.08 mg (has no administration in time range)   sodium chloride 0.9% flush 10 mL (has no administration in time range)   acetaminophen tablet 650 mg (has no administration in time range)   HYDROcodone-acetaminophen 5-325 mg per tablet 1 tablet (has no administration in time range)   tranexamic acid nebulizer Soln 500 mg ( Nebulization Canceled Entry 3/20/24 0115)   iohexoL (OMNIPAQUE 350) injection 75 mL (75 mLs Intravenous Given 3/20/24 0212)     Medical Decision Making  68M w/ recurrent oral cancer s/p partial glossectomy + trach placement 4wks ago p/w sandy oral bleeding and difficulty breathing. Seen yesterday for similar, less severe presentation. Lost around 500cc blood per EMS report. On arrival pt VSS but c/o difficulty breathing d/t amount of blood. Visible bright red blood in oral cavity and clots in back of throat. Trach cuff inflated with 10cc air, pt given nebulized TXA, trach cannula exchanged. ENT paged, bleeding slowed after TXA. CTA neck ordered per ENT recs. Pt stable in ED with ENT bedside pending further assessment.    Amount and/or Complexity of Data Reviewed  Labs: ordered.  Radiology: ordered.    Risk  Prescription drug management.  Decision regarding hospitalization.              Attending Attestation:   Physician Attestation Statement for Resident:  As the supervising MD   Physician Attestation Statement: I have personally seen and examined this patient.   I agree with the above history.  -:   As the supervising MD I agree with the above PE.     As the supervising MD I agree with the above treatment, course, plan, and disposition.   -: 68-year-old male with history of squamous cell carcinoma of the tongue status post tongue resection and flap  placement from right SCM, with trach and PEG, presents for hemoptysis.  Patient presents with copious amounts of bright red blood his posterior oropharynx, also in his nose and anterior neck at the site of the trach.  Vitals notable for mild hypoxia and tachypnea.  On exam patient has copious oral bleeding without clear source.  Differential diagnosis includes rebleeding from flap site, rebleeding from resection, erosion to artery from squamous so cell carcinoma.  Cuff inflated, trach suction, patient's oxygenation improved.  Establish control of airway.  Patient given nebulized TXA, there was some clot formation.  Patient continued to mentate appropriately.  ENT consulted at bedside.  They performed trach visualization, which showed no active bleed in trach airway.  CTA performed without clear etiology of bleed.  Patient taken by IR for possible embolectomy, admitted to surgical ICU under care of ENT.  I was personally present during the entire procedure.                  ED Course as of 03/20/24 0418   Wed Mar 20, 2024   0247 ENT evaluated patient, obtained neck imaging, will be admitting to SICU for further management [WL]      ED Course User Index  [WL] Carlos Hines MD                           Clinical Impression:  Final diagnoses:  [K13.79] Oral bleeding (Primary)  [R04.2] Hemoptysis  [Z90.49] S/P glossectomy          ED Disposition Condition    Admit Stable                Carlos Hines MD  Resident  03/20/24 0248       Adilson Arana MD  03/20/24 0419

## 2024-03-20 NOTE — HPI
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 He was discharged two weeks ago following procedure and was seen for routine outpatient follow up with Dr. Ventura in ENT clinic today. Due to noted sloughing of his native and neotongue and secretions from his tracheostomy, he underwent trach tube exchange with a6-0 cuffed tube that was inflated in clinic for airway protection.      He subsequently went home and in the evening and developed spontaneous bleeding from his oral cavity that came through his tracheostomy, prompting him to come back to the ED on 3/18. He was suctioned and bleeding subsided, prompting him to return home. He returns today with sandy oral bleeding with choking and difficulty breathing. EMS reported 300cc blood loss in his sink when they collected him, and another 200cc blood loss via oral suction en route to the hospital.Trach was suctioned, he was placed on supplemental O2 and given TXA nebs with improvement in breathing and decrease in active bleeding. Since presenting to the ED, his bleeding has slowly subsided. He denies any shortness of breath, nausea, vomiting and review of systems is otherwise negative. ENT consulted and saw patient at bedside, and TXA nebs given x1. No acute ENT intervention recommended and no clear source of bleed identified. CTA neck pending. MICU consulted for airway watch in patient with tracheostomy.    In the ED, vital signs stable. WBC 11.17, Hgb 8.5 (down from 9.0 on 3/18). Electrolytes and renal function wnl.

## 2024-03-20 NOTE — ANESTHESIA POSTPROCEDURE EVALUATION
Anesthesia Post Evaluation    Patient: Timothy Galdamez    Procedure(s) Performed: Procedure(s) (LRB):  EMBOLIZATION, BLOOD VESSEL (N/A)    Final Anesthesia Type: general      Patient location during evaluation: ICU  Patient participation: Yes- Able to Participate  Level of consciousness: awake and alert  Post-procedure vital signs: reviewed and stable  Pain management: adequate  Airway patency: patent    PONV status at discharge: No PONV  Anesthetic complications: no      Cardiovascular status: hemodynamically stable  Respiratory status: unassisted, spontaneous ventilation and room air  Hydration status: euvolemic                Vitals Value Taken Time   /59 03/20/24 1831   Temp 36.7 °C (98.1 °F) 03/20/24 1500   Pulse 90 03/20/24 1843   Resp 32 03/20/24 1843   SpO2 99 % 03/20/24 1843   Vitals shown include unvalidated device data.      No case tracking events are documented in the log.      Pain/Stephy Score: Pain Rating Prior to Med Admin: 4 (3/20/2024  9:42 AM)  Pain Rating Post Med Admin: 3 (3/20/2024 10:19 AM)

## 2024-03-20 NOTE — ASSESSMENT & PLAN NOTE
Neuro/Psych:   - Sedation: none  - Pain:     - Tylenol 650 q6h PRN    - Norco 5-325 q4h PRN            Cardiac:   - BP Goal: -140 / DBP   - Pressors: None  - Rhythm: NSR  - Bed rest 2hr s/p IR  - Q2h groin site checks s/p IR embolization     - Anti-HTNs: Will resume as appropriate   - Resumed home atorvastatin    Pulmonary:   - Close airway monitoring s/p bleeding event and IR embolization  - Goal SpO2 >92%  - ABGs PRN     Renal:  Recent Labs   Lab 03/18/24 2125 03/20/24  0056   BUN 17 19   CREATININE 0.8 0.8     - Trend BUN/Cr   - Maintain Olson, record strict Is/Os    FEN / GI:   - Daily CMP, PRN K/Mag/Phos per protocol   - Replace electrolytes as needed  - Nutrition: NPO  - Bowel Regimen: none     ID:   - Afebrile  Recent Labs   Lab 03/18/24 2125 03/20/24  0056   WBC 8.61 11.17     - Abx: none    Heme/Onc:   Recent Labs   Lab 03/18/24 2125 03/20/24  0056   HGB 9.0* 8.5*   * 659*   APTT 28.2  --    INR 1.1  --      - S/p IR embolization of the L lingual artery  - CBC daily   - DVT ppx: will discuss lovenox qd w/ primary team      Endocrine:   - CCM Goal BG <180  - POCT glucose frequency per protocol     PPx:   Feeding: NPO s/p procedure  Analgesia/Sedation: PRNs available / n/a  Thromboembolic Prevention: likely start this evening  HOB >30: Yes  Stress Ulcer: none indicated  Glucose Control: none     Lines/Drains/Airway:  Trach, 6-0 cuffed shiley  PEG  2x neck MARU drains, 1x anterior chest MARU     Dispo/Code Status/Palliative:   - SICU for q2h neurovascular monitoring and airway monitoring  - Full Code

## 2024-03-20 NOTE — NURSING
Nurses Note -- 4 Eyes      3/20/2024   5:32 AM      Skin assessed during: Transfer      [x] No Altered Skin Integrity Present    [x]Prevention Measures Documented      [] Yes- Altered Skin Integrity Present or Discovered   [] LDA Added if Not in Epic (Describe Wound)   [] New Altered Skin Integrity was Present on Admit and Documented in LDA   [] Wound Image Taken    Wound Care Consulted? No    Attending Nurse:  Mynor Casper RN/Staff Member:   Dea FRANCO

## 2024-03-20 NOTE — TRANSFER OF CARE
Anesthesia Transfer of Care Note    Patient: Timothy GREENE Rduolph    Procedure(s) Performed: Procedure(s) (LRB):  EMBOLIZATION, BLOOD VESSEL (N/A)    Patient location: ICU    Anesthesia Type: general    Transport from OR: Transported from OR on 6-10 L/min O2 by face mask with adequate spontaneous ventilation. Continuous ECG monitoring in transport. Continuous SpO2 monitoring in transport    Post pain: adequate analgesia    Post assessment: no apparent anesthetic complications and tolerated procedure well    Post vital signs: stable    Level of consciousness: awake and alert    Nausea/Vomiting: no nausea/vomiting    Complications: none    Transfer of care protocol was followed      Last vitals: Visit Vitals  /63   Pulse 86   Temp 36.4 °C (97.6 °F) (Axillary)   Resp 19   Ht 6' (1.829 m)   Wt 73.9 kg (162 lb 14.7 oz)   SpO2 99%   BMI 22.10 kg/m²

## 2024-03-20 NOTE — ED TRIAGE NOTES
Timothy Galdamez, a 68 y.o. male presents to the ED w/ complaint of bleeding from surgical site. Per EMS wound began bleeding suddenly without cause. Approx blood loss estimated to be around 500ml coming from mouth. PT pale with active blood loss noted on arrival using suctioning.    Triage note:  Chief Complaint   Patient presents with    Hemoptysis     BIB ems for bleeding from tongue surgical site. Tongue  removal x 1.5months ago.trach in place.staples present bilateral sides of neck     Review of patient's allergies indicates:  No Known Allergies  Past Medical History:   Diagnosis Date    Cancer     Hyperlipidemia     Hypertension      LOC: The patient is awake, alert, aware of environment with an appropriate affect. Oriented x4,.  APPEARANCE: Pt resting comfortably, in no acute distress, pt is clean and well groomed, clothing properly fastened  SKIN:The skin is warm and dry, pale in color with ethnicity, patient has normal skin turgor and moist mucus membranes, no bruising noted.Pale conjunctiva present. Pale mucous membranes. Tongue surgical site bleeding at steady rate on arrival. Staples and drains to surgical sites to neck and chest intact.  RESPIRATORY:Airway is open and patent at this time, respirations are spontaneous, patient has a labored effort and rate,trach present  CARDIAC: tachy rate and rhythm, no peripheral edema noted, capillary refill < 3 seconds, bilateral radial pulses 2+.  ABDOMEN: Soft, non tender, non distended.   NEUROLOGIC: PERRLA, facial expression is symmetrical, patient moving all extremities spontaneously, normal sensation in all extremities when touched with a finger.  Follows all commands appropriately  MUSCULOSKELETAL: Patient moving all extremities spontaneously, no obvious swelling or deformities noted.

## 2024-03-20 NOTE — PLAN OF CARE
SICU PLAN OF CARE    Dx: Oral bleeding    Goals of Care: -139    Vital Signs (last 12 hours):   Temp:  [98 °F (36.7 °C)-98.2 °F (36.8 °C)]   Pulse:  []   Resp:  [0-63]   BP: ()/(53-67)   SpO2:  [87 %-100 %]      Neuro: AAOx4 and Follows commands     Cardiac: NSR    Respiratory: Tracheostomy Collar; 5L, 28% FiO2    Urine Output: Voids Spontaneously  300 mL/shift    Drains: PEG tube    Diet: NPO and Tube Feeds intermittent bolus; see order for more detail     Labs/Accuchecks: Daily labs    Skin:  No new sign of skin breakdown.  Patient turned q2h, bony prominences protected, and mattress inflated/working correctly.   Pedro Score: 21. If Pedro Score is 16 or less, complete 4EYES note each shift.    Shift Events:  Pt ambulated around the unit with RN x2 connected to monitor and oxygen. VS remain stable. Pt's bp on the lower side; MD and RN @bedside to get consent, but pt refused. See flowsheet for further assessment/details.  Family updated on current condition/plan of care, questions answered, and emotional support provided.  MD updated on current condition, vitals, labs, and gtts.

## 2024-03-20 NOTE — PLAN OF CARE
Pt arrived to 104 for embolization. Pt oriented to unit and staff, Pt safely transferred from stretcher to procedural table. Fall risk reviewed and comfort measures utilized with interventions. Safety strap applied, position pillows to minimize pressure points. Blankets applied. Pt prepped and draped utilizing standard sterile technique. Patient placed on continuous monitoring, as required by sedation policy. Timeouts implemented utilizing standard universal time-out per department and facility policy. CRNA to remain at bedside with continuous monitoring. Pt resting comfortably. Denies pain/discomfort. Will continue to monitor. See flow sheets for monitoring, medication administration, and updates. patient verbalizes understanding.

## 2024-03-20 NOTE — NURSING
MD notified of pt's bp being below the SBP goal of 100-139. MD @bedside attempting to gather consent for blood. Pt refused blood despite education being provided. Will continue to monitor bp.

## 2024-03-20 NOTE — CONSULTS
Embolization performed. Please refer to the procedure note from 03/20/24.           Ras Hernandez MD, MHA  Fellow, NeuroEndovascular Surgery, [facility]  Neurologist, [facility] [place], [place]

## 2024-03-20 NOTE — CONSULTS
[facility] Dept  Otorhinolaryngology-Head & Neck Surgery  Consult Note    Patient Name: Timothy GREENE Allday  MRN: [id number]  Code Status: Full Code  Admission Date: 3/20/2024  Hospital Length of Stay: 0 days  Attending Physician: Jeferson Ventura MD  Primary Care Provider: Young Lanier MD    Patient information was obtained from patient, spouse/SO, and ER records.     Inpatient consult to ENT  Consult performed by: Mynor Dolan MD  Consult ordered by: Mynor Dolan MD        Subjective:     Chief Complaint/Reason for Admission: oral bleeding    History of Present Illness: Pt is a 68M well know to ENT service, PMH SCC of oropharynx who is s/p subtotal glossectomy, bilateral neck dissection, pectoralis rotational flap reconstruction, and tracheostomy 2/23/24. Was discharged from the hospital appx 2 weeks ago, has had regular outpatient follow up w Dr. Ventura. He was seen in clinic earlier this week and his trach was exchanged for a 6-0 cuffed and inflated for airway protection. He presented to the ED last night for small volume hempotysis, scope was negative and did not reveal a source of bleeding.     He returns to the ED tonight BIBEMS after waking up with large volume hemoptysis of reportedly bright red blood. Per ED, he was covered in bright red blood on arrival. The ED overinflated his cuff and the bleed eventually slowed down. ENT called for evaluation.        Medications:  Continuous Infusions:  Scheduled Meds:  PRN Meds:     No current facility-administered medications on file prior to encounter.     Current Outpatient Medications on File Prior to Encounter   Medication Sig    acetaminophen (TYLENOL) 650 mg/20.3 mL Soln 20.3 mLs (650 mg total) by Per G Tube route every 6 (six) hours as needed for Pain.    amLODIPine (NORVASC) 2.5 MG tablet 1 tablet (2.5 mg total) by Per G Tube route once daily. (Patient not taking: Reported on 3/14/2024)    ascorbic acid, vitamin C, (VITAMIN C) 100  MG tablet Take 100 mg by mouth once daily.    aspirin (ECOTRIN) 81 MG EC tablet Take 81 mg by mouth once daily.    atorvastatin (LIPITOR) 80 MG tablet 1 tablet (80 mg total) by Per G Tube route once daily.    calcium carbonate (OS-HERMES) 600 mg calcium (1,500 mg) Tab Take 600 mg by mouth once daily.    ergocalciferol, vitamin D2, (VITAMIN D ORAL) Take 1 tablet by mouth once daily.    ezetimibe (ZETIA) 10 mg tablet 1 tablet (10 mg total) by Per G Tube route once daily. (Patient not taking: Reported on 3/14/2024)    ferrous sulfate (FEROSUL) 220 mg (44 mg iron)/5 mL Elix Use 6.8 mLs (300.08 mg total) by Per G Tube route every other day. for 15 days    multivitamin (THERAGRAN) tablet 1 tablet by Per G Tube route once daily.    NON FORMULARY MEDICATION Prevegan once daily    omega-3/dha/epa/dpa/fish oil (OMEGA-3 2100 ORAL) Take 1 capsule by mouth once daily.    polyethylene glycol (GLYCOLAX) 17 gram/dose powder Use cap to measure 17 grams, mix in liquid and take by Per G Tube route once daily.    senna-docusate 8.6-50 mg (PERICOLACE) 8.6-50 mg per tablet 1 tablet by Per G Tube route 2 (two) times daily.    silodosin (RAPAFLO) 4 mg Cap capsule Take 1 capsule (4 mg total) by mouth once daily. Open capsule, place in water and administer into feeding tube    thiamine 100 MG tablet 1 tablet (100 mg total) by Per G Tube route once daily. (Patient not taking: Reported on 3/14/2024)    vitamin E 100 UNIT capsule Take 100 Units by mouth once daily.       Review of patient's allergies indicates:  No Known Allergies    Past Medical History:   Diagnosis Date    Cancer     Hyperlipidemia     Hypertension      Past Surgical History:   Procedure Laterality Date    ANKLE SURGERY      L ankle    BIOPSY OF TISSUE OF NECK Left 2013    COLONOSCOPY N/A 08/21/2017    Procedure: COLONOSCOPY;  Surgeon: Danny Jane MD;  Location: [address]);  Service: Endoscopy;  Laterality: N/A;  Do not cancel this order    CREATION OF MUSCLE  ROTATIONAL FLAP N/A 2/23/2024    Procedure: CREATION, FLAP, MUSCLE ROTATION;  Surgeon: Jeferson Ventura MD;  [facility];  Service: ENT;  Laterality: N/A;    DIRECT LARYNGOBRONCHOSCOPY N/A 12/11/2023    Procedure: LARYNGOSCOPY, DIRECT, WITH BRONCHOSCOPY;  Surgeon: Micaela Poole MD;  [facility];  Service: ENT;  Laterality: N/A;    DISSECTION OF NECK Bilateral 2/23/2024    Procedure: DISSECTION, NECK;  Surgeon: Jeferson Ventura MD;  [facility];  Service: ENT;  Laterality: Bilateral;    ESOPHAGOGASTRODUODENOSCOPY N/A 09/18/2023    Procedure: EGD (ESOPHAGOGASTRODUODENOSCOPY);  Surgeon: Basilia Villavicencio MD;  Location: [facility] ENDOSCOPY;  Service: Gastroenterology;  Laterality: N/A;  9.13 instr sent via portal [facility]  pre call complete, stated he would have a ride -HH    GLOSSECTOMY Right 2/23/2024    Procedure: GLOSSECTOMY;  Surgeon: Jeferson Ventura MD;  [facility];  Service: ENT;  Laterality: Right;    INSERTION, PEG TUBE Right 2/23/2024    Procedure: INSERTION, PEG TUBE;  Surgeon: Alpesh Wu MD;  [facility];  Service: General;  Laterality: Right;    TONSILLECTOMY      TRACHEOTOMY N/A 2/23/2024    Procedure: TRACHEOTOMY;  Surgeon: Jeferson Ventura MD;  [facility];  Service: ENT;  Laterality: N/A;    TUMOR REMOVAL Right 2015    at     VASECTOMY      WRIST FRACTURE SURGERY Right      Family History       Problem Relation (Age of Onset)    Arthritis Mother    COPD Brother          Tobacco Use    Smoking status: Former     Types: Cigarettes    Smokeless tobacco: Never    Tobacco comments:     Quit in 1981   Substance and Sexual Activity    Alcohol use: Yes     Alcohol/week: 7.0 - 8.0 standard drinks of alcohol     Types: 3 Glasses of wine, 4 - 5 Standard drinks or equivalent per week    Drug use: No    Sexual activity: Not on file     Review of Systems as above  Objective:     Vital Signs (Most  Recent):  Temp: 97.6 °F (36.4 °C) (03/20/24 0053)  Pulse: 91 (03/20/24 0134)  Resp: (!) 25 (03/20/24 0134)  BP: 103/63 (03/20/24 0134)  SpO2: 100 % (03/20/24 0134) Vital Signs (24h Range):  Temp:  [97.6 °F (36.4 °C)] 97.6 °F (36.4 °C)  Pulse:  [] 91  Resp:  [18-25] 25  SpO2:  [96 %-100 %] 100 %  BP: (103-164)/(63-84) 103/63     Weight: 73.9 kg (162 lb 14.7 oz)  Body mass index is 22.1 kg/m².         Physical Exam     NAD, communicates with writing board  Native tongue edematous and congested, large clot occupying oropharynx, clot along FOM  No bleeding from nose  6-0 cuffed trach in place, secured w soft collar, cuff over inflated. Min blood-tinged secretions suctioned from cannula. No peristomal bleeding.   Neck with doughy edema, post radiation  Neck incision intact with staples  Chest incision intact with staples  Chest is soft, right bulk tunneling to neck 2/2 pec flap, soft to palpation, no fluctuance     PROCEDURE: Flexible Fiberoptic Laryngoscopy and Tracheoscopy Exam  The risks, benefits, and alternatives to the procedure were explained. Patient/family  agreed to procedure; verbal consent obtained. The scope was inserted into the right nasal cavity and tracheostomy.     Pertinent exam findings include the following:  - No active bleeding from lower airways  - minimal blood tinged secretions coating wall of trachea but nothing active  - oropharynx filled with large blood clot, stable, no active bleeding     The scope was removed. The patient tolerated the procedure well without complications.    Significant Labs:  CBC:   Recent Labs   Lab 03/20/24 0056 03/20/24 0058   WBC 11.17  --    RBC 2.84*  --    HGB 8.5*  --    HCT 27.4* 26*   *  --    MCV 97  --    MCH 29.9  --    MCHC 31.0*  --      CMP:   Recent Labs   Lab 03/20/24 0056   *   CALCIUM 8.6*   ALBUMIN 2.6*   PROT 5.6*      K 4.0   CO2 26      BUN 19   CREATININE 0.8   ALKPHOS 98   ALT 52*   AST 24   BILITOT 0.2        Significant Diagnostics:  CT: I have reviewed all pertinent results/findings within the past 24 hours and my personal findings are:  CTA Neck reviewed with staff and radiology resident on call, extravasation from left ECA system     Assessment/Plan:     * Oral bleeding  The patient is a 68 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 Small bleeding episode 3/19 but no source identified. Presents again 3/20 with large volume bleed. CTA Neck concerning for left ECA system bleed. Patient currently HDS, no active bleeding.      - Discussed with IR who will perform emergent embolization of left ECA system  - SICU post-procedure (orders placed)  - Recommend keep tracheostomy cuff inflated at all times, overinflate if starts to rebleed  - Further recommendations following procedure  - Please page w questions or concerns      VTE Risk Mitigation (From admission, onward)           Ordered     IP VTE HIGH RISK PATIENT  Once         03/20/24 0245     Place sequential compression device  Until discontinued         03/20/24 0245                    Thank you for your consult. I will follow-up with patient. Please contact us if you have any additional questions.    Mynor Dolan MD  Otorhinolaryngology-Head & Neck Surgery  [facility]

## 2024-03-20 NOTE — PLAN OF CARE
[facility] Intensive Care  Initial Discharge Assessment       Primary Care Provider: Young Lanier MD    Admission Diagnosis: Oral bleeding [K13.79]  Hemoptysis [R04.2]  S/P glossectomy [Z90.49]    Admission Date: 3/20/2024  Expected Discharge Date:     Transition of Care Barriers: None    Payor: [employer] MANAGED MEDICARE / Plan: HU[employer] MEDICARE HMO / Product Type: Capitation /     Extended Emergency Contact Information  Primary Emergency Contact: Della Rasheed  Address: [address]           [address]           [place] [address] [facility]  Home Phone: [phone]  Mobile Phone: [phone]  Relation: Friend  Secondary Emergency Contact: Aminata Marino  Mobile Phone: [phone]  Relation: Daughter  Preferred language: English   needed? No    Discharge Plan A: Home Health  Discharge Plan B: Home with family      [facility] - [place], [place] - [address]  [address] [address]  Phone: [phone] Fax: [phone]    [facility] - [place], [place] - [address]  [address]  [place] [address]  Phone: [phone] Fax: [phone]      Initial Assessment (most recent)       Adult Discharge Assessment - 03/20/24 0928          Discharge Assessment    Assessment Type Discharge Planning Assessment     Source of Information patient;family     Communicated AMRIK with patient/caregiver Date not available/Unable to determine     Reason For Admission oral bleeding     People in Home --     Do you expect to return to your current living situation? Yes     Do you have help at home or someone to help you manage your care at home? Yes     Who are your caregiver(s) and their phone number(s)? --     Prior to hospitilization cognitive status: Alert/Oriented     Current cognitive status: Alert/Oriented     Walking or Climbing Stairs Difficulty no     Dressing/Bathing Difficulty no     Home  Accessibility stairs within home     Home Layout --   bedroom and bathroom on the 1st floor    Equipment Currently Used at Home other (see comments)   trach supplies    Readmission within 30 days? Yes     Patient currently being followed by outpatient case management? No     Do you currently have service(s) that help you manage your care at home? Yes     Name and Contact number of agency [facility]     Is the pt/caregiver preference to resume services with current agency Yes     Do you take prescription medications? Yes     Do you have prescription coverage? Yes     Coverage [employer]     Do you have any problems affording any of your prescribed medications? No     Is the patient taking medications as prescribed? yes     How do you get to doctors appointments? family or friend will provide     Are you on dialysis? No     Do you take coumadin? No     Discharge Plan A Home Health     Discharge Plan B Home with family     DME Needed Upon Discharge  none     Discharge Plan discussed with: Patient     Transition of Care Barriers None        Physical Activity    On average, how many days per week do you engage in moderate to strenuous exercise (like a brisk walk)? 0 days     On average, how many minutes do you engage in exercise at this level? 0 min        Financial Resource Strain    How hard is it for you to pay for the very basics like food, housing, medical care, and heating? Not hard at all        Housing Stability    In the last 12 months, was there a time when you were not able to pay the mortgage or rent on time? No     In the last 12 months, was there a time when you did not have a steady place to sleep or slept in a shelter (including now)? No        Transportation Needs    In the past 12 months, has lack of transportation kept you from medical appointments or from getting medications? No     In the past 12 months, has lack of transportation kept you from meetings, work, or from getting things needed for  daily living? No        Food Insecurity    Within the past 12 months, you worried that your food would run out before you got the money to buy more. Never true     Within the past 12 months, the food you bought just didn't last and you didn't have money to get more. Never true        Social Connections    In a typical week, how many times do you talk on the phone with family, friends, or neighbors? More than three times a week     How often do you get together with friends or relatives? More than three times a week     How often do you attend Episcopal or Mu-ism services? Never     Do you belong to any clubs or organizations such as Episcopal groups, unions, fraternal or athletic groups, or school groups? No     How often do you attend meetings of the clubs or organizations you belong to? Never        Alcohol Use    Q1: How often do you have a drink containing alcohol? Patient declined     Q2: How many drinks containing alcohol do you have on a typical day when you are drinking? Patient declined     Q3: How often do you have six or more drinks on one occasion? Patient declined                      Discharge Plan A and Plan B have been determined by review of patient's clinical status, future medical and therapeutic needs, and coverage/benefits for post-acute care in coordination with multidisciplinary team members.    This CM met with patient  in room to complete DPA. Questions answered / contact numbers provided.  Patient is trached, but  use white board to help with communication and friend is at bedside. Use PREFERRED PHARMACY / BEDSIDE DELIVERY for any necessary medications at time of discharge. Patient is independent with all ADLs  and has trach supplies at home Manpreet is unsure of the name of the vendor for trach supplies, but reports he has  ample trach supplies in home. Patient reports he is current with [facility]. .Family/friendwill be assisting with help upon discharge and will be providing  transportation home. Hospital follow up will be scheduled with PCP. Will continue to follow for course of hospitalization.     Selene Ball RN  Case Management  [phone]

## 2024-03-20 NOTE — H&P
[facility] - [facility]  Critical Care - Surgery  History & Physical    Patient Name: Timothy Galdamez  MRN: [id number]  Admission Date: 3/20/2024  Code Status: Full Code  Attending Physician: Jeferson Ventura MD   Primary Care Provider: Young Lanier MD   Principal Problem: Oral bleeding    Subjective:     HPI:  68M with PMHx L subclavian a stenosis,  SCC of oropharynx who is s/p subtotal glossectomy, bilateral neck dissection, pectoralis rotational flap reconstruction, and tracheostomy 2/23/24. He was discharged 2 weeks ago after an uncomplicated postoperative course. He was seen in clinic earlier this week and his trach was exchanged for a 6-0 cuffed and inflated for airway protection. He presented to the ED last night for small volume hempotysis, scope was negative and did not reveal a source of bleeding.      He returns to the ED tonight BIBEMS after waking up with large volume hemoptysis of reportedly bright red blood. Per ED, he was covered in bright red blood on arrival. The ED overinflated his cuff and the bleed eventually slowed down. CTA was concerning for active hemorrhage from the lingual artery.     The patient presents to the SICU s/p lingual artery embolization with IR on 03/20/2024. On admission, they are satting well on roomair via trach and in stable condition. He is not requiring vasopressors, MAP goals are > 65 and -140 / DBP . No central access. They also have 2x neck MARU drains (R and L), R chest MARU drains with appropriate output, and a PEG tube for enteral access.    Immediate post-operative plans include hemodynamic stabilization, close neurovascular monitoring, and closely monitor fluid status with strict Is/Os and continued fluid resuscitation as needed.      Hospital/ICU Course:  No notes on file    Follow-up For: Procedure(s) (LRB):  EMBOLIZATION, BLOOD VESSEL (N/A)    Post-Operative Day: Day of Surgery     Past Medical History:   Diagnosis Date    Cancer      Hyperlipidemia     Hypertension        Past Surgical History:   Procedure Laterality Date    ANKLE SURGERY      L ankle    BIOPSY OF TISSUE OF NECK Left 2013    COLONOSCOPY N/A 08/21/2017    Procedure: COLONOSCOPY;  Surgeon: Danny Jane MD;  Location: [facility] (4TH FLR);  Service: Endoscopy;  Laterality: N/A;  Do not cancel this order    CREATION OF MUSCLE ROTATIONAL FLAP N/A 2/23/2024    Procedure: CREATION, FLAP, MUSCLE ROTATION;  Surgeon: Jeferson Ventura MD;  [facility];  Service: ENT;  Laterality: N/A;    DIRECT LARYNGOBRONCHOSCOPY N/A 12/11/2023    Procedure: LARYNGOSCOPY, DIRECT, WITH BRONCHOSCOPY;  Surgeon: Micaela Poole MD;  [facility];  Service: ENT;  Laterality: N/A;    DISSECTION OF NECK Bilateral 2/23/2024    Procedure: DISSECTION, NECK;  Surgeon: Jeferson Ventura MD;  [facility];  Service: ENT;  Laterality: Bilateral;    ESOPHAGOGASTRODUODENOSCOPY N/A 09/18/2023    Procedure: EGD (ESOPHAGOGASTRODUODENOSCOPY);  Surgeon: Basilia Villavicencio MD;  Location: [facility] ENDOSCOPY;  Service: Gastroenterology;  Laterality: N/A;  9.13 instr sent via portal [facility]  pre call complete, stated he would have a ride -HH    GLOSSECTOMY Right 2/23/2024    Procedure: GLOSSECTOMY;  Surgeon: Jeferson Ventura MD;  Location: [facility] OR [address] [place] [address];  Service: ENT;  Laterality: Right;    INSERTION, PEG TUBE Right 2/23/2024    Procedure: INSERTION, PEG TUBE;  Surgeon: Alpesh Wu MD;  [facility];  Service: General;  Laterality: Right;    TONSILLECTOMY      TRACHEOTOMY N/A 2/23/2024    Procedure: TRACHEOTOMY;  Surgeon: Jeferson Ventura MD;  Location: [facility] OR [address] [place] [address];  Service: ENT;  Laterality: N/A;    TUMOR REMOVAL Right 2015    at     VASECTOMY      WRIST FRACTURE SURGERY Right        Review of patient's allergies indicates:  No Known Allergies    Family History       Problem Relation (Age of Onset)    Arthritis Mother    COPD Brother           Tobacco Use    Smoking status: Former     Types: Cigarettes    Smokeless tobacco: Never    Tobacco comments:     Quit in 1981   Substance and Sexual Activity    Alcohol use: Yes     Alcohol/week: 7.0 - 8.0 standard drinks of alcohol     Types: 3 Glasses of wine, 4 - 5 Standard drinks or equivalent per week    Drug use: No    Sexual activity: Not on file      Review of Systems   Constitutional:  Negative for appetite change and fever.   Respiratory:  Negative for cough and shortness of breath.    Cardiovascular:  Negative for chest pain.   Gastrointestinal:  Negative for abdominal distention and abdominal pain.   Skin: Negative.      Objective:     Vital Signs (Most Recent):  Temp: 97.6 °F (36.4 °C) (03/20/24 0053)  Pulse: 91 (03/20/24 0544)  Resp: (!) 21 (03/20/24 0530)  BP: 131/65 (03/20/24 0515)  SpO2: 100 % (03/20/24 0530) Vital Signs (24h Range):  Temp:  [97.6 °F (36.4 °C)] 97.6 °F (36.4 °C)  Pulse:  [] 91  Resp:  [18-27] 21  SpO2:  [95 %-100 %] 100 %  BP: (103-164)/(58-84) 131/65     Weight: 73.9 kg (162 lb 14.7 oz)  Body mass index is 22.1 kg/m².      Intake/Output Summary (Last 24 hours) at 3/20/2024 0550  Last data filed at 3/20/2024 0515  Gross per 24 hour   Intake 1000 ml   Output 30 ml   Net 970 ml          Physical Exam  Constitutional:       General: He is not in acute distress.  Cardiovascular:      Rate and Rhythm: Normal rate and regular rhythm.      Pulses: Normal pulses.      Comments: R flap donor site c/d/i  Pulmonary:      Effort: Pulmonary effort is normal. No respiratory distress.      Comments: Trach in place, on RA  Dried blood surrounding mouth and trach site  No active bleeding  L neck MARU drain w/ minimal output, to bulb suction  R neck drain w/o active oozing  Abdominal:      General: There is no distension.      Palpations: Abdomen is soft.      Tenderness: There is no abdominal tenderness.      Comments: PEG   Genitourinary:     Comments: R groin access site soft w/o  ecchymosis  Skin:     General: Skin is warm and dry.   Neurological:      General: No focal deficit present.      Mental Status: He is alert and oriented to person, place, and time.            Vents:  Oxygen Concentration (%): (S) 28 (03/20/24 0134)    Lines/Drains/Airways       Drain  Duration                  Closed/Suction Drain 02/23/24 1338 Tube - 2 Right;Medial Chest Bulb 15 Fr. 25 days         Closed/Suction Drain 02/23/24 1414 Tube - 3 Left;Anterior Neck Bulb 15 Fr. 25 days         Closed/Suction Drain 02/23/24 1445 Tube - 4 Right;Anterior Neck Bulb 15 Fr. 25 days         Gastrostomy/Enterostomy 02/23/24 0750 Percutaneous endoscopic gastrostomy (PEG) LUQ feeding 25 days              Airway  Duration             Adult Surgical Airway 03/01/24 0700 Shiley Uncuffed 6.0/ 75mm 18 days              Peripheral Intravenous Line  Duration                  Peripheral IV - Single Lumen 03/20/24 0218 20 G Anterior;Left Forearm <1 day         Peripheral IV - Single Lumen 03/20/24 0219 18 G Right Antecubital <1 day                    Significant Labs:    CBC/Anemia Profile:  Recent Labs   Lab 03/18/24 2125 03/20/24  0055 03/20/24  0056 03/20/24  0058   WBC 8.61  --  11.17  --    HGB 9.0*  --  8.5*  --    HCT 28.7* 27* 27.4* 26*   *  --  659*  --    MCV 94  --  97  --    RDW 13.9  --  13.6  --         Chemistries:  Recent Labs   Lab 03/18/24 2125 03/20/24  0056   * 136   K 4.0 4.0    103   CO2 25 26   BUN 17 19   CREATININE 0.8 0.8   CALCIUM 9.0 8.6*   ALBUMIN 2.7* 2.6*   PROT 6.4 5.6*   BILITOT 0.3 0.2   ALKPHOS 104 98   ALT 61* 52*   AST 24 24       All pertinent labs within the past 24 hours have been reviewed.    Significant Imaging: I have reviewed all pertinent imaging results/findings within the past 24 hours.  Assessment/Plan:     Oncology  Tongue cancer  Neuro/Psych:   - Sedation: none  - Pain:     - Tylenol 650 q6h PRN    - Norco 5-325 q4h PRN            Cardiac:   - BP Goal: -140 /  DBP   - Pressors: None  - Rhythm: NSR  - Bed rest 2hr s/p IR  - Q2h groin site checks s/p IR embolization     - Anti-HTNs: Will resume as appropriate   - Resumed home atorvastatin    Pulmonary:   - Close airway monitoring s/p bleeding event and IR embolization  - Goal SpO2 >92%  - ABGs PRN     Renal:  Recent Labs   Lab 03/18/24 2125 03/20/24  0056   BUN 17 19   CREATININE 0.8 0.8     - Trend BUN/Cr   - Maintain Olson, record strict Is/Os    FEN / GI:   - Daily CMP, PRN K/Mag/Phos per protocol   - Replace electrolytes as needed  - Nutrition: NPO  - Bowel Regimen: none     ID:   - Afebrile  Recent Labs   Lab 03/18/24 2125 03/20/24  0056   WBC 8.61 11.17     - Abx: none    Heme/Onc:   Recent Labs   Lab 03/18/24 2125 03/20/24  0056   HGB 9.0* 8.5*   * 659*   APTT 28.2  --    INR 1.1  --      - S/p IR embolization of the L lingual artery  - CBC daily   - DVT ppx: will discuss lovenox qd w/ primary team      Endocrine:   - CCM Goal BG <180  - POCT glucose frequency per protocol     PPx:   Feeding: NPO s/p procedure  Analgesia/Sedation: PRNs available / n/a  Thromboembolic Prevention: likely start this evening  HOB >30: Yes  Stress Ulcer: none indicated  Glucose Control: none     Lines/Drains/Airway:  Trach, 6-0 cuffed shiley  PEG  2x neck MARU drains, 1x anterior chest MARU     Dispo/Code Status/Palliative:   - SICU for q2h neurovascular monitoring and airway monitoring  - Full Code        Gina Mendoza MD  Critical Care - Surgery  [facility] - [facility]

## 2024-03-20 NOTE — PLAN OF CARE
Pt tolerated embolization well under crna care, rg groin exoseal, closure device, dressing c/d/I, hemostasis obtain @ 0443, bed rest up @ 0643, report given @ bedside

## 2024-03-20 NOTE — SUBJECTIVE & OBJECTIVE
Follow-up For: Procedure(s) (LRB):  EMBOLIZATION, BLOOD VESSEL (N/A)    Post-Operative Day: Day of Surgery     Past Medical History:   Diagnosis Date    Cancer     Hyperlipidemia     Hypertension        Past Surgical History:   Procedure Laterality Date    ANKLE SURGERY      L ankle    BIOPSY OF TISSUE OF NECK Left 2013    COLONOSCOPY N/A 08/21/2017    Procedure: COLONOSCOPY;  Surgeon: Danny Jane MD;  Location: [facility] (4TH FLR);  Service: Endoscopy;  Laterality: N/A;  Do not cancel this order    CREATION OF MUSCLE ROTATIONAL FLAP N/A 2/23/2024    Procedure: CREATION, FLAP, MUSCLE ROTATION;  Surgeon: Jeferson Ventura MD;  [facility];  Service: ENT;  Laterality: N/A;    DIRECT LARYNGOBRONCHOSCOPY N/A 12/11/2023    Procedure: LARYNGOSCOPY, DIRECT, WITH BRONCHOSCOPY;  Surgeon: Micaela Poole MD;  Location: [facility] OR [address] [place] [address];  Service: ENT;  Laterality: N/A;    DISSECTION OF NECK Bilateral 2/23/2024    Procedure: DISSECTION, NECK;  Surgeon: Jeferson Ventura MD;  [facility];  Service: ENT;  Laterality: Bilateral;    ESOPHAGOGASTRODUODENOSCOPY N/A 09/18/2023    Procedure: EGD (ESOPHAGOGASTRODUODENOSCOPY);  Surgeon: Basilia Villavicencio MD;  [facility];  Service: Gastroenterology;  Laterality: N/A;  9.13 instr sent via portal [facility]  pre call complete, stated he would have a ride -    GLOSSECTOMY Right 2/23/2024    Procedure: GLOSSECTOMY;  Surgeon: Jeferson Ventura MD;  [address]  Service: ENT;  Laterality: Right;    INSERTION, PEG TUBE Right 2/23/2024    Procedure: INSERTION, PEG TUBE;  Surgeon: Alpesh Wu MD;  [facility];  Service: General;  Laterality: Right;    TONSILLECTOMY      TRACHEOTOMY N/A 2/23/2024    Procedure: TRACHEOTOMY;  Surgeon: Jeferson Ventura MD;  [facility];  Service: ENT;  Laterality: N/A;    TUMOR REMOVAL Right 2015    at     VASECTOMY      WRIST FRACTURE SURGERY Right         Review of patient's allergies indicates:  No Known Allergies    Family History       Problem Relation (Age of Onset)    Arthritis Mother    COPD Brother          Tobacco Use    Smoking status: Former     Types: Cigarettes    Smokeless tobacco: Never    Tobacco comments:     Quit in 1981   Substance and Sexual Activity    Alcohol use: Yes     Alcohol/week: 7.0 - 8.0 standard drinks of alcohol     Types: 3 Glasses of wine, 4 - 5 Standard drinks or equivalent per week    Drug use: No    Sexual activity: Not on file      Review of Systems   Constitutional:  Negative for appetite change and fever.   Respiratory:  Negative for cough and shortness of breath.    Cardiovascular:  Negative for chest pain.   Gastrointestinal:  Negative for abdominal distention and abdominal pain.   Skin: Negative.      Objective:     Vital Signs (Most Recent):  Temp: 97.6 °F (36.4 °C) (03/20/24 0053)  Pulse: 91 (03/20/24 0544)  Resp: (!) 21 (03/20/24 0530)  BP: 131/65 (03/20/24 0515)  SpO2: 100 % (03/20/24 0530) Vital Signs (24h Range):  Temp:  [97.6 °F (36.4 °C)] 97.6 °F (36.4 °C)  Pulse:  [] 91  Resp:  [18-27] 21  SpO2:  [95 %-100 %] 100 %  BP: (103-164)/(58-84) 131/65     Weight: 73.9 kg (162 lb 14.7 oz)  Body mass index is 22.1 kg/m².      Intake/Output Summary (Last 24 hours) at 3/20/2024 0550  Last data filed at 3/20/2024 0515  Gross per 24 hour   Intake 1000 ml   Output 30 ml   Net 970 ml          Physical Exam  Constitutional:       General: He is not in acute distress.  Cardiovascular:      Rate and Rhythm: Normal rate and regular rhythm.      Pulses: Normal pulses.      Comments: R flap donor site c/d/i  Pulmonary:      Effort: Pulmonary effort is normal. No respiratory distress.      Comments: Trach in place, on RA  Dried blood surrounding mouth and trach site  No active bleeding  L neck MARU drain w/ minimal output, to bulb suction  R neck drain w/o active oozing  Abdominal:      General: There is no distension.       Palpations: Abdomen is soft.      Tenderness: There is no abdominal tenderness.      Comments: PEG   Genitourinary:     Comments: R groin access site soft w/o ecchymosis  Skin:     General: Skin is warm and dry.   Neurological:      General: No focal deficit present.      Mental Status: He is alert and oriented to person, place, and time.            Vents:  Oxygen Concentration (%): (S) 28 (03/20/24 0134)    Lines/Drains/Airways       Drain  Duration                  Closed/Suction Drain 02/23/24 1338 Tube - 2 Right;Medial Chest Bulb 15 Fr. 25 days         Closed/Suction Drain 02/23/24 1414 Tube - 3 Left;Anterior Neck Bulb 15 Fr. 25 days         Closed/Suction Drain 02/23/24 1445 Tube - 4 Right;Anterior Neck Bulb 15 Fr. 25 days         Gastrostomy/Enterostomy 02/23/24 0750 Percutaneous endoscopic gastrostomy (PEG) LUQ feeding 25 days              Airway  Duration             Adult Surgical Airway 03/01/24 0700 Shiley Uncuffed 6.0/ 75mm 18 days              Peripheral Intravenous Line  Duration                  Peripheral IV - Single Lumen 03/20/24 0218 20 G Anterior;Left Forearm <1 day         Peripheral IV - Single Lumen 03/20/24 0219 18 G Right Antecubital <1 day                    Significant Labs:    CBC/Anemia Profile:  Recent Labs   Lab 03/18/24 2125 03/20/24  0055 03/20/24  0056 03/20/24  0058   WBC 8.61  --  11.17  --    HGB 9.0*  --  8.5*  --    HCT 28.7* 27* 27.4* 26*   *  --  659*  --    MCV 94  --  97  --    RDW 13.9  --  13.6  --         Chemistries:  Recent Labs   Lab 03/18/24 2125 03/20/24  0056   * 136   K 4.0 4.0    103   CO2 25 26   BUN 17 19   CREATININE 0.8 0.8   CALCIUM 9.0 8.6*   ALBUMIN 2.7* 2.6*   PROT 6.4 5.6*   BILITOT 0.3 0.2   ALKPHOS 104 98   ALT 61* 52*   AST 24 24       All pertinent labs within the past 24 hours have been reviewed.    Significant Imaging: I have reviewed all pertinent imaging results/findings within the past 24 hours.

## 2024-03-20 NOTE — CARE UPDATE
I have reviewed the chart of Timothy Galdamez who is hospitalized for the following:    Active Hospital Problems    Diagnosis    *Oral bleeding    Hyponatremia    Acute on chronic blood loss anemia    Hypoalbuminemia    S/P percutaneous endoscopic gastrostomy (PEG) tube placement    Status post tracheostomy    Hx of glossectomy    Primary hypertension    Oropharyngeal mass    Coronary artery calcification    Tongue cancer    Hyperlipidemia          Ingrid Pike, JORGE LUIS  Unit Based [place]

## 2024-03-20 NOTE — SUBJECTIVE & OBJECTIVE
Medications:  Continuous Infusions:  Scheduled Meds:  PRN Meds:     No current facility-administered medications on file prior to encounter.     Current Outpatient Medications on File Prior to Encounter   Medication Sig    acetaminophen (TYLENOL) 650 mg/20.3 mL Soln 20.3 mLs (650 mg total) by Per G Tube route every 6 (six) hours as needed for Pain.    amLODIPine (NORVASC) 2.5 MG tablet 1 tablet (2.5 mg total) by Per G Tube route once daily. (Patient not taking: Reported on 3/14/2024)    ascorbic acid, vitamin C, (VITAMIN C) 100 MG tablet Take 100 mg by mouth once daily.    aspirin (ECOTRIN) 81 MG EC tablet Take 81 mg by mouth once daily.    atorvastatin (LIPITOR) 80 MG tablet 1 tablet (80 mg total) by Per G Tube route once daily.    calcium carbonate (OS-HERMES) 600 mg calcium (1,500 mg) Tab Take 600 mg by mouth once daily.    ergocalciferol, vitamin D2, (VITAMIN D ORAL) Take 1 tablet by mouth once daily.    ezetimibe (ZETIA) 10 mg tablet 1 tablet (10 mg total) by Per G Tube route once daily. (Patient not taking: Reported on 3/14/2024)    ferrous sulfate (FEROSUL) 220 mg (44 mg iron)/5 mL Elix Use 6.8 mLs (300.08 mg total) by Per G Tube route every other day. for 15 days    multivitamin (THERAGRAN) tablet 1 tablet by Per G Tube route once daily.    NON FORMULARY MEDICATION Prevegan once daily    omega-3/dha/epa/dpa/fish oil (OMEGA-3 2100 ORAL) Take 1 capsule by mouth once daily.    polyethylene glycol (GLYCOLAX) 17 gram/dose powder Use cap to measure 17 grams, mix in liquid and take by Per G Tube route once daily.    senna-docusate 8.6-50 mg (PERICOLACE) 8.6-50 mg per tablet 1 tablet by Per G Tube route 2 (two) times daily.    silodosin (RAPAFLO) 4 mg Cap capsule Take 1 capsule (4 mg total) by mouth once daily. Open capsule, place in water and administer into feeding tube    thiamine 100 MG tablet 1 tablet (100 mg total) by Per G Tube route once daily. (Patient not taking: Reported on 3/14/2024)    vitamin E 100 UNIT  capsule Take 100 Units by mouth once daily.       Review of patient's allergies indicates:  No Known Allergies    Past Medical History:   Diagnosis Date    Cancer     Hyperlipidemia     Hypertension      Past Surgical History:   Procedure Laterality Date    ANKLE SURGERY      L ankle    BIOPSY OF TISSUE OF NECK Left 2013    COLONOSCOPY N/A 08/21/2017    Procedure: COLONOSCOPY;  Surgeon: Danny Jane MD;  Location: [facility] (4TH FLR);  Service: Endoscopy;  Laterality: N/A;  Do not cancel this order    CREATION OF MUSCLE ROTATIONAL FLAP N/A 2/23/2024    Procedure: CREATION, FLAP, MUSCLE ROTATION;  Surgeon: Jeferson Ventura MD;  [facility];  Service: ENT;  Laterality: N/A;    DIRECT LARYNGOBRONCHOSCOPY N/A 12/11/2023    Procedure: LARYNGOSCOPY, DIRECT, WITH BRONCHOSCOPY;  Surgeon: Micaela Poole MD;  [facility];  Service: ENT;  Laterality: N/A;    DISSECTION OF NECK Bilateral 2/23/2024    Procedure: DISSECTION, NECK;  Surgeon: Jeferson Ventura MD;  [address]  Service: E[address];  Laterality: Bilateral;    ESOPHAGOGASTRODUODENOSCOPY N/A 09/18/2023    Procedure: EGD (ESOPHAGOGASTRODUODENOSCOPY);  Surgeon: Basilia Villavicencio MD;  Location: [facility] ENDOSCOPY;  Service: Gastroenterology;  Laterality: N/A;  9.13 instr sent via portal [facility]  pre call complete, stated he would have a ride -    GLOSSECTOMY Right 2/23/2024    Procedure: GLOSSECTOMY;  Surgeon: Jeferson Ventura MD;  [address]  Service: ENT;  Laterality: Right;    INSERTION, PEG TUBE Right 2/23/2024    Procedure: INSERTION, PEG TUBE;  Surgeon: Alpesh Wu MD;  [address]  Service: General;  Laterality: Right;    TONSILLECTOMY      TRACHEOTOMY N/A 2/23/2024    Procedure: TRACHEOTOMY;  Surgeon: Jeferson Ventura MD;  [address]  Service: ENT;  Laterality: N/A;    TUMOR REMOVAL Right 2015    at     VASECTOMY      WRIST FRACTURE SURGERY Right       Family History       Problem Relation (Age of Onset)    Arthritis Mother    COPD Brother          Tobacco Use    Smoking status: Former     Types: Cigarettes    Smokeless tobacco: Never    Tobacco comments:     Quit in 1981   Substance and Sexual Activity    Alcohol use: Yes     Alcohol/week: 7.0 - 8.0 standard drinks of alcohol     Types: 3 Glasses of wine, 4 - 5 Standard drinks or equivalent per week    Drug use: No    Sexual activity: Not on file     Review of Systems as above  Objective:     Vital Signs (Most Recent):  Temp: 97.6 °F (36.4 °C) (03/20/24 0053)  Pulse: 91 (03/20/24 0134)  Resp: (!) 25 (03/20/24 0134)  BP: 103/63 (03/20/24 0134)  SpO2: 100 % (03/20/24 0134) Vital Signs (24h Range):  Temp:  [97.6 °F (36.4 °C)] 97.6 °F (36.4 °C)  Pulse:  [] 91  Resp:  [18-25] 25  SpO2:  [96 %-100 %] 100 %  BP: (103-164)/(63-84) 103/63     Weight: 73.9 kg (162 lb 14.7 oz)  Body mass index is 22.1 kg/m².         Physical Exam     NAD, communicates with writing board  Native tongue edematous and congested, large clot occupying oropharynx, clot along FOM  No bleeding from nose  6-0 cuffed trach in place, secured w soft collar, cuff over inflated. Min blood-tinged secretions suctioned from cannula. No peristomal bleeding.   Neck with doughy edema, post radiation  Neck incision intact with staples  Chest incision intact with staples  Chest is soft, right bulk tunneling to neck 2/2 pec flap, soft to palpation, no fluctuance     PROCEDURE: Flexible Fiberoptic Laryngoscopy and Tracheoscopy Exam  The risks, benefits, and alternatives to the procedure were explained. Patient/family  agreed to procedure; verbal consent obtained. The scope was inserted into the right nasal cavity and tracheostomy.     Pertinent exam findings include the following:  - No active bleeding from lower airways  - minimal blood tinged secretions coating wall of trachea but nothing active  - oropharynx filled with large blood clot, stable, no  active bleeding     The scope was removed. The patient tolerated the procedure well without complications.    Significant Labs:  CBC:   Recent Labs   Lab 03/20/24  0056 03/20/24  0058   WBC 11.17  --    RBC 2.84*  --    HGB 8.5*  --    HCT 27.4* 26*   *  --    MCV 97  --    MCH 29.9  --    MCHC 31.0*  --      CMP:   Recent Labs   Lab 03/20/24  0056   *   CALCIUM 8.6*   ALBUMIN 2.6*   PROT 5.6*      K 4.0   CO2 26      BUN 19   CREATININE 0.8   ALKPHOS 98   ALT 52*   AST 24   BILITOT 0.2       Significant Diagnostics:  CT: I have reviewed all pertinent results/findings within the past 24 hours and my personal findings are:  CTA Neck reviewed with staff and radiology resident on call, extravasation from left ECA system

## 2024-03-20 NOTE — ASSESSMENT & PLAN NOTE
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 Small bleeding episode 3/19 but no source identified. Presents again 3/20 with large volume bleed. CTA Neck concerning for left ECA system bleed. Patient currently HDS, no active bleeding.      - Discussed with IR who will perform emergent embolization of left ECA system  - SICU post-procedure (orders placed)  - Recommend keep tracheostomy cuff inflated at all times, overinflate if starts to rebleed  - Further recommendations following procedure  - Please page w questions or concerns

## 2024-03-20 NOTE — HPI
68M with PMHx L subclavian a stenosis,  SCC of oropharynx who is s/p subtotal glossectomy, bilateral neck dissection, pectoralis rotational flap reconstruction, and tracheostomy 2/23/24. He was discharged 2 weeks ago after an uncomplicated postoperative course. He was seen in clinic earlier this week and his trach was exchanged for a 6-0 cuffed and inflated for airway protection. He presented to the ED last night for small volume hempotysis, scope was negative and did not reveal a source of bleeding.      He returns to the ED tonight BIBEMS after waking up with large volume hemoptysis of reportedly bright red blood. Per ED, he was covered in bright red blood on arrival. The ED overinflated his cuff and the bleed eventually slowed down. CTA was concerning for active hemorrhage from the lingual artery.     The patient presents to the SICU s/p lingual artery embolization with IR on 03/20/2024. On admission, they are satting well on roomair via trach and in stable condition. He is not requiring vasopressors, MAP goals are > 65 and -140 / DBP . No central access. They also have 2x neck MARU drains (R and L), R chest MARU drains with appropriate output, and a PEG tube for enteral access.    Immediate post-operative plans include hemodynamic stabilization, close neurovascular monitoring, and closely monitor fluid status with strict Is/Os and continued fluid resuscitation as needed.

## 2024-03-21 ENCOUNTER — ANESTHESIA EVENT (OUTPATIENT)
Dept: SURGERY | Facility: HOSPITAL | Age: 69
DRG: 908 | End: 2024-03-21
Payer: MEDICARE

## 2024-03-21 ENCOUNTER — TELEPHONE (OUTPATIENT)
Dept: OTOLARYNGOLOGY | Facility: CLINIC | Age: 69
End: 2024-03-21
Payer: MEDICARE

## 2024-03-21 PROBLEM — E44.0 MODERATE MALNUTRITION: Status: ACTIVE | Noted: 2024-03-21

## 2024-03-21 LAB
ALBUMIN SERPL BCP-MCNC: 2.1 G/DL (ref 3.5–5.2)
ALBUMIN SERPL BCP-MCNC: 2.2 G/DL (ref 3.5–5.2)
ALP SERPL-CCNC: 70 U/L (ref 55–135)
ALP SERPL-CCNC: 80 U/L (ref 55–135)
ALT SERPL W/O P-5'-P-CCNC: 33 U/L (ref 10–44)
ALT SERPL W/O P-5'-P-CCNC: 35 U/L (ref 10–44)
ANION GAP SERPL CALC-SCNC: 5 MMOL/L (ref 8–16)
ANION GAP SERPL CALC-SCNC: 6 MMOL/L (ref 8–16)
ANISOCYTOSIS BLD QL SMEAR: SLIGHT
AST SERPL-CCNC: 16 U/L (ref 10–40)
AST SERPL-CCNC: 18 U/L (ref 10–40)
BASO STIPL BLD QL SMEAR: ABNORMAL
BASOPHILS # BLD AUTO: 0.02 K/UL (ref 0–0.2)
BASOPHILS # BLD AUTO: 0.03 K/UL (ref 0–0.2)
BASOPHILS NFR BLD: 0.2 % (ref 0–1.9)
BASOPHILS NFR BLD: 0.3 % (ref 0–1.9)
BILIRUB SERPL-MCNC: 0.1 MG/DL (ref 0.1–1)
BILIRUB SERPL-MCNC: 0.2 MG/DL (ref 0.1–1)
BLD PROD TYP BPU: NORMAL
BLOOD UNIT EXPIRATION DATE: NORMAL
BLOOD UNIT TYPE CODE: 5100
BLOOD UNIT TYPE: NORMAL
BUN SERPL-MCNC: 19 MG/DL (ref 8–23)
BUN SERPL-MCNC: 20 MG/DL (ref 8–23)
BURR CELLS BLD QL SMEAR: ABNORMAL
CALCIUM SERPL-MCNC: 8.2 MG/DL (ref 8.7–10.5)
CALCIUM SERPL-MCNC: 8.4 MG/DL (ref 8.7–10.5)
CHLORIDE SERPL-SCNC: 104 MMOL/L (ref 95–110)
CHLORIDE SERPL-SCNC: 106 MMOL/L (ref 95–110)
CO2 SERPL-SCNC: 25 MMOL/L (ref 23–29)
CO2 SERPL-SCNC: 27 MMOL/L (ref 23–29)
CODING SYSTEM: NORMAL
CREAT SERPL-MCNC: 0.7 MG/DL (ref 0.5–1.4)
CREAT SERPL-MCNC: 0.8 MG/DL (ref 0.5–1.4)
CROSSMATCH INTERPRETATION: NORMAL
DACRYOCYTES BLD QL SMEAR: ABNORMAL
DIFFERENTIAL METHOD BLD: ABNORMAL
DIFFERENTIAL METHOD BLD: ABNORMAL
DISPENSE STATUS: NORMAL
DOHLE BOD BLD QL SMEAR: PRESENT
EOSINOPHIL # BLD AUTO: 0.1 K/UL (ref 0–0.5)
EOSINOPHIL # BLD AUTO: 0.1 K/UL (ref 0–0.5)
EOSINOPHIL NFR BLD: 1 % (ref 0–8)
EOSINOPHIL NFR BLD: 1.1 % (ref 0–8)
ERYTHROCYTE [DISTWIDTH] IN BLOOD BY AUTOMATED COUNT: 13.9 % (ref 11.5–14.5)
ERYTHROCYTE [DISTWIDTH] IN BLOOD BY AUTOMATED COUNT: 13.9 % (ref 11.5–14.5)
EST. GFR  (NO RACE VARIABLE): >60 ML/MIN/1.73 M^2
EST. GFR  (NO RACE VARIABLE): >60 ML/MIN/1.73 M^2
GIANT PLATELETS BLD QL SMEAR: PRESENT
GLUCOSE SERPL-MCNC: 104 MG/DL (ref 70–110)
GLUCOSE SERPL-MCNC: 107 MG/DL (ref 70–110)
HCT VFR BLD AUTO: 18.4 % (ref 40–54)
HCT VFR BLD AUTO: 19.2 % (ref 40–54)
HGB BLD-MCNC: 5.7 G/DL (ref 14–18)
HGB BLD-MCNC: 6 G/DL (ref 14–18)
HYPOCHROMIA BLD QL SMEAR: ABNORMAL
IMM GRANULOCYTES # BLD AUTO: 0.09 K/UL (ref 0–0.04)
IMM GRANULOCYTES # BLD AUTO: 0.09 K/UL (ref 0–0.04)
IMM GRANULOCYTES NFR BLD AUTO: 1 % (ref 0–0.5)
IMM GRANULOCYTES NFR BLD AUTO: 1 % (ref 0–0.5)
LYMPHOCYTES # BLD AUTO: 1 K/UL (ref 1–4.8)
LYMPHOCYTES # BLD AUTO: 1.1 K/UL (ref 1–4.8)
LYMPHOCYTES NFR BLD: 11.6 % (ref 18–48)
LYMPHOCYTES NFR BLD: 12.3 % (ref 18–48)
MAGNESIUM SERPL-MCNC: 2 MG/DL (ref 1.6–2.6)
MAGNESIUM SERPL-MCNC: 2.1 MG/DL (ref 1.6–2.6)
MCH RBC QN AUTO: 29.9 PG (ref 27–31)
MCH RBC QN AUTO: 30 PG (ref 27–31)
MCHC RBC AUTO-ENTMCNC: 31 G/DL (ref 32–36)
MCHC RBC AUTO-ENTMCNC: 31.3 G/DL (ref 32–36)
MCV RBC AUTO: 96 FL (ref 82–98)
MCV RBC AUTO: 97 FL (ref 82–98)
MONOCYTES # BLD AUTO: 1 K/UL (ref 0.3–1)
MONOCYTES # BLD AUTO: 1 K/UL (ref 0.3–1)
MONOCYTES NFR BLD: 10.8 % (ref 4–15)
MONOCYTES NFR BLD: 11.1 % (ref 4–15)
NEUTROPHILS # BLD AUTO: 6.5 K/UL (ref 1.8–7.7)
NEUTROPHILS # BLD AUTO: 6.6 K/UL (ref 1.8–7.7)
NEUTROPHILS NFR BLD: 74.6 % (ref 38–73)
NEUTROPHILS NFR BLD: 75 % (ref 38–73)
NRBC BLD-RTO: 0 /100 WBC
NRBC BLD-RTO: 0 /100 WBC
OVALOCYTES BLD QL SMEAR: ABNORMAL
PHOSPHATE SERPL-MCNC: 3.9 MG/DL (ref 2.7–4.5)
PHOSPHATE SERPL-MCNC: 4 MG/DL (ref 2.7–4.5)
PLATELET # BLD AUTO: 379 K/UL (ref 150–450)
PLATELET # BLD AUTO: 401 K/UL (ref 150–450)
PLATELET BLD QL SMEAR: ABNORMAL
PMV BLD AUTO: 10.5 FL (ref 9.2–12.9)
PMV BLD AUTO: 10.8 FL (ref 9.2–12.9)
POIKILOCYTOSIS BLD QL SMEAR: SLIGHT
POLYCHROMASIA BLD QL SMEAR: ABNORMAL
POTASSIUM SERPL-SCNC: 4.2 MMOL/L (ref 3.5–5.1)
POTASSIUM SERPL-SCNC: 4.3 MMOL/L (ref 3.5–5.1)
PROT SERPL-MCNC: 4.8 G/DL (ref 6–8.4)
PROT SERPL-MCNC: 5.1 G/DL (ref 6–8.4)
RBC # BLD AUTO: 1.9 M/UL (ref 4.6–6.2)
RBC # BLD AUTO: 2.01 M/UL (ref 4.6–6.2)
SCHISTOCYTES BLD QL SMEAR: ABNORMAL
SCHISTOCYTES BLD QL SMEAR: PRESENT
SODIUM SERPL-SCNC: 136 MMOL/L (ref 136–145)
SODIUM SERPL-SCNC: 137 MMOL/L (ref 136–145)
SPHEROCYTES BLD QL SMEAR: ABNORMAL
TARGETS BLD QL SMEAR: ABNORMAL
TOXIC GRANULES BLD QL SMEAR: PRESENT
TRANS ERYTHROCYTES VOL PATIENT: NORMAL ML
WBC # BLD AUTO: 8.67 K/UL (ref 3.9–12.7)
WBC # BLD AUTO: 8.85 K/UL (ref 3.9–12.7)

## 2024-03-21 PROCEDURE — P9021 RED BLOOD CELLS UNIT: HCPCS

## 2024-03-21 PROCEDURE — 25000003 PHARM REV CODE 250

## 2024-03-21 PROCEDURE — 36430 TRANSFUSION BLD/BLD COMPNT: CPT

## 2024-03-21 PROCEDURE — 30233N1 TRANSFUSION OF NONAUTOLOGOUS RED BLOOD CELLS INTO PERIPHERAL VEIN, PERCUTANEOUS APPROACH: ICD-10-PCS | Performed by: OTOLARYNGOLOGY

## 2024-03-21 PROCEDURE — 11000001 HC ACUTE MED/SURG PRIVATE ROOM

## 2024-03-21 PROCEDURE — 85025 COMPLETE CBC W/AUTO DIFF WBC: CPT

## 2024-03-21 PROCEDURE — 85025 COMPLETE CBC W/AUTO DIFF WBC: CPT | Mod: 91 | Performed by: OTOLARYNGOLOGY

## 2024-03-21 PROCEDURE — 80053 COMPREHEN METABOLIC PANEL: CPT | Mod: 91 | Performed by: OTOLARYNGOLOGY

## 2024-03-21 PROCEDURE — 84100 ASSAY OF PHOSPHORUS: CPT | Mod: 91 | Performed by: OTOLARYNGOLOGY

## 2024-03-21 PROCEDURE — 99900035 HC TECH TIME PER 15 MIN (STAT)

## 2024-03-21 PROCEDURE — 63600175 PHARM REV CODE 636 W HCPCS

## 2024-03-21 PROCEDURE — 80053 COMPREHEN METABOLIC PANEL: CPT

## 2024-03-21 PROCEDURE — 27000221 HC OXYGEN, UP TO 24 HOURS

## 2024-03-21 PROCEDURE — 83735 ASSAY OF MAGNESIUM: CPT

## 2024-03-21 PROCEDURE — 99900026 HC AIRWAY MAINTENANCE (STAT)

## 2024-03-21 PROCEDURE — 99233 SBSQ HOSP IP/OBS HIGH 50: CPT | Mod: GC,,, | Performed by: SURGERY

## 2024-03-21 PROCEDURE — 94761 N-INVAS EAR/PLS OXIMETRY MLT: CPT

## 2024-03-21 PROCEDURE — 84100 ASSAY OF PHOSPHORUS: CPT

## 2024-03-21 PROCEDURE — 83735 ASSAY OF MAGNESIUM: CPT | Mod: 91 | Performed by: OTOLARYNGOLOGY

## 2024-03-21 RX ORDER — HYDROCODONE BITARTRATE AND ACETAMINOPHEN 500; 5 MG/1; MG/1
TABLET ORAL
Status: DISCONTINUED | OUTPATIENT
Start: 2024-03-21 | End: 2024-03-21

## 2024-03-21 RX ADMIN — SILODOSIN 4 MG: 4 CAPSULE ORAL at 08:03

## 2024-03-21 RX ADMIN — GUAIFENESIN 200 MG: 200 SOLUTION ORAL at 11:03

## 2024-03-21 RX ADMIN — GUAIFENESIN 200 MG: 200 SOLUTION ORAL at 06:03

## 2024-03-21 RX ADMIN — ENOXAPARIN SODIUM 40 MG: 40 INJECTION SUBCUTANEOUS at 04:03

## 2024-03-21 RX ADMIN — Medication 6 MG: at 11:03

## 2024-03-21 RX ADMIN — ACETAMINOPHEN 650 MG: 325 TABLET ORAL at 11:03

## 2024-03-21 RX ADMIN — SODIUM CHLORIDE, POTASSIUM CHLORIDE, SODIUM LACTATE AND CALCIUM CHLORIDE 500 ML: 600; 310; 30; 20 INJECTION, SOLUTION INTRAVENOUS at 02:03

## 2024-03-21 RX ADMIN — GUAIFENESIN 200 MG: 200 SOLUTION ORAL at 05:03

## 2024-03-21 RX ADMIN — ACETAMINOPHEN 650 MG: 325 TABLET ORAL at 10:03

## 2024-03-21 RX ADMIN — ATORVASTATIN CALCIUM 80 MG: 40 TABLET, FILM COATED ORAL at 08:03

## 2024-03-21 NOTE — ANESTHESIA PREPROCEDURE EVALUATION
[facility]  Anesthesia Pre-Operative Evaluation       Patient Name: Timothy Galdamez  YOB: 1955  MRN: [id number]  CSN: [id number]      Code Status: Full Code   Date of Procedure: 3/20/2024  Anesthesia: Choice Procedure: Procedure(s) (LRB):  DEBRIDEMENT, WOUND (N/A)  CREATION, FREE FLAP (N/A)  Pre-Operative Diagnosis: Oral bleeding [K13.79]  Hemoptysis [R04.2]  Proceduralist: Surgeon(s) and Role:     * Surgeon, Edith - Primary        SUBJECTIVE:   Timothy Galdamez is a 69 y.o. male who is here today for Procedure(s) (LRB):  DEBRIDEMENT, WOUND (N/A)  CREATION, FREE FLAP (N/A).   No notes on file      Timothy Galdamez is a 69 y.o. M with PMH of L subclavian artery stenosis, SCC of oropharynx s/p subtotal glossectomy, bilateral neck dissection, pectoralis rotational flap reconstruction, and tracheostomy 2/23/24.     BIBEMS after waking up with large volume hemoptysis of reportedly bright red blood. Per ED, he was covered in bright red blood on arrival. The ED overinflated his cuff and the bleed eventually slowed down. CTA was concerning for active hemorrhage from the lingual artery.  S/p IR embolization 3/20/24    Of note, patient with very calcified radial arteries. Unable to achieve arterial cannulation during glossectomy procedure.     Paper consent in chart with ICU RN witness.    ALLERGIES:   Review of patient's allergies indicates:  No Known Allergies    LDA:   Oxygen Concentration (%):  [28] 28      Lines/Drains/Airways       Drain  Duration                  Gastrostomy/Enterostomy 02/23/24 0750 Percutaneous endoscopic gastrostomy (PEG) LUQ feeding 27 days         Closed/Suction Drain 02/23/24 1414 Tube - 3 Left;Anterior Neck Bulb 15 Fr. 26 days              Airway  Duration             Adult Surgical Airway 03/01/24 0700 Nayaley Uncuffed 6.0/ 75mm 20 days              Peripheral Intravenous Line  Duration                  Peripheral IV - Single Lumen 03/20/24 0218 20 G Anterior;Left  Forearm 1 day         Peripheral IV - Single Lumen 03/20/24 0219 18 G Right Antecubital 1 day                   RELEVANT MEDICATIONS:     Antibiotics (From admission, onward)      None          VTE Risk Mitigation (From admission, onward)      None            History:     There are no hospital problems to display for this patient.    Patient Active Problem List   Diagnosis    Primary insomnia    Hyperlipidemia    Tongue cancer    Rotator cuff syndrome of right shoulder    Memory change    RLS (restless legs syndrome)    Chronic right-sided low back pain    Ectatic aorta    Subclavian artery stenosis, left    Aortic atherosclerosis    Coronary artery calcification    Oropharyngeal mass    Primary hypertension    Elevated alanine aminotransferase (ALT) level    S/P percutaneous endoscopic gastrostomy (PEG) tube placement    Hypokalemia    Hypoalbuminemia    Hypophosphatemia    Status post tracheostomy    On supplemental oxygen therapy    Encounter for weaning from ventilator    Oral bleeding    Hyponatremia    Acute on chronic blood loss anemia    Hx of glossectomy     Medical History   Past Medical History:   Diagnosis Date    Acute on chronic blood loss anemia 3/19/2024    Cancer     Hyperlipidemia     Hypertension      Surgical History:    has a past surgical history that includes Vasectomy; Tonsillectomy; Ankle surgery; Colonoscopy (N/A, 08/21/2017); Tumor removal (Right, 2015); Biopsy of tissue of neck (Left, 2013); Esophagogastroduodenoscopy (N/A, 09/18/2023); Direct laryngobronchoscopy (N/A, 12/11/2023); Wrist fracture surgery (Right); Glossectomy (Right, 2/23/2024); Dissection of neck (Bilateral, 2/23/2024); Tracheotomy (N/A, 2/23/2024); Creation of muscle rotational flap (N/A, 2/23/2024); insertion, peg tube (Right, 2/23/2024); and Embolization (N/A, 3/20/2024).   Social History:    reports that he has quit smoking. His smoking use included cigarettes. He has never used smokeless tobacco. He reports current  alcohol use of about 7.0 - 8.0 standard drinks of alcohol per week. He reports that he does not use drugs.     OBJECTIVE:     Vital Signs (Most Recent):    Vital Signs Range (Last 24H):  Temp:  [36.2 °C (97.1 °F)-36.9 °C (98.5 °F)]   Pulse:  []   Resp:  [7-63]   BP: ()/(51-65)   SpO2:  [87 %-100 %]      Last 3 Vitals:       3/18/2024     8:39 PM 3/20/2024    12:39 AM 3/20/2024    12:53 AM   Vitals - 1 value per visit   SYSTOLIC 149 164    DIASTOLIC 72 84    Pulse 94 106    Temp 37.1 °C (98.7 °F)  36.4 °C (97.6 °F)   Resp 21 18    SPO2 93 % 96 %    Weight (lb) 163 162.92    Weight (kg) 73.936 73.9    Height  6' (1.829 m)    BMI (Calculated)  22.1      There is no height or weight on file to calculate BMI.   Wt Readings from Last 4 Encounters:   03/20/24 73.9 kg (162 lb 14.7 oz)   03/18/24 73.9 kg (163 lb)   03/18/24 73.9 kg (163 lb)   02/28/24 80 kg (176 lb 5.9 oz)     Significant Labs:  Lab Results   Component Value Date    WBC 8.85 03/21/2024    HGB 6.0 (L) 03/21/2024    HCT 19.2 (LL) 03/21/2024     03/21/2024     03/21/2024    K 4.3 03/21/2024     03/21/2024    CREATININE 0.7 03/21/2024    BUN 20 03/21/2024    CO2 27 03/21/2024     03/21/2024    CALCIUM 8.4 (L) 03/21/2024    MG 2.1 03/21/2024    PHOS 4.0 03/21/2024    ALKPHOS 80 03/21/2024    ALT 35 03/21/2024    AST 18 03/21/2024    ALBUMIN 2.2 (L) 03/21/2024    INR 1.1 03/18/2024    APTT 28.2 03/18/2024    HGBA1C 5.1 08/31/2023     No LMP for male patient.      EKG:   Results for orders placed or performed in visit on 09/22/23   IN OFFICE EKG 12-LEAD (to [place])    Collection Time: 09/22/23  9:58 AM    Narrative    Test Reason : I10,Z13.6,I77.819,    Vent. Rate : 054 BPM     Atrial Rate : 054 BPM     P-R Int : 184 ms          QRS Dur : 130 ms      QT Int : 486 ms       P-R-T Axes : 052 058 055 degrees     QTc Int : 460 ms    Sinus bradycardia  Left bundle branch block  Abnormal ECG  When compared with ECG of 16-JUL-2014  15:00,  Vent. rate has decreased BY  26 BPM  Nonspecific T wave abnormality now evident in Inferior leads  QT has shortened  Confirmed by Dickson RITCHIE MD ([id number]) on 9/22/2023 5:18:47 PM    Referred By: SANDRA HDZ           Confirmed By:Dickson RITCHIE MD       TTE:  Results for orders placed or performed during the hospital encounter of 11/06/23   Echo   Result Value Ref Range    RV TB RVSP 5 mmHg    Est. RA pres 3 mmHg    Narrative      Left Ventricle: The left ventricle is normal in size. Ventricular mass   is normal. Normal wall thickness. Normal wall motion. There is normal   systolic function with a visually estimated ejection fraction of 55 - 60%.   There is normal diastolic function.    Right Ventricle: Normal right ventricular cavity size. Wall thickness   is normal. Right ventricle wall motion  is normal. Systolic function is   normal.    Pulmonary Artery: The estimated pulmonary artery systolic pressure is   24 mmHg.    IVC/SVC: Normal venous pressure at 3 mmHg.         ASSESSMENT/PLAN:       Pre-op Assessment    I have reviewed the Patient Summary Reports.     I have reviewed the Nursing Notes. I have reviewed the NPO Status.   I have reviewed the Medications.     Review of Systems  Anesthesia Hx:  No problems with previous Anesthesia   History of prior surgery of interest to airway management or planning:          Denies Family Hx of Anesthesia complications.    Denies Personal Hx of Anesthesia complications.                    Social:  Former Smoker       Hematology/Oncology:       -- Anemia:                    --  Cancer in past history:                     EENT/Dental:  EENT/Dental Normal           Cardiovascular:     Hypertension   CAD                  Coronary Artery Disease:                            Hypertension         Pulmonary:  Pulmonary Normal                       Renal/:  Renal/ Normal                 Hepatic/GI:  Hepatic/GI Normal                 Neurological:  Neurology Normal                                       Endocrine:  Endocrine Normal            Psych:  Psychiatric Normal                    Physical Exam  General: Cooperative, Alert and Oriented    Airway:  Neck ROM: Normal ROM  Oropharynx: Active Airway Bleeding  Pre-Existing Airway: Tracheostomy tube    Chest/Lungs:  Normal Respiratory Rate    Heart:  Rate: Normal  Rhythm: Regular Rhythm        Anesthesia Plan  Type of Anesthesia, risks & benefits discussed:    Anesthesia Type: Gen ETT  Intra-op Monitoring Plan: Standard ASA Monitors  Post Op Pain Control Plan: IV/PO Opioids PRN and multimodal analgesia  Induction:  IV  Airway Plan: Via Tracheostomy, Post-Induction  Informed Consent: Informed consent signed with the Patient and all parties understand the risks and agree with anesthesia plan.  All questions answered.   ASA Score: 4  Day of Surgery Review of History & Physical: H&P Update referred to the surgeon/provider.    Ready For Surgery From Anesthesia Perspective.     .

## 2024-03-21 NOTE — PLAN OF CARE
SICU PLAN OF CARE    Dx: Oral bleeding    Goals of Care: -160    Vital Signs (last 12 hours):   Temp:  [97.2 °F (36.2 °C)-98.5 °F (36.9 °C)]   Pulse:  [67-95]   Resp:  [11-44]   BP: ()/(51-63)   SpO2:  [93 %-100 %]      Neuro: AAOx4, Follows commands , and Moves all extremities spontaneously writes     Cardiac: NSR    Respiratory: Tracheostomy Collar; 5L, 28% FiO2    Urine Output: Voids Spontaneously  675 mL/shift    Drains: MARU #1, total output 15mL /shift    Diet: NPO  and Tube Feeds at Bolus per order mL/hour     Labs/Accuchecks: daily labs/no accuchecks     Skin:  Patient turned q2h, bony prominences protected, and mattress inflated/working correctly.   Pedro Score: 20. If Pedro Score is 16 or less, complete 4EYES note each shift.    Shift Events:  1 PRBC, 500 LR bolus.  See flowsheet for further assessment/details.  Family updated on current condition/plan of care, questions answered, and emotional support provided.  MD updated on current condition, vitals, labs, and gtts.

## 2024-03-21 NOTE — PLAN OF CARE
Recommendations     1.) If and when medically feasible, Continue Bolus Tube Feeding; Actively Learn Peptide 1.5 - 5 cans/day to provide 2500 kcals, 120 g PRO, 1140 ml fluid w/ additional FWF per MD                  - Extra 500 kcal/day to help pt gain wt that had been lost.     2.) Monitor for s/s of intolerance such as residuals >500ml, n/v/d, or abdominal distension.       3.) RD to monitor wt, tolerance, skin, labs, vent settings.     Goals: to meet % of EEN/EPN by next RD f/u  Nutrition Goal Status: new  Communication of RD Recs:  (POC)

## 2024-03-21 NOTE — SUBJECTIVE & OBJECTIVE
Interval History: Patient doing okay this morning. Receiving blood transfusion this morning.    Medications:  Continuous Infusions:  Scheduled Meds:   atorvastatin  80 mg Per G Tube Daily    enoxparin  40 mg Subcutaneous Q24H (prophylaxis, 1700)    ferrous sulfate  300 mg Per G Tube Every other day    silodosin  4 mg Per G Tube Daily     PRN Meds:acetaminophen, guaiFENesin 100 mg/5 ml, magnesium oxide, magnesium oxide, melatonin, ondansetron, oxyCODONE, potassium bicarbonate, potassium bicarbonate, potassium bicarbonate, potassium, sodium phosphates, potassium, sodium phosphates, potassium, sodium phosphates     Review of patient's allergies indicates:  No Known Allergies  Objective:     Vital Signs (24h Range):  Temp:  [97.1 °F (36.2 °C)-98.5 °F (36.9 °C)] 97.1 °F (36.2 °C)  Pulse:  [] 85  Resp:  [11-63] 30  SpO2:  [87 %-100 %] 100 %  BP: ()/(51-65) 130/63     Date 03/21/24 0700 - 03/22/24 0659   Shift 2216-5646 0356-2019 0106-8291 24 Hour Total   INTAKE   Blood 310   310   NG/GT 30   30   Shift Total(mL/kg) 340(4.6)   340(4.6)   OUTPUT   Shift Total(mL/kg)       Weight (kg) 73.9 73.9 73.9 73.9     Lines/Drains/Airways       Drain  Duration                  Gastrostomy/Enterostomy 02/23/24 0750 Percutaneous endoscopic gastrostomy (PEG) LUQ feeding 27 days         Closed/Suction Drain 02/23/24 1414 Tube - 3 Left;Anterior Neck Bulb 15 Fr. 26 days              Airway  Duration             Adult Surgical Airway 03/01/24 0700 Ralph Uncuffed 6.0/ 75mm 20 days              Peripheral Intravenous Line  Duration                  Peripheral IV - Single Lumen 03/20/24 0218 20 G Anterior;Left Forearm 1 day         Peripheral IV - Single Lumen 03/20/24 0219 18 G Right Antecubital 1 day                     Physical Exam  NAD, communicates with writing board  Native tongue edematous and congested  No bleeding from nose  6-0 cuffed trach in place, secured w soft collar. No peristomal bleeding.   Neck with doughy  edema, post radiation  Neck incision intact with staples  Chest incision intact with staples  Chest is soft, right bulk tunneling to neck 2/2 pec flap, soft to palpation, no fluctuance      Significant Labs:  CBC:   Recent Labs   Lab 03/21/24  0406   WBC 8.85   RBC 2.01*   HGB 6.0*   HCT 19.2*      MCV 96   MCH 29.9   MCHC 31.3*     CMP:   Recent Labs   Lab 03/21/24  0406      CALCIUM 8.4*   ALBUMIN 2.2*   PROT 5.1*      K 4.3   CO2 27      BUN 20   CREATININE 0.7   ALKPHOS 80   ALT 35   AST 18   BILITOT 0.2       Significant Diagnostics:  I have reviewed and interpreted all pertinent imaging results/findings within the past 24 hours.

## 2024-03-21 NOTE — ASSESSMENT & PLAN NOTE
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 Small bleeding episode 3/19 but no source identified. Presents again 3/20 with large volume bleed. CTA Neck concerning for left ECA system bleed. Patient currently HDS, no active bleeding. Now s/p embolization with IR, no further bleeding.    - Recommend keep tracheostomy cuff inflated at all times, overinflate if starts to rebleed  - Plan for surgery for debridement and likely free flap tomorrow  - Hold tube feeds at midnight  - NPO  - Please page w questions or concerns

## 2024-03-21 NOTE — ASSESSMENT & PLAN NOTE
Neuro/Psych:   - Sedation: none  - Pain:     - Tylenol 650 q6h PRN    - Norco 5-325 q4h PRN            Cardiac:   - BP Goal: -140 / DBP   - Pressors: None  - Rhythm: NSR  - Bed rest 2hr s/p IR  - Q2h groin site checks s/p IR embolization     - Anti-HTNs: Will resume as appropriate   - Resumed home atorvastatin    Pulmonary:   - Close airway monitoring s/p bleeding event and IR embolization  - Goal SpO2 >92%  - ABGs PRN     Renal:  Recent Labs   Lab 03/20/24  0525 03/21/24  0330 03/21/24  0406   BUN 18 19 20   CREATININE 0.8 0.8 0.7     - Trend BUN/Cr , Cr stable at 0.8  -  Record strict Is/Os  - Voiding spontaneously after [place] removal    FEN / GI:   - Daily CMP, PRN K/Mag/Phos per protocol   - Replace electrolytes as needed  - Nutrition: NPO  - Bowel Regimen: none     ID:   - Afebrile  Recent Labs   Lab 03/20/24 2007 03/21/24  0330 03/21/24  0406   WBC 13.81* 8.67 8.85     - Abx: none    Heme/Onc:   Recent Labs   Lab 03/18/24  2125 03/20/24  0056 03/20/24 2007 03/21/24 0330 03/21/24  0406   HGB 9.0*   < > 7.8* 5.7* 6.0*   *   < > 576* 379 401   APTT 28.2  --   --   --   --    INR 1.1  --   --   --   --     < > = values in this interval not displayed.     - S/p IR embolization of the L lingual artery  - CBC daily   - DVT ppx: Lovenox      Endocrine:   - CCM Goal BG <180  - POCT glucose frequency per protocol     PPx:   Feeding: NP, Getting TF through G tube   Analgesia/Sedation: PRNs available / n/a  Thromboembolic Prevention:  Lovenox  HOB >30: Yes  Stress Ulcer: none indicated  Glucose Control: none     Lines/Drains/Airway:  Trach, 6-0 cuffed shiley  PEG  2x neck MARU drains, 1x anterior chest MARU     Dispo/Code Status/Palliative:   - SICU for q2h neurovascular monitoring and airway monitoring, possible step down later today.  - Full Code

## 2024-03-21 NOTE — PLAN OF CARE
SICU PLAN OF CARE    Dx: Oral bleeding    Goals of Care: -140, wean O2 as tolerated, bolus feeds as tolerated    Vital Signs (last 12 hours):   Temp:  [97.1 °F (36.2 °C)-98.6 °F (37 °C)]   Pulse:  []   Resp:  [16-53]   BP: ()/(54-67)   SpO2:  [98 %-100 %]      Neuro: AAOx4    Cardiac: NSR    Respiratory: Tracheostomy Collar; 5L, 28% FiO2    Urine Output: Voids Spontaneously  300 mL/shift    Drains: G tube    Diet: NPO, Tube feeds bolus     Labs/Accuchecks: Daily labs    Skin:  No new signs of skin breakdown. Patient turned q2h, bony prominences protected, and mattress inflated/working correctly.   Pedro Score: 22. If Pedro Score is 16 or less, complete 4EYES note each shift.    Shift Events:  Pt ambulated to the bathroom. Plan for tongue debridement surgery tomorrow; anesthesia and surgery consent have both been signed. Pt and spouse have been educated on the procedure. See flowsheet for further assessment/details.  Family updated on current condition/plan of care, questions answered, and emotional support provided.  MD updated on current condition, vitals, labs, and gtts.

## 2024-03-21 NOTE — ASSESSMENT & PLAN NOTE
Malnutrition Type:  Context: chronic illness  Level: moderate    Related to (etiology):   Oropharyngeal mass; tongue CA    Signs and Symptoms (as evidenced by):   Mild muscle /fat wasting and -7% x 1mo and -10% x 6mo     Malnutrition Characteristic Summary:  Weight Loss (Malnutrition):  (-7% x 1mo and -10% x 6mo)  Subcutaneous Fat (Malnutrition): mild depletion  Muscle Mass (Malnutrition): mild depletion      Interventions/Recommendations (treatment strategy):  Collaboration of nutritional care with other providers.  EN    Nutrition Diagnosis Status:   New

## 2024-03-21 NOTE — SUBJECTIVE & OBJECTIVE
Interval History/Significant Events: AF, slightly hypotensive overnight with systolics in the 90's. Received 500 cc bolus overnight. Refused 1 unit of bleed yesterday evening for a Hg of 5.7. Currently receiving 1 unit for Hg of 6. On 5L trach collar.       Follow-up For: Procedure(s) (LRB):  EMBOLIZATION, BLOOD VESSEL (N/A)    Post-Operative Day: 1 Day Post-Op    Objective:     Vital Signs (Most Recent):  Temp: 97.1 °F (36.2 °C) (03/21/24 0701)  Pulse: 78 (03/21/24 0715)  Resp: (!) 23 (03/21/24 0715)  BP: (!) 102/58 (03/21/24 0701)  SpO2: 100 % (03/21/24 0715) Vital Signs (24h Range):  Temp:  [97.1 °F (36.2 °C)-98.5 °F (36.9 °C)] 97.1 °F (36.2 °C)  Pulse:  [] 78  Resp:  [7-63] 23  SpO2:  [87 %-100 %] 100 %  BP: ()/(51-65) 102/58     Weight: 73.9 kg (162 lb 14.7 oz)  Body mass index is 22.1 kg/m².      Intake/Output Summary (Last 24 hours) at 3/21/2024 0743  Last data filed at 3/21/2024 0600  Gross per 24 hour   Intake 2315.57 ml   Output 990 ml   Net 1325.57 ml          Physical Exam  Constitutional:       General: He is not in acute distress.  Cardiovascular:      Rate and Rhythm: Normal rate and regular rhythm.      Pulses: Normal pulses.      Comments: R flap donor site c/d/i  Pulmonary:      Effort: Pulmonary effort is normal. No respiratory distress.      Comments: Trach in place, on RA  Dried blood surrounding mouth and trach site  No active bleeding  L neck MARU drain w/ minimal output, to bulb suction  R neck drain w/o active oozing  Abdominal:      General: There is no distension.      Palpations: Abdomen is soft.      Tenderness: There is no abdominal tenderness.      Comments: PEG   Genitourinary:     Comments: R groin access site soft w/o ecchymosis  Skin:     General: Skin is warm and dry.   Neurological:      General: No focal deficit present.      Mental Status: He is alert and oriented to person, place, and time.            Vents:  Oxygen Concentration (%): 28 (03/21/24  0701)    Lines/Drains/Airways       Drain  Duration                  Closed/Suction Drain 02/23/24 1414 Tube - 3 Left;Anterior Neck Bulb 15 Fr. 26 days         Gastrostomy/Enterostomy 02/23/24 0750 Percutaneous endoscopic gastrostomy (PEG) LUQ feeding 26 days              Airway  Duration             Adult Surgical Airway 03/01/24 0700 Shiley Uncuffed 6.0/ 75mm 19 days              Peripheral Intravenous Line  Duration                  Peripheral IV - Single Lumen 03/20/24 0218 20 G Anterior;Left Forearm 1 day         Peripheral IV - Single Lumen 03/20/24 0219 18 G Right Antecubital 1 day                    Significant Labs:    CBC/Anemia Profile:  Recent Labs   Lab 03/20/24 2007 03/21/24  0330 03/21/24  0406   WBC 13.81* 8.67 8.85   HGB 7.8* 5.7* 6.0*   HCT 24.8* 18.4* 19.2*   * 379 401   MCV 95 97 96   RDW 13.9 13.9 13.9        Chemistries:  Recent Labs   Lab 03/20/24 0525 03/21/24  0330 03/21/24  0406   * 137 136   K 4.9 4.2 4.3    106 104   CO2 23 25 27   BUN 18 19 20   CREATININE 0.8 0.8 0.7   CALCIUM 8.3* 8.2* 8.4*   ALBUMIN 2.2* 2.1* 2.2*   PROT 5.2* 4.8* 5.1*   BILITOT 0.2 0.1 0.2   ALKPHOS 82 70 80   ALT 43 33 35   AST 21 16 18   MG 1.8 2.0 2.1   PHOS 4.0 3.9 4.0       All pertinent labs within the past 24 hours have been reviewed.    Significant Imaging:  I have reviewed all pertinent imaging results/findings within the past 24 hours.

## 2024-03-21 NOTE — PROGRESS NOTES
[facility]  Otorhinolaryngology-Head & Neck Surgery  Progress Note    Subjective:     Post-Op Info:  Procedure(s) (LRB):  EMBOLIZATION, BLOOD VESSEL (N/A)   1 Day Post-Op  Hospital Day: 2     Interval History: Patient doing okay this morning. Receiving blood transfusion this morning.    Medications:  Continuous Infusions:  Scheduled Meds:   atorvastatin  80 mg Per G Tube Daily    enoxparin  40 mg Subcutaneous Q24H (prophylaxis, 1700)    ferrous sulfate  300 mg Per G Tube Every other day    silodosin  4 mg Per G Tube Daily     PRN Meds:acetaminophen, guaiFENesin 100 mg/5 ml, magnesium oxide, magnesium oxide, melatonin, ondansetron, oxyCODONE, potassium bicarbonate, potassium bicarbonate, potassium bicarbonate, potassium, sodium phosphates, potassium, sodium phosphates, potassium, sodium phosphates     Review of patient's allergies indicates:  No Known Allergies  Objective:     Vital Signs (24h Range):  Temp:  [97.1 °F (36.2 °C)-98.5 °F (36.9 °C)] 97.1 °F (36.2 °C)  Pulse:  [] 85  Resp:  [11-63] 30  SpO2:  [87 %-100 %] 100 %  BP: ()/(51-65) 130/63     Date 03/21/24 0700 - 03/22/24 0659   Shift 8784-7737 3256-9599 5654-2585 24 Hour Total   INTAKE   Blood 310   310   NG/GT 30   30   Shift Total(mL/kg) 340(4.6)   340(4.6)   OUTPUT   Shift Total(mL/kg)       Weight (kg) 73.9 73.9 73.9 73.9     Lines/Drains/Airways       Drain  Duration                  Gastrostomy/Enterostomy 02/23/24 0750 Percutaneous endoscopic gastrostomy (PEG) LUQ feeding 27 days         Closed/Suction Drain 02/23/24 1414 Tube - 3 Left;Anterior Neck Bulb 15 Fr. 26 days              Airway  Duration             Adult Surgical Airway 03/01/24 0700 Ralph Uncuffed 6.0/ 75mm 20 days              Peripheral Intravenous Line  Duration                  Peripheral IV - Single Lumen 03/20/24 0218 20 G Anterior;Left Forearm 1 day         Peripheral IV - Single Lumen 03/20/24 0219 18 G Right Antecubital 1 day                      Physical Exam  NAD, communicates with writing board  Native tongue edematous and congested  No bleeding from nose  6-0 cuffed trach in place, secured w soft collar. No peristomal bleeding.   Neck with doughy edema, post radiation  Neck incision intact with staples  Chest incision intact with staples  Chest is soft, right bulk tunneling to neck 2/2 pec flap, soft to palpation, no fluctuance      Significant Labs:  CBC:   Recent Labs   Lab 03/21/24  0406   WBC 8.85   RBC 2.01*   HGB 6.0*   HCT 19.2*      MCV 96   MCH 29.9   MCHC 31.3*     CMP:   Recent Labs   Lab 03/21/24  0406      CALCIUM 8.4*   ALBUMIN 2.2*   PROT 5.1*      K 4.3   CO2 27      BUN 20   CREATININE 0.7   ALKPHOS 80   ALT 35   AST 18   BILITOT 0.2       Significant Diagnostics:  I have reviewed and interpreted all pertinent imaging results/findings within the past 24 hours.  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 Small bleeding episode 3/19 but no source identified. Presents again 3/20 with large volume bleed. CTA Neck concerning for left ECA system bleed. Patient currently HDS, no active bleeding. Now s/p embolization with IR, no further bleeding.    - Recommend keep tracheostomy cuff inflated at all times, overinflate if starts to rebleed  - Plan for surgery for debridement and likely free flap tomorrow  - Hold tube feeds at midnight  - NPO  - Please page w questions or concerns        Trixie Sandy MD  Otorhinolaryngology-Head & Neck Surgery  [facility]

## 2024-03-22 ENCOUNTER — ANESTHESIA (OUTPATIENT)
Dept: SURGERY | Facility: HOSPITAL | Age: 69
DRG: 908 | End: 2024-03-22
Payer: MEDICARE

## 2024-03-22 LAB
ALBUMIN SERPL BCP-MCNC: 2.3 G/DL (ref 3.5–5.2)
ALP SERPL-CCNC: 75 U/L (ref 55–135)
ALT SERPL W/O P-5'-P-CCNC: 31 U/L (ref 10–44)
ANION GAP SERPL CALC-SCNC: 6 MMOL/L (ref 8–16)
AST SERPL-CCNC: 17 U/L (ref 10–40)
BASOPHILS # BLD AUTO: 0.06 K/UL (ref 0–0.2)
BASOPHILS NFR BLD: 0.6 % (ref 0–1.9)
BILIRUB SERPL-MCNC: 0.3 MG/DL (ref 0.1–1)
BUN SERPL-MCNC: 21 MG/DL (ref 8–23)
CALCIUM SERPL-MCNC: 8.7 MG/DL (ref 8.7–10.5)
CHLORIDE SERPL-SCNC: 105 MMOL/L (ref 95–110)
CO2 SERPL-SCNC: 25 MMOL/L (ref 23–29)
CREAT SERPL-MCNC: 0.7 MG/DL (ref 0.5–1.4)
DIFFERENTIAL METHOD BLD: ABNORMAL
EOSINOPHIL # BLD AUTO: 0.2 K/UL (ref 0–0.5)
EOSINOPHIL NFR BLD: 2.1 % (ref 0–8)
ERYTHROCYTE [DISTWIDTH] IN BLOOD BY AUTOMATED COUNT: 14.8 % (ref 11.5–14.5)
EST. GFR  (NO RACE VARIABLE): >60 ML/MIN/1.73 M^2
GLUCOSE SERPL-MCNC: 106 MG/DL (ref 70–110)
HCT VFR BLD AUTO: 24.2 % (ref 40–54)
HGB BLD-MCNC: 7.7 G/DL (ref 14–18)
IMM GRANULOCYTES # BLD AUTO: 0.15 K/UL (ref 0–0.04)
IMM GRANULOCYTES NFR BLD AUTO: 1.6 % (ref 0–0.5)
LYMPHOCYTES # BLD AUTO: 1.1 K/UL (ref 1–4.8)
LYMPHOCYTES NFR BLD: 11.1 % (ref 18–48)
MAGNESIUM SERPL-MCNC: 2.1 MG/DL (ref 1.6–2.6)
MCH RBC QN AUTO: 29.5 PG (ref 27–31)
MCHC RBC AUTO-ENTMCNC: 31.8 G/DL (ref 32–36)
MCV RBC AUTO: 93 FL (ref 82–98)
MONOCYTES # BLD AUTO: 1.1 K/UL (ref 0.3–1)
MONOCYTES NFR BLD: 11.2 % (ref 4–15)
NEUTROPHILS # BLD AUTO: 7 K/UL (ref 1.8–7.7)
NEUTROPHILS NFR BLD: 73.4 % (ref 38–73)
NRBC BLD-RTO: 0 /100 WBC
PHOSPHATE SERPL-MCNC: 4 MG/DL (ref 2.7–4.5)
PLATELET # BLD AUTO: 370 K/UL (ref 150–450)
PMV BLD AUTO: 10.4 FL (ref 9.2–12.9)
POTASSIUM SERPL-SCNC: 4.5 MMOL/L (ref 3.5–5.1)
PROT SERPL-MCNC: 5.4 G/DL (ref 6–8.4)
RBC # BLD AUTO: 2.61 M/UL (ref 4.6–6.2)
SODIUM SERPL-SCNC: 136 MMOL/L (ref 136–145)
WBC # BLD AUTO: 9.49 K/UL (ref 3.9–12.7)

## 2024-03-22 PROCEDURE — 25000003 PHARM REV CODE 250

## 2024-03-22 PROCEDURE — 94761 N-INVAS EAR/PLS OXIMETRY MLT: CPT

## 2024-03-22 PROCEDURE — 63600175 PHARM REV CODE 636 W HCPCS: Performed by: NURSE ANESTHETIST, CERTIFIED REGISTERED

## 2024-03-22 PROCEDURE — 99900026 HC AIRWAY MAINTENANCE (STAT)

## 2024-03-22 PROCEDURE — 25000003 PHARM REV CODE 250: Performed by: NURSE ANESTHETIST, CERTIFIED REGISTERED

## 2024-03-22 PROCEDURE — 85025 COMPLETE CBC W/AUTO DIFF WBC: CPT

## 2024-03-22 PROCEDURE — D9220A PRA ANESTHESIA: Mod: CRNA,,, | Performed by: NURSE ANESTHETIST, CERTIFIED REGISTERED

## 2024-03-22 PROCEDURE — 25000003 PHARM REV CODE 250: Performed by: OTOLARYNGOLOGY

## 2024-03-22 PROCEDURE — 36620 INSERTION CATHETER ARTERY: CPT | Mod: 59,,, | Performed by: ANESTHESIOLOGY

## 2024-03-22 PROCEDURE — 11000001 HC ACUTE MED/SURG PRIVATE ROOM

## 2024-03-22 PROCEDURE — 83735 ASSAY OF MAGNESIUM: CPT

## 2024-03-22 PROCEDURE — 11043 DBRDMT MUSC&/FSCA 1ST 20/<: CPT | Mod: ,,, | Performed by: OTOLARYNGOLOGY

## 2024-03-22 PROCEDURE — 37000009 HC ANESTHESIA EA ADD 15 MINS: Performed by: OTOLARYNGOLOGY

## 2024-03-22 PROCEDURE — 37000008 HC ANESTHESIA 1ST 15 MINUTES: Performed by: OTOLARYNGOLOGY

## 2024-03-22 PROCEDURE — 99900035 HC TECH TIME PER 15 MIN (STAT)

## 2024-03-22 PROCEDURE — 63600175 PHARM REV CODE 636 W HCPCS

## 2024-03-22 PROCEDURE — 0CB7XZZ EXCISION OF TONGUE, EXTERNAL APPROACH: ICD-10-PCS | Performed by: OTOLARYNGOLOGY

## 2024-03-22 PROCEDURE — 36000707: Performed by: OTOLARYNGOLOGY

## 2024-03-22 PROCEDURE — 0WJ60ZZ INSPECTION OF NECK, OPEN APPROACH: ICD-10-PCS | Performed by: OTOLARYNGOLOGY

## 2024-03-22 PROCEDURE — 88309 TISSUE EXAM BY PATHOLOGIST: CPT | Performed by: PATHOLOGY

## 2024-03-22 PROCEDURE — 88309 TISSUE EXAM BY PATHOLOGIST: CPT | Mod: 26,,, | Performed by: PATHOLOGY

## 2024-03-22 PROCEDURE — 86920 COMPATIBILITY TEST SPIN: CPT | Performed by: ANESTHESIOLOGY

## 2024-03-22 PROCEDURE — D9220A PRA ANESTHESIA: Mod: ANES,,, | Performed by: ANESTHESIOLOGY

## 2024-03-22 PROCEDURE — 27000221 HC OXYGEN, UP TO 24 HOURS

## 2024-03-22 PROCEDURE — 88305 TISSUE EXAM BY PATHOLOGIST: CPT | Performed by: PATHOLOGY

## 2024-03-22 PROCEDURE — 36000706: Performed by: OTOLARYNGOLOGY

## 2024-03-22 PROCEDURE — 99233 SBSQ HOSP IP/OBS HIGH 50: CPT | Mod: GC,,, | Performed by: SURGERY

## 2024-03-22 PROCEDURE — 80053 COMPREHEN METABOLIC PANEL: CPT

## 2024-03-22 PROCEDURE — 84100 ASSAY OF PHOSPHORUS: CPT

## 2024-03-22 PROCEDURE — 27201423 OPTIME MED/SURG SUP & DEVICES STERILE SUPPLY: Performed by: OTOLARYNGOLOGY

## 2024-03-22 PROCEDURE — 27201037 HC PRESSURE MONITORING SET UP

## 2024-03-22 RX ORDER — PROPOFOL 10 MG/ML
VIAL (ML) INTRAVENOUS
Status: DISCONTINUED | OUTPATIENT
Start: 2024-03-22 | End: 2024-03-22

## 2024-03-22 RX ORDER — LIDOCAINE HYDROCHLORIDE 20 MG/ML
INJECTION INTRAVENOUS
Status: DISCONTINUED | OUTPATIENT
Start: 2024-03-22 | End: 2024-03-22

## 2024-03-22 RX ORDER — ROCURONIUM BROMIDE 10 MG/ML
INJECTION, SOLUTION INTRAVENOUS
Status: DISCONTINUED | OUTPATIENT
Start: 2024-03-22 | End: 2024-03-22

## 2024-03-22 RX ORDER — ONDANSETRON HYDROCHLORIDE 2 MG/ML
INJECTION, SOLUTION INTRAVENOUS
Status: DISCONTINUED | OUTPATIENT
Start: 2024-03-22 | End: 2024-03-22

## 2024-03-22 RX ORDER — DEXAMETHASONE SODIUM PHOSPHATE 4 MG/ML
INJECTION, SOLUTION INTRA-ARTICULAR; INTRALESIONAL; INTRAMUSCULAR; INTRAVENOUS; SOFT TISSUE
Status: DISCONTINUED | OUTPATIENT
Start: 2024-03-22 | End: 2024-03-22

## 2024-03-22 RX ORDER — FENTANYL CITRATE 50 UG/ML
INJECTION, SOLUTION INTRAMUSCULAR; INTRAVENOUS
Status: DISCONTINUED | OUTPATIENT
Start: 2024-03-22 | End: 2024-03-22

## 2024-03-22 RX ORDER — HYDROCODONE BITARTRATE AND ACETAMINOPHEN 500; 5 MG/1; MG/1
TABLET ORAL
Status: DISCONTINUED | OUTPATIENT
Start: 2024-03-22 | End: 2024-03-23

## 2024-03-22 RX ADMIN — ONDANSETRON 4 MG: 2 INJECTION INTRAMUSCULAR; INTRAVENOUS at 08:03

## 2024-03-22 RX ADMIN — ROCURONIUM BROMIDE 50 MG: 10 INJECTION, SOLUTION INTRAVENOUS at 07:03

## 2024-03-22 RX ADMIN — OXYCODONE 5 MG: 5 TABLET ORAL at 11:03

## 2024-03-22 RX ADMIN — FENTANYL CITRATE 50 MCG: 50 INJECTION, SOLUTION INTRAMUSCULAR; INTRAVENOUS at 08:03

## 2024-03-22 RX ADMIN — AMPICILLIN SODIUM AND SULBACTAM SODIUM 3 G: 2; 1 INJECTION, POWDER, FOR SOLUTION INTRAMUSCULAR; INTRAVENOUS at 08:03

## 2024-03-22 RX ADMIN — SUGAMMADEX 200 MG: 100 INJECTION, SOLUTION INTRAVENOUS at 08:03

## 2024-03-22 RX ADMIN — ATORVASTATIN CALCIUM 80 MG: 40 TABLET, FILM COATED ORAL at 10:03

## 2024-03-22 RX ADMIN — SODIUM CHLORIDE: 0.9 INJECTION, SOLUTION INTRAVENOUS at 07:03

## 2024-03-22 RX ADMIN — ENOXAPARIN SODIUM 40 MG: 40 INJECTION SUBCUTANEOUS at 04:03

## 2024-03-22 RX ADMIN — GUAIFENESIN 200 MG: 200 SOLUTION ORAL at 04:03

## 2024-03-22 RX ADMIN — LIDOCAINE HYDROCHLORIDE 100 MG: 20 INJECTION INTRAVENOUS at 07:03

## 2024-03-22 RX ADMIN — PROPOFOL 150 MG: 10 INJECTION, EMULSION INTRAVENOUS at 07:03

## 2024-03-22 RX ADMIN — DEXAMETHASONE SODIUM PHOSPHATE 8 MG: 4 INJECTION, SOLUTION INTRAMUSCULAR; INTRAVENOUS at 07:03

## 2024-03-22 RX ADMIN — SILODOSIN 4 MG: 4 CAPSULE ORAL at 10:03

## 2024-03-22 RX ADMIN — FENTANYL CITRATE 100 MCG: 50 INJECTION, SOLUTION INTRAMUSCULAR; INTRAVENOUS at 07:03

## 2024-03-22 RX ADMIN — MINERAL SUPPLEMENT IRON 300 MG / 5 ML STRENGTH LIQUID 100 PER BOX UNFLAVORED 300 MG: at 10:03

## 2024-03-22 NOTE — ASSESSMENT & PLAN NOTE
Neuro/Psych:   - Sedation: none  - Pain:     - Tylenol 650 q6h PRN    - Norco 5-325 q4h PRN            Cardiac:   - BP Goal: -140 / DBP   - Pressors: None  - Rhythm: NSR     - Anti-HTNs: Will resume as appropriate   - Resumed home atorvastatin    Pulmonary:   - Goal SpO2 >92%  - ABGs PRN     Renal:  Recent Labs   Lab 03/21/24  0330 03/21/24  0406 03/22/24  0240   BUN 19 20 21   CREATININE 0.8 0.7 0.7     - Trend BUN/Cr , Cr stable at 0.7  -  Record strict Is/Os  - Voiding spontaneously after birmingham removal    FEN / GI:   - Daily CMP, PRN K/Mag/Phos per protocol   - Replace electrolytes as needed  - Nutrition: NPO  - Bowel Regimen: none     ID:   - Afebrile  Recent Labs   Lab 03/21/24  0330 03/21/24  0406 03/22/24  0240   WBC 8.67 8.85 9.49     - Abx: none    Heme/Onc:   Recent Labs   Lab 03/18/24 2125 03/20/24  0056 03/21/24  0330 03/21/24  0406 03/22/24  0240   HGB 9.0*   < > 5.7* 6.0* 7.7*   *   < > 379 401 370   APTT 28.2  --   --   --   --    INR 1.1  --   --   --   --     < > = values in this interval not displayed.     - S/p IR embolization of the L lingual artery  - CBC daily   - DVT ppx: Lovenox      Endocrine:   - CCM Goal BG <180  - POCT glucose frequency per protocol     PPx:   Feeding: NP, Getting TF through G tube   Analgesia/Sedation: PRNs available / n/a  Thromboembolic Prevention:  Lovenox  HOB >30: Yes  Stress Ulcer: none indicated  Glucose Control: none     Lines/Drains/Airway:  Trach, 6-0 cuffed shiley  PEG  1x neck MARU drains, 1x anterior chest MARU     Dispo/Code Status/Palliative:   - To the OR today with ENT   - Full Code

## 2024-03-22 NOTE — PLAN OF CARE
Returned to SICU s/p debridement of native tongue, neck washout, and neck packing with ENT in stable condition.     AF, VSS. Not on any pressors. Pain controlled. Stable for stepdown to the floor.      Marija Ramos MD  General Surgery, PGY-2

## 2024-03-22 NOTE — PROGRESS NOTES
[facility]  Critical Care - Surgery  Progress Note    Patient Name: Timothy GREENE Allday  MRN: [id number]  Admission Date: 3/20/2024  Hospital Length of Stay: 2 days  Code Status: Full Code  Attending Provider: Jeferson Ventura MD  Primary Care Provider: Young Lanier MD   Principal Problem: Oral bleeding    Subjective:     Hospital/ICU Course:  No notes on file    Interval History/Significant Events: AFVSS. NAEO. ON 5L Trach collar. BM yesterday. Urinating spontaneously after birmingham removal. TF held since yesterday. To the OR today with ENT.     Follow-up For: Procedure(s) (LRB):  EMBOLIZATION, BLOOD VESSEL (N/A)    Post-Operative Day: 2 Days Post-Op    Objective:     Vital Signs (Most Recent):  Temp: 98.6 °F (37 °C) (03/22/24 0300)  Pulse: 64 (03/22/24 0439)  Resp: 16 (03/22/24 0439)  BP: 109/60 (03/22/24 0400)  SpO2: 100 % (03/22/24 0439) Vital Signs (24h Range):  Temp:  [97.1 °F (36.2 °C)-99 °F (37.2 °C)] 98.6 °F (37 °C)  Pulse:  [] 64  Resp:  [14-53] 16  SpO2:  [98 %-100 %] 100 %  BP: ()/(53-73) 109/60     Weight: 73.9 kg (162 lb 14.7 oz)  Body mass index is 22.1 kg/m².      Intake/Output Summary (Last 24 hours) at 3/22/2024 0642  Last data filed at 3/22/2024 0300  Gross per 24 hour   Intake 2340 ml   Output 862 ml   Net 1478 ml          Physical Exam  Constitutional:       General: He is not in acute distress.  Cardiovascular:      Rate and Rhythm: Normal rate and regular rhythm.      Pulses: Normal pulses.      Comments: R flap donor site c/d/i  Pulmonary:      Effort: Pulmonary effort is normal. No respiratory distress.      Comments: Trach in place, on RA  Dried blood surrounding mouth and trach site  No active bleeding  L neck MARU drain w/ minimal output, to bulb suction  R neck drain w/o active oozing  Abdominal:      General: There is no distension.      Palpations: Abdomen is soft.      Tenderness: There is no abdominal tenderness.      Comments: PEG   Genitourinary:      Comments: R groin access site soft w/o ecchymosis  Skin:     General: Skin is warm and dry.   Neurological:      General: No focal deficit present.      Mental Status: He is alert and oriented to person, place, and time.            Vents:  Oxygen Concentration (%): 28 (03/22/24 0439)    Lines/Drains/Airways       Drain  Duration                  Closed/Suction Drain 02/23/24 1414 Tube - 3 Left;Anterior Neck Bulb 15 Fr. 27 days         Gastrostomy/Enterostomy 02/23/24 0750 Percutaneous endoscopic gastrostomy (PEG) LUQ feeding 27 days              Airway  Duration             Adult Surgical Airway 03/01/24 0700 Shiley Uncuffed 6.0/ 75mm 20 days              Peripheral Intravenous Line  Duration                  Peripheral IV - Single Lumen 03/20/24 0218 20 G Anterior;Left Forearm 2 days         Peripheral IV - Single Lumen 03/20/24 0219 18 G Right Antecubital 2 days                    Significant Labs:    CBC/Anemia Profile:  Recent Labs   Lab 03/21/24  0330 03/21/24  0406 03/22/24  0240   WBC 8.67 8.85 9.49   HGB 5.7* 6.0* 7.7*   HCT 18.4* 19.2* 24.2*    401 370   MCV 97 96 93   RDW 13.9 13.9 14.8*        Chemistries:  Recent Labs   Lab 03/21/24  0330 03/21/24  0406 03/22/24  0240    136 136   K 4.2 4.3 4.5    104 105   CO2 25 27 25   BUN 19 20 21   CREATININE 0.8 0.7 0.7   CALCIUM 8.2* 8.4* 8.7   ALBUMIN 2.1* 2.2* 2.3*   PROT 4.8* 5.1* 5.4*   BILITOT 0.1 0.2 0.3   ALKPHOS 70 80 75   ALT 33 35 31   AST 16 18 17   MG 2.0 2.1 2.1   PHOS 3.9 4.0 4.0       All pertinent labs within the past 24 hours have been reviewed.    Significant Imaging:  I have reviewed all pertinent imaging results/findings within the past 24 hours.  Assessment/Plan:     Oncology  Tongue cancer  Neuro/Psych:   - Sedation: none  - Pain:     - Tylenol 650 q6h PRN    - Norco 5-325 q4h PRN            Cardiac:   - BP Goal: -140 / DBP   - Pressors: None  - Rhythm: NSR     - Anti-HTNs: Will resume as appropriate   -  Resumed home atorvastatin    Pulmonary:   - Goal SpO2 >92%  - ABGs PRN     Renal:  Recent Labs   Lab 03/21/24  0330 03/21/24  0406 03/22/24  0240   BUN 19 20 21   CREATININE 0.8 0.7 0.7     - Trend BUN/Cr , Cr stable at 0.7  -  Record strict Is/Os  - Voiding spontaneously after b[place] removal    FEN / GI:   - Daily CMP, PRN K/Mag/Phos per protocol   - Replace electrolytes as needed  - Nutrition: NPO  - Bowel Regimen: none     ID:   - Afebrile  Recent Labs   Lab 03/21/24  0330 03/21/24  0406 03/22/24  0240   WBC 8.67 8.85 9.49     - Abx: none    Heme/Onc:   Recent Labs   Lab 03/18/24 2125 03/20/24  0056 03/21/24  0330 03/21/24  0406 03/22/24  0240   HGB 9.0*   < > 5.7* 6.0* 7.7*   *   < > 379 401 370   APTT 28.2  --   --   --   --    INR 1.1  --   --   --   --     < > = values in this interval not displayed.     - S/p IR embolization of the L lingual artery  - CBC daily   - DVT ppx: Lovenox      Endocrine:   - CCM Goal BG <180  - POCT glucose frequency per protocol     PPx:   Feeding: NP, Getting TF through G tube   Analgesia/Sedation: PRNs available / n/a  Thromboembolic Prevention:  Lovenox  HOB >30: Yes  Stress Ulcer: none indicated  Glucose Control: none     Lines/Drains/Airway:  Trach, 6-0 cuffed shiley  PEG  1x neck MARU drains, 1x anterior chest MARU     Dispo/Code Status/Palliative:   - To the OR today with ENT   - Full Code           Bro Hutton, DO  Critical Care - Surgery  [facility]

## 2024-03-22 NOTE — PLAN OF CARE
SICU PLAN OF CARE    Dx: Oral bleeding    Goals of Care: -160    Vital Signs (last 12 hours):   Temp:  [98.4 °F (36.9 °C)-99 °F (37.2 °C)]   Pulse:  [65-99]   Resp:  [14-46]   BP: ()/(53-73)   SpO2:  [99 %-100 %]      Neuro: AAOx4, Follows commands , and Moves all extremities spontaneously   Writing communications   Cardiac: NSR    Respiratory: Tracheostomy Collar; 5L, 28% FiO2    Urine Output: Voids Spontaneously  550 mL/shift    Drains: MARU #1, total output 12mL /shift    Diet: NPO      Labs/Accuchecks: Daily labs/no acccuchecks    Skin:  Neck dissection and R Pec flap noted with staples intact to both.  Patient able to weight shift and turn independently, bony prominences protected, and mattress inflated/working correctly.   Pedro Score: 22. If Pedro Score is 16 or less, complete 4EYES note each shift.    Shift Events:  Prepped for OR.  See flowsheet for further assessment/details.  Family updated on current condition/plan of care, questions answered, and emotional support provided.  MD updated on current condition, vitals, labs, and gtts.

## 2024-03-22 NOTE — TRANSFER OF CARE
Anesthesia Transfer of Care Note    Patient: Timothy GREENE Rudolph    Procedure(s) Performed: Procedure(s) (LRB):  DEBRIDEMENT, WOUND (N/A)    Patient location: ICU    Anesthesia Type: general    Transport from OR: Upon arrival to PACU/ICU, patient attached to 100% O2 by T-piece with adequate spontaneous ventilation. Continuous ECG monitoring in transport. Continuous SpO2 monitoring in transport. Continuos invasive BP monitoring in transport    Post pain: adequate analgesia    Post assessment: tolerated procedure well and no apparent anesthetic complications    Post vital signs: stable    Level of consciousness: awake and alert    Nausea/Vomiting: no nausea/vomiting    Complications: none    Transfer of care protocol was followed    Last vitals: Visit Vitals  /67   Pulse 75   Temp 37 °C (98.6 °F) (Axillary)   Resp 16   Ht 6' (1.829 m)   Wt 73.9 kg (162 lb 14.7 oz)   SpO2 99%   BMI 22.10 kg/m²

## 2024-03-22 NOTE — SUBJECTIVE & OBJECTIVE
Interval History/Significant Events: AFVSS. NAEO. ON 5L Trach collar. BM yesterday. Urinating spontaneously after birmingham removal. TF held since yesterday. To the OR today with ENT.     Follow-up For: Procedure(s) (LRB):  EMBOLIZATION, BLOOD VESSEL (N/A)    Post-Operative Day: 2 Days Post-Op    Objective:     Vital Signs (Most Recent):  Temp: 98.6 °F (37 °C) (03/22/24 0300)  Pulse: 64 (03/22/24 0439)  Resp: 16 (03/22/24 0439)  BP: 109/60 (03/22/24 0400)  SpO2: 100 % (03/22/24 0439) Vital Signs (24h Range):  Temp:  [97.1 °F (36.2 °C)-99 °F (37.2 °C)] 98.6 °F (37 °C)  Pulse:  [] 64  Resp:  [14-53] 16  SpO2:  [98 %-100 %] 100 %  BP: ()/(53-73) 109/60     Weight: 73.9 kg (162 lb 14.7 oz)  Body mass index is 22.1 kg/m².      Intake/Output Summary (Last 24 hours) at 3/22/2024 0642  Last data filed at 3/22/2024 0300  Gross per 24 hour   Intake 2340 ml   Output 862 ml   Net 1478 ml          Physical Exam  Constitutional:       General: He is not in acute distress.  Cardiovascular:      Rate and Rhythm: Normal rate and regular rhythm.      Pulses: Normal pulses.      Comments: R flap donor site c/d/i  Pulmonary:      Effort: Pulmonary effort is normal. No respiratory distress.      Comments: Trach in place, on RA  Dried blood surrounding mouth and trach site  No active bleeding  L neck MARU drain w/ minimal output, to bulb suction  R neck drain w/o active oozing  Abdominal:      General: There is no distension.      Palpations: Abdomen is soft.      Tenderness: There is no abdominal tenderness.      Comments: PEG   Genitourinary:     Comments: R groin access site soft w/o ecchymosis  Skin:     General: Skin is warm and dry.   Neurological:      General: No focal deficit present.      Mental Status: He is alert and oriented to person, place, and time.            Vents:  Oxygen Concentration (%): 28 (03/22/24 0439)    Lines/Drains/Airways       Drain  Duration                  Closed/Suction Drain 02/23/24 5204 Tube -  3 Left;Anterior Neck Bulb 15 Fr. 27 days         Gastrostomy/Enterostomy 02/23/24 0750 Percutaneous endoscopic gastrostomy (PEG) LUQ feeding 27 days              Airway  Duration             Adult Surgical Airway 03/01/24 0700 Ralph Uncuffed 6.0/ 75mm 20 days              Peripheral Intravenous Line  Duration                  Peripheral IV - Single Lumen 03/20/24 0218 20 G Anterior;Left Forearm 2 days         Peripheral IV - Single Lumen 03/20/24 0219 18 G Right Antecubital 2 days                    Significant Labs:    CBC/Anemia Profile:  Recent Labs   Lab 03/21/24  0330 03/21/24  0406 03/22/24  0240   WBC 8.67 8.85 9.49   HGB 5.7* 6.0* 7.7*   HCT 18.4* 19.2* 24.2*    401 370   MCV 97 96 93   RDW 13.9 13.9 14.8*        Chemistries:  Recent Labs   Lab 03/21/24  0330 03/21/24  0406 03/22/24  0240    136 136   K 4.2 4.3 4.5    104 105   CO2 25 27 25   BUN 19 20 21   CREATININE 0.8 0.7 0.7   CALCIUM 8.2* 8.4* 8.7   ALBUMIN 2.1* 2.2* 2.3*   PROT 4.8* 5.1* 5.4*   BILITOT 0.1 0.2 0.3   ALKPHOS 70 80 75   ALT 33 35 31   AST 16 18 17   MG 2.0 2.1 2.1   PHOS 3.9 4.0 4.0       All pertinent labs within the past 24 hours have been reviewed.    Significant Imaging:  I have reviewed all pertinent imaging results/findings within the past 24 hours.

## 2024-03-22 NOTE — ANESTHESIA POSTPROCEDURE EVALUATION
Anesthesia Post Evaluation    Patient: Timothy Galdamez    Procedure(s) Performed: Procedure(s) (LRB):  DEBRIDEMENT, WOUND (N/A)    Final Anesthesia Type: general      Patient location during evaluation: ICU  Patient participation: Yes- Able to Participate  Level of consciousness: awake  Post-procedure vital signs: reviewed and stable  Pain management: adequate  Airway patency: patent    PONV status at discharge: No PONV  Anesthetic complications: no      Cardiovascular status: hemodynamically stable  Respiratory status: spontaneous ventilation and Tracheostomy  Hydration status: euvolemic  Follow-up not needed.              Vitals Value Taken Time   /68 03/22/24 1201   Temp 36.7 °C (98 °F) 03/22/24 1115   Pulse 77 03/22/24 1238   Resp 22 03/22/24 1238   SpO2 98 % 03/22/24 1238   Vitals shown include unvalidated device data.      No case tracking events are documented in the log.      Pain/Stephy Score: Pain Rating Prior to Med Admin: 7 (3/22/2024 11:18 AM)  Pain Rating Post Med Admin: 0 (3/22/2024 12:16 AM)

## 2024-03-22 NOTE — OP NOTE
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 man known to me for history of squamous cell carcinoma of the base of tongue.  He is roughly 1 month status post salvage surgery with pectoralis flap reconstruction.  Recently, he presented with evidence of necrosis of the need of oral tongue.  He was admitted earlier this week for hemoptysis and underwent embolization of the lingual artery with Interventional Radiology.  He presents today for debridement of the nonviable tissue with possible flap reconstruction.    He was apprised of the risks, benefits and alternatives to surgery.  In spite of the risk inherent to surgery,he provided informed consent for the aforementioned procedures.     PROCEDURE IN DETAIL:  The patient was taken to the operating room and placed on the operating table in the supine position.  General endotracheal anesthesia was induced by the anesthesia team.     The right thigh was addressed in preparation for possible anterolateral thigh free flap.  The relevant landmarks for the flap were marked and 2 perforators were auscultated roughly FDC along the line between the anterior superior iliac spine and superolateral border of the patella.  An approximately 8 x 10 cm skin paddle was marked centered on the perforators.  The right lower extremity was prepped and draped in the standard sterile fashion as was the face, neck and chest.      To begin, the oral cavity was exposed.  The necrotic oral tongue was grasped with an  Allis clamp and sharply cut away from the surrounding tissues with face-lift scissors.  It was sent to pathology for permanent analysis.  The remaining base of tongue was noted to bleed well and was viable in appearance.  There was no obvious fistula into the neck.  Preoperatively, his drains were consistent with salivary output so I felt that it would be necessary to divert any small fistula that may be present.  As such, the central aspect of the neck incision was opened and blunt dissection proceeded up to the level of the mandible.  A small cavity was entered without appreciable fluid being drained.  This was irrigated with copious amounts of normal saline and suctioned dry.  It was then packed with saline soaked plain gauze.  A sterile dressing was then applied to the neck.    Once the dressing was applied, he was handed back to anesthesia.  He he was awakened and transferred to ICU in satisfactory condition.    There were no intraoperative complications.  I was present for and participated in the entire procedure as dictated above.       ESTIMATED BLOOD LOSS:  Minimal    SPECIMENS:   Specimen (24h ago, onward)       Start     Ordered    03/22/24 0902  Specimen to Pathology, Surgery ENT  Once        Comments: Pre-op Diagnosis: Tongue cancer [C02.9]Procedure(s):DEBRIDEMENT, WOUND Number of specimens: 1Name of specimens: 1. Remaining portion of tongue, permanent     References:    Click here for ordering Quick Tip   Question Answer Comment   Procedure Type: ENT    Specimen Class: Known or suspected malignancy    Which provider would you like to cc? MATTHIEU CALL    Release to patient Immediate        03/22/24 0902

## 2024-03-22 NOTE — BRIEF OP NOTE
[facility]  Brief Operative Note    SUMMARY     Surgery Date: 3/22/2024     Surgeon(s) and Role:     * Matthieu Call MD - Primary     * Trixie Sandy MD - Resident - Assisting        Pre-op Diagnosis:  Tongue cancer [C02.9]    Post-op Diagnosis:  Post-Op Diagnosis Codes:     * Tongue cancer [C02.9]    Procedure(s) (LRB):  DEBRIDEMENT, WOUND (N/A)    Anesthesia: General    Implants:  * No implants in log *    Operative Findings: Debridement of native tongue, neck washout, packing placed to left neck    Estimated Blood Loss: * No values recorded between 3/22/2024  8:27 AM and 3/22/2024  9:04 AM *    Estimated Blood Loss has not been documented. EBL = 5 mL.         Specimens:   Specimen (24h ago, onward)       Start     Ordered    03/22/24 0902  Specimen to Pathology, Surgery ENT  Once        Comments: Pre-op Diagnosis: Tongue cancer [C02.9]Procedure(s):DEBRIDEMENT, WOUND Number of specimens: 1Name of specimens: 1. Remaining portion of tongue, permanent     References:    Click here for ordering Quick Tip   Question Answer Comment   Procedure Type: ENT    Specimen Class: Known or suspected malignancy    Which provider would you like to cc? MATTHIEU CALL    Release to patient Immediate        03/22/24 0902                    [id number]

## 2024-03-22 NOTE — ANESTHESIA PROCEDURE NOTES
Intubation    Date/Time: 3/22/2024 7:55 AM    Performed by: Trace Zapata CRNA  Authorized by: Trace Zapata CRNA    Intubation:     Induction:  Intravenous    Intubated:  Postinduction    Mask Ventilation:  Not attempted    Attempts:  1    Attempted By:  CRNA    Method of Intubation:  Other (see comments) (Trach exchange.)    Difficult Airway Encountered?: No      Complications:  None    Airway Device:  Coil wire tube    Airway Device Size:  7.5    Style/Cuff Inflation:  Cuffed    Secured at:  The skin level of trach    Placement Verified By:  Capnometry    Complicating Factors:  None    Findings Post-Intubation:  BS equal bilateral and atraumatic/condition of teeth unchanged

## 2024-03-22 NOTE — SUBJECTIVE & OBJECTIVE
Interval History: No issues overnight. Patient ready for surgery this morning.    Medications:  Continuous Infusions:  Scheduled Meds:   atorvastatin  80 mg Per G Tube Daily    enoxparin  40 mg Subcutaneous Q24H (prophylaxis, 1700)    ferrous sulfate  300 mg Per G Tube Every other day    silodosin  4 mg Per G Tube Daily     PRN Meds:acetaminophen, guaiFENesin 100 mg/5 ml, magnesium oxide, magnesium oxide, melatonin, ondansetron, oxyCODONE, potassium bicarbonate, potassium bicarbonate, potassium bicarbonate, potassium, sodium phosphates, potassium, sodium phosphates, potassium, sodium phosphates     Review of patient's allergies indicates:  No Known Allergies  Objective:     Vital Signs (24h Range):  Temp:  [97.1 °F (36.2 °C)-99 °F (37.2 °C)] 98.6 °F (37 °C)  Pulse:  [] 64  Resp:  [14-53] 16  SpO2:  [98 %-100 %] 100 %  BP: ()/(53-73) 109/60       Lines/Drains/Airways       Drain  Duration                  Closed/Suction Drain 02/23/24 1414 Tube - 3 Left;Anterior Neck Bulb 15 Fr. 27 days         Gastrostomy/Enterostomy 02/23/24 0750 Percutaneous endoscopic gastrostomy (PEG) LUQ feeding 27 days              Airway  Duration             Adult Surgical Airway 03/01/24 0700 Shiley Uncuffed 6.0/ 75mm 20 days              Peripheral Intravenous Line  Duration                  Peripheral IV - Single Lumen 03/20/24 0218 20 G Anterior;Left Forearm 2 days         Peripheral IV - Single Lumen 03/20/24 0219 18 G Right Antecubital 2 days                     Physical Exam  NAD, communicates with writing board  Native tongue edematous and congested  No bleeding from nose  6-0 cuffed trach in place, secured w soft collar. No peristomal bleeding.   Neck with doughy edema, post radiation  Neck incision intact with staples  Chest incision intact with staples  Chest is soft, right bulk tunneling to neck 2/2 pec flap, soft to palpation, no fluctuance      Significant Labs:  CBC:   Recent Labs   Lab 03/22/24  0240   WBC 9.49    RBC 2.61*   HGB 7.7*   HCT 24.2*      MCV 93   MCH 29.5   MCHC 31.8*     CMP:   Recent Labs   Lab 03/22/24  0240      CALCIUM 8.7   ALBUMIN 2.3*   PROT 5.4*      K 4.5   CO2 25      BUN 21   CREATININE 0.7   ALKPHOS 75   ALT 31   AST 17   BILITOT 0.3       Significant Diagnostics:  None

## 2024-03-22 NOTE — NURSING
Pt up to SICU RM [id number]. Charge nurse and MD Richard notified of pts arrival. Pt connected to cardiac monitor. VSS. Pt on trach collar 5L 28%. Pressure dressing on R. Neck incision-CDI. Bed in lowest position, side rails x2, call light within reach. No new orders at this time. Plan of care ongoing.

## 2024-03-22 NOTE — PROGRESS NOTES
[facility]  Otorhinolaryngology-Head & Neck Surgery  Progress Note    Subjective:     Post-Op Info:  Procedure(s) (LRB):  EMBOLIZATION, BLOOD VESSEL (N/A)   2 Days Post-Op  Hospital Day: 3     Interval History: No issues overnight. Patient ready for surgery this morning.    Medications:  Continuous Infusions:  Scheduled Meds:   atorvastatin  80 mg Per G Tube Daily    enoxparin  40 mg Subcutaneous Q24H (prophylaxis, 1700)    ferrous sulfate  300 mg Per G Tube Every other day    silodosin  4 mg Per G Tube Daily     PRN Meds:acetaminophen, guaiFENesin 100 mg/5 ml, magnesium oxide, magnesium oxide, melatonin, ondansetron, oxyCODONE, potassium bicarbonate, potassium bicarbonate, potassium bicarbonate, potassium, sodium phosphates, potassium, sodium phosphates, potassium, sodium phosphates     Review of patient's allergies indicates:  No Known Allergies  Objective:     Vital Signs (24h Range):  Temp:  [97.1 °F (36.2 °C)-99 °F (37.2 °C)] 98.6 °F (37 °C)  Pulse:  [] 64  Resp:  [14-53] 16  SpO2:  [98 %-100 %] 100 %  BP: ()/(53-73) 109/60       Lines/Drains/Airways       Drain  Duration                  Closed/Suction Drain 02/23/24 1414 Tube - 3 Left;Anterior Neck Bulb 15 Fr. 27 days         Gastrostomy/Enterostomy 02/23/24 0750 Percutaneous endoscopic gastrostomy (PEG) LUQ feeding 27 days              Airway  Duration             Adult Surgical Airway 03/01/24 0700 Shiley Uncuffed 6.0/ 75mm 20 days              Peripheral Intravenous Line  Duration                  Peripheral IV - Single Lumen 03/20/24 0218 20 G Anterior;Left Forearm 2 days         Peripheral IV - Single Lumen 03/20/24 0219 18 G Right Antecubital 2 days                     Physical Exam  NAD, communicates with writing board  Native tongue edematous and congested  No bleeding from nose  6-0 cuffed trach in place, secured w soft collar. No peristomal bleeding.   Neck with doughy edema, post radiation  Neck incision intact  with staples  Chest incision intact with staples  Chest is soft, right bulk tunneling to neck 2/2 pec flap, soft to palpation, no fluctuance      Significant Labs:  CBC:   Recent Labs   Lab 03/22/24  0240   WBC 9.49   RBC 2.61*   HGB 7.7*   HCT 24.2*      MCV 93   MCH 29.5   MCHC 31.8*     CMP:   Recent Labs   Lab 03/22/24  0240      CALCIUM 8.7   ALBUMIN 2.3*   PROT 5.4*      K 4.5   CO2 25      BUN 21   CREATININE 0.7   ALKPHOS 75   ALT 31   AST 17   BILITOT 0.3       Significant Diagnostics:  None  Assessment/Plan:     * Oral bleeding  The patient is a 68 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 Small bleeding episode 3/19 but no source identified. Presents again 3/20 with large volume bleed. CTA Neck concerning for left ECA system bleed. Patient currently HDS, no active bleeding. Now s/p embolization with IR, no further bleeding.    - To OR today for debridement and likely free flap  - Back to SICU post-op  - Hold tube feeds        Trixie Sandy MD  Otorhinolaryngology-Head & Neck Surgery  [facility]

## 2024-03-22 NOTE — ANESTHESIA PROCEDURE NOTES
Intubation    Date/Time: 3/22/2024 8:57 AM    Performed by: Trace Zapata CRNA  Authorized by: Kelby Farr MD    Intubation:     Induction:  Intravenous    Intubated:  Postinduction    Mask Ventilation:  N/a    Attempts:  1    Attempted By:  CRNA    Method of Intubation:  Surgical tracheostomy    Difficult Airway Encountered?: No      Complications:  None    Airway Device:  Other (see comments) (Replacement of prior trach)    Airway Device Size:  6.0 (Trach)    Style/Cuff Inflation:  Cuffed    Secured at:  The skin level of trach    Placement Verified By:  Capnometry    Complicating Factors:  None    Findings Post-Intubation:  BS equal bilateral and atraumatic/condition of teeth unchanged  Notes:      Replacement of surgical trach that patient had prior to surgery. No issues. Pt sent back to ICU spon breathing on trach collar.

## 2024-03-22 NOTE — PLAN OF CARE
SICU PLAN OF CARE    Dx: Oral bleeding    Goals of Care: MAP >65    Vital Signs (last 12 hours):   Temp:  [97.5 °F (36.4 °C)-98.2 °F (36.8 °C)]   Pulse:  [68-93]   Resp:  [15-38]   BP: (102-151)/(53-75)   SpO2:  [97 %-100 %]   Arterial Line BP: (103-146)/(47-62)      Neuro: AAOx4, Follows commands , and Moves all extremities spontaneously     Cardiac: NSR    Respiratory: Tracheostomy Collar; 5L, 28% FiO2    Urine Output: Voids Spontaneously  950 mL/shift    Drains: G-Tube, total output 0mL/shift    Diet: Tube Feeds boluses    Skin:  All skin remains free from new injury, gauze dressing to neck-CDI, patient changes position independently, bony prominences protected, and mattress inflated/working correctly.   .  Shift Events:  Plan of care ongoing, VSS, no s/s of distress, bed in lowest position, side rails x2, call light within reach.  See flowsheet for further assessment/details.  Family updated on current condition/plan of care, questions answered, and emotional support provided.  MD updated on current condition, vitals, labs, and gtts.

## 2024-03-22 NOTE — NURSING
Fluid collection foaming just below the site of former R. MARU drain. Serosanguinous/purulent fluid oozing from remaining piece of MARU tubing. ENT resident MD Dayami notified. Stated he would come to beside and assess pt after critical ED visit. Pt resting comfortably with VSS. WCTM.

## 2024-03-22 NOTE — ASSESSMENT & PLAN NOTE
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 Small bleeding episode 3/19 but no source identified. Presents again 3/20 with large volume bleed. CTA Neck concerning for left ECA system bleed. Patient currently HDS, no active bleeding. Now s/p embolization with IR, no further bleeding.    - To OR today for debridement and likely free flap  - Back to SICU post-op  - Hold tube feeds

## 2024-03-22 NOTE — ANESTHESIA PROCEDURE NOTES
Arterial    Diagnosis: cancer of the tongue    Patient location during procedure: done in OR  Timeout: 3/22/2024 7:55 AM  Procedure end time: 3/22/2024 8:09 AM    Staffing  Authorizing Provider: Kelby Farr MD  Performing Provider: Kelby Farr MD    Staffing  Performed by: Kelby Farr MD  Authorized by: Kelby Farr MD    Anesthesiologist was present at the time of the procedure.    Preanesthetic Checklist  Completed: patient identified, IV checked, site marked, risks and benefits discussed, surgical consent, monitors and equipment checked, pre-op evaluation, timeout performed and anesthesia consent givenArterial  Skin Prep: chlorhexidine gluconate  Local Infiltration: none  Orientation: left  Location: radial    Catheter Size: 20 G  Catheter placement by Ultrasound guidance. Heme positive aspiration all ports.   Vessel Caliber: small, patent, compressibility normal  Needle advanced into vessel with real time Ultrasound guidance.  Guidewire confirmed in vessel.Insertion Attempts: 1  Assessment  Dressing: secured with tape and tegaderm  Patient: Tolerated well

## 2024-03-23 LAB
ALBUMIN SERPL BCP-MCNC: 2.2 G/DL (ref 3.5–5.2)
ALP SERPL-CCNC: 70 U/L (ref 55–135)
ALT SERPL W/O P-5'-P-CCNC: 27 U/L (ref 10–44)
ANION GAP SERPL CALC-SCNC: 7 MMOL/L (ref 8–16)
AST SERPL-CCNC: 14 U/L (ref 10–40)
BASOPHILS # BLD AUTO: 0.02 K/UL (ref 0–0.2)
BASOPHILS NFR BLD: 0.2 % (ref 0–1.9)
BILIRUB SERPL-MCNC: 0.2 MG/DL (ref 0.1–1)
BUN SERPL-MCNC: 19 MG/DL (ref 8–23)
CALCIUM SERPL-MCNC: 8.7 MG/DL (ref 8.7–10.5)
CHLORIDE SERPL-SCNC: 106 MMOL/L (ref 95–110)
CO2 SERPL-SCNC: 24 MMOL/L (ref 23–29)
CREAT SERPL-MCNC: 0.6 MG/DL (ref 0.5–1.4)
DIFFERENTIAL METHOD BLD: ABNORMAL
EOSINOPHIL # BLD AUTO: 0 K/UL (ref 0–0.5)
EOSINOPHIL NFR BLD: 0 % (ref 0–8)
ERYTHROCYTE [DISTWIDTH] IN BLOOD BY AUTOMATED COUNT: 14.5 % (ref 11.5–14.5)
EST. GFR  (NO RACE VARIABLE): >60 ML/MIN/1.73 M^2
GLUCOSE SERPL-MCNC: 127 MG/DL (ref 70–110)
HCT VFR BLD AUTO: 22.8 % (ref 40–54)
HGB BLD-MCNC: 7.3 G/DL (ref 14–18)
IMM GRANULOCYTES # BLD AUTO: 0.1 K/UL (ref 0–0.04)
IMM GRANULOCYTES NFR BLD AUTO: 1 % (ref 0–0.5)
LYMPHOCYTES # BLD AUTO: 0.8 K/UL (ref 1–4.8)
LYMPHOCYTES NFR BLD: 8.1 % (ref 18–48)
MAGNESIUM SERPL-MCNC: 2.1 MG/DL (ref 1.6–2.6)
MCH RBC QN AUTO: 29.7 PG (ref 27–31)
MCHC RBC AUTO-ENTMCNC: 32 G/DL (ref 32–36)
MCV RBC AUTO: 93 FL (ref 82–98)
MONOCYTES # BLD AUTO: 0.8 K/UL (ref 0.3–1)
MONOCYTES NFR BLD: 8.2 % (ref 4–15)
NEUTROPHILS # BLD AUTO: 8.2 K/UL (ref 1.8–7.7)
NEUTROPHILS NFR BLD: 82.5 % (ref 38–73)
NRBC BLD-RTO: 0 /100 WBC
PHOSPHATE SERPL-MCNC: 4.2 MG/DL (ref 2.7–4.5)
PLATELET # BLD AUTO: 371 K/UL (ref 150–450)
PMV BLD AUTO: 10.6 FL (ref 9.2–12.9)
POTASSIUM SERPL-SCNC: 4 MMOL/L (ref 3.5–5.1)
PROT SERPL-MCNC: 5.1 G/DL (ref 6–8.4)
RBC # BLD AUTO: 2.46 M/UL (ref 4.6–6.2)
SODIUM SERPL-SCNC: 137 MMOL/L (ref 136–145)
WBC # BLD AUTO: 9.88 K/UL (ref 3.9–12.7)

## 2024-03-23 PROCEDURE — 85025 COMPLETE CBC W/AUTO DIFF WBC: CPT

## 2024-03-23 PROCEDURE — 25000003 PHARM REV CODE 250

## 2024-03-23 PROCEDURE — 83735 ASSAY OF MAGNESIUM: CPT

## 2024-03-23 PROCEDURE — 99900035 HC TECH TIME PER 15 MIN (STAT)

## 2024-03-23 PROCEDURE — 84100 ASSAY OF PHOSPHORUS: CPT

## 2024-03-23 PROCEDURE — 99900026 HC AIRWAY MAINTENANCE (STAT)

## 2024-03-23 PROCEDURE — 94761 N-INVAS EAR/PLS OXIMETRY MLT: CPT

## 2024-03-23 PROCEDURE — 27200966 HC CLOSED SUCTION SYSTEM

## 2024-03-23 PROCEDURE — 27000221 HC OXYGEN, UP TO 24 HOURS

## 2024-03-23 PROCEDURE — 63600175 PHARM REV CODE 636 W HCPCS

## 2024-03-23 PROCEDURE — 20600001 HC STEP DOWN PRIVATE ROOM

## 2024-03-23 PROCEDURE — 80053 COMPREHEN METABOLIC PANEL: CPT

## 2024-03-23 PROCEDURE — 99233 SBSQ HOSP IP/OBS HIGH 50: CPT | Mod: GC,,, | Performed by: SURGERY

## 2024-03-23 RX ADMIN — ENOXAPARIN SODIUM 40 MG: 40 INJECTION SUBCUTANEOUS at 05:03

## 2024-03-23 RX ADMIN — Medication 6 MG: at 10:03

## 2024-03-23 RX ADMIN — SILODOSIN 4 MG: 4 CAPSULE ORAL at 09:03

## 2024-03-23 RX ADMIN — Medication 6 MG: at 12:03

## 2024-03-23 RX ADMIN — GUAIFENESIN 200 MG: 200 SOLUTION ORAL at 12:03

## 2024-03-23 RX ADMIN — GUAIFENESIN 200 MG: 200 SOLUTION ORAL at 10:03

## 2024-03-23 RX ADMIN — ACETAMINOPHEN 650 MG: 325 TABLET ORAL at 12:03

## 2024-03-23 RX ADMIN — ATORVASTATIN CALCIUM 80 MG: 40 TABLET, FILM COATED ORAL at 09:03

## 2024-03-23 RX ADMIN — ACETAMINOPHEN 650 MG: 325 TABLET ORAL at 10:03

## 2024-03-23 NOTE — SUBJECTIVE & OBJECTIVE
Interval History:   POD1 from debridement of native tongue. Doing well this am with no complaints. No further bleeding.    Medications:  Continuous Infusions:  Scheduled Meds:   atorvastatin  80 mg Per G Tube Daily    enoxparin  40 mg Subcutaneous Q24H (prophylaxis, 1700)    ferrous sulfate  300 mg Per G Tube Every other day    silodosin  4 mg Per G Tube Daily     PRN Meds:0.9%  NaCl infusion (for blood administration), acetaminophen, guaiFENesin 100 mg/5 ml, magnesium oxide, magnesium oxide, melatonin, ondansetron, oxyCODONE, potassium bicarbonate, potassium bicarbonate, potassium bicarbonate, potassium, sodium phosphates, potassium, sodium phosphates, potassium, sodium phosphates     Review of patient's allergies indicates:  No Known Allergies  Objective:     Vital Signs (24h Range):  Temp:  [97.3 °F (36.3 °C)-97.6 °F (36.4 °C)] 97.3 °F (36.3 °C)  Pulse:  [58-89] 83  Resp:  [0-60] 14  SpO2:  [87 %-100 %] 96 %  BP: (101-115)/(50-66) 105/58  Arterial Line BP: ()/(44-93) 128/58     Date 03/23/24 0700 - 03/24/24 0659   Shift 0916-3733 1890-1695 1128-4417 24 Hour Total   INTAKE   NG/GT 1275   1275   Shift Total(mL/kg) 1275(17.3)   1275(17.3)   OUTPUT   Shift Total(mL/kg)       Weight (kg) 73.9 73.9 73.9 73.9     Lines/Drains/Airways       Drain  Duration                  Gastrostomy/Enterostomy 02/23/24 0750 Percutaneous endoscopic gastrostomy (PEG) LUQ feeding 29 days              Airway  Duration             Adult Surgical Airway Shiley 6.0/ 75mm -- days         Airway - Non-Surgical 03/22/24 0857 Other (Comment) 1 day              Peripheral Intravenous Line  Duration                  Peripheral IV - Single Lumen 03/20/24 0218 20 G Anterior;Left Forearm 3 days         Peripheral IV - Single Lumen 03/20/24 0219 18 G Right Antecubital 3 days                     Physical Exam  NAD, communicates with writing board  Remaining native continue congested   No bleeding from nose  6-0 cuffed trach in place, secured w  soft collar. No peristomal bleeding.   Neck with doughy edema, post radiation  Former neck incision intact, small incision on the left side with cavity that tunnels towards mandible, packed with 1/2 inch strip gauze and covered with gauze dressing   Chest incision intact   Chest is soft, right bulk tunneling to neck 2/2 pec flap, soft to palpation, no fluctuance      Significant Labs:  CBC:   Recent Labs   Lab 03/23/24  0311   WBC 9.88   RBC 2.46*   HGB 7.3*   HCT 22.8*      MCV 93   MCH 29.7   MCHC 32.0     CMP:   Recent Labs   Lab 03/23/24  0311   *   CALCIUM 8.7   ALBUMIN 2.2*   PROT 5.1*      K 4.0   CO2 24      BUN 19   CREATININE 0.6   ALKPHOS 70   ALT 27   AST 14   BILITOT 0.2       Significant Diagnostics:  I have reviewed and interpreted all pertinent imaging results/findings within the past 24 hours.

## 2024-03-23 NOTE — NURSING
Nurses Note -- 4 Eyes      3/23/2024   6:42 PM      Skin assessed during: Transfer      [x] No Altered Skin Integrity Present    []Prevention Measures Documented      [] Yes- Altered Skin Integrity Present or Discovered   [] LDA Added if Not in Epic (Describe Wound)   [] New Altered Skin Integrity was Present on Admit and Documented in LDA   [] Wound Image Taken    Wound Care Consulted? No    Attending Nurse:  Khadijah Casper RN/Staff Member:  Alyse

## 2024-03-23 NOTE — ASSESSMENT & PLAN NOTE
Neuro/Psych:   - Sedation: none  - Pain:     - Tylenol 650 q6h PRN    - Norco 5-325 q4h PRN            Cardiac:   - BP Goal: -140 / DBP   - Pressors: None  - Rhythm: NSR     - Anti-HTNs: Will resume as appropriate   - Resumed home atorvastatin    Pulmonary:   - Goal SpO2 >92%  - ABGs PRN     Renal:  Recent Labs   Lab 03/21/24  0406 03/22/24  0240 03/23/24  0311   BUN 20 21 19   CREATININE 0.7 0.7 0.6     - Trend BUN/Cr , Cr stable at 0.6  -  Record strict Is/Os  - Voiding spontaneously after [place] removal    FEN / GI:   - Daily CMP, PRN K/Mag/Phos per protocol   - Replace electrolytes as needed  - Nutrition: NPO  - Bowel Regimen: none     ID:   - Afebrile  Recent Labs   Lab 03/21/24  0406 03/22/24  0240 03/23/24  0311   WBC 8.85 9.49 9.88     - Abx: none    Heme/Onc:   Recent Labs   Lab 03/18/24 2125 03/20/24  0056 03/21/24  0406 03/22/24  0240 03/23/24  0311   HGB 9.0*   < > 6.0* 7.7* 7.3*   *   < > 401 370 371   APTT 28.2  --   --   --   --    INR 1.1  --   --   --   --     < > = values in this interval not displayed.     - S/p IR embolization of the L lingual artery  - CBC daily   - DVT ppx: Lovenox      Endocrine:   - CCM Goal BG <180  - POCT glucose frequency per protocol     PPx:   Feeding: NP, Getting TF through G tube   Analgesia/Sedation: PRNs available / n/a  Thromboembolic Prevention:  Lovenox  HOB >30: Yes  Stress Ulcer: none indicated  Glucose Control: none     Lines/Drains/Airway:  Trach, 6-0 cuffed shiley  PEG       Dispo/Code Status/Palliative:   - Step down today pending bed.   - Full Code

## 2024-03-23 NOTE — PLAN OF CARE
[facility] Plan of Care Note     Dx: Oral bleeding     Shift Events: SICU transfer     Goals of Care: Pain management, airway management, safety.     Neuro: AAO x 4     Vital Signs: WNL     Respiratory: Trach # 6 Shiley RA     Diet: Bolus TF: Marsha CardioFocus 1.5 - 4 cans per day     Is patient tolerating current diet? Yes     GTTS: None     Urine Output/Bowel Movement: Adequate urine output, LBM 3/21/24     Drains/Tubes/Tube Feeds (include total output/shift): G tube LUQ     Lines: 18g R AC, 20g L FA     Accuchecks: None     Skin: Tongue flap. R groin puncture     Fall Risk Score: 6     Activity level? Independent     Any scheduled procedures? None     Any safety concerns? Falls

## 2024-03-23 NOTE — ASSESSMENT & PLAN NOTE
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 Small bleeding episode 3/19 but no source identified. Presents again 3/20 with large volume bleed. CTA Neck concerning for left ECA system bleed. Patient currently HDS, no active bleeding. Now s/p embolization with IR, no further bleeding. S/p OR on 3/22 for debridement of native tongue and neck exploration.     - BID neck packing with wet to dry  - Routine tracheostomy care   - Ok for tube feeds   - Ok for stepdown   - Page ENT with questions or concerns

## 2024-03-23 NOTE — PROGRESS NOTES
[facility]  Otorhinolaryngology-Head & Neck Surgery  Progress Note    Subjective:     Post-Op Info:  Procedure(s) (LRB):  DEBRIDEMENT, WOUND (N/A)   1 Day Post-Op  Hospital Day: 4     Interval History:   POD1 from debridement of native tongue. Doing well this am with no complaints. No further bleeding.    Medications:  Continuous Infusions:  Scheduled Meds:   atorvastatin  80 mg Per G Tube Daily    enoxparin  40 mg Subcutaneous Q24H (prophylaxis, 1700)    ferrous sulfate  300 mg Per G Tube Every other day    silodosin  4 mg Per G Tube Daily     PRN Meds:0.9%  NaCl infusion (for blood administration), acetaminophen, guaiFENesin 100 mg/5 ml, magnesium oxide, magnesium oxide, melatonin, ondansetron, oxyCODONE, potassium bicarbonate, potassium bicarbonate, potassium bicarbonate, potassium, sodium phosphates, potassium, sodium phosphates, potassium, sodium phosphates     Review of patient's allergies indicates:  No Known Allergies  Objective:     Vital Signs (24h Range):  Temp:  [97.3 °F (36.3 °C)-97.6 °F (36.4 °C)] 97.3 °F (36.3 °C)  Pulse:  [58-89] 83  Resp:  [0-60] 14  SpO2:  [87 %-100 %] 96 %  BP: (101-115)/(50-66) 105/58  Arterial Line BP: ()/(44-93) 128/58     Date 03/23/24 0700 - 03/24/24 0659   Shift 4219-0540 6739-2718 6757-1385 24 Hour Total   INTAKE   NG/GT 1275   1275   Shift Total(mL/kg) 1275(17.3)   1275(17.3)   OUTPUT   Shift Total(mL/kg)       Weight (kg) 73.9 73.9 73.9 73.9     Lines/Drains/Airways       Drain  Duration                  Gastrostomy/Enterostomy 02/23/24 0750 Percutaneous endoscopic gastrostomy (PEG) LUQ feeding 29 days              Airway  Duration             Adult Surgical Airway Shiley 6.0/ 75mm -- days         Airway - Non-Surgical 03/22/24 0857 Other (Comment) 1 day              Peripheral Intravenous Line  Duration                  Peripheral IV - Single Lumen 03/20/24 0218 20 G Anterior;Left Forearm 3 days         Peripheral IV - Single Lumen 03/20/24 0219 18 G Right  Antecubital 3 days                     Physical Exam  NAD, communicates with writing board  Remaining native continue congested   No bleeding from nose  6-0 cuffed trach in place, secured w soft collar. No peristomal bleeding.   Neck with doughy edema, post radiation  Former neck incision intact, small incision on the left side with cavity that tunnels towards mandible, packed with 1/2 inch strip gauze and covered with gauze dressing   Chest incision intact   Chest is soft, right bulk tunneling to neck 2/2 pec flap, soft to palpation, no fluctuance      Significant Labs:  CBC:   Recent Labs   Lab 03/23/24  0311   WBC 9.88   RBC 2.46*   HGB 7.3*   HCT 22.8*      MCV 93   MCH 29.7   MCHC 32.0     CMP:   Recent Labs   Lab 03/23/24  0311   *   CALCIUM 8.7   ALBUMIN 2.2*   PROT 5.1*      K 4.0   CO2 24      BUN 19   CREATININE 0.6   ALKPHOS 70   ALT 27   AST 14   BILITOT 0.2       Significant Diagnostics:  I have reviewed and interpreted all pertinent imaging results/findings within the past 24 hours.  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 Small bleeding episode 3/19 but no source identified. Presents again 3/20 with large volume bleed. CTA Neck concerning for left ECA system bleed. Patient currently HDS, no active bleeding. Now s/p embolization with IR, no further bleeding. S/p OR on 3/22 for debridement of native tongue and neck exploration.     - BID neck packing with wet to dry  - Routine tracheostomy care   - Ok for tube feeds   - Ok for stepdown   - Page ENT with questions or concerns         Pauly Dubois MD  Otorhinolaryngology-Head & Neck Surgery  [facility]

## 2024-03-23 NOTE — PROGRESS NOTES
[facility]  Critical Care - Surgery  Progress Note    Patient Name: Timothy GREENE Allday  MRN: [id number]  Admission Date: 3/20/2024  Hospital Length of Stay: 3 days  Code Status: Full Code  Attending Provider: Jeferson Ventura MD  Primary Care Provider: Young Lanier MD   Principal Problem: Oral bleeding    Subjective:     Hospital/ICU Course:  No notes on file    Interval History/Significant Events: AFVSS. NAEO. On 5L trach. UOP 1325 over last 24 hours.     Follow-up For: Procedure(s) (LRB):  DEBRIDEMENT, WOUND (N/A)    Post-Operative Day: 1 Day Post-Op    Objective:     Vital Signs (Most Recent):  Temp: 97.3 °F (36.3 °C) (03/23/24 0000)  Pulse: 80 (03/23/24 0111)  Resp: 18 (03/23/24 0045)  BP: (!) 111/51 (03/23/24 0000)  SpO2: 98 % (03/23/24 0045) Vital Signs (24h Range):  Temp:  [97.3 °F (36.3 °C)-98.2 °F (36.8 °C)] 97.3 °F (36.3 °C)  Pulse:  [64-93] 80  Resp:  [14-60] 18  SpO2:  [87 %-100 %] 98 %  BP: (102-151)/(50-75) 111/51  Arterial Line BP: ()/(46-65) 101/46     Weight: 73.9 kg (162 lb 14.7 oz)  Body mass index is 22.1 kg/m².      Intake/Output Summary (Last 24 hours) at 3/23/2024 0426  Last data filed at 3/22/2024 2315  Gross per 24 hour   Intake 2249 ml   Output 1325 ml   Net 924 ml          Physical Exam  Constitutional:       General: He is not in acute distress.  Cardiovascular:      Rate and Rhythm: Normal rate and regular rhythm.      Pulses: Normal pulses.      Comments: R flap donor site c/d/i  Pulmonary:      Effort: Pulmonary effort is normal. No respiratory distress.      Comments: Trach in place, on RA    No active bleeding      Abdominal:      General: There is no distension.      Palpations: Abdomen is soft.      Tenderness: There is no abdominal tenderness.      Comments: PEG   Genitourinary:     Comments: R groin access site soft w/o ecchymosis  Skin:     General: Skin is warm and dry.   Neurological:      General: No focal deficit present.      Mental Status:  He is alert and oriented to person, place, and time.            Vents:  Oxygen Concentration (%): 28 (03/23/24 0000)    Lines/Drains/Airways       Drain  Duration                  Gastrostomy/Enterostomy 02/23/24 0750 Percutaneous endoscopic gastrostomy (PEG) LUQ feeding 28 days              Airway  Duration             Adult Surgical Airway Shiley 6.0/ 75mm -- days         Airway - Non-Surgical 03/22/24 0857 Other (Comment) <1 day              Arterial Line  Duration             Arterial Line 03/22/24 0807 Left Radial <1 day              Peripheral Intravenous Line  Duration                  Peripheral IV - Single Lumen 03/20/24 0218 20 G Anterior;Left Forearm 3 days         Peripheral IV - Single Lumen 03/20/24 0219 18 G Right Antecubital 3 days                    Significant Labs:    CBC/Anemia Profile:  Recent Labs   Lab 03/22/24  0240 03/23/24  0311   WBC 9.49 9.88   HGB 7.7* 7.3*   HCT 24.2* 22.8*    371   MCV 93 93   RDW 14.8* 14.5        Chemistries:  Recent Labs   Lab 03/22/24  0240 03/23/24  0311    137   K 4.5 4.0    106   CO2 25 24   BUN 21 19   CREATININE 0.7 0.6   CALCIUM 8.7 8.7   ALBUMIN 2.3* 2.2*   PROT 5.4* 5.1*   BILITOT 0.3 0.2   ALKPHOS 75 70   ALT 31 27   AST 17 14   MG 2.1 2.1   PHOS 4.0 4.2       All pertinent labs within the past 24 hours have been reviewed.    Significant Imaging:  I have reviewed all pertinent imaging results/findings within the past 24 hours.  Assessment/Plan:     Oncology  Tongue cancer  Neuro/Psych:   - Sedation: none  - Pain:     - Tylenol PRN    - Oxy PRN            Cardiac:   - BP Goal: -140 / DBP   - Pressors: None  - Rhythm: NSR     - Anti-HTNs: Will resume as appropriate   - Resumed home atorvastatin    Pulmonary:   - AAKASH trach collar   - Goal SpO2 >92%  - ABGs PRN     Renal:  Recent Labs   Lab 03/21/24  0406 03/22/24  0240 03/23/24  0311   BUN 20 21 19   CREATININE 0.7 0.7 0.6     - Trend BUN/Cr , Cr stable at 0.6  - Voiding  spontaneously with appropriate UOP after [place] removal    FEN / GI:   - Daily CMP, PRN K/Mag/Phos per protocol   - Replace electrolytes as needed  - Nutrition: NPO, on TF   - Bowel Regimen: none     ID:   - Afebrile. WBC stable.   - Abx: none    Recent Labs   Lab 03/21/24  0406 03/22/24  0240 03/23/24  0311   WBC 8.85 9.49 9.88     Heme/Onc:   Recent Labs   Lab 03/18/24  2125 03/20/24  0056 03/21/24  0406 03/22/24  0240 03/23/24  0311   HGB 9.0*   < > 6.0* 7.7* 7.3*   *   < > 401 370 371   APTT 28.2  --   --   --   --    INR 1.1  --   --   --   --     < > = values in this interval not displayed.       - S/p IR embolization of the L lingual artery    - CBC daily    - DVT ppx: Lovenox      Endocrine:   - CCM Goal BG <180  - POCT glucose frequency per protocol     PPx:   Feeding: NP, Getting TF through G tube   Analgesia/Sedation: PRNs available / n/a  Thromboembolic Prevention:  Lovenox  HOB >30: Yes  Stress Ulcer: none indicated  Glucose Control: none     Lines/Drains/Airway:  Trach, 6-0 cuffed shiley  PEG     Dispo/Code Status/Palliative:   - Step down today pending bed.   - Full Code       Bro Hutton DO  Critical Care - Surgery  [facility]

## 2024-03-23 NOTE — SUBJECTIVE & OBJECTIVE
Interval History/Significant Events: AFVSS. NAEO. On 5L trach. UOP 1325 over last 24 hours.     Follow-up For: Procedure(s) (LRB):  DEBRIDEMENT, WOUND (N/A)    Post-Operative Day: 1 Day Post-Op    Objective:     Vital Signs (Most Recent):  Temp: 97.3 °F (36.3 °C) (03/23/24 0000)  Pulse: 80 (03/23/24 0111)  Resp: 18 (03/23/24 0045)  BP: (!) 111/51 (03/23/24 0000)  SpO2: 98 % (03/23/24 0045) Vital Signs (24h Range):  Temp:  [97.3 °F (36.3 °C)-98.2 °F (36.8 °C)] 97.3 °F (36.3 °C)  Pulse:  [64-93] 80  Resp:  [14-60] 18  SpO2:  [87 %-100 %] 98 %  BP: (102-151)/(50-75) 111/51  Arterial Line BP: ()/(46-65) 101/46     Weight: 73.9 kg (162 lb 14.7 oz)  Body mass index is 22.1 kg/m².      Intake/Output Summary (Last 24 hours) at 3/23/2024 0426  Last data filed at 3/22/2024 2315  Gross per 24 hour   Intake 2249 ml   Output 1325 ml   Net 924 ml          Physical Exam  Constitutional:       General: He is not in acute distress.  Cardiovascular:      Rate and Rhythm: Normal rate and regular rhythm.      Pulses: Normal pulses.      Comments: R flap donor site c/d/i  Pulmonary:      Effort: Pulmonary effort is normal. No respiratory distress.      Comments: Trach in place, on RA    No active bleeding      Abdominal:      General: There is no distension.      Palpations: Abdomen is soft.      Tenderness: There is no abdominal tenderness.      Comments: PEG   Genitourinary:     Comments: R groin access site soft w/o ecchymosis  Skin:     General: Skin is warm and dry.   Neurological:      General: No focal deficit present.      Mental Status: He is alert and oriented to person, place, and time.            Vents:  Oxygen Concentration (%): 28 (03/23/24 0000)    Lines/Drains/Airways       Drain  Duration                  Gastrostomy/Enterostomy 02/23/24 0750 Percutaneous endoscopic gastrostomy (PEG) LUQ feeding 28 days              Airway  Duration             Adult Surgical Airway Ralph 6.0/ 75mm -- days         Airway -  Non-Surgical 03/22/24 0857 Other (Comment) <1 day              Arterial Line  Duration             Arterial Line 03/22/24 0807 Left Radial <1 day              Peripheral Intravenous Line  Duration                  Peripheral IV - Single Lumen 03/20/24 0218 20 G Anterior;Left Forearm 3 days         Peripheral IV - Single Lumen 03/20/24 0219 18 G Right Antecubital 3 days                    Significant Labs:    CBC/Anemia Profile:  Recent Labs   Lab 03/22/24  0240 03/23/24  0311   WBC 9.49 9.88   HGB 7.7* 7.3*   HCT 24.2* 22.8*    371   MCV 93 93   RDW 14.8* 14.5        Chemistries:  Recent Labs   Lab 03/22/24  0240 03/23/24  0311    137   K 4.5 4.0    106   CO2 25 24   BUN 21 19   CREATININE 0.7 0.6   CALCIUM 8.7 8.7   ALBUMIN 2.3* 2.2*   PROT 5.4* 5.1*   BILITOT 0.3 0.2   ALKPHOS 75 70   ALT 31 27   AST 17 14   MG 2.1 2.1   PHOS 4.0 4.2       All pertinent labs within the past 24 hours have been reviewed.    Significant Imaging:  I have reviewed all pertinent imaging results/findings within the past 24 hours.

## 2024-03-23 NOTE — ASSESSMENT & PLAN NOTE
Neuro/Psych:   - Sedation: none  - Pain:     - Tylenol PRN    - Oxy PRN            Cardiac:   - BP Goal: -140 / DBP   - Pressors: None  - Rhythm: NSR     - Anti-HTNs: Will resume as appropriate   - Resumed home atorvastatin    Pulmonary:   - AAKASH trach collar   - Goal SpO2 >92%  - ABGs PRN     Renal:  Recent Labs   Lab 03/21/24  0406 03/22/24  0240 03/23/24  0311   BUN 20 21 19   CREATININE 0.7 0.7 0.6     - Trend BUN/Cr , Cr stable at 0.6  - Voiding spontaneously with appropriate UOP after [place] removal    FEN / GI:   - Daily CMP, PRN K/Mag/Phos per protocol   - Replace electrolytes as needed  - Nutrition: NPO, on TF   - Bowel Regimen: none     ID:   - Afebrile. WBC stable.   - Abx: none    Recent Labs   Lab 03/21/24  0406 03/22/24  0240 03/23/24  0311   WBC 8.85 9.49 9.88     Heme/Onc:   Recent Labs   Lab 03/18/24 2125 03/20/24  0056 03/21/24  0406 03/22/24  0240 03/23/24  0311   HGB 9.0*   < > 6.0* 7.7* 7.3*   *   < > 401 370 371   APTT 28.2  --   --   --   --    INR 1.1  --   --   --   --     < > = values in this interval not displayed.       - S/p IR embolization of the L lingual artery    - CBC daily    - DVT ppx: Lovenox      Endocrine:   - CCM Goal BG <180  - POCT glucose frequency per protocol     PPx:   Feeding: NP, Getting TF through G tube   Analgesia/Sedation: PRNs available / n/a  Thromboembolic Prevention:  Lovenox  HOB >30: Yes  Stress Ulcer: none indicated  Glucose Control: none     Lines/Drains/Airway:  Trach, 6-0 cuffed shiley  PEG     Dispo/Code Status/Palliative:   - Step down today pending bed.   - Full Code

## 2024-03-23 NOTE — NURSING TRANSFER
Nursing Transfer Note      3/23/2024   11:10AM    Nurse giving handoff:Min Wall RN  Nurse receiving handoff:Khadijah Taylor RN    Reason patient is being transferred: Stepdown    Transfer To: [facility] #[id number]    Transfer via wheelchair    Transfer with cardiac monitoring    Transported by RN x2    Telemetry: Telemetry box #[id number]  Order for Tele Monitor? Yes    4eyes on Skin: yes    Medicines sent: N/A    Any special needs or follow-up needed: None    Patient belongings transferred with patient: Yes    Chart send with patient: Yes    Notified: spouse    Patient reassessed at: 1115 (date, time)  1  Upon arrival to floor: cardiac monitor applied, patient oriented to room, call bell in reach, and bed in lowest position

## 2024-03-24 LAB
ALBUMIN SERPL BCP-MCNC: 2.3 G/DL (ref 3.5–5.2)
ALP SERPL-CCNC: 70 U/L (ref 55–135)
ALT SERPL W/O P-5'-P-CCNC: 32 U/L (ref 10–44)
ANION GAP SERPL CALC-SCNC: 6 MMOL/L (ref 8–16)
AST SERPL-CCNC: 19 U/L (ref 10–40)
BASOPHILS # BLD AUTO: 0.05 K/UL (ref 0–0.2)
BASOPHILS NFR BLD: 0.8 % (ref 0–1.9)
BILIRUB SERPL-MCNC: 0.2 MG/DL (ref 0.1–1)
BLD PROD TYP BPU: NORMAL
BLD PROD TYP BPU: NORMAL
BLOOD UNIT EXPIRATION DATE: NORMAL
BLOOD UNIT EXPIRATION DATE: NORMAL
BLOOD UNIT TYPE CODE: 5100
BLOOD UNIT TYPE CODE: 5100
BLOOD UNIT TYPE: NORMAL
BLOOD UNIT TYPE: NORMAL
BUN SERPL-MCNC: 22 MG/DL (ref 8–23)
CALCIUM SERPL-MCNC: 8.8 MG/DL (ref 8.7–10.5)
CHLORIDE SERPL-SCNC: 104 MMOL/L (ref 95–110)
CO2 SERPL-SCNC: 27 MMOL/L (ref 23–29)
CODING SYSTEM: NORMAL
CODING SYSTEM: NORMAL
CREAT SERPL-MCNC: 0.7 MG/DL (ref 0.5–1.4)
CROSSMATCH INTERPRETATION: NORMAL
CROSSMATCH INTERPRETATION: NORMAL
DIFFERENTIAL METHOD BLD: ABNORMAL
DISPENSE STATUS: NORMAL
DISPENSE STATUS: NORMAL
EOSINOPHIL # BLD AUTO: 0.1 K/UL (ref 0–0.5)
EOSINOPHIL NFR BLD: 1.7 % (ref 0–8)
ERYTHROCYTE [DISTWIDTH] IN BLOOD BY AUTOMATED COUNT: 14.6 % (ref 11.5–14.5)
EST. GFR  (NO RACE VARIABLE): >60 ML/MIN/1.73 M^2
GLUCOSE SERPL-MCNC: 91 MG/DL (ref 70–110)
HCT VFR BLD AUTO: 25.1 % (ref 40–54)
HGB BLD-MCNC: 7.9 G/DL (ref 14–18)
IMM GRANULOCYTES # BLD AUTO: 0.08 K/UL (ref 0–0.04)
IMM GRANULOCYTES NFR BLD AUTO: 1.2 % (ref 0–0.5)
LYMPHOCYTES # BLD AUTO: 1 K/UL (ref 1–4.8)
LYMPHOCYTES NFR BLD: 15.6 % (ref 18–48)
MAGNESIUM SERPL-MCNC: 2.1 MG/DL (ref 1.6–2.6)
MCH RBC QN AUTO: 29.7 PG (ref 27–31)
MCHC RBC AUTO-ENTMCNC: 31.5 G/DL (ref 32–36)
MCV RBC AUTO: 94 FL (ref 82–98)
MONOCYTES # BLD AUTO: 0.6 K/UL (ref 0.3–1)
MONOCYTES NFR BLD: 9.2 % (ref 4–15)
NEUTROPHILS # BLD AUTO: 4.8 K/UL (ref 1.8–7.7)
NEUTROPHILS NFR BLD: 71.5 % (ref 38–73)
NRBC BLD-RTO: 0 /100 WBC
PHOSPHATE SERPL-MCNC: 4.1 MG/DL (ref 2.7–4.5)
PLATELET # BLD AUTO: 379 K/UL (ref 150–450)
PMV BLD AUTO: 10.6 FL (ref 9.2–12.9)
POTASSIUM SERPL-SCNC: 4.1 MMOL/L (ref 3.5–5.1)
PROT SERPL-MCNC: 5.2 G/DL (ref 6–8.4)
RBC # BLD AUTO: 2.66 M/UL (ref 4.6–6.2)
SODIUM SERPL-SCNC: 137 MMOL/L (ref 136–145)
TRANS ERYTHROCYTES VOL PATIENT: NORMAL ML
TRANS ERYTHROCYTES VOL PATIENT: NORMAL ML
WBC # BLD AUTO: 6.65 K/UL (ref 3.9–12.7)

## 2024-03-24 PROCEDURE — 20600001 HC STEP DOWN PRIVATE ROOM

## 2024-03-24 PROCEDURE — 80053 COMPREHEN METABOLIC PANEL: CPT

## 2024-03-24 PROCEDURE — 99900035 HC TECH TIME PER 15 MIN (STAT)

## 2024-03-24 PROCEDURE — 84100 ASSAY OF PHOSPHORUS: CPT

## 2024-03-24 PROCEDURE — 25000003 PHARM REV CODE 250

## 2024-03-24 PROCEDURE — 83735 ASSAY OF MAGNESIUM: CPT

## 2024-03-24 PROCEDURE — 36415 COLL VENOUS BLD VENIPUNCTURE: CPT

## 2024-03-24 PROCEDURE — 63600175 PHARM REV CODE 636 W HCPCS

## 2024-03-24 PROCEDURE — 27000221 HC OXYGEN, UP TO 24 HOURS

## 2024-03-24 PROCEDURE — 94761 N-INVAS EAR/PLS OXIMETRY MLT: CPT

## 2024-03-24 PROCEDURE — 85025 COMPLETE CBC W/AUTO DIFF WBC: CPT

## 2024-03-24 RX ADMIN — ATORVASTATIN CALCIUM 80 MG: 40 TABLET, FILM COATED ORAL at 08:03

## 2024-03-24 RX ADMIN — GUAIFENESIN 200 MG: 200 SOLUTION ORAL at 07:03

## 2024-03-24 RX ADMIN — ENOXAPARIN SODIUM 40 MG: 40 INJECTION SUBCUTANEOUS at 04:03

## 2024-03-24 RX ADMIN — OXYCODONE 5 MG: 5 TABLET ORAL at 07:03

## 2024-03-24 RX ADMIN — MINERAL SUPPLEMENT IRON 300 MG / 5 ML STRENGTH LIQUID 100 PER BOX UNFLAVORED 300 MG: at 08:03

## 2024-03-24 RX ADMIN — SILODOSIN 4 MG: 4 CAPSULE ORAL at 08:03

## 2024-03-24 RX ADMIN — ACETAMINOPHEN 650 MG: 325 TABLET ORAL at 11:03

## 2024-03-24 RX ADMIN — GUAIFENESIN 200 MG: 200 SOLUTION ORAL at 12:03

## 2024-03-24 RX ADMIN — GUAIFENESIN 200 MG: 200 SOLUTION ORAL at 11:03

## 2024-03-24 RX ADMIN — Medication 6 MG: at 11:03

## 2024-03-24 NOTE — PLAN OF CARE
Problem: Adult Inpatient Plan of Care  Goal: Plan of Care Review  Outcome: Ongoing, Progressing  Goal: Patient-Specific Goal (Individualized)  Outcome: Ongoing, Progressing  Goal: Absence of Hospital-Acquired Illness or Injury  Outcome: Ongoing, Progressing  Goal: Optimal Comfort and Wellbeing  Outcome: Ongoing, Progressing  Goal: Readiness for Transition of Care  Outcome: Ongoing, Progressing   [facility] Plan of Care Note  Dx oral bleeding    Shift Events no significant event- slept  throughout shift;  no bleeding noticed    Goals of Care: patent airway, VS and lab WDL    Neuro: AAOx4    Vital Signs: stable    Respiratory: trach #6 Shiley- stayed on RA- barely put on  the humidification through trach collar- SpO2 %- coughed frequently while he was awake - small amount white- tan  sputum     Diet: NPO + TF - bolus feed- goal  4cans/ a day- pt did the feeding himself- 1 can with night shift    Is patient tolerating current diet? Tolerated TF well- no nausea    GTTS: none    Urine Output/Bowel Movement: 0 BM, urinated without difficulty     Drains/Tubes/Tube Feeds (include total output/shift): PEG tube for TF    Lines: 1 PIV      Accuchecks:none    Skin: right groin puncture incision healed DESHAUN CDI; neck incision with gauze in place underneath the trach tie    Fall Risk Score: 6    Activity level? Up ad meryl    Any scheduled procedures? none    Any safety concerns? Fall risk+ aspiration precaution    Other:refused SCD

## 2024-03-24 NOTE — PLAN OF CARE
[facility] Plan of Care Note     Dx: Oral bleeding     Shift Events: None     Goals of Care: Pain management, airway management, safety.     Neuro: AAO x 4     Vital Signs: WNL     Respiratory: Trach # 6 Shiley RA     Diet: Bolus TF: [employer] 1.5 - 4 cans per day     Is patient tolerating current diet? Yes     GTTS: None     Urine Output/Bowel Movement: Adequate urine output, LBM 3/21/24     Drains/Tubes/Tube Feeds (include total output/shift): G tube LUQ     Lines: 18g R AC     Accuchecks: None     Skin: Tongue flap. R groin puncture     Fall Risk Score: 6     Activity level? Independent     Any scheduled procedures? None     Any safety concerns? Falls

## 2024-03-24 NOTE — RESPIRATORY THERAPY
"RAPID RESPONSE RESPIRATORY THERAPY PROACTIVE NOTE           Time of visit: 830     Code Status: Full Code   : 1955  Bed: 1046/1046 A:   MRN: [id number]  Time spent at the bedside: < 15 min    SITUATION    Evaluated patient for: LDA Check     BACKGROUND    Patient has a past medical history of Acute on chronic blood loss anemia, Cancer, Hyperlipidemia, and Hypertension.  Clinically Significant Surgical Hx: tracheostomy    24 Hours Vitals Range:  Temp:  [97.3 °F (36.3 °C)-98.2 °F (36.8 °C)]   Pulse:  [62-83]   Resp:  [14-34]   BP: (103-124)/(57-72)   SpO2:  [93 %-100 %]     Labs:    Recent Labs     24  0240 24  0311 24  0435    137 137   K 4.5 4.0 4.1    106 104   CO2 25 24 27   BUN 21 19 22   CREATININE 0.7 0.6 0.7    127* 91   PHOS 4.0 4.2 4.1   MG 2.1 2.1 2.1        No results for input(s): "PH", "PCO2", "PO2", "HCO3", "POCSATURATED", "BE" in the last 72 hours.    ASSESSMENT/INTERVENTIONS  Patient sleeping comfortably. All supplies visible at bedside. No respiratory concerns at this time.       Last VS   Temp: 98.2 °F (36.8 °C) ( 07)  Pulse: 68 ( 0839)  Resp: 18 ( 07)  BP: 111/72 ( 0712)  SpO2: 95 % ( 0839)      Extra trachs at bedside: 4 & 6 cuff  Level of Consciousness: Level of Consciousness (AVPU): alert (Patient does not want anything at this time he is sleeping)  Respiratory Effort: Respiratory Effort: Normal, Unlabored Expansion/Accessory Muscle Usage: Expansion/Accessory Muscles/Retractions: expansion symmetric, no retractions, no use of accessory muscles  All Lung Field Breath Sounds: All Lung Fields Breath Sounds: Anterior:, Lateral:, coarse  O2 Device/Concentration: 5L 28% TC  Surgical airway: Yes, Type: Shiley Size: 6, cuffed  Ambu at bedside:       Active Orders   Respiratory Care    Oxygen Continuous     Frequency: Continuous     Number of Occurrences: Until Specified     Order Questions:      Device type: Low flow      Device: " Trach Collar      FiO2%: 28      Titrate O2 per Oxygen Titration Protocol: Yes      To maintain SpO2 goal of: >= 90%      Notify MD of: Inability to achieve desired SpO2; Sudden change in patient status and requires 20% increase in FiO2; Patient requires >60% FiO2    Pulse Oximetry Continuous     Frequency: Continuous     Number of Occurrences: Until Specified    Routine tracheostomy care     Frequency: BID     Number of Occurrences: Until Specified       RECOMMENDATIONS    We recommend: RRT Recs: Continue POC per primary team.      FOLLOW-UP    Please call back the Rapid Response RT, Sonia Robin RRT at x [phone] for any questions or concerns.

## 2024-03-24 NOTE — ASSESSMENT & PLAN NOTE
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 Small bleeding episode 3/19 but no source identified. Presents again 3/20 with large volume bleed. CTA Neck concerning for left ECA system bleed. Patient currently HDS, no active bleeding. Now s/p embolization with IR, no further bleeding. S/p OR on 3/22 for debridement of native tongue and neck exploration.     - BID neck packing with wet to dry  - Routine tracheostomy care   - Continue bolus tube feeds   - Page ENT with questions or concerns

## 2024-03-24 NOTE — SUBJECTIVE & OBJECTIVE
Interval History:   POD2, doing well this am. No complaints.     Medications:  Continuous Infusions:  Scheduled Meds:   atorvastatin  80 mg Per G Tube Daily    enoxparin  40 mg Subcutaneous Q24H (prophylaxis, 1700)    ferrous sulfate  300 mg Per G Tube Every other day    silodosin  4 mg Per G Tube Daily     PRN Meds:acetaminophen, guaiFENesin 100 mg/5 ml, melatonin, ondansetron, oxyCODONE     Review of patient's allergies indicates:  No Known Allergies  Objective:     Vital Signs (24h Range):  Temp:  [97.7 °F (36.5 °C)-98.3 °F (36.8 °C)] 98.3 °F (36.8 °C)  Pulse:  [62-92] 76  Resp:  [17-19] 19  SpO2:  [93 %-100 %] 95 %  BP: (103-124)/(56-72) 111/64     Date 03/24/24 0700 - 03/25/24 0659   Shift 0367-3525 6080-2192 8346-6871 24 Hour Total   INTAKE   NG/   650   Shift Total(mL/kg) 650(8.8)   650(8.8)   OUTPUT   Shift Total(mL/kg)       Weight (kg) 73.9 73.9 73.9 73.9     Lines/Drains/Airways       Drain  Duration                  Gastrostomy/Enterostomy 02/23/24 0750 Percutaneous endoscopic gastrostomy (PEG) LUQ feeding 30 days              Airway  Duration             Adult Surgical Airway Shiley 6.0/ 75mm -- days              Peripheral Intravenous Line  Duration                  Peripheral IV - Single Lumen 03/20/24 0219 18 G Right Antecubital 4 days                     Physical Exam  NAD, communicates with writing board  Remaining native continue congested   No bleeding from nose  6-0 cuffed trach in place, secured w soft collar. No peristomal bleeding.   Neck with doughy edema, post radiation  Former neck incision intact, small incision on the left side with cavity that tunnels towards mandible, packed with 1/2 inch strip gauze   Chest incision intact   Chest is soft, right bulk tunneling to neck 2/2 pec flap, soft to palpation, no fluctuance      Significant Labs:  CBC:   Recent Labs   Lab 03/24/24  0435   WBC 6.65   RBC 2.66*   HGB 7.9*   HCT 25.1*      MCV 94   MCH 29.7   MCHC 31.5*     CMP:    Recent Labs   Lab 03/24/24  0435   GLU 91   CALCIUM 8.8   ALBUMIN 2.3*   PROT 5.2*      K 4.1   CO2 27      BUN 22   CREATININE 0.7   ALKPHOS 70   ALT 32   AST 19   BILITOT 0.2       Significant Diagnostics:  I have reviewed and interpreted all pertinent imaging results/findings within the past 24 hours.

## 2024-03-24 NOTE — PROGRESS NOTES
[facility]  Otorhinolaryngology-Head & Neck Surgery  Progress Note    Subjective:     Post-Op Info:  Procedure(s) (LRB):  DEBRIDEMENT, WOUND (N/A)   2 Days Post-Op  Hospital Day: 5     Interval History:   POD2, doing well this am. No complaints.     Medications:  Continuous Infusions:  Scheduled Meds:   atorvastatin  80 mg Per G Tube Daily    enoxparin  40 mg Subcutaneous Q24H (prophylaxis, 1700)    ferrous sulfate  300 mg Per G Tube Every other day    silodosin  4 mg Per G Tube Daily     PRN Meds:acetaminophen, guaiFENesin 100 mg/5 ml, melatonin, ondansetron, oxyCODONE     Review of patient's allergies indicates:  No Known Allergies  Objective:     Vital Signs (24h Range):  Temp:  [97.7 °F (36.5 °C)-98.3 °F (36.8 °C)] 98.3 °F (36.8 °C)  Pulse:  [62-92] 76  Resp:  [17-19] 19  SpO2:  [93 %-100 %] 95 %  BP: (103-124)/(56-72) 111/64     Date 03/24/24 0700 - 03/25/24 0659   Shift 7281-7837 7646-3511 1295-8041 24 Hour Total   INTAKE   NG/   650   Shift Total(mL/kg) 650(8.8)   650(8.8)   OUTPUT   Shift Total(mL/kg)       Weight (kg) 73.9 73.9 73.9 73.9     Lines/Drains/Airways       Drain  Duration                  Gastrostomy/Enterostomy 02/23/24 0750 Percutaneous endoscopic gastrostomy (PEG) LUQ feeding 30 days              Airway  Duration             Adult Surgical Airway Shiley 6.0/ 75mm -- days              Peripheral Intravenous Line  Duration                  Peripheral IV - Single Lumen 03/20/24 0219 18 G Right Antecubital 4 days                     Physical Exam  NAD, communicates with writing board  Remaining native continue congested   No bleeding from nose  6-0 cuffed trach in place, secured w soft collar. No peristomal bleeding.   Neck with doughy edema, post radiation  Former neck incision intact, small incision on the left side with cavity that tunnels towards mandible, packed with 1/2 inch strip gauze   Chest incision intact   Chest is soft, right bulk tunneling to neck 2/2 pec flap, soft to  palpation, no fluctuance      Significant Labs:  CBC:   Recent Labs   Lab 03/24/24  0435   WBC 6.65   RBC 2.66*   HGB 7.9*   HCT 25.1*      MCV 94   MCH 29.7   MCHC 31.5*     CMP:   Recent Labs   Lab 03/24/24  0435   GLU 91   CALCIUM 8.8   ALBUMIN 2.3*   PROT 5.2*      K 4.1   CO2 27      BUN 22   CREATININE 0.7   ALKPHOS 70   ALT 32   AST 19   BILITOT 0.2       Significant Diagnostics:  I have reviewed and interpreted all pertinent imaging results/findings within the past 24 hours.  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 Small bleeding episode 3/19 but no source identified. Presents again 3/20 with large volume bleed. CTA Neck concerning for left ECA system bleed. Patient currently HDS, no active bleeding. Now s/p embolization with IR, no further bleeding. S/p OR on 3/22 for debridement of native tongue and neck exploration.     - BID neck packing with wet to dry  - Routine tracheostomy care   - Continue bolus tube feeds   - Page ENT with questions or concerns         Pauly Dubois MD  Otorhinolaryngology-Head & Neck Surgery  [facility]

## 2024-03-25 ENCOUNTER — TELEPHONE (OUTPATIENT)
Dept: OTOLARYNGOLOGY | Facility: CLINIC | Age: 69
End: 2024-03-25
Payer: MEDICARE

## 2024-03-25 VITALS
OXYGEN SATURATION: 95 % | DIASTOLIC BLOOD PRESSURE: 64 MMHG | RESPIRATION RATE: 18 BRPM | SYSTOLIC BLOOD PRESSURE: 105 MMHG | HEART RATE: 63 BPM | WEIGHT: 162.94 LBS | TEMPERATURE: 98 F | BODY MASS INDEX: 22.07 KG/M2 | HEIGHT: 72 IN

## 2024-03-25 LAB
ALBUMIN SERPL BCP-MCNC: 2.4 G/DL (ref 3.5–5.2)
ALP SERPL-CCNC: 70 U/L (ref 55–135)
ALT SERPL W/O P-5'-P-CCNC: 27 U/L (ref 10–44)
ANION GAP SERPL CALC-SCNC: 4 MMOL/L (ref 8–16)
AST SERPL-CCNC: 13 U/L (ref 10–40)
BASOPHILS # BLD AUTO: 0.06 K/UL (ref 0–0.2)
BASOPHILS NFR BLD: 1 % (ref 0–1.9)
BILIRUB SERPL-MCNC: 0.3 MG/DL (ref 0.1–1)
BUN SERPL-MCNC: 17 MG/DL (ref 8–23)
CALCIUM SERPL-MCNC: 8.4 MG/DL (ref 8.7–10.5)
CHLORIDE SERPL-SCNC: 105 MMOL/L (ref 95–110)
CO2 SERPL-SCNC: 26 MMOL/L (ref 23–29)
CREAT SERPL-MCNC: 0.7 MG/DL (ref 0.5–1.4)
DIFFERENTIAL METHOD BLD: ABNORMAL
EOSINOPHIL # BLD AUTO: 0.2 K/UL (ref 0–0.5)
EOSINOPHIL NFR BLD: 3.2 % (ref 0–8)
ERYTHROCYTE [DISTWIDTH] IN BLOOD BY AUTOMATED COUNT: 14.8 % (ref 11.5–14.5)
EST. GFR  (NO RACE VARIABLE): >60 ML/MIN/1.73 M^2
GLUCOSE SERPL-MCNC: 91 MG/DL (ref 70–110)
HCT VFR BLD AUTO: 25.9 % (ref 40–54)
HGB BLD-MCNC: 8.1 G/DL (ref 14–18)
IMM GRANULOCYTES # BLD AUTO: 0.04 K/UL (ref 0–0.04)
IMM GRANULOCYTES NFR BLD AUTO: 0.6 % (ref 0–0.5)
LYMPHOCYTES # BLD AUTO: 0.9 K/UL (ref 1–4.8)
LYMPHOCYTES NFR BLD: 13.6 % (ref 18–48)
MAGNESIUM SERPL-MCNC: 2.1 MG/DL (ref 1.6–2.6)
MCH RBC QN AUTO: 29.6 PG (ref 27–31)
MCHC RBC AUTO-ENTMCNC: 31.3 G/DL (ref 32–36)
MCV RBC AUTO: 95 FL (ref 82–98)
MONOCYTES # BLD AUTO: 0.7 K/UL (ref 0.3–1)
MONOCYTES NFR BLD: 11.9 % (ref 4–15)
NEUTROPHILS # BLD AUTO: 4.3 K/UL (ref 1.8–7.7)
NEUTROPHILS NFR BLD: 69.7 % (ref 38–73)
NRBC BLD-RTO: 0 /100 WBC
PHOSPHATE SERPL-MCNC: 4.2 MG/DL (ref 2.7–4.5)
PLATELET # BLD AUTO: 382 K/UL (ref 150–450)
PMV BLD AUTO: 11 FL (ref 9.2–12.9)
POTASSIUM SERPL-SCNC: 4.3 MMOL/L (ref 3.5–5.1)
PROT SERPL-MCNC: 4.9 G/DL (ref 6–8.4)
RBC # BLD AUTO: 2.74 M/UL (ref 4.6–6.2)
SODIUM SERPL-SCNC: 135 MMOL/L (ref 136–145)
WBC # BLD AUTO: 6.23 K/UL (ref 3.9–12.7)

## 2024-03-25 PROCEDURE — 94761 N-INVAS EAR/PLS OXIMETRY MLT: CPT

## 2024-03-25 PROCEDURE — 25000003 PHARM REV CODE 250

## 2024-03-25 PROCEDURE — 80053 COMPREHEN METABOLIC PANEL: CPT

## 2024-03-25 PROCEDURE — 99900035 HC TECH TIME PER 15 MIN (STAT)

## 2024-03-25 PROCEDURE — 83735 ASSAY OF MAGNESIUM: CPT

## 2024-03-25 PROCEDURE — 36415 COLL VENOUS BLD VENIPUNCTURE: CPT

## 2024-03-25 PROCEDURE — 85025 COMPLETE CBC W/AUTO DIFF WBC: CPT

## 2024-03-25 PROCEDURE — 84100 ASSAY OF PHOSPHORUS: CPT

## 2024-03-25 RX ORDER — FERROUS SULFATE 220 (44)/5
300 SOLUTION, ORAL ORAL EVERY OTHER DAY
Qty: 102 ML | Refills: 0 | Status: SHIPPED | OUTPATIENT
Start: 2024-03-26 | End: 2024-05-22

## 2024-03-25 RX ADMIN — ACETAMINOPHEN 650 MG: 325 TABLET ORAL at 06:03

## 2024-03-25 RX ADMIN — GUAIFENESIN 200 MG: 200 SOLUTION ORAL at 06:03

## 2024-03-25 RX ADMIN — SILODOSIN 4 MG: 4 CAPSULE ORAL at 08:03

## 2024-03-25 RX ADMIN — ATORVASTATIN CALCIUM 80 MG: 40 TABLET, FILM COATED ORAL at 08:03

## 2024-03-25 NOTE — DISCHARGE INSTRUCTIONS
DISCHARGE INSTRUCTIONS TRACHEOSTOMY    Tracheostomy Model: 6UN75H [facility] 6-0 Uncuffed    Home Supplies Needed:  Suction machine  Suction tubing  Suction catheters  Extra trach tube  Inner cannula  Trach ties / soft collar    HOW TO CARE FOR TRACHEOSTOMY    About this topic   A tracheostomy or trach is an opening made by a cut in your neck. The opening is called a stoma. A small tube is placed through the stoma. It goes into your windpipe or trachea. This is called a trach tube. It helps keep your airway open and removes secretions from your lungs.  A trach is done for many reasons. Some people need it to help them breathe more easily. Others have health problems caused by an injury, scarring, or surgeries. Some trachs are needed only for a short time. Others are needed for a long time. A trach may be connected to a breathing machine or covered with a small cap.    General   It is important to learn to care for your trach. You will need many supplies to help you. Keep all supplies together in one place and set them up before use. Practice caring for your trach before going home. Be sure you are comfortable caring for your trach. Be sure to ask your doctor or nurse any questions that you may have.  Learn the parts of your trach tube.  Inner cannula ? Fits inside the outer cannula. Locks into the outer cannula to keep it from being coughed out. It should be removed and cleaned more often.  Trach plate ? Has holes on the sides. This is where ties are placed and then tied around the neck. This may also be called the flange.  Trach ties ? These secure the trach to help keep it from falling out.  Obturator ? Act as a guide to help place the trach tube in the trachea.  Cuff ? This may be made of foam or a balloon. It helps block air from leaking back out of the windpipe.    How to Replace the Inner Cannula   Wash your hands carefully.  Remove the inner cannula from your trach tube.  Put the new inner cannula back in the  trach tube and carefully lock it in place.  Replace it if you think mucus is plugged at the end of the outer cannula.   Wash your hands carefully.    How to Change the Tracheostomy Ties   You may need someone to help you hold the trach tube so it will not move out of place.  Remove the old ties.  Put one end of each tie into the opening of the neck plate.  Secure both ends together to the side of your neck. Make sure that only one finger can slip under it.    How to Suction the Trach Tube   It is important to keep the trach tube free of thick mucus. Always have a cloth or tissue ready when you cough. This will help catch the mucus coming out from your tube. If you have trouble breathing or hear noisy breathing you may need to use a suction machine to help clear your trach tube.  Wash your hands carefully.  Attach the suction tube to the suction machine.  Turn the suction machine on.  Take a deep breath. Gently put the suction tube into your trach tube. Do not apply the suction while putting in the tube. If you feel uneasy, remove the suction tube slightly.  Cover the suction control with your thumb to apply suction. Gently rotate the tube while removing it as you apply suction.  Take a deep breath after you suction.  Soak the suction tube in water and rinse carefully.  Wash your hands carefully.  Talk to your doctor if you are not able to clear your secretions.    How to Keep Your Breathing Moist   Keep relative humidity in your home. Keep a humidifier in your living area. Sleep in a cool room at night.  Drink lots of fluids to keep mucus thin. Drink at least 6 to 8 glasses of water a day.     What lifestyle changes are needed?   While your trach tube is in place, you may not be able to speak for a while. You may have breathing and vocal changes. You may need to communicate with other people by:  Writing on a piece of paper  Gestures or lip reading  Communication board, such as a dry erase board or a picture  board  Electronic devices like artificial sound source  Use of a talking trach tube. An outside air source is used to force air into your vocal cords. Your doctor and a speech therapist can teach you how to do this.    Will there be any other care needed?   Do not get water in your trach tube. Use a movable shower head to control water flow. Cover the tube with plastic or clothing to keep water away from your trach tube. Avoid swimming pools. Water can easily flow into your trach tube. You cannot hold your breath under water, and if the trach tube is submerged, you can easily drown.  Keep small elements from getting into your trach tube. Take extra care around food bits, dust, powders, and sprays. Cover your tracheostomy with light clothing or scarves. This is helpful when going outdoors to keep you from getting infections.  If your trach tube falls out, tilt your back to keep the stoma wider.  Practice proper mouth care and hand washing.  Get a flu shot each year and pneumonia shot as often as your doctor advises.  Avoid crowded places and people. Try to avoid people who are sick.  It is important to always do trach care in a clean environment.    When do I need to call the doctor?   Go to the ER right away if:  Your trach tube falls out and you cannot replace it.  Call the doctor for:  Signs of wound infection. These include swelling, redness, warmth around the wound; too much pain when touched; yellowish, greenish, or bloody discharge; foul smell coming from the cut site; cut site opens up.  Trouble breathing that is not relieved by clearing out the secretions    What to do if you cannot breathe through your trach tube:  You should immediately try to suction your trach  If that does not help, you should remove the inner cannula and try to suction again  If you are unable to pass the suction catheter and are having trouble breathing, call 911 immediately. Your trach tube may be dislodged and should only be removed  & replaced in an emergency    Helpful tips   Do not smoke and avoid secondhand smoke.  Avoid triggers that can make your breathing worse such as very cold air.    Teach Back: Helping You Understand   The Teach Back Method helps you understand the information we are giving you. After you talk with the staff, tell them in your own words what you learned. This helps to make sure the staff has described each thing clearly. It also helps to explain things that may have been confusing. Before going home, make sure you can do these:  I can tell you about my procedure.  I can tell you how I will care for my trach and how to clean the cannula.  I can tell you how to suction and change my trach.  I can tell you what I will do if I have trouble breathing or cannot replace my trach tube.        IF URGENT POSTOPERATIVE CONCERNS/QUESTIONS CALL THE  [phone] AND ASK FOR ENT ON CALL.      Follow up appointment  - Your follow-up appointment with ENT will be in 1-2 weeks.   - If you do not know your f/u appt date or do not receive a call/notification about your follow-up appointment within 5 days following discharge, please call the clinic during business hours at [phone].    Medications/Pain control  - Alternate the use of acetaminophen (Tylenol®) and ibuprofen (Advil®, Motrin®) every 4-6 hours to control your pain (unless otherwise instructed not to take these pain medications by another provider). A low-grade fever (101 degrees or less) following surgery may occur and should be treated with acetaminophen. Follow the directions on the bottle. If the fever persists (more than 2 days) or is greater than 102 degrees, call our office.   - For pain that continues despite over the counter pain medication, take your narcotic pain medication (Ex: Norco, Hydrocodone, Oxycodone).   - Constipation is common especially when taking narcotic pain medication. You should drink plenty of liquids and eat a diet high in fiber. You  may take a stool softener for a short time while on the pain medication. Avoid laxatives.  - Do not drive a car, operate machinery/power tools, drink alcohol including beer, make important decisions or sign legal documents while using narcotic pain medication.  - If prescribed, please take your antibiotic until all of the pills are finished  - avoid taking antibiotics on an empty stomach to prevent nausea/vomiting    Diet  - You should not take anything by mouth until instructed by Dr. Ventura  - You should continue your home regimen of tube feeds    Activity  - Please do not lift anything heavier than 10 pounds (2 gallons of milk) for 2 weeks  - No straining or exercising for 2 weeks  - Continue your regular activities as you can tolerate them   - Walking around and moving about is strongly encouraged     Wound care  - OK to shower (if going home with drain see below), but do not abrasively rub your incision site  - Avoid swimming or soaking (including submerging in bathtub) your incision  - You can shower like normal and get your incisions wet and soapy. Do not let water get into your tracheostomy site.     When to call the clinic or return to a hospital/ED  - If there's any redness or drainage from your incision, concern for significant bleeding, or if you have fever greater than 101.5, call the office immediately or go to the emergency room.  - If you have increased swelling around your neck or difficulty breathing, report to the nearest emergency room.

## 2024-03-25 NOTE — PLAN OF CARE
[facility]  Discharge Final Note    Primary Care Provider: Young Lanier MD    Expected Discharge Date: 3/25/2024    Final Discharge Note (most recent)       Final Note - 03/25/24 1108          Final Note    Assessment Type Final Discharge Note     Anticipated Discharge Disposition Home-Health Care [facility]     Hospital Resources/Appts/Education Provided Provided patient/caregiver with written discharge plan information        Post-Acute Status    Post-Acute Authorization Home Health;IV Infusion     Home Health Status Set-up Complete/Auth obtained     IV Infusion Status Set-up Complete/Auth obtained     Patient choice form signed by patient/caregiver List with quality metrics by geographic area provided     Discharge Delays None known at this time                     Important Message from Medicare  Important Message from Medicare regarding Discharge Appeal Rights: Given to patient/caregiver, Explained to patient/caregiver, Signed/date by patient/caregiver     Date IMM was signed: 03/25/24  Time IMM was signed: 0956      Pt to d.c with family. [facility] to resume care on 3/27/23. [facility] last supplied pt on 3/11, continuing to following for home needs. All trach supplies and home suction still vj;id from last admission. No additional needs at this time.     Kerri Cuba LCSW  Case Management/[facility]  [phone]

## 2024-03-25 NOTE — ASSESSMENT & PLAN NOTE
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 Small bleeding episode 3/19 but no source identified. Presented again 3/20 with large volume bleed. CTA Neck concerning for left ECA system bleed. Now s/p embolization with IR, no further bleeding. S/p OR on 3/22 for debridement of native tongue and neck exploration. No further bleeding since these interventions.    - General surgery consult to address pain around g-tube site  - BID neck packing with wet to dry  - Routine tracheostomy care   - Continue bolus tube feeds   - Page ENT with questions or concerns

## 2024-03-25 NOTE — PLAN OF CARE
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 Sae Sandhu      Moderate malnutrition [E44.0]  Yes    Oral bleeding [K13.79]  Yes    Hyponatremia [E87.1]  Yes    Acute on chronic blood loss anemia [D62]  Yes    Hypoalbuminemia [E88.09]  Yes    S/P percutaneous endoscopic gastrostomy (PEG) tube placement [Z93.1]  Not Applicable    Status post tracheostomy [Z93.0]  Not Applicable    Hx of glossectomy [Z90.49]  Not Applicable    Primary hypertension [I10]  Yes    Oropharyngeal mass [J39.2]  Yes    Coronary artery calcification [I25.10, I25.84]  Yes    Hyperlipidemia [E78.5]  Yes      Resolved Hospital Problems   No resolved problems to display.       Follow up Appointment: next week    I have seen and examined this patient face to face today. My clinical findings that support the need for the home health skilled services and home bound status are the following:  Weakness/numbness causing balance and gait disturbance due to malignancy/cancer and surgery making it taxing to leave home.  Medical restrictions requiring assistance of another human to leave home due to  unstable ambulation, post surgery monitoring and newly placed g-tube/ostomy and trach.    Allergies:  Review of patient's allergies indicates:  No Known Allergies    Diet: NPO, tube feeds    [place] Peptide 1.5 daily or equivalent ( 4 containers) daily + 1 syringe water flush before and 2 syringe water flush after each feed     Activities: Light activity only. No heavy lifting, straining, stooping, exercising.       Nursing:   SN to complete comprehensive assessment including  routine vital signs. Instruct on disease process and s/s of complications to report to MD. Review/verify medication list sent home with the patient at time of discharge  and instruct patient/caregiver as needed. Frequency may be adjusted depending on start of care date.    Notify MD if SBP > 160 or < 90; DBP > 90 or < 50; HR > 120 or < 50; Temp > 101;      CONSULTS:      Physical Therapy to evaluate and treat. Evaluate for home safety and equipment needs; Establish/upgrade home exercise program. Perform / instruct on therapeutic exercises, gait training, transfer training, and Range of Motion.  Work on shoulder abduction and trapezius strength.    Occupational Therapy to evaluate and treat. Evaluate home environment for safety and equipment needs. Perform/Instruct on transfers, ADL training, ROM, and therapeutic exercises.  Work on shoulder abduction and trapezius strength.    Speech Therapy  to evaluate and treat for language and swallowing.      MISCELLANEOUS CARE:  PEG Care:  Instruct patient/caregiver to clean site.  Monitor skin integrity.     Tracheostomy Care: Trach care BID.  Change inner cannula daily.  Suction prn.  Suction machine, suction tubing, extra #6 shiley 6CN75H 2 per month, inner cannulas, trach collars, yankauer suction catheters, 14F soft suction catheters, saline flushes, humidifier.      WOUND CARE ORDERS  No heavy lifting > 10 lbs x 2 weeks. Keep incision clean and dry. Keep open to air .Okay to get wet. DO NOT scrub, soak, or submerge incision. Pat to dry. Apply bacitracin ointment to incision BID prn.       Medications: Review discharge medications with patient and family and provide education.       atorvastatin  80 mg Per G Tube Daily    enoxparin  40 mg Subcutaneous Q24H (prophylaxis, 1700)    ferrous sulfate  300 mg Per G Tube Every other day    silodosin  4 mg Per G Tube Daily     acetaminophen, guaiFENesin 100 mg/5 ml, melatonin, ondansetron, oxyCODONE     Medication List         START taking these medications      ferrous sulfate 300 mg (60 mg iron)/5 mL syrup  5 mLs (300 mg total) by Per G Tube route every other day.  Start taking on: March 26, 2024  Replaces: ferrous sulfate 220 mg (44 mg iron)/5 mL Elix            CONTINUE taking these medications      acetaminophen 650 mg/20.3 mL Soln  Commonly known as: TYLENOL  20.3 mLs (650 mg total) by Per G Tube route every 6 (six) hours as needed for Pain.     aspirin 81 MG EC tablet  Commonly known as: ECOTRIN     atorvastatin 80 MG tablet  Commonly known as: LIPITOR  1 tablet (80 mg total) by Per G Tube route once daily.     calcium carbonate 600 mg calcium (1,500 mg) Tab  Commonly known as: OS-HERMES     GAVILAX 17 gram/dose powder  Generic drug: polyethylene glycol  Use cap to measure 17 grams, mix in liquid and take by Per G Tube route once daily.     NON FORMULARY MEDICATION     OMEGA-3 2100 ORAL     silodosin 4 mg Cap capsule  Commonly known as: RAPAFLO  Take 1 capsule (4 mg total) by mouth once daily. Open capsule, place in water and administer into feeding tube     STOOL SOFTENER-LAXATIVE 8.6-50 mg per tablet  Generic drug: senna-docusate 8.6-50 mg  1 tablet by Per G Tube route 2 (two) times daily.     THERA-TABS tablet  Generic drug: multivitamin  1 tablet by Per G Tube route once daily.     VITAMIN C 100 MG tablet  Generic drug: ascorbic acid (vitamin C)     VITAMIN D ORAL     vitamin E 100 UNIT capsule            STOP taking these medications      ferrous sulfate 220 mg (44 mg iron)/5 mL Elix  Commonly known as: FEROSUL  Replaced by: ferrous sulfate 300 mg (60 mg iron)/5 mL syrup            ASK your doctor about these medications      amLODIPine 2.5 MG tablet  Commonly known as: NORVASC  1 tablet (2.5 mg total) by Per G Tube route once daily.     ezetimibe 10 mg tablet  Commonly known as: ZETIA  1 tablet (10 mg total) by Per G Tube route once daily.     thiamine 100 MG tablet  1 tablet (100 mg total) by Per G Tube route once daily.                Where to Get Your Medications        These medications [facility]  [address] [facility] [address]      Hours: Mon-Fri 7a-7p, Sat-Sun 10a-4p Phone: [phone]   ferrous sulfate 300 mg (60 mg iron)/5 mL syrup         I certify that this patient is confined to home and needs nursing care, physical therapy, speech therapy and occupational therapy.    Trixie Sandy MD  ENT- Division of Head and Neck Surgical Oncology

## 2024-03-25 NOTE — CONSULTS
PEG placed 1 month ago. Bumper 2.5 cm at the skin. Causing discomfort, especially when coughing - or otherwise flexing the abdominal wall muscles. Found to be too tight on exam. I loosened the bumper to 3.5 cm and MrMike Rudolph found this to be more comfortable.     Crow Lincoln MD  General Surgery Resident, PGY-3

## 2024-03-25 NOTE — TELEPHONE ENCOUNTER
----- Message from Trixie Sandy MD sent at 3/25/2024  9:18 AM CDT -----  Regarding: Appt  Hello, can you schedule this patient next week with Dr. Ventura please? Thanks!

## 2024-03-25 NOTE — HOSPITAL COURSE
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 Small bleeding episode 3/19 but no source identified. Presented again 3/20 with large volume bleed. CTA Neck concerning for left ECA system bleed. Now s/p embolization with IR, no further bleeding. S/p OR on 3/22 for debridement of native tongue and neck exploration. No further bleeding since these interventions. He did have some neck packing placed at the time of surgery on 3/22, but this tract healed well and the packing was removed prior to discharge.

## 2024-03-25 NOTE — NURSING
Pt AAO x 4, communicates via communication board.  Pt ambulatory & VS stable. Pt IV, tele box & continuous pulse ox monitor removed.  Pt provided discharge instructions & acknowledges understanding.

## 2024-03-25 NOTE — PLAN OF CARE
Problem: Adult Inpatient Plan of Care  Goal: Plan of Care Review  Outcome: Ongoing, Progressing  Goal: Patient-Specific Goal (Individualized)  Outcome: Ongoing, Progressing  Goal: Absence of Hospital-Acquired Illness or Injury  Outcome: Ongoing, Progressing  Goal: Optimal Comfort and Wellbeing  Outcome: Ongoing, Progressing  Goal: Readiness for Transition of Care  Outcome: Ongoing, Progressing   [facility] Plan of Care Note  Dx oral bleeding     Shift Events none     Goals of Care: patent airway, VS and lab WDL     Neuro: AAOx4     Vital Signs: stable     Respiratory: trach #6 Shiley- stayed on RA- SpO2 93-98%- coughed frequently while he was awake- moderate amount thick white-tan sputum through trach collar     Diet: NPO + TF - bolus feed- goal  4cans/ a day- pt did the feeding himself- 1 can with night shift     Is patient tolerating current diet? Tolerated TF well- no nausea     GTTS: none     Urine Output/Bowel Movement:1 BM, urinated without difficulty      Drains/Tubes/Tube Feeds (include total output/shift): PEG tube for TF     Lines: 1 PIV        Accuchecks:none     Skin: right groin puncture incision healed DESHAUN CDI; neck incision with gauze in place underneath the trach tie     Fall Risk Score: 6     Activity level? Up ad meryl     Any scheduled procedures? none     Any safety concerns? Fall risk+ aspiration precaution     Other:refused SCD

## 2024-03-25 NOTE — SUBJECTIVE & OBJECTIVE
Interval History: Doing okay this morning. No issues overnight. Reports continued pain around g-tube site.    Medications:  Continuous Infusions:  Scheduled Meds:   atorvastatin  80 mg Per G Tube Daily    enoxparin  40 mg Subcutaneous Q24H (prophylaxis, 1700)    ferrous sulfate  300 mg Per G Tube Every other day    silodosin  4 mg Per G Tube Daily     PRN Meds:acetaminophen, guaiFENesin 100 mg/5 ml, melatonin, ondansetron, oxyCODONE     Review of patient's allergies indicates:  No Known Allergies  Objective:     Vital Signs (24h Range):  Temp:  [97.6 °F (36.4 °C)-98.3 °F (36.8 °C)] 97.8 °F (36.6 °C)  Pulse:  [64-92] 68  Resp:  [18-19] 18  SpO2:  [89 %-100 %] 95 %  BP: (108-115)/(56-72) 109/69       Lines/Drains/Airways       Drain  Duration                  Gastrostomy/Enterostomy 02/23/24 0750 Percutaneous endoscopic gastrostomy (PEG) LUQ feeding 30 days              Airway  Duration             Adult Surgical Airway Shiley 6.0/ 75mm -- days              Peripheral Intravenous Line  Duration                  Peripheral IV - Single Lumen 03/20/24 0219 18 G Right Antecubital 5 days                     Physical Exam  NAD, communicates with writing board  Native tongue with expected fibrinous debris  No bleeding from nose or mouth  6-0 cuffed trach in place, secured w soft collar. No peristomal bleeding.   Neck with doughy edema, post radiation  Former neck incision intact, small incision on the left side with cavity that tunnels towards mandible, packed with 1/2 inch strip gauze   Chest incision intact   Chest is soft, right bulk tunneling to neck 2/2 pec flap, soft to palpation, no fluctuance      Significant Labs:  CBC:   Recent Labs   Lab 03/24/24  0435   WBC 6.65   RBC 2.66*   HGB 7.9*   HCT 25.1*      MCV 94   MCH 29.7   MCHC 31.5*     CMP:   Recent Labs   Lab 03/24/24  0435   GLU 91   CALCIUM 8.8   ALBUMIN 2.3*   PROT 5.2*      K 4.1   CO2 27      BUN 22   CREATININE 0.7   ALKPHOS 70   ALT 32    AST 19   BILITOT 0.2       Significant Diagnostics:  None

## 2024-03-25 NOTE — RESPIRATORY THERAPY
"RAPID RESPONSE RESPIRATORY THERAPY PROACTIVE NOTE           Time of visit: 931     Code Status: Full Code   : 1955  Bed: [facility]6/1046 A:   MRN: [id number]  Time spent at the bedside: < 15 min    SITUATION    Evaluated patient for: LDA Check     BACKGROUND    Patient has a past medical history of Acute on chronic blood loss anemia, Cancer, Hyperlipidemia, and Hypertension.  Clinically Significant Surgical Hx: tracheostomy    24 Hours Vitals Range:  Temp:  [97.6 °F (36.4 °C)-98.3 °F (36.8 °C)]   Pulse:  [63-92]   Resp:  [18-19]   BP: (105-115)/(56-72)   SpO2:  [89 %-100 %]     Labs:    Recent Labs     24  0311 24  0435 24  0557    137 135*   K 4.0 4.1 4.3    104 105   CO2 24 27 26   BUN 19 22 17   CREATININE 0.6 0.7 0.7   * 91 91   PHOS 4.2 4.1 4.2   MG 2.1 2.1 2.1        No results for input(s): "PH", "PCO2", "PO2", "HCO3", "POCSATURATED", "BE" in the last 72 hours.    ASSESSMENT/INTERVENTIONS  Patient resting comfortably. No respiratory concerns at this time.      Last VS   Temp: 97.7 °F (36.5 °C) ( 0756)  Pulse: 63 ( 0756)  Resp: 18 ( 0756)  BP: 105/64 ( 0756)  SpO2: 96 % ( 0800)      Extra trachs at bedside: #4 Shiley Cuffed, #6 Shiley Cuffed  Level of Consciousness: Level of Consciousness (AVPU): alert  Respiratory Effort: Respiratory Effort: Unlabored Expansion/Accessory Muscle Usage: Expansion/Accessory Muscles/Retractions: no use of accessory muscles, no retractions, expansion symmetric  All Lung Field Breath Sounds: All Lung Fields Breath Sounds: Anterior:, Lateral:, coarse  O2 Device/Concentration: RA  Surgical airway: Yes, Type: Shiley Size: 6, cuffed  Ambu at bedside:       Active Orders   Respiratory Care    Oxygen Continuous     Frequency: Continuous     Number of Occurrences: Until Specified     Order Questions:      Device type: Low flow      Device: Trach Collar      FiO2%: 28      Titrate O2 per Oxygen Titration Protocol: Yes      To " maintain SpO2 goal of: >= 90%      Notify MD of: Inability to achieve desired SpO2; Sudden change in patient status and requires 20% increase in FiO2; Patient requires >60% FiO2    Pulse Oximetry Continuous     Frequency: Continuous     Number of Occurrences: Until Specified    Routine tracheostomy care     Frequency: BID     Number of Occurrences: Until Specified       RECOMMENDATIONS    We recommend: RRT Recs: Continue POC per primary team.      FOLLOW-UP    Please call back the Rapid Response RT, Lacey Luu RRT at x [phone] for any questions or concerns.

## 2024-03-25 NOTE — DISCHARGE SUMMARY
[facility]  Otorhinolaryngology-Head & Neck Surgery  Discharge Summary      Patient Name: Timothy GREENE Rudolph  MRN: [id number]  Admission Date: 3/20/2024  Hospital Length of Stay: 5 days  Discharge Date and Time:  03/25/2024 9:16 AM  Attending Physician: Jeferson Ventura MD   Discharging Provider: Trixie Sandy MD  Primary Care Provider: Young Lanier MD    HPI:   Pt is a 68M well know to ENT service, [facility] SCC of oropharynx who is s/p subtotal glossectomy, bilateral neck dissection, pectoralis rotational flap reconstruction, and tracheostomy 2/23/24. Was discharged from the hospital appx 2 weeks ago, has had regular outpatient follow up w Dr. Ventura. He was seen in clinic earlier this week and his trach was exchanged for a 6-0 cuffed and inflated for airway protection. He presented to the ED last night for small volume hempotysis, scope was negative and did not reveal a source of bleeding.     He returns to the ED tonight BIBEMS after waking up with large volume hemoptysis of reportedly bright red blood. Per ED, he was covered in bright red blood on arrival. The ED overinflated his cuff and the bleed eventually slowed down. ENT called for evaluation.        Procedure(s) (LRB):  DEBRIDEMENT, WOUND (N/A)      Indwelling Lines/Drains at time of discharge:   Lines/Drains/Airways       Drain  Duration                  Gastrostomy/Enterostomy 02/23/24 0750 Percutaneous endoscopic gastrostomy (PEG) LUQ feeding 31 days              Airway  Duration             Adult Surgical Airway 02/23/24 1200 Shiley Cuffed 6.0/ 75mm 30 days                  Hospital Course: The patient is a 69 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 Small bleeding episode 3/19 but no source identified. Presented again 3/20 with large volume bleed. CTA Neck concerning for left ECA  system bleed. Now s/p embolization with IR, no further bleeding. S/p OR on 3/22 for debridement of native tongue and neck exploration. No further bleeding since these interventions. He did have some neck packing placed at the time of surgery on 3/22, but this tract healed well and the packing was removed prior to discharge.    Goals of Care Treatment Preferences:  Code Status: Full Code      Consults:   Consults (From admission, onward)          Status Ordering Provider     Inpatient consult to General Surgery  Once        Provider:  (Not yet assigned)    Completed EVIE WILEY     Inpatient consult to Interventional Radiology  Once        Provider:  (Not yet assigned)    Completed CRISTÓBAL DANIEL     Inpatient consult to ENT  Once        Provider:  (Not yet assigned)    Completed JACEK FERNÁNDEZ            Significant Diagnostic Studies: see progress notes    Pending Diagnostic Studies:       Procedure Component Value Units Date/Time    Specimen to Pathology, Surgery ENT [[id number]] Collected: 03/22/24 0902    Order Status: Sent Lab Status: In process Updated: 03/22/24 1143    Specimen: Tissue           Final Active Diagnoses:    Diagnosis Date Noted POA    PRINCIPAL PROBLEM:  Tongue cancer [C02.9] 06/29/2015 Yes    Moderate malnutrition [E44.0] 03/21/2024 Yes    Oral bleeding [K13.79] 03/20/2024 Yes    Hyponatremia [E87.1] 03/19/2024 Yes    Acute on chronic blood loss anemia [D62] 03/19/2024 Yes    Hypoalbuminemia [E88.09] 02/26/2024 Yes    S/P percutaneous endoscopic gastrostomy (PEG) tube placement [Z93.1] 02/24/2024 Not Applicable    Status post tracheostomy [Z93.0] 02/23/2024 Not Applicable    Hx of glossectomy [Z90.49] 02/23/2024 Not Applicable    Primary hypertension [I10] 01/24/2024 Yes    Oropharyngeal mass [J39.2] 12/11/2023 Yes    Coronary artery calcification [I25.10, I25.84] 10/30/2023 Yes    Hyperlipidemia [E78.5] 10/17/2012 Yes      Problems Resolved During this Admission:       Discharged Condition: good    Disposition: Home or Self Care    Follow Up:    Patient Instructions:      Diet NPO     Medications:  Reconciled Home Medications:      Medication List        START taking these medications      ferrous sulfate 300 mg (60 mg iron)/5 mL syrup  5 mLs (300 mg total) by Per G Tube route every other day.  Start taking on: March 26, 2024  Replaces: ferrous sulfate 220 mg (44 mg iron)/5 mL Elix            CONTINUE taking these medications      acetaminophen 650 mg/20.3 mL Soln  Commonly known as: TYLENOL  20.3 mLs (650 mg total) by Per G Tube route every 6 (six) hours as needed for Pain.     aspirin 81 MG EC tablet  Commonly known as: ECOTRIN  Take 81 mg by mouth once daily.     atorvastatin 80 MG tablet  Commonly known as: LIPITOR  1 tablet (80 mg total) by Per G Tube route once daily.     calcium carbonate 600 mg calcium (1,500 mg) Tab  Commonly known as: OS-HERMES  Take 600 mg by mouth once daily.     GAVILAX 17 gram/dose powder  Generic drug: polyethylene glycol  Use cap to measure 17 grams, mix in liquid and take by Per G Tube route once daily.     NON FORMULARY MEDICATION  Prevegan once daily     OMEGA-3 2100 ORAL  Take 1 capsule by mouth once daily.     silodosin 4 mg Cap capsule  Commonly known as: RAPAFLO  Take 1 capsule (4 mg total) by mouth once daily. Open capsule, place in water and administer into feeding tube     STOOL SOFTENER-LAXATIVE 8.6-50 mg per tablet  Generic drug: senna-docusate 8.6-50 mg  1 tablet by Per G Tube route 2 (two) times daily.     THERA-TABS tablet  Generic drug: multivitamin  1 tablet by Per G Tube route once daily.     VITAMIN C 100 MG tablet  Generic drug: ascorbic acid (vitamin C)  Take 100 mg by mouth once daily.     VITAMIN D ORAL  Take 1 tablet by mouth once daily.     vitamin E 100 UNIT capsule  Take 100 Units by mouth once daily.            STOP taking these medications      ferrous sulfate 220 mg (44 mg iron)/5 mL Elix  Commonly known as:  FEROSUL  Replaced by: ferrous sulfate 300 mg (60 mg iron)/5 mL syrup            ASK your doctor about these medications      amLODIPine 2.5 MG tablet  Commonly known as: NORVASC  1 tablet (2.5 mg total) by Per G Tube route once daily.     ezetimibe 10 mg tablet  Commonly known as: ZETIA  1 tablet (10 mg total) by Per G Tube route once daily.     thiamine 100 MG tablet  1 tablet (100 mg total) by Per G Tube route once daily.            Time spent on the discharge of patient: 20 minutes    Trixie Sandy MD  Otorhinolaryngology-Head & Neck Surgery  [facility]

## 2024-03-25 NOTE — PROGRESS NOTES
[facility]  Otorhinolaryngology-Head & Neck Surgery  Progress Note    Subjective:     Post-Op Info:  Procedure(s) (LRB):  DEBRIDEMENT, WOUND (N/A)   3 Days Post-Op  Hospital Day: 6     Interval History: Doing okay this morning. No issues overnight. Reports continued pain around g-tube site.    Medications:  Continuous Infusions:  Scheduled Meds:   atorvastatin  80 mg Per G Tube Daily    enoxparin  40 mg Subcutaneous Q24H (prophylaxis, 1700)    ferrous sulfate  300 mg Per G Tube Every other day    silodosin  4 mg Per G Tube Daily     PRN Meds:acetaminophen, guaiFENesin 100 mg/5 ml, melatonin, ondansetron, oxyCODONE     Review of patient's allergies indicates:  No Known Allergies  Objective:     Vital Signs (24h Range):  Temp:  [97.6 °F (36.4 °C)-98.3 °F (36.8 °C)] 97.8 °F (36.6 °C)  Pulse:  [64-92] 68  Resp:  [18-19] 18  SpO2:  [89 %-100 %] 95 %  BP: (108-115)/(56-72) 109/69       Lines/Drains/Airways       Drain  Duration                  Gastrostomy/Enterostomy 02/23/24 0750 Percutaneous endoscopic gastrostomy (PEG) LUQ feeding 30 days              Airway  Duration             Adult Surgical Airway Shiley 6.0/ 75mm -- days              Peripheral Intravenous Line  Duration                  Peripheral IV - Single Lumen 03/20/24 0219 18 G Right Antecubital 5 days                     Physical Exam  NAD, communicates with writing board  Native tongue with expected fibrinous debris  No bleeding from nose or mouth  6-0 cuffed trach in place, secured w soft collar. No peristomal bleeding.   Neck with doughy edema, post radiation  Former neck incision intact, small incision on the left side with cavity that tunnels towards mandible, packed with 1/2 inch strip gauze   Chest incision intact   Chest is soft, right bulk tunneling to neck 2/2 pec flap, soft to palpation, no fluctuance      Significant Labs:  CBC:   Recent Labs   Lab 03/24/24  0435   WBC 6.65   RBC 2.66*   HGB 7.9*   HCT 25.1*      MCV 94   MCH 29.7    MCHC 31.5*     CMP:   Recent Labs   Lab 03/24/24  0435   GLU 91   CALCIUM 8.8   ALBUMIN 2.3*   PROT 5.2*      K 4.1   CO2 27      BUN 22   CREATININE 0.7   ALKPHOS 70   ALT 32   AST 19   BILITOT 0.2       Significant Diagnostics:  None  Assessment/Plan:     * Oral bleeding  The patient is a 69 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 Small bleeding episode 3/19 but no source identified. Presented again 3/20 with large volume bleed. CTA Neck concerning for left ECA system bleed. Now s/p embolization with IR, no further bleeding. S/p OR on 3/22 for debridement of native tongue and neck exploration. No further bleeding since these interventions.    - General surgery consult to address pain around g-tube site  - BID neck packing with wet to dry  - Routine tracheostomy care   - Continue bolus tube feeds   - Page ENT with questions or concerns         Trixie Sandy MD  Otorhinolaryngology-Head & Neck Surgery  [facility]

## 2024-03-26 LAB
FINAL PATHOLOGIC DIAGNOSIS: NORMAL
GROSS: NORMAL
Lab: NORMAL

## 2024-03-27 ENCOUNTER — PATIENT MESSAGE (OUTPATIENT)
Dept: OTOLARYNGOLOGY | Facility: CLINIC | Age: 69
End: 2024-03-27
Payer: MEDICARE

## 2024-03-28 DIAGNOSIS — C02.9 TONGUE CANCER: Primary | ICD-10-CM

## 2024-03-28 RX ORDER — ALPRAZOLAM 0.5 MG/1
0.5 TABLET ORAL 3 TIMES DAILY
Qty: 90 TABLET | Refills: 0 | Status: SHIPPED | OUTPATIENT
Start: 2024-03-28 | End: 2024-06-14

## 2024-04-01 ENCOUNTER — OFFICE VISIT (OUTPATIENT)
Dept: OTOLARYNGOLOGY | Facility: CLINIC | Age: 69
End: 2024-04-01
Payer: MEDICARE

## 2024-04-01 VITALS — DIASTOLIC BLOOD PRESSURE: 68 MMHG | SYSTOLIC BLOOD PRESSURE: 108 MMHG

## 2024-04-01 DIAGNOSIS — Z98.890 POST-OPERATIVE STATE: ICD-10-CM

## 2024-04-01 DIAGNOSIS — Z90.49 HX OF GLOSSECTOMY: ICD-10-CM

## 2024-04-01 DIAGNOSIS — C02.9 TONGUE CANCER: Primary | ICD-10-CM

## 2024-04-01 PROCEDURE — 1159F MED LIST DOCD IN RCRD: CPT | Mod: CPTII,S$GLB,, | Performed by: PHYSICIAN ASSISTANT

## 2024-04-01 PROCEDURE — 99999 PR PBB SHADOW E&M-EST. PATIENT-LVL III: CPT | Mod: PBBFAC,,, | Performed by: PHYSICIAN ASSISTANT

## 2024-04-01 PROCEDURE — 3078F DIAST BP <80 MM HG: CPT | Mod: CPTII,S$GLB,, | Performed by: PHYSICIAN ASSISTANT

## 2024-04-01 PROCEDURE — 3074F SYST BP LT 130 MM HG: CPT | Mod: CPTII,S$GLB,, | Performed by: PHYSICIAN ASSISTANT

## 2024-04-01 PROCEDURE — 1160F RVW MEDS BY RX/DR IN RCRD: CPT | Mod: CPTII,S$GLB,, | Performed by: PHYSICIAN ASSISTANT

## 2024-04-01 PROCEDURE — 99024 POSTOP FOLLOW-UP VISIT: CPT | Mod: S$GLB,,, | Performed by: PHYSICIAN ASSISTANT

## 2024-04-01 NOTE — PROGRESS NOTES
HEAD AND NECK SURGICAL ONCOLOGY CLINIC NOTE    CC: Follow-up post op    TREATMENT HISTORY:  1. DEBRIDEMENT, WOUND (N/A), March 22, 2024  - Dr Ventura  2. GLOSSECTOMY (Right), DISSECTION, NECK (Bilateral), TRACHEOTOMY (N/A), CREATION, FLAP, MUSCLE ROTATION (N/A), INSERTION, PEG TUBE (Right) - February 23, 2024 - Dr Ventura  3. LARYNGOSCOPY, DIRECT, WITH BRONCHOSCOPY (N/A) - December 11, 2023 - Dr Poole    Pathology from March 22, 2024:   REMAINING PORTION OF TONGUE:   WIDESPREAD ADVANCED NECROSIS   NO RESIDUAL CARCINOMA IDENTIFIED     Oncology History   Tongue cancer   6/29/2015 Initial Diagnosis    Tongue cancer     2/19/2024 - 2/19/2024 Chemotherapy    Treatment Summary   Plan Name: OP pembrolizumab 400mg Q6W  Treatment Goal: Control  Status: Inactive  Start Date:   End Date:   Provider: Vincent Reid MD  Chemotherapy: [No matching medication found in this treatment plan]     2/19/2024 Cancer Staged    Staging form: Oral Cavity, AJCC 8th Edition  - Clinical: Stage II (cT2, cN0, cM0)           INTERVAL HISTORY:  He did have some packing to the neck after surgery, tract closed up and packing was removed prior to discharge. He was having some anxiety after most recent surgery - rx for xanax sent by Dr Ventura. Coughing up thick clear mucus from trach, no fever, SOB. Not currently on abx. Post op pain is improving. No bleeding recurrence. Has Home Health services that are coming out today.      PHYSICAL EXAM:  Trach in place - coughing up clear mucus. There is a small wound noted to the L anterior neck just above trach collar - no bleeding, drainage, erythema, warmth. Appears to be healing well. Oropharynx pink, no area of necrosis.      PROCEDURE:  Trach changed to a 6 uncuffed.      PLAN:  Follow up in clinic for post op appt with Dr Ventura in 2 weeks. Modified Baruim Swallow study ordered today.

## 2024-04-04 ENCOUNTER — OUTPATIENT CASE MANAGEMENT (OUTPATIENT)
Dept: ADMINISTRATIVE | Facility: OTHER | Age: 69
End: 2024-04-04
Payer: MEDICARE

## 2024-04-04 NOTE — PROGRESS NOTES
HUGO contacted patient / Caregiver Ms. Hull regarding referral. HUGO explained to Ms. Hull reason for referral to assist with social needs. Ms. Hull denied patient has any needs. SW will close case as no needs.

## 2024-04-09 ENCOUNTER — PATIENT MESSAGE (OUTPATIENT)
Dept: OTOLARYNGOLOGY | Facility: CLINIC | Age: 69
End: 2024-04-09
Payer: MEDICARE

## 2024-04-10 ENCOUNTER — PATIENT MESSAGE (OUTPATIENT)
Dept: SPEECH THERAPY | Facility: HOSPITAL | Age: 69
End: 2024-04-10
Payer: MEDICARE

## 2024-04-11 PROBLEM — R04.2 HEMOPTYSIS: Status: ACTIVE | Noted: 2024-04-11

## 2024-04-18 ENCOUNTER — OFFICE VISIT (OUTPATIENT)
Dept: OTOLARYNGOLOGY | Facility: CLINIC | Age: 69
End: 2024-04-18
Payer: MEDICARE

## 2024-04-18 ENCOUNTER — HOSPITAL ENCOUNTER (OUTPATIENT)
Dept: RADIOLOGY | Facility: HOSPITAL | Age: 69
Discharge: HOME | End: 2024-04-18
Attending: PHYSICIAN ASSISTANT
Payer: MEDICARE

## 2024-04-18 ENCOUNTER — CLINICAL SUPPORT (OUTPATIENT)
Dept: SPEECH THERAPY | Facility: HOSPITAL | Age: 69
End: 2024-04-18
Payer: MEDICARE

## 2024-04-18 ENCOUNTER — DOCUMENT SCAN (OUTPATIENT)
Dept: HOME HEALTH SERVICES | Facility: HOSPITAL | Age: 69
End: 2024-04-18
Payer: MEDICARE

## 2024-04-18 VITALS
HEART RATE: 64 BPM | HEIGHT: 72 IN | BODY MASS INDEX: 21.59 KG/M2 | SYSTOLIC BLOOD PRESSURE: 109 MMHG | WEIGHT: 159.38 LBS | DIASTOLIC BLOOD PRESSURE: 70 MMHG

## 2024-04-18 DIAGNOSIS — C02.9 TONGUE CANCER: Primary | ICD-10-CM

## 2024-04-18 DIAGNOSIS — Z90.49 HX OF GLOSSECTOMY: ICD-10-CM

## 2024-04-18 DIAGNOSIS — Z98.890 POST-OPERATIVE STATE: ICD-10-CM

## 2024-04-18 DIAGNOSIS — R13.12 DYSPHAGIA, OROPHARYNGEAL: Primary | ICD-10-CM

## 2024-04-18 DIAGNOSIS — C02.9 TONGUE CANCER: ICD-10-CM

## 2024-04-18 PROCEDURE — 1126F AMNT PAIN NOTED NONE PRSNT: CPT | Mod: CPTII,S$GLB,, | Performed by: OTOLARYNGOLOGY

## 2024-04-18 PROCEDURE — 74230 X-RAY XM SWLNG FUNCJ C+: CPT | Mod: TC

## 2024-04-18 PROCEDURE — 74230 X-RAY XM SWLNG FUNCJ C+: CPT | Mod: 26,,, | Performed by: RADIOLOGY

## 2024-04-18 PROCEDURE — 1159F MED LIST DOCD IN RCRD: CPT | Mod: CPTII,S$GLB,, | Performed by: OTOLARYNGOLOGY

## 2024-04-18 PROCEDURE — 99024 POSTOP FOLLOW-UP VISIT: CPT | Mod: S$GLB,,, | Performed by: OTOLARYNGOLOGY

## 2024-04-18 PROCEDURE — 25500020 PHARM REV CODE 255: Performed by: PHYSICIAN ASSISTANT

## 2024-04-18 PROCEDURE — 3078F DIAST BP <80 MM HG: CPT | Mod: CPTII,S$GLB,, | Performed by: OTOLARYNGOLOGY

## 2024-04-18 PROCEDURE — 92611 MOTION FLUOROSCOPY/SWALLOW: CPT | Mod: GN | Performed by: SPEECH-LANGUAGE PATHOLOGIST

## 2024-04-18 PROCEDURE — 3074F SYST BP LT 130 MM HG: CPT | Mod: CPTII,S$GLB,, | Performed by: OTOLARYNGOLOGY

## 2024-04-18 PROCEDURE — 99999 PR PBB SHADOW E&M-EST. PATIENT-LVL III: CPT | Mod: PBBFAC,,, | Performed by: OTOLARYNGOLOGY

## 2024-04-18 PROCEDURE — 1160F RVW MEDS BY RX/DR IN RCRD: CPT | Mod: CPTII,S$GLB,, | Performed by: OTOLARYNGOLOGY

## 2024-04-18 PROCEDURE — A9698 NON-RAD CONTRAST MATERIALNOC: HCPCS | Performed by: PHYSICIAN ASSISTANT

## 2024-04-18 RX ADMIN — BARIUM SULFATE 6 ML: 0.81 POWDER, FOR SUSPENSION ORAL at 02:04

## 2024-04-18 NOTE — PROGRESS NOTES
Timothy Galdamez presents roughly 1 month following resection of necrotic oral tongue and wound debridement.  Doing well overall.  He underwent modified barium swallow today but was advised to remain NPO per his report.  The official report is pending.  He has questions about decannulation.  Overall, he is doing quite well.      On exam, his neck incisions have healed nicely.  His pectoralis flap was well incorporated.  On flexible laryngoscopy, his airway is widely patent.  There are pooled secretions throughout the hypopharynx and overlying the larynx.      Assessment plan:  Doing well.  He has been tolerating his Passy Lc valve and is even slept with the on.  I kept his trach today and will have him return to see us on Monday of next week for decannulation if he is able to tolerate his cap.

## 2024-04-19 NOTE — PROGRESS NOTES
MODIFIED BARIUM SWALLOW STUDY    REASON FOR REFERRAL:  Timothy Galdamez, age 69, was referred by ANDRES Katz, head and neck surgical oncology, for a Modified Barium Swallow Study to rule out aspiration, assess his overall swallowing function, and determine whether any consistencies were safe for oral intake.  He was accompanied by his wife.    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  He returned to surgery 3/22/24 for debridement of necrotic remaining native tongue.    He remains NPO and presents today to determine swallowing function and baseline for therapeutic intervention.      MEDICAL HISTORY:  Past Medical History:   Diagnosis Date    Acute on chronic blood loss anemia 3/19/2024    Cancer     Hyperlipidemia     Hypertension         SURGICAL HISTORY:  Past Surgical History:   Procedure Laterality Date    ANKLE SURGERY      L ankle    BIOPSY OF TISSUE OF NECK Left 2013    COLONOSCOPY N/A 08/21/2017    Procedure: COLONOSCOPY;  Surgeon: Danny Jane MD;  Location: [facility] (4TH FLR);  Service: Endoscopy;  Laterality: N/A;  Do not cancel this order    CREATION OF MUSCLE ROTATIONAL FLAP N/A 2/23/2024    Procedure: CREATION, FLAP, MUSCLE ROTATION;  Surgeon: Jeferson Ventura MD;  [facility];  Service: ENT;  Laterality: N/A;    DIRECT LARYNGOBRONCHOSCOPY N/A 12/11/2023    Procedure: LARYNGOSCOPY, DIRECT, WITH BRONCHOSCOPY;  Surgeon: Micaela Poole MD;  [facility];  Service: ENT;  Laterality: N/A;    DISSECTION OF NECK Bilateral 2/23/2024    Procedure: DISSECTION, NECK;  Surgeon: Jeferson Ventura MD;  [facility];  Service: ENT;  Laterality: Bilateral;    EMBOLIZATION N/A 3/20/2024    Procedure: EMBOLIZATION, BLOOD VESSEL;  Surgeon:  SurgeonEdith;  Location: [facility];  Service: Anesthesiology;  Laterality: N/A;  lingual artery embo    ESOPHAGOGASTRODUODENOSCOPY N/A 09/18/2023    Procedure: EGD (ESOPHAGOGASTRODUODENOSCOPY);  Surgeon: Basilia iVllavicencio MD;  [facility];  Service: Gastroenterology;  Laterality: N/A;  9.13 instr sent via portal [facility]  pre call complete, stated he would have a ride -HH    GLOSSECTOMY Right 2/23/2024    Procedure: GLOSSECTOMY;  Surgeon: Jeferson Ventura MD;  Location: [facility] OR [facility] FLR;  Service: ENT;  Laterality: Right;    INSERTION, PEG TUBE Right 2/23/2024    Procedure: INSERTION, PEG TUBE;  Surgeon: Alpesh Wu MD;  Location: [facility] OR [facility] FLR;  Service: General;  Laterality: Right;    TONSILLECTOMY      TRACHEOTOMY N/A 2/23/2024    Procedure: TRACHEOTOMY;  Surgeon: Jeferson Ventura MD;  [facility];  Service: ENT;  Laterality: N/A;    TUMOR REMOVAL Right 2015    at [facility]    VASECTOMY      WOUND DEBRIDEMENT N/A 3/22/2024    Procedure: DEBRIDEMENT, WOUND;  Surgeon: Jeferson Ventura MD;  [facility];  Service: ENT;  Laterality: N/A;    WRIST FRACTURE SURGERY Right        ONCOLOGIC and TREATMENT HISTORY:  Oncology History   Tongue cancer   6/29/2015 Initial Diagnosis    Tongue cancer     2/19/2024 - 2/19/2024 Chemotherapy    Treatment Summary   Plan Name: OP pembrolizumab 400mg Q6W  Treatment Goal: Control  Status: Inactive  Start Date:   End Date:   Provider: Vincent Reid MD  Chemotherapy: [No matching medication found in this treatment plan]     2/19/2024 Cancer Staged    Staging form: Oral Cavity, AJCC 8th Edition  - Clinical: Stage II (cT2, cN0, cM0)     2/23/24:  DL with BMND, resection of SCC of the right BOT with partial glossectomy involving entirety of tongue base and adjacent pharynx and bilateral posterior oral tongue, pectoralis major myocutaneous flap reconstruction of partial pharyngectomy/partial glossectomy defect, tracheostomy (Dr. Ventura),  "and PEG placement  (Dr. Wu)    3/22/24:  Debridement of nonviable oral tongue measuring 5 x 4 cm       SWALLOWING HISTORY:  Per evaluation of 1/24/24, Mr. Galdamez then had had about a 12-month history of experiencing dysphagia (globus sensation localized to base of neck) with meds and dense and/or dry foods; he used liquids to clear.  On bedside exam, there were s/s concerning for airway threat.    S/p his 2/23/24 surgery he has been NPO.  He stated that he is swallowing secretions some, but keeps a towel with him that he uses to absorb excess saliva he is unable to move and/or swallow.    FAMILY HISTORY:  Family History   Problem Relation Name Age of Onset    Arthritis Mother      COPD Brother      Psoriasis Neg Hx      Eczema Neg Hx          SOCIAL HISTORY:  Mr. Galdamez is  and lives in [place].  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 and ride jet skis during the summer.     Tobacco use:  Former smoker as a teen ([id number]).  ETOH use:  Not currently.  At his 1/24/24 visit, Mr. Galdamez stated that he thought he drank more than he should and noted it was a family pattern.  He verbalized his suspicion that alcohol consumption may be related to his cancer.    BEHAVIOR:  Timothy Galdamez remained a very pleasant man who was seen with his wife.  He had normal affect and social interaction.  He was able to fully cooperate during the study.  Results of today's assessment were considered indicative of his current levels of swallowing functioning.      HEARING:  Subjectively, within normal limits.     ORAL PERIPHERAL:   Examination of the oral mechanism revealed absent oral tongue with FOM sharply angled inferiorly moving from anterior mandibular arch to his posterior left.  The tip of the epiglottis appeared to be visible over the posterior aspect of " this.  On request to attempt to move the tongue in various planes, there was no movement of tissue with attempts to protrude or lateralize.  With attempt to elevate posterior tongue tissue (as for /k/ production), there was movement posteriorly with approximation of or contact with PPW out of view per related movement and sound produced.  Mr. Galdamez kept a towel with him to aid with secretion management.   Lip structure and functioning appeared within normal limits.  Dentition appeared within normal limits for speech and swallowing purposes.      Palpation of neck revealed induration throughout the anterolateral neck.  Trach in place with PMSV (purple -- PMV 2001).  Tolerates well.    Speech remarkably intelligible with limited tongue tissue.  Judged % intelligible with distortions for most utterances.  (Note:  Radiology technician reported that she understood him completely via phone during reminder call yesterday.)     Voice quality was within normal limits with no wet quality before, during, or after the study.      TEST FINDINGS:   Mr. Galdamez was seen in Radiology with the radiology technician (Radiologist available for in-person input during a study as needed) for a Modified Barium Swallow Study.  He was seated on a stool for a left lateral videofluoroscopic view.      Consistencies assessed using radiopaque barium contrast:  thin (3-mL boluses via spoon).    Strategies:  Breath hold -- aided airway protection  Cough and swallow -- good execution of cough; swallow was maladaptive    Phases:  Oral:  Mr. Galdamez was able to obtain liquid and strip utensils well with no loss of material from the oral cavity.  He moved boluses through the oral cavity by secondary intention.  That is, the liquid flowed from the anterior oral cavity where it was placed along the inclined FOM without any change in posture of the head or other movement.  It continued through the valleculae to the pyriforms while the airway was  open.   There was trace to  mild oral residue  with these small boluses.  Swallow reflex was triggered  after liquid reached the pyriforms .    Pharyngeal:  Boluses moved through the pharyngeal phase with laryngeal penetration due to expression of material from the pyriforms into the laryngeal vestibule on attempts to execute a swallow contraction.  This was maladaptive with no complete swallow observed.  Rather, Mr. Galdamez approximated the anterior and posterior laryngeal structures in attempts to protect the airway so that there was compression of those structures in an anterior to posterior fashion, but no elliptical  pattern of movement as is typical in a true swallow. As material had spilled to the pyriforms prior to initiation of these types of contractions, material was expressed from the pyriforms into the laryngeal vestibule, coughed out (cued and/or with spontaneous cough), but attempts to swallow afterwards resulted in re-depositing material in the pyriforms with none to little entering the UES with each attempt.  Multiple attempts were executed to clear most of a 3-mL bolus.  There was   no nasal regurgitation (bolus to small to challenge port).     - Delayed initiation (see above)  - Adequate soft palate elevation  -  Markedly reduced  laryngeal elevation and anterior hyoid excursion.  Unclear whether trach plays any role in tethering vs effects of surgery and/or late effects of radiation  -  Markedly reduced  tongue base retraction  -  Absent  epiglottic inversion  - Incomplete laryngeal vestibular closure  - Reduced and/or dysorganized pharyngeal stripping wave  - Reduced PE segment opening secondary to reduced swallowing pressures   -  Moderate-significant pharyngeal residue in pyriforms (considered relative to the 3-mL bolus size -- actual volume was small)    Cervical Esophageal:  Boluses entered the upper esophagus  piecemeal related to reduced PE segment aperture and poor swallowing pressures (as  well as absent true swallow) .  No obstruction was noted.    Rosenbeck 8-point Penetration-Aspiration Scale:    4 - Material enters the airway, contacts the vocal folds, and is ejected from the airway. -- With cued and spontaneous coughing; risk of aspiration considered likely.      IMPRESSIONS:   This 69 y.o. man appears to present with  1.  Severe-profound oropharyngeal dysphagia characterized by poor ability to control bolus, premature spillage to pyriforms, maladaptive swallow contraction attempts with  nearly absent hyolaryngeal elevation/excursion, reduced BOT and dysorganized PPW contraction, no evidence of epiglottic inversion resulting in expression of fluid from pyriforms into the laryngeal vestibule (penetration) and reduced swallowing pressures and reduced PE segment aperture.  Use of supraglottic swallow strategy was protective of airway in terms of expelling penetrated material, but above issues resulted in need for multiple attempts to try to reduce volume pharynx and facilitate progression into esophageal inlet.  2.  Dysarthria, peripheral.  3.  S/p near-total glossectomy per surgeries of 2/23 and 3/22/24.  4.  Trach and PEG dependent.  5.  History second primary SCC of BOT (p16-)  6.  Indurated anterolateral neck tissue.  7.  History XRT 2015 ([facility]).  8.  History SCC BOT, p16+    RECOMMENDATIONS/PLAN OF CARE:   It is felt that Mr. Galdamez would benefit from  1.  Continued use of his PEG for primary nutrition, hydration, and medication delivery.  2.  ST on a once weekly basis with a home program for 8-12 weeks to address dysphagia and speech.  3.  Follow up with Dr. Ventura as planned immediately following this visit.      Long-term goals:  Mr. Galdamez will be able to   Consume at least one consistency for pleasure with no signs/symptoms of aspiration.  Produce conversational speech that is % intelligible to most listeners.      Short-term objectives:  Mr. Galdamez will perform the following with  "% accuracy/consistency:  1.  Swallowing   A.  Execute a normalized swallow.  B.  Demonstrate execution of swallowing exercises  Breath-hold maneuver  2.   Falsetto voice/elevation of larynx  3.   Tongue base retraction  4.   Chin tuck against resistance  5.    Effortful swallow  C.  Demonstrate use of safe swallowing exercises   1.  SGS   2.  Effortful swallow  D.  Advance diet using MDTP modification   1.  3-mL thin liquid   2.  5-mL thin liquid   3.  3-mL thin puree   4.  5-mL thin puree  E.  Demonstrate (alone or with caregiver) execution of myofascial release maneuvers.   1.  Hyoid   2.  Stripping or skin rolling  2.  Speech   A.  Demonstrate adequately slowed rate of speech to facilitate improved listener comprehension.   B.  Demonstrate independent self-correction of too-rapid rate of speech.   C.  Utilize BOT-PPW articulation to improve acoustic quality of appropriate phonemes (particularly /k/, /g/, "ng").   D.  Demonstrate independent use of alternative phrasing to facilitate listener comprehension when original phrasing was not intelligible.        "

## 2024-04-19 NOTE — PLAN OF CARE
IMPRESSIONS:   This 69 y.o. man appears to present with  1.  Severe-profound oropharyngeal dysphagia characterized by poor ability to control bolus, premature spillage to pyriforms, maladaptive swallow contraction attempts with  nearly absent hyolaryngeal elevation/excursion, reduced BOT and dysorganized PPW contraction, no evidence of epiglottic inversion resulting in expression of fluid from pyriforms into the laryngeal vestibule (penetration) and reduced swallowing pressures and reduced PE segment aperture.  Use of supraglottic swallow strategy was protective of airway in terms of expelling penetrated material, but above issues resulted in need for multiple attempts to try to reduce volume pharynx and facilitate progression into esophageal inlet.  2.  Dysarthria, peripheral.  3.  S/p near-total glossectomy per surgeries of 2/23 and 3/22/24.  4.  Trach and PEG dependent.  5.  History second primary SCC of BOT (p16-)  6.  Indurated anterolateral neck tissue.  7.  History XRT 2015 ([facility]).  8.  History SCC BOT, p16+    RECOMMENDATIONS/PLAN OF CARE:   It is felt that Mr. Galdamez would benefit from  1.  Continued use of his PEG for primary nutrition, hydration, and medication delivery.  2.  ST on a once weekly basis with a home program for 8-12 weeks to address dysphagia and speech.  3.  Follow up with Dr. Ventura as planned immediately following this visit.      Long-term goals:  Mr. Galdamez will be able to   Consume at least one consistency for pleasure with no signs/symptoms of aspiration.  Produce conversational speech that is % intelligible to most listeners.      Short-term objectives:  Mr. Galdamez will perform the following with % accuracy/consistency:  1.  Swallowing   A.  Execute a normalized swallow.  B.  Demonstrate execution of swallowing exercises  Breath-hold maneuver  2.   Falsetto voice/elevation of larynx  3.   Tongue base retraction  4.   Chin tuck against resistance  5.    Effortful  "swallow  C.  Demonstrate use of safe swallowing exercises   1.  SGS   2.  Effortful swallow  D.  Advance diet using MDTP modification   1.  3-mL thin liquid   2.  5-mL thin liquid   3.  3-mL thin puree   4.  5-mL thin puree  E.  Demonstrate (alone or with caregiver) execution of myofascial release maneuvers.   1.  Hyoid   2.  Stripping or skin rolling  2.  Speech   A.  Demonstrate adequately slowed rate of speech to facilitate improved listener comprehension.   B.  Demonstrate independent self-correction of too-rapid rate of speech.   C.  Utilize BOT-PPW articulation to improve acoustic quality of appropriate phonemes (particularly /k/, /g/, "ng").   D.  Demonstrate independent use of alternative phrasing to facilitate listener comprehension when original phrasing was not intelligible.        "

## 2024-04-21 ENCOUNTER — PATIENT MESSAGE (OUTPATIENT)
Dept: OTOLARYNGOLOGY | Facility: CLINIC | Age: 69
End: 2024-04-21
Payer: MEDICARE

## 2024-04-22 ENCOUNTER — OFFICE VISIT (OUTPATIENT)
Dept: OTOLARYNGOLOGY | Facility: CLINIC | Age: 69
End: 2024-04-22
Payer: MEDICARE

## 2024-04-22 ENCOUNTER — CLINICAL SUPPORT (OUTPATIENT)
Dept: SPEECH THERAPY | Facility: HOSPITAL | Age: 69
End: 2024-04-22
Payer: MEDICARE

## 2024-04-22 VITALS — BODY MASS INDEX: 21.02 KG/M2 | WEIGHT: 155 LBS | DIASTOLIC BLOOD PRESSURE: 75 MMHG | SYSTOLIC BLOOD PRESSURE: 129 MMHG

## 2024-04-22 DIAGNOSIS — C02.9 TONGUE CANCER: ICD-10-CM

## 2024-04-22 DIAGNOSIS — Z92.3 HISTORY OF HEAD AND NECK RADIATION: ICD-10-CM

## 2024-04-22 DIAGNOSIS — Z90.49 HX OF GLOSSECTOMY: Primary | ICD-10-CM

## 2024-04-22 DIAGNOSIS — R13.12 DYSPHAGIA, OROPHARYNGEAL: Primary | ICD-10-CM

## 2024-04-22 DIAGNOSIS — Z90.49 HX OF GLOSSECTOMY: ICD-10-CM

## 2024-04-22 DIAGNOSIS — Z93.0 TRACHEOSTOMY IN PLACE: ICD-10-CM

## 2024-04-22 PROCEDURE — 92526 ORAL FUNCTION THERAPY: CPT | Mod: GN,HCNC | Performed by: SPEECH-LANGUAGE PATHOLOGIST

## 2024-04-22 PROCEDURE — 99024 POSTOP FOLLOW-UP VISIT: CPT | Mod: HCNC,S$GLB,, | Performed by: PHYSICIAN ASSISTANT

## 2024-04-22 PROCEDURE — 1159F MED LIST DOCD IN RCRD: CPT | Mod: HCNC,CPTII,S$GLB, | Performed by: PHYSICIAN ASSISTANT

## 2024-04-22 PROCEDURE — 99999 PR PBB SHADOW E&M-EST. PATIENT-LVL III: CPT | Mod: PBBFAC,HCNC,, | Performed by: PHYSICIAN ASSISTANT

## 2024-04-22 PROCEDURE — 3074F SYST BP LT 130 MM HG: CPT | Mod: HCNC,CPTII,S$GLB, | Performed by: PHYSICIAN ASSISTANT

## 2024-04-22 PROCEDURE — 1160F RVW MEDS BY RX/DR IN RCRD: CPT | Mod: HCNC,CPTII,S$GLB, | Performed by: PHYSICIAN ASSISTANT

## 2024-04-22 PROCEDURE — 3078F DIAST BP <80 MM HG: CPT | Mod: HCNC,CPTII,S$GLB, | Performed by: PHYSICIAN ASSISTANT

## 2024-04-22 NOTE — PROGRESS NOTES
DIAGNOSIS:  Dysphagia, oropharyngeal (R13.12), History glossectomy (Z90.49), History tongue cancer (C02.9), History XRT (Z92.3)  REFERRING DOCTOR:  Jeferson Ventura M.D., Head and Neck Surgical Oncology  LENGTH OF SESSION:  1 hour    REASON FOR VISIT:  Dysphagia and speech therapy      ONCOLOGIC and TREATMENT HISTORY:      Oncology History   Tongue cancer   6/29/2015 Initial Diagnosis     Tongue cancer      2/19/2024 - 2/19/2024 Chemotherapy     Treatment Summary   Plan Name: OP pembrolizumab 400mg Q6W  Treatment Goal: Control  Status: Inactive  Start Date:   End Date:   Provider: Vincent Reid MD  Chemotherapy: [No matching medication found in this treatment plan]      2/19/2024 Cancer Staged     Staging form: Oral Cavity, AJCC 8th Edition  - Clinical: Stage II (cT2, cN0, cM0)      2/23/24:  DL with BMND, resection of SCC of the right BOT with partial glossectomy involving entirety of tongue base and adjacent pharynx and bilateral posterior oral tongue, pectoralis major myocutaneous flap reconstruction of partial pharyngectomy/partial glossectomy defect, tracheostomy (Dr. Ventura), and PEG placement  (Dr. Wu)     3/22/24:  Debridement of nonviable oral tongue measuring 5 x 4 cm      INTERVAL HISTORY:  MBSS completed 4/18/24.  Severe-profound oropharyngeal dysphagia characterized by poor ability to control bolus, premature spillage to pyriforms, maladaptive swallow contraction attempts with nearly absent hyolaryngeal elevation/excursion, reduced BOT and dysorganized PPW contraction, no evidence of epiglottic inversion resulting in expression of fluid from pyriforms into the laryngeal vestibule (penetration) and reduced swallowing pressures and reduced PE segment aperture. Use of supraglottic swallow strategy was protective of airway in terms of expelling penetrated material, but above issues resulted in need for multiple attempts to try to reduce volume pharynx and facilitate progression into esophageal  "inlet.   Continuing NPO.    Anticipated decannulation today; will see Dr. Ventura immediately afterwards. Tolerating capped trach well.    INTERVENTION:  Short-term objectives:  Mr. Galdamez will perform the following with % accuracy/consistency:  1.  Swallowing              A.  Execute a normalized swallow.    Introduced concept and rationale.  Unable today to elicit.    B.  Demonstrate execution of swallowing exercises  Breath-hold maneuver  2.   Falsetto voice/elevation of larynx  3.   Tongue base retraction  4.   Chin tuck against resistance  5.    Effortful swallow  Introduced each.  He was able to execute each with exception of true swallow.    C.  Demonstrate use of safe swallowing exercises              1.  SGS -- reviewed              2.  Effortful swallow    D.  Advance diet using MDTP modification              1.  3-mL thin liquid -- Three trials were variably successful, but with sufficient indications of airway threat that we determined to not use them in a home program yet.              2.  5-mL thin liquid              3.  3-mL thin puree              4.  5-mL thin puree    E.  Demonstrate (alone or with caregiver) execution of myofascial release maneuvers.              1.  Hyoid -- demonstrated and trained Mrs. Galdamez to perform this an others.              2.  Stripping or skin rolling -- utilized stripping on lateral necks and in submental area.   3.  Fingertip massage -- performed over neck/submental area.   4,  Neck stretches -- reviewed and provided home program of active stretches    2.  Speech              A.  Demonstrate adequately slowed rate of speech to facilitate improved listener comprehension.    Utilizing with % consistency.                B.  Demonstrate independent self-correction of too-rapid rate of speech.    Being met.                C.  Utilize BOT-PPW articulation to improve acoustic quality of appropriate phonemes (particularly /k/, /g/, "ng").                D.  " Demonstrate independent use of alternative phrasing to facilitate listener comprehension when original phrasing was not intelligible.        IMPRESSIONS:   This 69 y.o. man appears to present with  1.  Severe-profound oropharyngeal dysphagia characterized by poor ability to control bolus, premature spillage to pyriforms, maladaptive swallow contraction attempts with  nearly absent hyolaryngeal elevation/excursion, reduced BOT and dysorganized PPW contraction, no evidence of epiglottic inversion resulting in expression of fluid from pyriforms into the laryngeal vestibule (penetration) and reduced swallowing pressures and reduced PE segment aperture.  Use of supraglottic swallow strategy was protective of airway in terms of expelling penetrated material, but above issues resulted in need for multiple attempts to try to reduce volume pharynx and facilitate progression into esophageal inlet.  2.  Dysarthria, peripheral.  3.  S/p near-total glossectomy per surgeries of 2/23 and 3/22/24.  4.  Trach and PEG dependent.  5.  History second primary SCC of BOT (p16-)  6.  Indurated anterolateral neck tissue.  7.  History XRT 2015 ([facility]).  8.  History SCC BOT, p16+     RECOMMENDATIONS/PLAN OF CARE:   It is felt that Mr. Galdamez would benefit from  1.  Continued use of his PEG for primary nutrition, hydration, and medication delivery.  2.  ST on a once weekly basis with a home program for 7-11 weeks to address dysphagia and speech.  3.  Follow up with Dr. Ventura as planned immediately following this visit.        Long-term goals:  Mr. Galdamez will be able to   Consume at least one consistency for pleasure with no signs/symptoms of aspiration.  Produce conversational speech that is % intelligible to most listeners.

## 2024-04-22 NOTE — PROGRESS NOTES
HEAD AND NECK SURGICAL ONCOLOGY CLINIC NOTE    CC: Follow-up for tracheostomy decannulation     TREATMENT HISTORY:  1. DEBRIDEMENT, WOUND (N/A), March 22, 2024  - Dr Ventura  2. GLOSSECTOMY (Right), DISSECTION, NECK (Bilateral), TRACHEOTOMY (N/A), CREATION, FLAP, MUSCLE ROTATION (N/A), INSERTION, PEG TUBE (Right) - February 23, 2024 - Dr Ventura  3. LARYNGOSCOPY, DIRECT, WITH BRONCHOSCOPY (N/A) - December 11, 2023 - Dr Poole    INTERVAL HISTORY:  Timothy GREENE [employer] returns to clinic today without complaints. He has had trach capped all weekend without any difficulty breathing. He is still having some thick mucus from trach but it is clear, no fever, chills, SOB. He is here today for decannulation.      PHYSICAL EXAM:  Well appearing. Tracheostomy in place, capped. No distress.      PROCEDURE:  Tracheostomy de cannulated today. Covered with gauze and tape. He was able to speak by applying pressure over dressing. Tolerated well.      PLAN:  Wound care discussed. Will message us with any questions.

## 2024-04-22 NOTE — PATIENT INSTRUCTIONS
"Neck range of motion exercises:    For each, turn/move until you feel a stretch, but do not continue to the point that it is painful.  Hold (no bouncing) the position for 10 seconds.  Do 10 repetitions.    1.  Turn your head to the side to try to place your chin over your shoulder.  Hold, then turn to the opposite side.    2.  Turn as above, and once at the point you feel a stretch, lift your chin up so that you are looking at the ceiling or a high point on the wall.  Hold, then repeat on the opposite side.    3.  Look forward, then drop your head to one side to try to place your ear over your shoulder.  Hold, then rotate your head so that your chin is near/on your chest.  Let your head be a weight and hold that for 6 seconds.  Rotate your head to the opposite side, and try to place that ear over that shoulder.  Hold.    4.  "The Bulldog":  Extend your jaw forward and raise your lower lip as high as you can over your upper lip.  Then lift your chin until you feel a stretch; hold.    5.  Shoulder rolls:  Hold your arms in a relaxed manner at your sides or with your hands in your lap. Make circles with your shoulders, first forward (10 times), then backward (10 times).    6.  Reach:  Hold your arms straight out in front of you at shoulder level.  Without moving your head or body, reach forward.  You should feel your shoulders move and a stretch in the muscles between your neck and shoulders.  Hold.       Myofascial release maneuvers:  Fingertip massage over tight areas  Pulling chin/jaw up and to the side  Stripping the sides of the neck    Swallowing exercises:  Breath-hold maneuver  2.   Falsetto voice/elevation of larynx  3.   Tongue base retraction  4.   Chin tuck against resistance  5.    Effortful swallow      "

## 2024-04-24 ENCOUNTER — DOCUMENT SCAN (OUTPATIENT)
Dept: HOME HEALTH SERVICES | Facility: HOSPITAL | Age: 69
End: 2024-04-24
Payer: MEDICARE

## 2024-04-26 ENCOUNTER — TELEPHONE (OUTPATIENT)
Dept: SPEECH THERAPY | Facility: HOSPITAL | Age: 69
End: 2024-04-26
Payer: MEDICARE

## 2024-04-26 ENCOUNTER — CLINICAL SUPPORT (OUTPATIENT)
Dept: SPEECH THERAPY | Facility: HOSPITAL | Age: 69
End: 2024-04-26
Payer: MEDICARE

## 2024-04-26 DIAGNOSIS — Z92.3 HISTORY OF HEAD AND NECK RADIATION: ICD-10-CM

## 2024-04-26 DIAGNOSIS — C02.9 TONGUE CANCER: ICD-10-CM

## 2024-04-26 DIAGNOSIS — Z90.49 HX OF GLOSSECTOMY: ICD-10-CM

## 2024-04-26 DIAGNOSIS — K11.7 PTYALISM: Primary | ICD-10-CM

## 2024-04-26 DIAGNOSIS — R13.12 DYSPHAGIA, OROPHARYNGEAL: Primary | ICD-10-CM

## 2024-04-26 PROCEDURE — 92526 ORAL FUNCTION THERAPY: CPT | Mod: GN,HCNC | Performed by: SPEECH-LANGUAGE PATHOLOGIST

## 2024-04-26 RX ORDER — SCOLOPAMINE TRANSDERMAL SYSTEM 1 MG/1
1 PATCH, EXTENDED RELEASE TRANSDERMAL
Qty: 10 PATCH | Refills: 0 | Status: SHIPPED | OUTPATIENT
Start: 2024-04-26 | End: 2024-05-13

## 2024-04-26 NOTE — PROGRESS NOTES
DIAGNOSIS:  Dysphagia, oropharyngeal (R13.12), History glossectomy (Z90.49), History tongue cancer (C02.9), History XRT (Z92.3)  REFERRING DOCTOR:  Jeferson Ventura M.D., Head [facility] Neck Surgical Oncology  LENGTH OF SESSION:  1 hour    REASON FOR VISIT:  Dysphagia and speech therapy      ONCOLOGIC and TREATMENT HISTORY:      Oncology History   Tongue cancer   6/29/2015 Initial Diagnosis     Tongue cancer      2/19/2024 - 2/19/2024 Chemotherapy     Treatment Summary   Plan Name: OP pembrolizumab 400mg Q6W  Treatment Goal: Control  Status: Inactive  Start Date:   End Date:   Provider: Vincent Reid MD  Chemotherapy: [No matching medication found in this treatment plan]      2/19/2024 Cancer Staged     Staging form: Oral Cavity, AJCC 8th Edition  - Clinical: Stage II (cT2, cN0, cM0)      2/23/24:  DL with BMND, resection of SCC of the right BOT with partial glossectomy involving entirety of tongue base and adjacent pharynx and bilateral posterior oral tongue, pectoralis major myocutaneous flap reconstruction of partial pharyngectomy/partial glossectomy defect, tracheostomy (Dr. Ventura), and PEG placement  (Dr. Wu)     3/22/24:  Debridement of nonviable oral tongue measuring 5 x 4 cm      INTERVAL HISTORY:  4/26/24:  Decannulated 4/22/24; still with bandage over site. Performing MFR and swallowing exercises as able, but not at target frequency yet.  Still felt there was some improvement in neck tissue pliability.  Reported difficulty opening mouth as widely as he could prior to surgery.      MBSS completed 4/18/24.  Severe-profound oropharyngeal dysphagia characterized by poor ability to control bolus, premature spillage to pyriforms, maladaptive swallow contraction attempts with nearly absent hyolaryngeal elevation/excursion, reduced BOT and dysorganized PPW contraction, no evidence of epiglottic inversion resulting in expression of fluid from pyriforms into the laryngeal vestibule (penetration) and reduced  swallowing pressures and reduced PE segment aperture. Use of supraglottic swallow strategy was protective of airway in terms of expelling penetrated material, but above issues resulted in need for multiple attempts to try to reduce volume pharynx and facilitate progression into esophageal inlet.   Continuing NPO.      INTERVENTION:  Measured oral aperture via TheraBite measuring tool:  23 mm (avg = 40 mm).  Instructed in 5-5-30 protocol with digital stretching.  Mr. Galdamez was able to tolerate this well (performed an entire set) and to perform it appropriately.  Added to home program.     Short-term objectives:  Mr. Galdamez will perform the following with % accuracy/consistency:  1.  Swallowing              A.  Execute a normalized swallow.    Continuing.  Most attempts are slight up-down movements of larynx, but occasionally there was a motion approximating a normalized swallow.  Continuing.     B.  Demonstrate execution of swallowing exercises  Breath-hold maneuver  2.   Falsetto voice/elevation of larynx  3.   Tongue base retraction  4.   Chin tuck against resistance  5.    Effortful swallow  Mr. Galdamez reported he felt comfortable performing these.  Deferred direct today in order to focus on trismus and swallowing.      C.  Demonstrate use of safe swallowing exercises              1.  SGS -- reviewed              2.  Effortful swallow    D.  Advance diet using MDTP modification   1.  1-mL -- Utilized this small consistency to help facilitate execution of swallowing attempts with limited bolus size.  Had multiple swallow attempts (typically 6-7) before liquid was mostly cleared, but only had 1 of 10 with no indication during or after the attempts.              2.  3-mL thin liquid -- Held              3.  5-mL thin liquid              4.  3-mL thin puree              5.  5-mL thin puree      E.  Demonstrate (alone or with caregiver) execution of myofascial release maneuvers.              1.  Hyoid --  "demonstrated and trained Mrs. Rudolph to perform this an others.  Being met.              2.  Stripping or skin rolling -- utilized stripping on lateral necks and in submental area.  Continuing.   3.  Fingertip massage -- performed over neck/submental area.  Continuing   4.  Larynx hold -- Added since he has been decannulated.  Able to perform on self.   5.  Neck stretches -- reviewed and provided home program of active stretches.  Continuing.    F.  Trismus:  Perform 5-5-30 protocol with digital stretching daily.   Introduced today.    2.  Speech              A.  Demonstrate adequately slowed rate of speech to facilitate improved listener comprehension.    Utilizing with % consistency.                B.  Demonstrate independent self-correction of too-rapid rate of speech.    Being met.                C.  Utilize BOT-PPW articulation to improve acoustic quality of appropriate phonemes (particularly /k/, /g/, "ng").                D.  Demonstrate independent use of alternative phrasing to facilitate listener comprehension when original phrasing was not intelligible.        IMPRESSIONS:   This 69 y.o. man appears to present with  1.  Severe-profound oropharyngeal dysphagia characterized by poor ability to control bolus, premature spillage to pyriforms, maladaptive swallow contraction attempts with  nearly absent hyolaryngeal elevation/excursion, reduced BOT and dysorganized PPW contraction, no evidence of epiglottic inversion resulting in expression of fluid from pyriforms into the laryngeal vestibule (penetration) and reduced swallowing pressures and reduced PE segment aperture.  Use of supraglottic swallow strategy was protective of airway in terms of expelling penetrated material, but above issues resulted in need for multiple attempts to try to reduce volume pharynx and facilitate progression into esophageal inlet.  2.  Dysarthria, peripheral.  3.  S/p near-total glossectomy per surgeries of 2/23 and " 3/22/24.  4.  Trismus  5.  PEG dependent.  6.  History second primary SCC of BOT (p16-)  7.  Indurated anterolateral neck tissue.  8.  History XRT 2015 ([facility]).  9.  History SCC BOT, p16+     RECOMMENDATIONS/PLAN OF CARE:   It is felt that Mr. Galdamez would benefit from  1.  Continued use of his PEG for primary nutrition, hydration, and medication delivery.  2.  ST on a once weekly basis with a home program for 6-10 weeks to address dysphagia, trismus, and speech.  3.  Follow up with Dr. Ventura as planned immediately following this visit.        Long-term goals:  Mr. Galdamez will be able to   Consume at least one consistency for pleasure with no signs/symptoms of aspiration.  Produce conversational speech that is % intelligible to most listeners.

## 2024-04-30 ENCOUNTER — TELEPHONE (OUTPATIENT)
Dept: HEMATOLOGY/ONCOLOGY | Facility: CLINIC | Age: 69
End: 2024-04-30
Payer: MEDICARE

## 2024-04-30 NOTE — NURSING
Spoke to patient via phone, prefers email to confirm appt, asked to book 5/23/24 at 8am in-person with Ketan STRAUSS RD. Will touchbase via email this evening as he is busy right now. Added to waitlist. Patient aware and will adjust appt accordingly/confirm with team once email is received. DAMON [place].

## 2024-05-03 ENCOUNTER — CLINICAL SUPPORT (OUTPATIENT)
Dept: SPEECH THERAPY | Facility: HOSPITAL | Age: 69
End: 2024-05-03
Payer: MEDICARE

## 2024-05-03 DIAGNOSIS — R13.12 DYSPHAGIA, OROPHARYNGEAL: Primary | ICD-10-CM

## 2024-05-03 DIAGNOSIS — Z90.49 HX OF GLOSSECTOMY: ICD-10-CM

## 2024-05-03 DIAGNOSIS — Z92.3 HISTORY OF HEAD AND NECK RADIATION: ICD-10-CM

## 2024-05-03 DIAGNOSIS — C02.9 TONGUE CANCER: ICD-10-CM

## 2024-05-03 PROCEDURE — 92526 ORAL FUNCTION THERAPY: CPT | Mod: GN,HCNC | Performed by: SPEECH-LANGUAGE PATHOLOGIST

## 2024-05-03 NOTE — PATIENT INSTRUCTIONS
"Neck range of motion exercises:    For each, turn/move until you feel a stretch, but do not continue to the point that it is painful.  Hold (no bouncing) the position for 10 seconds.  Do 10 repetitions.    1.  Turn your head to the side to try to place your chin over your shoulder.  Hold, then turn to the opposite side.    2.  Turn as above, and once at the point you feel a stretch, lift your chin up so that you are looking at the ceiling or a high point on the wall.  Hold, then repeat on the opposite side.    3.  Look forward, then drop your head to one side to try to place your ear over your shoulder.  Hold, then rotate your head so that your chin is near/on your chest.  Let your head be a weight and hold that for 6 seconds.  Rotate your head to the opposite side, and try to place that ear over that shoulder.  Hold.    4.  "The Bulldog":  Extend your jaw forward and raise your lower lip as high as you can over your upper lip.  Then lift your chin until you feel a stretch; hold.    5.  Shoulder rolls:  Hold your arms in a relaxed manner at your sides or with your hands in your lap. Make circles with your shoulders, first forward (10 times), then backward (10 times).    6.  Reach:  Hold your arms straight out in front of you at shoulder level.  Without moving your head or body, reach forward.  You should feel your shoulders move and a stretch in the muscles between your neck and shoulders.  Hold.       Myofascial release maneuvers:  Fingertip massage over tight areas  Pulling chin/jaw up and to the side  Stripping the sides of the neck and side of cheek (large muscle for chewing)  Insert finger along R upper arch into space between jaw and arch      Swallowing exercises:  Breath-hold maneuver  2.   Falsetto voice/elevation of larynx  3.   Tongue base retraction  4.   Chin tuck against resistance  5.   Effortful swallow    Swallowing plain (or carbonated) water with supraglottic swallow (try 2-3, or more, times per " day):  Warm up with 2-3 5-mL boluses  Try 7.5 mL boluses  If they start to get have involuntary coughing,   Stop if you are fatigued  Or, try going back to 5-mL for 2-3 swallows, then try 7.5 mL again  If still having involuntary coughing or wet voice, stop for that set.

## 2024-05-03 NOTE — PROGRESS NOTES
DIAGNOSIS:  Dysphagia, oropharyngeal (R13.12), History glossectomy (Z90.49), History tongue cancer (C02.9), History XRT (Z92.3)  REFERRING DOCTOR:  Jeferson Ventura M.D., Head and Neck Surgical Oncology  LENGTH OF SESSION:  1 hour    REASON FOR VISIT:  Dysphagia and speech therapy      ONCOLOGIC and TREATMENT HISTORY:      Oncology History   Tongue cancer   6/29/2015 Initial Diagnosis     Tongue cancer      2/19/2024 - 2/19/2024 Chemotherapy     Treatment Summary   Plan Name: OP pembrolizumab 400mg Q6W  Treatment Goal: Control  Status: Inactive  Start Date:   End Date:   Provider: Vincent Reid MD  Chemotherapy: [No matching medication found in this treatment plan]      2/19/2024 Cancer Staged     Staging form: Oral Cavity, AJCC 8th Edition  - Clinical: Stage II (cT2, cN0, cM0)      2/23/24:  DL with BMND, resection of SCC of the right BOT with partial glossectomy involving entirety of tongue base and adjacent pharynx and bilateral posterior oral tongue, pectoralis major myocutaneous flap reconstruction of partial pharyngectomy/partial glossectomy defect, tracheostomy (Dr. Ventura), and PEG placement  (Dr. Wu)     3/22/24:  Debridement of nonviable oral tongue measuring 5 x 4 cm        INTERVAL HISTORY:  5/3/24:  No longer with bandage over trach site; appears to be well healed.  Using Scopolamine patches per Dr. Ventura and reported reduced saliva; not spitting as much.  Using Pepcid and a Musinex product (was not able to use the recommended time-released tablet due to contraindication re: altering a time-released med) and thought something was helping with reducing mucous, but it is not resolved.  Reported feeling as though he has a sinus drip today, but also reported he does not have that sort of problem in his history.  Used sugar-free clear carbonated beverages to help clear mucous in mouth, but could not really discern if it was helpful.  Admitted later that he was using that for swallowing trials more  than plain water b/c it gave him a better sense of whether or not it was present (thought carbonation gave the benefit vs taste perception).   Doing home program work, but not able to do with frequency prescribed due to other responsibilities/activities.       4/26/24:  Decannulated 4/22/24; still with bandage over site. Performing MFR and swallowing exercises as able, but not at target frequency yet.  Still felt there was some improvement in neck tissue pliability.  Reported difficulty opening mouth as widely as he could prior to surgery.      MBSS completed 4/18/24.  Severe-profound oropharyngeal dysphagia characterized by poor ability to control bolus, premature spillage to pyriforms, maladaptive swallow contraction attempts with nearly absent hyolaryngeal elevation/excursion, reduced BOT and dysorganized PPW contraction, no evidence of epiglottic inversion resulting in expression of fluid from pyriforms into the laryngeal vestibule (penetration) and reduced swallowing pressures and reduced PE segment aperture. Use of supraglottic swallow strategy was protective of airway in terms of expelling penetrated material, but above issues resulted in need for multiple attempts to try to reduce volume pharynx and facilitate progression into esophageal inlet.   Continuing NPO.      INTERVENTION:  Suggested trial of saline spray and/or OTC antihistamine to help with sinus drip.  Mrs. Galdamez will see if the pharmacy could compound a liquid equivalent to the recommended Mucinex product so it could be administered via PEG.    Short-term objectives:  Mr. Galdamez will perform the following with % accuracy/consistency:  1.  Swallowing              A.  Execute a normalized swallow.    Continuing.  Most attempts are slight up-down movements of larynx, but occasionally there was a motion approximating a normalized swallow.  Achieved a better-appearing approximation with larger boluses (see below). Continuing.     B.   "Demonstrate execution of swallowing exercises  Breath-hold maneuver  2.   Falsetto voice/elevation of larynx  3.   Tongue base retraction  4.   Chin tuck against resistance  5.    Effortful swallow  Mr. Galdamez reported he felt comfortable performing these.  Deferred direct today in order to focus on trismus and swallowing.      C.  Demonstrate use of safe swallowing exercises              1.  SGS -- Executed well independently.  Benefited from attempting to imitate "chugging" but applying to the multiple swallow attempts needed to clear a single given bolus so that he was holding his breath volitionally until he was ready to cough out.              2.  Effortful swallow -- utilizing.      D.  Advance diet using MDTP modification   1.  1-mL -- deferred              2.  3-mL thin liquid -- Based on report of what he had tried at home and the results, asked his judgment re: where he felt comfortable starting trials today.  He stated 3 mL.  Executed 19 swallows (2 boluses) with appropriate use of SGS and one indication.              3.  5-mL thin liquid -- Advanced to this level with 19 swallows (2 boluses) with SGS and 1 indication.   4.  7.5 mL thin liquid -- Advanced to this level with 25 swallows (2 boluses) and 1 indication.  He advised that he cannot really tell if the material is cleared, so continues swallowing with breath hold until he needs a breath, then continues as indicated per feel and sound with volitional cough.  Instructed use of carbonated water at home.              5.  3-mL thin puree              6.  5-mL thin puree      E.  Demonstrate (alone or with caregiver) execution of myofascial release maneuvers.              1.  Hyoid -- demonstrated and trained Mrs. Galdamez to perform this an others.  Being met.              2.  Stripping or skin rolling -- utilized stripping on lateral necks and in submental area.  Continuing.   3.  Fingertip massage -- performed over neck/submental area.  Continuing   4. " " Larynx hold -- Added since he has been decannulated.  Able to perform on self.   5.  Neck stretches -- reviewed and provided home program of active stretches.  Continuing.    F.  Trismus:  Perform 5-5-30 protocol with digital stretching daily.   Measured oral aperture via TheraBite measuring tool:  23-24 mm (avg = 40 mm).  Continuing 5-5-30 protocol with digital stretching, but reported that it hurt..  Performing active stretches as well.     Had him demonstrate stretch and tell whether there was a point where he was able to achieve a stretch, but it did not hurt.  He confirmed that he could.  Instructed him to use that point for a stretch and not to go to the point that it hurt.   Also added MFR maneuvers:   * stripping the masseter   * TMJ stretch via index finger accessing joint intraorally    2.  Speech              A.  Demonstrate adequately slowed rate of speech to facilitate improved listener comprehension.    Utilizing with % consistency.                B.  Demonstrate independent self-correction of too-rapid rate of speech.    Being met.                C.  Utilize BOT-PPW articulation to improve acoustic quality of appropriate phonemes (particularly /k/, /g/, "ng").                D.  Demonstrate independent use of alternative phrasing to facilitate listener comprehension when original phrasing was not intelligible.          IMPRESSIONS:   This 69 y.o. man appears to present with  1.  Severe-profound oropharyngeal dysphagia characterized by poor ability to control bolus, premature spillage to pyriforms, maladaptive swallow contraction attempts with  nearly absent hyolaryngeal elevation/excursion, reduced BOT and dysorganized PPW contraction, no evidence of epiglottic inversion resulting in expression of fluid from pyriforms into the laryngeal vestibule (penetration) and reduced swallowing pressures and reduced PE segment aperture.  Use of supraglottic swallow strategy was protective of airway in " terms of expelling penetrated material, but above issues resulted in need for multiple attempts to try to reduce volume pharynx and facilitate progression into esophageal inlet.  2.  Dysarthria, peripheral.  3.  S/p near-total glossectomy per surgeries of 2/23 and 3/22/24.  4.  Trismus  5.  PEG dependent.  6.  History second primary SCC of BOT (p16-)  7.  Indurated anterolateral neck tissue.  8.  History XRT 2015 ([facility]).  9.  History SCC BOT, p16+     RECOMMENDATIONS/PLAN OF CARE:   It is felt that Mr. Galdamez would benefit from  1.  Continued use of his PEG for primary nutrition, hydration, and medication delivery.  2.  ST on a once weekly basis with a home program for 5-9 weeks to address dysphagia, trismus, and speech.  3.  Follow up with Dr. Ventura as planned immediately following this visit.        Long-term goals:  Mr. Galdamez will be able to   Consume at least one consistency for pleasure with no signs/symptoms of aspiration.  Produce conversational speech that is % intelligible to most listeners.

## 2024-05-10 ENCOUNTER — CLINICAL SUPPORT (OUTPATIENT)
Dept: SPEECH THERAPY | Facility: HOSPITAL | Age: 69
End: 2024-05-10
Payer: MEDICARE

## 2024-05-10 DIAGNOSIS — Z90.49 HX OF GLOSSECTOMY: ICD-10-CM

## 2024-05-10 DIAGNOSIS — C02.9 TONGUE CANCER: ICD-10-CM

## 2024-05-10 DIAGNOSIS — R13.12 DYSPHAGIA, OROPHARYNGEAL: Primary | ICD-10-CM

## 2024-05-10 DIAGNOSIS — Z92.3 HISTORY OF HEAD AND NECK RADIATION: ICD-10-CM

## 2024-05-10 PROCEDURE — 92526 ORAL FUNCTION THERAPY: CPT | Mod: GN,HCNC | Performed by: SPEECH-LANGUAGE PATHOLOGIST

## 2024-05-10 NOTE — PATIENT INSTRUCTIONS
"Neck range of motion exercises:    For each, turn/move until you feel a stretch, but do not continue to the point that it is painful.  Hold (no bouncing) the position for 10 seconds.  Do 10 repetitions.    1.  Turn your head to the side to try to place your chin over your shoulder.  Hold, then turn to the opposite side.    2.  Turn as above, and once at the point you feel a stretch, lift your chin up so that you are looking at the ceiling or a high point on the wall.  Hold, then repeat on the opposite side.    3.  Look forward, then drop your head to one side to try to place your ear over your shoulder.  Hold, then rotate your head so that your chin is near/on your chest.  Let your head be a weight and hold that for 6 seconds.  Rotate your head to the opposite side, and try to place that ear over that shoulder.  Hold.    4.  "The Bulldog":  Extend your jaw forward and raise your lower lip as high as you can over your upper lip.  Then lift your chin until you feel a stretch; hold.    5.  Shoulder rolls:  Hold your arms in a relaxed manner at your sides or with your hands in your lap. Make circles with your shoulders, first forward (10 times), then backward (10 times).    6.  Reach:  Hold your arms straight out in front of you at shoulder level.  Without moving your head or body, reach forward.  You should feel your shoulders move and a stretch in the muscles between your neck and shoulders.  Hold.       Myofascial release maneuvers:  Fingertip massage over tight areas  Pulling chin/jaw up and to the side  Stripping the sides of the neck and side of cheek (large muscle for chewing)  Insert finger along R upper arch into space between jaw and arch      Swallowing exercises:  Breath-hold maneuver  2.   Falsetto voice/elevation of larynx  3.   Tongue base retraction  4.   Chin tuck against resistance  5.   Effortful swallow    Swallowing plain (or carbonated) water with supraglottic swallow (try 2-3, or more, times per " day):  Warm up with 2-3 7.5-mL boluses  Try 10-mL boluses  If they start to get have involuntary coughing,   Stop if you are fatigued  Or, try going back to 5-mL for 2-3 swallows, then try 7.5 mL again  If still having involuntary coughing or wet voice, stop for that set.  Also try  3-mL of thin puree (applesauce consistency) with the supraglottic swallow, followed by a 5-mL swallow of thin liquid using the supraglottic swallow    Try 5 effortful swallows with each practice session where you are feeling with your fingertips over your larynx for the larynx to go up and hesitate a little at the top of the swallow.  Do this after or with a swallow of 5-mL of water to facilitate swallowing.

## 2024-05-10 NOTE — PROGRESS NOTES
Oncology Nutrition Assessment Medical Nutrition Therapy Initial Visit    Timothy GREENE Rudolph  1955    Referring Provider: Jeferson Ventura MD   Reason for Referral: C02.9 (ICD-10-CM) - Tongue cancer     Diagnosis: Tongue cancer   Treatment: [No matching plan found]   1. DEBRIDEMENT, WOUND (N/A), March 22, 2024  - Dr Ventura  2. GLOSSECTOMY (Right), DISSECTION, NECK (Bilateral), TRACHEOTOMY (N/A), CREATION, FLAP, MUSCLE ROTATION (N/A), INSERTION, PEG TUBE (Right) - February 23, 2024 - Dr Ventura    PMHx:   Past Medical History:   Diagnosis Date    Acute on chronic blood loss anemia 3/19/2024    Cancer     Hyperlipidemia     Hypertension      Allergies: Patient has no known allergies.    Nutrition Assessment    Anthropometrics:   Weight:  05/14/24: 162 lb (in RD office)  Wt Readings from Last 10 Encounters:   04/22/24 70.3 kg (155 lb)   04/18/24 72.3 kg (159 lb 6.3 oz)   03/20/24 73.9 kg (162 lb 14.7 oz)   03/18/24 73.9 kg (163 lb)   03/18/24 73.9 kg (163 lb)   02/28/24 80 kg (176 lb 5.9 oz)   02/20/24 79.7 kg (175 lb 11.3 oz)   02/19/24 78.4 kg (172 lb 13.5 oz)   02/15/24 78.5 kg (173 lb 1 oz)   01/30/24 77.1 kg (170 lb)                              Usual BW: 155 lb Ht Readings from Last 1 Encounters:   04/18/24 6' (1.829 m)      BMI Readings from Last 1 Encounters:   04/22/24 21.02 kg/m²     Estimated body surface area is 1.89 meters squared as calculated from the following:    Height as of 4/18/24: 6' (1.829 m).    Weight as of 4/22/24: 70.3 kg (155 lb).        Food/Nutrition-related History:  Had some Clear Soup at [employer] for the first time. Working with SLP on swallowing. Meeting with Mary Ellen who cleared him for thin puree. Sipping liquids. Not more than a small amount at a time.      DME/Insurance: [place] [facility] Medicare HMO / [place]  EN Order: 4 carton of [employer] Peptide 1.5 by bolus, syringe (gravity) via PEG. Provides: 912 mL/day total volume, 2000 kcals/day, 96 g/day protein  Current EN Intake: 4-5  "carton of [employer] Peptide 1.5 by bolus, syringe (gravity) via PEG. Flushing with 0 ml water before and 180 ml water after each carton. Additional Water flushes: 120 mL 2 x/day with medications and ~300 ml of decaf coffee in PEG tube. Provides: 2580 mL/day total volume, 3557-8437 kcals/day,  g/day protein     Encounter Notes:  Timothy Galdamez is a 69 y.o. male with a history of tongue cancer S/p near-total glossectomy per surgeries of 2/23 and 3/22/24 referred by Jeferson Ventura MD for nutrition assessment and counseling. Today, 5/14/24, Mr. Galdamez presents with a weight gain of 7 lbs in 3 weeks. Patient is PEG tube dependent however he is working closely with Mary Ellen SLP on dysphagia, trismus, and speech. Noted that Mary Ellen recently added use of 5-mL thin puree (or solid pureed to that level) with consistency equivalent to applesauce on 5/10/24. Denies N/V/D/C, pain swallowing, gas or bloating. Noted that patient reports new onset of reflux which he is treating with Pepcid. Reports tastes are more intense than usual such as "too salty". Observed PEG tube. Noted the tab on feeding port has come off making it harder to open.     Current Medications:  Current Outpatient Medications   Medication Instructions    acetaminophen (TYLENOL) 160 mg/5 mL Liqd take 20.3 mLs (649.6 mg total) every 6 (six) hours as needed.    ALPRAZolam (XANAX) 0.5 mg, Oral, 3 times daily    amLODIPine (NORVASC) 2.5 mg, Per G Tube, Daily    ascorbic acid (vitamin C) (VITAMIN C) 100 mg, Oral, Daily    aspirin (ECOTRIN) 81 mg, Oral, Daily    atorvastatin (LIPITOR) 80 mg, Per G Tube, Daily    calcium carbonate (OS-HERMES) 600 mg, Oral, Daily    ergocalciferol, vitamin D2, (VITAMIN D ORAL) 1 tablet, Oral, Daily    ezetimibe (ZETIA) 10 mg, Per G Tube, Daily    ferrous sulfate 299.2 mg, Per G Tube, Every other day    multivitamin (THERAGRAN) tablet 1 tablet, Per G Tube, Daily    NON FORMULARY MEDICATION Prevegan once daily    " omega-3/dha/epa/dpa/fish oil (OMEGA-3 2100 ORAL) 1 capsule, Oral, Daily    scopolamine (TRANSDERM-SCOP) 1.3-1.5 mg (1 mg over 3 days) 1 patch, Transdermal, Every 72 hours    silodosin (RAPAFLO) 4 mg, Oral, Daily, Open capsule, place in water and administer into feeding tube    vitamin E 100 Units, Oral, Daily   Pepcid    Labs: Reviewed 3/25/24: ca 8.4, na 135, alb 2.4     Nutrition Diagnosis    Nutrition Problem: Malnutrition  Etiology (related to): increased energy needs and swallowing difficulty S/p near-total glossectomy  Signs/Symptoms (as evidenced by): requiring tube feeding for nutrition support, weight loss of 20 lbs in 2 months, and delayed wound healing    Nutrition Intervention    Nutrition Prescription  5120-4537 kcals/day 30-35 kcal/kg   70-84 g protein/day 1-1.2 gm/kg  5942-2251 mL fluid/day 1 ml/kcal    Recommendations:   Continue 4-5 cartons of [employer] Peptide 1.5 bolus via PEG tube with 60 ml of water before and 180 ml of water after feeding and 120 ml of water BID with medications.   Noted that 300 ml of decaf coffee daily is contributing to fluid intake however, water would be preferred.  Reached out to [facility] to have [employer] Valve sent to Mr. Rudolph with next shipment of formula.  Discussed high calorie foods that could be added to a thin-puree. Use of cream/half n assisted, oils/butter, thinned nut butter, purees fruits. Reinforced 5-mL amount and applesauce consistency   Discussed potential indication for low-profile Goyo Shields in the future.    Materials Provided/Reviewed: none    Nutrition Monitoring and Evaluation    Monitor: rate/formulation of tube feeding, weight, diet education needs , and symptom management     Follow up In 1 months    Communication to referring provider/care team: Note available in chart.     Consultation Time: 30 Minutes    Ketan Alcaraz RD-AP, , LDN  [facility] Clinical Nutrition  Board Certified Specialist in Oncology Nutrition   [facility]  [facility], [address] [place] [address]  [phone]

## 2024-05-10 NOTE — PROGRESS NOTES
DIAGNOSIS:  Dysphagia, oropharyngeal (R13.12), History glossectomy (Z90.49), History tongue cancer (C02.9), History XRT (Z92.3)  REFERRING DOCTOR:  Jeferson Ventura M.D., Head and Neck Surgical Oncology  LENGTH OF SESSION:  1 hour    REASON FOR VISIT:  Dysphagia and speech therapy      ONCOLOGIC and TREATMENT HISTORY:      Oncology History   Tongue cancer   6/29/2015 Initial Diagnosis     Tongue cancer      2/19/2024 - 2/19/2024 Chemotherapy     Treatment Summary   Plan Name: OP pembrolizumab 400mg Q6W  Treatment Goal: Control  Status: Inactive  Start Date:   End Date:   Provider: Vincent Reid MD  Chemotherapy: [No matching medication found in this treatment plan]      2/19/2024 Cancer Staged     Staging form: Oral Cavity, AJCC 8th Edition  - Clinical: Stage II (cT2, cN0, cM0)      2/23/24:  DL with BMND, resection of SCC of the right BOT with partial glossectomy involving entirety of tongue base and adjacent pharynx and bilateral posterior oral tongue, pectoralis major myocutaneous flap reconstruction of partial pharyngectomy/partial glossectomy defect, tracheostomy (Dr. Ventura), and PEG placement  (Dr. Wu)     3/22/24:  Debridement of nonviable oral tongue measuring 5 x 4 cm        INTERVAL HISTORY:  5/10/24:  Continuing home program daily.  Doing 7.5 mL via tablespoon.  Often starts off well, but starts to have trouble near end of a given session.     Not needing suction machine and less use of towel to clear secretions.  Scopolamine patches continue to help, and notes worsening secretions management when one is nearing the end of its usefulness.  Noted, regardless of use of patch or not, that around 8:00 pm has increased saliva/mucous.     5/3/24:  No longer with bandage over trach site; appears to be well healed.  Using Scopolamine patches per Dr. Ventura and reported reduced saliva; not spitting as much.  Using Pepcid and a Musinex product (was not able to use the recommended time-released tablet due  to contraindication re: altering a time-released med) and thought something was helping with reducing mucous, but it is not resolved.  Reported feeling as though he has a sinus drip today, but also reported he does not have that sort of problem in his history.  Used sugar-free clear carbonated beverages to help clear mucous in mouth, but could not really discern if it was helpful.  Admitted later that he was using that for swallowing trials more than plain water b/c it gave him a better sense of whether or not it was present (thought carbonation gave the benefit vs taste perception).   Doing home program work, but not able to do with frequency prescribed due to other responsibilities/activities.       4/26/24:  Decannulated 4/22/24; still with bandage over site. Performing MFR and swallowing exercises as able, but not at target frequency yet.  Still felt there was some improvement in neck tissue pliability.  Reported difficulty opening mouth as widely as he could prior to surgery.      MBSS completed 4/18/24.  Severe-profound oropharyngeal dysphagia characterized by poor ability to control bolus, premature spillage to pyriforms, maladaptive swallow contraction attempts with nearly absent hyolaryngeal elevation/excursion, reduced BOT and dysorganized PPW contraction, no evidence of epiglottic inversion resulting in expression of fluid from pyriforms into the laryngeal vestibule (penetration) and reduced swallowing pressures and reduced PE segment aperture. Use of supraglottic swallow strategy was protective of airway in terms of expelling penetrated material, but above issues resulted in need for multiple attempts to try to reduce volume pharynx and facilitate progression into esophageal inlet.   Continuing NPO.      INTERVENTION:  Asked him to observe whether the increased mucous/saliva in the evening seems to correspond with his PEG feeding just before that, particularly re: how rapidly it is administered.  The  "increase might be related to reflux/LPR if the PEG feeding is going in too fast for his system.    Short-term objectives:  Mr. Galdamez will perform the following with % accuracy/consistency:  1.  Swallowing              A.  Execute a normalized swallow.    Continuing.  Most attempts are slight up-down movements of larynx, but more frequent better-appearing approximations.  Able to see and feel thyroid notch moving below/above surgical scar.  Had him palpate clinician's neck to appreciate the hesitation at the apex of a normal swallow.  He is beginning to use phrases like "complete swallow" to describe attempts he makes that he feels are more effective. Continuing.     B.  Demonstrate execution of swallowing exercises  Breath-hold maneuver  2.   Falsetto voice/elevation of larynx  3.   Tongue base retraction  4.   Chin tuck against resistance  5.    Effortful swallow  Mr. Galdamez reported he felt comfortable performing these.  Deferred direct today in order to focus on trismus and swallowing.      C.  Demonstrate use of safe swallowing exercises              1.  SGS -- Executed well independently.  Benefited from cuing to slow pace slightly so that he was in better control and to do a proper cough out with swallow attempt following.              2.  Effortful swallow -- utilizing as well as able at present.      D.  Advance diet using MDTP modification   1.  1-mL -- deferred              2.  3-mL thin liquid -- goal met.              3.  5-mL thin liquid -- Warmed up with this volume.  77% w/o indication.     4.  7.5 mL thin liquid -- Advanced to this volume:  96% w/o indication.  Continuing as warm-up.   5.  10 mL thin liquid -- 95% w/o indication; continuing.              5.  3-mL thin puree -- Introduced this with 5-mL thin liquid chaser to clear any residue.  Benefited from use of infant teaspoon inverted over L posterior oral cavity (and inferiorly-slanting FOM) followed by brief head tilt back to advance " bolus.  Returned head to neutral .  Appeared to swallow with few indications.  No oral residue after liquids wash.  Added to home program.  May use any thin puree (or solid pureed to that level) with consistency equivalent to applesauce.              6.  5-mL thin puree      E.  Demonstrate (alone or with caregiver) execution of myofascial release maneuvers.  Deferred direct today in favor of focus on swallowing.  To continue in home program.              1.  Hyoid -- demonstrated and trained . Rudolph to perform this an others.  Being met.              2.  Stripping or skin rolling -- utilized stripping on lateral necks and in submental area.  Continuing.   3.  Fingertip massage -- performed over neck/submental area.  Continuing   4.  Larynx hold -- Added since he has been decannulated.  Able to perform on self.   5.  Neck stretches -- reviewed and provided home program of active stretches.  Continuing.    F.  Trismus:  Perform 5-5-30 protocol with digital stretching daily.   Deferred direct today.   On 5/3/24:  Measured oral aperture via TheraBite measuring tool:  23-24 mm (avg = 40 mm).  Continuing 5-5-30 protocol with digital stretching, but reported that it hurt..  Performing active stretches as well.     Had him demonstrate stretch and tell whether there was a point where he was able to achieve a stretch, but it did not hurt.  He confirmed that he could.  Instructed him to use that point for a stretch and not to go to the point that it hurt.   Also added MFR maneuvers:   * stripping the masseter   * TMJ stretch via index finger accessing joint intraorally    2.  Speech              A.  Demonstrate adequately slowed rate of speech to facilitate improved listener comprehension.    Utilizing with % consistency.                B.  Demonstrate independent self-correction of too-rapid rate of speech.    Being met.                C.  Utilize BOT-PPW articulation to improve acoustic quality of appropriate  "phonemes (particularly /k/, /g/, "ng").                D.  Demonstrate independent use of alternative phrasing to facilitate listener comprehension when original phrasing was not intelligible.          IMPRESSIONS:   This 69 y.o. man appears to present with  1.  Severe-profound oropharyngeal dysphagia characterized by poor ability to control bolus, premature spillage to pyriforms, maladaptive swallow contraction attempts with  nearly absent hyolaryngeal elevation/excursion, reduced BOT and dysorganized PPW contraction, no evidence of epiglottic inversion resulting in expression of fluid from pyriforms into the laryngeal vestibule (penetration) and reduced swallowing pressures and reduced PE segment aperture.  Use of supraglottic swallow strategy was protective of airway in terms of expelling penetrated material, but above issues resulted in need for multiple attempts to try to reduce volume pharynx and facilitate progression into esophageal inlet.  2.  Dysarthria, peripheral.  3.  S/p near-total glossectomy per surgeries of 2/23 and 3/22/24.  4.  Trismus  5.  PEG dependent.  6.  History second primary SCC of BOT (p16-)  7.  Indurated anterolateral neck tissue.  8.  History XRT 2015 ([facility]).  9.  History SCC BOT, p16+     RECOMMENDATIONS/PLAN OF CARE:   It is felt that Mr. Galdamez would benefit from  1.  Continued use of his PEG for primary nutrition, hydration, and medication delivery.  2.  ST on a once weekly basis with a home program for 4-8 weeks to address dysphagia, trismus, and speech.  3.  Follow up with Dr. Ventura as planned immediately following this visit.        Long-term goals:  Mr. Galdamez will be able to   Consume at least one consistency for pleasure with no signs/symptoms of aspiration.  Produce conversational speech that is % intelligible to most listeners.              "

## 2024-05-12 ENCOUNTER — PATIENT MESSAGE (OUTPATIENT)
Dept: SPEECH THERAPY | Facility: HOSPITAL | Age: 69
End: 2024-05-12
Payer: MEDICARE

## 2024-05-13 ENCOUNTER — PATIENT MESSAGE (OUTPATIENT)
Dept: OTOLARYNGOLOGY | Facility: CLINIC | Age: 69
End: 2024-05-13
Payer: MEDICARE

## 2024-05-13 DIAGNOSIS — K11.7 PTYALISM: ICD-10-CM

## 2024-05-13 RX ORDER — SCOLOPAMINE TRANSDERMAL SYSTEM 1 MG/1
1 PATCH, EXTENDED RELEASE TRANSDERMAL
Qty: 10 PATCH | Refills: 3 | Status: SHIPPED | OUTPATIENT
Start: 2024-05-13 | End: 2024-05-17 | Stop reason: SDUPTHER

## 2024-05-14 ENCOUNTER — CLINICAL SUPPORT (OUTPATIENT)
Dept: HEMATOLOGY/ONCOLOGY | Facility: CLINIC | Age: 69
End: 2024-05-14
Payer: MEDICARE

## 2024-05-14 DIAGNOSIS — Z90.49 HX OF GLOSSECTOMY: ICD-10-CM

## 2024-05-14 DIAGNOSIS — Z93.1 S/P PERCUTANEOUS ENDOSCOPIC GASTROSTOMY (PEG) TUBE PLACEMENT: ICD-10-CM

## 2024-05-14 DIAGNOSIS — Z71.3 NUTRITIONAL COUNSELING: Primary | ICD-10-CM

## 2024-05-14 DIAGNOSIS — C02.9 TONGUE CANCER: ICD-10-CM

## 2024-05-14 DIAGNOSIS — E44.0 MODERATE MALNUTRITION: ICD-10-CM

## 2024-05-14 PROCEDURE — 99999 PR PBB SHADOW E&M-EST. PATIENT-LVL I: CPT | Mod: PBBFAC,HCNC,,

## 2024-05-14 PROCEDURE — 97802 MEDICAL NUTRITION INDIV IN: CPT | Mod: HCNC,S$GLB,,

## 2024-05-17 ENCOUNTER — CLINICAL SUPPORT (OUTPATIENT)
Dept: SPEECH THERAPY | Facility: HOSPITAL | Age: 69
End: 2024-05-17
Payer: MEDICARE

## 2024-05-17 DIAGNOSIS — Z92.3 HISTORY OF HEAD AND NECK RADIATION: ICD-10-CM

## 2024-05-17 DIAGNOSIS — Z90.49 HX OF GLOSSECTOMY: ICD-10-CM

## 2024-05-17 DIAGNOSIS — C02.9 TONGUE CANCER: ICD-10-CM

## 2024-05-17 DIAGNOSIS — R13.12 DYSPHAGIA, OROPHARYNGEAL: Primary | ICD-10-CM

## 2024-05-17 DIAGNOSIS — K11.7 PTYALISM: ICD-10-CM

## 2024-05-17 PROCEDURE — 92507 TX SP LANG VOICE COMM INDIV: CPT | Mod: HCNC | Performed by: SPEECH-LANGUAGE PATHOLOGIST

## 2024-05-17 RX ORDER — SCOLOPAMINE TRANSDERMAL SYSTEM 1 MG/1
1 PATCH, EXTENDED RELEASE TRANSDERMAL
Qty: 10 PATCH | Refills: 3 | Status: SHIPPED | OUTPATIENT
Start: 2024-05-17

## 2024-05-17 NOTE — PROGRESS NOTES
DIAGNOSIS:  Dysphagia, oropharyngeal (R13.12), History glossectomy (Z90.49), History tongue cancer (C02.9), History XRT (Z92.3)  REFERRING DOCTOR:  Jeferson Ventura M.D., [facility] Ne[facility]  LENGTH OF SESSION:  1 hour    REASON FOR VISIT:  Dysphagia and speech therapy      ONCOLOGIC and TREATMENT HISTORY:      Oncology History   Tongue cancer   6/29/2015 Initial Diagnosis     Tongue cancer      2/19/2024 - 2/19/2024 Chemotherapy     Treatment Summary   Plan Name: OP pembrolizumab 400mg Q6W  Treatment Goal: Control  Status: Inactive  Start Date:   End Date:   Provider: Vincent Reid MD  Chemotherapy: [No matching medication found in this treatment plan]      2/19/2024 Cancer Staged     Staging form: Oral Cavity, AJCC 8th Edition  - Clinical: Stage II (cT2, cN0, cM0)      2/23/24:  DL with BMND, resection of SCC of the right BOT with partial glossectomy involving entirety of tongue base and adjacent pharynx and bilateral posterior oral tongue, pectoralis major myocutaneous flap reconstruction of partial pharyngectomy/partial glossectomy defect, tracheostomy (Dr. Ventura), and PEG placement  (Dr. Wu)     3/22/24:  Debridement of nonviable oral tongue measuring 5 x 4 cm        INTERVAL HISTORY:  5/17/24:  Continuing home program.  Advanced himself to cup sips.  Yesterday, drank 32 oz of a coffee/formula mix over the course of the day.  Worked with applesauce only re: pureed foods so far.  (Has other foods as of yesterday, but had not tried yet.)  Felt he was managing swallowing well, but placement of bolus was a challenge.   Still having benefit from scopolamine  patches re: secretion management.  Noted that he had read that vision changes were a possible side effect of the med and reported that he noted some subtle changes.    5/10/24:  Continuing home program daily.  Doing 7.5 mL via tablespoon.  Often starts off well, but starts to have trouble near end of a given session.     Not needing  suction machine and less use of towel to clear secretions.  Scopolamine patches continue to help, and notes worsening secretions management when one is nearing the end of its usefulness.  Noted, regardless of use of patch or not, that around 8:00 pm has increased saliva/mucous.     5/3/24:  No longer with bandage over trach site; appears to be well healed.  Using Scopolamine patches per Dr. Ventura and reported reduced saliva; not spitting as much.  Using Pepcid and a Musinex product (was not able to use the recommended time-released tablet due to contraindication re: altering a time-released med) and thought something was helping with reducing mucous, but it is not resolved.  Reported feeling as though he has a sinus drip today, but also reported he does not have that sort of problem in his history.  Used sugar-free clear carbonated beverages to help clear mucous in mouth, but could not really discern if it was helpful.  Admitted later that he was using that for swallowing trials more than plain water b/c it gave him a better sense of whether or not it was present (thought carbonation gave the benefit vs taste perception).   Doing home program work, but not able to do with frequency prescribed due to other responsibilities/activities.       4/26/24:  Decannulated 4/22/24; still with bandage over site. Performing MFR and swallowing exercises as able, but not at target frequency yet.  Still felt there was some improvement in neck tissue pliability.  Reported difficulty opening mouth as widely as he could prior to surgery.      MBSS completed 4/18/24.  Severe-profound oropharyngeal dysphagia characterized by poor ability to control bolus, premature spillage to pyriforms, maladaptive swallow contraction attempts with nearly absent hyolaryngeal elevation/excursion, reduced BOT and dysorganized PPW contraction, no evidence of epiglottic inversion resulting in expression of fluid from pyriforms into the laryngeal vestibule  (penetration) and reduced swallowing pressures and reduced PE segment aperture. Use of supraglottic swallow strategy was protective of airway in terms of expelling penetrated material, but above issues resulted in need for multiple attempts to try to reduce volume pharynx and facilitate progression into esophageal inlet.   Continuing NPO.      INTERVENTION:  Asked him to monitor perceived vision changes and report to Dr. Ventura.    Short-term objectives:  Mr. Galdamez will perform the following with % accuracy/consistency:  1.  Swallowing              A.  Execute a normalized swallow.    Continuing.  Most attempts consist of  better-appearing approximations of a normalized swallow.  Able to see and feel thyroid notch moving below/above surgical scar.  Continuing.     B.  Demonstrate execution of swallowing exercises  Breath-hold maneuver  2.   Falsetto voice/elevation of larynx  3.   Tongue base retraction  4.   Chin tuck against resistance  5.    Effortful swallow  Mr. Galdamez reported he felt comfortable performing these.  Deferred direct today in order to focus on trismus and swallowing.      C.  Demonstrate use of safe swallowing exercises              1.  SGS -- Executed well independently.  Benefited from cuing to slow pace slightly so that he was in better control and to do a proper cough out with swallow attempt following.              2.  Effortful swallow -- utilizing as well as able at present.      D.  Advance diet using MDTP modification   1.  1-mL -- deferred              2.  3-mL thin liquid -- goal met.              3.  5-mL thin liquid -- Deferred today; last visit,  77% w/o indication.     4.  7.5 mL thin liquid -- Deferred today; last visit,  96% w/o indication.     5.  10 mL thin liquid -- Deferred today; last visit, 95% w/o indication; continuing.   6.  Cup sip liquid -- 96% w/o indication.  Performed 4-8 swallows/swallow attempts while holding breath, then executed controled cough and  reswallows, appropriate judging if cough sounded wet and continuing until it did not.  Tended to have late throat clear or cough after bolus was cleared. Suspect this is trace residue that migrates to the larynx after the swallow; followed throat clear or cough with swallow and it cleared.  He has had no changed in pulmonary status or other clinical signs to suggest detrimental effects of cup sips to date.              7.  3-mL thin puree --  Benefited from use of infant teaspoon inverted over L posterior oral cavity (and inferiorly-slanting FOM) followed by brief head tilt back to advance bolus. Still chanllenging to place bolus efficiently.  Returned head to neutral . Benefited from addition of small cup sip to help progress bolus.  75% with no indications.  Continuing.     May use any thin puree (or solid pureed to that level) with consistency equivalent to applesauce.   Discussed obtaining and trying Squeasy Snacker to deliver puree with greater efficiency and accuracy to the poster oral cavity.              6.  5-mL thin puree      E.  Demonstrate (alone or with caregiver) execution of myofascial release maneuvers.                1.  Hyoid -- demonstrated and trained Mrs. Rudolph to perform this an others.  Being met.              2.  Stripping or skin rolling -- utilized stripping on lateral necks and in submental area.  Continuing.   3.  Fingertip massage -- performed over neck/submental area.  Continuing   4.  Larynx hold -- Added since he has been decannulated.  Able to perform on self.   5.  Neck stretches -- reviewed and provided home program of active stretches.  Continuing.    F.  Trismus:  Perform 5-5-30 protocol with digital stretching daily.   Deferred direct today.   On 5/3/24:  Measured oral aperture via TheraBite measuring tool:  23-24 mm (avg = 40 mm).  Continuing 5-5-30 protocol with digital stretching, but reported that it hurt..  Performing active stretches as well.     Had him demonstrate  "stretch and tell whether there was a point where he was able to achieve a stretch, but it did not hurt.  He confirmed that he could.  Instructed him to use that point for a stretch and not to go to the point that it hurt.   Also added MFR maneuvers:   * stripping the masseter   * TMJ stretch via index finger accessing joint intraorally    2.  Speech              A.  Demonstrate adequately slowed rate of speech to facilitate improved listener comprehension.    Utilizing with % consistency.                B.  Demonstrate independent self-correction of too-rapid rate of speech.    Being met.                C.  Utilize BOT-PPW articulation to improve acoustic quality of appropriate phonemes (particularly /k/, /g/, "ng").                D.  Demonstrate independent use of alternative phrasing to facilitate listener comprehension when original phrasing was not intelligible.          IMPRESSIONS:   This 69 y.o. man appears to present with  1.  Severe-profound oropharyngeal dysphagia characterized by poor ability to control bolus, premature spillage to pyriforms, maladaptive swallow contraction attempts with  nearly absent hyolaryngeal elevation/excursion, reduced BOT and dysorganized PPW contraction, no evidence of epiglottic inversion resulting in expression of fluid from pyriforms into the laryngeal vestibule (penetration) and reduced swallowing pressures and reduced PE segment aperture.  Use of supraglottic swallow strategy was protective of airway in terms of expelling penetrated material, but above issues resulted in need for multiple attempts to try to reduce volume pharynx and facilitate progression into esophageal inlet.  2.  Dysarthria, peripheral.  3.  S/p near-total glossectomy per surgeries of 2/23 and 3/22/24.  4.  Trismus  5.  PEG dependent.  6.  History second primary SCC of BOT (p16-)  7.  Indurated anterolateral neck tissue.  8.  History XRT 2015 ([facility]).  9.  History SCC BOT, p16+   "   RECOMMENDATIONS/PLAN OF CARE:   It is felt that Mr. Galdamez would benefit from  1.  Continued use of his PEG for primary nutrition, hydration, and medication delivery.  2.  ST on a once weekly basis with a home program for 5-7 weeks to address dysphagia, trismus, and speech.  3.  Follow up with Dr. Ventura as planned immediately following this visit.        Long-term goals:  Mr. Galdamez will be able to   Consume at least one consistency for pleasure with no signs/symptoms of aspiration.  Produce conversational speech that is % intelligible to most listeners.

## 2024-05-24 ENCOUNTER — CLINICAL SUPPORT (OUTPATIENT)
Dept: SPEECH THERAPY | Facility: HOSPITAL | Age: 69
End: 2024-05-24
Payer: MEDICARE

## 2024-05-24 DIAGNOSIS — Z92.3 HISTORY OF HEAD AND NECK RADIATION: ICD-10-CM

## 2024-05-24 DIAGNOSIS — R13.12 DYSPHAGIA, OROPHARYNGEAL: Primary | ICD-10-CM

## 2024-05-24 DIAGNOSIS — Z90.49 HX OF GLOSSECTOMY: ICD-10-CM

## 2024-05-24 DIAGNOSIS — C02.9 TONGUE CANCER: ICD-10-CM

## 2024-05-24 PROCEDURE — 92507 TX SP LANG VOICE COMM INDIV: CPT | Mod: GN,HCNC | Performed by: SPEECH-LANGUAGE PATHOLOGIST

## 2024-05-24 NOTE — PROGRESS NOTES
DIAGNOSIS:  Dysphagia, oropharyngeal (R13.12), History glossectomy (Z90.49), History tongue cancer (C02.9), History XRT (Z92.3)  REFERRING DOCTOR:  Jeferson Ventura M.D., Head and Neck Surgical Oncology  LENGTH OF SESSION:  1 hour    REASON FOR VISIT:  Dysphagia and speech therapy      ONCOLOGIC and TREATMENT HISTORY:      Oncology History   Tongue cancer   6/29/2015 Initial Diagnosis     Tongue cancer      2/19/2024 - 2/19/2024 Chemotherapy     Treatment Summary   Plan Name: OP pembrolizumab 400mg Q6W  Treatment Goal: Control  Status: Inactive  Start Date:   End Date:   Provider: Vincent Reid MD  Chemotherapy: [No matching medication found in this treatment plan]      2/19/2024 Cancer Staged     Staging form: Oral Cavity, AJCC 8th Edition  - Clinical: Stage II (cT2, cN0, cM0)      2/23/24:  DL with BMND, resection of SCC of the right BOT with partial glossectomy involving entirety of tongue base and adjacent pharynx and bilateral posterior oral tongue, pectoralis major myocutaneous flap reconstruction of partial pharyngectomy/partial glossectomy defect, tracheostomy (Dr. Ventura), and PEG placement  (Dr. Wu)     3/22/24:  Debridement of nonviable oral tongue measuring 5 x 4 cm        INTERVAL HISTORY:  5/24/24:  Mr. Galdamez reported that the past week was disappointing as he came to the conclusion that the work involved in swallowing a liquid or pureed bolus was such that he would not ever be able to have his PEG removed.  In addition, he found that after 2-4 boluses, he saliva elicited by placing liquid or food in the oral cavity combined with mucous to be such a volume that he had to stop PO intake.  He denied that sugar-free, clear, carbonated beverages adequately cleared this.    He also reported trying soups, one of which (tomato) had such an intense taste as to be unpleasant.   He has not yet obtained a Squeasy Snacker and continued to find it very difficult to deposit foods in his posterior oral  cavity via a small spoon.    5/17/24:  Continuing home program.  Advanced himself to cup sips.  Yesterday, drank 32 oz of a coffee/formula mix over the course of the day.  Worked with applesauce only re: pureed foods so far.  (Has other foods as of yesterday, but had not tried yet.)  Felt he was managing swallowing well, but placement of bolus was a challenge.   Still having benefit from scopolamine  patches re: secretion management.  Noted that he had read that vision changes were a possible side effect of the med and reported that he noted some subtle changes.    5/10/24:  Continuing home program daily.  Doing 7.5 mL via tablespoon.  Often starts off well, but starts to have trouble near end of a given session.     Not needing suction machine and less use of towel to clear secretions.  Scopolamine patches continue to help, and notes worsening secretions management when one is nearing the end of its usefulness.  Noted, regardless of use of patch or not, that around 8:00 pm has increased saliva/mucous.     5/3/24:  No longer with bandage over trach site; appears to be well healed.  Using Scopolamine patches per Dr. Ventura and reported reduced saliva; not spitting as much.  Using Pepcid and a Musinex product (was not able to use the recommended time-released tablet due to contraindication re: altering a time-released med) and thought something was helping with reducing mucous, but it is not resolved.  Reported feeling as though he has a sinus drip today, but also reported he does not have that sort of problem in his history.  Used sugar-free clear carbonated beverages to help clear mucous in mouth, but could not really discern if it was helpful.  Admitted later that he was using that for swallowing trials more than plain water b/c it gave him a better sense of whether or not it was present (thought carbonation gave the benefit vs taste perception).   Doing home program work, but not able to do with frequency  prescribed due to other responsibilities/activities.       4/26/24:  Decannulated 4/22/24; still with bandage over site. Performing MFR and swallowing exercises as able, but not at target frequency yet.  Still felt there was some improvement in neck tissue pliability.  Reported difficulty opening mouth as widely as he could prior to surgery.      MBSS completed 4/18/24.  Severe-profound oropharyngeal dysphagia characterized by poor ability to control bolus, premature spillage to pyriforms, maladaptive swallow contraction attempts with nearly absent hyolaryngeal elevation/excursion, reduced BOT and dysorganized PPW contraction, no evidence of epiglottic inversion resulting in expression of fluid from pyriforms into the laryngeal vestibule (penetration) and reduced swallowing pressures and reduced PE segment aperture. Use of supraglottic swallow strategy was protective of airway in terms of expelling penetrated material, but above issues resulted in need for multiple attempts to try to reduce volume pharynx and facilitate progression into esophageal inlet.   Continuing NPO.      INTERVENTION:  Acknowledged his reported status and agreed that with what he reported, it is not likely that he would be able to take in adequate volume to be rid of his PEG tube.  To reframe swallowing, then, recommended use of PO intake periodically throughout the day in 2- to 4-bolus volumes for the purpose of pleasure.  Recommended still that he obtain a Squeasy Snacker to try with pureed food; if it takes away the work involved in delivering puree to the posterior oral cavity, it may enhance the pleasure a bit.  Cautioned that if he desired to continue some PO intake long-term to avoid ceasing routine PO trials.  He verbalized understanding and his desire to attend to speech intelligibility at this point.       Short-term objectives:   Rudolph will perform the following with % accuracy/consistency:  1.  Swallowing -- Deferred  direct today s/p above conversation.  Mr. Galdamez will continue PO intake for pleasure and let me know if he has any improvements or worsening of swallow functioning.              A.  Execute a normalized swallow.    Continuing.     B.  Demonstrate execution of swallowing exercises  Breath-hold maneuver  2.   Falsetto voice/elevation of larynx  3.   Tongue base retraction  4.   Chin tuck against resistance  5.    Effortful swallow  Mr. Galdamez reported he felt comfortable performing these.  Deferred direct today in order to focus on trismus and swallowing.      C.  Demonstrate use of safe swallowing exercises -- last direct 5/17/24              1.  SGS -- has executed well independently.                2.  Effortful swallow -- utilizing as well as able at present.      D.  Advance diet using MDTP modification   1.  1-mL -- deferred              2.  3-mL thin liquid -- goal met.              3.  5-mL thin liquid -- Deferred today; last visit,  77% w/o indication.     4.  7.5 mL thin liquid -- Deferred today; last visit,  96% w/o indication.     5.  10 mL thin liquid -- Deferred today; last visit, 95% w/o indication; continuing.   6.  Cup sip liquid -- 96% w/o indication.  Performed 4-8 swallows/swallow attempts while holding breath, then executed controled cough and reswallows, appropriate judging if cough sounded wet and continuing until it did not.  Tended to have late throat clear or cough after bolus was cleared. Suspect this is trace residue that migrates to the larynx after the swallow; followed throat clear or cough with swallow and it cleared.  He has had no changed in pulmonary status or other clinical signs to suggest detrimental effects of cup sips to date.              7.  3-mL thin puree --  Benefited from use of infant teaspoon inverted over L posterior oral cavity (and inferiorly-slanting FOM) followed by brief head tilt back to advance bolus. Still chanllenging to place bolus efficiently.  Returned head  "to neutral . Benefited from addition of small cup sip to help progress bolus.  75% with no indications.  Continuing.     May use any thin puree (or solid pureed to that level) with consistency equivalent to applesauce.   Discussed obtaining and trying Squeasy Snacker to deliver puree with greater efficiency and accuracy to the poster oral cavity.              6.  5-mL thin puree      E.  Demonstrate (alone or with caregiver) execution of myofascial release maneuvers.                1.  Hyoid --               2.  Stripping or skin rolling --    3.  Fingertip massage --    4.  Larynx hold --    5.  Neck stretches --    Continuing all in home program.    F.  Trismus:  Perform 5-5-30 protocol with digital stretching daily.   Deferred direct today.   On 5/3/24:  Measured oral aperture via TheraBite measuring tool:  23-24 mm (avg = 40 mm).  Continuing 5-5-30 protocol with digital stretching, but reported that it hurt..  Performing active stretches as well.     Had him demonstrate stretch and tell whether there was a point where he was able to achieve a stretch, but it did not hurt.  He confirmed that he could.  Instructed him to use that point for a stretch and not to go to the point that it hurt.   Also added MFR maneuvers:   * stripping the masseter   * TMJ stretch via index finger accessing joint intraorally    2.  Speech              A.  Demonstrate adequately slowed rate of speech to facilitate improved listener comprehension.    Utilizing with % consistency.                B.  Demonstrate independent self-correction of too-rapid rate of speech.    Being met.                C.  Utilize BOT-PPW articulation to improve acoustic quality of appropriate phonemes (particularly /k/, /g/, "ng").    1.  /k/ and /g/ -- Able to approximate BOT articulation with PPW (sometimes with simultaneous glottal stop) as an alternative to typical site of articulation for velars.  Provided abutting releaser words for home " "program.    2.  "Th" -- Able to achieve improved approximation of voiceless and voiced "th" with use of bilabial fricative substitution.  This sometimes resulted in labiodental articulation. In either case, the phoneme was marked and better approximated, increasing intelligibility.  Provided abutting releaser voiceless "th" words.    3.  "L" -- with attention to syllable-initial "L," approximating structure -- unclear exactly which -- to nicolasa the "L" (even in blends) which improved intelligibility. Provided words with initial "L" and medial "L" that was in the initial position of the syllable fro home practice.                D.  Demonstrate independent use of alternative phrasing to facilitate listener comprehension when original phrasing was not intelligible.          IMPRESSIONS:   This 69 y.o. man appears to present with  1.  Severe-profound oropharyngeal dysphagia characterized by poor ability to control bolus, premature spillage to pyriforms, maladaptive swallow contraction attempts with  nearly absent hyolaryngeal elevation/excursion, reduced BOT and dysorganized PPW contraction, no evidence of epiglottic inversion resulting in expression of fluid from pyriforms into the laryngeal vestibule (penetration) and reduced swallowing pressures and reduced PE segment aperture.  Use of supraglottic swallow strategy was protective of airway in terms of expelling penetrated material, but above issues resulted in need for multiple attempts to try to reduce volume pharynx and facilitate progression into esophageal inlet.  2.  Dysarthria, peripheral.  3.  S/p near-total glossectomy per surgeries of 2/23 and 3/22/24.  4.  Trismus  5.  PEG dependent.  6.  History second primary SCC of BOT (p16-)  7.  Indurated anterolateral neck tissue.  8.  History XRT 2015 ([facility]).  9.  History SCC BOT, p16+     RECOMMENDATIONS/PLAN OF CARE:   It is felt that  Rudolph would benefit from  1.  Continued use of his PEG for primary nutrition, " hydration, and medication delivery.  2.  ST on a once weekly basis with a home program for 4-6 weeks to address speech, trismus, and dysphagia.  3.  Follow up with Dr. Ventura as planned immediately following this visit.        Long-term goals:   Rudolph will be able to   Consume at least one consistency for pleasure with no signs/symptoms of aspiration.  Produce conversational speech that is % intelligible to most listeners.

## 2024-05-24 NOTE — PATIENT INSTRUCTIONS
"For /k/ and /g/:  Try to use the very back of your tongue to contact the back wall of your throat.    Cow  Key  Go  get    Kick  Goo  give  Co-  Cap  Gah  game  Cah  Cock  Gaw  nieves  Cat  Cook  Efrain  Gum  Gilliland  Crow    For "th" try getting your lips close together to guide the air stream:    Think  Thin  Third  Thirst  Thought Thank  Thick  Thorn  Thief  Thaw    For "L" go slowly enough to hear that there is something there when "L" is at the beginning of a syllable:    Aly devlin  Little  Look  Lizzie Cano  Law  Below  follow  Light  Louse  Yellow  silly  Lamp  Lot  Mellow  Alea    In general, so a little slower in the area of the sound that you know is a particular challenge.    "

## 2024-05-28 ENCOUNTER — DOCUMENT SCAN (OUTPATIENT)
Dept: HOME HEALTH SERVICES | Facility: HOSPITAL | Age: 69
End: 2024-05-28
Payer: MEDICARE

## 2024-05-30 ENCOUNTER — EXTERNAL HOME HEALTH (OUTPATIENT)
Dept: HOME HEALTH SERVICES | Facility: HOSPITAL | Age: 69
End: 2024-05-30
Payer: MEDICARE

## 2024-06-07 ENCOUNTER — CLINICAL SUPPORT (OUTPATIENT)
Dept: SPEECH THERAPY | Facility: HOSPITAL | Age: 69
End: 2024-06-07
Payer: MEDICARE

## 2024-06-07 DIAGNOSIS — C02.9 TONGUE CANCER: ICD-10-CM

## 2024-06-07 DIAGNOSIS — Z90.49 HX OF GLOSSECTOMY: ICD-10-CM

## 2024-06-07 DIAGNOSIS — R47.1 DYSARTHRIA: ICD-10-CM

## 2024-06-07 DIAGNOSIS — Z92.3 HISTORY OF HEAD AND NECK RADIATION: ICD-10-CM

## 2024-06-07 DIAGNOSIS — R13.12 DYSPHAGIA, OROPHARYNGEAL: Primary | ICD-10-CM

## 2024-06-07 PROCEDURE — 92507 TX SP LANG VOICE COMM INDIV: CPT | Mod: GN,HCNC | Performed by: SPEECH-LANGUAGE PATHOLOGIST

## 2024-06-07 NOTE — PROGRESS NOTES
DIAGNOSIS:  Dysphagia, oropharyngeal (R13.12), History glossectomy (Z90.49), History tongue cancer (C02.9), History XRT (Z92.3)  REFERRING DOCTOR:  Jeferson Ventura M.D., Head and Neck Surgical Oncology  LENGTH OF SESSION:  1 hour    REASON FOR VISIT:  Dysphagia and speech therapy      ONCOLOGIC and TREATMENT HISTORY:      Oncology History   Tongue cancer   6/29/2015 Initial Diagnosis     Tongue cancer      2/19/2024 - 2/19/2024 Chemotherapy     Treatment Summary   Plan Name: OP pembrolizumab 400mg Q6W  Treatment Goal: Control  Status: Inactive  Start Date:   End Date:   Provider: Vincent Reid MD  Chemotherapy: [No matching medication found in this treatment plan]      2/19/2024 Cancer Staged     Staging form: Oral Cavity, AJCC 8th Edition  - Clinical: Stage II (cT2, cN0, cM0)      2/23/24:  DL with BMND, resection of SCC of the right BOT with partial glossectomy involving entirety of tongue base and adjacent pharynx and bilateral posterior oral tongue, pectoralis major myocutaneous flap reconstruction of partial pharyngectomy/partial glossectomy defect, tracheostomy (Dr. Ventura), and PEG placement  (Dr. Wu)     3/22/24:  Debridement of nonviable oral tongue measuring 5 x 4 cm        INTERVAL HISTORY:  6/7/24:  Went to [place] on a family vacation last week.   at their lodging created soups for him to have at meals.   Rudolph reported that sometimes he was able to take the entire serving PO, sometimes, he took a portion PO, and gavaged the rest.  Used SGS, but had occasional spontaneous coughing that sometimes persisted.   Feels that despite wearing scopolamine patches routinely, he is producing more saliva again.  Has found that brushing his teeth just after PO intake helps reduce it somewhat.   Having increased mucous that he cannot swallow and must expel.  Gets plenty of water via PEG. Uses sips of clear, carbonated, sugar-free beverages and it helps somewhat, but not adequately.   Noted taste  change as things having a much more intense taste that they should or being significantly off (e.g., avocado did not taste at all like it should).    Forgot handout of articulation stimuli so did not practice on trip.      5/24/24:  Mr. Galdamez reported that the past week was disappointing as he came to the conclusion that the work involved in swallowing a liquid or pureed bolus was such that he would not ever be able to have his PEG removed.  In addition, he found that after 2-4 boluses, he saliva elicited by placing liquid or food in the oral cavity combined with mucous to be such a volume that he had to stop PO intake.  He denied that sugar-free, clear, carbonated beverages adequately cleared this.    He also reported trying soups, one of which (tomato) had such an intense taste as to be unpleasant.   He has not yet obtained a Squeasy Snacker and continued to find it very difficult to deposit foods in his posterior oral cavity via a small spoon.    5/17/24:  Continuing home program.  Advanced himself to cup sips.  Yesterday, drank 32 oz of a coffee/formula mix over the course of the day.  Worked with applesauce only re: pureed foods so far.  (Has other foods as of yesterday, but had not tried yet.)  Felt he was managing swallowing well, but placement of bolus was a challenge.   Still having benefit from scopolamine  patches re: secretion management.  Noted that he had read that vision changes were a possible side effect of the med and reported that he noted some subtle changes.    5/10/24:  Continuing home program daily.  Doing 7.5 mL via tablespoon.  Often starts off well, but starts to have trouble near end of a given session.     Not needing suction machine and less use of towel to clear secretions.  Scopolamine patches continue to help, and notes worsening secretions management when one is nearing the end of its usefulness.  Noted, regardless of use of patch or not, that around 8:00 pm has increased  saliva/mucous.     5/3/24:  No longer with bandage over trach site; appears to be well healed.  Using Scopolamine patches per Dr. Ventura and reported reduced saliva; not spitting as much.  Using Pepcid and a Musinex product (was not able to use the recommended time-released tablet due to contraindication re: altering a time-released med) and thought something was helping with reducing mucous, but it is not resolved.  Reported feeling as though he has a sinus drip today, but also reported he does not have that sort of problem in his history.  Used sugar-free clear carbonated beverages to help clear mucous in mouth, but could not really discern if it was helpful.  Admitted later that he was using that for swallowing trials more than plain water b/c it gave him a better sense of whether or not it was present (thought carbonation gave the benefit vs taste perception).   Doing home program work, but not able to do with frequency prescribed due to other responsibilities/activities.       4/26/24:  Decannulated 4/22/24; still with bandage over site. Performing MFR and swallowing exercises as able, but not at target frequency yet.  Still felt there was some improvement in neck tissue pliability.  Reported difficulty opening mouth as widely as he could prior to surgery.      MBSS completed 4/18/24.  Severe-profound oropharyngeal dysphagia characterized by poor ability to control bolus, premature spillage to pyriforms, maladaptive swallow contraction attempts with nearly absent hyolaryngeal elevation/excursion, reduced BOT and dysorganized PPW contraction, no evidence of epiglottic inversion resulting in expression of fluid from pyriforms into the laryngeal vestibule (penetration) and reduced swallowing pressures and reduced PE segment aperture. Use of supraglottic swallow strategy was protective of airway in terms of expelling penetrated material, but above issues resulted in need for multiple attempts to try to reduce  volume pharynx and facilitate progression into esophageal inlet.   Continuing NPO.      INTERVENTION:  Asked Mr. Galdamez to write Dr. Ventura via the portal re: scopolamine patch's effectiveness and to ask if he has any additional suggestions to reduce or resolve mucous.  He stated his intention to do so.       Short-term objectives:  Mr. Galdamez will perform the following with % accuracy/consistency:  1.  Swallowing -- Deferred direct today s/p conversation 5/24/24.  Mr. Galdamez will continue PO intake for pleasure and let me know if he has any improvements or worsening of swallow functioning.              A.  Execute a normalized swallow.    Continuing.     B.  Demonstrate execution of swallowing exercises  Breath-hold maneuver  2.   Falsetto voice/elevation of larynx  3.   Tongue base retraction  4.   Chin tuck against resistance  5.    Effortful swallow  Mr. Galdamez reported he felt comfortable performing these.  Deferred direct today in order to focus on trismus and swallowing.      C.  Demonstrate use of safe swallowing exercises -- last direct 5/17/24              1.  SGS -- has executed well independently.                2.  Effortful swallow -- utilizing as well as able at present.      D.  Advance diet using MDTP modification    E.  Demonstrate (alone or with caregiver) execution of myofascial release maneuvers.                1.  Hyoid --               2.  Stripping or skin rolling --    3.  Fingertip massage --    4.  Larynx hold --    5.  Neck stretches --    Continuing all in home program.    F.  Trismus:  Perform 5-5-30 protocol with digital stretching daily.   Deferred direct today.   On 5/3/24:  Measured oral aperture via TheraBite measuring tool:  23-24 mm (avg = 40 mm).  Continuing 5-5-30 protocol with digital stretching, but reported that it hurt..  Performing active stretches as well.     Had him demonstrate stretch and tell whether there was a point where he was able to achieve a stretch, but  "it did not hurt.  He confirmed that he could.  Instructed him to use that point for a stretch and not to go to the point that it hurt.   Also added MFR maneuvers:   * stripping the masseter   * TMJ stretch via index finger accessing joint intraorally    2.  Speech              A.  Demonstrate adequately slowed rate of speech to facilitate improved listener comprehension.    Utilizing with % consistency.                B.  Demonstrate independent self-correction of too-rapid rate of speech.    Being met.                C.  Utilize BOT-PPW articulation to improve acoustic quality of appropriate phonemes (particularly /k/, /g/, "ng").    1.  /k/ and /g/ -- Able to approximate BOT articulation with PPW (sometimes with simultaneous glottal stop) as an alternative to typical site of articulation for velars.  Continuing words and provided phrases, all contexts, for home program.    2.  "Th" -- Able to achieve improved approximation of voiceless and voiced "th" with use of bilabial fricative substitution.  This sometimes resulted in labiodental articulation. In either case, the phoneme was marked and better approximated, increasing intelligibility.  Continuing words and provided phrases, all contexts, for home program.    3.  "L" -- with attention to syllable-initial "L," approximating structure -- unclear exactly which -- to nicolasa the "L" (even in blends) which improved intelligibility. Continuing words and provided phrases, all contexts, for home program.                D.  Demonstrate independent use of alternative phrasing to facilitate listener comprehension when original phrasing was not intelligible.          IMPRESSIONS:   This 69 y.o. man appears to present with  1.  Severe-profound oropharyngeal dysphagia characterized by poor ability to control bolus, premature spillage to pyriforms, maladaptive swallow contraction attempts with  nearly absent hyolaryngeal elevation/excursion, reduced BOT and dysorganized " PPW contraction, no evidence of epiglottic inversion resulting in expression of fluid from pyriforms into the laryngeal vestibule (penetration) and reduced swallowing pressures and reduced PE segment aperture.  Use of supraglottic swallow strategy was protective of airway in terms of expelling penetrated material, but above issues resulted in need for multiple attempts to try to reduce volume pharynx and facilitate progression into esophageal inlet.  2.  Dysarthria, peripheral.  3.  S/p near-total glossectomy per surgeries of 2/23 and 3/22/24.  4.  Trismus  5.  PEG dependent.  6.  History second primary SCC of BOT (p16-)  7.  Indurated anterolateral neck tissue.  8.  History XRT 2015 ([facility]).  9.  History SCC BOT, p16+     RECOMMENDATIONS/PLAN OF CARE:   It is felt that Mr. Galdamez would benefit from  1.  Continued use of his PEG for primary nutrition, hydration, and medication delivery.  2.  ST on a once weekly basis with a home program for 3-5weeks to address speech, trismus, and dysphagia.  3.  Follow up with Dr. Ventura as planned immediately following this visit.        Long-term goals:  Mr. Galdamez will be able to   Consume at least one consistency for pleasure with no signs/symptoms of aspiration.  Produce conversational speech that is % intelligible to most listeners.

## 2024-06-07 NOTE — PATIENT INSTRUCTIONS
"For /k/ and /g/:  Try to use the very back of your tongue to contact the back wall of your throat.    Cow  Key  Go  get    Kick  Goo  give  Co-  Cap  Gah  game  Cah  Cock  Gaw  nieves  Cat  Cook  Efrain  Gum  Gilliland  Crow  Guillermo    Cow barn Key chain Go home Get up  My cow No key  You go  I get  Red cow His key Can go  Might get    Cat bowl Cook it  Guillermo is  Give me  My cat  You cook See Guillermo You give  [place] cat Will cook Call Guillermo Will give    For "th" try getting your lips close together to guide the air stream:    Think  Thin  Third  Thirst  Thought Thank  Thick  Thorn  Thief  Thaw    Think so Thin man Thank you Third base  I think  Too thin You thank Play third  Can think Not thin Did thank Going third    Thought so Thaw out Thirsty boy Thorn bush  I thought You thaw Very thirsty No thorns  Had thought Don't thaw Not thirsty Sharp thorns    For "L" go slowly enough to hear that there is something there when "L" is at the beginning of a syllable:    Aly deviln  Little  Look  Lizzie  belly  Lame  Law  Below  follow  Light  Louse  Yellow  silly  Lamp  Lot  Mellow  Alea    Little way Look out Light on Yellow dog  Too little I look  No light Too yellow  Not little Can look Bright light    Law book Lame duck Lot of money Follow me  No law  Very lame No lot  Silly song  A bad law Not lame His lot  Below me    In general, so a little slower in the area of the sound that you know is a particular challenge.  "

## 2024-06-13 ENCOUNTER — PATIENT MESSAGE (OUTPATIENT)
Dept: OTOLARYNGOLOGY | Facility: CLINIC | Age: 69
End: 2024-06-13
Payer: MEDICARE

## 2024-06-13 DIAGNOSIS — K11.7 PTYALISM: Primary | ICD-10-CM

## 2024-06-13 NOTE — PROGRESS NOTES
Oncology Nutrition Assessment Medical Nutrition Therapy Follow-up Visit    Timothy GREENE Rudolph  1955    Referring Provider: No ref. provider found   Reason for Referral: C02.9 (ICD-10-CM) - Tongue cancer     Diagnosis: Tongue cancer   Treatment: [No matching plan found]   1. DEBRIDEMENT, WOUND (N/A), March 22, 2024  - Dr Ventura  2. GLOSSECTOMY (Right), DISSECTION, NECK (Bilateral), TRACHEOTOMY (N/A), CREATION, FLAP, MUSCLE ROTATION (N/A), INSERTION, PEG TUBE (Right) - February 23, 2024 - Dr Ventura    PMHx:   Past Medical History:   Diagnosis Date    Acute on chronic blood loss anemia 3/19/2024    Cancer     Hyperlipidemia     Hypertension      Allergies: Patient has no known allergies.    Nutrition Assessment    Anthropometrics:   Weight:  06/14/24: 157.3 lbs (in RD office)  05/14/24: 162 lb (in RD office)  Wt Readings from Last 10 Encounters:   04/22/24 70.3 kg (155 lb)   04/18/24 72.3 kg (159 lb 6.3 oz)   03/20/24 73.9 kg (162 lb 14.7 oz)   03/18/24 73.9 kg (163 lb)   03/18/24 73.9 kg (163 lb)   02/28/24 80 kg (176 lb 5.9 oz)   02/20/24 79.7 kg (175 lb 11.3 oz)   02/19/24 78.4 kg (172 lb 13.5 oz)   02/15/24 78.5 kg (173 lb 1 oz)   01/30/24 77.1 kg (170 lb)                              Usual BW: 155 lb Ht Readings from Last 1 Encounters:   04/18/24 6' (1.829 m)      BMI Readings from Last 1 Encounters:   04/22/24 21.02 kg/m²     Estimated body surface area is 1.89 meters squared as calculated from the following:    Height as of 4/18/24: 6' (1.829 m).    Weight as of 4/22/24: 70.3 kg (155 lb).        Food/Nutrition-related History:  Had some Clear Soup at [facility]. Took about 1 hour to eat it. Drank sprite, coffee, soda water. Tried squeeze applesauce and cottage cheese but wont go down due to thickness. Working with SLP on swallowing. Meeting with Mary Ellen who cleared him for thin puree. Sipping liquids. Not more than a small amount at a time.      DME/Insurance: [facility] Medicare HMO / [place]  EN Order: 4  "carton of [employer] Peptide 1.5 by bolus, syringe (gravity) via PEG. Provides: 912 mL/day total volume, 2000 kcals/day, 96 g/day protein  Current EN Intake: Mostly 4 carton of [employer] Peptide 1.5 (occasionally 5) by bolus, syringe (gravity) via PEG. Flushing with 0 ml water before and 180 ml water after each carton. Additional Water flushes: 120 mL 2 x/day with medications and ~300 ml of decaf coffee in PEG tube. Provides: 2580 mL/day total volume, 7824-0697 kcals/day,  g/day protein     Encounter Notes:  Timothy Galdamez is a 69 y.o. male with a history of tongue cancer S/p near-total glossectomy per surgeries of 2/23 and 3/22/24 referred by No ref. provider found for nutrition assessment and counseling. Today, 5/14/24, Mr. Galdamez presents with a weight gain of 7 lbs in 3 weeks. Patient is PEG tube dependent however he is working closely with Mary Ellen SLP on dysphagia, trismus, and speech. Noted that Mary Ellen recently added use of 5-mL thin puree (or solid pureed to that level) with consistency equivalent to applesauce on 5/10/24. Denies N/V/D/C, pain swallowing, gas or bloating. Previously reports new onset of reflux which he is treating with Pepcid however he reports feeling that increased thick mucus production may be the cause of this. Reports tastes are more intense than usual such as "too salty". Observed PEG tube. Noted the tab on feeding port has come off making it harder to open.     Current Medications:  Current Outpatient Medications   Medication Instructions    acetaminophen (TYLENOL) 160 mg/5 mL Liqd take 20.3 mLs (649.6 mg total) every 6 (six) hours as needed.    ALPRAZolam (XANAX) 0.5 mg, Oral, 3 times daily    amLODIPine (NORVASC) 2.5 mg, Per G Tube, Daily    ascorbic acid (vitamin C) (VITAMIN C) 100 mg, Oral, Daily    aspirin (ECOTRIN) 81 mg, Oral, Daily    atorvastatin (LIPITOR) 80 mg, Per G Tube, Daily    calcium carbonate (OS-HERMES) 600 mg, Oral, Daily    ergocalciferol, vitamin D2, (VITAMIN D " ORAL) 1 tablet, Oral, Daily    ezetimibe (ZETIA) 10 mg, Per G Tube, Daily    multivitamin (THERAGRAN) tablet 1 tablet, Per G Tube, Daily    NON FORMULARY MEDICATION Prevegan once daily    omega-3/dha/epa/dpa/fish oil (OMEGA-3 2100 ORAL) 1 capsule, Oral, Daily    scopolamine (TRANSDERM-SCOP) 1.3-1.5 mg (1 mg over 3 days) 1 patch, Transdermal, Every 72 hours    silodosin (RAPAFLO) 4 mg, Oral, Daily, Open capsule, place in water and administer into feeding tube    vitamin E 100 Units, Oral, Daily   Pepcid    Labs: Reviewed 3/25/24: ca 8.4, na 135, alb 2.4     Nutrition Diagnosis    Nutrition Problem: Malnutrition  Etiology (related to): increased energy needs and swallowing difficulty S/p near-total glossectomy  Signs/Symptoms (as evidenced by): requiring tube feeding for nutrition support, weight loss of 20 lbs in 2 months, and delayed wound healing    Nutrition Intervention    Nutrition Prescription  4324-1686 kcals/day 30-35 kcal/kg   70-84 g protein/day 1-1.2 gm/kg  2304-3416 mL fluid/day 1 ml/kcal    Recommendations:   Continue 4 cartons of [employer] Peptide 1.5 bolus via PEG tube with 60 ml of water before and 180 ml of water after feeding and 120 ml of water BID with medications.   Discussed goal of taking 1/2 carton of [employer] Peptide 1.5 orally mixed in coffee or ready to drink. If unable to finish the 5 oz orally, put the rest in PEG tube.   Message Yamel HALL with [facility] about getting vanilla product.   Discussed potential indication for low-profile Goyo Shields in the future.  May consider increasing water flushes to 120 ml TID for additional hydration and mucus management.    Materials Provided/Reviewed: none    Nutrition Monitoring and Evaluation    Monitor: rate/formulation of tube feeding, weight, diet education needs , and symptom management     Follow up In 1 months    Communication to referring provider/care team: Note available in chart.     Consultation Time: 30 Minutes    Ketan Alcaraz RD-AP, ,  JERADN  [facility] in Clinical Nutrition  Board Certified Specialist in Oncology Nutrition   [facility]  [phone]

## 2024-06-14 ENCOUNTER — OFFICE VISIT (OUTPATIENT)
Dept: INTERNAL MEDICINE | Facility: CLINIC | Age: 69
End: 2024-06-14
Attending: FAMILY MEDICINE
Payer: MEDICARE

## 2024-06-14 ENCOUNTER — CLINICAL SUPPORT (OUTPATIENT)
Dept: HEMATOLOGY/ONCOLOGY | Facility: CLINIC | Age: 69
End: 2024-06-14
Payer: MEDICARE

## 2024-06-14 ENCOUNTER — CLINICAL SUPPORT (OUTPATIENT)
Dept: SPEECH THERAPY | Facility: HOSPITAL | Age: 69
End: 2024-06-14
Payer: MEDICARE

## 2024-06-14 VITALS
HEART RATE: 61 BPM | OXYGEN SATURATION: 95 % | DIASTOLIC BLOOD PRESSURE: 70 MMHG | WEIGHT: 158.06 LBS | HEIGHT: 72 IN | BODY MASS INDEX: 21.41 KG/M2 | SYSTOLIC BLOOD PRESSURE: 112 MMHG

## 2024-06-14 DIAGNOSIS — R47.1 DYSARTHRIA: Primary | ICD-10-CM

## 2024-06-14 DIAGNOSIS — Z00.00 ANNUAL PHYSICAL EXAM: Primary | ICD-10-CM

## 2024-06-14 DIAGNOSIS — E78.5 HYPERLIPIDEMIA, UNSPECIFIED HYPERLIPIDEMIA TYPE: ICD-10-CM

## 2024-06-14 DIAGNOSIS — Z93.1 S/P PERCUTANEOUS ENDOSCOPIC GASTROSTOMY (PEG) TUBE PLACEMENT: ICD-10-CM

## 2024-06-14 DIAGNOSIS — Z90.49 HX OF GLOSSECTOMY: ICD-10-CM

## 2024-06-14 DIAGNOSIS — I25.10 CORONARY ARTERY CALCIFICATION: ICD-10-CM

## 2024-06-14 DIAGNOSIS — E44.0 MODERATE MALNUTRITION: ICD-10-CM

## 2024-06-14 DIAGNOSIS — I25.84 CORONARY ARTERY CALCIFICATION: ICD-10-CM

## 2024-06-14 DIAGNOSIS — R13.12 DYSPHAGIA, OROPHARYNGEAL: ICD-10-CM

## 2024-06-14 DIAGNOSIS — C02.9 TONGUE CANCER: ICD-10-CM

## 2024-06-14 DIAGNOSIS — I10 HYPERTENSION, ESSENTIAL: ICD-10-CM

## 2024-06-14 DIAGNOSIS — Z71.3 NUTRITIONAL COUNSELING: Primary | ICD-10-CM

## 2024-06-14 DIAGNOSIS — Z92.3 HISTORY OF HEAD AND NECK RADIATION: ICD-10-CM

## 2024-06-14 DIAGNOSIS — I70.0 AORTIC ATHEROSCLEROSIS: ICD-10-CM

## 2024-06-14 DIAGNOSIS — Z12.5 PROSTATE CANCER SCREENING: ICD-10-CM

## 2024-06-14 PROBLEM — Z99.81 ON SUPPLEMENTAL OXYGEN THERAPY: Status: RESOLVED | Noted: 2024-02-23 | Resolved: 2024-06-14

## 2024-06-14 PROBLEM — J39.2 OROPHARYNGEAL MASS: Status: RESOLVED | Noted: 2023-12-11 | Resolved: 2024-06-14

## 2024-06-14 PROBLEM — Z99.11 ENCOUNTER FOR WEANING FROM VENTILATOR: Status: RESOLVED | Noted: 2024-03-06 | Resolved: 2024-06-14

## 2024-06-14 PROCEDURE — 3288F FALL RISK ASSESSMENT DOCD: CPT | Mod: HCNC,CPTII,S$GLB, | Performed by: FAMILY MEDICINE

## 2024-06-14 PROCEDURE — 1160F RVW MEDS BY RX/DR IN RCRD: CPT | Mod: HCNC,CPTII,S$GLB, | Performed by: FAMILY MEDICINE

## 2024-06-14 PROCEDURE — 97803 MED NUTRITION INDIV SUBSEQ: CPT | Mod: HCNC,S$GLB,,

## 2024-06-14 PROCEDURE — 3078F DIAST BP <80 MM HG: CPT | Mod: HCNC,CPTII,S$GLB, | Performed by: FAMILY MEDICINE

## 2024-06-14 PROCEDURE — 99999 PR PBB SHADOW E&M-EST. PATIENT-LVL IV: CPT | Mod: PBBFAC,HCNC,, | Performed by: FAMILY MEDICINE

## 2024-06-14 PROCEDURE — 92507 TX SP LANG VOICE COMM INDIV: CPT | Mod: GN,HCNC | Performed by: SPEECH-LANGUAGE PATHOLOGIST

## 2024-06-14 PROCEDURE — 3008F BODY MASS INDEX DOCD: CPT | Mod: HCNC,CPTII,S$GLB, | Performed by: FAMILY MEDICINE

## 2024-06-14 PROCEDURE — 1101F PT FALLS ASSESS-DOCD LE1/YR: CPT | Mod: HCNC,CPTII,S$GLB, | Performed by: FAMILY MEDICINE

## 2024-06-14 PROCEDURE — 3074F SYST BP LT 130 MM HG: CPT | Mod: HCNC,CPTII,S$GLB, | Performed by: FAMILY MEDICINE

## 2024-06-14 PROCEDURE — 1126F AMNT PAIN NOTED NONE PRSNT: CPT | Mod: HCNC,CPTII,S$GLB, | Performed by: FAMILY MEDICINE

## 2024-06-14 PROCEDURE — 1159F MED LIST DOCD IN RCRD: CPT | Mod: HCNC,CPTII,S$GLB, | Performed by: FAMILY MEDICINE

## 2024-06-14 PROCEDURE — 99397 PER PM REEVAL EST PAT 65+ YR: CPT | Mod: HCNC,S$GLB,, | Performed by: FAMILY MEDICINE

## 2024-06-14 NOTE — PATIENT INSTRUCTIONS
"For /k/ and /g/:  Try to use the very back of your tongue to contact the back wall of your throat.    Cow  Key  Go  Get     Kick  Goo  give  Co-  Cap  Gah  game  Cah  Cock  Gaw  nieves  Cat  Cook  Efrain  Gum  Gilliland  Crow  Guillermo    Drill these 3x each:  Cow barn Key chain Go home Get up  My cow No key  You go  I get  Red cow His key Can go  Might get    Cat bowl Cook it  Guillermo is  Give me  My cat  You cook See Guillermo You give  [place] cat Will cook Call Guillermo Will give    Try putting these in sentences.  ____________________________________________________________    For "th" try getting your lips close together to guide the air stream:    Think  Thin  Third  Thirst  Thought Thank  Thick  Thorn  Thief  Thaw    Think so Thin man Thank you Third base  I think  Too thin You thank Play third  Can think Not thin Did thank Going third    Thought so Thaw out Thirsty boy Thorn bush  I thought You thaw Very thirsty No thorns  Had thought Don't thaw Not thirsty Sharp thorns    Go ahead and expand these into sentences.  _______________________________________________________________________________    For "L" go slowly enough to hear that there is something there when "L" is at the beginning of a syllable:    Aly devlin  Little  Look  Lizzie  belly  Lame  Law  Below  follow  Light  Louse  Yellow  silly  Lamp  Lot  Mellow  Alea    Little way Look out Light on Yellow dog  Too little I look  No light Too yellow  Not little Can look Bright light    Law book Lame duck Lot of money Follow me  No law  Very lame No lot  Silly song  A bad law Not lame His lot  Below me    In general, so a little slower in the area of the sound that you know is a particular challenge.  Go ahead and expand these phrases into sentences.  "

## 2024-06-14 NOTE — PROGRESS NOTES
Subjective:       Patient ID: Timothy Galdamez is a 69 y.o. male.    Chief Complaint: Annual Exam    Established patient for an annual wellness check/physical exam and also chronic disease management. Specific complaints - see dictation, M*model entries and please see ROS.  Past, Surgical, Family, Social Histories; Medications, Allergies reviewed and reconciled.  Health maintenance file reviewed and addressed items due. Recent applicable lab, imaging and cardiovascular results reviewed.  Problem list items reviewed and modified or added entries (in the overview section) may not be transcribed into this encounter note due to note writer format.      Last saw me 2021, virtual appointment.  Changed PCP.  Saw had an open appointment and wanted to reestablish.  Complicated ENT cancer history since seeing me, described in problem list.  Recovering from neck dissection, glossectomy and debridement.  Still undergoing speech therapy.  PEG tube.     Reviewed PET-CT scan 12/07/2023 which mentions local disease only.        Review of Systems   Constitutional:  Negative for activity change and unexpected weight change.   HENT:  Positive for trouble swallowing. Negative for hearing loss and rhinorrhea.    Eyes:  Negative for discharge and visual disturbance.   Respiratory:  Positive for chest tightness. Negative for wheezing.    Cardiovascular:  Negative for chest pain and palpitations.   Gastrointestinal:  Negative for blood in stool, constipation, diarrhea and vomiting.   Endocrine: Negative for polydipsia and polyuria.   Genitourinary:  Negative for difficulty urinating, hematuria and urgency.   Musculoskeletal:  Negative for arthralgias, joint swelling and neck pain.   Neurological:  Positive for speech difficulty. Negative for weakness and headaches.   Psychiatric/Behavioral:  Negative for confusion and dysphoric mood.        Objective:      Physical Exam  Vitals and nursing note reviewed.   Constitutional:       Appearance:  He is well-developed. He is not diaphoretic.   HENT:      Head:        Mouth/Throat:     Eyes:      General: No scleral icterus.  Neck:      Thyroid: No thyromegaly.      Vascular: No JVD.      Trachea: No tracheal deviation.   Cardiovascular:      Rate and Rhythm: Normal rate and regular rhythm.      Heart sounds: Normal heart sounds. No murmur heard.     No friction rub. No gallop.   Pulmonary:      Effort: Pulmonary effort is normal. No respiratory distress.      Breath sounds: Normal breath sounds. No wheezing or rales.   Abdominal:      General: There is no distension.      Palpations: Abdomen is soft. There is no mass.      Tenderness: There is no abdominal tenderness. There is no guarding or rebound.   Musculoskeletal:      Cervical back: Normal range of motion and neck supple.   Lymphadenopathy:      Cervical: No cervical adenopathy.   Skin:     General: Skin is warm and dry.      Findings: No erythema or rash.   Neurological:      Mental Status: He is alert and oriented to person, place, and time.      Cranial Nerves: No cranial nerve deficit.      Motor: No tremor.      Coordination: Coordination normal.      Gait: Gait normal.   Psychiatric:         Behavior: Behavior normal.         Thought Content: Thought content normal.         Judgment: Judgment normal.         Assessment:       1. Annual physical exam    2. Tongue cancer    3. Hx of glossectomy    4. S/P percutaneous endoscopic gastrostomy (PEG) tube placement    5. Aortic atherosclerosis    6. Coronary artery calcification    7. Hypertension, essential    8. Hyperlipidemia, unspecified hyperlipidemia type    9. Prostate cancer screening        Plan:     Medication List with Changes/Refills   Current Medications    ACETAMINOPHEN (TYLENOL) 160 MG/5 ML LIQD    take 20.3 mLs (649.6 mg total) every 6 (six) hours as needed.    ALPRAZOLAM (XANAX) 0.5 MG TABLET    Take 1 tablet (0.5 mg total) by mouth 3 (three) times daily.    AMLODIPINE (NORVASC) 2.5 MG  TABLET    1 tablet (2.5 mg total) by Per G Tube route once daily.    ASCORBIC ACID, VITAMIN C, (VITAMIN C) 100 MG TABLET    Take 100 mg by mouth once daily.    ASPIRIN (ECOTRIN) 81 MG EC TABLET    Take 81 mg by mouth once daily.    ATORVASTATIN (LIPITOR) 80 MG TABLET    1 tablet (80 mg total) by Per G Tube route once daily.    CALCIUM CARBONATE (OS-HERMES) 600 MG CALCIUM (1,500 MG) TAB    Take 600 mg by mouth once daily.    ERGOCALCIFEROL, VITAMIN D2, (VITAMIN D ORAL)    Take 1 tablet by mouth once daily.    EZETIMIBE (ZETIA) 10 MG TABLET    1 tablet (10 mg total) by Per G Tube route once daily.    MULTIVITAMIN (THERAGRAN) TABLET    1 tablet by Per G Tube route once daily.    NON FORMULARY MEDICATION    Prevegan once daily    OMEGA-3/DHA/EPA/DPA/FISH OIL (OMEGA-3 2100 ORAL)    Take 1 capsule by mouth once daily.    SCOPOLAMINE (TRANSDERM-SCOP) 1.3-1.5 MG (1 MG OVER 3 DAYS)    Place 1 patch onto the skin every 72 hours.    SILODOSIN (RAPAFLO) 4 MG CAP CAPSULE    Take 1 capsule (4 mg total) by mouth once daily. Open capsule, place in water and administer into feeding tube    VITAMIN E 100 UNIT CAPSULE    Take 100 Units by mouth once daily.     1. Annual physical exam    2. Tongue cancer  Overview:  -abnml path circa 2014  -LN bx and XRT [facility] circa 2015, followed by Dr. Sae Sandhu  -recurrence (or new primary) noted late 2023, Dr. Poole  -PET-CT 12/7/2023 neg for metastatic disease  -2/25/2024 complicated glossectomy procedure w/ pectoralis flap, with further tongue debridement 3/22/2024  -followed by ENT      3. Hx of glossectomy  Overview:  -recurrent SCCA  -2/25/2024 complicated procedure w/ pectoralis flap, with further debridement 3/22/2024      4. S/P percutaneous endoscopic gastrostomy (PEG) tube placement  Overview:  -circa feb 2024 pre glossectomy      5. Aortic atherosclerosis  Overview:  -6/30/2020 US, on statin      6. Coronary artery calcification  Overview:  -CAC 42 on 10/18/2023, on  statin    Assessment & Plan:  Follows in Cardiology      7. Hypertension, essential    8. Hyperlipidemia, unspecified hyperlipidemia type    9. Prostate cancer screening      See meds, orders, follow up, routing and instructions sections of encounter and AVS. Discussed with patient and provided on AVS.        Lab Results   Component Value Date     (L) 03/25/2024    K 4.3 03/25/2024     03/25/2024    BUN 17 03/25/2024    CREATININE 0.7 03/25/2024    GLU 91 03/25/2024    HGBA1C 5.1 08/31/2023    MG 2.1 03/25/2024    AST 13 03/25/2024    ALT 27 03/25/2024    ALBUMIN 2.4 (L) 03/25/2024    PROT 4.9 (L) 03/25/2024    BILITOT 0.3 03/25/2024    CHOL 150 08/31/2023    HDL 57 08/31/2023    LDLCALC 83.4 08/31/2023    TRIG 48 08/31/2023    WBC 6.23 03/25/2024    HGB 8.1 (L) 03/25/2024    HCT 25.9 (L) 03/25/2024    HCT 26 (L) 03/20/2024     03/25/2024    PSA 0.70 08/31/2023    PSADIAG 0.73 06/02/2022    TSH 2.519 08/31/2023

## 2024-06-14 NOTE — PROGRESS NOTES
DIAGNOSIS:  Dysphagia, oropharyngeal (R13.12), History glossectomy (Z90.49), History tongue cancer (C02.9), History XRT (Z92.3)  REFERRING DOCTOR:  Jeferson Ventura M.D., [facility]  LENGTH OF SESSION:  1 hour    REASON FOR VISIT:  Dysphagia and speech therapy      ONCOLOGIC and TREATMENT HISTORY:      Oncology History   Tongue cancer   6/29/2015 Initial Diagnosis     Tongue cancer      2/19/2024 - 2/19/2024 Chemotherapy     Treatment Summary   Plan Name: OP pembrolizumab 400mg Q6W  Treatment Goal: Control  Status: Inactive  Start Date:   End Date:   Provider: Vincent Reid MD  Chemotherapy: [No matching medication found in this treatment plan]      2/19/2024 Cancer Staged     Staging form: Oral Cavity, AJCC 8th Edition  - Clinical: Stage II (cT2, cN0, cM0)      2/23/24:  DL with BMND, resection of SCC of the right BOT with partial glossectomy involving entirety of tongue base and adjacent pharynx and bilateral posterior oral tongue, pectoralis major myocutaneous flap reconstruction of partial pharyngectomy/partial glossectomy defect, tracheostomy (Dr. Ventura), and PEG placement  (Dr. Wu)     3/22/24:  Debridement of nonviable oral tongue measuring 5 x 4 cm        INTERVAL HISTORY:  6/14/24:  Practiced speech stimuli with his wife, but she had mixed success comprehending the isolated words/phrases with no context.  Taking liquids nutrition PO as able.  Has recently followed up with Dr. Ventura re: scopolamine patch alternative.    6/7/24:  Went to [place] on a family vacation last week.   at their lodging created soups for him to have at meals.   Rudolph reported that sometimes he was able to take the entire serving PO, sometimes, he took a portion PO, and gavaged the rest.  Used SGS, but had occasional spontaneous coughing that sometimes persisted.   Feels that despite wearing scopolamine patches routinely, he is producing more saliva again.  Has found that brushing his teeth  just after PO intake helps reduce it somewhat.   Having increased mucous that he cannot swallow and must expel.  Gets plenty of water via PEG. Uses sips of clear, carbonated, sugar-free beverages and it helps somewhat, but not adequately.   Noted taste change as things having a much more intense taste that they should or being significantly off (e.g., avocado did not taste at all like it should).    Forgot handout of articulation stimuli so did not practice on trip.      5/24/24:  Mr. Galdamez reported that the past week was disappointing as he came to the conclusion that the work involved in swallowing a liquid or pureed bolus was such that he would not ever be able to have his PEG removed.  In addition, he found that after 2-4 boluses, he saliva elicited by placing liquid or food in the oral cavity combined with mucous to be such a volume that he had to stop PO intake.  He denied that sugar-free, clear, carbonated beverages adequately cleared this.    He also reported trying soups, one of which (tomato) had such an intense taste as to be unpleasant.   He has not yet obtained a Squeasy Snacker and continued to find it very difficult to deposit foods in his posterior oral cavity via a small spoon.    5/17/24:  Continuing home program.  Advanced himself to cup sips.  Yesterday, drank 32 oz of a coffee/formula mix over the course of the day.  Worked with applesauce only re: pureed foods so far.  (Has other foods as of yesterday, but had not tried yet.)  Felt he was managing swallowing well, but placement of bolus was a challenge.   Still having benefit from scopolamine  patches re: secretion management.  Noted that he had read that vision changes were a possible side effect of the med and reported that he noted some subtle changes.    5/10/24:  Continuing home program daily.  Doing 7.5 mL via tablespoon.  Often starts off well, but starts to have trouble near end of a given session.     Not needing suction machine and  less use of towel to clear secretions.  Scopolamine patches continue to help, and notes worsening secretions management when one is nearing the end of its usefulness.  Noted, regardless of use of patch or not, that around 8:00 pm has increased saliva/mucous.     5/3/24:  No longer with bandage over trach site; appears to be well healed.  Using Scopolamine patches per Dr. Ventura and reported reduced saliva; not spitting as much.  Using Pepcid and a Musinex product (was not able to use the recommended time-released tablet due to contraindication re: altering a time-released med) and thought something was helping with reducing mucous, but it is not resolved.  Reported feeling as though he has a sinus drip today, but also reported he does not have that sort of problem in his history.  Used sugar-free clear carbonated beverages to help clear mucous in mouth, but could not really discern if it was helpful.  Admitted later that he was using that for swallowing trials more than plain water b/c it gave him a better sense of whether or not it was present (thought carbonation gave the benefit vs taste perception).   Doing home program work, but not able to do with frequency prescribed due to other responsibilities/activities.       4/26/24:  Decannulated 4/22/24; still with bandage over site. Performing MFR and swallowing exercises as able, but not at target frequency yet.  Still felt there was some improvement in neck tissue pliability.  Reported difficulty opening mouth as widely as he could prior to surgery.      MBSS completed 4/18/24.  Severe-profound oropharyngeal dysphagia characterized by poor ability to control bolus, premature spillage to pyriforms, maladaptive swallow contraction attempts with nearly absent hyolaryngeal elevation/excursion, reduced BOT and dysorganized PPW contraction, no evidence of epiglottic inversion resulting in expression of fluid from pyriforms into the laryngeal vestibule (penetration) and  reduced swallowing pressures and reduced PE segment aperture. Use of supraglottic swallow strategy was protective of airway in terms of expelling penetrated material, but above issues resulted in need for multiple attempts to try to reduce volume pharynx and facilitate progression into esophageal inlet.   Continuing NPO.      INTERVENTION:  Short-term objectives:  Mr. Galdamez will perform the following with % accuracy/consistency:  1.  Swallowing -- Deferred direct today s/p conversation 5/24/24.  Mr. Galdamez will continue PO intake for pleasure and let me know if he has any improvements or worsening of swallow functioning.              A.  Execute a normalized swallow.    Continuing.     B.  Demonstrate execution of swallowing exercises  Breath-hold maneuver  2.   Falsetto voice/elevation of larynx  3.   Tongue base retraction  4.   Chin tuck against resistance  5.    Effortful swallow  Mr. Galdamez reported he felt comfortable performing these.  Deferred direct today in order to focus on trismus and swallowing.      C.  Demonstrate use of safe swallowing exercises -- last direct 5/17/24              1.  SGS -- has executed well independently.                2.  Effortful swallow -- utilizing as well as able at present.      D.  Advance diet using MDTP modification    E.  Demonstrate (alone or with caregiver) execution of myofascial release maneuvers.                1.  Hyoid --               2.  Stripping or skin rolling --    3.  Fingertip massage --    4.  Larynx hold --    5.  Neck stretches --    Continuing all in home program.    F.  Trismus:  Perform 5-5-30 protocol with digital stretching daily.   Deferred direct today.   On 5/3/24:  Measured oral aperture via TheraBite measuring tool:  23-24 mm (avg = 40 mm).  Continuing 5-5-30 protocol with digital stretching, but reported that it hurt..  Performing active stretches as well.     Had him demonstrate stretch and tell whether there was a point where he was  "able to achieve a stretch, but it did not hurt.  He confirmed that he could.  Instructed him to use that point for a stretch and not to go to the point that it hurt.   Also added MFR maneuvers:   * stripping the masseter   * TMJ stretch via index finger accessing joint intraorally    2.  Speech              A.  Demonstrate adequately slowed rate of speech to facilitate improved listener comprehension.    Utilizing with % consistency.                B.  Demonstrate independent self-correction of too-rapid rate of speech.    Being met.                C.  Utilize BOT-PPW articulation to improve acoustic quality of appropriate phonemes (particularly /k/, /g/, "ng").    1.  /k/ and /g/ -- Able to approximate BOT articulation with PPW (sometimes with simultaneous glottal stop) as an alternative to typical site of articulation for velars.  Continuing words and phrases, all contexts (3x each to drill motor pattern) and added self-generated sentences.    2.  "Th" -- Able to achieve good approximation of voiceless and voiced "th" with use of bilabial fricative substitution.  This sometimes resulted in labiodental articulation. In either case, the phoneme was marked and better approximated, increasing intelligibility.  Continuing phrases, all contexts, and added self-generated sentences for home program.    3.  "L" -- with attention to syllable-initial "L," approximating structure -- unclear exactly which -- to nicolasa the "L" (even in blends) which improved intelligibility. Continuing words and phrases, all contexts, for home program.                D.  Demonstrate independent use of alternative phrasing to facilitate listener comprehension when original phrasing was not intelligible.          IMPRESSIONS:   This 69 y.o. man appears to present with  1.  Severe-profound oropharyngeal dysphagia characterized by poor ability to control bolus, premature spillage to pyriforms, maladaptive swallow contraction attempts with  " nearly absent hyolaryngeal elevation/excursion, reduced BOT and dysorganized PPW contraction, no evidence of epiglottic inversion resulting in expression of fluid from pyriforms into the laryngeal vestibule (penetration) and reduced swallowing pressures and reduced PE segment aperture.  Use of supraglottic swallow strategy was protective of airway in terms of expelling penetrated material, but above issues resulted in need for multiple attempts to try to reduce volume pharynx and facilitate progression into esophageal inlet.  2.  Dysarthria, peripheral.  3.  S/p near-total glossectomy per surgeries of 2/23 and 3/22/24.  4.  Trismus  5.  PEG dependent.  6.  History second primary SCC of BOT (p16-)  7.  Indurated anterolateral neck tissue.  8.  History XRT 2015 ([facility]).  9.  History SCC BOT, p16+     RECOMMENDATIONS/PLAN OF CARE:   It is felt that Mr. Galdamez would benefit from  1.  Continued use of his PEG for primary nutrition, hydration, and medication delivery.  2.  ST on a once weekly basis with a home program for 2-4 weeks to address speech, trismus, and dysphagia.  3.  Follow up with Dr. Ventura as planned immediately following this visit.        Long-term goals:  Mr. Galdamez will be able to   Consume at least one consistency for pleasure with no signs/symptoms of aspiration.  Produce conversational speech that is % intelligible to most listeners.

## 2024-06-17 ENCOUNTER — TELEPHONE (OUTPATIENT)
Dept: CARDIOLOGY | Facility: CLINIC | Age: 69
End: 2024-06-17
Payer: MEDICARE

## 2024-06-17 RX ORDER — GLYCOPYRROLATE 1 MG/1
1 TABLET ORAL 3 TIMES DAILY
Qty: 90 TABLET | Refills: 0 | Status: SHIPPED | OUTPATIENT
Start: 2024-06-17 | End: 2024-07-17

## 2024-06-17 NOTE — TELEPHONE ENCOUNTER
Called pt to schedule follow up office visit with . Pt wishes to follow up with  for cardiology care.

## 2024-06-21 ENCOUNTER — PATIENT MESSAGE (OUTPATIENT)
Dept: INTERNAL MEDICINE | Facility: CLINIC | Age: 69
End: 2024-06-21
Payer: MEDICARE

## 2024-06-25 ENCOUNTER — CLINICAL SUPPORT (OUTPATIENT)
Dept: SPEECH THERAPY | Facility: HOSPITAL | Age: 69
End: 2024-06-25
Payer: MEDICARE

## 2024-06-25 DIAGNOSIS — C02.9 TONGUE CANCER: ICD-10-CM

## 2024-06-25 DIAGNOSIS — Z92.3 HISTORY OF HEAD AND NECK RADIATION: ICD-10-CM

## 2024-06-25 DIAGNOSIS — R47.1 DYSARTHRIA: Primary | ICD-10-CM

## 2024-06-25 DIAGNOSIS — Z90.49 HX OF GLOSSECTOMY: ICD-10-CM

## 2024-06-25 PROCEDURE — 92507 TX SP LANG VOICE COMM INDIV: CPT | Mod: GN,HCNC | Performed by: SPEECH-LANGUAGE PATHOLOGIST

## 2024-06-25 NOTE — PROGRESS NOTES
DIAGNOSIS:  Dysphagia, oropharyngeal (R13.12), History glossectomy (Z90.49), History tongue cancer (C02.9), History XRT (Z92.3)  REFERRING DOCTOR:  Jeferson Ventura M.D., [facility]  LENGTH OF SESSION:  1 hour    REASON FOR VISIT:  Dysphagia and speech therapy      ONCOLOGIC and TREATMENT HISTORY:      Oncology History   Tongue cancer   6/29/2015 Initial Diagnosis     Tongue cancer      2/19/2024 - 2/19/2024 Chemotherapy     Treatment Summary   Plan Name: OP pembrolizumab 400mg Q6W  Treatment Goal: Control  Status: Inactive  Start Date:   End Date:   Provider: Vincent Reid MD  Chemotherapy: [No matching medication found in this treatment plan]      2/19/2024 Cancer Staged     Staging form: Oral Cavity, AJCC 8th Edition  - Clinical: Stage II (cT2, cN0, cM0)      2/23/24:  DL with BMND, resection of SCC of the right BOT with partial glossectomy involving entirety of tongue base and adjacent pharynx and bilateral posterior oral tongue, pectoralis major myocutaneous flap reconstruction of partial pharyngectomy/partial glossectomy defect, tracheostomy (Dr. Ventura), and PEG placement  (Dr. Wu)     3/22/24:  Debridement of nonviable oral tongue measuring 5 x 4 cm        INTERVAL HISTORY:  6/25/24:  New prescription in use for saliva.  Currently using new med 2x/day (vs 3 as prescribed due to not having a pill  at the office).  Also using in concert with last of scopolamine patches.  Reports some benefit with current use of these.  Having less mucous.   Has had some success with using a straw with cold liquids and cup sips with hot liquids b/c he can sense the temperatures.  Denied coughing with cold liquids.  Cannot extract much volume with straw so sips are small and maintains small cup sips with hot liquids.  Takes a certain volume over time, then puts the rest through the tube.    6/14/24:  Practiced speech stimuli with his wife, but she had mixed success comprehending the isolated  words/phrases with no context.  Taking liquids nutrition PO as able.  Has recently followed up with Dr. Ventura re: scopolamine patch alternative.    6/7/24:  Went to [place] on a family vacation last week.   at their lodging created soups for him to have at meals.  Mr. Galdamez reported that sometimes he was able to take the entire serving PO, sometimes, he took a portion PO, and gavaged the rest.  Used SGS, but had occasional spontaneous coughing that sometimes persisted.   Feels that despite wearing scopolamine patches routinely, he is producing more saliva again.  Has found that brushing his teeth just after PO intake helps reduce it somewhat.   Having increased mucous that he cannot swallow and must expel.  Gets plenty of water via PEG. Uses sips of clear, carbonated, sugar-free beverages and it helps somewhat, but not adequately.   Noted taste change as things having a much more intense taste that they should or being significantly off (e.g., avocado did not taste at all like it should).    Forgot handout of articulation stimuli so did not practice on trip.    5/24/24:  Mr. Galdamez reported that the past week was disappointing as he came to the conclusion that the work involved in swallowing a liquid or pureed bolus was such that he would not ever be able to have his PEG removed.  In addition, he found that after 2-4 boluses, he saliva elicited by placing liquid or food in the oral cavity combined with mucous to be such a volume that he had to stop PO intake.  He denied that sugar-free, clear, carbonated beverages adequately cleared this.    He also reported trying soups, one of which (tomato) had such an intense taste as to be unpleasant.   He has not yet obtained a Squeasy Snacker and continued to find it very difficult to deposit foods in his posterior oral cavity via a small spoon.    5/17/24:  Continuing home program.  Advanced himself to cup sips.  Yesterday, drank 32 oz of a coffee/formula mix over the  course of the day.  Worked with applesauce only re: pureed foods so far.  (Has other foods as of yesterday, but had not tried yet.)  Felt he was managing swallowing well, but placement of bolus was a challenge.   Still having benefit from scopolamine  patches re: secretion management.  Noted that he had read that vision changes were a possible side effect of the med and reported that he noted some subtle changes.    5/10/24:  Continuing home program daily.  Doing 7.5 mL via tablespoon.  Often starts off well, but starts to have trouble near end of a given session.     Not needing suction machine and less use of towel to clear secretions.  Scopolamine patches continue to help, and notes worsening secretions management when one is nearing the end of its usefulness.  Noted, regardless of use of patch or not, that around 8:00 pm has increased saliva/mucous.     5/3/24:  No longer with bandage over trach site; appears to be well healed.  Using Scopolamine patches per Dr. Ventura and reported reduced saliva; not spitting as much.  Using Pepcid and a Musinex product (was not able to use the recommended time-released tablet due to contraindication re: altering a time-released med) and thought something was helping with reducing mucous, but it is not resolved.  Reported feeling as though he has a sinus drip today, but also reported he does not have that sort of problem in his history.  Used sugar-free clear carbonated beverages to help clear mucous in mouth, but could not really discern if it was helpful.  Admitted later that he was using that for swallowing trials more than plain water b/c it gave him a better sense of whether or not it was present (thought carbonation gave the benefit vs taste perception).   Doing home program work, but not able to do with frequency prescribed due to other responsibilities/activities.       4/26/24:  Decannulated 4/22/24; still with bandage over site. Performing MFR and swallowing exercises  as able, but not at target frequency yet.  Still felt there was some improvement in neck tissue pliability.  Reported difficulty opening mouth as widely as he could prior to surgery.      MBSS completed 4/18/24.  Severe-profound oropharyngeal dysphagia characterized by poor ability to control bolus, premature spillage to pyriforms, maladaptive swallow contraction attempts with nearly absent hyolaryngeal elevation/excursion, reduced BOT and dysorganized PPW contraction, no evidence of epiglottic inversion resulting in expression of fluid from pyriforms into the laryngeal vestibule (penetration) and reduced swallowing pressures and reduced PE segment aperture. Use of supraglottic swallow strategy was protective of airway in terms of expelling penetrated material, but above issues resulted in need for multiple attempts to try to reduce volume pharynx and facilitate progression into esophageal inlet.   Continuing NPO.      INTERVENTION:  Short-term objectives:  Mr. Galdamez will perform the following with % accuracy/consistency:  1.  Swallowing -- Deferred direct today s/p conversation 5/24/24.  Mr. Galdamez will continue PO intake for pleasure and let me know if he has any improvements or worsening of swallow functioning.              A.  Execute a normalized swallow.    Continuing.     B.  Demonstrate execution of swallowing exercises  Breath-hold maneuver  2.   Falsetto voice/elevation of larynx  3.   Tongue base retraction  4.   Chin tuck against resistance  5.    Effortful swallow  Mr. Galdamez reported he felt comfortable performing these.  Deferred direct today in order to focus on trismus and swallowing.      C.  Demonstrate use of safe swallowing exercises -- last direct 5/17/24              1.  SGS -- has executed well independently.                2.  Effortful swallow -- utilizing as well as able at present.      D.  Advance diet using MDTP modification    E.  Demonstrate (alone or with caregiver) execution  "of myofascial release maneuvers.                1.  Hyoid --               2.  Stripping or skin rolling --    3.  Fingertip massage --    4.  Larynx hold --    5.  Neck stretches --    Continuing all in home program.    F.  Trismus:  Perform 5-5-30 protocol with digital stretching daily.   Deferred direct today.   On 5/3/24:  Measured oral aperture via TheraBite measuring tool:  23-24 mm (avg = 40 mm).  Continuing 5-5-30 protocol with digital stretching, but reported that it hurt..  Performing active stretches as well.     Had him demonstrate stretch and tell whether there was a point where he was able to achieve a stretch, but it did not hurt.  He confirmed that he could.  Instructed him to use that point for a stretch and not to go to the point that it hurt.   Also added MFR maneuvers:   * stripping the masseter   * TMJ stretch via index finger accessing joint intraorally    2.  Speech              A.  Demonstrate adequately slowed rate of speech to facilitate improved listener comprehension.    Utilizing with % consistency.                B.  Demonstrate independent self-correction of too-rapid rate of speech.    Being met.                C.  Utilize BOT-PPW articulation to improve acoustic quality of appropriate phonemes (particularly /k/, /g/, "ng").    1.  /k/ and /g/ -- Able to approximate BOT articulation with PPW (sometimes with simultaneous glottal stop) as an alternative to typical site of articulation for velars.  Addressed speech in sentences and at conversational level today.  Benefited from occasional cuing to improve marking of velars, but overall performing independently.    2.  "Th" -- Able to achieve good approximation of voiceless and voiced "th" with use of bilabial fricative substitution.  This sometimes resulted in labiodental articulation. In either case, the phoneme was marked and better approximated, increasing intelligibility.  Addressed speech in sentences and at conversational " "level today.  Excellent approximation of phoneme.    3.  "L" -- with attention to syllable-initial "L," approximating structure -- unclear exactly which -- to nicolasa the "L" (even in blends) which improved intelligibility. Addressed speech in sentences and at conversational level today. Adequate approximation to perceive phoneme.                D.  Demonstrate independent use of alternative phrasing to facilitate listener comprehension when original phrasing was not intelligible.   Readily able to use as needed.    Mr. Galdamez reported that there are individuals with whom he regularly interacts who always and immediately cue they cannot understand and who ask him to write.  Discussed possibility of more directly letting them know that he doesn't mind repeating as needed in case their actions above are originating from their wish to avoid him (or them) any embarrassment.  He has also spontaneously been using a combination of the chat features and speaking while on Zoom calls, but admits that he is a bit of a slow  and is intent on using correct grammar and spelling, but generally has found this to work adequately.    COUNSELING:  Discussed with Mr. Galdamez that he is utilizing his strategies well both for speech and swallowing, operating within the parameters now in place and compensating as needed.  He endorsed that there was not any additional area of need that we needed to address.  With that, I advised that I think we are at the end of the need for therapeutic intervention, but I would like him to let me know if he discerns another area of need or has any change, re: swallowing particularly, that should be re-assessed.  Encouraged continued trials with liquids and, as able, thin pureed foods.  He verbalized agreement.          IMPRESSIONS:   This 69 y.o. man appears to present with  1.  Severe-profound oropharyngeal dysphagia characterized by poor ability to control bolus, premature spillage to pyriforms, " maladaptive swallow contraction attempts with  nearly absent hyolaryngeal elevation/excursion, reduced BOT and dysorganized PPW contraction, no evidence of epiglottic inversion resulting in expression of fluid from pyriforms into the laryngeal vestibule (penetration) and reduced swallowing pressures and reduced PE segment aperture.  Use of supraglottic swallow strategy was protective of airway in terms of expelling penetrated material, but above issues resulted in need for multiple attempts to try to reduce volume pharynx and facilitate progression into esophageal inlet.  2.  Dysarthria, peripheral.  3.  S/p near-total glossectomy per surgeries of 2/23 and 3/22/24.  4.  Trismus; stable.  5.  PEG dependent.  6.  History second primary SCC of BOT (p16-)  7.  Indurated anterolateral neck tissue.  8.  History XRT 2015 ([facility]).  9.  History SCC BOT, p16+     RECOMMENDATIONS/PLAN OF CARE:   It is felt that Mr. Galdamez would benefit from  1.  Continued use of his PEG for primary nutrition, hydration, and medication delivery.  2.  Discharge from  due to progress.  3.  Follow up with Dr. Ventura as planned immediately following this visit.        Long-term goals:  Mr. Galdamez will be able to   Consume at least one consistency for pleasure with no signs/symptoms of aspiration.  Goal met with liquids.  Produce conversational speech that is % intelligible to most listeners.  Goal met.

## 2024-06-25 NOTE — PATIENT INSTRUCTIONS
"For /k/ and /g/:  Try to use the very back of your tongue to contact the back wall of your throat.    Cow  Key  Go  Get     Kick  Goo  give  Co-  Cap  Gah  game  Cah  Cock  Gaw  nieves  Cat  Cook  Efrain  Gum  [place]ill[place]and  Crow  Guillermo    Drill these 3x each:  Cow barn Key chain Go home Get up  My cow No key  You go  I get  Red cow His key Can go  Might get    Cat bowl Cook it  Guillermo is  Give me  My cat  You cook See Guillermo You give  [place] cat Will cook Call Guillermo Will give    Try putting these in sentences.  ____________________________________________________________    For "th" try getting your lips close together to guide the air stream:    Think  Thin  Third  Thirst  Thought Thank  Thick  Thorn  Thief  Thaw    Think so Thin man Thank you Third base  I think  Too thin You thank Play third  Can think Not thin Did thank Going third    Thought so Thaw out Thirsty boy Thorn bush  I thought You thaw Very thirsty No thorns  Had thought Don't thaw Not thirsty Sharp thorns    Go ahead and expand these into sentences.  _______________________________________________________________________________    For "L" go slowly enough to hear that there is something there when "L" is at the beginning of a syllable:    Aly devlin  Little  Look  Lizzie  belly  Lame  Law  Below  follow  Light  Louse  Yellow  silly  Lamp  Lot  Mellow  Alea    Little way Look out Light on Yellow dog  Too little I look  No light Too yellow  Not little Can look Bright light    Law book Lame duck Lot of money Follow me  No law  Very lame No lot  Silly song  A bad law Not lame His lot  Below me    In general, so a little slower in the area of the sound that you know is a particular challenge.  Go ahead and expand these phrases into sentences.  "

## 2024-06-25 NOTE — Clinical Note
Rudolph is doing remarkably well given his parameters.  While disappointed at not being able to take PO any better than he can, he is also grateful for what he can do and continues to experiment a bit.  He's done remarkably well with his speech and there are still challenges, but he compensates well and in a variety of ways.  I've d/c'd him.  Please send him back if anything emerges.

## 2024-06-28 NOTE — PLAN OF CARE
IMPRESSIONS:   This 69 y.o. man appears to present with  1.  Severe-profound oropharyngeal dysphagia characterized by poor ability to control bolus, premature spillage to pyriforms, maladaptive swallow contraction attempts with  nearly absent hyolaryngeal elevation/excursion, reduced BOT and dysorganized PPW contraction, no evidence of epiglottic inversion resulting in expression of fluid from pyriforms into the laryngeal vestibule (penetration) and reduced swallowing pressures and reduced PE segment aperture.  Use of supraglottic swallow strategy was protective of airway in terms of expelling penetrated material, but above issues resulted in need for multiple attempts to try to reduce volume pharynx and facilitate progression into esophageal inlet.  2.  Dysarthria, peripheral.  3.  S/p near-total glossectomy per surgeries of 2/23 and 3/22/24.  4.  Trismus; stable.  5.  PEG dependent.  6.  History second primary SCC of BOT (p16-)  7.  Indurated anterolateral neck tissue.  8.  History XRT 2015 ([facility]).  9.  History SCC BOT, p16+     RECOMMENDATIONS/PLAN OF CARE:   It is felt that Mr. Galdamez would benefit from  1.  Continued use of his PEG for primary nutrition, hydration, and medication delivery.  2.  Discharge from  due to progress.  3.  Follow up with Dr. Ventura as planned immediately following this visit.        Long-term goals:  Mr. Galdamez will be able to   Consume at least one consistency for pleasure with no signs/symptoms of aspiration.  Goal met with liquids.  Produce conversational speech that is % intelligible to most listeners.  Goal met.

## 2024-07-08 ENCOUNTER — PATIENT MESSAGE (OUTPATIENT)
Dept: OTHER | Facility: OTHER | Age: 69
End: 2024-07-08
Payer: MEDICARE

## 2024-07-08 ENCOUNTER — PATIENT MESSAGE (OUTPATIENT)
Dept: OTOLARYNGOLOGY | Facility: CLINIC | Age: 69
End: 2024-07-08
Payer: MEDICARE

## 2024-07-15 ENCOUNTER — OFFICE VISIT (OUTPATIENT)
Dept: OTOLARYNGOLOGY | Facility: CLINIC | Age: 69
End: 2024-07-15
Payer: MEDICARE

## 2024-07-15 VITALS
HEIGHT: 72 IN | BODY MASS INDEX: 22.09 KG/M2 | HEART RATE: 54 BPM | WEIGHT: 163.13 LBS | DIASTOLIC BLOOD PRESSURE: 79 MMHG | SYSTOLIC BLOOD PRESSURE: 139 MMHG

## 2024-07-15 DIAGNOSIS — C02.9 TONGUE CANCER: Primary | ICD-10-CM

## 2024-07-15 PROCEDURE — 3078F DIAST BP <80 MM HG: CPT | Mod: CPTII,S$GLB,, | Performed by: OTOLARYNGOLOGY

## 2024-07-15 PROCEDURE — 3008F BODY MASS INDEX DOCD: CPT | Mod: CPTII,S$GLB,, | Performed by: OTOLARYNGOLOGY

## 2024-07-15 PROCEDURE — 99213 OFFICE O/P EST LOW 20 MIN: CPT | Mod: S$GLB,,, | Performed by: OTOLARYNGOLOGY

## 2024-07-15 PROCEDURE — 1126F AMNT PAIN NOTED NONE PRSNT: CPT | Mod: CPTII,S$GLB,, | Performed by: OTOLARYNGOLOGY

## 2024-07-15 PROCEDURE — 3075F SYST BP GE 130 - 139MM HG: CPT | Mod: CPTII,S$GLB,, | Performed by: OTOLARYNGOLOGY

## 2024-07-15 PROCEDURE — 1159F MED LIST DOCD IN RCRD: CPT | Mod: CPTII,S$GLB,, | Performed by: OTOLARYNGOLOGY

## 2024-07-15 PROCEDURE — 99999 PR PBB SHADOW E&M-EST. PATIENT-LVL IV: CPT | Mod: PBBFAC,HCNC,, | Performed by: OTOLARYNGOLOGY

## 2024-07-15 PROCEDURE — 1160F RVW MEDS BY RX/DR IN RCRD: CPT | Mod: CPTII,S$GLB,, | Performed by: OTOLARYNGOLOGY

## 2024-07-15 NOTE — PROGRESS NOTES
CC: Surveillance      HPI   69 y.o. male presents status post subtotal glossectomy and pec flap reconstruction in March, 2024.    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  He underwent right sided cervical lymph node biopsy which revealed metastatic disease in the right neck and subsequent laryngoscopy with biopsy of the right base of tongue.  He was treated with radiation at [facility] in 2015.      Doing reasonably well overall.  He has been discharged from speech therapy.  He is drinking liquids but not eating solid foods.  He reports some difficulty handling his secretions despite the use of scopolamine and Robinul.  He is interested pursuing Botox injections.      Review of Systems   Constitutional: Negative for fatigue and unexpected weight change.   HENT: Per HPI.  Eyes: Negative for visual disturbance.   Respiratory: Negative for shortness of breath, hemoptysis   Cardiovascular: Negative for chest pain and palpitations.   Musculoskeletal: Negative for decreased ROM, back pain.   Skin: Negative for rash, sunburn, itching.   Neurological: Negative for dizziness and seizures.   Hematological: Negative for adenopathy. Does not bruise/bleed easily.   Endocrine: Negative for rapid weight loss/weight gain, heat/cold intolerance.     Past Medical History   Patient Active Problem List   Diagnosis    Primary insomnia    Hyperlipidemia    Tongue cancer    Rotator cuff syndrome of right shoulder    Memory change    RLS (restless legs syndrome)    Chronic right-sided low back pain    Ectatic aorta    Subclavian artery stenosis, left    Aortic atherosclerosis    Coronary artery calcification    Hypertension, essential    Elevated alanine aminotransferase (ALT) level    S/P percutaneous endoscopic gastrostomy (PEG) tube placement    Hypokalemia    Hypoalbuminemia    Hypophosphatemia    Status post  tracheostomy    Oral bleeding    Hyponatremia    Acute on chronic blood loss anemia    Hx of glossectomy    Moderate malnutrition    Hemoptysis           Past Surgical History   Past Surgical History:   Procedure Laterality Date    ANKLE SURGERY      L ankle    BIOPSY OF TISSUE OF NECK Left 2013    COLONOSCOPY N/A 08/21/2017    Procedure: COLONOSCOPY;  Surgeon: Danny Jane MD;  Location: [facility] (4TH FLR);  Service: Endoscopy;  Laterality: N/A;  Do not cancel this order    CREATION OF MUSCLE ROTATIONAL FLAP N/A 2/23/2024    Procedure: CREATION, FLAP, MUSCLE ROTATION;  Surgeon: Jeferson Ventura MD;  Location: [address];  Service: ENT;  Laterality: N/A;    DIRECT LARYNGOBRONCHOSCOPY N/A 12/11/2023    Procedure: LARYNGOSCOPY, DIRECT, WITH BRONCHOSCOPY;  Surgeon: Micaela Poole MD;  [facility];  Service: ENT;  Laterality: N/A;    DISSECTION OF NECK Bilateral 2/23/2024    Procedure: DISSECTION, NECK;  Surgeon: Jeferson Ventura MD;  [address];  Service: ENT;  Laterality: Bilateral;    EMBOLIZATION N/A 3/20/2024    Procedure: EMBOLIZATION, BLOOD VESSEL;  Surgeon: Edith Jasso;  Location: [facility];  Service: Anesthesiology;  Laterality: N/A;  lingual artery embo    ESOPHAGOGASTRODUODENOSCOPY N/A 09/18/2023    Procedure: EGD (ESOPHAGOGASTRODUODENOSCOPY);  Surgeon: Basilia Villavicencio MD;  Location: [facility] ENDOSCOPY;  Service: Gastroenterology;  Laterality: N/A;  9.13 instr sent via portal [facility]  pre call complete, stated he would have a ride -    GLOSSECTOMY Right 2/23/2024    Procedure: GLOSSECTOMY;  Surgeon: Jeferson Ventura MD;  Location: [address];  Service: ENT;  Laterality: Right;    INSERTION, PEG TUBE Right 2/23/2024    Procedure: INSERTION, PEG TUBE;  Surgeon: Alpesh Wu MD;  Location: [address];  Service: General;  Laterality: Right;    TONSILLECTOMY      TRACHEOTOMY N/A 2/23/2024    Procedure: TRACHEOTOMY;  Surgeon: Jeferson Ventura  MD CORTNEY;  Location: [facility] OR [address] [place] [address];  Service: ENT;  Laterality: N/A;    TUMOR REMOVAL Right 2015    at [facility]    VASECTOMY      WOUND DEBRIDEMENT N/A 3/22/2024    Procedure: DEBRIDEMENT, WOUND;  Surgeon: Jeferson Ventura MD;  Location: [facility] OR [address] [place] [address];  Service: ENT;  Laterality: N/A;    WRIST FRACTURE SURGERY Right          Family History   Family History   Problem Relation Name Age of Onset    Arthritis Mother      COPD Brother      Psoriasis Neg Hx      Eczema Neg Hx             Social History   .  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                 Social Determinants of Health     Financial Resource Strain: Low Risk  (3/20/2024)    Overall Financial Resource Strain (CARDIA)     Difficulty of Paying Living Expenses: Not hard at all   Food Insecurity: No Food Insecurity (3/20/2024)    Hunger Vital Sign     Worried About Running Out of Food in the Last Year: Never true     Ran Out of Food in the Last Year: Never true   Transportation Needs: No Transportation Needs (3/20/2024)    PRAPARE - Transportation     Lack of Transportation (Medical): No     Lack of Transportation (Non-Medical): No   Physical Activity: Inactive (3/20/2024)    Exercise Vital Sign     Days of Exercise per Week: 0 days     Minutes of Exercise per Session: 0 min   Stress: Stress Concern Present (1/22/2024)    [employer] [place] [employer] - Occupational Stress Questionnaire     Feeling of Stress : Rather much   Housing Stability: Low Risk  (3/20/2024)    Housing Stability  Vital Sign     Unable to Pay for Housing in the Last Year: No     Number of Places Lived in the Last Year: 1     Unstable Housing in the Last Year: No         Allergies   Review of patient's allergies indicates:  No Known Allergies        Physical Exam     Vitals:    07/15/24 1342   BP: 139/79   Pulse: (!) 54         Body mass index is 22.13 kg/m².      General: AOx3, NAD   Respiratory:  Symmetric chest rise, normal effort   Oral Cavity:  Tongue absent. No lesions. Hard Palate WNL. No buccal or FOM lesions.  Oropharynx:  No masses/lesions of the posterior pharyngeal wall. Tonsillar fossa without lesions. Soft palate without masses. Midline uvula.   Neck: Well-healed neck scars.  No cervical lymphadenopathy, thyromegaly or thyroid nodules.  Normal range of motion.    Face: House Brackmann I bilaterally.     Assessment/Plan  Problem List Items Addressed This Visit          Oncology    Tongue cancer - Primary     Repeat PET. Will submit Botox to insurance.  RTC 6 weeks.          Relevant Orders    NM PET CT FDG Skull Base to Mid Thigh

## 2024-07-25 ENCOUNTER — HOSPITAL ENCOUNTER (OUTPATIENT)
Dept: RADIOLOGY | Facility: HOSPITAL | Age: 69
Discharge: HOME | End: 2024-07-25
Attending: OTOLARYNGOLOGY
Payer: MEDICARE

## 2024-07-25 DIAGNOSIS — C02.9 TONGUE CANCER: ICD-10-CM

## 2024-07-25 LAB — POCT GLUCOSE: 96 MG/DL (ref 70–110)

## 2024-07-25 PROCEDURE — 78815 PET IMAGE W/CT SKULL-THIGH: CPT | Mod: 26,PS,, | Performed by: STUDENT IN AN ORGANIZED HEALTH CARE EDUCATION/TRAINING PROGRAM

## 2024-07-25 PROCEDURE — A9552 F18 FDG: HCPCS | Performed by: OTOLARYNGOLOGY

## 2024-07-25 PROCEDURE — 78815 PET IMAGE W/CT SKULL-THIGH: CPT | Mod: TC

## 2024-07-25 RX ORDER — FLUDEOXYGLUCOSE F18 500 MCI/ML
12 INJECTION INTRAVENOUS ONCE
Status: COMPLETED | OUTPATIENT
Start: 2024-07-25 | End: 2024-07-25

## 2024-07-25 RX ADMIN — FLUDEOXYGLUCOSE F-18 12.07 MILLICURIE: 500 INJECTION INTRAVENOUS at 08:07

## 2024-08-01 NOTE — PROGRESS NOTES
Oncology Nutrition Assessment Medical Nutrition Therapy Follow-up Visit    Timothy GREENE Rudolph  1955    Referring Provider: No ref. provider found   Reason for Referral: C02.9 (ICD-10-CM) - Tongue cancer     Diagnosis: Tongue cancer   Treatment: [No matching plan found]   1. DEBRIDEMENT, WOUND (N/A), March 22, 2024  - Dr Ventura  2. GLOSSECTOMY (Right), DISSECTION, NECK (Bilateral), TRACHEOTOMY (N/A), CREATION, FLAP, MUSCLE ROTATION (N/A), INSERTION, PEG TUBE (Right) 20 Fr PEG tube, 3 cm length- February 23, 2024 - Dr Ventura    PMHx:   Past Medical History:   Diagnosis Date    Acute on chronic blood loss anemia 3/19/2024    Cancer     Hyperlipidemia     Hypertension      Allergies: Patient has no known allergies.    Nutrition Assessment    Anthropometrics:   Weight:  08/02/24: 162 lbs  06/14/24: 157.3 lbs (in RD office)  05/14/24: 162 lb (in RD office)  Wt Readings from Last 10 Encounters:   07/15/24 74 kg (163 lb 2.3 oz)   06/14/24 71.7 kg (158 lb 1.1 oz)   04/22/24 70.3 kg (155 lb)   04/18/24 72.3 kg (159 lb 6.3 oz)   03/20/24 73.9 kg (162 lb 14.7 oz)   03/18/24 73.9 kg (163 lb)   03/18/24 73.9 kg (163 lb)   02/28/24 80 kg (176 lb 5.9 oz)   02/20/24 79.7 kg (175 lb 11.3 oz)   02/19/24 78.4 kg (172 lb 13.5 oz)                              Usual BW: 155 lb Ht Readings from Last 1 Encounters:   07/15/24 6' (1.829 m)      BMI Readings from Last 1 Encounters:   07/15/24 22.13 kg/m²     Estimated body surface area is 1.94 meters squared as calculated from the following:    Height as of 7/15/24: 6' (1.829 m).    Weight as of 7/15/24: 74 kg (163 lb 2.3 oz).        Food/Nutrition-related History:  Continued intake of thin liquids that work well for him. Clear Soup at [employer]. Also drinks sprite, coffee, soda water. Noted that he is experiencing with intake tolerance. Tried Marsha Farms orally but it was too thick. This also occurred squeeze applesauce and cottage cheese but wont go down due to thickness. Working  "with SLP on swallowing. Meeting with Mary Ellen who cleared him for thin puree. Sipping liquids. Not more than a small amount at a time.      DME/Insurance: [employer] HMO / [employer]  EN Order: 4 carton of Marsha Farm Peptide 1.5 by bolus, syringe (gravity) via PEG. Provides: 912 mL/day total volume, 2000 kcals/day, 96 g/day protein  Current EN Intake: Mostly 4 carton of Marsha Farm Peptide 1.5 (occasionally 5) by bolus, syringe (gravity) via PEG. Flushing with 0 ml water before and 180 ml water after each carton. Diluting Marsha Herrera with water to thin it out each feeding. Additional Water flushes: 120 mL 2 x/day with medications and ~300 ml of decaf coffee in PEG tube. Provides: 2580 mL/day total volume, 8316-2478 kcals/day,  g/day protein     Encounter Notes:  Timothy Galdamez is a 69 y.o. male with a history of tongue cancer S/p near-total glossectomy per surgeries of 2/23 and 3/22/24 referred by No ref. provider found for nutrition assessment and counseling. On, 5/14/24, Mr. Galdamez presents with a weight gain of 7 lbs in 3 weeks. Patient is PEG tube dependent however he is working closely with Mary Ellen SLP on dysphagia, trismus, and speech. Noted that Mary Ellen recently added use of 5-mL thin puree (or solid pureed to that level) with consistency equivalent to applesauce on 5/10/24. Denies N/V/D/C, pain swallowing, gas or bloating. Previously reports new onset of reflux which he is treating with Pepcid however he reports feeling that increased thick mucus production may be the cause of this. Reports tastes are more intense than usual such as "too salty". Observed PEG tube. Noted the tab on feeding port has come off making it harder to open.     Today, 08/02/24, Mr. Galdamez presents with a stable weight. He reports consistent intake of thin liquids orally and use of PEG tube as directed. Attempted vanilla formula orally however this was too viscous. Gargling with lemon water and salt to help get mucus out. Reflux is " improved therefore he discontinued use of Pepcid. Patient not currently interested in Lenora tube at this time.     Current Medications:  Current Outpatient Medications   Medication Instructions    acetaminophen (TYLENOL) 160 mg/5 mL Liqd take 20.3 mLs (649.6 mg total) every 6 (six) hours as needed.    ALPRAZolam (XANAX) 0.5 mg, Oral, 3 times daily    amLODIPine (NORVASC) 2.5 mg, Per G Tube, Daily    ascorbic acid (vitamin C) (VITAMIN C) 100 mg, Oral, Daily    aspirin (ECOTRIN) 81 mg, Oral, Daily    atorvastatin (LIPITOR) 80 mg, Per G Tube, Daily    calcium carbonate (OS-HERMES) 600 mg, Oral, Daily    ergocalciferol, vitamin D2, (VITAMIN D ORAL) 1 tablet, Oral, Daily    ezetimibe (ZETIA) 10 mg, Per G Tube, Daily    multivitamin (THERAGRAN) tablet 1 tablet, Per G Tube, Daily    NON FORMULARY MEDICATION Prevegan once daily    omega-3/dha/epa/dpa/fish oil (OMEGA-3 2100 ORAL) 1 capsule, Oral, Daily    scopolamine (TRANSDERM-SCOP) 1.3-1.5 mg (1 mg over 3 days) 1 patch, Transdermal, Every 72 hours    silodosin (RAPAFLO) 4 mg, Oral, Daily, Open capsule, place in water and administer into feeding tube    vitamin E 100 Units, Oral, Daily   Pepcid    Labs: Reviewed 06/14/24    Nutrition Diagnosis    Nutrition Problem: Malnutrition  Etiology (related to): increased energy needs and swallowing difficulty S/p near-total glossectomy  Signs/Symptoms (as evidenced by): requiring tube feeding for nutrition support, weight loss of 20 lbs in 2 months, and delayed wound healing    Nutrition Intervention    Nutrition Prescription  7557-7908 kcals/day 30-35 kcal/kg   70-84 g protein/day 1-1.2 gm/kg  7319-9030 mL fluid/day 1 ml/kcal    Recommendations:   Continue 4 cartons of [employer] Farms Peptide 1.5 bolus via PEG tube with 60 ml of water before and 180 ml of water after feeding and 120 ml of water BID with medications.   Reviewed potential indication for low-profile Goyo Shields in the future.  May consider increasing water flushes to 120 ml TID  for additional hydration and mucus management.    Materials Provided/Reviewed: none    Nutrition Monitoring and Evaluation    Monitor: rate/formulation of tube feeding, weight, diet education needs , and symptom management     Follow up Patient will follow up via portal as needed with any questions/concerns     Communication to referring provider/care team: Note available in chart.     Consultation Time: 15 Minutes    Ketan BLAKE, , LDN  [facility] in Clinical Nutrition  Board Certified Specialist in Oncology Nutrition   [facility]  [phone]

## 2024-08-02 ENCOUNTER — CLINICAL SUPPORT (OUTPATIENT)
Dept: HEMATOLOGY/ONCOLOGY | Facility: CLINIC | Age: 69
End: 2024-08-02
Payer: MEDICARE

## 2024-08-02 DIAGNOSIS — Z71.3 NUTRITIONAL COUNSELING: Primary | ICD-10-CM

## 2024-08-02 DIAGNOSIS — C02.9 TONGUE CANCER: ICD-10-CM

## 2024-08-02 DIAGNOSIS — E44.0 MODERATE MALNUTRITION: ICD-10-CM

## 2024-08-02 DIAGNOSIS — Z90.49 HX OF GLOSSECTOMY: ICD-10-CM

## 2024-08-02 DIAGNOSIS — Z93.1 S/P PERCUTANEOUS ENDOSCOPIC GASTROSTOMY (PEG) TUBE PLACEMENT: ICD-10-CM

## 2024-08-12 ENCOUNTER — PATIENT MESSAGE (OUTPATIENT)
Dept: OTOLARYNGOLOGY | Facility: CLINIC | Age: 69
End: 2024-08-12
Payer: MEDICARE

## 2024-08-14 DIAGNOSIS — C02.9 TONGUE CANCER: ICD-10-CM

## 2024-08-14 DIAGNOSIS — K11.7 PTYALISM: ICD-10-CM

## 2024-08-18 ENCOUNTER — PATIENT MESSAGE (OUTPATIENT)
Dept: GASTROENTEROLOGY | Facility: CLINIC | Age: 69
End: 2024-08-18
Payer: MEDICARE

## 2024-08-18 RX ORDER — SCOLOPAMINE TRANSDERMAL SYSTEM 1 MG/1
1 PATCH, EXTENDED RELEASE TRANSDERMAL
Qty: 10 PATCH | Refills: 3 | Status: SHIPPED | OUTPATIENT
Start: 2024-08-18

## 2024-08-18 RX ORDER — ALPRAZOLAM 0.5 MG/1
0.5 TABLET ORAL 3 TIMES DAILY
Qty: 90 TABLET | Refills: 0 | Status: SHIPPED | OUTPATIENT
Start: 2024-08-18 | End: 2024-09-18

## 2024-08-19 DIAGNOSIS — K11.7 SIALORRHEA: Primary | ICD-10-CM

## 2024-08-19 DIAGNOSIS — K21.9 GASTROESOPHAGEAL REFLUX DISEASE, UNSPECIFIED WHETHER ESOPHAGITIS PRESENT: Primary | ICD-10-CM

## 2024-08-19 DIAGNOSIS — Z90.49 HX OF GLOSSECTOMY: ICD-10-CM

## 2024-08-19 RX ORDER — OMEPRAZOLE 40 MG/1
40 CAPSULE, DELAYED RELEASE ORAL DAILY
Qty: 90 CAPSULE | Refills: 3 | Status: SHIPPED | OUTPATIENT
Start: 2024-08-19 | End: 2025-08-19

## 2024-08-19 NOTE — TELEPHONE ENCOUNTER
"SUBJECTIVE:   Arnoldo is a 70 year old who presents for Preventive Visit.  History high cholesterol (elevated blood pressure too in past)  Weight gain - bought new home.   Will now exercise more now with time.   Doing upper body.  Lots of walking. Needs some weight lifting. Some neck issues. Did  P.T.  No chest pain or shortness of breath. Outside blood pressure normal.  [place] - normal colonoscopy - due in at 69yo.  No nausea, vomiting or diarrhea or black/bloody stools. No urine changes or  Hematuria.   2 pots coffee/day. Needs more water. No pop. No ALCOHOL. No rashes/mole changes.   Taking vitaminD. Drinks milk. Eye exam -recently. Dentist regularly.   No testicle pain/masses/hernia.   emotoinally doing ok.  and 4 kids. 7 grandkids. cpap for harry.        10/30/2023    10:14 AM   Additional Questions   Roomed by XIOMARA Culver CMA       Are you in the first 12 months of your Medicare coverage?  No    Healthy Habits:     In general, how would you rate your overall health?  Good    Frequency of exercise:  2-3 days/week    Duration of exercise:  15-30 minutes    Do you usually eat at least 4 servings of fruit and vegetables a day, include whole grains    & fiber and avoid regularly eating high fat or \"junk\" foods?  Yes    Taking medications regularly:  Yes    Medication side effects:  Not applicable    Ability to successfully perform activities of daily living:  No assistance needed    Home Safety:  No safety concerns identified    Hearing Impairment:  Difficulty following a conversation in a noisy restaurant or crowded room, feel that people are mumbling or not speaking clearly, need to ask people to speak up or repeat themselves and difficulty understanding soft or whispered speech    In the past 6 months, have you been bothered by leaking of urine?  No    In general, how would you rate your overall mental or emotional health?  Excellent    Additional concerns today:  Yes          Have you ever done Advance Care " See patient message.   Planning? (For example, a Health Directive, POLST, or a discussion with a medical provider or your loved ones about your wishes): Yes, patient states has an Advance Care Planning document and will bring a copy to the clinic.    Normal conversational hearing  Fall risk  Fallen 2 or more times in the past year?: No  Any fall with injury in the past year?: No    Cognitive Screening   1) Repeat 3 items (Leader, Season, Table)    2) Clock draw: NORMAL  3) 3 item recall: Recalls 1 object   Results: NORMAL clock, 1-2 items recalled: COGNITIVE IMPAIRMENT LESS LIKELY    Mini-CogTM Copyright S Delphine. Licensed by the author for use in [facility]; reprinted with permission (sherwin@[facility]). All rights reserved.      Do you have sleep apnea, excessive snoring or daytime drowsiness? : has cpap    Reviewed and updated as needed this visit by clinical staff    Allergies  Meds              Reviewed and updated as needed this visit by Provider                 Social History     Tobacco Use     Smoking status: Former     Packs/day: 0.00     Years: 10.00     Additional pack years: 0.00     Total pack years: 0.00     Types: Cigarettes     Start date: 6/15/1972     Quit date: 1985     Years since quittin.8     Smokeless tobacco: Never   Substance Use Topics     Alcohol use: No             10/27/2023     7:29 AM   Alcohol Use   Prescreen: >3 drinks/day or >7 drinks/week? Not Applicable     Do you have a current opioid prescription? No  Do you use any other controlled substances or medications that are not prescribed by a provider? None        Current providers sharing in care for this patient include:   Patient Care Team:  Piyush Pickett MD as PCP - General (Family Medicine)  Piyush Pickett MD as Assigned PCP    The following health maintenance items are reviewed in Epic and correct as of today:  Health Maintenance   Topic Date Due     ANNUAL REVIEW OF  ORDERS  Never done     COLORECTAL CANCER SCREENING   "Never done     RSV VACCINE 60+ (1 - 1-dose 60+ series) Never done     DTAP/TDAP/TD IMMUNIZATION (3 - Td or Tdap) 12/02/2023     MEDICARE ANNUAL WELLNESS VISIT  10/30/2024     FALL RISK ASSESSMENT  10/30/2024     LIPID  10/26/2028     ADVANCE CARE PLANNING  10/30/2028     HEPATITIS C SCREENING  Completed     PHQ-2 (once per calendar year)  Completed     INFLUENZA VACCINE  Completed     Pneumococcal Vaccine: 65+ Years  Completed     ZOSTER IMMUNIZATION  Completed     AORTIC ANEURYSM SCREENING (SYSTEM ASSIGNED)  Completed     COVID-19 Vaccine  Completed     IPV IMMUNIZATION  Aged Out     HPV IMMUNIZATION  Aged Out     MENINGITIS IMMUNIZATION  Aged Out         Review of Systems   Constitutional:  Negative for chills and fever.   HENT:  Positive for congestion and hearing loss. Negative for ear pain and sore throat.    Eyes:  Negative for pain and visual disturbance.   Respiratory:  Negative for cough and shortness of breath.    Cardiovascular:  Negative for chest pain, palpitations and peripheral edema.   Gastrointestinal:  Negative for abdominal pain, constipation, diarrhea, heartburn, hematochezia and nausea.   Genitourinary:  Negative for dysuria, frequency, genital sores, hematuria, impotence, penile discharge and urgency.   Musculoskeletal:  Positive for myalgias. Negative for arthralgias and joint swelling.   Skin:  Negative for rash.   Neurological:  Negative for dizziness, weakness, headaches and paresthesias.   Psychiatric/Behavioral:  Negative for mood changes. The patient is not nervous/anxious.        OBJECTIVE:   BP (!) 142/78   Pulse (!) 48   Temp 97.5  F (36.4  C) (Oral)   Resp 14   Ht 1.753 m (5' 9\")   Wt 90.9 kg (200 lb 6.4 oz)   SpO2 96%   BMI 29.59 kg/m      Physical Exam  GENERAL: healthy, alert and no distress  EYES: Eyes grossly normal to inspection, PERRL and conjunctivae and sclerae normal  HENT: ear canals and TM's normal, nose and mouth without ulcers or lesions  NECK: no adenopathy, no " asymmetry, masses, or scars and thyroid normal to palpation  RESP: lungs clear to auscultation - no rales, rhonchi or wheezes  BREAST: normal without masses, tenderness or nipple discharge and no palpable axillary masses or adenopathy  CV: regular rate and rhythm, normal S1 S2, no S3 or S4, no murmur, click or rub, no peripheral edema and peripheral pulses strong  ABDOMEN: soft, nontender, no hepatosplenomegaly, no masses and bowel sounds normal   (male): patient deferred /rectal exams  MS: no gross musculoskeletal defects noted, no edema  SKIN: no suspicious lesions or rashes  NEURO: Normal strength and tone, mentation intact and speech normal  PSYCH: mentation appears normal, affect normal/bright  LYMPH: no cervical, supraclavicular, axillary adenopathy    ASSESSMENT / PLAN:   ASSESSMENT / PLAN:  (Z00.00) Encounter for subsequent annual wellness visit in Medicare patient  (primary encounter diagnosis)  Comment: generally healthy and normal exam/labs  Plan: Reviewed self mole/testicle check handout.  vitmainD. Continue exercise and weight loss.     (Z12.11) Colon cancer screening    Plan: Colonoscopy Screening  Referral           (E78.00) Hypercholesterolemia  Comment: stable  Plan: simvastatin (ZOCOR) 10 MG tablet        Continue diet/ weight loss. Chest pain or shortness of breath to er.     (R09.81) Congestion of paranasal sinus  Comment: intermittent with cpap  Plan: over the counter navage rinse. Consider flonase or ENT if worsening. Expected course and warning signs reviewed. Call/email with questions/concerns      (R03.0) Elevated blood pressure reading without diagnosis of hypertension  Comment: possibly early htn  Plan: patient would like to self-monitor. Needs more exercise and weight loss/lower sodium. Start norvasc if worse. Call/email with questions/concerns       Patient has been advised of split billing requirements and indicates understanding: Yes      COUNSELING:  Reviewed preventive  "health counseling, as reflected in patient instructions       Regular exercise       Healthy diet/nutrition       Vision screening       Dental care       Bladder control       Fall risk prevention       Alcohol Use        Colon cancer screening       Prostate cancer screening      BMI:   Estimated body mass index is 29.59 kg/m  as calculated from the following:    Height as of this encounter: 1.753 m (5' 9\").    Weight as of this encounter: 90.9 kg (200 lb 6.4 oz).   Weight management plan: Discussed healthy diet and exercise guidelines      He reports that he quit smoking about 38 years ago. His smoking use included cigarettes. He started smoking about 51 years ago. He has never used smokeless tobacco.      Appropriate preventive services were discussed with this patient, including applicable screening as appropriate for fall prevention, nutrition, physical activity, Tobacco-use cessation, weight loss and cognition.  Checklist reviewing preventive services available has been given to the patient.    Reviewed patients plan of care and provided an AVS. The Intermediate Care Plan ( asthma action plan, low back pain action plan, and migraine action plan) for Chinedu meets the Care Plan requirement. This Care Plan has been established and reviewed with the Patient.          Piyush Pickett MD  [facility]    Identified Health Risks:  I have reviewed Opioid Use Disorder and Substance Use Disorder risk factors and made any needed referrals.   The patient was provided with written information regarding signs of hearing loss.  "

## 2024-08-21 ENCOUNTER — LAB VISIT (OUTPATIENT)
Dept: LAB | Facility: HOSPITAL | Age: 69
End: 2024-08-21
Attending: INTERNAL MEDICINE
Payer: MEDICARE

## 2024-08-21 DIAGNOSIS — R79.9 ABNORMAL FINDING OF BLOOD CHEMISTRY, UNSPECIFIED: ICD-10-CM

## 2024-08-21 DIAGNOSIS — I77.1 SUBCLAVIAN ARTERY STENOSIS, LEFT: ICD-10-CM

## 2024-08-21 LAB
CHOLEST SERPL-MCNC: 162 MG/DL (ref 120–199)
CHOLEST/HDLC SERPL: 3.4 {RATIO} (ref 2–5)
HDLC SERPL-MCNC: 47 MG/DL (ref 40–75)
HDLC SERPL: 29 % (ref 20–50)
LDLC SERPL CALC-MCNC: 102.4 MG/DL (ref 63–159)
NONHDLC SERPL-MCNC: 115 MG/DL
TRIGL SERPL-MCNC: 63 MG/DL (ref 30–150)

## 2024-08-21 PROCEDURE — 80061 LIPID PANEL: CPT | Performed by: INTERNAL MEDICINE

## 2024-08-21 PROCEDURE — 36415 COLL VENOUS BLD VENIPUNCTURE: CPT | Mod: PO | Performed by: INTERNAL MEDICINE

## 2024-08-26 ENCOUNTER — PROCEDURE VISIT (OUTPATIENT)
Dept: OTOLARYNGOLOGY | Facility: CLINIC | Age: 69
End: 2024-08-26
Payer: MEDICARE

## 2024-08-26 VITALS
HEART RATE: 61 BPM | DIASTOLIC BLOOD PRESSURE: 75 MMHG | BODY MASS INDEX: 21.71 KG/M2 | WEIGHT: 160.06 LBS | SYSTOLIC BLOOD PRESSURE: 113 MMHG

## 2024-08-26 DIAGNOSIS — C02.9 TONGUE CANCER: Primary | ICD-10-CM

## 2024-08-26 NOTE — PROCEDURES
CC: Surveillance      HPI   69 y.o. male presents status post subtotal glossectomy and pec flap reconstruction in March, 2024.    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  He underwent right sided cervical lymph node biopsy which revealed metastatic disease in the right neck and subsequent laryngoscopy with biopsy of the right base of tongue.  He was treated with radiation at [facility] in 2015.      No significant complaints today.  He presents for surveillance to assist with secretions.      Review of Systems   Constitutional: Negative for fatigue and unexpected weight change.   HENT: Per HPI.  Eyes: Negative for visual disturbance.   Respiratory: Negative for shortness of breath, hemoptysis   Cardiovascular: Negative for chest pain and palpitations.   Musculoskeletal: Negative for decreased ROM, back pain.   Skin: Negative for rash, sunburn, itching.   Neurological: Negative for dizziness and seizures.   Hematological: Negative for adenopathy. Does not bruise/bleed easily.   Endocrine: Negative for rapid weight loss/weight gain, heat/cold intolerance.     Past Medical History   Patient Active Problem List   Diagnosis    Primary insomnia    Hyperlipidemia    Tongue cancer    Rotator cuff syndrome of right shoulder    Memory change    RLS (restless legs syndrome)    Chronic right-sided low back pain    Ectatic aorta    Subclavian artery stenosis, left    Aortic atherosclerosis    Coronary artery calcification    Hypertension, essential    Elevated alanine aminotransferase (ALT) level    S/P percutaneous endoscopic gastrostomy (PEG) tube placement    Hypokalemia    Hypoalbuminemia    Hypophosphatemia    Status post tracheostomy    Oral bleeding    Hyponatremia    Acute on chronic blood loss anemia    Hx of glossectomy    Moderate malnutrition    Hemoptysis           Past Surgical History   Past  Surgical History:   Procedure Laterality Date    ANKLE SURGERY      L ankle    BIOPSY OF TISSUE OF NECK Left 2013    COLONOSCOPY N/A 08/21/2017    Procedure: COLONOSCOPY;  Surgeon: Danny Jane MD;  Location: [facility] (4TH FLR);  Service: Endoscopy;  Laterality: N/A;  Do not cancel this order    CREATION OF MUSCLE ROTATIONAL FLAP N/A 2/23/2024    Procedure: CREATION, FLAP, MUSCLE ROTATION;  Surgeon: Jeferson Ventura MD;  [facility];  Service: ENT;  Laterality: N/A;    DIRECT LARYNGOBRONCHOSCOPY N/A 12/11/2023    Procedure: LARYNGOSCOPY, DIRECT, WITH BRONCHOSCOPY;  Surgeon: Micaela Poole MD;  [facility];  Service: ENT;  Laterality: N/A;    DISSECTION OF NECK Bilateral 2/23/2024    Procedure: DISSECTION, NECK;  Surgeon: Jeferson Ventura MD;  [facility];  Service: ENT;  Laterality: Bilateral;    EMBOLIZATION N/A 3/20/2024    Procedure: EMBOLIZATION, BLOOD VESSEL;  Surgeon: Edith Jasso;  Location: [facility];  Service: Anesthesiology;  Laterality: N/A;  lingual artery embo    ESOPHAGOGASTRODUODENOSCOPY N/A 09/18/2023    Procedure: EGD (ESOPHAGOGASTRODUODENOSCOPY);  Surgeon: Basilia Villavicencio MD;  Location: [facility] ENDOSCOPY;  Service: Gastroenterology;  Laterality: N/A;  9.13 instr sent via portal [facility]  pre call complete, stated he would have a ride -HH    GLOSSECTOMY Right 2/23/2024    Procedure: GLOSSECTOMY;  Surgeon: Jeferson Ventura MD;  [facility];  Service: ENT;  Laterality: Right;    INSERTION, PEG TUBE Right 2/23/2024    Procedure: INSERTION, PEG TUBE;  Surgeon: Alpehs Wu MD;  [facility];  Service: General;  Laterality: Right;    TONSILLECTOMY      TRACHEOTOMY N/A 2/23/2024    Procedure: TRACHEOTOMY;  Surgeon: Jeferson Ventura MD;  Location: [facility] OR [address] [place] [address];  Service: ENT;  Laterality: N/A;    TUMOR REMOVAL Right 2015    at     VASECTOMY      WOUND DEBRIDEMENT N/A 3/22/2024    Procedure: DEBRIDEMENT,  WOUND;  Surgeon: Jeferson Ventura MD;  Location: [facility] OR [address] [place] [address];  Service: ENT;  Laterality: N/A;    WRIST FRACTURE SURGERY Right          Family History   Family History   Problem Relation Name Age of Onset    Arthritis Mother      COPD Brother      Psoriasis Neg Hx      Eczema Neg Hx             Social History   .  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                 Social Determinants of Health     Financial Resource Strain: Low Risk  (3/20/2024)    Overall Financial Resource Strain (CARDIA)     Difficulty of Paying Living Expenses: Not hard at all   Food Insecurity: No Food Insecurity (3/20/2024)    Hunger Vital Sign     Worried About Running Out of Food in the Last Year: Never true     Ran Out of Food in the Last Year: Never true   Transportation Needs: No Transportation Needs (3/20/2024)    PRAPARE - Transportation     Lack of Transportation (Medical): No     Lack of Transportation (Non-Medical): No   Physical Activity: Inactive (3/20/2024)    Exercise Vital Sign     Days of Exercise per Week: 0 days     Minutes of Exercise per Session: 0 min   Stress: Stress Concern Present (1/22/2024)    Citizen of [place] of Occupational Health - Occupational Stress Questionnaire     Feeling of Stress : Rather much   Housing Stability: Low Risk  (3/20/2024)    Housing Stability Vital Sign     Unable to Pay for Housing in the Last Year: No     Number of Places Lived in the Last Year: 1     Unstable Housing in the Last Year: No         Allergies   Review of  patient's allergies indicates:  No Known Allergies        Physical Exam     Vitals:    08/26/24 1019   BP: 113/75   Pulse: 61         Body mass index is 21.71 kg/m².      General: AOx3, NAD   Respiratory:  Symmetric chest rise, normal effort   Oral Cavity:  Tongue absent. No lesions. Hard Palate WNL. No buccal or FOM lesions.  Oropharynx:  No masses/lesions of the posterior pharyngeal wall. Tonsillar fossa without lesions. Soft palate without masses. Midline uvula.   Neck: Well-healed neck scars.  No cervical lymphadenopathy, thyromegaly or thyroid nodules.  Normal range of motion.    Face: House Brackmann I bilaterally.     NP: No lesions of posterior wall  OP: No lesions of posterior wall or BOT. BOT is soft to palpation  Larynx: No lesions of glottic or supraglottic larynx. Normal vocal fold mobility   HP: No lesions of pyriform sinuses or postcricoid region  Mirror examination was performed.    50 units of Botox injected into each parotid gland.  He tolerated the procedure well.    Assessment/Plan  Problem List Items Addressed This Visit          Oncology    Tongue cancer - Primary     BRIGIDA.  Botox today. RTC 6 weeks.

## 2024-08-28 ENCOUNTER — OFFICE VISIT (OUTPATIENT)
Dept: CARDIOLOGY | Facility: CLINIC | Age: 69
End: 2024-08-28
Payer: MEDICARE

## 2024-08-28 VITALS
HEART RATE: 52 BPM | WEIGHT: 164 LBS | BODY MASS INDEX: 22.96 KG/M2 | DIASTOLIC BLOOD PRESSURE: 74 MMHG | HEIGHT: 71 IN | SYSTOLIC BLOOD PRESSURE: 124 MMHG

## 2024-08-28 DIAGNOSIS — I25.84 CORONARY ARTERY CALCIFICATION: ICD-10-CM

## 2024-08-28 DIAGNOSIS — I77.819 ECTATIC AORTA: ICD-10-CM

## 2024-08-28 DIAGNOSIS — E78.2 MIXED HYPERLIPIDEMIA: ICD-10-CM

## 2024-08-28 DIAGNOSIS — I25.10 CORONARY ARTERY CALCIFICATION: ICD-10-CM

## 2024-08-28 DIAGNOSIS — I70.0 AORTIC ATHEROSCLEROSIS: ICD-10-CM

## 2024-08-28 DIAGNOSIS — I10 HYPERTENSION, ESSENTIAL: ICD-10-CM

## 2024-08-28 DIAGNOSIS — I77.1 SUBCLAVIAN ARTERY STENOSIS, LEFT: Primary | ICD-10-CM

## 2024-08-28 PROCEDURE — 1101F PT FALLS ASSESS-DOCD LE1/YR: CPT | Mod: CPTII,S$GLB,, | Performed by: INTERNAL MEDICINE

## 2024-08-28 PROCEDURE — 99999 PR PBB SHADOW E&M-EST. PATIENT-LVL IV: CPT | Mod: PBBFAC,,, | Performed by: INTERNAL MEDICINE

## 2024-08-28 PROCEDURE — 3078F DIAST BP <80 MM HG: CPT | Mod: CPTII,S$GLB,, | Performed by: INTERNAL MEDICINE

## 2024-08-28 PROCEDURE — 3008F BODY MASS INDEX DOCD: CPT | Mod: CPTII,S$GLB,, | Performed by: INTERNAL MEDICINE

## 2024-08-28 PROCEDURE — 3074F SYST BP LT 130 MM HG: CPT | Mod: CPTII,S$GLB,, | Performed by: INTERNAL MEDICINE

## 2024-08-28 PROCEDURE — 1126F AMNT PAIN NOTED NONE PRSNT: CPT | Mod: CPTII,S$GLB,, | Performed by: INTERNAL MEDICINE

## 2024-08-28 PROCEDURE — 1159F MED LIST DOCD IN RCRD: CPT | Mod: CPTII,S$GLB,, | Performed by: INTERNAL MEDICINE

## 2024-08-28 PROCEDURE — 3288F FALL RISK ASSESSMENT DOCD: CPT | Mod: CPTII,S$GLB,, | Performed by: INTERNAL MEDICINE

## 2024-08-28 PROCEDURE — 99215 OFFICE O/P EST HI 40 MIN: CPT | Mod: S$GLB,,, | Performed by: INTERNAL MEDICINE

## 2024-08-28 RX ORDER — ROSUVASTATIN CALCIUM 20 MG/1
20 TABLET, COATED ORAL DAILY
Qty: 90 TABLET | Refills: 3 | Status: SHIPPED | OUTPATIENT
Start: 2024-08-28 | End: 2025-08-28

## 2024-08-28 NOTE — PROGRESS NOTES
"[facility]      Chief Complaint   Patient presents with    Subclavian artery stenosis, left       Patient ID: Timothy Galdamez is a 69 y.o. male with HTN, history of squamous cell CA s/p XRT (2015), overweight, former smoker, who presents for a follow up appointment.  Pertinent history/events are as follows:     -Pt kindly referred by Dr. Wilian Dudley for evaluation of subclavian artery stenosis (per his note on 9/22/2023):  "Noted difference in BP between his two arms. Reviewed the BP log that he provided to Dr. Lanier. BP fluctuates with as much as a 30 mm Hg difference between the 2 arms - higher on the right side.  He had radiation therapy to the right side of his neck but his blood pressure is lower on the contralateral side.  Through reasonable to perform an ultrasound to rule out subclavian artery stenosis.  He does not have any symptoms suggestive of subclavian steal."    -At our initial clinic visit on 10/30/2023, Mr. Galdamez reports no upper extremity weakness, no dizziness, or other symptoms.  BUE Arterial Ultrasound on 10/19/2023 revealed right subclavian artery with elevated velocity suggestive of greater than 50% stenosis.  Normal wrist brachial index bilaterally.   Plan:  Left Subclavian artery stenosis- Pt is asymptomatic with no LUE weakness, dizziness, or other symptoms related to subclavian steal.  Etiologies include atherosclerosis and prior radiation exposure.  Check CTA neck to evaluate further. Continue rosuvastatin 20 mg daily, zetia 10 mg daily, and ASA 81 mg daily.    HTN- Continue current medications.   Overweight- Encourage diet, exercise, and weight loss.    HPI:  Mr. Galdamez reports no upper extremity weakness, no dizziness, or other symptoms. CTA Neck on 11/13/2023 revealed no evidence of significant subclavian stenosis as clinically questioned. Atherosclerotic disease with mixed calcified noncalcified plaque at the carotid bifurcations right greater than left and left common " carotid artery.  No high-grade stenosis by NASCET criteria.  The estimated stenosis is less than 50%.   Nodular enhancing lesion at the base of the tongue right.  He is status post subtotal glossectomy and pec flap reconstruction in March, 2024.      Past Medical History:   Diagnosis Date    Acute on chronic blood loss anemia 3/19/2024    Cancer     Hyperlipidemia     Hypertension      Past Surgical History:   Procedure Laterality Date    ANKLE SURGERY      L ankle    BIOPSY OF TISSUE OF NECK Left 2013    COLONOSCOPY N/A 08/21/2017    Procedure: COLONOSCOPY;  Surgeon: Danny Jane MD;  Location: [facility] ENDO (4TH FLR);  Service: Endoscopy;  Laterality: N/A;  Do not cancel this order    CREATION OF MUSCLE ROTATIONAL FLAP N/A 2/23/2024    Procedure: CREATION, FLAP, MUSCLE ROTATION;  Surgeon: Jeferson Ventura MD;  [address]  Service: ENT;  Laterality: N/A;    DIRECT LARYNGOBRONCHOSCOPY N/A 12/11/2023    Procedure: LARYNGOSCOPY, DIRECT, WITH BRONCHOSCOPY;  Surgeon: Micaela Poole MD;  [facility];  Service: ENT;  Laterality: N/A;    DISSECTION OF NECK Bilateral 2/23/2024    Procedure: DISSECTION, NECK;  Surgeon: Jeferson Ventura MD;  Location: [facility] OR [address] [place] [address];  Service: ENT;  Laterality: Bilateral;    EMBOLIZATION N/A 3/20/2024    Procedure: EMBOLIZATION, BLOOD VESSEL;  Surgeon: Edith Jasso;  Location: [facility] EDITH;  Service: Anesthesiology;  Laterality: N/A;  lingual artery embo    ESOPHAGOGASTRODUODENOSCOPY N/A 09/18/2023    Procedure: EGD (ESOPHAGOGASTRODUODENOSCOPY);  Surgeon: Basilia Villavicencio MD;  Location: [facility] ENDOSCOPY;  Service: Gastroenterology;  Laterality: N/A;  9.13 instr sent via portal [facility]  pre call complete, stated he would have a ride -HH    GLOSSECTOMY Right 2/23/2024    Procedure: GLOSSECTOMY;  Surgeon: Jeferson Ventura MD;  [facility];  Service: ENT;  Laterality: Right;    INSERTION, PEG TUBE Right 2/23/2024    Procedure:  INSERTION, PEG TUBE;  Surgeon: Alpesh Wu MD;  Location: [facility] OR [address] [place] [address];  Service: General;  Laterality: Right;    TONSILLECTOMY      TRACHEOTOMY N/A 2/23/2024    Procedure: TRACHEOTOMY;  Surgeon: Jeferson Ventura MD;  Location: [facility] OR [address] [place] [address];  Service: ENT;  Laterality: N/A;    TUMOR REMOVAL Right 2015    at     VASECTOMY      WOUND DEBRIDEMENT N/A 3/22/2024    Procedure: DEBRIDEMENT, WOUND;  Surgeon: Jeferson Ventura MD;  Location: [facility] OR [address] [place] [address];  Service: ENT;  Laterality: N/A;    WRIST FRACTURE SURGERY Right      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                 Social Determinants of Health     Financial Resource Strain: Low Risk  (3/20/2024)    Overall Financial Resource Strain (CARDIA)     Difficulty of Paying Living Expenses: Not hard at all   Food Insecurity: No Food Insecurity (3/20/2024)    Hunger Vital Sign     Worried About Running Out of Food in the Last Year: Never true     Ran Out of Food in the Last Year: Never true   Transportation Needs: No Transportation Needs (3/20/2024)    PRAPARE - Transportation     Lack of Transportation (Medical): No     Lack of Transportation (Non-Medical): No   Physical Activity: Inactive (3/20/2024)    Exercise Vital Sign     Days of Exercise per Week: 0 days     Minutes of Exercise per Session: 0 min   Stress: Stress Concern Present (1/22/2024)    [place] of Occupational Health - Occupational Stress Questionnaire     Feeling of Stress : Rather much   Housing Stability: Low Risk   (3/20/2024)    Housing Stability Vital Sign     Unable to Pay for Housing in the Last Year: No     Number of Places Lived in the Last Year: 1     Unstable Housing in the Last Year: No     Family History   Problem Relation Name Age of Onset    Arthritis Mother      COPD Brother      Psoriasis Neg Hx      Eczema Neg Hx         Review of patient's allergies indicates:  No Known Allergies    Medication List with Changes/Refills   Current Medications    ACETAMINOPHEN (TYLENOL) 160 MG/5 ML LIQD    take 20.3 mLs (649.6 mg total) every 6 (six) hours as needed.    ALPRAZOLAM (XANAX) 0.5 MG TABLET    Take 1 tablet (0.5 mg total) by mouth 3 (three) times daily.    AMLODIPINE (NORVASC) 2.5 MG TABLET    1 tablet (2.5 mg total) by Per G Tube route once daily.    ASCORBIC ACID, VITAMIN C, (VITAMIN C) 100 MG TABLET    Take 100 mg by mouth once daily.    ASPIRIN (ECOTRIN) 81 MG EC TABLET    Take 81 mg by mouth once daily.    ATORVASTATIN (LIPITOR) 80 MG TABLET    1 tablet (80 mg total) by Per G Tube route once daily.    CALCIUM CARBONATE (OS-HREMES) 600 MG CALCIUM (1,500 MG) TAB    Take 600 mg by mouth once daily.    ERGOCALCIFEROL, VITAMIN D2, (VITAMIN D ORAL)    Take 1 tablet by mouth once daily.    EZETIMIBE (ZETIA) 10 MG TABLET    1 tablet (10 mg total) by Per G Tube route once daily.    MULTIVITAMIN (THERAGRAN) TABLET    1 tablet by Per G Tube route once daily.    NON FORMULARY MEDICATION    Prevegan once daily    OMEGA-3/DHA/EPA/DPA/FISH OIL (OMEGA-3 2100 ORAL)    Take 1 capsule by mouth once daily.    OMEPRAZOLE (PRILOSEC) 40 MG CAPSULE    Take 1 capsule (40 mg total) by mouth once daily.    SCOPOLAMINE (TRANSDERM-SCOP) 1.3-1.5 MG (1 MG OVER 3 DAYS)    Place 1 patch onto the skin every 72 hours.    SILODOSIN (RAPAFLO) 4 MG CAP CAPSULE    Take 1 capsule (4 mg total) by mouth once daily. Open capsule, place in water and administer into feeding tube    VITAMIN E 100 UNIT CAPSULE    Take 100 Units by mouth once daily.       Review  "of Systems  Constitution: Denies chills, fever, and sweats.  HENT: Denies headaches or blurry vision.  Cardiovascular: Denies chest pain or irregular heart beat.  Respiratory: Denies cough or shortness of breath.  Gastrointestinal: Denies abdominal pain, nausea, or vomiting.  Musculoskeletal: Denies muscle cramps.  Neurological: Denies dizziness or focal weakness.  Psychiatric/Behavioral: Normal mental status.  Hematologic/Lymphatic: Denies bleeding problem or easy bruising/bleeding.  Skin: Denies rash or suspicious lesions    Physical Examination  Ht 5' 11" (1.803 m)   Wt 74.4 kg (164 lb 0.4 oz)   BMI 22.88 kg/m²     Constitutional: No acute distress, conversant  HEENT: Sclera anicteric, Pupils equal, round and reactive to light, extraocular motions intact, Oropharynx clear  Neck: No JVD, no carotid bruits  Cardiovascular: regular rate and rhythm, no murmur, rubs or gallops, normal S1/S2  Pulmonary: Clear to auscultation bilaterally  Abdominal: Abdomen soft, nontender, nondistended, positive bowel sounds  Extremities: No lower extremity edema,   Pulses:  Carotid pulses are 2+ on the right side, and 2+ on the left side.  Radial pulses are 2+ on the right side, and 2+ on the left side.   Femoral pulses are 2+ on the right side, and 2+ on the left side.  Popliteal pulses are 2+ on the right side, and 2+ on the left side.   Dorsalis pedis pulses are 2+ on the right side, and 2+ on the left side.   Posterior tibial pulses are 2+ on the right side, and 2+ on the left side.    Skin: No ecchymosis, erythema, or ulcers  Psych: Alert and oriented x 3, appropriate affect  Neuro: CNII-XII intact, no focal deficits    Labs:  Most Recent Data  CBC:   Lab Results   Component Value Date    WBC 6.23 03/25/2024    HGB 8.1 (L) 03/25/2024    HCT 25.9 (L) 03/25/2024     03/25/2024    MCV 95 03/25/2024    RDW 14.8 (H) 03/25/2024     BMP:   Lab Results   Component Value Date     06/14/2024    K 4.3 06/14/2024     " "06/14/2024    CO2 27 06/14/2024    BUN 15 06/14/2024    CREATININE 0.9 06/14/2024    GLU 86 06/14/2024    CALCIUM 9.5 06/14/2024    MG 2.1 03/25/2024    PHOS 4.2 03/25/2024     LFTS;   Lab Results   Component Value Date    PROT 6.4 06/14/2024    ALBUMIN 3.6 06/14/2024    BILITOT 0.3 06/14/2024    AST 24 06/14/2024    ALKPHOS 64 06/14/2024    ALT 24 06/14/2024     COAGS:   Lab Results   Component Value Date    INR 1.1 03/18/2024     FLP:   Lab Results   Component Value Date    CHOL 162 08/21/2024    HDL 47 08/21/2024    LDLCALC 102.4 08/21/2024    TRIG 63 08/21/2024    CHOLHDL 29.0 08/21/2024     CARDIAC: No results found for: "TROPONINI", "CKTOTAL", "CKMB", "BNP"    Imaging:    CTA Neck 11/13/2023:  CTA neck shows no evidence of significant subclavian stenosis as clinically questioned.   Atherosclerotic disease with mixed calcified noncalcified plaque at the carotid bifurcations right greater than left and left common carotid artery.  No high-grade stenosis by NASCET criteria.  The estimated stenosis is less than 50%.   Nodular enhancing lesion at the base of the tongue right.  Recommend follow-up with ENT further evaluation/characterization.    BUE Arterial Ultrasound 10/19/2023:    Right subclavian artery with elevated velocity suggestive of greater than 50% stenosis.  Recommend further evaluation with CTA or MRA if clinically indicated.    Right wrist brachial index 1.0, is normal.    Left wrist brachial index 1.16, is normal.    Assessment/Plan:  Timothy Galdamez is a 69 y.o. male with HTN, history of squamous cell CA s/p XRT (2015), overweight, former smoker, who presents for a follow up appointment.     Left Subclavian artery stenosis- Pt is asymptomatic with no LUE weakness, dizziness, or other symptoms related to subclavian steal.  Etiologies include atherosclerosis and prior radiation exposure.  CTA Neck on 11/13/2023 revealed no evidence of significant subclavian stenosis as clinically questioned. " Atherosclerotic disease with mixed calcified noncalcified plaque at the carotid bifurcations right greater than left and left common carotid artery.  No high-grade stenosis by NASCET criteria.  The estimated stenosis is less than 50%. Continue rosuvastatin 20 mg daily, zetia 10 mg daily, and ASA 81 mg daily.      2. HTN- Continue current medications.     3. HLD- The 10-year ASCVD risk score (Zari MILLAN, et al., 2019) is: 17.1%.  Restart rosuvastatin 20 mg daily and continue zetia 10 mg daily.  Check LPa.    Follow up in 6 months with lipids prior    Total duration of face to face visit time 45 minutes.  Total time spent counseling greater than fifty percent of total visit time.  Counseling included discussion regarding imaging findings, diagnosis, possibilities, treatment options, risks and benefits.  The patient had many questions regarding the options and long-term effects.    Carlos Guzmán MD, PhD  Interventional Cardiology

## 2024-08-28 NOTE — PATIENT INSTRUCTIONS
Assessment/Plan:  Timothy Galdamez is a 69 y.o. male with HTN, history of squamous cell CA s/p XRT (2015), overweight, former smoker, who presents for a follow up appointment.     Left Subclavian artery stenosis- Pt is asymptomatic with no LUE weakness, dizziness, or other symptoms related to subclavian steal.  Etiologies include atherosclerosis and prior radiation exposure.  CTA Neck on 11/13/2023 revealed no evidence of significant subclavian stenosis as clinically questioned. Atherosclerotic disease with mixed calcified noncalcified plaque at the carotid bifurcations right greater than left and left common carotid artery.  No high-grade stenosis by NASCET criteria.  The estimated stenosis is less than 50%. Continue rosuvastatin 20 mg daily, zetia 10 mg daily, and ASA 81 mg daily.      2. HTN- Continue current medications.     3. HLD- The 10-year ASCVD risk score (Zari MILLAN, et al., 2019) is: 17.1%.  Restart rosuvastatin 20 mg daily and continue zetia 10 mg daily.  Check LPa.    Follow up in 6 months with lipids prior

## 2024-10-08 ENCOUNTER — PATIENT MESSAGE (OUTPATIENT)
Dept: OTOLARYNGOLOGY | Facility: CLINIC | Age: 69
End: 2024-10-08
Payer: MEDICARE

## 2024-10-11 ENCOUNTER — PATIENT MESSAGE (OUTPATIENT)
Dept: OTOLARYNGOLOGY | Facility: CLINIC | Age: 69
End: 2024-10-11
Payer: MEDICARE

## 2024-10-13 DIAGNOSIS — C02.9 TONGUE CANCER: ICD-10-CM

## 2024-10-13 DIAGNOSIS — K21.9 GASTROESOPHAGEAL REFLUX DISEASE, UNSPECIFIED WHETHER ESOPHAGITIS PRESENT: ICD-10-CM

## 2024-10-13 RX ORDER — ALPRAZOLAM 0.5 MG/1
0.5 TABLET ORAL 3 TIMES DAILY
Qty: 90 TABLET | Refills: 0 | Status: CANCELLED | OUTPATIENT
Start: 2024-10-13 | End: 2024-11-12

## 2024-10-14 RX ORDER — OMEPRAZOLE 40 MG/1
40 CAPSULE, DELAYED RELEASE ORAL DAILY
Qty: 90 CAPSULE | Refills: 3 | Status: SHIPPED | OUTPATIENT
Start: 2024-10-14 | End: 2025-10-14

## 2024-10-14 RX ORDER — ALPRAZOLAM 0.5 MG/1
0.5 TABLET ORAL 3 TIMES DAILY
Qty: 90 TABLET | Refills: 0 | Status: SHIPPED | OUTPATIENT
Start: 2024-10-14 | End: 2024-10-15 | Stop reason: SDUPTHER

## 2024-10-14 RX ORDER — ROSUVASTATIN CALCIUM 20 MG/1
20 TABLET, COATED ORAL DAILY
Qty: 90 TABLET | Refills: 3 | Status: SHIPPED | OUTPATIENT
Start: 2024-10-14 | End: 2025-10-14

## 2024-10-15 ENCOUNTER — OFFICE VISIT (OUTPATIENT)
Dept: OTOLARYNGOLOGY | Facility: CLINIC | Age: 69
End: 2024-10-15
Payer: MEDICARE

## 2024-10-15 VITALS — HEART RATE: 49 BPM | SYSTOLIC BLOOD PRESSURE: 124 MMHG | DIASTOLIC BLOOD PRESSURE: 74 MMHG

## 2024-10-15 DIAGNOSIS — C02.9 TONGUE CANCER: ICD-10-CM

## 2024-10-15 DIAGNOSIS — C02.9 TONGUE CANCER: Primary | ICD-10-CM

## 2024-10-15 PROCEDURE — 99999 PR PBB SHADOW E&M-EST. PATIENT-LVL III: CPT | Mod: PBBFAC,HCNC,, | Performed by: OTOLARYNGOLOGY

## 2024-10-15 RX ORDER — ALPRAZOLAM 0.5 MG/1
0.5 TABLET ORAL 3 TIMES DAILY
Qty: 90 TABLET | Refills: 0 | Status: SHIPPED | OUTPATIENT
Start: 2024-10-15 | End: 2024-11-14

## 2024-10-18 NOTE — ASSESSMENT & PLAN NOTE
No evidence of disease.  Suspect that his complaints surrounding carbonated beverages are due to laryngospasm.  Counseled him to take   rapid deep breaths through his nose.  He expressed understanding.  He will return to see me in 6 weeks.

## 2024-10-18 NOTE — PROGRESS NOTES
CC: Surveillance      HPI   69 y.o. male presents status post subtotal glossectomy and pec flap reconstruction in March, 2024.    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  He underwent right sided cervical lymph node biopsy which revealed metastatic disease in the right neck and subsequent laryngoscopy with biopsy of the right base of tongue.  He was treated with radiation at [facility] in 2015.      No significant complaints today.  He reports some reason shortness of breath particularly with drinking carbonated beverages.  He feels as if his throat is closing.  He states these episodes only last for several moments.  No associated stridor per his report.      Review of Systems   Constitutional: Negative for fatigue and unexpected weight change.   HENT: Per HPI.  Eyes: Negative for visual disturbance.   Respiratory: Negative for shortness of breath, hemoptysis   Cardiovascular: Negative for chest pain and palpitations.   Musculoskeletal: Negative for decreased ROM, back pain.   Skin: Negative for rash, sunburn, itching.   Neurological: Negative for dizziness and seizures.   Hematological: Negative for adenopathy. Does not bruise/bleed easily.   Endocrine: Negative for rapid weight loss/weight gain, heat/cold intolerance.     Past Medical History   Patient Active Problem List   Diagnosis    Primary insomnia    Hyperlipidemia    Tongue cancer    Rotator cuff syndrome of right shoulder    Memory change    RLS (restless legs syndrome)    Chronic right-sided low back pain    Ectatic aorta    Subclavian artery stenosis, left    Aortic atherosclerosis    Coronary artery calcification    Hypertension, essential    Elevated alanine aminotransferase (ALT) level    S/P percutaneous endoscopic gastrostomy (PEG) tube placement    Hypokalemia    Hypoalbuminemia    Hypophosphatemia    Status post tracheostomy     Oral bleeding    Hyponatremia    Acute on chronic blood loss anemia    Hx of glossectomy    Moderate malnutrition    Hemoptysis           Past Surgical History   Past Surgical History:   Procedure Laterality Date    ANKLE SURGERY      L ankle    BIOPSY OF TISSUE OF NECK Left 2013    COLONOSCOPY N/A 08/21/2017    Procedure: COLONOSCOPY;  Surgeon: Danny Jane MD;  Location: [facility] (4TH FLR);  Service: Endoscopy;  Laterality: N/A;  Do not cancel this order    CREATION OF MUSCLE ROTATIONAL FLAP N/A 2/23/2024    Procedure: CREATION, FLAP, MUSCLE ROTATION;  Surgeon: Jeferson Ventura MD;  [address]  Service: ENT;  Laterality: N/A;    DIRECT LARYNGOBRONCHOSCOPY N/A 12/11/2023    Procedure: LARYNGOSCOPY, DIRECT, WITH BRONCHOSCOPY;  Surgeon: Micaela Poole MD;  Location: [facility] OR [address] [place] [address];  Service: ENT;  Laterality: N/A;    DISSECTION OF NECK Bilateral 2/23/2024    Procedure: DISSECTION, NECK;  Surgeon: Jeferson Ventura MD;  [address];  Service: ENT;  Laterality: Bilateral;    EMBOLIZATION N/A 3/20/2024    Procedure: EMBOLIZATION, BLOOD VESSEL;  Surgeon: Edith Jasso;  Location: [facility];  Service: Anesthesiology;  Laterality: N/A;  lingual artery embo    ESOPHAGOGASTRODUODENOSCOPY N/A 09/18/2023    Procedure: EGD (ESOPHAGOGASTRODUODENOSCOPY);  Surgeon: Basilia Villavicencio MD;  Location: [facility] ENDOSCOPY;  Service: Gastroenterology;  Laterality: N/A;  9.13 instr sent via portal [facility]  pre call complete, stated he would have a ride -HH    GLOSSECTOMY Right 2/23/2024    Procedure: GLOSSECTOMY;  Surgeon: Jeferson Ventura MD;  Location: [facility] OR [address] [place] [address];  Service: ENT;  Laterality: Right;    INSERTION, PEG TUBE Right 2/23/2024    Procedure: INSERTION, PEG TUBE;  Surgeon: Alpesh Wu MD;  Location: [address];  Service: General;  Laterality: Right;    TONSILLECTOMY      TRACHEOTOMY N/A 2/23/2024    Procedure: TRACHEOTOMY;  Surgeon: Jeferson Ventura MD;   [facility];  Service: ENT;  Laterality: N/A;    TUMOR REMOVAL Right 2015    at [facility]    VASECTOMY      WOUND DEBRIDEMENT N/A 3/22/2024    Procedure: DEBRIDEMENT, WOUND;  Surgeon: Jeferson Ventura MD;  [facility];  Service: ENT;  Laterality: N/A;    WRIST FRACTURE SURGERY Right          Family History   Family History   Problem Relation Name Age of Onset    Arthritis Mother      COPD Brother      Psoriasis Neg Hx      Eczema Neg Hx             Social History   .  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                 Social Drivers of Health     Financial Resource Strain: Low Risk  (3/20/2024)    Overall Financial Resource Strain (CARDIA)     Difficulty of Paying Living Expenses: Not hard at all   Food Insecurity: No Food Insecurity (3/20/2024)    Hunger Vital Sign     Worried About Running Out of Food in the Last Year: Never true     Ran Out of Food in the Last Year: Never true   Transportation Needs: No Transportation Needs (3/20/2024)    PRAPARE - Transportation     Lack of Transportation (Medical): No     Lack of Transportation (Non-Medical): No   Physical Activity: Inactive (3/20/2024)    Exercise Vital Sign     Days of Exercise per Week: 0 days     Minutes of Exercise per Session: 0 min   Stress: Stress Concern Present (1/22/2024)    [employer] - Occupational Stress Questionnaire     Feeling of Stress : Rather much   Housing Stability: Low Risk  (3/20/2024)    Housing Stability Vital Sign      Unable to Pay for Housing in the Last Year: No     Number of Places Lived in the Last Year: 1     Unstable Housing in the Last Year: No         Allergies   Review of patient's allergies indicates:  No Known Allergies        Physical Exam     Vitals:    10/15/24 1302   BP: 124/74   Pulse: (!) 49         There is no height or weight on file to calculate BMI.      General: AOx3, NAD   Respiratory:  Symmetric chest rise, normal effort   Oral Cavity:  Tongue absent. No lesions. Hard Palate WNL. No buccal or FOM lesions.  Oropharynx:  No masses/lesions of the posterior pharyngeal wall. Tonsillar fossa without lesions. Soft palate without masses. Midline uvula.   Neck: Well-healed neck scars.  No cervical lymphadenopathy, thyromegaly or thyroid nodules.  Normal range of motion.    Face: House Brackmann I bilaterally.     Flex Naso Emilee Hypo Procedures #2    Procedure:  Diagnostic flexible nasopharyngoscopy, laryngoscopy and hypopharyngoscopy:    Routine preparation with local atomizer with 1% neosynephrine/pontocaine with customary flexible endoscope.    Posterior pharynx:  No lesions.  Larynx/hypopharynx:   Epiglottis:  No lesions, without edema.   AE Folds:  No lesions.   Vocal cords:  No polyps, nodules, ulcers or lesions.   Mobility:  Equal and normal bilateral.   Hypopharynx:  No lesions.   Piriform sinus:  No pooling, no lesions.   Post Cricoid:  No erythema, no edema.      Assessment/Plan  Problem List Items Addressed This Visit          Oncology    Tongue cancer - Primary     No evidence of disease.  Suspect that his complaints surrounding carbonated beverages are due to laryngospasm.  Counseled him to take   rapid deep breaths through his nose.  He expressed understanding.  He will return to see me in 6 weeks.

## 2024-11-04 ENCOUNTER — PATIENT MESSAGE (OUTPATIENT)
Dept: OTOLARYNGOLOGY | Facility: CLINIC | Age: 69
End: 2024-11-04
Payer: MEDICARE

## 2024-11-09 DIAGNOSIS — C02.9 TONGUE CANCER: ICD-10-CM

## 2024-11-10 RX ORDER — ALPRAZOLAM 0.5 MG/1
0.5 TABLET ORAL 3 TIMES DAILY
Qty: 90 TABLET | Refills: 0 | Status: SHIPPED | OUTPATIENT
Start: 2024-11-10 | End: 2024-12-10

## 2024-11-15 ENCOUNTER — OFFICE VISIT (OUTPATIENT)
Dept: SURGERY | Facility: CLINIC | Age: 69
End: 2024-11-15
Payer: MEDICARE

## 2024-11-15 VITALS
DIASTOLIC BLOOD PRESSURE: 73 MMHG | OXYGEN SATURATION: 100 % | HEART RATE: 52 BPM | SYSTOLIC BLOOD PRESSURE: 133 MMHG | WEIGHT: 158.94 LBS | BODY MASS INDEX: 22.25 KG/M2 | HEIGHT: 71 IN

## 2024-11-15 DIAGNOSIS — K94.23 GASTROSTOMY MALFUNCTION: Primary | ICD-10-CM

## 2024-11-15 PROCEDURE — 99999 PR PBB SHADOW E&M-EST. PATIENT-LVL IV: CPT | Mod: PBBFAC,HCNC,, | Performed by: STUDENT IN AN ORGANIZED HEALTH CARE EDUCATION/TRAINING PROGRAM

## 2024-11-15 PROCEDURE — 99499 UNLISTED E&M SERVICE: CPT | Mod: HCNC,S$GLB,, | Performed by: STUDENT IN AN ORGANIZED HEALTH CARE EDUCATION/TRAINING PROGRAM

## 2024-12-05 ENCOUNTER — OFFICE VISIT (OUTPATIENT)
Dept: OTOLARYNGOLOGY | Facility: CLINIC | Age: 69
End: 2024-12-05
Payer: MEDICARE

## 2024-12-05 VITALS
BODY MASS INDEX: 22.26 KG/M2 | WEIGHT: 159.63 LBS | DIASTOLIC BLOOD PRESSURE: 71 MMHG | SYSTOLIC BLOOD PRESSURE: 106 MMHG | HEART RATE: 62 BPM

## 2024-12-05 DIAGNOSIS — C02.9 TONGUE CANCER: Primary | ICD-10-CM

## 2024-12-05 PROCEDURE — 3074F SYST BP LT 130 MM HG: CPT | Mod: HCNC,CPTII,S$GLB, | Performed by: OTOLARYNGOLOGY

## 2024-12-05 PROCEDURE — 1126F AMNT PAIN NOTED NONE PRSNT: CPT | Mod: HCNC,CPTII,S$GLB, | Performed by: OTOLARYNGOLOGY

## 2024-12-05 PROCEDURE — 3078F DIAST BP <80 MM HG: CPT | Mod: HCNC,CPTII,S$GLB, | Performed by: OTOLARYNGOLOGY

## 2024-12-05 PROCEDURE — 99999 PR PBB SHADOW E&M-EST. PATIENT-LVL III: CPT | Mod: PBBFAC,HCNC,, | Performed by: OTOLARYNGOLOGY

## 2024-12-05 PROCEDURE — 1160F RVW MEDS BY RX/DR IN RCRD: CPT | Mod: HCNC,CPTII,S$GLB, | Performed by: OTOLARYNGOLOGY

## 2024-12-05 PROCEDURE — 3008F BODY MASS INDEX DOCD: CPT | Mod: HCNC,CPTII,S$GLB, | Performed by: OTOLARYNGOLOGY

## 2024-12-05 PROCEDURE — 99213 OFFICE O/P EST LOW 20 MIN: CPT | Mod: HCNC,S$GLB,, | Performed by: OTOLARYNGOLOGY

## 2024-12-05 PROCEDURE — 1159F MED LIST DOCD IN RCRD: CPT | Mod: HCNC,CPTII,S$GLB, | Performed by: OTOLARYNGOLOGY

## 2024-12-05 NOTE — PROGRESS NOTES
CC: Surveillance      HPI   69 y.o. male presents status post subtotal glossectomy and pec flap reconstruction in March, 2024.    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  He underwent right sided cervical lymph node biopsy which revealed metastatic disease in the right neck and subsequent laryngoscopy with biopsy of the right base of tongue.  He was treated with radiation at [facility] in 2015.      No significant complaints today.        Review of Systems   Constitutional: Negative for fatigue and unexpected weight change.   HENT: Per HPI.  Eyes: Negative for visual disturbance.   Respiratory: Negative for shortness of breath, hemoptysis   Cardiovascular: Negative for chest pain and palpitations.   Musculoskeletal: Negative for decreased ROM, back pain.   Skin: Negative for rash, sunburn, itching.   Neurological: Negative for dizziness and seizures.   Hematological: Negative for adenopathy. Does not bruise/bleed easily.   Endocrine: Negative for rapid weight loss/weight gain, heat/cold intolerance.     Past Medical History   Patient Active Problem List   Diagnosis    Primary insomnia    Hyperlipidemia    Tongue cancer    Rotator cuff syndrome of right shoulder    Memory change    RLS (restless legs syndrome)    Chronic right-sided low back pain    Ectatic aorta    Subclavian artery stenosis, left    Aortic atherosclerosis    Coronary artery calcification    Hypertension, essential    Elevated alanine aminotransferase (ALT) level    S/P percutaneous endoscopic gastrostomy (PEG) tube placement    Hypokalemia    Hypoalbuminemia    Hypophosphatemia    Status post tracheostomy    Oral bleeding    Hyponatremia    Acute on chronic blood loss anemia    Hx of glossectomy    Moderate malnutrition    Hemoptysis           Past Surgical History   Past Surgical History:   Procedure Laterality Date    ANKLE  SURGERY      L ankle    BIOPSY OF TISSUE OF NECK Left 2013    COLONOSCOPY N/A 08/21/2017    Procedure: COLONOSCOPY;  Surgeon: Danny Jane MD;  Location: [facility] (4TH FLR);  Service: Endoscopy;  Laterality: N/A;  Do not cancel this order    CREATION OF MUSCLE ROTATIONAL FLAP N/A 2/23/2024    Procedure: CREATION, FLAP, MUSCLE ROTATION;  Surgeon: Jeferson Ventura MD;  [facility];  Service: ENT;  Laterality: N/A;    DIRECT LARYNGOBRONCHOSCOPY N/A 12/11/2023    Procedure: LARYNGOSCOPY, DIRECT, WITH BRONCHOSCOPY;  Surgeon: Micaela Poole MD;  [facility];  Service: ENT;  Laterality: N/A;    DISSECTION OF NECK Bilateral 2/23/2024    Procedure: DISSECTION, NECK;  Surgeon: Jeferson Ventura MD;  [facility];  Service: ENT;  Laterality: Bilateral;    EMBOLIZATION N/A 3/20/2024    Procedure: EMBOLIZATION, BLOOD VESSEL;  Surgeon: Edith Jasso;  Location: [facility];  Service: Anesthesiology;  Laterality: N/A;  lingual artery embo    ESOPHAGOGASTRODUODENOSCOPY N/A 09/18/2023    Procedure: EGD (ESOPHAGOGASTRODUODENOSCOPY);  Surgeon: Basilia Villavicencio MD;  Location: [facility] ENDOSCOPY;  Service: Gastroenterology;  Laterality: N/A;  9.13 instr sent via portal [facility]  pre call complete, stated he would have a ride -HH    GLOSSECTOMY Right 2/23/2024    Procedure: GLOSSECTOMY;  Surgeon: Jeferson Ventura MD;  [facility];  Service: ENT;  Laterality: Right;    INSERTION, PEG TUBE Right 2/23/2024    Procedure: INSERTION, PEG TUBE;  Surgeon: Alpesh Wu MD;  [facility];  Service: General;  Laterality: Right;    TONSILLECTOMY      TRACHEOTOMY N/A 2/23/2024    Procedure: TRACHEOTOMY;  Surgeon: Jeferson Ventura MD;  [facility];  Service: ENT;  Laterality: N/A;    TUMOR REMOVAL Right 2015    at     VASECTOMY      WOUND DEBRIDEMENT N/A 3/22/2024    Procedure: DEBRIDEMENT, WOUND;  Surgeon: Jeferson Ventura MD;  Location:  [facility] OR 2ND FLR;  Service: ENT;  Laterality: N/A;    WRIST FRACTURE SURGERY Right          Family History   Family History   Problem Relation Name Age of Onset    Arthritis Mother      COPD Brother      Psoriasis Neg Hx      Eczema Neg Hx             Social History   .  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                 Social Drivers of Health     Financial Resource Strain: Low Risk  (3/20/2024)    Overall Financial Resource Strain (CARDIA)     Difficulty of Paying Living Expenses: Not hard at all   Food Insecurity: No Food Insecurity (3/20/2024)    Hunger Vital Sign     Worried About Running Out of Food in the Last Year: Never true     Ran Out of Food in the Last Year: Never true   Transportation Needs: No Transportation Needs (3/20/2024)    PRAPARE - Transportation     Lack of Transportation (Medical): No     Lack of Transportation (Non-Medical): No   Physical Activity: Inactive (3/20/2024)    Exercise Vital Sign     Days of Exercise per Week: 0 days     Minutes of Exercise per Session: 0 min   Stress: Stress Concern Present (1/22/2024)    [employer] - Occupational Stress Questionnaire     Feeling of Stress : Rather much   Housing Stability: Low Risk  (3/20/2024)    Housing Stability Vital Sign     Unable to Pay for Housing in the Last Year: No     Number of Places Lived in the Last Year: 1     Unstable Housing in the Last Year: No         Allergies   Review of patient's allergies indicates:  No Known  Allergies        Physical Exam     Vitals:    12/05/24 0831   BP: 106/71   Pulse: 62         Body mass index is 22.26 kg/m².      General: AOx3, NAD   Respiratory:  Symmetric chest rise, normal effort   Oral Cavity:  Tongue absent. No lesions. Hard Palate WNL. No buccal or FOM lesions.  Oropharynx:  No masses/lesions of the posterior pharyngeal wall. Tonsillar fossa without lesions. Soft palate without masses. Midline uvula.   Neck: Well-healed neck scars.  No cervical lymphadenopathy, thyromegaly or thyroid nodules.  Normal range of motion.    Face: House Brackmann I bilaterally.     NP: No lesions of posterior wall  OP: No lesions of posterior wall or BOT. BOT is soft to palpation  Larynx: No lesions of glottic or supraglottic larynx. Normal vocal fold mobility   HP: No lesions of pyriform sinuses or postcricoid region  Mirror examination was performed.        Assessment/Plan  Problem List Items Addressed This Visit          Oncology    Tongue cancer - Primary     BRIGIDA.  RTC 6 weeks.  PET at next visit.

## 2024-12-05 NOTE — ASSESSMENT & PLAN NOTE
BRIGIDA.  RTC 6 weeks.  PET at next visit.   Ms. Castillo is a single, 41-year-old  female, non caregiver, unemployed, domiciled on [place] grounds building [address] [place] [address], with past psychiatric history of Schizoaffective Disorder, Borderline Personality Disorder, cannabis abuse, multiple inpatient psychiatric hospitalizations (>20), recently Low 4 12/12-12/24 2018, with frequent visits to the [facility] (well-known to staff) 3 prior suicide attempts (last in 2012 via OD), recurrent suicidal gestures and suicidal ideation, history of property destruction when upset, past legal issues, no access to weapons. PMH GERD & asthma. BIB EMS called by staff at residence for agitation.    On arrival, patient was agitated and provocative and  was given haldol 5mg/ativan 2mg/Benadryl 50mg IM. She de-escalated and on interview was calm and cooperative, asking for a new residence.  Patient is well known to this writer and has been known to make provacative and attention seeking statements but denies SI/HI. Patient is at chronic baseline. Is not manic or psychotic. She is known to dislike her residence and seek admission through the ED. She is often provocative and dramatic which is most consistent with behavioral dysregulation and personality disorder pathology. There is no indication that patient is decompensating from her baseline, no evidence of an acute mood or psychotic disorder on exam, is compliant with PALACIOS and at baseline per ACT Team, and she is appropriate for discharge with outpatient ACT team follow up as scheduled. Ms. Castillo is a single, 41-year-old  female, non caregiver, unemployed, domiciled on [place] grounds building [address] [place] [address], with past psychiatric history of Schizoaffective Disorder, Borderline Personality Disorder, cannabis abuse, multiple inpatient psychiatric hospitalizations (>20), recently Low 4 12/12-12/24 2018, with frequent visits to the [facility] (well-known to staff) 3 prior suicide attempts (last in 2012 via OD), recurrent suicidal gestures and suicidal ideation, history of property destruction when upset, past legal issues, no access to weapons. PMH GERD & asthma. BIB EMS called by staff at residence for agitation/destructive to property.    Presentation and ED course are consistent with patient's baseline.  Pt initially agitated and provocative in ED and given prn by EM team.  Pt initially disinhibted, dancing/provocative, asking for new residence,  With support and redirection she de-escalated, was calm and cooperative and agreed to resume her PO medications this evening and follow with her ACT team.  We discussed that residence can not be changed through the emergency room and she accepted that.   She is currently at chronic baseline.  It would be counterproductive to respond to her provocative statements and acting out behaviors by admitting her to inpatient psychiatric unit as, again she readily admits to wanting a difference residence.   At this time no evidence of trina/psychosis/suicidality/homicidality.  She has chronic issues with impulse control, acting out behaviors, intermittent destruction to property (again all consistent with her character pathology and would not benefit from inpatient psychiatric hospitalization but would be best served by returning to supportive/structured residence and working with  from there if she really wants to change residences).

## 2024-12-06 ENCOUNTER — PATIENT MESSAGE (OUTPATIENT)
Dept: OTOLARYNGOLOGY | Facility: CLINIC | Age: 69
End: 2024-12-06
Payer: MEDICARE

## 2024-12-16 DIAGNOSIS — Z90.49 HX OF GLOSSECTOMY: Primary | ICD-10-CM

## 2025-01-10 ENCOUNTER — HOSPITAL ENCOUNTER (OUTPATIENT)
Dept: RADIOLOGY | Facility: HOSPITAL | Age: 70
Discharge: HOME | End: 2025-01-10
Attending: OTOLARYNGOLOGY
Payer: MEDICARE

## 2025-01-10 ENCOUNTER — PATIENT MESSAGE (OUTPATIENT)
Dept: OTOLARYNGOLOGY | Facility: CLINIC | Age: 70
End: 2025-01-10
Payer: MEDICARE

## 2025-01-10 DIAGNOSIS — Z90.49 HX OF GLOSSECTOMY: ICD-10-CM

## 2025-01-10 DIAGNOSIS — Z90.49 HX OF GLOSSECTOMY: Primary | ICD-10-CM

## 2025-01-10 LAB — POCT GLUCOSE: 79 MG/DL (ref 70–110)

## 2025-01-10 PROCEDURE — A9552 F18 FDG: HCPCS | Mod: HCNC | Performed by: OTOLARYNGOLOGY

## 2025-01-10 PROCEDURE — 78815 PET IMAGE W/CT SKULL-THIGH: CPT | Mod: TC,HCNC

## 2025-01-10 PROCEDURE — 78815 PET IMAGE W/CT SKULL-THIGH: CPT | Mod: 26,HCNC,, | Performed by: NUCLEAR MEDICINE

## 2025-01-10 RX ORDER — FLUDEOXYGLUCOSE F18 500 MCI/ML
15.66 INJECTION INTRAVENOUS
Status: COMPLETED | OUTPATIENT
Start: 2025-01-10 | End: 2025-01-10

## 2025-01-10 RX ORDER — GLYCOPYRROLATE 1 MG/1
1 TABLET ORAL 3 TIMES DAILY
Qty: 90 TABLET | Refills: 0 | Status: SHIPPED | OUTPATIENT
Start: 2025-01-10 | End: 2025-02-09

## 2025-01-10 RX ADMIN — FLUDEOXYGLUCOSE F-18 15.66 MILLICURIE: 500 INJECTION INTRAVENOUS at 10:01

## 2025-01-14 DIAGNOSIS — Z00.00 ENCOUNTER FOR MEDICARE ANNUAL WELLNESS EXAM: ICD-10-CM

## 2025-01-16 ENCOUNTER — OFFICE VISIT (OUTPATIENT)
Dept: OTOLARYNGOLOGY | Facility: CLINIC | Age: 70
End: 2025-01-16
Payer: MEDICARE

## 2025-01-16 VITALS
BODY MASS INDEX: 23.03 KG/M2 | HEART RATE: 61 BPM | WEIGHT: 165.13 LBS | SYSTOLIC BLOOD PRESSURE: 111 MMHG | DIASTOLIC BLOOD PRESSURE: 72 MMHG

## 2025-01-16 DIAGNOSIS — C02.9 TONGUE CANCER: Primary | ICD-10-CM

## 2025-01-16 PROCEDURE — 99999 PR PBB SHADOW E&M-EST. PATIENT-LVL III: CPT | Mod: PBBFAC,HCNC,, | Performed by: OTOLARYNGOLOGY

## 2025-01-16 PROCEDURE — 1126F AMNT PAIN NOTED NONE PRSNT: CPT | Mod: HCNC,CPTII,S$GLB, | Performed by: OTOLARYNGOLOGY

## 2025-01-16 PROCEDURE — 3078F DIAST BP <80 MM HG: CPT | Mod: HCNC,CPTII,S$GLB, | Performed by: OTOLARYNGOLOGY

## 2025-01-16 PROCEDURE — 3074F SYST BP LT 130 MM HG: CPT | Mod: HCNC,CPTII,S$GLB, | Performed by: OTOLARYNGOLOGY

## 2025-01-16 PROCEDURE — 1159F MED LIST DOCD IN RCRD: CPT | Mod: HCNC,CPTII,S$GLB, | Performed by: OTOLARYNGOLOGY

## 2025-01-16 PROCEDURE — 3008F BODY MASS INDEX DOCD: CPT | Mod: HCNC,CPTII,S$GLB, | Performed by: OTOLARYNGOLOGY

## 2025-01-16 PROCEDURE — 99213 OFFICE O/P EST LOW 20 MIN: CPT | Mod: HCNC,S$GLB,, | Performed by: OTOLARYNGOLOGY

## 2025-01-16 NOTE — PROGRESS NOTES
CC: Surveillance      HPI   69 y.o. male presents status post subtotal glossectomy and pec flap reconstruction in March, 2024.    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  He underwent right sided cervical lymph node biopsy which revealed metastatic disease in the right neck and subsequent laryngoscopy with biopsy of the right base of tongue.  He was treated with radiation at [facility] in 2015.      No significant complaints today.  Recent PET clear.      Review of Systems   Constitutional: Negative for fatigue and unexpected weight change.   HENT: Per HPI.  Eyes: Negative for visual disturbance.   Respiratory: Negative for shortness of breath, hemoptysis   Cardiovascular: Negative for chest pain and palpitations.   Musculoskeletal: Negative for decreased ROM, back pain.   Skin: Negative for rash, sunburn, itching.   Neurological: Negative for dizziness and seizures.   Hematological: Negative for adenopathy. Does not bruise/bleed easily.   Endocrine: Negative for rapid weight loss/weight gain, heat/cold intolerance.     Past Medical History   Patient Active Problem List   Diagnosis    Primary insomnia    Hyperlipidemia    Tongue cancer    Rotator cuff syndrome of right shoulder    Memory change    RLS (restless legs syndrome)    Chronic right-sided low back pain    Ectatic aorta    Subclavian artery stenosis, left    Aortic atherosclerosis    Coronary artery calcification    Hypertension, essential    Elevated alanine aminotransferase (ALT) level    S/P percutaneous endoscopic gastrostomy (PEG) tube placement    Hypokalemia    Hypoalbuminemia    Hypophosphatemia    Status post tracheostomy    Oral bleeding    Hyponatremia    Acute on chronic blood loss anemia    Hx of glossectomy    Moderate malnutrition    Hemoptysis           Past Surgical History   Past Surgical History:   Procedure Laterality  Date    ANKLE SURGERY      L ankle    BIOPSY OF TISSUE OF NECK Left 2013    COLONOSCOPY N/A 08/21/2017    Procedure: COLONOSCOPY;  Surgeon: Danny Jane MD;  Location: [facility] (4TH FLR);  Service: Endoscopy;  Laterality: N/A;  Do not cancel this order    CREATION OF MUSCLE ROTATIONAL FLAP N/A 2/23/2024    Procedure: CREATION, FLAP, MUSCLE ROTATION;  Surgeon: Jeferson Ventura MD;  [facility];  Service: ENT;  Laterality: N/A;    DIRECT LARYNGOBRONCHOSCOPY N/A 12/11/2023    Procedure: LARYNGOSCOPY, DIRECT, WITH BRONCHOSCOPY;  Surgeon: Micaela Poole MD;  [facility];  Service: ENT;  Laterality: N/A;    DISSECTION OF NECK Bilateral 2/23/2024    Procedure: DISSECTION, NECK;  Surgeon: Jeferson Ventura MD;  [facility];  Service: ENT;  Laterality: Bilateral;    EMBOLIZATION N/A 3/20/2024    Procedure: EMBOLIZATION, BLOOD VESSEL;  Surgeon: Edith Jasso;  Location: [facility] EDITH;  Service: Anesthesiology;  Laterality: N/A;  lingual artery embo    ESOPHAGOGASTRODUODENOSCOPY N/A 09/18/2023    Procedure: EGD (ESOPHAGOGASTRODUODENOSCOPY);  Surgeon: Basilia Villavicencio MD;  Location: [facility] ENDOSCOPY;  Service: Gastroenterology;  Laterality: N/A;  9.13 instr sent via portal [facility]  pre call complete, stated he would have a ride -HH    GLOSSECTOMY Right 2/23/2024    Procedure: GLOSSECTOMY;  Surgeon: Jeferson Ventura MD;  [facility];  Service: ENT;  Laterality: Right;    INSERTION, PEG TUBE Right 2/23/2024    Procedure: INSERTION, PEG TUBE;  Surgeon: Alpesh Wu MD;  [facility];  Service: General;  Laterality: Right;    TONSILLECTOMY      TRACHEOTOMY N/A 2/23/2024    Procedure: TRACHEOTOMY;  Surgeon: Jeferson Ventura MD;  [facility];  Service: ENT;  Laterality: N/A;    TUMOR REMOVAL Right 2015    at     VASECTOMY      WOUND DEBRIDEMENT N/A 3/22/2024    Procedure: DEBRIDEMENT, WOUND;  Surgeon: Jeferson Ventura,  MD;  [facility] [address] [place] [address];  Service: ENT;  Laterality: N/A;    WRIST FRACTURE SURGERY Right          Family History   Family History   Problem Relation Name Age of Onset    Arthritis Mother      COPD Brother      Psoriasis Neg Hx      Eczema Neg Hx             Social History   .  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                 Social Drivers of Health     Financial Resource Strain: Low Risk  (3/20/2024)    Overall Financial Resource Strain (CARDIA)     Difficulty of Paying Living Expenses: Not hard at all   Food Insecurity: No Food Insecurity (3/20/2024)    Hunger Vital Sign     Worried About Running Out of Food in the Last Year: Never true     Ran Out of Food in the Last Year: Never true   Transportation Needs: No Transportation Needs (3/20/2024)    PRAPARE - Transportation     Lack of Transportation (Medical): No     Lack of Transportation (Non-Medical): No   Physical Activity: Inactive (3/20/2024)    Exercise Vital Sign     Days of Exercise per Week: 0 days     Minutes of Exercise per Session: 0 min   Stress: Stress Concern Present (1/22/2024)    [employer] [place] [employer] - Occupational Stress Questionnaire     Feeling of Stress : Rather much   Housing Stability: Low Risk  (3/20/2024)    Housing Stability Vital Sign     Unable to Pay for Housing in the Last Year: No     Number of Places Lived in the Last Year: 1     Unstable Housing in the Last Year: No         Allergies   Review of patient's allergies indicates:  No Known  Allergies        Physical Exam     Vitals:    01/16/25 0811   BP: 111/72   Pulse: 61         Body mass index is 23.03 kg/m².      General: AOx3, NAD   Respiratory:  Symmetric chest rise, normal effort   Oral Cavity:  Tongue absent. No lesions. Hard Palate WNL. No buccal or FOM lesions.  Oropharynx:  No masses/lesions of the posterior pharyngeal wall. Tonsillar fossa without lesions. Soft palate without masses. Midline uvula.   Neck: Well-healed neck scars.  No cervical lymphadenopathy, thyromegaly or thyroid nodules.  Normal range of motion.    Face: House Brackmann I bilaterally.     NP: No lesions of posterior wall  OP: No lesions of posterior wall or BOT. BOT is soft to palpation  Larynx: No lesions of glottic or supraglottic larynx. Normal vocal fold mobility   HP: No lesions of pyriform sinuses or postcricoid region  Mirror examination was performed.        Assessment/Plan  Problem List Items Addressed This Visit          Oncology    Tongue cancer - Primary     BRIGIDA.  RTC 6 weeks.

## 2025-02-08 ENCOUNTER — PATIENT MESSAGE (OUTPATIENT)
Dept: HEMATOLOGY/ONCOLOGY | Facility: CLINIC | Age: 70
End: 2025-02-08
Payer: MEDICARE

## 2025-02-08 DIAGNOSIS — Z90.49 HX OF GLOSSECTOMY: ICD-10-CM

## 2025-02-11 RX ORDER — GLYCOPYRROLATE 1 MG/1
1 TABLET ORAL 3 TIMES DAILY
Qty: 90 TABLET | Refills: 0 | Status: SHIPPED | OUTPATIENT
Start: 2025-02-11 | End: 2025-03-14

## 2025-02-17 ENCOUNTER — LAB VISIT (OUTPATIENT)
Dept: LAB | Facility: HOSPITAL | Age: 70
End: 2025-02-17
Attending: INTERNAL MEDICINE
Payer: MEDICARE

## 2025-02-17 DIAGNOSIS — E78.2 MIXED HYPERLIPIDEMIA: ICD-10-CM

## 2025-02-17 LAB
ALBUMIN SERPL BCP-MCNC: 3.9 G/DL (ref 3.5–5.2)
ALP SERPL-CCNC: 58 U/L (ref 40–150)
ALT SERPL W/O P-5'-P-CCNC: 20 U/L (ref 10–44)
ANION GAP SERPL CALC-SCNC: 5 MMOL/L (ref 8–16)
AST SERPL-CCNC: 26 U/L (ref 10–40)
BILIRUB SERPL-MCNC: 0.8 MG/DL (ref 0.1–1)
BUN SERPL-MCNC: 12 MG/DL (ref 8–23)
CALCIUM SERPL-MCNC: 9.9 MG/DL (ref 8.7–10.5)
CHLORIDE SERPL-SCNC: 105 MMOL/L (ref 95–110)
CHOLEST SERPL-MCNC: 134 MG/DL (ref 120–199)
CHOLEST/HDLC SERPL: 2.3 {RATIO} (ref 2–5)
CO2 SERPL-SCNC: 30 MMOL/L (ref 23–29)
CREAT SERPL-MCNC: 1.1 MG/DL (ref 0.5–1.4)
EST. GFR  (NO RACE VARIABLE): >60 ML/MIN/1.73 M^2
GLUCOSE SERPL-MCNC: 93 MG/DL (ref 70–110)
HDLC SERPL-MCNC: 58 MG/DL (ref 40–75)
HDLC SERPL: 43.3 % (ref 20–50)
LDLC SERPL CALC-MCNC: 67.2 MG/DL (ref 63–159)
NONHDLC SERPL-MCNC: 76 MG/DL
POTASSIUM SERPL-SCNC: 5.6 MMOL/L (ref 3.5–5.1)
PROT SERPL-MCNC: 6.5 G/DL (ref 6–8.4)
SODIUM SERPL-SCNC: 140 MMOL/L (ref 136–145)
TRIGL SERPL-MCNC: 44 MG/DL (ref 30–150)

## 2025-02-17 PROCEDURE — 83695 ASSAY OF LIPOPROTEIN(A): CPT | Mod: HCNC | Performed by: INTERNAL MEDICINE

## 2025-02-17 PROCEDURE — 80061 LIPID PANEL: CPT | Mod: HCNC | Performed by: INTERNAL MEDICINE

## 2025-02-17 PROCEDURE — 36415 COLL VENOUS BLD VENIPUNCTURE: CPT | Mod: HCNC,PO | Performed by: INTERNAL MEDICINE

## 2025-02-17 PROCEDURE — 80053 COMPREHEN METABOLIC PANEL: CPT | Mod: HCNC | Performed by: INTERNAL MEDICINE

## 2025-02-18 DIAGNOSIS — K21.9 GASTROESOPHAGEAL REFLUX DISEASE, UNSPECIFIED WHETHER ESOPHAGITIS PRESENT: ICD-10-CM

## 2025-02-19 RX ORDER — ATORVASTATIN CALCIUM 80 MG/1
TABLET, FILM COATED ORAL
Qty: 90 TABLET | Refills: 0 | OUTPATIENT
Start: 2025-02-19

## 2025-02-19 RX ORDER — OMEPRAZOLE 40 MG/1
40 CAPSULE, DELAYED RELEASE ORAL EVERY MORNING
Qty: 90 CAPSULE | Refills: 3 | Status: SHIPPED | OUTPATIENT
Start: 2025-02-19 | End: 2026-02-14

## 2025-02-21 LAB — LPA SERPL-MCNC: 12 MG/DL (ref 0–30)

## 2025-02-24 ENCOUNTER — TELEPHONE (OUTPATIENT)
Dept: GASTROENTEROLOGY | Facility: CLINIC | Age: 70
End: 2025-02-24
Payer: MEDICARE

## 2025-02-24 ENCOUNTER — OFFICE VISIT (OUTPATIENT)
Dept: CARDIOLOGY | Facility: CLINIC | Age: 70
End: 2025-02-24
Payer: MEDICARE

## 2025-02-24 ENCOUNTER — LAB VISIT (OUTPATIENT)
Dept: LAB | Facility: HOSPITAL | Age: 70
End: 2025-02-24
Attending: INTERNAL MEDICINE
Payer: MEDICARE

## 2025-02-24 VITALS
HEIGHT: 71 IN | HEART RATE: 64 BPM | WEIGHT: 161.63 LBS | DIASTOLIC BLOOD PRESSURE: 68 MMHG | BODY MASS INDEX: 22.63 KG/M2 | SYSTOLIC BLOOD PRESSURE: 114 MMHG

## 2025-02-24 DIAGNOSIS — R79.81 ELEVATED CARBON DIOXIDE LEVEL: ICD-10-CM

## 2025-02-24 DIAGNOSIS — I70.0 AORTIC ATHEROSCLEROSIS: ICD-10-CM

## 2025-02-24 DIAGNOSIS — I25.10 CORONARY ARTERY CALCIFICATION: ICD-10-CM

## 2025-02-24 DIAGNOSIS — E78.2 MIXED HYPERLIPIDEMIA: ICD-10-CM

## 2025-02-24 DIAGNOSIS — E78.2 MIXED HYPERLIPIDEMIA: Primary | ICD-10-CM

## 2025-02-24 DIAGNOSIS — E87.5 HYPERKALEMIA: ICD-10-CM

## 2025-02-24 DIAGNOSIS — I77.819 ECTATIC AORTA: ICD-10-CM

## 2025-02-24 DIAGNOSIS — Z78.9 ON TUBE FEEDING DIET: ICD-10-CM

## 2025-02-24 LAB
ALBUMIN SERPL BCP-MCNC: 3.8 G/DL (ref 3.5–5.2)
ALP SERPL-CCNC: 53 U/L (ref 40–150)
ALT SERPL W/O P-5'-P-CCNC: 20 U/L (ref 10–44)
ANION GAP SERPL CALC-SCNC: 7 MMOL/L (ref 8–16)
AST SERPL-CCNC: 25 U/L (ref 10–40)
BILIRUB SERPL-MCNC: 0.5 MG/DL (ref 0.1–1)
BUN SERPL-MCNC: 14 MG/DL (ref 8–23)
CALCIUM SERPL-MCNC: 9.3 MG/DL (ref 8.7–10.5)
CHLORIDE SERPL-SCNC: 103 MMOL/L (ref 95–110)
CO2 SERPL-SCNC: 28 MMOL/L (ref 23–29)
CREAT SERPL-MCNC: 0.9 MG/DL (ref 0.5–1.4)
EST. GFR  (NO RACE VARIABLE): >60 ML/MIN/1.73 M^2
GLUCOSE SERPL-MCNC: 94 MG/DL (ref 70–110)
POTASSIUM SERPL-SCNC: 4.4 MMOL/L (ref 3.5–5.1)
PROT SERPL-MCNC: 6.6 G/DL (ref 6–8.4)
SODIUM SERPL-SCNC: 138 MMOL/L (ref 136–145)

## 2025-02-24 PROCEDURE — 3288F FALL RISK ASSESSMENT DOCD: CPT | Mod: HCNC,CPTII,S$GLB, | Performed by: INTERNAL MEDICINE

## 2025-02-24 PROCEDURE — 80053 COMPREHEN METABOLIC PANEL: CPT | Mod: HCNC | Performed by: INTERNAL MEDICINE

## 2025-02-24 PROCEDURE — 3074F SYST BP LT 130 MM HG: CPT | Mod: HCNC,CPTII,S$GLB, | Performed by: INTERNAL MEDICINE

## 2025-02-24 PROCEDURE — 1101F PT FALLS ASSESS-DOCD LE1/YR: CPT | Mod: HCNC,CPTII,S$GLB, | Performed by: INTERNAL MEDICINE

## 2025-02-24 PROCEDURE — 99999 PR PBB SHADOW E&M-EST. PATIENT-LVL V: CPT | Mod: PBBFAC,HCNC,, | Performed by: INTERNAL MEDICINE

## 2025-02-24 PROCEDURE — 1159F MED LIST DOCD IN RCRD: CPT | Mod: HCNC,CPTII,S$GLB, | Performed by: INTERNAL MEDICINE

## 2025-02-24 PROCEDURE — 1126F AMNT PAIN NOTED NONE PRSNT: CPT | Mod: HCNC,CPTII,S$GLB, | Performed by: INTERNAL MEDICINE

## 2025-02-24 PROCEDURE — 36415 COLL VENOUS BLD VENIPUNCTURE: CPT | Mod: HCNC,PO | Performed by: INTERNAL MEDICINE

## 2025-02-24 PROCEDURE — 3078F DIAST BP <80 MM HG: CPT | Mod: HCNC,CPTII,S$GLB, | Performed by: INTERNAL MEDICINE

## 2025-02-24 PROCEDURE — 99213 OFFICE O/P EST LOW 20 MIN: CPT | Mod: HCNC,S$GLB,, | Performed by: INTERNAL MEDICINE

## 2025-02-24 PROCEDURE — 3008F BODY MASS INDEX DOCD: CPT | Mod: HCNC,CPTII,S$GLB, | Performed by: INTERNAL MEDICINE

## 2025-02-24 NOTE — PROGRESS NOTES
"[facility]      Chief Complaint   Patient presents with    Subclavian artery stenosis, left       Patient ID: Timothy Galdamez is a 69 y.o. male with HTN, history of squamous cell CA s/p XRT (2015), overweight, former smoker, who presents for a follow up appointment.  Pertinent history/events are as follows:     -Pt kindly referred by Dr. Wilian Dudley for evaluation of subclavian artery stenosis (per his note on 9/22/2023):  "Noted difference in BP between his two arms. Reviewed the BP log that he provided to Dr. Lanier. BP fluctuates with as much as a 30 mm Hg difference between the 2 arms - higher on the right side.  He had radiation therapy to the right side of his neck but his blood pressure is lower on the contralateral side.  Through reasonable to perform an ultrasound to rule out subclavian artery stenosis.  He does not have any symptoms suggestive of subclavian steal."    -At our initial clinic visit on 10/30/2023, Mr. Galdamez reports no upper extremity weakness, no dizziness, or other symptoms.  BUE Arterial Ultrasound on 10/19/2023 revealed right subclavian artery with elevated velocity suggestive of greater than 50% stenosis.  Normal wrist brachial index bilaterally.   Plan:  Left Subclavian artery stenosis- Pt is asymptomatic with no LUE weakness, dizziness, or other symptoms related to subclavian steal.  Etiologies include atherosclerosis and prior radiation exposure.  Check CTA neck to evaluate further. Continue rosuvastatin 20 mg daily, zetia 10 mg daily, and ASA 81 mg daily.    HTN- Continue current medications.   Overweight- Encourage diet, exercise, and weight loss.    -At follow up clinic visit on 8/28/2024, Mr. Galdamez reported no upper extremity weakness, no dizziness, or other symptoms. CTA Neck on 11/13/2023 revealed no evidence of significant subclavian stenosis as clinically questioned. Atherosclerotic disease with mixed calcified noncalcified plaque at the carotid bifurcations " right greater than left and left common carotid artery.  No high-grade stenosis by NASCET criteria.  The estimated stenosis is less than 50%.   Nodular enhancing lesion at the base of the tongue right.  He is status post subtotal glossectomy and pec flap reconstruction in March, 2024.    Plan:  Left Subclavian artery stenosis- Pt is asymptomatic with no LUE weakness, dizziness, or other symptoms related to subclavian steal.  Etiologies include atherosclerosis and prior radiation exposure.  CTA Neck on 11/13/2023 revealed no evidence of significant subclavian stenosis as clinically questioned. Atherosclerotic disease with mixed calcified noncalcified plaque at the carotid bifurcations right greater than left and left common carotid artery.  No high-grade stenosis by NASCET criteria.  The estimated stenosis is less than 50%. Continue rosuvastatin 20 mg daily, zetia 10 mg daily, and ASA 81 mg daily.    HTN- Continue current medications.   HLD- The 10-year ASCVD risk score (Zari DK, et al., 2019) is: 17.1%.  Restart rosuvastatin 20 mg daily and continue zetia 10 mg daily.  Check LPa.    HPI:  Mr. Rudolph reports no chest pain, SOB, upper extremity weakness, no dizziness, or other symptoms. Labs from 2/17/2025 with mildly elevated potassium of 5.6 and bicarb of 30. LDL 67 on 2/17/2025.     Past Medical History:   Diagnosis Date    Acute on chronic blood loss anemia 3/19/2024    Cancer     Hyperlipidemia     Hypertension      Past Surgical History:   Procedure Laterality Date    ANKLE SURGERY      L ankle    BIOPSY OF TISSUE OF NECK Left 2013    COLONOSCOPY N/A 08/21/2017    Procedure: COLONOSCOPY;  Surgeon: Danny Jane MD;  Location: [facility] ENDO (4TH FLR);  Service: Endoscopy;  Laterality: N/A;  Do not cancel this order    CREATION OF MUSCLE ROTATIONAL FLAP N/A 2/23/2024    Procedure: CREATION, FLAP, MUSCLE ROTATION;  Surgeon: Jeferson Ventura MD;  Location: [facility] OR 2ND FLR;  Service: ENT;  Laterality: N/A;     DIRECT LARYNGOBRONCHOSCOPY N/A 12/11/2023    Procedure: LARYNGOSCOPY, DIRECT, WITH BRONCHOSCOPY;  Surgeon: Micaela Poole MD;  [facility];  Service: ENT;  Laterality: N/A;    DISSECTION OF NECK Bilateral 2/23/2024    Procedure: DISSECTION, NECK;  Surgeon: Jeferson Ventura MD;  [facility];  Service: ENT;  Laterality: Bilateral;    EMBOLIZATION N/A 3/20/2024    Procedure: EMBOLIZATION, BLOOD VESSEL;  Surgeon: Edith Jasso;  Location: [facility] EDITH;  Service: Anesthesiology;  Laterality: N/A;  lingual artery embo    ESOPHAGOGASTRODUODENOSCOPY N/A 09/18/2023    Procedure: EGD (ESOPHAGOGASTRODUODENOSCOPY);  Surgeon: Basilia Villavicencio MD;  Location: [facility] ENDOSCOPY;  Service: Gastroenterology;  Laterality: N/A;  9.13 instr sent via portal [facility]  pre call complete, stated he would have a ride -HH    GLOSSECTOMY Right 2/23/2024    Procedure: GLOSSECTOMY;  Surgeon: Jeferson Ventura MD;  [facility];  Service: ENT;  Laterality: Right;    INSERTION, PEG TUBE Right 2/23/2024    Procedure: INSERTION, PEG TUBE;  Surgeon: Alpesh Wu MD;  Location: [address];  Service: General;  Laterality: Right;    TONSILLECTOMY      TRACHEOTOMY N/A 2/23/2024    Procedure: TRACHEOTOMY;  Surgeon: Jeferson Ventura MD;  Location: [facility] OR [address] [place] [address];  Service: ENT;  Laterality: N/A;    TUMOR REMOVAL Right 2015    at     VASECTOMY      WOUND DEBRIDEMENT N/A 3/22/2024    Procedure: DEBRIDEMENT, WOUND;  Surgeon: Jeferson Ventura MD;  [facility];  Service: ENT;  Laterality: N/A;    WRIST FRACTURE SURGERY Right      Social History     Socioeconomic History    Marital status:    Tobacco Use    Smoking status: Former     Types: Cigarettes    Smokeless tobacco: Never    Tobacco comments:     Quit in 1981   Substance and Sexual Activity    Alcohol use: Yes     Alcohol/week: 7.0 - 8.0 standard drinks of alcohol     Types: 3 Glasses of wine, 4 - 5 Standard drinks or  equivalent per week    Drug use: No   Social History Narrative    Single, CPA, no tobacco, 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                 Social Drivers of Health     Financial Resource Strain: Low Risk  (3/20/2024)    Overall Financial Resource Strain (CARDIA)     Difficulty of Paying Living Expenses: Not hard at all   Food Insecurity: No Food Insecurity (3/20/2024)    Hunger Vital Sign     Worried About Running Out of Food in the Last Year: Never true     Ran Out of Food in the Last Year: Never true   Transportation Needs: No Transportation Needs (3/20/2024)    PRAPARE - Transportation     Lack of Transportation (Medical): No     Lack of Transportation (Non-Medical): No   Physical Activity: Inactive (3/20/2024)    Exercise Vital Sign     Days of Exercise per Week: 0 days     Minutes of Exercise per Session: 0 min   Stress: Stress Concern Present (1/22/2024)    [employer] - Occupational Stress Questionnaire     Feeling of Stress : Rather much   Housing Stability: Low Risk  (3/20/2024)    Housing Stability Vital Sign     Unable to Pay for Housing in the Last Year: No     Number of Places Lived in the Last Year: 1     Unstable Housing in the Last Year: No     Family History   Problem Relation Name Age of Onset    Arthritis Mother      COPD Brother      Psoriasis Neg Hx      Eczema Neg Hx         Review of patient's allergies indicates:  No Known Allergies    Medication List with Changes/Refills   Current Medications    ACETAMINOPHEN (TYLENOL) 160 MG/5 ML LIQD    take 20.3 mLs (649.6 mg total) every 6 (six) hours as needed.    ALPRAZOLAM (XANAX) 0.5 MG TABLET    Take 1 tablet (0.5 mg total) by mouth 3 (three) times daily.    AMLODIPINE (NORVASC) 2.5 MG TABLET    1 tablet (2.5 mg total) by Per G Tube route once daily.    ASCORBIC ACID, VITAMIN C, (VITAMIN C) 100 MG TABLET    Take 100 mg by mouth once daily.    ASPIRIN (ECOTRIN) 81 MG EC TABLET    Take 81 mg by mouth  "once daily.    ERGOCALCIFEROL, VITAMIN D2, (VITAMIN D ORAL)    Take 1 tablet by mouth once daily.    EZETIMIBE (ZETIA) 10 MG TABLET    1 tablet (10 mg total) by Per G Tube route once daily.    GLYCOPYRROLATE (ROBINUL) 1 MG TAB    Take 1 tablet (1 mg total) by mouth 3 (three) times daily.    NON FORMULARY MEDICATION    Prevegan once daily    OMEGA-3/DHA/EPA/DPA/FISH OIL (OMEGA-3 2100 ORAL)    Take 1 capsule by mouth once daily.    ROSUVASTATIN (CRESTOR) 20 MG TABLET    Take 1 tablet (20 mg total) by mouth once daily.    SCOPOLAMINE (TRANSDERM-SCOP) 1.3-1.5 MG (1 MG OVER 3 DAYS)    Place 1 patch onto the skin every 72 hours.    VITAMIN E 100 UNIT CAPSULE    Take 100 Units by mouth once daily.   Discontinued Medications    CALCIUM CARBONATE (OS-HERMES) 600 MG CALCIUM (1,500 MG) TAB    Take 600 mg by mouth once daily.    MULTIVITAMIN (THERAGRAN) TABLET    1 tablet by Per G Tube route once daily.    OMEPRAZOLE (PRILOSEC) 40 MG CAPSULE    Take 1 capsule (40 mg total) by mouth every morning.    SILODOSIN (RAPAFLO) 4 MG CAP CAPSULE    Take 1 capsule (4 mg total) by mouth once daily. Open capsule, place in water and administer into feeding tube       Review of Systems  Constitution: Denies chills, fever, and sweats.  HENT: Denies headaches or blurry vision.  Cardiovascular: Denies chest pain or irregular heart beat.  Respiratory: Denies cough or shortness of breath.  Gastrointestinal: Denies abdominal pain, nausea, or vomiting.  Musculoskeletal: Denies muscle cramps.  Neurological: Denies dizziness or focal weakness.  Psychiatric/Behavioral: Normal mental status.  Hematologic/Lymphatic: Denies bleeding problem or easy bruising/bleeding.  Skin: Denies rash or suspicious lesions    Physical Examination  /68 (BP Location: Left arm, Patient Position: Sitting)   Pulse 64   Ht 5' 11" (1.803 m)   Wt 73.3 kg (161 lb 9.6 oz)   BMI 22.54 kg/m²     Constitutional: No acute distress, conversant  HEENT: Sclera anicteric, Pupils " "equal, round and reactive to light, extraocular motions intact, Oropharynx clear  Neck: No JVD, no carotid bruits  Cardiovascular: regular rate and rhythm, no murmur, rubs or gallops, normal S1/S2  Pulmonary: Clear to auscultation bilaterally  Abdominal: Abdomen soft, nontender, nondistended, positive bowel sounds  Extremities: No lower extremity edema,   Pulses:  Carotid pulses are 2+ on the right side, and 2+ on the left side.  Radial pulses are 2+ on the right side, and 2+ on the left side.   Femoral pulses are 2+ on the right side, and 2+ on the left side.  Popliteal pulses are 2+ on the right side, and 2+ on the left side.   Dorsalis pedis pulses are 2+ on the right side, and 2+ on the left side.   Posterior tibial pulses are 2+ on the right side, and 2+ on the left side.    Skin: No ecchymosis, erythema, or ulcers  Psych: Alert and oriented x 3, appropriate affect  Neuro: CNII-XII intact, no focal deficits    Labs:  Most Recent Data  CBC:   Lab Results   Component Value Date    WBC 6.23 03/25/2024    HGB 8.1 (L) 03/25/2024    HCT 25.9 (L) 03/25/2024     03/25/2024    MCV 95 03/25/2024    RDW 14.8 (H) 03/25/2024     BMP:   Lab Results   Component Value Date     02/17/2025    K 5.6 (H) 02/17/2025     02/17/2025    CO2 30 (H) 02/17/2025    BUN 12 02/17/2025    CREATININE 1.1 02/17/2025    GLU 93 02/17/2025    CALCIUM 9.9 02/17/2025    MG 2.1 03/25/2024    PHOS 4.2 03/25/2024     LFTS;   Lab Results   Component Value Date    PROT 6.5 02/17/2025    ALBUMIN 3.9 02/17/2025    BILITOT 0.8 02/17/2025    AST 26 02/17/2025    ALKPHOS 58 02/17/2025    ALT 20 02/17/2025     COAGS:   Lab Results   Component Value Date    INR 1.1 03/18/2024     FLP:   Lab Results   Component Value Date    CHOL 134 02/17/2025    HDL 58 02/17/2025    LDLCALC 67.2 02/17/2025    TRIG 44 02/17/2025    CHOLHDL 43.3 02/17/2025     CARDIAC: No results found for: "TROPONINI", "CKTOTAL", "CKMB", "BNP"    Imaging:    CTA Neck " 11/13/2023:  CTA neck shows no evidence of significant subclavian stenosis as clinically questioned.   Atherosclerotic disease with mixed calcified noncalcified plaque at the carotid bifurcations right greater than left and left common carotid artery.  No high-grade stenosis by NASCET criteria.  The estimated stenosis is less than 50%.   Nodular enhancing lesion at the base of the tongue right.  Recommend follow-up with ENT further evaluation/characterization.    BUE Arterial Ultrasound 10/19/2023:    Right subclavian artery with elevated velocity suggestive of greater than 50% stenosis.  Recommend further evaluation with CTA or MRA if clinically indicated.    Right wrist brachial index 1.0, is normal.    Left wrist brachial index 1.16, is normal.    Assessment/Plan:  Timothy Galdamez is a 69 y.o. male with HTN, history of squamous cell CA s/p XRT (2015), overweight, former smoker, who presents for a follow up appointment.     Left Subclavian artery stenosis- Pt is asymptomatic with no LUE weakness, dizziness, or other symptoms related to subclavian steal.  Etiologies include atherosclerosis and prior radiation exposure.  CTA Neck on 11/13/2023 revealed no evidence of significant subclavian stenosis as clinically questioned. Atherosclerotic disease with mixed calcified noncalcified plaque at the carotid bifurcations right greater than left and left common carotid artery.  No high-grade stenosis by NASCET criteria.  The estimated stenosis is less than 50%.  LDL 67 on 2/17/2025. Continue rosuvastatin 20 mg daily, zetia 10 mg daily, and ASA 81 mg daily.      2. HTN- Continue current medications.     3. HLD- The 10-year ASCVD risk score (Walton DK, et al., 2019) is: 17.1%.   LDL 67 on 2/17/2025. Continue rosuvastatin 20 mg daily and zetia 10 mg daily.      4. Elevated potassium and bicarbonate with low anion gap- Etiology is unclear. Given low anion gap of 5, this is suggestive of GI or renal losses. Check repeat cmp  today. Refer to GI and Nephrology for further evaluation.     Follow up in 4 months     Total duration of face to face visit time 30 minutes.  Total time spent counseling greater than fifty percent of total visit time.  Counseling included discussion regarding imaging findings, diagnosis, possibilities, treatment options, risks and benefits.  The patient had many questions regarding the options and long-term effects.    Carlos Guzmán MD, PhD  Interventional Cardiology

## 2025-02-24 NOTE — PATIENT INSTRUCTIONS
Assessment/Plan:  Timothy Galdamez is a 69 y.o. male with HTN, history of squamous cell CA s/p XRT (2015), overweight, former smoker, who presents for a follow up appointment.     Left Subclavian artery stenosis- Pt is asymptomatic with no LUE weakness, dizziness, or other symptoms related to subclavian steal.  Etiologies include atherosclerosis and prior radiation exposure.  CTA Neck on 11/13/2023 revealed no evidence of significant subclavian stenosis as clinically questioned. Atherosclerotic disease with mixed calcified noncalcified plaque at the carotid bifurcations right greater than left and left common carotid artery.  No high-grade stenosis by NASCET criteria.  The estimated stenosis is less than 50%.  LDL 67 on 2/17/2025. Continue rosuvastatin 20 mg daily, zetia 10 mg daily, and ASA 81 mg daily.      2. HTN- Continue current medications.     3. HLD- The 10-year ASCVD risk score (Zari DK, et al., 2019) is: 17.1%.   LDL 67 on 2/17/2025. Continue rosuvastatin 20 mg daily and zetia 10 mg daily.      4. Elevated potassium and bicarbonate with low anion gap- Etiology is unclear. Given low anion gap of 5, this is suggestive of GI or renal losses. Check repeat cmp today. Refer to GI and Nephrology for further evaluation.     Follow up in 4 months

## 2025-02-26 ENCOUNTER — PATIENT MESSAGE (OUTPATIENT)
Dept: OTOLARYNGOLOGY | Facility: CLINIC | Age: 70
End: 2025-02-26
Payer: MEDICARE

## 2025-02-26 DIAGNOSIS — Z90.49 HX OF GLOSSECTOMY: ICD-10-CM

## 2025-02-26 DIAGNOSIS — K11.7 PTYALISM: ICD-10-CM

## 2025-02-26 RX ORDER — GLYCOPYRROLATE 1 MG/1
1 TABLET ORAL 3 TIMES DAILY
Qty: 90 TABLET | Refills: 0 | Status: SHIPPED | OUTPATIENT
Start: 2025-02-26 | End: 2025-03-28

## 2025-02-26 RX ORDER — SCOPOLAMINE 1 MG/3D
1 PATCH, EXTENDED RELEASE TRANSDERMAL
Qty: 10 PATCH | Refills: 3 | Status: SHIPPED | OUTPATIENT
Start: 2025-02-26

## 2025-02-28 ENCOUNTER — PATIENT MESSAGE (OUTPATIENT)
Dept: HEMATOLOGY/ONCOLOGY | Facility: CLINIC | Age: 70
End: 2025-02-28
Payer: MEDICARE

## 2025-02-28 DIAGNOSIS — K11.7 SIALORRHEA: Primary | ICD-10-CM

## 2025-02-28 DIAGNOSIS — Z90.49 HX OF GLOSSECTOMY: ICD-10-CM

## 2025-03-13 ENCOUNTER — PATIENT MESSAGE (OUTPATIENT)
Dept: OTOLARYNGOLOGY | Facility: CLINIC | Age: 70
End: 2025-03-13
Payer: MEDICARE

## 2025-03-13 ENCOUNTER — TELEPHONE (OUTPATIENT)
Dept: GASTROENTEROLOGY | Facility: CLINIC | Age: 70
End: 2025-03-13
Payer: MEDICARE

## 2025-03-14 ENCOUNTER — PATIENT MESSAGE (OUTPATIENT)
Dept: HEMATOLOGY/ONCOLOGY | Facility: CLINIC | Age: 70
End: 2025-03-14
Payer: MEDICARE

## 2025-03-19 NOTE — PROGRESS NOTES
"Oncology Nutrition Assessment Medical Nutrition Therapy Follow-up Visit    Timothy GREENE Rudolph  1955    Referring Provider: No ref. provider found   Reason for Referral: C02.9 (ICD-10-CM) - Tongue cancer     Diagnosis: Tongue cancer   Treatment: [No matching plan found]   1. DEBRIDEMENT, WOUND (N/A), March 22, 2024  - Dr Ventura  2. GLOSSECTOMY (Right), DISSECTION, NECK (Bilateral), TRACHEOTOMY (N/A), CREATION, FLAP, MUSCLE ROTATION (N/A), INSERTION, PEG TUBE (Right) 20 Fr PEG tube, 3 cm length- February 23, 2024 - Dr Ventura    PMHx:   Past Medical History:   Diagnosis Date    Acute on chronic blood loss anemia 3/19/2024    Cancer     Hyperlipidemia     Hypertension      Allergies: Patient has no known allergies.    Nutrition Assessment    Anthropometrics:   Weight:  03/20/25: 162.2 lb (in RD office)  08/02/24: 162 lbs  06/14/24: 157.3 lbs (in RD office)  05/14/24: 162 lb (in RD office)  Wt Readings from Last 10 Encounters:   02/24/25 73.3 kg (161 lb 9.6 oz)   01/16/25 74.9 kg (165 lb 2 oz)   12/05/24 72.4 kg (159 lb 9.8 oz)   11/15/24 72.1 kg (158 lb 15.2 oz)   08/28/24 74.4 kg (164 lb 0.4 oz)   08/26/24 72.6 kg (160 lb 0.9 oz)   07/15/24 74 kg (163 lb 2.3 oz)   06/14/24 71.7 kg (158 lb 1.1 oz)   04/22/24 70.3 kg (155 lb)   04/18/24 72.3 kg (159 lb 6.3 oz)                              Usual BW: 155 lb Ht Readings from Last 1 Encounters:   02/24/25 5' 11" (1.803 m)      BMI Readings from Last 1 Encounters:   02/24/25 22.54 kg/m²     Estimated body surface area is 1.92 meters squared as calculated from the following:    Height as of 2/24/25: 5' 11" (1.803 m).    Weight as of 2/24/25: 73.3 kg (161 lb 9.6 oz).        Food/Nutrition-related History:  Continued intake of thin liquids that work well for him. Clear Soup at [employer] restaurant. Also drinks sprite, coffee, soda water.   Tried [employer]ms orally but it was too thick. This also occurred squeeze applesauce and cottage cheese but wont go down due to thickness. " "Working with SLP on swallowing. Meeting with Mary Ellen who cleared him for thin puree. Sipping liquids. Not more than a small amount at a time.      DME/Insurance: [employer] HMO / [place]  EN Order: 4 carton of [employer] Peptide 1.5 by bolus, syringe (gravity) via PEG. Provides: 912 mL/day total volume, 2000 kcals/day, 96 g/day protein  Current EN Intake: Mostly 2 carton of [employer] Peptide 1.5 (occasionally 3) by bolus, syringe (gravity) via PEG mixed into homemade smoothies or soups with he dilutes with water, coffee or tea totaling 10 oz per feeing. Adds  [employer] Pro Performance (2 scoops per day = 446 kcal/day) into his mixtures. Taking 8 feedings daily.  Flushing with 0 ml water before or after feeding. Additional Water flushes: 120 mL 1 x/day with medications in PEG tube.  Added [employer] Pro Performance (2 scoops per day = 446 kcal/day)  Estimated water intake is ~8155-2745 ml/day    Encounter Notes:  Timothy Galdamez is a 69 y.o. male with a history of tongue cancer S/p near-total glossectomy per surgeries of 2/23 and 3/22/24 referred by No ref. provider found for nutrition assessment and counseling. On, 5/14/24, Mr. Galdamez presents with a weight gain of 7 lbs in 3 weeks. Patient is PEG tube dependent however he is working closely with Mary Ellen SLP on dysphagia, trismus, and speech. Noted that Mary Ellen recently added use of 5-mL thin puree (or solid pureed to that level) with consistency equivalent to applesauce on 5/10/24. Denies N/V/D/C, pain swallowing, gas or bloating. Previously reports new onset of reflux which he is treating with Pepcid however he reports feeling that increased thick mucus production may be the cause of this. Reports tastes are more intense than usual such as "too salty". Observed PEG tube. Noted the tab on feeding port has come off making it harder to open. Reports having hard stool.     Today, 03/20/25, Mr. Galdamez presents with a stable weight. He reports consistent intake of thin liquids orally " and use of PEG tube as directed. Patient reports increased mucous with peptide formula however it is noted that patient has significantly decreased water intake. Gargling with lemon water and salt or sparkling water to help get mucus out. Reflux is improved therefore he discontinued use of Pepcid. Patient not currently interested in Lenora tube at this time.     Current Medications:  Current Outpatient Medications   Medication Instructions    acetaminophen (TYLENOL) 160 mg/5 mL Liqd take 20.3 mLs (649.6 mg total) every 6 (six) hours as needed.    ALPRAZolam (XANAX) 0.5 mg, Oral, 3 times daily    amLODIPine (NORVASC) 2.5 mg, Per G Tube, Daily    ascorbic acid (vitamin C) (VITAMIN C) 100 mg, Daily    aspirin (ECOTRIN) 81 mg, Daily    ergocalciferol, vitamin D2, (VITAMIN D ORAL) 1 tablet, Daily    ezetimibe (ZETIA) 10 mg, Per G Tube, Daily    glycopyrrolate (ROBINUL) 1 mg, Oral, 3 times daily    NON FORMULARY MEDICATION Prevegan once daily    omega-3/dha/epa/dpa/fish oil (OMEGA-3 2100 ORAL) 1 capsule, Daily    rosuvastatin (CRESTOR) 20 mg, Oral, Daily    scopolamine (TRANSDERM-SCOP) 1.3-1.5 mg (1 mg over 3 days) 1 patch, Transdermal, Every 72 hours    vitamin E 100 Units, Daily   Pepcid    Labs: Reviewed 06/14/24    Nutrition Diagnosis    Nutrition Problem: Malnutrition  Etiology (related to): increased energy needs and swallowing difficulty S/p near-total glossectomy  Signs/Symptoms (as evidenced by): requiring tube feeding for nutrition support, weight loss of 20 lbs in 2 months, and delayed wound healing    Nutrition Intervention    Nutrition Prescription  1420-1805 kcals/day 30-35 kcal/kg   70-84 g protein/day 1-1.2 gm/kg  3008-8571 mL fluid/day 1 ml/kcal    Recommendations:   Recommend 3 cartons of [employer] Peptide 1.5 bolus(gravity) mixed with smoothie/soups, etc diluted with water or decaffeinated coffee or tea via PEG tube with 60 ml of water before and 180 ml of water after feeding and 120 ml of water with  medications.   Provided samples of Compleat Original 1.5 to determine if this decreases mucus production. Patient will message via portal. May change order to Compleat Original 1.5, 6 carton daily if this is better tolerated.  Reviewed potential indication for low-profile Goyo Shields in the future.  Explained that increasing water/fluid will improve mucous and constipation.    Materials Provided/Reviewed: none    Nutrition Monitoring and Evaluation    Monitor: rate/formulation of tube feeding, weight, diet education needs , and symptom management     Follow up Patient will follow up via portal as needed with any questions/concerns     Communication to referring provider/care team: Note available in chart.     Consultation Time: 30 Minutes    Ketan HDZAP, , LDN  [facility] in Clinical Nutrition  Board Certified Specialist in Oncology Nutrition   [facility]  [phone]

## 2025-03-20 ENCOUNTER — CLINICAL SUPPORT (OUTPATIENT)
Dept: HEMATOLOGY/ONCOLOGY | Facility: CLINIC | Age: 70
End: 2025-03-20
Payer: MEDICARE

## 2025-03-20 ENCOUNTER — OFFICE VISIT (OUTPATIENT)
Dept: OTOLARYNGOLOGY | Facility: CLINIC | Age: 70
End: 2025-03-20
Payer: MEDICARE

## 2025-03-20 ENCOUNTER — TELEPHONE (OUTPATIENT)
Dept: OTOLARYNGOLOGY | Facility: CLINIC | Age: 70
End: 2025-03-20
Payer: MEDICARE

## 2025-03-20 ENCOUNTER — CLINICAL SUPPORT (OUTPATIENT)
Dept: AUDIOLOGY | Facility: CLINIC | Age: 70
End: 2025-03-20
Payer: MEDICARE

## 2025-03-20 DIAGNOSIS — E44.0 MODERATE MALNUTRITION: ICD-10-CM

## 2025-03-20 DIAGNOSIS — Z71.3 NUTRITIONAL COUNSELING: Primary | ICD-10-CM

## 2025-03-20 DIAGNOSIS — H90.A31 MIXED CONDUCTIVE AND SENSORINEURAL HEARING LOSS OF RIGHT EAR WITH RESTRICTED HEARING OF LEFT EAR: Primary | ICD-10-CM

## 2025-03-20 DIAGNOSIS — H93.13 TINNITUS, BILATERAL: ICD-10-CM

## 2025-03-20 DIAGNOSIS — C02.9 TONGUE CANCER: ICD-10-CM

## 2025-03-20 DIAGNOSIS — H90.A31 MIXED CONDUCTIVE AND SENSORINEURAL HEARING LOSS OF RIGHT EAR WITH RESTRICTED HEARING OF LEFT EAR: ICD-10-CM

## 2025-03-20 DIAGNOSIS — H65.01 NON-RECURRENT ACUTE SEROUS OTITIS MEDIA OF RIGHT EAR: Primary | ICD-10-CM

## 2025-03-20 DIAGNOSIS — Z90.49 HX OF GLOSSECTOMY: ICD-10-CM

## 2025-03-20 DIAGNOSIS — Z93.1 S/P PERCUTANEOUS ENDOSCOPIC GASTROSTOMY (PEG) TUBE PLACEMENT: ICD-10-CM

## 2025-03-20 PROCEDURE — 99999 PR PBB SHADOW E&M-EST. PATIENT-LVL III: CPT | Mod: PBBFAC,HCNC,,

## 2025-03-20 PROCEDURE — 92557 COMPREHENSIVE HEARING TEST: CPT | Mod: HCNC,S$GLB,, | Performed by: AUDIOLOGIST

## 2025-03-20 PROCEDURE — 1101F PT FALLS ASSESS-DOCD LE1/YR: CPT | Mod: HCNC,CPTII,S$GLB,

## 2025-03-20 PROCEDURE — 99999 PR PBB SHADOW E&M-EST. PATIENT-LVL I: CPT | Mod: PBBFAC,HCNC,,

## 2025-03-20 PROCEDURE — 1126F AMNT PAIN NOTED NONE PRSNT: CPT | Mod: HCNC,CPTII,S$GLB,

## 2025-03-20 PROCEDURE — 99213 OFFICE O/P EST LOW 20 MIN: CPT | Mod: HCNC,S$GLB,,

## 2025-03-20 PROCEDURE — 92567 TYMPANOMETRY: CPT | Mod: HCNC,S$GLB,, | Performed by: AUDIOLOGIST

## 2025-03-20 PROCEDURE — 3288F FALL RISK ASSESSMENT DOCD: CPT | Mod: HCNC,CPTII,S$GLB,

## 2025-03-20 PROCEDURE — 1160F RVW MEDS BY RX/DR IN RCRD: CPT | Mod: HCNC,CPTII,S$GLB,

## 2025-03-20 PROCEDURE — 1159F MED LIST DOCD IN RCRD: CPT | Mod: HCNC,CPTII,S$GLB,

## 2025-03-20 RX ORDER — METHYLPREDNISOLONE 4 MG/1
TABLET ORAL
Qty: 1 EACH | Refills: 0 | Status: SHIPPED | OUTPATIENT
Start: 2025-03-20

## 2025-03-20 RX ORDER — AMLODIPINE BESYLATE 2.5 MG/1
2.5 TABLET ORAL DAILY
Qty: 30 TABLET | Refills: 11 | Status: CANCELLED | OUTPATIENT
Start: 2025-03-20 | End: 2026-03-20

## 2025-03-20 RX ORDER — EZETIMIBE 10 MG/1
10 TABLET ORAL DAILY
Qty: 90 TABLET | Refills: 3 | Status: CANCELLED | OUTPATIENT
Start: 2025-03-20 | End: 2026-03-20

## 2025-03-20 NOTE — PROGRESS NOTES
Audiologic Evaluation 3/20/2025:       Timothy Galdamez, a 69 y.o. male, was seen today in the clinic for an audiologic evaluation.  Mr. Galdamez reported muffled hearing in the right ear. He noted bilateral tinnitus that he notices at night when trying to go to sleep. Mr. Galdamez noted a history of tongue cancer. He denied otalgia and dizziness.      Tympanometry revealed Type B tympanogram with average ear canal volume in the right ear and Type A tympanogram in the left ear. Audiogram results revealed mild sloping to severe mixed hearing loss in the right ear and normal hearing sloping to moderately severe sensorineural hearing loss in the left ear.  Speech reception thresholds were noted at 40 dB in the right ear and 15 dB in the left ear.  Speech discrimination scores were 92% in the right ear and 100% in the left ear.    Recommendations:  Otologic evaluation  Repeat audiogram as needed to monitor hearing  Hearing protection when in noise

## 2025-03-20 NOTE — PROGRESS NOTES
Subjective:   Timothy Galdamez is a 69 y.o. male with PMHx of HTN, HLD and s/p subtotal glossectomy for tongue cancer with metastases to the neck who presents to clinic today with right ear fullness. He notes clogged sensation, muffled hearing and associated tinnitus in the right ear for one week. He is concerned about wax impaction. He denies otalgia, otorrhea or vertigo. No left ear symptoms. Denies recent URI. He has not tried any treatments. No previous hx of ear problems. There is not a prior history of ear surgery. There is not a prior history of ear infections. There is not a history of ear trauma. He denies a history of significant noise exposure.     Past Medical History  He has a past medical history of Acute on chronic blood loss anemia, Cancer, Hyperlipidemia, and Hypertension.    Past Surgical History  He has a past surgical history that includes Vasectomy; Tonsillectomy; Ankle surgery; Colonoscopy (N/A, 08/21/2017); Tumor removal (Right, 2015); Biopsy of tissue of neck (Left, 2013); Esophagogastroduodenoscopy (N/A, 09/18/2023); Direct laryngobronchoscopy (N/A, 12/11/2023); Wrist fracture surgery (Right); Glossectomy (Right, 2/23/2024); Dissection of neck (Bilateral, 2/23/2024); Tracheotomy (N/A, 2/23/2024); Creation of muscle rotational flap (N/A, 2/23/2024); insertion, peg tube (Right, 2/23/2024); Embolization (N/A, 3/20/2024); and Wound debridement (N/A, 3/22/2024).    Family History  His family history includes Arthritis in his mother; COPD in his brother.    Social History  He reports that he has quit smoking. His smoking use included cigarettes. He has never used smokeless tobacco. He reports current alcohol use of about 7.0 - 8.0 standard drinks of alcohol per week. He reports that he does not use drugs.    Allergies  He has no known allergies.    Medications  He has a current medication list which includes the following prescription(s): ascorbic acid (vitamin c), aspirin, ergocalciferol (vitamin  d2), glycopyrrolate, NON FORMULARY MEDICATION, omega-3/dha/epa/dpa/fish oil, rosuvastatin, scopolamine, acetaminophen, alprazolam, amlodipine, ezetimibe, methylprednisolone, and vitamin e.    Review of Systems     Constitutional: Negative for appetite change, chills, fatigue, fever and unexpected weight loss.      HENT: Positive for hearing loss and ringing in the ears.  Negative for ear discharge, ear infection and ear pain.      Eyes:  Negative for change in eyesight, eye drainage, eye itching and photophobia.     Respiratory:  Positive for sleep apnea.      Cardiovascular:  Negative for chest pain, foot swelling, irregular heartbeat and swollen veins.     Gastrointestinal:  Negative for abdominal pain, acid reflux, constipation, diarrhea, heartburn and vomiting.     Genitourinary: Negative for difficulty urinating, sexual problems and frequent urination.     Musc: Negative for aching joints, aching muscles, back pain and neck pain.     Skin: Negative for rash.     Allergy: Negative for food allergies and seasonal allergies.     Endocrine: Negative for cold intolerance and heat intolerance.      Neurological: Negative for dizziness, headaches, light-headedness, seizures and tremors.      Hematologic: Negative for bruises/bleeds easily and swollen glands.      Psychiatric: Positive for sleep disturbance.         Objective:       Ears:    Right Ear: No drainage, swelling or tenderness. Tympanic membrane is not injected, not scarred, not perforated, not erythematous and not retracted. A middle ear effusion (serous) is present.   Left Ear: No drainage, swelling or tenderness. Tympanic membrane is not injected, not scarred, not perforated, not erythematous and not retracted.  No middle ear effusion.       Procedure  None    Audiology     I independently reviewed the tracings of the complete audiometric evaluation performed today. I reviewed the audiogram with the patient as well. Pertinent findings include:  Tympanometry revealed Type B tympanogram with average ear canal volume in the right ear and Type A tympanogram in the left ear. Audiogram results revealed mild sloping to severe mixed hearing loss in the right ear and normal hearing sloping to moderately severe sensorineural hearing loss in the left ear.  Speech reception thresholds were noted at 40 dB in the right ear and 15 dB in the left ear.  Speech discrimination scores were 92% in the right ear and 100% in the left ear.     Imaging:   No pertinent imaging available.       Assessment:     1. Non-recurrent acute serous otitis media of right ear    2. Hx of glossectomy    3. Mixed conductive and sensorineural hearing loss of right ear with restricted hearing of left ear      Plan:   Non-recurrent acute serous otitis media of right ear  Mixed conductive and sensorineural hearing loss of right ear with restricted hearing of left ear  -     methylPREDNISolone (MEDROL DOSEPACK) 4 mg tablet; Use as directed  Clinical exam with right serous effusion. No signs of acute infection or inflammation. We discussed the potential causes and treatments for this problem. Discussed observation with nasal steroids vs oral steroid. Steroid dosepak sent. Discussed the potential risks and benefits of the oral steroid medication have been discussed with the patient. The risks include, but are not limited to, stomach irritation, increased energy/restless, changes in mood, fluid retention, diabetes, hypertension and allergic reaction. Also instructed to perform nasal insufflation 1-2 times daily, but to stop if it causes any pain.     Follow-up in 4-6 weeks with audiogram.    Hx of glossectomy

## 2025-03-31 RX ORDER — EZETIMIBE 10 MG/1
10 TABLET ORAL DAILY
Qty: 90 TABLET | Refills: 3 | OUTPATIENT
Start: 2025-03-31 | End: 2026-03-31

## 2025-03-31 RX ORDER — AMLODIPINE BESYLATE 2.5 MG/1
2.5 TABLET ORAL DAILY
Qty: 30 TABLET | Refills: 11 | OUTPATIENT
Start: 2025-03-31 | End: 2026-03-31

## 2025-04-03 ENCOUNTER — PATIENT MESSAGE (OUTPATIENT)
Dept: HEMATOLOGY/ONCOLOGY | Facility: CLINIC | Age: 70
End: 2025-04-03
Payer: MEDICARE

## 2025-04-04 ENCOUNTER — PATIENT MESSAGE (OUTPATIENT)
Dept: ADMINISTRATIVE | Facility: OTHER | Age: 70
End: 2025-04-04
Payer: MEDICARE

## 2025-04-10 DIAGNOSIS — K11.7 PTYALISM: Primary | ICD-10-CM

## 2025-04-10 RX ORDER — GLYCOPYRROLATE 1 MG/1
1 TABLET ORAL 3 TIMES DAILY
Qty: 90 TABLET | Refills: 0 | Status: SHIPPED | OUTPATIENT
Start: 2025-04-10 | End: 2025-05-10

## 2025-04-18 ENCOUNTER — PATIENT MESSAGE (OUTPATIENT)
Dept: OTOLARYNGOLOGY | Facility: CLINIC | Age: 70
End: 2025-04-18
Payer: MEDICARE

## 2025-04-28 ENCOUNTER — CLINICAL SUPPORT (OUTPATIENT)
Dept: AUDIOLOGY | Facility: CLINIC | Age: 70
End: 2025-04-28
Payer: MEDICARE

## 2025-04-28 ENCOUNTER — OFFICE VISIT (OUTPATIENT)
Dept: OTOLARYNGOLOGY | Facility: CLINIC | Age: 70
End: 2025-04-28
Payer: MEDICARE

## 2025-04-28 ENCOUNTER — PATIENT MESSAGE (OUTPATIENT)
Dept: OTOLARYNGOLOGY | Facility: CLINIC | Age: 70
End: 2025-04-28

## 2025-04-28 VITALS
HEART RATE: 73 BPM | SYSTOLIC BLOOD PRESSURE: 104 MMHG | DIASTOLIC BLOOD PRESSURE: 65 MMHG | WEIGHT: 160.25 LBS | BODY MASS INDEX: 22.35 KG/M2

## 2025-04-28 DIAGNOSIS — Z90.49 HX OF GLOSSECTOMY: ICD-10-CM

## 2025-04-28 DIAGNOSIS — H90.3 BILATERAL HIGH FREQUENCY SENSORINEURAL HEARING LOSS: Primary | ICD-10-CM

## 2025-04-28 DIAGNOSIS — S62.101A CLOSED FRACTURE OF RIGHT WRIST, INITIAL ENCOUNTER: Primary | ICD-10-CM

## 2025-04-28 DIAGNOSIS — H90.3 SENSORINEURAL HEARING LOSS (SNHL), BILATERAL: ICD-10-CM

## 2025-04-28 DIAGNOSIS — H65.01 NON-RECURRENT ACUTE SEROUS OTITIS MEDIA OF RIGHT EAR: Primary | ICD-10-CM

## 2025-04-28 DIAGNOSIS — C02.9 TONGUE CANCER: ICD-10-CM

## 2025-04-28 PROCEDURE — 99999 PR PBB SHADOW E&M-EST. PATIENT-LVL III: CPT | Mod: PBBFAC,HCNC,,

## 2025-04-28 PROCEDURE — 3008F BODY MASS INDEX DOCD: CPT | Mod: CPTII,HCNC,S$GLB, | Performed by: OTOLARYNGOLOGY

## 2025-04-28 PROCEDURE — 1126F AMNT PAIN NOTED NONE PRSNT: CPT | Mod: CPTII,HCNC,S$GLB,

## 2025-04-28 PROCEDURE — 31575 DIAGNOSTIC LARYNGOSCOPY: CPT | Mod: HCNC,S$GLB,, | Performed by: OTOLARYNGOLOGY

## 2025-04-28 PROCEDURE — 1159F MED LIST DOCD IN RCRD: CPT | Mod: CPTII,HCNC,S$GLB, | Performed by: OTOLARYNGOLOGY

## 2025-04-28 PROCEDURE — 92557 COMPREHENSIVE HEARING TEST: CPT | Mod: HCNC,S$GLB,,

## 2025-04-28 PROCEDURE — 99999 PR PBB SHADOW E&M-EST. PATIENT-LVL III: CPT | Mod: PBBFAC,HCNC,, | Performed by: OTOLARYNGOLOGY

## 2025-04-28 PROCEDURE — 3074F SYST BP LT 130 MM HG: CPT | Mod: CPTII,HCNC,S$GLB, | Performed by: OTOLARYNGOLOGY

## 2025-04-28 PROCEDURE — 99213 OFFICE O/P EST LOW 20 MIN: CPT | Mod: 25,HCNC,S$GLB, | Performed by: OTOLARYNGOLOGY

## 2025-04-28 PROCEDURE — 1159F MED LIST DOCD IN RCRD: CPT | Mod: CPTII,HCNC,S$GLB,

## 2025-04-28 PROCEDURE — 99213 OFFICE O/P EST LOW 20 MIN: CPT | Mod: HCNC,S$GLB,,

## 2025-04-28 PROCEDURE — 1160F RVW MEDS BY RX/DR IN RCRD: CPT | Mod: CPTII,HCNC,S$GLB, | Performed by: OTOLARYNGOLOGY

## 2025-04-28 PROCEDURE — 1126F AMNT PAIN NOTED NONE PRSNT: CPT | Mod: CPTII,HCNC,S$GLB, | Performed by: OTOLARYNGOLOGY

## 2025-04-28 PROCEDURE — 92567 TYMPANOMETRY: CPT | Mod: HCNC,S$GLB,,

## 2025-04-28 PROCEDURE — 1101F PT FALLS ASSESS-DOCD LE1/YR: CPT | Mod: CPTII,HCNC,S$GLB,

## 2025-04-28 PROCEDURE — 3078F DIAST BP <80 MM HG: CPT | Mod: CPTII,HCNC,S$GLB, | Performed by: OTOLARYNGOLOGY

## 2025-04-28 PROCEDURE — 3288F FALL RISK ASSESSMENT DOCD: CPT | Mod: CPTII,HCNC,S$GLB,

## 2025-04-28 NOTE — PROGRESS NOTES
CC: Surveillance      HPI   70 y.o. male presents status post subtotal glossectomy and pec flap reconstruction in March, 2024.    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  He underwent right sided cervical lymph node biopsy which revealed metastatic disease in the right neck and subsequent laryngoscopy with biopsy of the right base of tongue.  He was treated with radiation at [facility] in 2015.      No significant complaints today.  Recent PET clear.      Review of Systems   Constitutional: Negative for fatigue and unexpected weight change.   HENT: Per HPI.  Eyes: Negative for visual disturbance.   Respiratory: Negative for shortness of breath, hemoptysis   Cardiovascular: Negative for chest pain and palpitations.   Musculoskeletal: Negative for decreased ROM, back pain.   Skin: Negative for rash, sunburn, itching.   Neurological: Negative for dizziness and seizures.   Hematological: Negative for adenopathy. Does not bruise/bleed easily.   Endocrine: Negative for rapid weight loss/weight gain, heat/cold intolerance.     Past Medical History   Patient Active Problem List   Diagnosis    Primary insomnia    Hyperlipidemia    Tongue cancer    Rotator cuff syndrome of right shoulder    Memory change    RLS (restless legs syndrome)    Chronic right-sided low back pain    Ectatic aorta    Subclavian artery stenosis, left    Aortic atherosclerosis    Coronary artery calcification    Hypertension, essential    Elevated alanine aminotransferase (ALT) level    S/P percutaneous endoscopic gastrostomy (PEG) tube placement    Hypokalemia    Hypoalbuminemia    Hypophosphatemia    Status post tracheostomy    Oral bleeding    Hyponatremia    Acute on chronic blood loss anemia    Hx of glossectomy    Moderate malnutrition    Hemoptysis    Hyperkalemia    Elevated carbon dioxide level    On tube feeding diet            Past Surgical History   Past Surgical History:   Procedure Laterality Date    ANKLE SURGERY      L ankle    BIOPSY OF TISSUE OF NECK Left 2013    COLONOSCOPY N/A 08/21/2017    Procedure: COLONOSCOPY;  Surgeon: Danny Jane MD;  Location: [facility] ENDO (4TH FLR);  Service: Endoscopy;  Laterality: N/A;  Do not cancel this order    CREATION OF MUSCLE ROTATIONAL FLAP N/A 2/23/2024    Procedure: CREATION, FLAP, MUSCLE ROTATION;  Surgeon: Jeferson Ventura MD;  [facility];  Service: ENT;  Laterality: N/A;    DIRECT LARYNGOBRONCHOSCOPY N/A 12/11/2023    Procedure: LARYNGOSCOPY, DIRECT, WITH BRONCHOSCOPY;  Surgeon: Micaela Poole MD;  [facility];  Service: ENT;  Laterality: N/A;    DISSECTION OF NECK Bilateral 2/23/2024    Procedure: DISSECTION, NECK;  Surgeon: Jeferson Ventura MD;  [facility];  Service: ENT;  Laterality: Bilateral;    EMBOLIZATION N/A 3/20/2024    Procedure: EMBOLIZATION, BLOOD VESSEL;  Surgeon: Edith Jasso;  Location: [facility] EDITH;  Service: Anesthesiology;  Laterality: N/A;  lingual artery embo    ESOPHAGOGASTRODUODENOSCOPY N/A 09/18/2023    Procedure: EGD (ESOPHAGOGASTRODUODENOSCOPY);  Surgeon: Basilia Villavicencio MD;  Location: [facility] ENDOSCOPY;  Service: Gastroenterology;  Laterality: N/A;  9.13 instr sent via portal [facility]  pre call complete, stated he would have a ride -HH    GLOSSECTOMY Right 2/23/2024    Procedure: GLOSSECTOMY;  Surgeon: Jeferson Ventura MD;  [address]  Service: ENT;  Laterality: Right;    INSERTION, PEG TUBE Right 2/23/2024    Procedure: INSERTION, PEG TUBE;  Surgeon: Alpesh Wu MD;  [facility];  Service: General;  Laterality: Right;    TONSILLECTOMY      TRACHEOTOMY N/A 2/23/2024    Procedure: TRACHEOTOMY;  Surgeon: Jeferson Ventura MD;  [facility];  Service: ENT;  Laterality: N/A;    TUMOR REMOVAL Right 2015    at     VASECTOMY      WOUND DEBRIDEMENT  N/A 3/22/2024    Procedure: DEBRIDEMENT, WOUND;  Surgeon: Jeferson Ventura MD;  Location: [facility] OR [address] [place] [address];  Service: ENT;  Laterality: N/A;    WRIST FRACTURE SURGERY Right          Family History   Family History   Problem Relation Name Age of Onset    Arthritis Mother      COPD Brother      Psoriasis Neg Hx      Eczema Neg Hx             Social History   .  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                 Social Drivers of Health     Financial Resource Strain: Low Risk  (2/27/2025)    Overall Financial Resource Strain (CARDIA)     Difficulty of Paying Living Expenses: Not hard at all   Food Insecurity: No Food Insecurity (2/27/2025)    Hunger Vital Sign     Worried About Running Out of Food in the Last Year: Never true     Ran Out of Food in the Last Year: Never true   Transportation Needs: No Transportation Needs (2/27/2025)    PRAPARE - Transportation     Lack of Transportation (Medical): No     Lack of Transportation (Non-Medical): No   Physical Activity: Insufficiently Active (2/27/2025)    Exercise Vital Sign     Days of Exercise per Week: 4 days     Minutes of Exercise per Session: 30 min   Stress: No Stress Concern Present (2/27/2025)    [employer] [place] [employer] - Occupational Stress Questionnaire     Feeling of Stress : Only a little   Housing Stability: Low Risk  (2/27/2025)    Housing Stability Vital Sign     Unable to Pay for Housing in the Last Year: No     Number of Times Moved in the Last Year: 0     Homeless in the  Last Year: No         Allergies   Review of patient's allergies indicates:  No Known Allergies        Physical Exam     Vitals:    04/28/25 0811   BP: 104/65   Pulse: 73         Body mass index is 22.35 kg/m².      General: AOx3, NAD   Respiratory:  Symmetric chest rise, normal effort   Oral Cavity:  Tongue absent. No lesions. Hard Palate WNL. No buccal or FOM lesions.  Oropharynx:  No masses/lesions of the posterior pharyngeal wall. Tonsillar fossa without lesions. Soft palate without masses. Midline uvula.   Neck: Well-healed neck scars.  No cervical lymphadenopathy, thyromegaly or thyroid nodules.  Normal range of motion.    Face: House Brackmann I bilaterally.     Flex Naso Emilee Hypo Procedures #2    Procedure:  Diagnostic flexible nasopharyngoscopy, laryngoscopy and hypopharyngoscopy:    Routine preparation with local atomizer with 1% neosynephrine/pontocaine with customary flexible endoscope.      Posterior pharynx:  No lesions.  Larynx/hypopharynx:   Epiglottis:  No lesions, without edema.   AE Folds:  No lesions.   Vocal cords:  No polyps, nodules, ulcers or lesions.   Mobility:  Equal and normal bilateral.   Hypopharynx:  No lesions.   Piriform sinus:  No pooling, no lesions.   Post Cricoid:  No erythema, no edema.            Assessment/Plan  Problem List Items Addressed This Visit          Oncology    Tongue cancer    BRIGIDA.  RTC 3 mos.            Orthopedic    RESOLVED: Wrist fracture - Primary

## 2025-04-28 NOTE — PROGRESS NOTES
Timothy Galdamez, a 70 y.o. male, was seen today in the clinic for an audiologic evaluation. Mr. Galdamez reported a perceived improvement in hearing in the right ear since his last audiogram. His last audiogram on 3/20/2025 revealed mild sloping to severe mixed hearing loss in the right ear and normal hearing sensitivity with a mild to moderately severe high frequency sensorineural hearing loss in the left ear. Patient denied tinnitus, otalgia, aural fullness, and dizziness.    Tympanometry revealed Type A tympanogram in the right ear and Type A tympanogram in the left ear. Audiogram results revealed normal hearing sensitivity with a mild to moderately severe high frequency sensorineural hearing loss (SNHL) in the right ear and normal hearing sensitivity with a mild to moderately severe high frequency SNHL in the left ear.  Speech reception thresholds were noted at 15 dBHL in the right ear and 15 dBHL in the left ear.  Speech discrimination scores were 100% in the right ear and 100% in the left ear.    Recommendations:  Otologic evaluation  Annual audiogram or sooner if change is perceived  Hearing protection in noise

## 2025-04-28 NOTE — PROGRESS NOTES
Subjective:   Previous Note from 3/20/2025:   Timothy Galdamez is a 70 y.o. male with PMHx of HTN, HLD and s/p subtotal glossectomy for tongue cancer with metastases to the neck who presents to clinic today with right ear fullness. He notes clogged sensation, muffled hearing and associated tinnitus in the right ear for one week. He is concerned about wax impaction. He denies otalgia, otorrhea or vertigo. No left ear symptoms. Denies recent URI. He has not tried any treatments. No previous hx of ear problems. There is not a prior history of ear surgery. There is not a prior history of ear infections. There is not a history of ear trauma. He denies a history of significant noise exposure.     4/48/2025:  Patient presents today for 2 week follow up for right AOME. Today he notes complete improvement of hist symptoms with completion of steroids. He denies clogged sensation, muffled hearing, tinnitus, otalgia, otorrhea, or vertigo.    Past Medical History  He has a past medical history of Acute on chronic blood loss anemia, Cancer, Hyperlipidemia, and Hypertension.    Past Surgical History  He has a past surgical history that includes Vasectomy; Tonsillectomy; Ankle surgery; Colonoscopy (N/A, 08/21/2017); Tumor removal (Right, 2015); Biopsy of tissue of neck (Left, 2013); Esophagogastroduodenoscopy (N/A, 09/18/2023); Direct laryngobronchoscopy (N/A, 12/11/2023); Wrist fracture surgery (Right); Glossectomy (Right, 2/23/2024); Dissection of neck (Bilateral, 2/23/2024); Tracheotomy (N/A, 2/23/2024); Creation of muscle rotational flap (N/A, 2/23/2024); insertion, peg tube (Right, 2/23/2024); Embolization (N/A, 3/20/2024); and Wound debridement (N/A, 3/22/2024).    Family History  His family history includes Arthritis in his mother; COPD in his brother.    Social History  He reports that he has quit smoking. His smoking use included cigarettes. He has never used smokeless tobacco. He reports current alcohol use of about 7.0 -  8.0 standard drinks of alcohol per week. He reports that he does not use drugs.    Allergies  He has no known allergies.    Medications  He has a current medication list which includes the following prescription(s): ascorbic acid (vitamin c), aspirin, ergocalciferol (vitamin d2), glycopyrrolate, methylprednisolone, NON FORMULARY MEDICATION, omega-3/dha/epa/dpa/fish oil, rosuvastatin, scopolamine, alprazolam, amlodipine, and ezetimibe.    Review of Systems     Constitutional: Negative for appetite change, chills, fatigue, fever and unexpected weight loss.      HENT: Positive for hearing loss and ringing in the ears.  Negative for ear discharge, ear infection and ear pain.      Eyes:  Negative for change in eyesight, eye drainage, eye itching and photophobia.     Respiratory:  Positive for sleep apnea. Negative for cough, shortness of breath, snoring and wheezing.      Cardiovascular:  Negative for chest pain, foot swelling, irregular heartbeat and swollen veins.     Gastrointestinal:  Negative for abdominal pain, acid reflux, constipation, diarrhea, heartburn and vomiting.     Genitourinary: Negative for difficulty urinating, sexual problems and frequent urination.     Musc: Negative for aching joints, aching muscles, back pain and neck pain.     Skin: Negative for rash.     Allergy: Negative for food allergies and seasonal allergies.     Endocrine: Negative for cold intolerance and heat intolerance.      Neurological: Negative for dizziness, headaches, light-headedness, seizures and tremors.      Hematologic: Negative for bruises/bleeds easily and swollen glands.      Psychiatric: Positive for sleep disturbance.         Objective:     Constitutional:   He appears alert.     Ears:    Right Ear: No drainage, swelling or tenderness. Tympanic membrane is not injected, not scarred, not perforated, not erythematous and not retracted. No middle ear effusion.   Left Ear: No drainage, swelling or tenderness. Tympanic  membrane is not injected, not scarred, not perforated, not erythematous and not retracted.  No middle ear effusion.     Neurological:   He is alert.       Procedure  None    Audiology     Previous audiogram.      Audiogram interpreted by myself and reviewed with the patient. Normal hearing with mild-moderately severe SNHL AU. SRT 15 dBHL AU. Speech discrimination 100% AU. Type A tympanogram AU.    Imaging:   No pertinent imaging available.     Assessment:     1. Non-recurrent acute serous otitis media of right ear    2. Hx of glossectomy    3. Sensorineural hearing loss (SNHL), bilateral      Plan:   Non-recurrent acute serous otitis media of right ear - resolved  Resolved. Benign otoscopic exam today. Improvement in conductive component of hearing loss today compared to previous.     Sensorineural hearing loss (SNHL), bilateral  Audiometric testing interpretation consistent with sensorineural hearing loss. Discussed the etiology of SNHL. Hearing conservation in noisy environments. RTC in 1-2 years for routine audiogram or sooner if needed.      Hx of glossectomy  Follows with Dr. Kat

## 2025-05-08 ENCOUNTER — PATIENT MESSAGE (OUTPATIENT)
Dept: OTOLARYNGOLOGY | Facility: CLINIC | Age: 70
End: 2025-05-08
Payer: MEDICARE

## 2025-05-28 ENCOUNTER — PATIENT MESSAGE (OUTPATIENT)
Dept: OTOLARYNGOLOGY | Facility: CLINIC | Age: 70
End: 2025-05-28
Payer: MEDICARE

## 2025-05-29 ENCOUNTER — OFFICE VISIT (OUTPATIENT)
Dept: SURGERY | Facility: CLINIC | Age: 70
End: 2025-05-29
Payer: MEDICARE

## 2025-05-29 ENCOUNTER — OFFICE VISIT (OUTPATIENT)
Dept: OTOLARYNGOLOGY | Facility: CLINIC | Age: 70
End: 2025-05-29
Payer: MEDICARE

## 2025-05-29 VITALS
DIASTOLIC BLOOD PRESSURE: 59 MMHG | SYSTOLIC BLOOD PRESSURE: 118 MMHG | WEIGHT: 156 LBS | HEART RATE: 60 BPM | HEIGHT: 71 IN | OXYGEN SATURATION: 100 % | BODY MASS INDEX: 21.84 KG/M2

## 2025-05-29 VITALS
WEIGHT: 156.94 LBS | DIASTOLIC BLOOD PRESSURE: 83 MMHG | BODY MASS INDEX: 21.89 KG/M2 | SYSTOLIC BLOOD PRESSURE: 127 MMHG

## 2025-05-29 DIAGNOSIS — C02.9 TONGUE CANCER: Primary | ICD-10-CM

## 2025-05-29 DIAGNOSIS — K94.23 GASTROSTOMY MALFUNCTION: Primary | ICD-10-CM

## 2025-05-29 PROCEDURE — 3008F BODY MASS INDEX DOCD: CPT | Mod: CPTII,S$GLB,, | Performed by: OTOLARYNGOLOGY

## 2025-05-29 PROCEDURE — 99999 PR PBB SHADOW E&M-EST. PATIENT-LVL III: CPT | Mod: PBBFAC,,, | Performed by: STUDENT IN AN ORGANIZED HEALTH CARE EDUCATION/TRAINING PROGRAM

## 2025-05-29 PROCEDURE — 99213 OFFICE O/P EST LOW 20 MIN: CPT | Mod: S$GLB,,, | Performed by: OTOLARYNGOLOGY

## 2025-05-29 PROCEDURE — 3078F DIAST BP <80 MM HG: CPT | Mod: CPTII,S$GLB,, | Performed by: STUDENT IN AN ORGANIZED HEALTH CARE EDUCATION/TRAINING PROGRAM

## 2025-05-29 PROCEDURE — 3074F SYST BP LT 130 MM HG: CPT | Mod: CPTII,S$GLB,, | Performed by: STUDENT IN AN ORGANIZED HEALTH CARE EDUCATION/TRAINING PROGRAM

## 2025-05-29 PROCEDURE — 3079F DIAST BP 80-89 MM HG: CPT | Mod: CPTII,S$GLB,, | Performed by: OTOLARYNGOLOGY

## 2025-05-29 PROCEDURE — 1101F PT FALLS ASSESS-DOCD LE1/YR: CPT | Mod: CPTII,S$GLB,, | Performed by: STUDENT IN AN ORGANIZED HEALTH CARE EDUCATION/TRAINING PROGRAM

## 2025-05-29 PROCEDURE — 1160F RVW MEDS BY RX/DR IN RCRD: CPT | Mod: CPTII,S$GLB,, | Performed by: OTOLARYNGOLOGY

## 2025-05-29 PROCEDURE — 99999 PR PBB SHADOW E&M-EST. PATIENT-LVL II: CPT | Mod: PBBFAC,,, | Performed by: OTOLARYNGOLOGY

## 2025-05-29 PROCEDURE — 1126F AMNT PAIN NOTED NONE PRSNT: CPT | Mod: CPTII,S$GLB,, | Performed by: STUDENT IN AN ORGANIZED HEALTH CARE EDUCATION/TRAINING PROGRAM

## 2025-05-29 PROCEDURE — 99213 OFFICE O/P EST LOW 20 MIN: CPT | Mod: S$GLB,,, | Performed by: STUDENT IN AN ORGANIZED HEALTH CARE EDUCATION/TRAINING PROGRAM

## 2025-05-29 PROCEDURE — 3074F SYST BP LT 130 MM HG: CPT | Mod: CPTII,S$GLB,, | Performed by: OTOLARYNGOLOGY

## 2025-05-29 PROCEDURE — 3288F FALL RISK ASSESSMENT DOCD: CPT | Mod: CPTII,S$GLB,, | Performed by: STUDENT IN AN ORGANIZED HEALTH CARE EDUCATION/TRAINING PROGRAM

## 2025-05-29 PROCEDURE — 1159F MED LIST DOCD IN RCRD: CPT | Mod: CPTII,S$GLB,, | Performed by: OTOLARYNGOLOGY

## 2025-05-29 PROCEDURE — 1159F MED LIST DOCD IN RCRD: CPT | Mod: CPTII,S$GLB,, | Performed by: STUDENT IN AN ORGANIZED HEALTH CARE EDUCATION/TRAINING PROGRAM

## 2025-05-29 PROCEDURE — 3008F BODY MASS INDEX DOCD: CPT | Mod: CPTII,S$GLB,, | Performed by: STUDENT IN AN ORGANIZED HEALTH CARE EDUCATION/TRAINING PROGRAM

## 2025-05-29 NOTE — ASSESSMENT & PLAN NOTE
Doing well without using PEG tube.  He is likely intermittently aspirating on liquids but has robust enough to avoid a pneumonia.  His weight is relatively stable having lost 25 lb were then a 2 month period of only eating by mouth.  I am comfortable with him removing his PEG tube since I do feel that it would significantly improve his quality of life and he has proven that he can safely do without it.  He is aware that he may require PEG tube reinsertion if he develops an aspiration pneumonia or if he loses a significant amount of weight.  Return as scheduled for surveillance.

## 2025-05-29 NOTE — PROGRESS NOTES
CC: Surveillance      HPI   70 y.o. male presents status post subtotal glossectomy and pec flap reconstruction in March, 2024.    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  He underwent right sided cervical lymph node biopsy which revealed metastatic disease in the right neck and subsequent laryngoscopy with biopsy of the right base of tongue.  He was treated with radiation at [facility] in 2015.      No significant complaints today.  Recent PET clear.  He presents for evaluation for PEG tube removal.  He has not used his PEG tube in 2 months and has been sustaining himself primarily on liquids.  He reports occasional symptoms of aspiration but has not developed a pneumonia.  He has lost roughly 5 lb over 2 months.      Review of Systems   Constitutional: Negative for fatigue and unexpected weight change.   HENT: Per HPI.  Eyes: Negative for visual disturbance.   Respiratory: Negative for shortness of breath, hemoptysis   Cardiovascular: Negative for chest pain and palpitations.   Musculoskeletal: Negative for decreased ROM, back pain.   Skin: Negative for rash, sunburn, itching.   Neurological: Negative for dizziness and seizures.   Hematological: Negative for adenopathy. Does not bruise/bleed easily.   Endocrine: Negative for rapid weight loss/weight gain, heat/cold intolerance.     Past Medical History   Patient Active Problem List   Diagnosis    Primary insomnia    Hyperlipidemia    Tongue cancer    Rotator cuff syndrome of right shoulder    Memory change    RLS (restless legs syndrome)    Chronic right-sided low back pain    Ectatic aorta    Subclavian artery stenosis, left    Aortic atherosclerosis    Coronary artery calcification    Hypertension, essential    Elevated alanine aminotransferase (ALT) level    S/P percutaneous endoscopic gastrostomy (PEG) tube placement    Hypokalemia     Hypoalbuminemia    Hypophosphatemia    Status post tracheostomy    Oral bleeding    Hyponatremia    Acute on chronic blood loss anemia    Hx of glossectomy    Moderate malnutrition    Hemoptysis    Hyperkalemia    Elevated carbon dioxide level    On tube feeding diet           Past Surgical History   Past Surgical History:   Procedure Laterality Date    ANKLE SURGERY      L ankle    BIOPSY OF TISSUE OF NECK Left 2013    COLONOSCOPY N/A 08/21/2017    Procedure: COLONOSCOPY;  Surgeon: Danny Jane MD;  Location: [facility] (4TH FLR);  Service: Endoscopy;  Laterality: N/A;  Do not cancel this order    CREATION OF MUSCLE ROTATIONAL FLAP N/A 2/23/2024    Procedure: CREATION, FLAP, MUSCLE ROTATION;  Surgeon: Jeferson Ventura MD;  [facility];  Service: ENT;  Laterality: N/A;    DIRECT LARYNGOBRONCHOSCOPY N/A 12/11/2023    Procedure: LARYNGOSCOPY, DIRECT, WITH BRONCHOSCOPY;  Surgeon: Micaela Poole MD;  [facility];  Service: ENT;  Laterality: N/A;    DISSECTION OF NECK Bilateral 2/23/2024    Procedure: DISSECTION, NECK;  Surgeon: Jeferson Ventura MD;  [facility];  Service: ENT;  Laterality: Bilateral;    EMBOLIZATION N/A 3/20/2024    Procedure: EMBOLIZATION, BLOOD VESSEL;  Surgeon: Edith Jasso;  Location: [facility];  Service: Anesthesiology;  Laterality: N/A;  lingual artery embo    ESOPHAGOGASTRODUODENOSCOPY N/A 09/18/2023    Procedure: EGD (ESOPHAGOGASTRODUODENOSCOPY);  Surgeon: Basilia Villavicencio MD;  Location: [facility] ENDOSCOPY;  Service: Gastroenterology;  Laterality: N/A;  9.13 instr sent via portal [facility]  pre call complete, stated he would have a ride -HH    GLOSSECTOMY Right 2/23/2024    Procedure: GLOSSECTOMY;  Surgeon: Jeferson Ventura MD;  [facility];  Service: ENT;  Laterality: Right;    INSERTION, PEG TUBE Right 2/23/2024    Procedure: INSERTION, PEG TUBE;  Surgeon: Alpesh Wu MD;  [facility];  Service:  General;  Laterality: Right;    TONSILLECTOMY      TRACHEOTOMY N/A 2/23/2024    Procedure: TRACHEOTOMY;  Surgeon: Jeferson Ventura MD;  Location: [facility] OR [address] [place] [address];  Service: ENT;  Laterality: N/A;    TUMOR REMOVAL Right 2015    at [facility]    VASECTOMY      WOUND DEBRIDEMENT N/A 3/22/2024    Procedure: DEBRIDEMENT, WOUND;  Surgeon: Jeferson Ventura MD;  Location: [facility] OR [address] [place] [address];  Service: ENT;  Laterality: N/A;    WRIST FRACTURE SURGERY Right          Family History   Family History   Problem Relation Name Age of Onset    Arthritis Mother      COPD Brother      Psoriasis Neg Hx      Eczema Neg Hx             Social History   .  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                 Social Drivers of Health     Financial Resource Strain: Low Risk  (2/27/2025)    Overall Financial Resource Strain (CARDIA)     Difficulty of Paying Living Expenses: Not hard at all   Food Insecurity: No Food Insecurity (2/27/2025)    Hunger Vital Sign     Worried About Running Out of Food in the Last Year: Never true     Ran Out of Food in the Last Year: Never true   Transportation Needs: No Transportation Needs (2/27/2025)    PRAPARE - Transportation     Lack of Transportation (Medical): No     Lack of Transportation (Non-Medical): No   Physical Activity: Insufficiently Active (2/27/2025)    Exercise Vital Sign     Days of Exercise per Week: 4 days     Minutes of Exercise per Session: 30 min   Stress: No Stress Concern Present (2/27/2025)    [employer] [place] [employer] - Occupational Stress Questionnaire     Feeling of Stress : Only a little   Housing Stability: Low Risk  (2/27/2025)    Housing Stability Vital Sign     Unable to Pay for Housing in the Last Year: No     Number of Times Moved in the Last Year: 0     Homeless in the Last Year: No         Allergies   Review of patient's allergies indicates:  No Known Allergies        Physical Exam     Vitals:    05/29/25 0839   BP: 127/83         Body mass index is 21.89 kg/m².      General: AOx3, NAD   Respiratory:  Symmetric chest rise, normal effort   Oral Cavity:  Tongue absent. No lesions. Hard Palate WNL. No buccal or FOM lesions.  Oropharynx:  No masses/lesions of the posterior pharyngeal wall. Tonsillar fossa without lesions. Soft palate without masses. Midline uvula.   Neck: Well-healed neck scars.  No cervical lymphadenopathy, thyromegaly or thyroid nodules.  Normal range of motion.    Face: House Brackmann I bilaterally.     NP: No lesions of posterior wall  OP: No lesions of posterior wall or BOT. BOT is soft to palpation  Larynx: No lesions of glottic or supraglottic larynx. Normal vocal fold mobility   HP: No lesions of pyriform sinuses or postcricoid region  Mirror examination was performed.            Assessment/Plan  Problem List Items Addressed This Visit          Oncology    Tongue cancer - Primary    Doing well without using PEG tube.  He is likely intermittently aspirating on liquids but has robust enough to avoid a pneumonia.  His weight is relatively stable having lost 25 lb were then a 2 month period of only eating by mouth.  I am comfortable with him removing his PEG tube since I do feel that it would significantly improve his quality of life and he has proven that he can safely do without it.  He is aware that he may require PEG tube reinsertion if he develops an aspiration pneumonia or if he loses a significant amount of weight.  Return as scheduled for surveillance.

## 2025-06-04 DIAGNOSIS — K29.60 REFLUX GASTRITIS: Primary | ICD-10-CM

## 2025-06-04 RX ORDER — OMEPRAZOLE 40 MG/1
40 CAPSULE, DELAYED RELEASE ORAL DAILY
Qty: 90 CAPSULE | Refills: 3 | Status: SHIPPED | OUTPATIENT
Start: 2025-06-04 | End: 2026-06-04

## 2025-06-06 DIAGNOSIS — C02.9 TONGUE CANCER: Primary | ICD-10-CM

## 2025-06-10 ENCOUNTER — TELEPHONE (OUTPATIENT)
Dept: PHARMACY | Facility: CLINIC | Age: 70
End: 2025-06-10
Payer: MEDICARE

## 2025-06-10 NOTE — TELEPHONE ENCOUNTER
[facility]/Population Health Chart Review & Patient Outreach Details For Medication Adherence Project    Reason for Outreach Encounter: 3rd Party payor non-compliance report ([employer], [employer], C, etc)  2.  Patient Outreach Method: Reviewed Patient Chart  3.   Medication in question: rosuvastatin   LAST FILLED: 3/20/25 for 90 day supply  Hyperlipidemia Medications              ezetimibe (ZETIA) 10 mg tablet 1 tablet (10 mg total) by Per G Tube route once daily.    omega-3/dha/epa/dpa/fish oil (OMEGA-3 2100 ORAL) Take 1 capsule by mouth once daily.    rosuvastatin (CRESTOR) 20 MG tablet Take 1 tablet (20 mg total) by mouth once daily.               4.  Reviewed and or Updates Made To: Patient Chart  5. Outreach Outcomes and/or actions taken: Patient filled medication and is on track to be adherent

## 2025-06-16 ENCOUNTER — PATIENT MESSAGE (OUTPATIENT)
Dept: OTOLARYNGOLOGY | Facility: CLINIC | Age: 70
End: 2025-06-16
Payer: MEDICARE

## 2025-06-16 DIAGNOSIS — K29.60 REFLUX GASTRITIS: ICD-10-CM

## 2025-06-16 DIAGNOSIS — K11.7 PTYALISM: ICD-10-CM

## 2025-06-16 RX ORDER — SCOPOLAMINE 1 MG/3D
1 PATCH, EXTENDED RELEASE TRANSDERMAL
Qty: 10 PATCH | Refills: 3 | Status: SHIPPED | OUTPATIENT
Start: 2025-06-16

## 2025-06-16 RX ORDER — OMEPRAZOLE 40 MG/1
40 CAPSULE, DELAYED RELEASE ORAL DAILY
Qty: 90 CAPSULE | Refills: 3 | Status: SHIPPED | OUTPATIENT
Start: 2025-06-16 | End: 2026-06-16

## 2025-06-18 DIAGNOSIS — C02.9 TONGUE CANCER: Primary | ICD-10-CM

## 2025-06-20 ENCOUNTER — HOSPITAL ENCOUNTER (OUTPATIENT)
Dept: RADIOLOGY | Facility: HOSPITAL | Age: 70
Discharge: HOME | End: 2025-06-20
Attending: OTOLARYNGOLOGY
Payer: MEDICARE

## 2025-06-20 ENCOUNTER — OFFICE VISIT (OUTPATIENT)
Dept: INTERNAL MEDICINE | Facility: CLINIC | Age: 70
End: 2025-06-20
Payer: MEDICARE

## 2025-06-20 VITALS
HEART RATE: 57 BPM | OXYGEN SATURATION: 95 % | HEIGHT: 71 IN | DIASTOLIC BLOOD PRESSURE: 62 MMHG | BODY MASS INDEX: 21.32 KG/M2 | SYSTOLIC BLOOD PRESSURE: 102 MMHG | WEIGHT: 152.31 LBS

## 2025-06-20 DIAGNOSIS — E78.5 HYPERLIPIDEMIA, UNSPECIFIED HYPERLIPIDEMIA TYPE: ICD-10-CM

## 2025-06-20 DIAGNOSIS — F41.9 ANXIETY: Primary | ICD-10-CM

## 2025-06-20 DIAGNOSIS — K14.8 OTHER DISEASES OF TONGUE: ICD-10-CM

## 2025-06-20 DIAGNOSIS — Z87.891 PERSONAL HISTORY OF NICOTINE DEPENDENCE: ICD-10-CM

## 2025-06-20 DIAGNOSIS — C02.9 TONGUE CANCER: ICD-10-CM

## 2025-06-20 DIAGNOSIS — Z12.5 ENCOUNTER FOR SCREENING FOR MALIGNANT NEOPLASM OF PROSTATE: ICD-10-CM

## 2025-06-20 DIAGNOSIS — Z00.00 ANNUAL PHYSICAL EXAM: ICD-10-CM

## 2025-06-20 DIAGNOSIS — R63.4 WEIGHT LOSS: ICD-10-CM

## 2025-06-20 PROBLEM — Z93.0 STATUS POST TRACHEOSTOMY: Status: RESOLVED | Noted: 2024-02-23 | Resolved: 2025-06-20

## 2025-06-20 PROCEDURE — 71046 X-RAY EXAM CHEST 2 VIEWS: CPT | Mod: TC

## 2025-06-20 PROCEDURE — 74018 RADEX ABDOMEN 1 VIEW: CPT | Mod: 26,,, | Performed by: RADIOLOGY

## 2025-06-20 PROCEDURE — 71046 X-RAY EXAM CHEST 2 VIEWS: CPT | Mod: 26,,, | Performed by: RADIOLOGY

## 2025-06-20 PROCEDURE — 74018 RADEX ABDOMEN 1 VIEW: CPT | Mod: TC

## 2025-06-20 PROCEDURE — 99999 PR PBB SHADOW E&M-EST. PATIENT-LVL III: CPT | Mod: PBBFAC,GC,,

## 2025-06-20 RX ORDER — HYDROXYZINE HYDROCHLORIDE 25 MG/1
25 TABLET, FILM COATED ORAL 3 TIMES DAILY PRN
Qty: 30 TABLET | Refills: 3 | Status: SHIPPED | OUTPATIENT
Start: 2025-06-20

## 2025-06-20 NOTE — PROGRESS NOTES
[facility]  RESIDENT CONTINUITY CLINIC NOTE    Name: Timothy Galdamez  : 1955  MRN: [id number]  Date of Service: 2025   PCP: Tee Thomas MD          HPI:           Timothy Galdamez is a 70 y.o. male with HTN, HLD, tongue SCC s/p subtotal glossectomy and PEG tube recently removed, L subclavian artery stenosis who presents to clinic for annual visit. Patient of Dr. Thomas.    Hx SCC of the tongue with metastatic disease to R neck. S/p subtotal glossectomy and radiation in . Recent PET scan has been clear. Had PEG tube placed, now removed. Eating liquids with protein shake supplements.     Seen by cardiology for L subclavian artery stenosis given large discrepancies in BP between arms, asymptomatic with no weakness, dizziness, or other symptoms related to subclavian steal. No high grade stenosis on CTA. Possibly related to atherosclerosis vs prior radiation to L neck. Cardiology recommended continuing statin, zetia, and asa. Following every 6 months.     HTN-- needs to take BP in R arm due to L subclavian artery stenosis, well controlled, at home usually 120-130s/80s-90    Some constipation, about to start miralax     HLD-- on crestor 20mg and zetia  The 10-year ASCVD risk score (Zari MILLAN, et al., 2019) is: 9.6%    Values used to calculate the score:      Age: 70 years      Sex: Male      Is Non- : No      Diabetic: No      Tobacco smoker: No      Systolic Blood Pressure: 102 mmHg      Is BP treated: No      HDL Cholesterol: 58 mg/dL      Total Cholesterol: 134 mg/dL     Screenings:  Tobacco: The pt quit smoking in .   Colon CA: Most recent screening: Last Colonoscopy completed on 2017. Return in .   Prostate CA: Most recent PSA:   Lab Results   Component Value Date    PSA 0.70 2023    PSA 0.46 06/15/2021    PSA 0.53 2020   PSA levels every 2 years starting at age 50 in average-risk individuals, or at age  "40-45 in those at high risk (including  Americans; FHx of prostate CA, divya in relatives younger than 65; and pts known or likely to have the BRCA1 or BRCA2 mutations).  AAA: All ever-smoker (100+ cigarettes over entire lifetime) males aged 65-75 should have one-time ultrasound eval. Previous screen not done.  HIV and HepC: She has been previously screened for it.    Immunizations:  Influenza every year: last received 2023  TDaP every 10 years: last received 2016  Recombinant zoster virus (Shingrix) twice after age 50: pt has had it.  Pneumococcal vaccine: all adults >65 or adults age 19-64 with certain underlying medical conditions. Pt has received it.  Covid x4  RSV 10/2023    Social screening:  Depression/Anxiety: All adults should be screened using [employer] Patient Health Questionaire and JOSEFINA-7.  He reports a good mood.  Substance abuse: Alcohol: occasionally once a week      Review of systems as above in HPI; all unmentioned systems are negative.      PEX:     Vitals:    06/20/25 0819   BP: 102/62   BP Location: Left arm   Patient Position: Sitting   Pulse: (!) 57   SpO2: 95%   Weight: 69.1 kg (152 lb 5.4 oz)   Height: 5' 11" (1.803 m)     Body mass index is 21.25 kg/m².    Physical Exam  Vitals reviewed.   Constitutional:       General: He is not in acute distress.     Appearance: He is not ill-appearing or toxic-appearing.   HENT:      Head: Normocephalic and atraumatic.   Eyes:      General: No scleral icterus.  Cardiovascular:      Rate and Rhythm: Normal rate and regular rhythm.      Heart sounds: Normal heart sounds. No murmur heard.  Pulmonary:      Effort: Pulmonary effort is normal. No respiratory distress.      Breath sounds: No wheezing or rales.   Abdominal:      General: Abdomen is flat. There is no distension.   Musculoskeletal:      Right lower leg: No edema.      Left lower leg: No edema.   Skin:     General: Skin is warm.   Neurological:      Mental Status: He is alert and oriented to " person, place, and time. Mental status is at baseline.      Motor: No weakness.            Other pertinent data from chart that formed part of my MDM:           Current Outpatient Medications   Medication Instructions    ALPRAZolam (XANAX) 0.5 mg, Oral, 3 times daily    ascorbic acid (vitamin C) (VITAMIN C) 100 mg, Daily    aspirin (ECOTRIN) 81 mg, Daily    ergocalciferol, vitamin D2, (VITAMIN D ORAL) 1 tablet, Daily    ezetimibe (ZETIA) 10 mg, Per G Tube, Daily    hydrOXYzine HCL (ATARAX) 25 mg, Oral, 3 times daily PRN    methylPREDNISolone (MEDROL DOSEPACK) 4 mg tablet Use as directed    NON FORMULARY MEDICATION Prevegan once daily    omega-3/dha/epa/dpa/fish oil (OMEGA-3 2100 ORAL) 1 capsule, Daily    omeprazole (PRILOSEC) 40 mg, Oral, Daily    rosuvastatin (CRESTOR) 20 mg, Oral, Daily    scopolamine (TRANSDERM-SCOP) 1.3-1.5 mg (1 mg over 3 days) 1 patch, Transdermal, Every 72 hours      Past Medical History:   Diagnosis Date    Acute on chronic blood loss anemia 3/19/2024    Cancer     Hyperlipidemia     Hypertension      Past Surgical History:   Procedure Laterality Date    ANKLE SURGERY      L ankle    BIOPSY OF TISSUE OF NECK Left 2013    COLONOSCOPY N/A 08/21/2017    Procedure: COLONOSCOPY;  Surgeon: Danny Jane MD;  [facility]: [facility] (4TH FLR);  Service: Endoscopy;  Laterality: N/A;  Do not cancel this order    CREATION OF MUSCLE ROTATIONAL FLAP N/A 2/23/2024    Procedure: CREATION, FLAP, MUSCLE ROTATION;  Surgeon: Jeferson Ventura MD;  [facility];  Service: ENT;  Laterality: N/A;    DIRECT LARYNGOBRONCHOSCOPY N/A 12/11/2023    Procedure: LARYNGOSCOPY, DIRECT, WITH BRONCHOSCOPY;  Surgeon: Micaela Poole MD;  Location: [facility] OR 2ND FLR;  Service: ENT;  Laterality: N/A;    DISSECTION OF NECK Bilateral 2/23/2024    Procedure: DISSECTION, NECK;  Surgeon: Jeferson Ventura MD;  Location: [facility] OR 2ND FLR;  Service: ENT;  Laterality: Bilateral;    EMBOLIZATION N/A 3/20/2024     Procedure: EMBOLIZATION, BLOOD VESSEL;  Surgeon: Edith Jasso;  Location: [facility];  Service: Anesthesiology;  Laterality: N/A;  lingual artery embo    ESOPHAGOGASTRODUODENOSCOPY N/A 2023    Procedure: EGD (ESOPHAGOGASTRODUODENOSCOPY);  Surgeon: Basilia Villavicencio MD;  Location: [facility] END[facility];  Service: Gastroenterology;  Laterality: N/A;  9.13 instr sent via portal [facility]  pre call complete, stated he would have a ride -HH    GLOSSECTOMY Right 2024    Procedure: GLOSSECTOMY;  Surgeon: Jeferson Ventura MD;  Location: [facility] FLR;  Service: ENT;  Laterality: Right;    INSERTION, PEG TUBE Right 2024    Procedure: INSERTION, PEG TUBE;  Surgeon: Alpesh Wu MD;  [facility];  Service: General;  Laterality: Right;    TONSILLECTOMY      TRACHEOTOMY N/A 2024    Procedure: TRACHEOTOMY;  Surgeon: Jeferson Ventura MD;  [facility];  Service: ENT;  Laterality: N/A;    TUMOR REMOVAL Right     at EJ    VASECTOMY      WOUND DEBRIDEMENT N/A 3/22/2024    Procedure: DEBRIDEMENT, WOUND;  Surgeon: Jeferson Ventura MD;  [facility];  Service: ENT;  Laterality: N/A;    WRIST FRACTURE SURGERY Right      Family History   Problem Relation Name Age of Onset    Arthritis Mother      COPD Brother      Psoriasis Neg Hx      Eczema Neg Hx       Social History     Socioeconomic History    Marital status:    Tobacco Use    Smoking status: Former     Current packs/day: 0.00     Types: Cigarettes     Quit date:      Years since quittin.4    Smokeless tobacco: Never    Tobacco comments:     Quit in    Substance and Sexual Activity    Alcohol use: Yes     Alcohol/week: 7.0 - 8.0 standard drinks of alcohol     Types: 3 Glasses of wine, 4 - 5 Standard drinks or equivalent per week    Drug use: No   Social History Narrative    Single, CPA, no tobacco, 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                 Social Drivers of  Health     Financial Resource Strain: Low Risk  (2/27/2025)    Overall Financial Resource Strain (CARDIA)     Difficulty of Paying Living Expenses: Not hard at all   Food Insecurity: No Food Insecurity (2/27/2025)    Hunger Vital Sign     Worried About Running Out of Food in the Last Year: Never true     Ran Out of Food in the Last Year: Never true   Transportation Needs: No Transportation Needs (2/27/2025)    PRAPARE - Transportation     Lack of Transportation (Medical): No     Lack of Transportation (Non-Medical): No   Physical Activity: Insufficiently Active (2/27/2025)    Exercise Vital Sign     Days of Exercise per Week: 4 days     Minutes of Exercise per Session: 30 min   Stress: No Stress Concern Present (2/27/2025)    [employer] [place] [employer] - Occupational Stress Questionnaire     Feeling of Stress : Only a little   Housing Stability: Low Risk  (2/27/2025)    Housing Stability Vital Sign     Unable to Pay for Housing in the Last Year: No     Number of Times Moved in the Last Year: 0     Homeless in the Last Year: No       Labs: Previous labs reviewed.    Lab Results   Component Value Date    WBC 6.23 03/25/2024    HGB 8.1 (L) 03/25/2024    HCT 25.9 (L) 03/25/2024    MCV 95 03/25/2024     03/25/2024         Lab Results   Component Value Date     02/24/2025    K 4.4 02/24/2025     02/24/2025    CO2 28 02/24/2025    GLU 94 02/24/2025    BUN 14 02/24/2025    CREATININE 0.9 02/24/2025    CALCIUM 9.3 02/24/2025    PROT 6.6 02/24/2025    ALBUMIN 3.8 02/24/2025    BILITOT 0.5 02/24/2025    ALKPHOS 53 02/24/2025    AST 25 02/24/2025    ALT 20 02/24/2025    ANIONGAP 7 (L) 02/24/2025    EGFRNORACEVR >60.0 02/24/2025       Imaging: Previous imaging reviewed.     Assessment and Plan:       Anxiety  Discussed stopping Xanax and have atarax as alternative PRN for anxiety.   -     hydrOXYzine HCL (ATARAX) 25 MG tablet; Take 1 tablet (25 mg total) by mouth 3 (three) times daily as needed  for Anxiety.  Dispense: 30 tablet; Refill: 3    Encounter for annual exam  -     hydrOXYzine HCL (ATARAX) 25 MG tablet; Take 1 tablet (25 mg total) by mouth 3 (three) times daily as needed for Anxiety.  Dispense: 30 tablet; Refill: 3  -     PSA, Screening; Future; Expected date: 06/20/2025  -     TSH; Future; Expected date: 06/20/2025  -     CBC Auto Differential; Future; Expected date: 06/20/2025  -     Lipid Panel; Future; Expected date: 06/20/2025    Personal history of nicotine dependence  Former smoker. Never had AAA screening.  -     US Abdominal Aorta; Future; Expected date: 06/20/2025    Encounter for screening for malignant neoplasm of prostate  -     PSA, Screening; Future; Expected date: 06/20/2025    Weight loss  -     TSH; Future; Expected date: 06/20/2025    Other diseases of tongue  -     CBC Auto Differential; Future; Expected date: 06/20/2025    Hyperlipidemia, unspecified hyperlipidemia type  -     Lipid Panel; Future; Expected date: 06/20/2025        Preventative Health: UTD    RTC in 6 months for PCP follow up      Discussed with Dr. Bagley, further recommendations as per attending addendum. Please feel free to call with any questions or concerns.    Visit today included increased complexity associated with the care of the episodic problem addressed and managing the longitudinal care of the patient due to the serious and/or complex managed problem(s).    Lacey Pinedo MD  Resident Continuity Clinic, PGY-II  [facility]  [address] [place] [address]  [phone]

## 2025-06-23 ENCOUNTER — RESULTS FOLLOW-UP (OUTPATIENT)
Dept: OTOLARYNGOLOGY | Facility: CLINIC | Age: 70
End: 2025-06-23

## 2025-06-28 DIAGNOSIS — E78.49 OTHER HYPERLIPIDEMIA: Primary | ICD-10-CM

## 2025-06-30 RX ORDER — EZETIMIBE 10 MG/1
10 TABLET ORAL DAILY
Qty: 90 TABLET | Refills: 3 | Status: SHIPPED | OUTPATIENT
Start: 2025-06-30 | End: 2026-06-30

## 2025-06-30 NOTE — TELEPHONE ENCOUNTER
Dane,    This is Stewart with ENT. I'm not sure why this medication is under my name (Zetia) but I feel that it would be more appropriate for his PCP to handle the refills moving forward. Would this be ok? I'm sorry for the message.     Thanks    Stewart Dolan MD  ENT PGY4

## 2025-07-09 ENCOUNTER — LAB VISIT (OUTPATIENT)
Dept: LAB | Facility: HOSPITAL | Age: 70
End: 2025-07-09
Attending: INTERNAL MEDICINE
Payer: MEDICARE

## 2025-07-09 DIAGNOSIS — E78.2 MIXED HYPERLIPIDEMIA: ICD-10-CM

## 2025-07-09 DIAGNOSIS — Z12.5 ENCOUNTER FOR SCREENING FOR MALIGNANT NEOPLASM OF PROSTATE: ICD-10-CM

## 2025-07-09 DIAGNOSIS — E78.5 HYPERLIPIDEMIA, UNSPECIFIED HYPERLIPIDEMIA TYPE: ICD-10-CM

## 2025-07-09 DIAGNOSIS — R63.4 WEIGHT LOSS: ICD-10-CM

## 2025-07-09 DIAGNOSIS — K14.8 OTHER DISEASES OF TONGUE: ICD-10-CM

## 2025-07-09 LAB
ABSOLUTE EOSINOPHIL (OHS): 0.22 K/UL
ABSOLUTE MONOCYTE (OHS): 0.49 K/UL (ref 0.3–1)
ABSOLUTE NEUTROPHIL COUNT (OHS): 1.89 K/UL (ref 1.8–7.7)
ALBUMIN SERPL BCP-MCNC: 3.4 G/DL (ref 3.5–5.2)
ALP SERPL-CCNC: 64 UNIT/L (ref 40–150)
ALT SERPL W/O P-5'-P-CCNC: 17 UNIT/L (ref 10–44)
ANION GAP (OHS): 6 MMOL/L (ref 8–16)
AST SERPL-CCNC: 21 UNIT/L (ref 11–45)
BASOPHILS # BLD AUTO: 0.04 K/UL
BASOPHILS NFR BLD AUTO: 1.2 %
BILIRUB SERPL-MCNC: 0.5 MG/DL (ref 0.1–1)
BUN SERPL-MCNC: 9 MG/DL (ref 8–23)
CALCIUM SERPL-MCNC: 9.6 MG/DL (ref 8.7–10.5)
CHLORIDE SERPL-SCNC: 104 MMOL/L (ref 95–110)
CHOLEST SERPL-MCNC: 106 MG/DL (ref 120–199)
CHOLEST/HDLC SERPL: 2.5 {RATIO} (ref 2–5)
CO2 SERPL-SCNC: 28 MMOL/L (ref 23–29)
CREAT SERPL-MCNC: 1 MG/DL (ref 0.5–1.4)
ERYTHROCYTE [DISTWIDTH] IN BLOOD BY AUTOMATED COUNT: 12.8 % (ref 11.5–14.5)
GFR SERPLBLD CREATININE-BSD FMLA CKD-EPI: >60 ML/MIN/1.73/M2
GLUCOSE SERPL-MCNC: 87 MG/DL (ref 70–110)
HCT VFR BLD AUTO: 39.4 % (ref 40–54)
HDLC SERPL-MCNC: 43 MG/DL (ref 40–75)
HDLC SERPL: 40.6 % (ref 20–50)
HGB BLD-MCNC: 13.1 GM/DL (ref 14–18)
IMM GRANULOCYTES # BLD AUTO: 0 K/UL (ref 0–0.04)
IMM GRANULOCYTES NFR BLD AUTO: 0 % (ref 0–0.5)
LDLC SERPL CALC-MCNC: 50.8 MG/DL (ref 63–159)
LYMPHOCYTES # BLD AUTO: 0.68 K/UL (ref 1–4.8)
MCH RBC QN AUTO: 31.5 PG (ref 27–31)
MCHC RBC AUTO-ENTMCNC: 33.2 G/DL (ref 32–36)
MCV RBC AUTO: 95 FL (ref 82–98)
NONHDLC SERPL-MCNC: 63 MG/DL
NUCLEATED RBC (/100WBC) (OHS): 0 /100 WBC
PLATELET # BLD AUTO: 205 K/UL (ref 150–450)
PMV BLD AUTO: 12.7 FL (ref 9.2–12.9)
POTASSIUM SERPL-SCNC: 4.1 MMOL/L (ref 3.5–5.1)
PROT SERPL-MCNC: 6.3 GM/DL (ref 6–8.4)
PSA SERPL-MCNC: 0.54 NG/ML
RBC # BLD AUTO: 4.16 M/UL (ref 4.6–6.2)
RELATIVE EOSINOPHIL (OHS): 6.6 %
RELATIVE LYMPHOCYTE (OHS): 20.5 % (ref 18–48)
RELATIVE MONOCYTE (OHS): 14.8 % (ref 4–15)
RELATIVE NEUTROPHIL (OHS): 56.9 % (ref 38–73)
SODIUM SERPL-SCNC: 138 MMOL/L (ref 136–145)
TRIGL SERPL-MCNC: 61 MG/DL (ref 30–150)
TSH SERPL-ACNC: 2.21 UIU/ML (ref 0.4–4)
WBC # BLD AUTO: 3.32 K/UL (ref 3.9–12.7)

## 2025-07-09 PROCEDURE — 36415 COLL VENOUS BLD VENIPUNCTURE: CPT | Mod: HCNC,PO

## 2025-07-09 PROCEDURE — 80061 LIPID PANEL: CPT | Mod: HCNC

## 2025-07-09 PROCEDURE — 85025 COMPLETE CBC W/AUTO DIFF WBC: CPT | Mod: HCNC

## 2025-07-09 PROCEDURE — 84443 ASSAY THYROID STIM HORMONE: CPT | Mod: HCNC

## 2025-07-09 PROCEDURE — 84153 ASSAY OF PSA TOTAL: CPT | Mod: HCNC

## 2025-07-09 PROCEDURE — 82040 ASSAY OF SERUM ALBUMIN: CPT | Mod: HCNC

## 2025-07-16 ENCOUNTER — OFFICE VISIT (OUTPATIENT)
Dept: CARDIOLOGY | Facility: CLINIC | Age: 70
End: 2025-07-16
Payer: MEDICARE

## 2025-07-16 VITALS
SYSTOLIC BLOOD PRESSURE: 114 MMHG | BODY MASS INDEX: 20.82 KG/M2 | DIASTOLIC BLOOD PRESSURE: 84 MMHG | WEIGHT: 153.69 LBS | HEART RATE: 52 BPM | HEIGHT: 72 IN

## 2025-07-16 DIAGNOSIS — I25.10 CORONARY ARTERY CALCIFICATION: ICD-10-CM

## 2025-07-16 DIAGNOSIS — R79.9 ABNORMAL FINDING OF BLOOD CHEMISTRY, UNSPECIFIED: ICD-10-CM

## 2025-07-16 DIAGNOSIS — I65.29 OCCLUSION AND STENOSIS OF UNSPECIFIED CAROTID ARTERY: ICD-10-CM

## 2025-07-16 DIAGNOSIS — I10 HYPERTENSION, ESSENTIAL: ICD-10-CM

## 2025-07-16 DIAGNOSIS — I77.1 SUBCLAVIAN ARTERY STENOSIS, LEFT: Primary | ICD-10-CM

## 2025-07-16 DIAGNOSIS — I70.0 AORTIC ATHEROSCLEROSIS: ICD-10-CM

## 2025-07-16 PROCEDURE — 1126F AMNT PAIN NOTED NONE PRSNT: CPT | Mod: CPTII,HCNC,S$GLB, | Performed by: INTERNAL MEDICINE

## 2025-07-16 PROCEDURE — 3079F DIAST BP 80-89 MM HG: CPT | Mod: CPTII,HCNC,S$GLB, | Performed by: INTERNAL MEDICINE

## 2025-07-16 PROCEDURE — 1101F PT FALLS ASSESS-DOCD LE1/YR: CPT | Mod: CPTII,HCNC,S$GLB, | Performed by: INTERNAL MEDICINE

## 2025-07-16 PROCEDURE — 3008F BODY MASS INDEX DOCD: CPT | Mod: CPTII,HCNC,S$GLB, | Performed by: INTERNAL MEDICINE

## 2025-07-16 PROCEDURE — 3074F SYST BP LT 130 MM HG: CPT | Mod: CPTII,HCNC,S$GLB, | Performed by: INTERNAL MEDICINE

## 2025-07-16 PROCEDURE — 99999 PR PBB SHADOW E&M-EST. PATIENT-LVL III: CPT | Mod: PBBFAC,HCNC,, | Performed by: INTERNAL MEDICINE

## 2025-07-16 PROCEDURE — 3288F FALL RISK ASSESSMENT DOCD: CPT | Mod: CPTII,HCNC,S$GLB, | Performed by: INTERNAL MEDICINE

## 2025-07-16 PROCEDURE — 99215 OFFICE O/P EST HI 40 MIN: CPT | Mod: HCNC,S$GLB,, | Performed by: INTERNAL MEDICINE

## 2025-07-16 PROCEDURE — 1159F MED LIST DOCD IN RCRD: CPT | Mod: CPTII,HCNC,S$GLB, | Performed by: INTERNAL MEDICINE

## 2025-07-16 NOTE — PROGRESS NOTES
"[facility]      Chief Complaint   Patient presents with    mixed hyperlipidemia    subclavian artery stenosis       Patient ID: Timothy Galdamez is a 70 y.o. male with HTN, history of squamous cell CA s/p XRT (2015), overweight, former smoker, who presents for a follow up appointment.  Pertinent history/events are as follows:     -Pt kindly referred by Dr. Wilian Dudley for evaluation of subclavian artery stenosis (per his note on 9/22/2023):  "Noted difference in BP between his two arms. Reviewed the BP log that he provided to Dr. Lanier. BP fluctuates with as much as a 30 mm Hg difference between the 2 arms - higher on the right side.  He had radiation therapy to the right side of his neck but his blood pressure is lower on the contralateral side.  Through reasonable to perform an ultrasound to rule out subclavian artery stenosis.  He does not have any symptoms suggestive of subclavian steal."    -At our initial clinic visit on 10/30/2023, Mr. Galdamez reports no upper extremity weakness, no dizziness, or other symptoms.  BUE Arterial Ultrasound on 10/19/2023 revealed right subclavian artery with elevated velocity suggestive of greater than 50% stenosis.  Normal wrist brachial index bilaterally.   Plan:  Left Subclavian artery stenosis- Pt is asymptomatic with no LUE weakness, dizziness, or other symptoms related to subclavian steal.  Etiologies include atherosclerosis and prior radiation exposure.  Check CTA neck to evaluate further. Continue rosuvastatin 20 mg daily, zetia 10 mg daily, and ASA 81 mg daily.    HTN- Continue current medications.   Overweight- Encourage diet, exercise, and weight loss.    -At follow up clinic visit on 8/28/2024, Mr. Galdamez reported no upper extremity weakness, no dizziness, or other symptoms. CTA Neck on 11/13/2023 revealed no evidence of significant subclavian stenosis as clinically questioned. Atherosclerotic disease with mixed calcified noncalcified plaque at the carotid " bifurcations right greater than left and left common carotid artery.  No high-grade stenosis by NASCET criteria.  The estimated stenosis is less than 50%.   Nodular enhancing lesion at the base of the tongue right.  He is status post subtotal glossectomy and pec flap reconstruction in March, 2024.    Plan:  Left Subclavian artery stenosis- Pt is asymptomatic with no LUE weakness, dizziness, or other symptoms related to subclavian steal.  Etiologies include atherosclerosis and prior radiation exposure.  CTA Neck on 11/13/2023 revealed no evidence of significant subclavian stenosis as clinically questioned. Atherosclerotic disease with mixed calcified noncalcified plaque at the carotid bifurcations right greater than left and left common carotid artery.  No high-grade stenosis by NASCET criteria.  The estimated stenosis is less than 50%. Continue rosuvastatin 20 mg daily, zetia 10 mg daily, and ASA 81 mg daily.    HTN- Continue current medications.   HLD- The 10-year ASCVD risk score (Zari MILLAN, et al., 2019) is: 17.1%.  Restart rosuvastatin 20 mg daily and continue zetia 10 mg daily.  Check LPa.    2/24/2025 clinic visit: Mr. Rudolph reports no chest pain, SOB, upper extremity weakness, no dizziness, or other symptoms. Labs from 2/17/2025 with mildly elevated potassium of 5.6 and bicarb of 30. LDL 67 on 2/17/2025.   Plan:  Left Subclavian artery stenosis- Pt is asymptomatic with no LUE weakness, dizziness, or other symptoms related to subclavian steal.  Etiologies include atherosclerosis and prior radiation exposure.  CTA Neck on 11/13/2023 revealed no evidence of significant subclavian stenosis as clinically questioned. Atherosclerotic disease with mixed calcified noncalcified plaque at the carotid bifurcations right greater than left and left common carotid artery.  No high-grade stenosis by NASCET criteria.  The estimated stenosis is less than 50%.  LDL 67 on 2/17/2025. Continue rosuvastatin 20 mg daily, zetia 10  mg daily, and ASA 81 mg daily.    HTN- Continue current medications.   HLD- The 10-year ASCVD risk score (Zari DK, et al., 2019) is: 17.1%.   LDL 67 on 2/17/2025. Continue rosuvastatin 20 mg daily and zetia 10 mg daily.    Elevated potassium and bicarbonate with low anion gap- Etiology is unclear. Given low anion gap of 5, this is suggestive of GI or renal losses. Check repeat cmp today. Refer to GI and Nephrology for further evaluation.     HPI:  Mr. Galdamez reports no chest pain, SOB, upper extremity weakness, dizziness, or other symptoms. LDL 51 on 7/9/2025. CMP is stable with K and creatinine within normal limits.     Past Medical History:   Diagnosis Date    Acute on chronic blood loss anemia 3/19/2024    Cancer     Hyperlipidemia     Hypertension      Past Surgical History:   Procedure Laterality Date    ANKLE SURGERY      L ankle    BIOPSY OF TISSUE OF NECK Left 2013    COLONOSCOPY N/A 08/21/2017    Procedure: COLONOSCOPY;  Surgeon: Danny Jane MD;  Location: [facility] (4TH FLR);  Service: Endoscopy;  Laterality: N/A;  Do not cancel this order    CREATION OF MUSCLE ROTATIONAL FLAP N/A 2/23/2024    Procedure: CREATION, FLAP, MUSCLE ROTATION;  Surgeon: Jeferson Ventura MD;  [facility];  Service: ENT;  Laterality: N/A;    DIRECT LARYNGOBRONCHOSCOPY N/A 12/11/2023    Procedure: LARYNGOSCOPY, DIRECT, WITH BRONCHOSCOPY;  Surgeon: Micaela Poole MD;  [facility];  Service: ENT;  Laterality: N/A;    DISSECTION OF NECK Bilateral 2/23/2024    Procedure: DISSECTION, NECK;  Surgeon: Jeferson Ventura MD;  [facility];  Service: ENT;  Laterality: Bilateral;    EMBOLIZATION N/A 3/20/2024    Procedure: EMBOLIZATION, BLOOD VESSEL;  Surgeon: Edith Jasso;  Location: [facility] EDITH;  Service: Anesthesiology;  Laterality: N/A;  lingual artery embo    ESOPHAGOGASTRODUODENOSCOPY N/A 09/18/2023    Procedure: EGD (ESOPHAGOGASTRODUODENOSCOPY);  Surgeon: Basilia Villavicencio,  MD;  Location: [facility] END[facility];  Service: Gastroenterology;  Laterality: N/A;  9.13 instr sent via portal [facility]  pre call complete, stated he would have a ride -HH    GLOSSECTOMY Right 2024    Procedure: GLOSSECTOMY;  Surgeon: Jeferson Ventura MD;  [facility];  Service: ENT;  Laterality: Right;    INSERTION, PEG TUBE Right 2024    Procedure: INSERTION, PEG TUBE;  Surgeon: Alpesh Wu MD;  [facility];  Service: General;  Laterality: Right;    TONSILLECTOMY      TRACHEOTOMY N/A 2024    Procedure: TRACHEOTOMY;  Surgeon: Jeferson Ventura MD;  [facility];  Service: ENT;  Laterality: N/A;    TUMOR REMOVAL Right     at     VASECTOMY      WOUND DEBRIDEMENT N/A 3/22/2024    Procedure: DEBRIDEMENT, WOUND;  Surgeon: Jeferson Ventura MD;  [facility];  Service: ENT;  Laterality: N/A;    WRIST FRACTURE SURGERY Right      Social History     Socioeconomic History    Marital status:    Tobacco Use    Smoking status: Former     Current packs/day: 0.00     Types: Cigarettes     Quit date:      Years since quittin.5    Smokeless tobacco: Never    Tobacco comments:     Quit in    Substance and Sexual Activity    Alcohol use: Yes     Alcohol/week: 7.0 - 8.0 standard drinks of alcohol     Types: 3 Glasses of wine, 4 - 5 Standard drinks or equivalent per week    Drug use: No   Social History Narrative    Single, CPA, no tobacco, 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                 Social Drivers of Health     Financial Resource Strain: Low Risk  (2025)    Overall Financial Resource Strain (CARDIA)     Difficulty of Paying Living Expenses: Not hard at all   Food Insecurity: No Food Insecurity (2025)    Hunger Vital Sign     Worried About Running Out of Food in the Last Year: Never true     Ran Out of Food in the Last Year: Never true   Transportation Needs: No Transportation Needs (2025)     PRAPARE - Transportation     Lack of Transportation (Medical): No     Lack of Transportation (Non-Medical): No   Physical Activity: Insufficiently Active (2/27/2025)    Exercise Vital Sign     Days of Exercise per Week: 4 days     Minutes of Exercise per Session: 30 min   Stress: No Stress Concern Present (2/27/2025)    [employer] - Occupational Stress Questionnaire     Feeling of Stress : Only a little   Housing Stability: Low Risk  (2/27/2025)    Housing Stability Vital Sign     Unable to Pay for Housing in the Last Year: No     Number of Times Moved in the Last Year: 0     Homeless in the Last Year: No     Family History   Problem Relation Name Age of Onset    Arthritis Mother      COPD Brother      Psoriasis Neg Hx      Eczema Neg Hx         Review of patient's allergies indicates:  No Known Allergies    Medication List with Changes/Refills   Current Medications    ASCORBIC ACID, VITAMIN C, (VITAMIN C) 100 MG TABLET    Take 100 mg by mouth once daily.    ASPIRIN (ECOTRIN) 81 MG EC TABLET    Take 81 mg by mouth once daily.    ERGOCALCIFEROL, VITAMIN D2, (VITAMIN D ORAL)    Take 1 tablet by mouth once daily.    EZETIMIBE (ZETIA) 10 MG TABLET    1 tablet (10 mg total) by Per G Tube route once daily.    HYDROXYZINE HCL (ATARAX) 25 MG TABLET    Take 1 tablet (25 mg total) by mouth 3 (three) times daily as needed for Anxiety.    NON FORMULARY MEDICATION    Prevegan once daily    OMEPRAZOLE (PRILOSEC) 40 MG CAPSULE    Take 1 capsule (40 mg total) by mouth once daily.    ROSUVASTATIN (CRESTOR) 20 MG TABLET    Take 1 tablet (20 mg total) by mouth once daily.    SCOPOLAMINE (TRANSDERM-SCOP) 1.3-1.5 MG (1 MG OVER 3 DAYS)    Place 1 patch onto the skin every 72 hours.   Discontinued Medications    ALPRAZOLAM (XANAX) 0.5 MG TABLET    Take 1 tablet (0.5 mg total) by mouth 3 (three) times daily.    METHYLPREDNISOLONE (MEDROL DOSEPACK) 4 MG TABLET    Use as directed    OMEGA-3/DHA/EPA/DPA/FISH OIL  (OMEGA-3 2100 ORAL)    Take 1 capsule by mouth once daily.       Review of Systems  Constitution: Denies chills, fever, and sweats.  HENT: Denies headaches or blurry vision.  Cardiovascular: Denies chest pain or irregular heart beat.  Respiratory: Denies cough or shortness of breath.  Gastrointestinal: Denies abdominal pain, nausea, or vomiting.  Musculoskeletal: Denies muscle cramps.  Neurological: Denies dizziness or focal weakness.  Psychiatric/Behavioral: Normal mental status.  Hematologic/Lymphatic: Denies bleeding problem or easy bruising/bleeding.  Skin: Denies rash or suspicious lesions    Physical Examination  /84   Pulse (!) 52   Ht 6' (1.829 m)   Wt 69.7 kg (153 lb 10.6 oz)   BMI 20.84 kg/m²     Constitutional: No acute distress, conversant  HEENT: Sclera anicteric, Pupils equal, round and reactive to light, extraocular motions intact, Oropharynx clear  Neck: No JVD, no carotid bruits  Cardiovascular: regular rate and rhythm, no murmur, rubs or gallops, normal S1/S2  Pulmonary: Clear to auscultation bilaterally  Abdominal: Abdomen soft, nontender, nondistended, positive bowel sounds  Extremities: No lower extremity edema,   Skin: No ecchymosis, erythema, or ulcers  Psych: Alert and oriented x 3, appropriate affect  Neuro: CNII-XII intact, no focal deficits    Labs:  Most Recent Data  CBC:   Lab Results   Component Value Date    WBC 3.32 (L) 07/09/2025    HGB 13.1 (L) 07/09/2025    HCT 39.4 (L) 07/09/2025     07/09/2025    MCV 95 07/09/2025    RDW 12.8 07/09/2025     BMP:   Lab Results   Component Value Date     07/09/2025    K 4.1 07/09/2025     07/09/2025    CO2 28 07/09/2025    BUN 9 07/09/2025    CREATININE 1.0 07/09/2025    GLU 87 07/09/2025    CALCIUM 9.6 07/09/2025    MG 2.1 03/25/2024    PHOS 4.2 03/25/2024     LFTS;   Lab Results   Component Value Date    PROT 6.3 07/09/2025    ALBUMIN 3.4 (L) 07/09/2025    BILITOT 0.5 07/09/2025    AST 21 07/09/2025    ALKPHOS 64  "07/09/2025    ALT 17 07/09/2025     COAGS:   Lab Results   Component Value Date    INR 1.1 03/18/2024     FLP:   Lab Results   Component Value Date    CHOL 106 (L) 07/09/2025    HDL 43 07/09/2025    LDLCALC 50.8 (L) 07/09/2025    TRIG 61 07/09/2025    CHOLHDL 40.6 07/09/2025     CARDIAC: No results found for: "TROPONINI", "CKTOTAL", "CKMB", "BNP"    Imaging:    CTA Neck 11/13/2023:  CTA neck shows no evidence of significant subclavian stenosis as clinically questioned.   Atherosclerotic disease with mixed calcified noncalcified plaque at the carotid bifurcations right greater than left and left common carotid artery.  No high-grade stenosis by NASCET criteria.  The estimated stenosis is less than 50%.   Nodular enhancing lesion at the base of the tongue right.  Recommend follow-up with ENT further evaluation/characterization.    BUE Arterial Ultrasound 10/19/2023:    Right subclavian artery with elevated velocity suggestive of greater than 50% stenosis.  Recommend further evaluation with CTA or MRA if clinically indicated.    Right wrist brachial index 1.0, is normal.    Left wrist brachial index 1.16, is normal.    Assessment/Plan:  Timothy Galdamez is a 70 y.o. male with HTN, history of squamous cell CA s/p XRT (2015), overweight, former smoker, who presents for a follow up appointment.     Left Subclavian artery stenosis- Pt is asymptomatic with no LUE weakness, dizziness, or other symptoms related to subclavian steal.  Etiologies include atherosclerosis and prior radiation exposure.  CTA Neck on 11/13/2023 revealed no evidence of significant subclavian stenosis as clinically questioned. Atherosclerotic disease with mixed calcified noncalcified plaque at the carotid bifurcations right greater than left and left common carotid artery.  No high-grade stenosis by NASCET criteria.  The estimated stenosis is less than 50%.  LDL 51 on 7/9/2025.  Continue rosuvastatin 20 mg daily, zetia 10 mg daily, and ASA 81 mg daily.  " Check updated CTA neck prior to next clinic visit for surveillance.     2. HTN- Continue current medications.     3. HLD- LDL 51 on 7/9/2025.  Continue rosuvastatin 20 mg daily and zetia 10 mg daily.      Follow up in 6 months with CTA neck prior    Total duration of face to face visit time 20 minutes.  Total time spent counseling greater than fifty percent of total visit time.  Counseling included discussion regarding imaging findings, diagnosis, possibilities, treatment options, risks and benefits.  The patient had many questions regarding the options and long-term effects.    Carlos Guzmán MD, PhD  Interventional Cardiology

## 2025-07-16 NOTE — PATIENT INSTRUCTIONS
Assessment/Plan:  Timothy aGldamez is a 70 y.o. male with HTN, history of squamous cell CA s/p XRT (2015), overweight, former smoker, who presents for a follow up appointment.     Left Subclavian artery stenosis- Pt is asymptomatic with no LUE weakness, dizziness, or other symptoms related to subclavian steal.  Etiologies include atherosclerosis and prior radiation exposure.  CTA Neck on 11/13/2023 revealed no evidence of significant subclavian stenosis as clinically questioned. Atherosclerotic disease with mixed calcified noncalcified plaque at the carotid bifurcations right greater than left and left common carotid artery.  No high-grade stenosis by NASCET criteria.  The estimated stenosis is less than 50%.  LDL 51 on 7/9/2025.  Continue rosuvastatin 20 mg daily, zetia 10 mg daily, and ASA 81 mg daily.  Check updated CTA neck prior to next clinic visit for surveillance.     2. HTN- Continue current medications.     3. HLD- LDL 51 on 7/9/2025.  Continue rosuvastatin 20 mg daily and zetia 10 mg daily.      Follow up in 6 months with CTA neck prior

## 2025-07-28 ENCOUNTER — OFFICE VISIT (OUTPATIENT)
Dept: OTOLARYNGOLOGY | Facility: CLINIC | Age: 70
End: 2025-07-28
Payer: MEDICARE

## 2025-07-28 ENCOUNTER — PATIENT MESSAGE (OUTPATIENT)
Dept: OTOLARYNGOLOGY | Facility: CLINIC | Age: 70
End: 2025-07-28

## 2025-07-28 VITALS
BODY MASS INDEX: 20.72 KG/M2 | WEIGHT: 152.75 LBS | DIASTOLIC BLOOD PRESSURE: 87 MMHG | HEART RATE: 65 BPM | SYSTOLIC BLOOD PRESSURE: 133 MMHG

## 2025-07-28 DIAGNOSIS — C02.9 TONGUE CANCER: Primary | ICD-10-CM

## 2025-07-28 PROCEDURE — 1126F AMNT PAIN NOTED NONE PRSNT: CPT | Mod: CPTII,HCNC,S$GLB, | Performed by: OTOLARYNGOLOGY

## 2025-07-28 PROCEDURE — 3079F DIAST BP 80-89 MM HG: CPT | Mod: CPTII,HCNC,S$GLB, | Performed by: OTOLARYNGOLOGY

## 2025-07-28 PROCEDURE — 99999 PR PBB SHADOW E&M-EST. PATIENT-LVL III: CPT | Mod: PBBFAC,HCNC,, | Performed by: OTOLARYNGOLOGY

## 2025-07-28 PROCEDURE — 1160F RVW MEDS BY RX/DR IN RCRD: CPT | Mod: CPTII,HCNC,S$GLB, | Performed by: OTOLARYNGOLOGY

## 2025-07-28 PROCEDURE — 99213 OFFICE O/P EST LOW 20 MIN: CPT | Mod: HCNC,S$GLB,, | Performed by: OTOLARYNGOLOGY

## 2025-07-28 PROCEDURE — 3075F SYST BP GE 130 - 139MM HG: CPT | Mod: CPTII,HCNC,S$GLB, | Performed by: OTOLARYNGOLOGY

## 2025-07-28 PROCEDURE — 1159F MED LIST DOCD IN RCRD: CPT | Mod: CPTII,HCNC,S$GLB, | Performed by: OTOLARYNGOLOGY

## 2025-07-28 PROCEDURE — 3008F BODY MASS INDEX DOCD: CPT | Mod: CPTII,HCNC,S$GLB, | Performed by: OTOLARYNGOLOGY

## 2025-07-28 NOTE — PROGRESS NOTES
CC: Surveillance      HPI   70 y.o. male presents status post subtotal glossectomy and pec flap reconstruction in March, 2024.    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  He underwent right sided cervical lymph node biopsy which revealed metastatic disease in the right neck and subsequent laryngoscopy with biopsy of the right base of tongue.  He was treated with radiation at [facility] in 2015.      No significant complaints today.  PEG tube removed. He is maintaining his weight.    Review of Systems   Constitutional: Negative for fatigue and unexpected weight change.   HENT: Per HPI.  Eyes: Negative for visual disturbance.   Respiratory: Negative for shortness of breath, hemoptysis   Cardiovascular: Negative for chest pain and palpitations.   Musculoskeletal: Negative for decreased ROM, back pain.   Skin: Negative for rash, sunburn, itching.   Neurological: Negative for dizziness and seizures.   Hematological: Negative for adenopathy. Does not bruise/bleed easily.   Endocrine: Negative for rapid weight loss/weight gain, heat/cold intolerance.     Past Medical History   Patient Active Problem List   Diagnosis    Primary insomnia    Hyperlipidemia    Tongue cancer    Rotator cuff syndrome of right shoulder    Memory change    RLS (restless legs syndrome)    Chronic right-sided low back pain    Ectatic aorta    Subclavian artery stenosis, left    Aortic atherosclerosis    Coronary artery calcification    Hypertension, essential    Elevated alanine aminotransferase (ALT) level    S/P percutaneous endoscopic gastrostomy (PEG) tube placement    Hypokalemia    Hypoalbuminemia    Hypophosphatemia    Oral bleeding    Hyponatremia    Acute on chronic blood loss anemia    Hx of glossectomy    Moderate malnutrition    Hemoptysis    Hyperkalemia    Elevated carbon dioxide level    On tube feeding diet            Past Surgical History   Past Surgical History:   Procedure Laterality Date    ANKLE SURGERY      L ankle    BIOPSY OF TISSUE OF NECK Left 2013    COLONOSCOPY N/A 08/21/2017    Procedure: COLONOSCOPY;  Surgeon: Danny Jane MD;  Location: [facility] ENDO (4TH FLR);  Service: Endoscopy;  Laterality: N/A;  Do not cancel this order    CREATION OF MUSCLE ROTATIONAL FLAP N/A 2/23/2024    Procedure: CREATION, FLAP, MUSCLE ROTATION;  Surgeon: Jeferson Ventura MD;  [facility];  Service: ENT;  Laterality: N/A;    DIRECT LARYNGOBRONCHOSCOPY N/A 12/11/2023    Procedure: LARYNGOSCOPY, DIRECT, WITH BRONCHOSCOPY;  Surgeon: Micaela Poole MD;  [facility];  Service: ENT;  Laterality: N/A;    DISSECTION OF NECK Bilateral 2/23/2024    Procedure: DISSECTION, NECK;  Surgeon: Jeferson Ventura MD;  [facility];  Service: ENT;  Laterality: Bilateral;    EMBOLIZATION N/A 3/20/2024    Procedure: EMBOLIZATION, BLOOD VESSEL;  Surgeon: Edith Jasso;  Location: [facility] EDITH;  Service: Anesthesiology;  Laterality: N/A;  lingual artery embo    ESOPHAGOGASTRODUODENOSCOPY N/A 09/18/2023    Procedure: EGD (ESOPHAGOGASTRODUODENOSCOPY);  Surgeon: Basilia Villavicencio MD;  Location: [facility] ENDOSCOPY;  Service: Gastroenterology;  Laterality: N/A;  9.13 instr sent via portal [facility]  pre call complete, stated he would have a ride -HH    GLOSSECTOMY Right 2/23/2024    Procedure: GLOSSECTOMY;  Surgeon: Jeferson Ventura MD;  [address]  Service: ENT;  Laterality: Right;    INSERTION, PEG TUBE Right 2/23/2024    Procedure: INSERTION, PEG TUBE;  Surgeon: Alpesh Wu MD;  [facility];  Service: General;  Laterality: Right;    TONSILLECTOMY      TRACHEOTOMY N/A 2/23/2024    Procedure: TRACHEOTOMY;  Surgeon: Jeferson Ventura MD;  [facility];  Service: ENT;  Laterality: N/A;    TUMOR REMOVAL Right 2015    at     VASECTOMY      WOUND DEBRIDEMENT  N/A 3/22/2024    Procedure: DEBRIDEMENT, WOUND;  Surgeon: Jeferson Ventura MD;  [facility] OR [address] [place] [address];  Service: ENT;  Laterality: N/A;    WRIST FRACTURE SURGERY Right          Family History   Family History   Problem Relation Name Age of Onset    Arthritis Mother      COPD Brother      Psoriasis Neg Hx      Eczema Neg Hx             Social History   .  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                 Social Drivers of Health     Financial Resource Strain: Low Risk  (2025)    Overall Financial Resource Strain (CARDIA)     Difficulty of Paying Living Expenses: Not hard at all   Food Insecurity: No Food Insecurity (2025)    Hunger Vital Sign     Worried About Running Out of Food in the Last Year: Never true     Ran Out of Food in the Last Year: Never true   Transportation Needs: No Transportation Needs (2025)    PRAPARE - Transportation     Lack of Transportation (Medical): No     Lack of Transportation (Non-Medical): No   Physical Activity: Insufficiently Active (2025)    Exercise Vital Sign     Days of Exercise per Week: 4 days     Minutes of Exercise per Session: 30 min   Stress: No Stress Concern Present (2025)    [employer] - Occupational Stress Questionnaire     Feeling of Stress : Only a little   Housing Stability: Low Risk  (2025)    Housing Stability Vital Sign     Unable to Pay for Housing in the  Last Year: No     Number of Times Moved in the Last Year: 0     Homeless in the Last Year: No         Allergies   Review of patient's allergies indicates:  No Known Allergies        Physical Exam     Vitals:    07/28/25 0945   BP: 133/87   Pulse: 65         Body mass index is 20.72 kg/m².      General: AOx3, NAD   Respiratory:  Symmetric chest rise, normal effort   Oral Cavity:  Tongue absent. No lesions. Hard Palate WNL. No buccal or FOM lesions.  Oropharynx:  No masses/lesions of the posterior pharyngeal wall. Tonsillar fossa without lesions. Soft palate without masses. Midline uvula.   Neck: Well-healed neck scars.  No cervical lymphadenopathy, thyromegaly or thyroid nodules.  Normal range of motion.    Face: House Brackmann I bilaterally.     NP: No lesions of posterior wall  OP: No lesions of posterior wall or BOT. BOT is soft to palpation  Larynx: No lesions of glottic or supraglottic larynx. Normal vocal fold mobility   HP: No lesions of pyriform sinuses or postcricoid region  Mirror examination was performed.            Assessment/Plan  Problem List Items Addressed This Visit          Oncology    Tongue cancer - Primary    BRIGIDA.  RTC 3 mos.

## 2025-08-17 ENCOUNTER — PATIENT MESSAGE (OUTPATIENT)
Dept: OTOLARYNGOLOGY | Facility: CLINIC | Age: 70
End: 2025-08-17
Payer: MEDICARE

## 2025-08-19 DIAGNOSIS — Z93.1 S/P PERCUTANEOUS ENDOSCOPIC GASTROSTOMY (PEG) TUBE PLACEMENT: ICD-10-CM

## 2025-08-19 DIAGNOSIS — Z71.3 NUTRITIONAL COUNSELING: Primary | ICD-10-CM

## 2025-08-19 DIAGNOSIS — E44.0 MODERATE MALNUTRITION: ICD-10-CM

## 2025-08-19 DIAGNOSIS — C02.9 TONGUE CANCER: ICD-10-CM

## 2025-08-26 ENCOUNTER — TELEPHONE (OUTPATIENT)
Dept: HEMATOLOGY/ONCOLOGY | Facility: CLINIC | Age: 70
End: 2025-08-26
Payer: MEDICARE

## 2025-08-26 DIAGNOSIS — C02.9 TONGUE CANCER: ICD-10-CM

## 2025-08-26 DIAGNOSIS — Z71.3 NUTRITIONAL COUNSELING: Primary | ICD-10-CM

## 2025-08-26 DIAGNOSIS — Z90.49 HX OF GLOSSECTOMY: ICD-10-CM

## (undated) DEVICE — SUT SILK 2-0 SH 18IN BLACK

## (undated) DEVICE — HOLDER TRACH TUBE NECKBAND

## (undated) DEVICE — MARKER FN REG DUAL UTIL RULER

## (undated) DEVICE — KIT URINARY CATH URINE METER

## (undated) DEVICE — TRAY SKIN SCRUB WET PREMIUM

## (undated) DEVICE — KIT PEG PULL STANDARD 20F

## (undated) DEVICE — DRAPE HALF SURGICAL 40X58IN

## (undated) DEVICE — ELECTRODE REM PLYHSV RETURN 9

## (undated) DEVICE — DRESSING AQUACEL SACRAL 9 X 9

## (undated) DEVICE — PAD K-THERMIA 24IN X 60IN

## (undated) DEVICE — SUT 2-0 SILK 30IN BLK BRAID

## (undated) DEVICE — TRAY MINOR GEN SURG OMC

## (undated) DEVICE — TUBING SUC UNIV W/CONN 12FT

## (undated) DEVICE — CLAMP SINGLE MICRO.

## (undated) DEVICE — GOWN SURGICAL X-LARGE

## (undated) DEVICE — STAPLER SKIN PROXIMATE WIDE

## (undated) DEVICE — CONTAINER SPECIMEN STRL 4OZ

## (undated) DEVICE — SUT LIGACLIP SMALL XTRA

## (undated) DEVICE — KIT ANTIFOG W/SPONG & FLUID

## (undated) DEVICE — GAUZE SPONGE PEANUT STRL

## (undated) DEVICE — COVER LIGHT HANDLE 80/CA

## (undated) DEVICE — CLIP DOUBLE MICRO.

## (undated) DEVICE — SUT 3-0 12-18IN SILK

## (undated) DEVICE — DRAPE EENT SPLIT STERILE

## (undated) DEVICE — PACK UNIVERSAL SPLIT II

## (undated) DEVICE — SUT SILK 3-0 RB-1 30IN BLK

## (undated) DEVICE — TOWEL OR DISP STRL BLUE 4/PK

## (undated) DEVICE — LUBRICANT SURGILUBE 2 OZ

## (undated) DEVICE — ELECTRODE BLADE INSULATED 1 IN

## (undated) DEVICE — Device

## (undated) DEVICE — SPONGE LAP 18X18 PREWASHED

## (undated) DEVICE — PROBE CATH TEMP 16 FRFOLEY 400

## (undated) DEVICE — DRAPE INSTR MAGNETIC 10X16IN

## (undated) DEVICE — BLADE SURG #15 CARBON STEEL

## (undated) DEVICE — DRAPE TOP 53X102IN

## (undated) DEVICE — SUT COATED VICRYL 4/0 27IN

## (undated) DEVICE — BOOT AIR FLUID HEEL ADLT STD

## (undated) DEVICE — CLIP MED TICALL

## (undated) DEVICE — HOOK LONE STAR BLUNT 12MM

## (undated) DEVICE — KIT SAHARA DRAPE DRAW/LIFT

## (undated) DEVICE — CORD BIPOLAR 12 FOOT

## (undated) DEVICE — FIBRILLAR ABS HEMOSTAT 4X4

## (undated) DEVICE — NDL HYPO REG 25G X 1 1/2

## (undated) DEVICE — DRAPE STERI INSTRUMENT 1018

## (undated) DEVICE — SUT SILK 3-0 SH 18IN BLACK

## (undated) DEVICE — CATH IV INTROCAN 20G X 1.1

## (undated) DEVICE — TRAY CATH FOL SIL URIMTR 16FR

## (undated) DEVICE — ADHESIVE DERMABOND ADVANCED

## (undated) DEVICE — SUT MCRYL PLUS 4-0 PS2 27IN

## (undated) DEVICE — SUT 2-0 12-18IN SILK

## (undated) DEVICE — TRAY ENT 4/CS

## (undated) DEVICE — SUT ETHILON 3-0 PS2 18 BLK

## (undated) DEVICE — SUT SILK 2-0 PS 18IN BLACK

## (undated) DEVICE — SUT VICRYL PLUS 3-0 SH 18IN

## (undated) DEVICE — SUT VICRYL 3-0 27 SH

## (undated) DEVICE — FORCEP HARDY ELETRO 7.5IN 1MM

## (undated) DEVICE — DRAPE CORETEMP FLD WRM 56X62IN

## (undated) DEVICE — CATH IV INTROCAN 22G X 1

## (undated) DEVICE — SUT 4-0 VICRYL / SH

## (undated) DEVICE — CATH IV INTROCAN 24G X 3/4